# Patient Record
Sex: FEMALE | Race: WHITE | NOT HISPANIC OR LATINO | Employment: OTHER | URBAN - METROPOLITAN AREA
[De-identification: names, ages, dates, MRNs, and addresses within clinical notes are randomized per-mention and may not be internally consistent; named-entity substitution may affect disease eponyms.]

---

## 2017-01-11 ENCOUNTER — OFFICE VISIT (OUTPATIENT)
Dept: CARDIOLOGY | Facility: CLINIC | Age: 72
End: 2017-01-11
Payer: COMMERCIAL

## 2017-01-11 VITALS
WEIGHT: 251 LBS | BODY MASS INDEX: 43.06 KG/M2 | SYSTOLIC BLOOD PRESSURE: 151 MMHG | OXYGEN SATURATION: 94 % | HEART RATE: 56 BPM | DIASTOLIC BLOOD PRESSURE: 78 MMHG

## 2017-01-11 DIAGNOSIS — I25.10 CALCIFICATION OF CORONARY ARTERY: ICD-10-CM

## 2017-01-11 DIAGNOSIS — I48.91 ATRIAL FIBRILLATION (H): Primary | ICD-10-CM

## 2017-01-11 DIAGNOSIS — I48.0 PAROXYSMAL ATRIAL FIBRILLATION (H): Primary | ICD-10-CM

## 2017-01-11 DIAGNOSIS — I50.33 ACUTE ON CHRONIC DIASTOLIC CONGESTIVE HEART FAILURE (H): ICD-10-CM

## 2017-01-11 LAB
ALBUMIN SERPL-MCNC: 3.8 G/DL (ref 3.4–5)
ALP SERPL-CCNC: 86 U/L (ref 40–150)
ALT SERPL W P-5'-P-CCNC: 28 U/L (ref 0–50)
AST SERPL W P-5'-P-CCNC: 13 U/L (ref 0–45)
BILIRUB DIRECT SERPL-MCNC: 0.1 MG/DL (ref 0–0.2)
BILIRUB SERPL-MCNC: 0.4 MG/DL (ref 0.2–1.3)
PROT SERPL-MCNC: 8.3 G/DL (ref 6.8–8.8)

## 2017-01-11 PROCEDURE — 99214 OFFICE O/P EST MOD 30 MIN: CPT | Performed by: INTERNAL MEDICINE

## 2017-01-11 PROCEDURE — 80076 HEPATIC FUNCTION PANEL: CPT | Performed by: INTERNAL MEDICINE

## 2017-01-11 PROCEDURE — 36415 COLL VENOUS BLD VENIPUNCTURE: CPT | Performed by: INTERNAL MEDICINE

## 2017-01-11 RX ORDER — ATENOLOL 50 MG/1
50 TABLET ORAL 2 TIMES DAILY
Qty: 180 TABLET | Refills: 3 | Status: SHIPPED | OUTPATIENT
Start: 2017-01-11 | End: 2017-08-31 | Stop reason: ALTCHOICE

## 2017-01-11 RX ORDER — TORSEMIDE 20 MG/1
40 TABLET ORAL DAILY
Qty: 180 TABLET | Refills: 3 | Status: SHIPPED | OUTPATIENT
Start: 2017-01-11 | End: 2018-02-01

## 2017-01-11 RX ORDER — ATORVASTATIN CALCIUM 40 MG/1
40 TABLET, FILM COATED ORAL DAILY
Qty: 90 TABLET | Refills: 3 | Status: SHIPPED | OUTPATIENT
Start: 2017-01-11 | End: 2018-02-01

## 2017-01-11 RX ORDER — AMIODARONE HYDROCHLORIDE 200 MG/1
200 TABLET ORAL DAILY
Qty: 90 TABLET | Refills: 3 | Status: SHIPPED | OUTPATIENT
Start: 2017-01-11 | End: 2018-02-01

## 2017-01-11 ASSESSMENT — PAIN SCALES - GENERAL: PAINLEVEL: NO PAIN (0)

## 2017-01-11 NOTE — NURSING NOTE
"Chief Complaint   Patient presents with     RECHECK     10 month follow up for HFpEF and dyspnea on exertion. Former Dr. Cobos Pt. Feeling well for the most part. Does report more fatigue and SOB since last time.     Refill Request     all cardiac meds       Initial /78 mmHg  Pulse 56  Wt 113.853 kg (251 lb)  SpO2 94% Estimated body mass index is 43.06 kg/(m^2) as calculated from the following:    Height as of 3/8/16: 1.626 m (5' 4\").    Weight as of this encounter: 113.853 kg (251 lb)..  BP completed using cuff size: regular on right forearm.    Tanesha Mario CMA    "

## 2017-01-11 NOTE — Clinical Note
1/11/2017      RE: Caty Ng  [address] [address]       Dear Colleague,    Thank you for the opportunity to participate in the care of your patient, Caty Ng, at the [facility] AT [facility]. Please see a copy of my visit note below.       SUBJECTIVE:  Caty Ng is a 71 year old female who presents for follow up.  PAF.On Amio for over a year.Remain in SR. Also have elevated wedge pressure by cath in the past.  Symptoms more or less unchanged.Recent cough and some SOB with that.    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 Provider to review       Calcification of coronary artery 07/01/2015     Priority: Medium     Chronic diastolic heart failure (H) 03/06/2014     Priority: Medium     Hyperlipidemia LDL goal <130 11/05/2013     Priority: Medium     BROCK (obstructive sleep apnea) 11/04/2013     Priority: Medium     Patient has chosen to not do cpap.       Atrial fibrillation (H) 10/17/2012     Priority: Medium     Hyperlipidemia      Priority: Medium     Systolic heart failure (H)      Priority: Medium     Followed by Dr. Cobos, cardiology       Mitral valve regurgitation 06/24/2012     Priority: Medium     Health Care Home 06/22/2012     Priority: Medium     [facility] for .  Rosenda Hoyt RN-BSN, [facility]  [phone]   DX V65.8 REPLACED WITH [id number] HEALTH CARE HOME (04/08/2013)       Atrial fibrillation with RVR (H) 06/21/2012     Priority: Medium     Followed by Dr. Cobos,  cardiology       Hypertriglyceridemia      Priority: Medium     overweight with BMI >40 11/29/2011     Priority: Medium     Advanced directives, counseling/discussion 11/29/2011     Priority: Low     Advance Directive Problem List Overview:   Name Relationship Phone    Primary Health Care Agent            Alternative Health Care Agent          Discussed advance care planning with patient; information given to patient to review. 11/29/2011       .  Current Outpatient Prescriptions   Medication Sig     levothyroxine (SYNTHROID/LEVOTHROID) 75 MCG tablet Take 1 tablet (75 mcg) by mouth daily     warfarin (COUMADIN) 2 MG tablet Take as directed by ACC. Current dose is 4mg MWF and 2 TThSS.     torsemide (DEMADEX) 20 MG tablet Take 2 tablets (40 mg) by mouth daily     amiodarone (PACERONE/CODARONE) 200 MG tablet Take 1 tablet (200 mg) by mouth daily     atenolol (TENORMIN) 50 MG tablet Take 1 tablet (50 mg) by mouth 2 times daily     atorvastatin (LIPITOR) 40 MG tablet Take 1 tablet (40 mg) by mouth daily     fluticasone (FLONASE) 50 MCG/ACT nasal spray Spray 1-2 sprays into both nostrils daily Please inform patient if the OTC would be cheaper for her or if there is an alternative nasal steroid that is less costly, please provide that information.     loratadine (CLARITIN) 10 MG tablet Take 10 mg by mouth as needed for allergies     No current facility-administered medications for this visit.     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= [employer]     Hypertriglyceridemia      Mitral valve regurgitation 6/24/2012     Atrial fibrillation with RVR (H)      Hyperlipidemia      Systolic heart failure (H)      Obesity      BROCK (obstructive sleep apnea)      Past Surgical History   Procedure Laterality Date     Joint replacemtn, knee rt/lt  2007     right     Surgical history of -   1989     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     Hysterectomy, pap no longer indicated  1989      Salpingo oophorectomy,r/l/brando  2003     Salpingo Oophorectomy, BRANDO     Allergies   Allergen Reactions     Lisinopril      cough     Metal [Staples] Other (See Comments)     Metal allergy not clearly specified - patient had a stainless steel screw put in her foot that needed to be removed due to allergy/hypersensitivity      Metoprolol Fatigue     Social History     Social History     Marital Status:      Spouse Name: N/A     Number of Children: N/A     Years of Education: N/A     Occupational History     Not on file.     Social History Main Topics     Smoking status: Former Smoker -- 0.50 packs/day for 20 years     Types: Cigarettes     Quit date: 10/01/1982     Smokeless tobacco: Never Used     Alcohol Use: No      Comment: 1 beers per week     Drug Use: No     Sexual Activity:     Partners: Male     Other Topics Concern     Parent/Sibling W/ Cabg, Mi Or Angioplasty Before 65f 55m? Yes     Social History Narrative    , [employer].    2 children.    One great grandchildren.        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     Family History   Problem Relation Age of Onset     HEART DISEASE Mother      CANCER Mother      ovarian     DIABETES Father      Musculoskeletal Disorder Father      DIABETES Paternal Grandmother      DIABETES Paternal Grandfather      DIABETES Sister      Thrombophilia Daughter      Factor V Leiden Deficiency          REVIEW OF SYSTEMS:  General: negative, fever, chills, night sweats  Skin: negative, acne, rash and scaling  Eyes: negative, glaucoma, eye pain and photophobia  Ears/Nose/Throat: negative, nasal congestion and purulent rhinorrhea  Respiratory: No hemoptysis and negative  Cardiovascular: negative, palpitations, tachycardia, irregular heart beat, chest pain, exertional chest pain or pressure, paroxysmal nocturnal dyspnea and orthopnea         OBJECTIVE:  Blood pressure 151/78, pulse 56, weight 113.853 kg (251 lb), SpO2 94 %, not currently breastfeeding.  General Appearance: alert and  no distress  Head: Normocephalic. No masses, lesions, tenderness or abnormalities  Eyes: conjuctiva clear, PERRL, EOM intact  Ears: External ears normal. Canals clear. TM's normal.  Nose: Nares normal  Mouth: normal  Neck: Supple, no cervical adenopathy, no thyromegaly  Lungs: clear to auscultation  Cardiac: regular rate and rhythm, normal S1 and S2, no murmur         ASSESSMENT/PLAN:  PAF. HFpEF. Doing resonably well. Cough and SOB now.  On Amio for over a year.Remain in SR. Will check DLCO.  Cough- if persist,may contact PCP.  EF known to be mildly reduced.Will check echo.  Per orders.   Return to Clinic 1yr    Please do not hesitate to contact me if you have any questions/concerns.     Sincerely,     MEL Casey MD

## 2017-01-11 NOTE — MR AVS SNAPSHOT
After Visit Summary   1/11/2017    Caty Ng    MRN: [id number]           Patient Information     Date Of Birth          1945        Visit Information        Provider Department      1/11/2017 2:30 PM MEL Casey MD [facility] AT [facility]        Today's Diagnoses     Atrial fibrillation (H)    -  1     Mitral valve regurgitation         Systolic heart failure (H)         On amiodarone therapy           Care Instructions    1.  Dr. JACOB Whitlock would like for you to have an ECHO done. This is scheduled for:    Wednesday January 11th, 2017 at 11:00am. [address].  Cardiology RN will call you to discuss the results.      2. Dr. JACOB Whitlock would like for you to have labs today: LFT    3. Please call [facility]: [phone] to schedule PFT (pulmonary function testing).    4. Please follow up with your Primary doctor if cough worsens.    5. Dr. JACOB Whitlock would like to see you back in 1 year (January 2017). We will call you to schedule this appointment as time draws closer to the date.          [facility] Location    If you have any questions regarding to your visit please contact your care team:     Cardiology  Telephone Number   Hansel Agudelo  Cardiology RN's.    Gloria Mario CMA [phone]    After hours: [phone].  [phone]   For scheduling appts:     [phone] or  [phone]    After hours: [phone]   For the Device Clinic (Pacemakers and ICD's)  RN's :  Maricruz Chaudhari   During business hours: [phone]  After business hours:  [phone]- select option 4.      If you need a medication refill please contact your pharmacy.  Please allow 3 business days for your refill to be completed.    As always, Thank you for trusting us with your health care needs!  _____________________________________________________________________            Follow-ups  after your visit        Your next 10 appointments already scheduled     Jan 11, 2017  3:00 PM   LAB with FK LAB   [facility]    [address] [place] [id number] [phone]           Patient must bring picture ID.  Patient should be prepared to give a urine specimen  Please do not eat 10-12 hours before your appointment if you are coming in fasting for labs on lipids, cholesterol, or glucose (sugar).  Pregnant women should follow their Care Team instructions. Water with medications is okay. Do not drink coffee or other fluids.   If you have concerns about taking  your medications, please ask at office or if scheduling via BioAmber, send a message by clicking on Secure Messaging, Message Your Care Team.            Jan 16, 2017 10:00 AM   Anticoagulation Visit with NE ANTI COAG   [facility]    [address] [place] [id number] [phone]            Jan 18, 2017 11:00 AM   Ech Complete with FKECHR1   [facility] [place] ([facility])    [address] [place] [address] [address]nd [facility]  [place] [address]   [phone]           1.  Please bring or wear a comfortable two-piece outfit. 2.  You may eat, drink and take your normal medicines. 3.  For any questions that cannot be answered, please contact the ordering physician              Future tests that were ordered for you today     Open Future Orders        Priority Expected Expires Ordered    Hepatic panel Routine 1/11/2017 1/11/2018 1/11/2017    Echocardiogram Complete Routine  1/11/2018 1/11/2017    General PFT Lab (Please always keep checked) Routine  1/11/2018 1/11/2017    Pulmonary Function Test Routine  1/11/2018 1/11/2017            Who to contact     If you have questions or need follow up information about today's clinic visit or your schedule please contact [facility] PHYSICIANS HEART AT [place]  [facility] directly at [phone].  Normal or non-critical lab and imaging results will be communicated to you by MyChart, letter or phone within 4 business days after the clinic has received the results. If you do not hear from us within 7 days, please contact the clinic through Routehappyhart or phone. If you have a critical or abnormal lab result, we will notify you by phone as soon as possible.  Submit refill requests through Privcap or call your pharmacy and they will forward the refill request to us. Please allow 3 business days for your refill to be completed.          Additional Information About Your Visit        Routehappyhart Information     [employer] gives you secure access to your electronic health record. If you see a primary care provider, you can also send messages to your care team and make appointments. If you have questions, please call your primary care clinic.  If you do not have a primary care provider, please call [phone] and they will assist you.        Care EveryWhere ID     This is your Care EveryWhere ID. This could be used by other organizations to access your [facility] medical records  [id number]        Your Vitals Were     Pulse Pulse Oximetry                56 94%           Blood Pressure from Last 3 Encounters:   01/11/17 151/78   03/08/16 153/80   02/24/16 132/70    Weight from Last 3 Encounters:   01/11/17 113.853 kg (251 lb)   03/08/16 116.937 kg (257 lb 12.8 oz)   02/24/16 115.214 kg (254 lb)               Primary Care Provider Office Phone # Fax #    Avis Ferrer [phone]       [address] [address]        Thank you!     Thank you for choosing [facility] PHYSICIANS HEART AT [facility]  for your care. Our goal is always to provide you with excellent care. Hearing back from our patients is one way we can continue to improve our services. Please take a few minutes to complete the written survey that  you may receive in the mail after your visit with us. Thank you!             Your Updated Medication List - Protect others around you: Learn how to safely use, store and throw away your medicines at www.disposemymeds.org.          This list is accurate as of: 1/11/17  2:55 PM.  Always use your most recent med list.                   Brand Name Dispense Instructions for use    amiodarone 200 MG tablet    PACERONE/CODARONE    90 tablet    Take 1 tablet (200 mg) by mouth daily       atenolol 50 MG tablet    TENORMIN    180 tablet    Take 1 tablet (50 mg) by mouth 2 times daily       atorvastatin 40 MG tablet    LIPITOR    90 tablet    Take 1 tablet (40 mg) by mouth daily       fluticasone 50 MCG/ACT spray    FLONASE    16 g    Spray 1-2 sprays into both nostrils daily Please inform patient if the OTC would be cheaper for her or if there is an alternative nasal steroid that is less costly, please provide that information.       levothyroxine 75 MCG tablet    SYNTHROID/LEVOTHROID    90 tablet    Take 1 tablet (75 mcg) by mouth daily       loratadine 10 MG tablet    CLARITIN     Take 10 mg by mouth as needed for allergies       torsemide 20 MG tablet    DEMADEX    180 tablet    Take 2 tablets (40 mg) by mouth daily       warfarin 2 MG tablet    COUMADIN    135 tablet    Take as directed by ACC. Current dose is 4mg MWF and 2 TThSS.

## 2017-01-11 NOTE — PATIENT INSTRUCTIONS
1.  Dr. JACOB Whitlock would like for you to have an ECHO done. This is scheduled for:    Wednesday January 11th, 2017 at 11:00am. [address].  Cardiology RN will call you to discuss the results.      2. Dr. JACOB Whitlock would like for you to have labs today: LFT    3. Please call [facility]: [phone] to schedule PFT (pulmonary function testing).    4. Please follow up with your Primary doctor if cough worsens.    5. Dr. JACOB Whitlock would like to see you back in 1 year (January 2017). We will call you to schedule this appointment as time draws closer to the date.          [facility] Cardiology - [place] Location    If you have any questions regarding to your visit please contact your care team:     Cardiology  Telephone Number   Hansel Agudelo  Cardiology RN's.    Gloria Mario CMA [phone]    After hours: [phone].  [phone]   For scheduling appts:     [phone] or  [phone]    After hours: [phone]   For the Device Clinic (Pacemakers and ICD's)  RN's :  Maricruz Chaudhari   During business hours: [phone]  After business hours:  [phone]- select option 4.      If you need a medication refill please contact your pharmacy.  Please allow 3 business days for your refill to be completed.    As always, Thank you for trusting us with your health care needs!  _____________________________________________________________________

## 2017-01-11 NOTE — NURSING NOTE
Cardiac Testing: Patient given instructions regarding  echocardiogram . Discussed purpose, preparation, procedure and when to expect results reported back to the patient. Patient demonstrated understanding of this information and agreed to call with further questions or concerns.  Labs: Amiodarone patient:  Patient was advised to have LFT hepatic panel drawn today.  Cardiology RN will call patient with results Patient demonstrated understanding of this information and agreed to call with further questions or concerns.     Dr. Whitlock has also ordered PFT's d/t on amiodarone.  Patient currently has a cold and wants to wait to have the PFT's until she feels better.  # given to schedule the PFT's at  location.   Med Reconcile: Reviewed and verified all current medications with the patient. The updated medication list was printed and given to the patient.  Return Appointment: Patient given instructions regarding scheduling next clinic visit. Patient demonstrated understanding of this information and agreed to call with further questions or concerns.  Patient stated she understood all health information given and agreed to call with further questions or concerns.  Rajani Stout RN

## 2017-01-11 NOTE — PROGRESS NOTES
SUBJECTIVE:  Caty Ng is a 71 year old female who presents for follow up.  PAF.On Amio for over a year.Remain in SR. Also have elevated wedge pressure by cath in the past.  Symptoms more or less unchanged.Recent cough and some SOB with that.    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 Provider to review       Calcification of coronary artery 07/01/2015     Priority: Medium     Chronic diastolic heart failure (H) 03/06/2014     Priority: Medium     Hyperlipidemia LDL goal <130 11/05/2013     Priority: Medium     BROCK (obstructive sleep apnea) 11/04/2013     Priority: Medium     Patient has chosen to not do cpap.       Atrial fibrillation (H) 10/17/2012     Priority: Medium     Hyperlipidemia      Priority: Medium     Systolic heart failure (H)      Priority: Medium     Followed by Dr. Cobos, cardiology       Mitral valve regurgitation 06/24/2012     Priority: Medium     Health Care Home 06/22/2012     Priority: Medium     [facility] for .  Rosenda Hoyt RN-BSN, [facility]  [phone]   DX V65.8 REPLACED WITH [id number] HEALTH CARE HOME (04/08/2013)       Atrial fibrillation with RVR (H) 06/21/2012     Priority: Medium     Followed by Dr. Cobos, cardiology       Hypertriglyceridemia      Priority: Medium     overweight with BMI >40 11/29/2011     Priority: Medium     Advanced directives, counseling/discussion 11/29/2011     Priority: Low     Advance Directive Problem List Overview:   Name Relationship Phone    Primary Health Care Agent            Alternative Health Care Agent          Discussed advance care  planning with patient; information given to patient to review. 11/29/2011       .  Current Outpatient Prescriptions   Medication Sig     levothyroxine (SYNTHROID/LEVOTHROID) 75 MCG tablet Take 1 tablet (75 mcg) by mouth daily     warfarin (COUMADIN) 2 MG tablet Take as directed by ACC. Current dose is 4mg MWF and 2 TThSS.     torsemide (DEMADEX) 20 MG tablet Take 2 tablets (40 mg) by mouth daily     amiodarone (PACERONE/CODARONE) 200 MG tablet Take 1 tablet (200 mg) by mouth daily     atenolol (TENORMIN) 50 MG tablet Take 1 tablet (50 mg) by mouth 2 times daily     atorvastatin (LIPITOR) 40 MG tablet Take 1 tablet (40 mg) by mouth daily     fluticasone (FLONASE) 50 MCG/ACT nasal spray Spray 1-2 sprays into both nostrils daily Please inform patient if the OTC would be cheaper for her or if there is an alternative nasal steroid that is less costly, please provide that information.     loratadine (CLARITIN) 10 MG tablet Take 10 mg by mouth as needed for allergies     No current facility-administered medications for this visit.     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= [employer]     Hypertriglyceridemia      Mitral valve regurgitation 6/24/2012     Atrial fibrillation with RVR (H)      Hyperlipidemia      Systolic heart failure (H)      Obesity      BROCK (obstructive sleep apnea)      Past Surgical History   Procedure Laterality Date     Joint replacemtn, knee rt/lt  2007     right     Surgical history of -   1989     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     Hysterectomy, pap no longer indicated  1989     Salpingo oophorectomy,r/l/brando  2003     Salpingo Oophorectomy, BRANDO     Allergies   Allergen Reactions     Lisinopril      cough     Metal [Staples] Other (See Comments)     Metal allergy not clearly specified - patient had a stainless steel screw put in her foot that needed to be removed due to allergy/hypersensitivity      Metoprolol Fatigue     Social History      Social History     Marital Status:      Spouse Name: N/A     Number of Children: N/A     Years of Education: N/A     Occupational History     Not on file.     Social History Main Topics     Smoking status: Former Smoker -- 0.50 packs/day for 20 years     Types: Cigarettes     Quit date: 10/01/1982     Smokeless tobacco: Never Used     Alcohol Use: No      Comment: 1 beers per week     Drug Use: No     Sexual Activity:     Partners: Male     Other Topics Concern     Parent/Sibling W/ Cabg, Mi Or Angioplasty Before 65f 55m? Yes     Social History Narrative    , [employer].    2 children.    One great grandchildren.        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     Family History   Problem Relation Age of Onset     HEART DISEASE Mother      CANCER Mother      ovarian     DIABETES Father      Musculoskeletal Disorder Father      DIABETES Paternal Grandmother      DIABETES Paternal Grandfather      DIABETES Sister      Thrombophilia Daughter      Factor V Leiden Deficiency          REVIEW OF SYSTEMS:  General: negative, fever, chills, night sweats  Skin: negative, acne, rash and scaling  Eyes: negative, glaucoma, eye pain and photophobia  Ears/Nose/Throat: negative, nasal congestion and purulent rhinorrhea  Respiratory: No hemoptysis and negative  Cardiovascular: negative, palpitations, tachycardia, irregular heart beat, chest pain, exertional chest pain or pressure, paroxysmal nocturnal dyspnea and orthopnea         OBJECTIVE:  Blood pressure 151/78, pulse 56, weight 113.853 kg (251 lb), SpO2 94 %, not currently breastfeeding.  General Appearance: alert and no distress  Head: Normocephalic. No masses, lesions, tenderness or abnormalities  Eyes: conjuctiva clear, PERRL, EOM intact  Ears: External ears normal. Canals clear. TM's normal.  Nose: Nares normal  Mouth: normal  Neck: Supple, no cervical adenopathy, no thyromegaly  Lungs: clear to auscultation  Cardiac: regular rate and rhythm, normal S1 and S2, no  murmur         ASSESSMENT/PLAN:  PAF. HFpEF. Doing resonably well. Cough and SOB now.  On Amio for over a year.Remain in SR. Will check DLCO.  Cough- if persist,may contact PCP.  EF known to be mildly reduced.Will check echo.  Per orders.   Return to Clinic 1yr

## 2017-01-16 ENCOUNTER — ANTICOAGULATION THERAPY VISIT (OUTPATIENT)
Dept: NURSING | Facility: CLINIC | Age: 72
End: 2017-01-16
Payer: COMMERCIAL

## 2017-01-16 DIAGNOSIS — Z79.01 LONG TERM CURRENT USE OF ANTICOAGULANT THERAPY: Primary | ICD-10-CM

## 2017-01-16 DIAGNOSIS — I48.91 ATRIAL FIBRILLATION (H): ICD-10-CM

## 2017-01-16 LAB — INR POINT OF CARE: 3.8 (ref 2–3)

## 2017-01-16 PROCEDURE — 36416 COLLJ CAPILLARY BLOOD SPEC: CPT

## 2017-01-16 PROCEDURE — 99207 ZZC NO CHARGE NURSE ONLY: CPT

## 2017-01-16 PROCEDURE — 85610 PROTHROMBIN TIME: CPT | Mod: QW

## 2017-01-16 NOTE — MR AVS SNAPSHOT
Caty gN   1/16/2017 10:00 AM   Anticoagulation Therapy Visit    Description:  72 year old female   Provider:  CHRISSIE MANRIQUE   Department:  Ne Nurse           INR as of 1/16/2017     Selected INR 3.8! (1/16/2017)      Anticoagulation Summary as of 1/16/2017     INR goal 2.0-3.0   Selected INR 3.8! (1/16/2017)   Full instructions 1/16: Hold; Otherwise 2 mg every day   Next INR check 1/23/2017    Indications   Atrial fibrillation (H) [I48.91]  Long term current use of anticoagulant therapy [Z79.01]         Your next Anticoagulation Clinic appointment(s)     Jan 23, 2017 11:15 AM   Anticoagulation Visit with [facility]   [facility]    [address] [place] [address] [phone]              Contact Numbers     Please call [phone] to cancel and/or reschedule your appointment.  Please call [phone] with any problems or questions regarding your therapy          January 2017 Details    Sun Mon Tue Wed Thu Fri Sat     1               2               3               4               5               6               7                 8               9               10               11               12               13               14                 15               16      Hold   See details      17      2 mg         18      2 mg         19      2 mg         20      2 mg         21      2 mg           22      2 mg         23            24               25               26               27               28                 29               30               31                    Date Details   01/16 This INR check       Date of next INR:  1/23/2017         How to take your warfarin dose     To take:  2 mg Take 1 of the 2 mg tablets.    Hold Do not take your warfarin dose. See the Details table to the right for additional instructions.

## 2017-01-16 NOTE — PROGRESS NOTES
ANTICOAGULATION FOLLOW-UP CLINIC VISIT    Patient Name:  Caty Ng  Date:  1/16/2017  Contact Type:  Face to Face    SUBJECTIVE:     Patient Findings     Positives Other Complaints    Comments Resolving URI           OBJECTIVE    INR PROTIME   Date Value Ref Range Status   01/16/2017 3.8* 2.0 - 3.0 Final       ASSESSMENT / PLAN  INR assessment SUPRA URI   Recheck INR In: 1 WEEK    INR Location Clinic      Anticoagulation Summary as of 1/16/2017     INR goal 2.0-3.0   Selected INR 3.8! (1/16/2017)   Maintenance plan 2 mg (2 mg x 1) every day   Full instructions 1/16: Hold; Otherwise 2 mg every day   Weekly total 14 mg   Plan last modified Adiel Boyle RN (3/7/2016)   Next INR check 1/23/2017   Priority INR   Target end date Indefinite    Indications   Atrial fibrillation (H) [I48.91]  Long term current use of anticoagulant therapy [Z79.01]         Anticoagulation Episode Summary     [facility]    Preferred lab [facility]    [facility]    Comments       Anticoagulation Care Providers     Provider Role Specialty Phone number    Avis Ferrer MD  Family Practice [phone]            See the Encounter Report to view Anticoagulation Flowsheet and Dosing Calendar (Go to Encounters tab in chart review, and find the Anticoagulation Therapy Visit)    Dosage adjustment made based on physician directed care plan.    Madhuri Renae RN

## 2017-01-23 ENCOUNTER — ANTICOAGULATION THERAPY VISIT (OUTPATIENT)
Dept: NURSING | Facility: CLINIC | Age: 72
End: 2017-01-23
Payer: COMMERCIAL

## 2017-01-23 ENCOUNTER — RADIANT APPOINTMENT (OUTPATIENT)
Dept: CARDIOLOGY | Facility: CLINIC | Age: 72
End: 2017-01-23
Attending: INTERNAL MEDICINE
Payer: COMMERCIAL

## 2017-01-23 DIAGNOSIS — R06.02 SOB (SHORTNESS OF BREATH): ICD-10-CM

## 2017-01-23 DIAGNOSIS — Z79.01 LONG TERM CURRENT USE OF ANTICOAGULANT THERAPY: Primary | ICD-10-CM

## 2017-01-23 DIAGNOSIS — I48.91 ATRIAL FIBRILLATION (H): ICD-10-CM

## 2017-01-23 LAB — INR POINT OF CARE: 2.5 (ref 2–3)

## 2017-01-23 PROCEDURE — 36416 COLLJ CAPILLARY BLOOD SPEC: CPT

## 2017-01-23 PROCEDURE — 93306 TTE W/DOPPLER COMPLETE: CPT | Performed by: INTERNAL MEDICINE

## 2017-01-23 PROCEDURE — 85610 PROTHROMBIN TIME: CPT | Mod: QW

## 2017-01-23 PROCEDURE — 99207 ZZC NO CHARGE NURSE ONLY: CPT

## 2017-01-23 RX ADMIN — Medication 3 ML: at 14:00

## 2017-01-23 NOTE — PROGRESS NOTES
ANTICOAGULATION FOLLOW-UP CLINIC VISIT    Patient Name:  Caty Ng  Date:  1/23/2017  Contact Type:  Face to Face    SUBJECTIVE:     Patient Findings     Positives No Problem Findings    Comments Recovering from sinus illness           OBJECTIVE    INR PROTIME   Date Value Ref Range Status   01/23/2017 2.5 2.0 - 3.0 Final       ASSESSMENT / PLAN  INR assessment THER    Recheck INR In: 10 DAYS    INR Location Clinic      Anticoagulation Summary as of 1/23/2017     INR goal 2.0-3.0   Selected INR 2.5 (1/23/2017)   Maintenance plan 1 mg (2 mg x 0.5) on Tue, Fri; 2 mg (2 mg x 1) all other days   Full instructions 1 mg on Tue, Fri; 2 mg all other days   Weekly total 12 mg   Plan last modified Madhuri Renae RN (1/23/2017)   Next INR check 2/2/2017   Priority INR   Target end date Indefinite    Indications   Atrial fibrillation (H) [I48.91]  Long term current use of anticoagulant therapy [Z79.01]         Anticoagulation Episode Summary     [facility]    Preferred lab [facility]    [facility]ers [facility]    Comments       Anticoagulation Care Providers     Provider Role Specialty Phone number    Avis Ferrer MD  Family Practice [phone]            See the Encounter Report to view Anticoagulation Flowsheet and Dosing Calendar (Go to Encounters tab in chart review, and find the Anticoagulation Therapy Visit)    Dosage adjustment made based on physician directed care plan.    Madhuri Renae RN

## 2017-01-23 NOTE — NURSING NOTE
Patient presents today for resting echo ordered by MD.     Echo Tech provided patient education about resting echo. IV started in left hand.   IV start documented in  [employer].  Echo technician completed resting echo. Patient monitored according to Optison protocol. Data transferred to [facility] for final interpretation through [employer].     Optison medication:  Amount used 3ml Optison mixed with 6ml Saline - [id number]   After completion of resting echo, IV removed.    Patient education provided about cardiology interpretation and primary provider will be notified of results.    Aurelia Wilcox RDCS

## 2017-01-23 NOTE — MR AVS SNAPSHOT
Caty Ng   1/23/2017 11:15 AM   Anticoagulation Therapy Visit    Description:  72 year old female   Provider:  NE ANTI COAG   Department:  Ne Nurse           INR as of 1/23/2017     Selected INR 2.5 (1/23/2017)      Anticoagulation Summary as of 1/23/2017     INR goal 2.0-3.0   Selected INR 2.5 (1/23/2017)   Full instructions 1 mg on Tue, Fri; 2 mg all other days   Next INR check 2/2/2017    Indications   Atrial fibrillation (H) [I48.91]  Long term current use of anticoagulant therapy [Z79.01]         Your next Anticoagulation Clinic appointment(s)     Feb 02, 2017  9:45 AM   Anticoagulation Visit with NE ANTI COAG   [facility] ([facility])    [address] [place] [address] [phone]              Contact Numbers     Please call [phone] to cancel and/or reschedule your appointment.  Please call [phone] with any problems or questions regarding your therapy          January 2017 Details    Sun Mon Tue Wed Thu Fri Sat     1               2               3               4               5               6               7                 8               9               10               11               12               13               14                 15               16               17               18               19               20               21                 22               23      2 mg   See details      24      1 mg         25      2 mg         26      2 mg         27      1 mg         28      2 mg           29      2 mg         30      2 mg         31      1 mg              Date Details   01/23 This INR check               How to take your warfarin dose     To take:  1 mg Take 0.5 of a 2 mg tablet.    To take:  2 mg Take 1 of the 2 mg tablets.           February 2017 Details    Sun Mon Tue Wed Thu Fri Sat        1      2 mg         2            3               4                 5               6               7                8               9               10               11                 12               13               14               15               16               17               18                 19               20               21               22               23               24               25                 26               27               28                    Date Details   No additional details    Date of next INR:  2/2/2017         How to take your warfarin dose     To take:  2 mg Take 1 of the 2 mg tablets.

## 2017-01-25 ENCOUNTER — TELEPHONE (OUTPATIENT)
Dept: CARDIOLOGY | Facility: CLINIC | Age: 72
End: 2017-01-25

## 2017-01-25 DIAGNOSIS — E03.9 HYPOTHYROIDISM: ICD-10-CM

## 2017-01-25 DIAGNOSIS — I50.32 CHRONIC DIASTOLIC HEART FAILURE (H): Primary | ICD-10-CM

## 2017-01-25 RX ORDER — LOSARTAN POTASSIUM 50 MG/1
50 TABLET ORAL DAILY
Qty: 30 TABLET | Refills: 1 | Status: SHIPPED | OUTPATIENT
Start: 2017-01-25 | End: 2017-01-25

## 2017-01-25 RX ORDER — LOSARTAN POTASSIUM 50 MG/1
50 TABLET ORAL DAILY
Qty: 90 TABLET | Refills: 3 | Status: SHIPPED | OUTPATIENT
Start: 2017-01-25 | End: 2019-01-31

## 2017-02-02 ENCOUNTER — ANTICOAGULATION THERAPY VISIT (OUTPATIENT)
Dept: NURSING | Facility: CLINIC | Age: 72
End: 2017-02-02
Payer: COMMERCIAL

## 2017-02-02 DIAGNOSIS — I48.91 ATRIAL FIBRILLATION (H): ICD-10-CM

## 2017-02-02 DIAGNOSIS — Z79.01 LONG TERM CURRENT USE OF ANTICOAGULANT THERAPY: Primary | ICD-10-CM

## 2017-02-02 DIAGNOSIS — E03.9 HYPOTHYROIDISM: ICD-10-CM

## 2017-02-02 DIAGNOSIS — I50.32 CHRONIC DIASTOLIC HEART FAILURE (H): ICD-10-CM

## 2017-02-02 LAB
ANION GAP SERPL CALCULATED.3IONS-SCNC: 8 MMOL/L (ref 3–14)
BUN SERPL-MCNC: 23 MG/DL (ref 7–30)
CALCIUM SERPL-MCNC: 8.7 MG/DL (ref 8.5–10.1)
CHLORIDE SERPL-SCNC: 102 MMOL/L (ref 94–109)
CO2 SERPL-SCNC: 29 MMOL/L (ref 20–32)
CREAT SERPL-MCNC: 1.05 MG/DL (ref 0.52–1.04)
GFR SERPL CREATININE-BSD FRML MDRD: 52 ML/MIN/1.7M2
GLUCOSE SERPL-MCNC: 147 MG/DL (ref 70–99)
INR POINT OF CARE: 2.5 (ref 2–3)
POTASSIUM SERPL-SCNC: 3.9 MMOL/L (ref 3.4–5.3)
SODIUM SERPL-SCNC: 139 MMOL/L (ref 133–144)
TSH SERPL DL<=0.05 MIU/L-ACNC: 1.48 MU/L (ref 0.4–4)

## 2017-02-02 PROCEDURE — 85610 PROTHROMBIN TIME: CPT | Mod: QW

## 2017-02-02 PROCEDURE — 36416 COLLJ CAPILLARY BLOOD SPEC: CPT

## 2017-02-02 PROCEDURE — 84443 ASSAY THYROID STIM HORMONE: CPT | Performed by: INTERNAL MEDICINE

## 2017-02-02 PROCEDURE — 99207 ZZC NO CHARGE NURSE ONLY: CPT

## 2017-02-02 PROCEDURE — 80048 BASIC METABOLIC PNL TOTAL CA: CPT | Performed by: INTERNAL MEDICINE

## 2017-02-02 NOTE — PROGRESS NOTES
ANTICOAGULATION FOLLOW-UP CLINIC VISIT    Patient Name:  Caty Ng  Date:  2/2/2017  Contact Type:  Face to Face    SUBJECTIVE:     Patient Findings     Positives No Problem Findings           OBJECTIVE    INR PROTIME   Date Value Ref Range Status   02/02/2017 2.5 2.0 - 3.0 Final       ASSESSMENT / PLAN  INR assessment THER    Recheck INR In: 2 WEEKS    INR Location Clinic      Anticoagulation Summary as of 2/2/2017     INR goal 2.0-3.0   Selected INR 2.5 (2/2/2017)   Maintenance plan 1 mg (2 mg x 0.5) on Tue, Fri; 2 mg (2 mg x 1) all other days   Full instructions 1 mg on Tue, Fri; 2 mg all other days   Weekly total 12 mg   No change documented Madhuri Renae RN   Plan last modified Madhuri Renae RN (1/23/2017)   Next INR check 2/16/2017   Priority INR   Target end date Indefinite    Indications   Atrial fibrillation (H) [I48.91]  Long term current use of anticoagulant therapy [Z79.01]         Anticoagulation Episode Summary     [facility]    Preferred lab [facility]    Send INR reminders to [facility]    Comments       Anticoagulation Care Providers     Provider Role Specialty Phone number    Avis Ferrer MD  Family Practice [phone]            See the Encounter Report to view Anticoagulation Flowsheet and Dosing Calendar (Go to Encounters tab in chart review, and find the Anticoagulation Therapy Visit)        Madhuri Renae RN

## 2017-02-02 NOTE — MR AVS SNAPSHOT
Caty Ng   2/2/2017 9:45 AM   Anticoagulation Therapy Visit    Description:  72 year old female   Provider:  NE ANTI COAG   Department:  Ne Nurse           INR as of 2/2/2017     Selected INR 2.5 (2/2/2017)      Anticoagulation Summary as of 2/2/2017     INR goal 2.0-3.0   Selected INR 2.5 (2/2/2017)   Full instructions 1 mg on Tue, Fri; 2 mg all other days   Next INR check 2/16/2017    Indications   Atrial fibrillation (H) [I48.91]  Long term current use of anticoagulant therapy [Z79.01]         Your next Anticoagulation Clinic appointment(s)     Feb 16, 2017  9:30 AM   Anticoagulation [facility] with NE ANTI COAG   [facility]    [address] [place] [id number] [phone]              Contact Numbers     Please call [phone] to cancel and/or reschedule your appointment.  Please call [phone] with any problems or questions regarding your therapy          February 2017 Details    Sun Mon Tue Wed Thu Fri Sat        1               2      2 mg   See details      3      1 mg         4      2 mg           5      2 mg         6      2 mg         7      1 mg         8      2 mg         9      2 mg         10      1 mg         11      2 mg           12      2 mg         13      2 mg         14      1 mg         15      2 mg         16            17               18                 19               20               21               22               23               24               25                 26               27               28                    Date Details   02/02 This INR check       Date of next INR:  2/16/2017         How to take your warfarin dose     To take:  1 mg Take 0.5 of a 2 mg tablet.    To take:  2 mg Take 1 of the 2 mg tablets.

## 2017-02-13 ENCOUNTER — OFFICE VISIT (OUTPATIENT)
Dept: NURSING | Facility: CLINIC | Age: 72
End: 2017-02-13
Payer: COMMERCIAL

## 2017-02-13 DIAGNOSIS — R06.00 DYSPNEA: Primary | ICD-10-CM

## 2017-02-13 PROCEDURE — 94729 DIFFUSING CAPACITY: CPT | Performed by: INTERNAL MEDICINE

## 2017-02-13 PROCEDURE — 94375 RESPIRATORY FLOW VOLUME LOOP: CPT | Performed by: INTERNAL MEDICINE

## 2017-02-13 NOTE — MR AVS SNAPSHOT
After Visit Summary   2/13/2017    Caty Ng    MRN: [id number]           Patient Information     Date Of Birth          1945        Visit Information        Provider Department      2/13/2017 11:30 AM PFT LAB [facility]        Today's Diagnoses     Dyspnea    -  1       Follow-ups after your visit        Your next 10 appointments already scheduled     Feb 16, 2017  9:30 AM CST   Anticoagulation Visit with NE ANTI COAG   [facility] ([facility])    [address] [place] [address] [phone]              Who to contact     If you have questions or need follow up information about today's clinic visit or your schedule please contact [facility] directly at [phone].  Normal or non-critical lab and imaging results will be communicated to you by [email or web address], letter or phone within 4 business days after the clinic has received the results. If you do not hear from us within 7 days, please contact the clinic through [email or web address] or phone. If you have a critical or abnormal lab result, we will notify you by phone as soon as possible.  Submit refill requests through [employer] or call your pharmacy and they will forward the refill request to us. Please allow 3 business days for your refill to be completed.          Additional Information About Your Visit        [email or web address] Information     [employer] gives you secure access to your electronic health record. If you see a primary care provider, you can also send messages to your care team and make appointments. If you have questions, please call your primary care clinic.  If you do not have a primary care provider, please call [phone] and they will assist you.      [employer] is an electronic gateway that provides easy, online access to your medical records. With [employer], you can request a clinic appointment, read your test results, renew a prescription or communicate with  your care team.     To access your existing account, please contact your [facility] or call [phone] for assistance.        Care EveryWhere ID     This is your Care EveryWhere ID. This could be used by other organizations to access your [place] medical records  [id number]         Blood Pressure from Last 3 Encounters:   01/11/17 151/78   03/08/16 153/80   02/24/16 132/70    Weight from Last 3 Encounters:   01/11/17 113.9 kg (251 lb)   03/08/16 116.9 kg (257 lb 12.8 oz)   02/24/16 115.2 kg (254 lb)              We Performed the Following     General PFT Lab (Please always keep checked)     Pulmonary Function Test        Primary Care Provider Office Phone # Fax #    Avis Ferrer [phone]       [address] [address]        Thank you!     Thank you for choosing [facility]  for your care. Our goal is always to provide you with excellent care. Hearing back from our patients is one way we can continue to improve our services. Please take a few minutes to complete the written survey that you may receive in the mail after your visit with us. Thank you!             Your Updated Medication List - Protect others around you: Learn how to safely use, store and throw away your medicines at www.disposemymeds.org.          This list is accurate as of: 2/13/17 11:59 PM.  Always use your most recent med list.                   Brand Name Dispense Instructions for use    amiodarone 200 MG tablet    PACERONE/CODARONE    90 tablet    Take 1 tablet (200 mg) by mouth daily       atenolol 50 MG tablet    TENORMIN    180 tablet    Take 1 tablet (50 mg) by mouth 2 times daily       atorvastatin 40 MG tablet    LIPITOR    90 tablet    Take 1 tablet (40 mg) by mouth daily       fluticasone 50 MCG/ACT spray    FLONASE    16 g    Spray 1-2 sprays into both nostrils daily Please inform patient if the OTC would be  cheaper for her or if there is an alternative nasal steroid that is less costly, please provide that information.       levothyroxine 75 MCG tablet    SYNTHROID/LEVOTHROID    90 tablet    Take 1 tablet (75 mcg) by mouth daily       loratadine 10 MG tablet    CLARITIN     Take 10 mg by mouth as needed for allergies       losartan 50 MG tablet    COZAAR    90 tablet    Take 1 tablet (50 mg) by mouth daily       torsemide 20 MG tablet    DEMADEX    180 tablet    Take 2 tablets (40 mg) by mouth daily       warfarin 2 MG tablet    COUMADIN    135 tablet    Take as directed by [facility]. Current dose is 4mg MWF and 2 TThSS.

## 2017-02-16 ENCOUNTER — ANTICOAGULATION THERAPY VISIT (OUTPATIENT)
Dept: NURSING | Facility: CLINIC | Age: 72
End: 2017-02-16
Payer: COMMERCIAL

## 2017-02-16 DIAGNOSIS — I48.91 ATRIAL FIBRILLATION (H): ICD-10-CM

## 2017-02-16 DIAGNOSIS — Z79.01 LONG TERM CURRENT USE OF ANTICOAGULANT THERAPY: ICD-10-CM

## 2017-02-16 LAB — INR POINT OF CARE: 2.5 (ref 2–3)

## 2017-02-16 PROCEDURE — 36416 COLLJ CAPILLARY BLOOD SPEC: CPT

## 2017-02-16 PROCEDURE — 85610 PROTHROMBIN TIME: CPT | Mod: QW

## 2017-02-16 PROCEDURE — 99207 ZZC NO CHARGE NURSE ONLY: CPT

## 2017-02-16 NOTE — MR AVS SNAPSHOT
Caty Ng   2/16/2017 9:30 AM   Anticoagulation Therapy Visit    Description:  72 year old female   Provider:  NE ANTI HILARIA   Department:  Ne Nurse           INR as of 2/16/2017     Today's INR 2.5      Anticoagulation Summary as of 2/16/2017     INR goal 2.0-3.0   Today's INR 2.5   Full instructions 1 mg on Tue, Fri; 2 mg all other days   Next INR check 3/9/2017    Indications   Atrial fibrillation (H) [I48.91]  Long term current use of anticoagulant therapy [Z79.01]         Your next Anticoagulation Clinic appointment(s)     Mar 09, 2017  9:30 AM CST   An[facility]agulation [facility] with [facility] ANTI COAG   [facility]    [address] [place] [address] [phone]              Contact Numbers     Please call [phone] to cancel and/or reschedule your appointment.  Please call [phone] with any problems or questions regarding your therapy          February 2017 Details    Sun Mon Tue Wed Thu Fri Sat        1               2               3               4                 5               6               7               8               9               10               11                 12               13               14               15               16      2 mg   See details      17      1 mg         18      2 mg           19      2 mg         20      2 mg         21      1 mg         22      2 mg         23      2 mg         24      1 mg         25      2 mg           26      2 mg         27      2 mg         28      1 mg              Date Details   02/16 This INR check               How to take your warfarin dose     To take:  1 mg Take 0.5 of a 2 mg tablet.    To take:  2 mg Take 1 of the 2 mg tablets.           March 2017 Details    Sun Mon Tue Wed Thu Fri Sat        1      2 mg         2      2 mg         3      1 mg         4      2 mg           5      2 mg         6      2 mg         7      1 mg         8      2 mg         9             10               11                 12               13               14               15               16               17               18                 19               20               21               22               23               24               25                 26               27               28               29               30               31                 Date Details   No additional details    Date of next INR:  3/9/2017         How to take your warfarin dose     To take:  1 mg Take 0.5 of a 2 mg tablet.    To take:  2 mg Take 1 of the 2 mg tablets.

## 2017-02-16 NOTE — PROGRESS NOTES
ANTICOAGULATION FOLLOW-UP CLINIC VISIT    Patient Name:  Caty Ng  Date:  2/16/2017  Contact Type:  Face to Face    SUBJECTIVE:     Patient Findings     Positives No Problem Findings           OBJECTIVE    INR Protime   Date Value Ref Range Status   02/16/2017 2.5 2.0 - 3.0 Final       ASSESSMENT / PLAN  INR assessment THER    Recheck INR In: 3 WEEKS    INR Location Clinic      Anticoagulation Summary as of 2/16/2017     INR goal 2.0-3.0   Today's INR 2.5   Maintenance plan 1 mg (2 mg x 0.5) on Tue, Fri; 2 mg (2 mg x 1) all other days   Full instructions 1 mg on Tue, Fri; 2 mg all other days   Weekly total 12 mg   No change documented Madhuri Renae RN   Plan last modified Madhuri Renae RN (1/23/2017)   Next INR check 3/9/2017   Priority INR   Target end date Indefinite    Indications   Atrial fibrillation (H) [I48.91]  Long term current use of anticoagulant therapy [Z79.01]         Anticoagulation Episode Summary     [facility]    Preferred lab [facility]    Send INR reminders to [facility]    Comments       Anticoagulation Care Providers     Provider Role Specialty Phone number    Avis Ferrer MD  Family Practice [phone]            See the Encounter Report to view Anticoagulation Flowsheet and Dosing Calendar (Go to Encounters tab in chart review, and find the Anticoagulation Therapy Visit)        Madhuri Renae RN

## 2017-02-22 ENCOUNTER — TELEPHONE (OUTPATIENT)
Dept: CARDIOLOGY | Facility: CLINIC | Age: 72
End: 2017-02-22

## 2017-02-22 NOTE — TELEPHONE ENCOUNTER
"Dr. Whitlock has reviewed PFT and lab results.  Patient is to continue taking amiodarone.  Patient informed and verbalized understanding.  Patient did report that on some days she does experience dizziness that she feels it is related to the Losartan because it will occur about 1 hour after she takes losartan.  This does not happen daily.  She will rest (sit down) and the dizziness will resolve.  Happens when she is up and walking not during a positional change. When she feels dizzy she will check her blood pressure and the readings are \"normal\".  States it is tolerable.    Writer advised for patient to continue to monitor her symptoms, stay hydrated, rest when she feels dizzy, and if it persists/worsens to call back to discuss.  Patient verbalized understanding and is agreeable with the plan.    Rajani Stout RN    "

## 2017-03-09 ENCOUNTER — ANTICOAGULATION THERAPY VISIT (OUTPATIENT)
Dept: NURSING | Facility: CLINIC | Age: 72
End: 2017-03-09
Payer: COMMERCIAL

## 2017-03-09 DIAGNOSIS — Z79.01 LONG TERM CURRENT USE OF ANTICOAGULANT THERAPY: ICD-10-CM

## 2017-03-09 DIAGNOSIS — I48.91 ATRIAL FIBRILLATION (H): ICD-10-CM

## 2017-03-09 LAB — INR POINT OF CARE: 2.7 (ref 2–3)

## 2017-03-09 PROCEDURE — 85610 PROTHROMBIN TIME: CPT | Mod: QW

## 2017-03-09 PROCEDURE — 36416 COLLJ CAPILLARY BLOOD SPEC: CPT

## 2017-03-09 PROCEDURE — 99207 ZZC NO CHARGE NURSE ONLY: CPT

## 2017-03-09 NOTE — PROGRESS NOTES
ANTICOAGULATION FOLLOW-UP CLINIC VISIT    Patient Name:  Caty Ng  Date:  3/9/2017  Contact Type:  Face to Face    SUBJECTIVE:     Patient Findings     Positives No Problem Findings           OBJECTIVE    INR Protime   Date Value Ref Range Status   03/09/2017 2.7 2.0 - 3.0 Final       ASSESSMENT / PLAN  INR assessment THER    Recheck INR In: 4 WEEKS    INR Location Clinic      Anticoagulation Summary as of 3/9/2017     INR goal 2.0-3.0   Today's INR 2.7   Maintenance plan 1 mg (2 mg x 0.5) on Tue, Fri; 2 mg (2 mg x 1) all other days   Full instructions 1 mg on Tue, Fri; 2 mg all other days   Weekly total 12 mg   No change documented Madhuri Renae RN   Plan last modified Madhuri Renae RN (1/23/2017)   Next INR check 4/4/2017   Priority INR   Target end date Indefinite    Indications   Atrial fibrillation (H) [I48.91]  Long term current use of anticoagulant therapy [Z79.01]         Anticoagulation Episode Summary     [facility]    Preferred lab [facility]    [facility]    Comments       Anticoagulation Care Providers     Provider Role Specialty Phone number    Avis Ferrer MD  Family Practice [phone]            See the Encounter Report to view Anticoagulation Flowsheet and Dosing Calendar (Go to Encounters tab in chart review, and find the Anticoagulation Therapy Visit)        Madhuri Renae RN

## 2017-03-11 LAB
DLCOUNC-%PRED-PRE: 85 %
DLCOUNC-PRE: 17.02 ML/MIN/MMHG
DLCOUNC-PRED: 19.9 ML/MIN/MMHG
ERV-%PRED-PRE: 632 %
ERV-PRE: 0.13 L
ERV-PRED: 0.02 L
EXPTIME-PRE: 6.35 SEC
FEF2575-%PRED-PRE: 76 %
FEF2575-PRE: 1.39 L/SEC
FEF2575-PRED: 1.81 L/SEC
FEFMAX-%PRED-PRE: 85 %
FEFMAX-PRE: 4.65 L/SEC
FEFMAX-PRED: 5.41 L/SEC
FEV1-%PRED-PRE: 73 %
FEV1-PRE: 1.55 L
FEV1FEV6-PRE: 81 %
FEV1FEV6-PRED: 79 %
FEV1FVC-PRE: 81 %
FEV1FVC-PRED: 78 %
FEV1SVC-PRE: 73 %
FEV1SVC-PRED: 73 %
FIFMAX-PRE: 4.76 L/SEC
FVC-%PRED-PRE: 71 %
FVC-PRE: 1.92 L
FVC-PRED: 2.7 L
IC-%PRED-PRE: 69 %
IC-PRE: 1.98 L
IC-PRED: 2.85 L
VA-%PRED-PRE: 71 %
VA-PRE: 3.44 L
VC-%PRED-PRE: 73 %
VC-PRE: 2.12 L
VC-PRED: 2.87 L

## 2017-03-24 ENCOUNTER — OFFICE VISIT (OUTPATIENT)
Dept: FAMILY MEDICINE | Facility: CLINIC | Age: 72
End: 2017-03-24
Payer: COMMERCIAL

## 2017-03-24 VITALS
BODY MASS INDEX: 43.02 KG/M2 | HEART RATE: 54 BPM | OXYGEN SATURATION: 94 % | HEIGHT: 64 IN | WEIGHT: 252 LBS | SYSTOLIC BLOOD PRESSURE: 130 MMHG | DIASTOLIC BLOOD PRESSURE: 74 MMHG | TEMPERATURE: 97.8 F

## 2017-03-24 DIAGNOSIS — J06.9 VIRAL URI WITH COUGH: Primary | ICD-10-CM

## 2017-03-24 DIAGNOSIS — J98.01 ACUTE BRONCHOSPASM: ICD-10-CM

## 2017-03-24 PROCEDURE — 94640 AIRWAY INHALATION TREATMENT: CPT | Performed by: FAMILY MEDICINE

## 2017-03-24 PROCEDURE — 99214 OFFICE O/P EST MOD 30 MIN: CPT | Mod: 25 | Performed by: FAMILY MEDICINE

## 2017-03-24 RX ORDER — PREDNISONE 20 MG/1
20 TABLET ORAL DAILY
Qty: 5 TABLET | Refills: 0 | Status: SHIPPED | OUTPATIENT
Start: 2017-03-24 | End: 2017-03-29

## 2017-03-24 RX ORDER — ALBUTEROL SULFATE 0.83 MG/ML
1 SOLUTION RESPIRATORY (INHALATION) ONCE
Qty: 1 VIAL | Refills: 0
Start: 2017-03-24 | End: 2019-02-25

## 2017-03-24 RX ORDER — ALBUTEROL SULFATE 90 UG/1
2 AEROSOL, METERED RESPIRATORY (INHALATION) EVERY 6 HOURS PRN
Qty: 1 INHALER | Refills: 0 | Status: SHIPPED | OUTPATIENT
Start: 2017-03-24 | End: 2022-09-26

## 2017-03-24 NOTE — MR AVS SNAPSHOT
After Visit Summary   3/24/2017    Caty Ng    MRN: [id number]           Patient Information     Date Of Birth          1945        Visit Information        Provider Department      3/24/2017 1:40 PM Avis Ferrer MD [facility]        Today's Diagnoses     Viral URI with cough    -  1    Acute bronchospasm          Care Instructions    I would like you to start taking prednisone as it will help you feel as though you're breathing easier.     Albuterol is the inhaler for wheezing    [facility]   Discharged by : Jessica NELSON, Certified Medical Assistant (AAMA)March 24, 2017 3:51 PM    Paper scripts provided to patient :   albuterol (PROAIR HFA/PROVENTIL HFA/VENTOLIN HFA) 108 (90 BASE) MCG/ACT Inhaler 1 Inhaler 0 3/24/2017  --      Sig: Inhale 2 puffs into the lungs every 6 hours as needed for shortness of breath / dyspnea or wheezing Profile Rx: patient will contact pharmacy when needed        If you have any questions regarding to your visit please contact your care team:   [facility] Hours Telephone Number   Dr. Jessica Lima PA-C   7am-7pm Monday - Thursday   7am-5pm Fridays  [phone]   (Appointment scheduling available 24/7)   RN Line   [phone] option 2     What options do I have for visits at the clinic other than the traditional office visit?   To expand how we care for you, many of our providers are utilizing electronic visits (e-visits) and telephone visits, when medically appropriate, for interactions with their patients rather than a visit in the clinic. We also offer nurse visits for many medical concerns. Just like any other service, we will bill your insurance company for this type of visit based on time spent on the phone with your provider. Not all insurance companies cover these visits. Please check with your medical insurance if this type of visit is covered. You will  be responsible for any charges that are not paid by your insurance.   E-visits via Dragon Insidehart: generally incur a $35.00 fee.   Telephone visits:   Time spent on the phone: *charged based on time that is spent on the phone in increments of 10 minutes. Estimated cost:   5-10 mins $30.00   11-20 mins. $59.00   21-30 mins. $85.00   Use Dragon Insidehart (secure email communication and access to your chart) to send your primary care provider a message or make an appointment. Ask someone on your Team how to sign up for [facility].   For a Price Quote for your services, please call our [facility] Line at [phone].   As always, Thank you for trusting us with your health care needs!                    [facility]:    Scheduling Appointments  [place] [place]  Call: [phone]    [facility]  Call: [phone]    [facility]  Call: [phone]    WHERE TO GO FOR CARE?    Clinic    Make an appointment if you:       Are sick (cold, cough, flu, sore throat, earache or in pain).       Have a small injury (sprain, small cut, burn or broken bone).       Need a physical exam, Pap smear, vaccine or prescription refill.       Have questions about your health or medicines.    To reach us:      Call [phone] ([phone]). Open 24 hours every day. (For counseling services, call [phone].)    Log into [facility] at [email or web address]. (Visit [email or web address] to create an account.) Hospital emergency room    An emergency is a serious or life- threatening problem that must be treated right away.    Call 036 or get to the hospital if you have:      Very bad or sudden:            - Chest pain or pressure         - Bleeding         - Head or belly pain         - Dizziness or trouble seeing, walking or                          Speaking      Problems breathing      Blood in your vomit or you are coughing up blood      A major injury (knocked out,  loss of a finger or limb, rape, broken bone protruding from skin)    A mental health crisis. (Or call the Mental Health Crisis line at 1-394.445.4463 or Suicide Prevention Hotline at 1-272.840.4799.)    Open 24 hours every day. You don't need an appointment.     Urgent care    Visit urgent care for sickness or small injuries when the clinic is closed. You don't need an appointment. To check hours or find an urgent care near you, visit [email or web address]. Online care    Get online care from [employer] for more than 70 common problems, like colds, allergies and infections. Open 24 hours every day at: [email or web address]   Need help deciding?    For advice about where to be seen, you may call your clinic and ask to speak with a nurse. We're here for you 24 hours every day.         If you are deaf or hard of hearing, please let us know. We provide many free services including sign language interpreters, oral interpreters, TTYs, telephone amplifiers, note takers and written materials.               Follow-ups after your visit        Your next 10 appointments already scheduled     Apr 04, 2017  9:00 AM CDT   Anticoagulation Visit with NE ANTI COAG   [facility]    [address] [place] [id number] [phone]              Who to contact     If you have questions or need follow up information about today's clinic visit or your schedule please contact [facility] directly at [phone].  Normal or non-critical lab and imaging results will be communicated to you by MyChart, letter or phone within 4 business days after the clinic has received the results. If you do not hear from us within 7 days, please contact the clinic through MyChart or phone. If you have a critical or abnormal lab result, we will notify you by phone as soon as possible.  Submit refill requests through [employer] or call your pharmacy and they will forward the  "refill request to us. Please allow 3 business days for your refill to be completed.          Additional Information About Your Visit        WorkVoiceshart Information     Dropmysite gives you secure access to your electronic health record. If you see a primary care provider, you can also send messages to your care team and make appointments. If you have questions, please call your primary care clinic.  If you do not have a primary care provider, please call [phone] and they will assist you.        Care EveryWhere ID     This is your Care EveryWhere ID. This could be used by other organizations to access your [place] medical records  [id number]        Your Vitals Were     Pulse Temperature Height Pulse Oximetry BMI (Body Mass Index)       54 97.8  F (36.6  C) (Oral) 5' 4\" (1.626 m) 94% 43.26 kg/m2        Blood Pressure from Last 3 Encounters:   03/24/17 130/74   01/11/17 151/78   03/08/16 153/80    Weight from Last 3 Encounters:   03/24/17 252 lb (114.3 kg)   01/11/17 251 lb (113.9 kg)   03/08/16 257 lb 12.8 oz (116.9 kg)              We Performed the Following     INHALATION/NEBULIZER TREATMENT, INITIAL          Today's Medication Changes          These changes are accurate as of: 3/24/17  3:51 PM.  If you have any questions, ask your nurse or doctor.               Start taking these medicines.        Dose/Directions    * albuterol (2.5 MG/3ML) 0.083% neb solution   Used for:  Acute bronchospasm   Started by:  Avis Ferrer MD        Dose:  1 vial   Take 1 vial (2.5 mg) by nebulization once for 1 dose   Quantity:  1 vial   Refills:  0       * albuterol 108 (90 BASE) MCG/ACT Inhaler   Commonly known as:  PROAIR HFA/PROVENTIL HFA/VENTOLIN HFA   Used for:  Acute bronchospasm   Started by:  Avis Ferrer MD        Dose:  2 puff   Inhale 2 puffs into the lungs every 6 hours as needed for shortness of breath / dyspnea or wheezing Profile Rx: patient will contact pharmacy when needed   Quantity:  1 Inhaler "   Refills:  0       predniSONE 20 MG tablet   Commonly known as:  DELTASONE   Used for:  Acute bronchospasm   Started by:  Avis Ferrer MD        Dose:  20 mg   Take 1 tablet (20 mg) by mouth daily for 5 days   Quantity:  5 tablet   Refills:  0       * Notice:  This list has 2 medication(s) that are the same as other medications prescribed for you. Read the directions carefully, and ask your doctor or other care provider to review them with you.         Where to get your medicines      These medications were sent to [facility]/pharmacy #[id number] - [place], [place] - [address] [place] [address] [address] OF [address], [place] [address]     Phone:  [phone]     predniSONE 20 MG tablet         Some of these will need a paper prescription and others can be bought over the counter.  Ask your nurse if you have questions.     Bring a paper prescription for each of these medications     albuterol 108 (90 BASE) MCG/ACT Inhaler       You don't need a prescription for these medications     albuterol (2.5 MG/3ML) 0.083% neb solution                Primary Care Provider Office Phone # Fax #    Avis Ferrer [phone]       [facility] [address] [place] [id number]        Thank you!     Thank you for choosing [facility]  for your care. Our goal is always to provide you with excellent care. Hearing back from our patients is one way we can continue to improve our services. Please take a few minutes to complete the written survey that you may receive in the mail after your visit with us. Thank you!             Your Updated Medication List - Protect others around you: Learn how to safely use, store and throw away your medicines at www.disposemymeds.org.          This list is accurate as of: 3/24/17  3:51 PM.  Always use your most recent med list.                   Brand Name Dispense Instructions for use    * albuterol (2.5 MG/3ML)  0.083% neb solution     1 vial    Take 1 vial (2.5 mg) by nebulization once for 1 dose       * albuterol 108 (90 BASE) MCG/ACT Inhaler    PROAIR HFA/PROVENTIL HFA/VENTOLIN HFA    1 Inhaler    Inhale 2 puffs into the lungs every 6 hours as needed for shortness of breath / dyspnea or wheezing Profile Rx: patient will contact pharmacy when needed       amiodarone 200 MG tablet    PACERONE/CODARONE    90 tablet    Take 1 tablet (200 mg) by mouth daily       atenolol 50 MG tablet    TENORMIN    180 tablet    Take 1 tablet (50 mg) by mouth 2 times daily       atorvastatin 40 MG tablet    LIPITOR    90 tablet    Take 1 tablet (40 mg) by mouth daily       fluticasone 50 MCG/ACT spray    FLONASE    16 g    Spray 1-2 sprays into both nostrils daily Please inform patient if the OTC would be cheaper for her or if there is an alternative nasal steroid that is less costly, please provide that information.       levothyroxine 75 MCG tablet    SYNTHROID/LEVOTHROID    90 tablet    Take 1 tablet (75 mcg) by mouth daily       loratadine 10 MG tablet    CLARITIN     Take 10 mg by mouth as needed for allergies       losartan 50 MG tablet    COZAAR    90 tablet    Take 1 tablet (50 mg) by mouth daily       predniSONE 20 MG tablet    DELTASONE    5 tablet    Take 1 tablet (20 mg) by mouth daily for 5 days       torsemide 20 MG tablet    DEMADEX    180 tablet    Take 2 tablets (40 mg) by mouth daily       warfarin 2 MG tablet    COUMADIN    135 tablet    Take as directed by [facility]. Current dose is 4mg MWF and 2 TThSS.       * Notice:  This list has 2 medication(s) that are the same as other medications prescribed for you. Read the directions carefully, and ask your doctor or other care provider to review them with you.

## 2017-03-24 NOTE — NURSING NOTE
The following medication was given:     MEDICATION: Albuterol Sulfate  ROUTE: PO  SITE: Medication was given orally   DOSE: 2.5mg/3mL 0.083% neb solution   LOT #: [id number]  :  [employer]    EXPIRATION DATE:  Aug 2017  NDC#: 7279-2397-35   Madison Rooney CMA ([facility])

## 2017-03-24 NOTE — PROGRESS NOTES
SUBJECTIVE:                                                    Caty Ng is a 72 year old female who presents to clinic today for the following health issues:      ENT Symptoms             Symptoms: cc Present Absent Comment   Fever/Chills   x    Fatigue  x     Muscle Aches  x     Eye Irritation   x    Sneezing   x    Nasal Kyler/Drg  x  Runny nose   Sinus Pressure/Pain   x    Loss of smell   x    Dental pain   x    Sore Throat   x    Swollen Glands   x    Ear Pain/Fullness   x    Cough x   Both dry and productive at times, has coughed so hard she feels she may pass out   Wheeze  x  Very hard time sleeping due to her cough   Chest Pain  x     Shortness of breath  x     Rash   x    Other   x      Symptom duration:  week 3   Symptom severity:  moderate, improving over last couple days   Treatments tried:     Contacts:       It's been hard to take a deep breath for a couple weeks.  Started as a cough.   Endorses bad sore throat and runny nose.  Denies fever, or having something like this before.   Endorses trying many things over the counter; all without relief.   A shot of jama, lemon, and honey has been helping her sleep.   If she takes the jama she can get about 3 hours of sleep. Then wakes up and get another shot.   Denies ever using an inhaler for a cold besides a few years ago; made it worst.       Problem list and histories reviewed & adjusted, as indicated.  Additional history: as documented    Patient Active Problem List   Diagnosis     Advanced directives, counseling/discussion     overweight with BMI >40     Hypertriglyceridemia     Atrial fibrillation with RVR (H)     Health Care Home     Mitral valve regurgitation     Hyperlipidemia     Systolic heart failure (H)     Atrial fibrillation (H)     BROCK (obstructive sleep apnea)     Hyperlipidemia LDL goal <130     Chronic diastolic heart failure (H)     Calcification of coronary artery     Long term current use of anticoagulant therapy     Lesion of  mediastinum     Hypothyroidism     Thymic neoplasm     Past Surgical History:   Procedure Laterality Date     HYSTERECTOMY, PAP NO LONGER INDICATED  1989     JOINT REPLACEMTN, KNEE RT/LT  2007    right     SALPINGO OOPHORECTOMY,R/L/BRANDO  2003    Salpingo Oophorectomy, BRANDO     SURGICAL HISTORY OF -   1989    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       Social History   Substance Use Topics     Smoking status: Former Smoker     Packs/day: 0.50     Years: 20.00     Types: Cigarettes     Quit date: 10/1/1982     Smokeless tobacco: Never Used     Alcohol use No      Comment: 1 beers per week     Family History   Problem Relation Age of Onset     HEART DISEASE Mother      CANCER Mother      ovarian     DIABETES Father      Musculoskeletal Disorder Father      DIABETES Paternal Grandmother      DIABETES Paternal Grandfather      DIABETES Sister      Thrombophilia Daughter      Factor V Leiden Deficiency         Current Outpatient Prescriptions   Medication Sig Dispense Refill     albuterol (PROAIR HFA/PROVENTIL HFA/VENTOLIN HFA) 108 (90 BASE) MCG/ACT Inhaler Inhale 2 puffs into the lungs every 6 hours as needed for shortness of breath / dyspnea or wheezing Profile Rx: patient will contact pharmacy when needed 1 Inhaler 0     predniSONE (DELTASONE) 20 MG tablet Take 1 tablet (20 mg) by mouth daily for 5 days 5 tablet 0     losartan (COZAAR) 50 MG tablet Take 1 tablet (50 mg) by mouth daily 90 tablet 3     torsemide (DEMADEX) 20 MG tablet Take 2 tablets (40 mg) by mouth daily 180 tablet 3     amiodarone (PACERONE/CODARONE) 200 MG tablet Take 1 tablet (200 mg) by mouth daily 90 tablet 3     atenolol (TENORMIN) 50 MG tablet Take 1 tablet (50 mg) by mouth 2 times daily 180 tablet 3     atorvastatin (LIPITOR) 40 MG tablet Take 1 tablet (40 mg) by mouth daily 90 tablet 3     levothyroxine (SYNTHROID/LEVOTHROID) 75 MCG tablet Take 1 tablet (75 mcg) by mouth daily 90 tablet 0     warfarin (COUMADIN) 2 MG tablet Take as directed by  ACC. Current dose is 4mg MWF and 2 TThSS. 135 tablet 3     fluticasone (FLONASE) 50 MCG/ACT nasal spray Spray 1-2 sprays into both nostrils daily Please inform patient if the OTC would be cheaper for her or if there is an alternative nasal steroid that is less costly, please provide that information. 16 g 6     loratadine (CLARITIN) 10 MG tablet Take 10 mg by mouth as needed for allergies       Allergies   Allergen Reactions     Lisinopril      cough     Metal [Staples] Other (See Comments)     Metal allergy not clearly specified - patient had a stainless steel screw put in her foot that needed to be removed due to allergy/hypersensitivity      Metoprolol Fatigue     Recent Labs   Lab Test  02/02/17   0957  01/11/17   1511  03/28/16   1035  02/24/16   0904   09/09/15   0837   02/07/14   0738   11/04/13   1220   06/22/12   0618   A1C   --    --    --    --    --    --    --    --    --    --    --   5.4   LDL   --    --    --    --    --   32   --   70   --   125   --   48   HDL   --    --    --    --    --   41*   --   35*   --   37*   --   43*   TRIG   --    --    --    --    --   115   --   165*   --   298*   --   173*   ALT   --   28   --    --    --   23   --    --    --   24   < >   --    CR  1.05*   --   0.81   --    < >  0.84   < >  0.77   < >  0.67   < >  0.72   GFRESTIMATED  52*   --   70   --    < >  68   < >  75   < >  88   < >  81   GFRESTBLACK  63   --   85   --    < >  82   < >  >90   < >  >90   < >  >90   POTASSIUM  3.9   --   3.6   --    < >  4.1   < >  4.3   < >  4.4   < >  4.3   TSH  1.48   --    --   3.22   --   4.55*   < >   --    < >   --    --   7.97*    < > = values in this interval not displayed.      BP Readings from Last 3 Encounters:   03/24/17 130/74   01/11/17 151/78   03/08/16 153/80    Wt Readings from Last 3 Encounters:   03/24/17 252 lb (114.3 kg)   01/11/17 251 lb (113.9 kg)   03/08/16 257 lb 12.8 oz (116.9 kg)         Labs reviewed in EPIC    Reviewed and updated as needed this  "visit by clinical staff  Tobacco  Allergies  Meds  Med Hx  Surg Hx  Fam Hx  Soc Hx      Reviewed and updated as needed this visit by Provider       ROS:  Constitutional, HEENT, cardiovascular, pulmonary, GI, , musculoskeletal, neuro, skin, endocrine and psych systems are negative, except as otherwise noted.    This document serves as a record of the services and decisions personally performed by Avis Ferrer MD. It was created on his/her behalf by Jp Carr, a trained medical scribe. The creation of this document is based on the provider's statements to the medical scribe. Jp Carr March 24, 2017 2:21 PM      OBJECTIVE:                                                    /74 (BP Location: Right arm, Patient Position: Chair, Cuff Size: Adult Large)  Pulse 54  Temp 97.8  F (36.6  C) (Oral)  Ht 5' 4\" (1.626 m)  Wt 252 lb (114.3 kg)  SpO2 94%  BMI 43.26 kg/m2  Body mass index is 43.26 kg/(m^2).  GENERAL: healthy, morbidly obese, alert and no distress  HENT: ear canals and TM's normal, nose and mouth without ulcers or lesions  NECK: no adenopathy, no asymmetry, masses, or scars and thyroid normal to palpation  RESP:  Bilateral expiratory wheezes diffusely.   CV: regular rate and rhythm, normal S1 S2, no murmur, click or rub  SKIN: no suspicious lesions or rashes  NEURO: Normal strength and tone, mentation intact and speech normal  PSYCH: mentation appears normal, affect normal/bright         ASSESSMENT/PLAN:                                                      Caty was seen today for cough and health maintenance due.    Diagnoses and all orders for this visit:    Viral URI with cough  improving    Acute bronchospasm  -     INHALATION/NEBULIZER TREATMENT, INITIAL  -     albuterol (2.5 MG/3ML) 0.083% neb solution; Take 1 vial (2.5 mg) by nebulization once for 1 dose  -     albuterol (PROAIR HFA/PROVENTIL HFA/VENTOLIN HFA) 108 (90 BASE) MCG/ACT Inhaler; Inhale 2 puffs into the " lungs every 6 hours as needed for shortness of breath / dyspnea or wheezing Profile Rx: patient will contact pharmacy when needed  -     predniSONE (DELTASONE) 20 MG tablet; Take 1 tablet (20 mg) by mouth daily for 5 days    Common side effects of medications prescribed at this visit were discussed with the patient. Severe side effects, including current applicable black box warnings, were discussed. We discussed options for dealing with these possible side effects and allergic reactions, based on their severity.    Had wheezing episode last winter as well.  Next steps, spirometry.      FUTURE APPOINTMENTS:       - Follow-up visit in one week, sooner for worsening symptoms  See Patient Instructions  Patient Instructions     I would like you to start taking prednisone as it will help you feel as though you're breathing easier.     Albuterol is the inhaler for wheezing    [facility]   Discharged by : Jessica NELSON, Certified Medical Assistant (AAMA)March 24, 2017 3:51 PM    Paper scripts provided to patient :   albuterol (PROAIR HFA/PROVENTIL HFA/VENTOLIN HFA) 108 (90 BASE) MCG/ACT Inhaler 1 Inhaler 0 3/24/2017  --      Sig: Inhale 2 puffs into the lungs every 6 hours as needed for shortness of breath / dyspnea or wheezing Profile Rx: patient will contact pharmacy when needed        If you have any questions regarding to your visit please contact your care team:   [facility] Clinic Hours Telephone Number   Dr. Jessica Lima PA-C   7am-7pm Monday - Thursday   7am-5pm Fridays  [phone]   (Appointment scheduling available 24/7)   RN Line   [phone] option 2     What options do I have for visits at the clinic other than the traditional office visit?   To expand how we care for you, many of our providers are utilizing electronic visits (e-visits) and telephone visits, when medically appropriate, for interactions with their patients rather than a  visit in the clinic. We also offer nurse visits for many medical concerns. Just like any other service, we will bill your insurance company for this type of visit based on time spent on the phone with your provider. Not all insurance companies cover these visits. Please check with your medical insurance if this type of visit is covered. You will be responsible for any charges that are not paid by your insurance.   E-visits via wmblyhart: generally incur a $35.00 fee.   Telephone visits:   Time spent on the phone: *charged based on time that is spent on the phone in increments of 10 minutes. Estimated cost:   5-10 mins $30.00   11-20 mins. $59.00   21-30 mins. $85.00   Use Pinkdingo (secure email communication and access to your chart) to send your primary care provider a message or make an appointment. Ask someone on your Team how to sign up for Pinkdingo.   For a Price Quote for your services, please call our Euroffice Line at [phone].   As always, Thank you for trusting us with your health care needs!                    [facility]:    Scheduling Appointments  [facility]  Call: [phone]    [facility]  Call: [phone]    [facility]  Call: [phone]    WHERE TO GO FOR CARE?    Clinic    Make an appointment if you:       Are sick (cold, cough, flu, sore throat, earache or in pain).       Have a small injury (sprain, small cut, burn or broken bone).       Need a physical exam, Pap smear, vaccine or prescription refill.       Have questions about your health or medicines.    To reach us:      Call [phone] ([phone]). Open 24 hours every day. (For counseling services, call [phone].)    Log into Pinkdingo at [email or web address]. (Visit [email or web address] to create an account.) Hospital emergency room    An emergency is a serious or life- threatening problem that must be treated right  away.    Call 911 or get to the hospital if you have:      Very bad or sudden:            - Chest pain or pressure         - Bleeding         - Head or belly pain         - Dizziness or trouble seeing, walking or                          Speaking      Problems breathing      Blood in your vomit or you are coughing up blood      A major injury (knocked out, loss of a finger or limb, rape, broken bone protruding from skin)    A mental health crisis. (Or call the Mental Health Crisis line at 1-644.857.1815 or Suicide Prevention Hotline at 1-149.956.4677.)    Open 24 hours every day. You don't need an appointment.     Urgent care    Visit urgent care for sickness or small injuries when the clinic is closed. You don't need an appointment. To check hours or find an urgent care near you, visit [email or web address]. Online care    Get online care from [facility] for more than 70 common problems, like colds, allergies and infections. Open 24 hours every day at: [email or web address]   Need help deciding?    For advice about where to be seen, you may call your clinic and ask to speak with a nurse. We're here for you 24 hours every day.         If you are deaf or hard of hearing, please let us know. We provide many free services including sign language interpreters, oral interpreters, TTYs, telephone amplifiers, note takers and written materials.             Avis Ferrer MD  [facility]    The information in this document, created by the medical scribe Jp Carr for me, accurately reflects the services I personally performed and the decisions made by me. I have reviewed and approved this document for accuracy prior to leaving the patient care area.

## 2017-03-24 NOTE — NURSING NOTE
"Chief Complaint   Patient presents with     Cough     3 weeks but improving     Health Maintenance Due       Initial /74 (BP Location: Right arm, Patient Position: Chair, Cuff Size: Adult Large)  Pulse 54  Temp 97.8  F (36.6  C) (Oral)  Ht 5' 4\" (1.626 m)  Wt 252 lb (114.3 kg)  SpO2 94%  BMI 43.26 kg/m2 Estimated body mass index is 43.26 kg/(m^2) as calculated from the following:    Height as of this encounter: 5' 4\" (1.626 m).    Weight as of this encounter: 252 lb (114.3 kg).  Medication Reconciliation: complete   Poonam Girard MA      "

## 2017-03-24 NOTE — PATIENT INSTRUCTIONS
I would like you to start taking prednisone as it will help you feel as though you're breathing easier.     Albuterol is the inhaler for wheezing    [facility]   Discharged by : Jessica NELSON, Certified Medical Assistant (AAMA)March 24, 2017 3:51 PM    Paper scripts provided to patient :   albuterol (PROAIR HFA/PROVENTIL HFA/VENTOLIN HFA) 108 (90 BASE) MCG/ACT Inhaler 1 Inhaler 0 3/24/2017  --      Sig: Inhale 2 puffs into the lungs every 6 hours as needed for shortness of breath / dyspnea or wheezing Profile Rx: patient will contact pharmacy when needed        If you have any questions regarding to your visit please contact your care team:   [facility] Clinic Hours Telephone Number   Dr. Jessica Lima PA-C   7am-7pm Monday - Thursday   7am-5pm Fridays  [phone]   (Appointment scheduling available 24/7)   RN Line   [phone] option 2     What options do I have for visits at the clinic other than the traditional office visit?   To expand how we care for you, many of our providers are utilizing electronic visits (e-visits) and telephone visits, when medically appropriate, for interactions with their patients rather than a visit in the clinic. We also offer nurse visits for many medical concerns. Just like any other service, we will bill your insurance company for this type of visit based on time spent on the phone with your provider. Not all insurance companies cover these visits. Please check with your medical insurance if this type of visit is covered. You will be responsible for any charges that are not paid by your insurance.   E-visits via Stick and Play: generally incur a $35.00 fee.   Telephone visits:   Time spent on the phone: *charged based on time that is spent on the phone in increments of 10 minutes. Estimated cost:   5-10 mins $30.00   11-20 mins. $59.00   21-30 mins. $85.00   Use Stick and Play (secure email communication and access to your  chart) to send your primary care provider a message or make an appointment. Ask someone on your Team how to sign up for [address].   For a Price Quote for your services, please call our Consumer Price Line at [phone].   As always, Thank you for trusting us with your health care needs!                    [facility]:    Scheduling Appointments  [facility]  Call: [phone]    [facility]  Call: [phone]    [facility]  Call: [phone]    WHERE TO GO FOR CARE?    Clinic    Make an appointment if you:       Are sick (cold, cough, flu, sore throat, earache or in pain).       Have a small injury (sprain, small cut, burn or broken bone).       Need a physical exam, Pap smear, vaccine or prescription refill.       Have questions about your health or medicines.    To reach us:      Call [phone] ([phone]). Open 24 hours every day. (For counseling services, call [phone].)    Log into [address] at [email or web address].Mayi Zhaopin. (Visit "[email or web address].[email or web address] to create an account.) Hospital emergency room    An emergency is a serious or life- threatening problem that must be treated right away.    Call 143 or get to the hospital if you have:      Very bad or sudden:            - Chest pain or pressure         - Bleeding         - Head or belly pain         - Dizziness or trouble seeing, walking or                          Speaking      Problems breathing      Blood in your vomit or you are coughing up blood      A major injury (knocked out, loss of a finger or limb, rape, broken bone protruding from skin)    A mental health crisis. (Or call the Mental Health Crisis line at 1-841.701.9064 or Suicide Prevention Hotline at 1-389.817.1335.)    Open 24 hours every day. You don't need an appointment.     Urgent care    Visit urgent care for sickness or small injuries when the clinic is closed. You don't need an appointment.  To check hours or find an urgent care near you, visit [email or web address]. Online care    Get online care from [facility] for more than 70 common problems, like colds, allergies and infections. Open 24 hours every day at: [email or web address]   Need help deciding?    For advice about where to be seen, you may call your clinic and ask to speak with a nurse. We're here for you 24 hours every day.         If you are deaf or hard of hearing, please let us know. We provide many free services including sign language interpreters, oral interpreters, TTYs, telephone amplifiers, note takers and written materials.

## 2017-04-04 ENCOUNTER — ANTICOAGULATION THERAPY VISIT (OUTPATIENT)
Dept: NURSING | Facility: CLINIC | Age: 72
End: 2017-04-04
Payer: COMMERCIAL

## 2017-04-04 DIAGNOSIS — Z79.01 LONG TERM CURRENT USE OF ANTICOAGULANT THERAPY: ICD-10-CM

## 2017-04-04 DIAGNOSIS — I48.91 ATRIAL FIBRILLATION (H): ICD-10-CM

## 2017-04-04 LAB — INR POINT OF CARE: 2.6 (ref 2–3)

## 2017-04-04 PROCEDURE — 36416 COLLJ CAPILLARY BLOOD SPEC: CPT

## 2017-04-04 PROCEDURE — 85610 PROTHROMBIN TIME: CPT | Mod: QW

## 2017-04-04 PROCEDURE — 99207 ZZC NO CHARGE NURSE ONLY: CPT

## 2017-04-04 NOTE — MR AVS SNAPSHOT
Caty Ng   4/4/2017 9:00 AM   Anticoagulation Therapy Visit    Description:  72 year old female   Provider:  NE ANTI COACRISTY   Department:  Ne Nurse           INR as of 4/4/2017     Today's INR 2.6      Anticoagulation Summary as of 4/4/2017     INR goal 2.0-3.0   Today's INR 2.6   Full instructions 1 mg on Tue, Fri; 2 mg all other days   Next INR check 5/2/2017    Indications   Atrial fibrillation (H) [I48.91]  Long term current use of anticoagulant therapy [Z79.01]         Your next Anticoagulation Clinic appointment(s)     May 02, 2017  9:00 AM CDT   [facility] with [facility]   [facility]    [address] [place] [id number] [phone]              Contact Numbers     Please call [phone] to cancel and/or reschedule your appointment.  Please call [phone] with any problems or questions regarding your therapy          April 2017 Details    Sun Mon Tue Wed Thu Fri Sat           1                 2               3               4      1 mg   See details      5      2 mg         6      2 mg         7      1 mg         8      2 mg           9      2 mg         10      2 mg         11      1 mg         12      2 mg         13      2 mg         14      1 mg         15      2 mg           16      2 mg         17      2 mg         18      1 mg         19      2 mg         20      2 mg         21      1 mg         22      2 mg           23      2 mg         24      2 mg         25      1 mg         26      2 mg         27      2 mg         28      1 mg         29      2 mg           30      2 mg                Date Details   04/04 This INR check               How to take your warfarin dose     To take:  1 mg Take 0.5 of a 2 mg tablet.    To take:  2 mg Take 1 of the 2 mg tablets.           May 2017 Details    Sun Mon Tue Wed Thu Fri Sat      1      2 mg         2            3               4               5               6                  7               8               9               10               11               12               13                 14               15               16               17               18               19               20                 21               22               23               24               25               26               27                 28               29               30               31                   Date Details   No additional details    Date of next INR:  5/2/2017         How to take your warfarin dose     To take:  1 mg Take 0.5 of a 2 mg tablet.    To take:  2 mg Take 1 of the 2 mg tablets.

## 2017-04-04 NOTE — PROGRESS NOTES
ANTICOAGULATION FOLLOW-UP CLINIC VISIT    Patient Name:  Caty Ng  Date:  4/4/2017  Contact Type:  Face to Face    SUBJECTIVE:     Patient Findings     Positives No Problem Findings           OBJECTIVE    INR Protime   Date Value Ref Range Status   04/04/2017 2.6 2.0 - 3.0 Final       ASSESSMENT / PLAN  INR assessment THER    Recheck INR In: 4 WEEKS    INR Location Clinic      Anticoagulation Summary as of 4/4/2017     INR goal 2.0-3.0   Today's INR 2.6   Maintenance plan 1 mg (2 mg x 0.5) on Tue, Fri; 2 mg (2 mg x 1) all other days   Full instructions 1 mg on Tue, Fri; 2 mg all other days   Weekly total 12 mg   No change documented Madhuri Renae RN   Plan last modified Madhuri Renae RN (1/23/2017)   Next INR check 5/2/2017   Priority INR   Target end date Indefinite    Indications   Atrial fibrillation (H) [I48.91]  Long term current use of anticoagulant therapy [Z79.01]         Anticoagulation Episode Summary     INR check location [facility]    Preferred lab [facility]    [facility] reminders to [facility]    Comments       Anticoagulation Care Providers     Provider Role Specialty Phone number    Avis Ferrer MD  Family Practice [phone]            See the Encounter Report to view Anticoagulation Flowsheet and Dosing Calendar (Go to Encounters tab in chart review, and find the Anticoagulation Therapy Visit)        Madhuri Renae RN

## 2017-04-12 ENCOUNTER — TELEPHONE (OUTPATIENT)
Dept: FAMILY MEDICINE | Facility: CLINIC | Age: 72
End: 2017-04-12

## 2017-04-12 NOTE — TELEPHONE ENCOUNTER
Reason for Call:  Medication or medication refill:    Do you use a [facility]?  Name of the pharmacy and phone number for the current request:   [id number], [address] AT [address] [facility]    Name of the medication requested: Amoxicillin, 500 mg, by mouth    Other request: For her dental appointment    Can we leave a detailed message on this number? YES    Phone number patient can be reached at: Home number on file [phone] (home)    Best Time: anytime    Call taken on 4/12/2017 at 2:54 PM by Trisha Villa

## 2017-04-12 NOTE — TELEPHONE ENCOUNTER
Please find out why she needs this.  I don't believe there is a valve replacement.  I don't see that I have prescribed this in past so I'm not sure why it is being requested.  If prescribed by someone else, she may need to contact them.  Avis Ferrer MD

## 2017-04-12 NOTE — TELEPHONE ENCOUNTER
"Called and spoke to  Caty. She says that when she had her second knee replacement the orthopedic doctor told her she would need pre-dental antibiotics for life because \"they are finding that people get infections in these joints even 7 or 8 years after the surgery.\" She no longer sees that orthopedic doctor and isn't sure if he is even still in practice, the surgery was quite a few years ago. RN did inform her that the current guidelines are not to do antibiotics for joint replacements, but she is confused because of how that conflicts with the information she was given in the past. Will route to provider to advise.      Kaleigh Loomis RN    "

## 2017-04-12 NOTE — TELEPHONE ENCOUNTER
Will route to provider to advise. Unsure if patient has had any valve replacement or qualifying factors/procedures to prescribe abx for dental appointments.     Glen Juarez RN

## 2017-04-18 DIAGNOSIS — Z79.01 LONG-TERM (CURRENT) USE OF ANTICOAGULANTS, INR GOAL 2.0-3.0: ICD-10-CM

## 2017-04-18 RX ORDER — WARFARIN SODIUM 2 MG/1
TABLET ORAL
Qty: 130 TABLET | Refills: 3 | Status: SHIPPED | OUTPATIENT
Start: 2017-04-18 | End: 2018-08-19

## 2017-05-02 ENCOUNTER — ANTICOAGULATION THERAPY VISIT (OUTPATIENT)
Dept: NURSING | Facility: CLINIC | Age: 72
End: 2017-05-02
Payer: COMMERCIAL

## 2017-05-02 DIAGNOSIS — Z79.01 LONG TERM CURRENT USE OF ANTICOAGULANT THERAPY: ICD-10-CM

## 2017-05-02 DIAGNOSIS — I48.91 ATRIAL FIBRILLATION (H): ICD-10-CM

## 2017-05-02 LAB — INR POINT OF CARE: 2.8 (ref 2–3)

## 2017-05-02 PROCEDURE — 99207 ZZC NO CHARGE NURSE ONLY: CPT

## 2017-05-02 PROCEDURE — 85610 PROTHROMBIN TIME: CPT | Mod: QW

## 2017-05-02 PROCEDURE — 36416 COLLJ CAPILLARY BLOOD SPEC: CPT

## 2017-05-02 NOTE — PROGRESS NOTES
ANTICOAGULATION FOLLOW-UP CLINIC VISIT    Patient Name:  Caty Ng  Date:  5/2/2017  Contact Type:  Face to Face    SUBJECTIVE:     Patient Findings     Positives Other complaints           OBJECTIVE    INR Protime   Date Value Ref Range Status   05/02/2017 2.8 2.0 - 3.0 Final       ASSESSMENT / PLAN  INR assessment THER    Recheck INR In: 4 WEEKS    INR Location Clinic      Anticoagulation Summary as of 5/2/2017     INR goal 2.0-3.0   Today's INR 2.8   Maintenance plan 1 mg (2 mg x 0.5) on Tue, Fri; 2 mg (2 mg x 1) all other days   Full instructions 1 mg on Tue, Fri; 2 mg all other days   Weekly total 12 mg   No change documented Madhuri Renae RN   Plan last modified Madhuri Renae RN (1/23/2017)   Next INR check 5/30/2017   Priority INR   Target end date Indefinite    Indications   Atrial fibrillation (H) [I48.91]  Long term current use of anticoagulant therapy [Z79.01]         Anticoagulation Episode Summary     [facility]    Preferred lab [facility]    Send INR remind[facility]    Comments       Anticoagulation Care Providers     Provider Role Specialty Phone number    Avis Ferrer MD  Family Practice [phone]            See the Encounter Report to view Anticoagulation Flowsheet and Dosing Calendar (Go to Encounters tab in chart review, and find the Anticoagulation Therapy Visit)        Madhuri Renae RN

## 2017-05-02 NOTE — MR AVS SNAPSHOT
Caty Ng   5/2/2017 9:00 AM   Anticoagulation Therapy Visit    Description:  72 year old female   Provider:  [facility]   Department:  Ne Nurse           INR as of 5/2/2017     Today's INR 2.8      Anticoagulation Summary as of 5/2/2017     INR goal 2.0-3.0   Today's INR 2.8   Full instructions 1 mg on Tue, Fri; 2 mg all other days   Next INR check 5/30/2017    Indications   Atrial fibrillation (H) [I48.91]  Long term current use of anticoagulant therapy [Z79.01]         Your next Anticoagulation Clinic appointment(s)     May 30, 2017  9:00 AM CDT   Anticoagulation Visit with [facility]   [facility]    [address] [place] [id number] [phone]              Contact Numbers     Please call [phone] to cancel and/or reschedule your appointment.  Please call [phone] with any problems or questions regarding your therapy          May 2017 Details    Sun Mon Tue Wed Thu Fri Sat      1               2      1 mg   See details      3      2 mg         4      2 mg         5      1 mg         6      2 mg           7      2 mg         8      2 mg         9      1 mg         10      2 mg         11      2 mg         12      1 mg         13      2 mg           14      2 mg         15      2 mg         16      1 mg         17      2 mg         18      2 mg         19      1 mg         20      2 mg           21      2 mg         22      2 mg         23      1 mg         24      2 mg         25      2 mg         26      1 mg         27      2 mg           28      2 mg         29      2 mg         30            31                   Date Details   05/02 This INR check       Date of next INR:  5/30/2017         How to take your warfarin dose     To take:  1 mg Take 0.5 of a 2 mg tablet.    To take:  2 mg Take 1 of the 2 mg tablets.

## 2017-05-10 ENCOUNTER — TELEPHONE (OUTPATIENT)
Dept: CARDIOLOGY | Facility: CLINIC | Age: 72
End: 2017-05-10

## 2017-05-10 DIAGNOSIS — I50.32 CHRONIC DIASTOLIC HEART FAILURE (H): Primary | ICD-10-CM

## 2017-05-10 NOTE — TELEPHONE ENCOUNTER
[facility] requested a signed prescription by Dr. Whitlock for Losartan d/t Lisinopril listed as an allergy.  Patient has been on cozaar since beginning of 2017 and she has been tolerating it.  Lisinopril caused a cough.  Rx signed by Dr. Whitlock and faxed back to [facility].  Writer did attempt to contact patient to see how many tablets she has left to determine if a local Rx needs to be sent in the meantime until she receives the mail order.    Rajani Stout RN

## 2017-05-24 RX ORDER — LOSARTAN POTASSIUM 50 MG/1
50 TABLET ORAL DAILY
Qty: 10 TABLET | Refills: 0 | Status: SHIPPED | OUTPATIENT
Start: 2017-05-24 | End: 2017-05-25

## 2017-05-24 NOTE — TELEPHONE ENCOUNTER
Spoke to [facility] and the Rx will be shipped and patient will receive the Rx in 5-7 days.  Patient only has 4 tablets left.  Rx sent to local preferred pharmacy for 10 tablets to get through until she receives the mail order.  Patient is agreeable to this and verbalized understanding.    Rajani Stout, RN  Care Coordinator  [facility]  [phone]

## 2017-05-25 ENCOUNTER — TELEPHONE (OUTPATIENT)
Dept: CARDIOLOGY | Facility: CLINIC | Age: 72
End: 2017-05-25

## 2017-05-25 DIAGNOSIS — I50.32 CHRONIC DIASTOLIC HEART FAILURE (H): ICD-10-CM

## 2017-05-25 RX ORDER — LOSARTAN POTASSIUM 50 MG/1
50 TABLET ORAL DAILY
Qty: 5 TABLET | Refills: 0 | Status: SHIPPED | OUTPATIENT
Start: 2017-05-25 | End: 2018-02-01

## 2017-05-25 NOTE — TELEPHONE ENCOUNTER
Reason for Call:  Other prescription    Detailed comments: patient returning the call, has more questions regarding the prescription Losartan, Patient state she only has two remaining tabs left not 4, please call to discuss further     Phone Number Patient can be reached at: Home number on file [phone] (home)    Best Time: today    Can we leave a detailed message on this number? YES    Call taken on 5/25/2017 at 2:15 PM by Purvi Alejandra

## 2017-05-25 NOTE — TELEPHONE ENCOUNTER
Spoke to pt. States that she received a call from her [facility] mail order for her Losartan stating that it can not be filled again until July 8th. Pt had 10 tablets to  from her local pharmacy to cover her until her mail order comes but was told that was to be waved by [facility]. Pt went to pick the tablets up and they were going to charge her 19 dollars for the 10 tablets.    Called [facility] and they stated that her mail order was mailed out yesterday (5/24) and it will take 3-5 days. Pt currently has 2 tablets left to take.    Spoke to pt and she is going to go to pharmacy to see if she can get 5 tablets only to cover her until her mail order comes. New script written for 5 tablets and not 10.    Pt will touch base with us if any further issues come up.    Hansel Spann RN

## 2017-05-30 ENCOUNTER — ANTICOAGULATION THERAPY VISIT (OUTPATIENT)
Dept: NURSING | Facility: CLINIC | Age: 72
End: 2017-05-30
Payer: COMMERCIAL

## 2017-05-30 DIAGNOSIS — I48.91 ATRIAL FIBRILLATION (H): ICD-10-CM

## 2017-05-30 DIAGNOSIS — Z79.01 LONG TERM CURRENT USE OF ANTICOAGULANT THERAPY: ICD-10-CM

## 2017-05-30 LAB — INR POINT OF CARE: 1.6 (ref 2–3)

## 2017-05-30 PROCEDURE — 99207 ZZC NO CHARGE NURSE ONLY: CPT

## 2017-05-30 PROCEDURE — 36416 COLLJ CAPILLARY BLOOD SPEC: CPT

## 2017-05-30 PROCEDURE — 85610 PROTHROMBIN TIME: CPT | Mod: QW

## 2017-05-30 NOTE — MR AVS SNAPSHOT
Caty Ng   5/30/2017 9:00 AM   Anticoagulation Therapy Visit    Description:  72 year old female   Provider:  [facility]   Department:  Ne Nurse           INR as of 5/30/2017     Today's INR 1.6!      Anticoagulation Summary as of 5/30/2017     INR goal 2.0-3.0   Today's INR 1.6!   Full instructions 5/30: 2 mg; Otherwise 1 mg on Tue, Fri; 2 mg all other days   Next INR check 6/13/2017    Indications   Atrial fibrillation (H) [I48.91]  Long term current use of anticoagulant therapy [Z79.01]         Your next Anticoagulation Clinic appointment(s)     Jun 12, 2017  9:00 AM CDT   Anticoagulation Visit with [facility]   [facility]    [address] [place] [id number] [phone]              Contact Numbers     Please call [phone] to cancel and/or reschedule your appointment.  Please call [phone] with any problems or questions regarding your therapy          May 2017 Details    Sun Mon Tue Wed Thu Fri Sat      1               2               3               4               5               6                 7               8               9               10               11               12               13                 14               15               16               17               18               19               20                 21               22               23               24               25               26               27                 28               29               30      2 mg   See details      31      2 mg             Date Details   05/30 This INR check               How to take your warfarin dose     To take:  2 mg Take 1 of the 2 mg tablets.           June 2017 Details    Sun Mon Tue Wed Thu Fri Sat         1      2 mg         2      1 mg         3      2 mg           4      2 mg         5      2 mg         6      1 mg         7      2 mg         8      2 mg         9      1 mg         10      2  mg           11      2 mg         12      2 mg         13            14               15               16               17                 18               19               20               21               22               23               24                 25               26               27               28               29               30                 Date Details   No additional details    Date of next INR:  6/13/2017         How to take your warfarin dose     To take:  1 mg Take 0.5 of a 2 mg tablet.    To take:  2 mg Take 1 of the 2 mg tablets.

## 2017-05-30 NOTE — PROGRESS NOTES
ANTICOAGULATION FOLLOW-UP CLINIC VISIT    Patient Name:  Caty Ng  Date:  5/30/2017  Contact Type:  Face to Face    SUBJECTIVE:     Patient Findings     Positives Change in diet/appetite           OBJECTIVE    INR Protime   Date Value Ref Range Status   05/30/2017 1.6 (A) 2.0 - 3.0 Final       ASSESSMENT / PLAN  INR assessment SUB    Recheck INR In: 2 WEEKS    INR Location Clinic      Anticoagulation Summary as of 5/30/2017     INR goal 2.0-3.0   Today's INR 1.6!   Maintenance plan 1 mg (2 mg x 0.5) on Tue, Fri; 2 mg (2 mg x 1) all other days   Full instructions 5/30: 2 mg; Otherwise 1 mg on Tue, Fri; 2 mg all other days   Weekly total 12 mg   Plan last modified Madhuri Renae RN (1/23/2017)   Next INR check 6/13/2017   Priority INR   Target end date Indefinite    Indications   Atrial fibrillation (H) [I48.91]  Long term current use of anticoagulant therapy [Z79.01]         Anticoagulation Episode Summary     [facility]    Preferred lab [facility]    Send INR reminders to [facility]    Comments       Anticoagulation Care Providers     Provider Role Specialty Phone number    Avis Ferrer MD  Family Practice [phone]            See the Encounter Report to view Anticoagulation Flowsheet and Dosing Calendar (Go to Encounters tab in chart review, and find the Anticoagulation Therapy Visit)    Dosage adjustment made based on physician directed care plan.    Madhuri Renae RN

## 2017-06-09 ENCOUNTER — TELEPHONE (OUTPATIENT)
Dept: FAMILY MEDICINE | Facility: CLINIC | Age: 72
End: 2017-06-09

## 2017-06-09 DIAGNOSIS — E03.9 HYPOTHYROIDISM: ICD-10-CM

## 2017-06-09 RX ORDER — LEVOTHYROXINE SODIUM 75 UG/1
TABLET ORAL
Qty: 90 TABLET | Refills: 2 | Status: SHIPPED | OUTPATIENT
Start: 2017-06-09 | End: 2017-06-16

## 2017-06-09 NOTE — TELEPHONE ENCOUNTER
levothyroxine (SYNTHROID/LEVOTHROID) 75 MCG tablet   75 mcg, DAILY 0 ordered  Edit     Summary: Take 1 tablet (75 mcg) by mouth daily, Disp-90 tablet, R-0, E-Prescribe   Dose, Route, Frequency: 75 mcg, Oral, DAILY  Start: 12/16/2016  Ord/Sold: 12/16/2016 (O)  Report  Taking:   Long-term:   Pharmacy: [facility] [address] [facility]  Med Dose History       Patient Sig: Take 1 tablet (75 mcg) by mouth daily       Ordered on: 12/16/2016       Authorized by: NOREEN DE LOS SANTOS       Dispense: 90 tablet          [place] Office Visit [place] [facility] or [facility] prescribing provider: 3-        TSH   Date Value Ref Range Status   02/02/2017 1.48 0.40 - 4.00 mU/L Final

## 2017-06-12 ENCOUNTER — ANTICOAGULATION THERAPY VISIT (OUTPATIENT)
Dept: NURSING | Facility: CLINIC | Age: 72
End: 2017-06-12
Payer: COMMERCIAL

## 2017-06-12 DIAGNOSIS — Z79.01 LONG TERM CURRENT USE OF ANTICOAGULANT THERAPY: ICD-10-CM

## 2017-06-12 LAB — INR POINT OF CARE: 2.4 (ref 2–3)

## 2017-06-12 PROCEDURE — 36416 COLLJ CAPILLARY BLOOD SPEC: CPT

## 2017-06-12 PROCEDURE — 85610 PROTHROMBIN TIME: CPT | Mod: QW

## 2017-06-12 PROCEDURE — 99207 ZZC NO CHARGE NURSE ONLY: CPT

## 2017-06-12 NOTE — PROGRESS NOTES
ANTICOAGULATION FOLLOW-UP CLINIC VISIT    Patient Name:  Caty Ng  Date:  6/12/2017  Contact Type:  Face to Face    SUBJECTIVE:     Patient Findings     Positives No Problem Findings           OBJECTIVE    INR Protime   Date Value Ref Range Status   06/12/2017 2.4 2.0 - 3.0 Final       ASSESSMENT / PLAN  INR assessment THER    Recheck INR In: 4 WEEKS    INR Location Clinic      Anticoagulation Summary as of 6/12/2017     INR goal 2.0-3.0   Today's INR 2.4   Maintenance plan 1 mg (2 mg x 0.5) on Tue, Fri; 2 mg (2 mg x 1) all other days   Full instructions 1 mg on Tue, Fri; 2 mg all other days   Weekly total 12 mg   No change documented Madhuri Renae RN   Plan last modified Madhuri Renae RN (1/23/2017)   Next INR check 7/11/2017   Priority INR   Target end date Indefinite    Indications   Atrial fibrillation (H) [I48.91]  Long term current use of anticoagulant therapy [Z79.01]         Anticoagulation Episode Summary     [facility]    Preferred lab [facility]    Send INR reminders to [facility]    Comments       Anticoagulation Care Providers     Provider Role Specialty Phone number    Avis Ferrer MD  Family Practice [phone]            See the Encounter Report to view Anticoagulation Flowsheet and Dosing Calendar (Go to Encounters tab in chart review, and find the Anticoagulation Therapy Visit)        Madhuri Renae RN

## 2017-06-12 NOTE — MR AVS SNAPSHOT
Caty Ng   6/12/2017 9:00 AM   Anticoagulation Therapy Visit    Description:  72 year old female   Provider:  [facility]   Department:  Ne Nurse           INR as of 6/12/2017     Today's INR 2.4      Anticoagulation Summary as of 6/12/2017     INR goal 2.0-3.0   Today's INR 2.4   Full instructions 1 mg on Tue, Fri; 2 mg all other days   Next INR check 7/11/2017    Indications   Atrial fibrillation (H) [I48.91]  Long term current use of anticoagulant therapy [Z79.01]         Your next Anticoagulation Clinic appointment(s)     Jul 11, 2017  9:00 AM CDT   Anticoagulation Visit with [facility]   [facility]    [address] [place] [id number] [phone]              Contact Numbers     Please call [phone] to cancel and/or reschedule your appointment.  Please call [phone] with any problems or questions regarding your therapy          June 2017 Details    Sun Mon Tue Wed Thu Fri Sat         1               2               3                 4               5               6               7               8               9               10                 11               12      2 mg   See details      13      1 mg         14      2 mg         15      2 mg         16      1 mg         17      2 mg           18      2 mg         19      2 mg         20      1 mg         21      2 mg         22      2 mg         23      1 mg         24      2 mg           25      2 mg         26      2 mg         27      1 mg         28      2 mg         29      2 mg         30      1 mg           Date Details   06/12 This INR check               How to take your warfarin dose     To take:  1 mg Take 0.5 of a 2 mg tablet.    To take:  2 mg Take 1 of the 2 mg tablets.           July 2017 Details    Sun Mon Tue Wed Thu Fri Sat           1      2 mg           2      2 mg         3      2 mg         4      1 mg         5      2 mg         6      2 mg          7      1 mg         8      2 mg           9      2 mg         10      2 mg         11            12               13               14               15                 16               17               18               19               20               21               22                 23               24               25               26               27               28               29                 30               31                     Date Details   No additional details    Date of next INR:  7/11/2017         How to take your warfarin dose     To take:  1 mg Take 0.5 of a 2 mg tablet.    To take:  2 mg Take 1 of the 2 mg tablets.

## 2017-06-15 NOTE — TELEPHONE ENCOUNTER
Reason for Call:  Other prescription    Detailed comments: Patient states that she got a letter from [facility] saying they did not receive prescription, and are going to cancel medication. Please call to advise.    Phone Number Patient can be reached at: Home number on file [phone] (home)    Best Time: anytime    Can we leave a detailed message on this number? YES    Call taken on 6/15/2017 at 3:56 PM by Glen Johnson

## 2017-06-16 RX ORDER — LEVOTHYROXINE SODIUM 75 UG/1
TABLET ORAL
Qty: 90 TABLET | Refills: 2 | Status: SHIPPED | OUTPATIENT
Start: 2017-06-16 | End: 2018-05-27

## 2017-06-16 NOTE — TELEPHONE ENCOUNTER
Receipt was confirmed on 6/9 but resent & receipt was confirmed.  Left a detailed VM informing that I resent it & to double check with [employer].  Monique Simon RN

## 2017-06-16 NOTE — TELEPHONE ENCOUNTER
Spoke with patient who reports that she has approx 2 weeks left of losartan.  Writer spoke with [employer] rep who states it takes up to 1-2 days to receive a fax.  Writer will call [facility] next Monday to confirm they have received the fax and that the medication gets filled.  Rajani Stout, RN  Care Coordinator  [facility]  [phone]       (4) walks frequently

## 2017-07-08 ENCOUNTER — HEALTH MAINTENANCE LETTER (OUTPATIENT)
Age: 72
End: 2017-07-08

## 2017-07-11 ENCOUNTER — ANTICOAGULATION THERAPY VISIT (OUTPATIENT)
Dept: NURSING | Facility: CLINIC | Age: 72
End: 2017-07-11
Payer: COMMERCIAL

## 2017-07-11 DIAGNOSIS — Z79.01 LONG TERM CURRENT USE OF ANTICOAGULANT THERAPY: ICD-10-CM

## 2017-07-11 LAB — INR POINT OF CARE: 2.3 (ref 2–3)

## 2017-07-11 PROCEDURE — 85610 PROTHROMBIN TIME: CPT | Mod: QW

## 2017-07-11 PROCEDURE — 36416 COLLJ CAPILLARY BLOOD SPEC: CPT

## 2017-07-11 NOTE — MR AVS SNAPSHOT
Caty Ng   7/11/2017 9:00 AM   Anticoagulation Therapy Visit    Description:  72 year old female   Provider:  [facility]   Department:  Ne Nurse           INR as of 7/11/2017     Today's INR 2.3      Anticoagulation Summary as of 7/11/2017     INR goal 2.0-3.0   Today's INR 2.3   Full instructions 1 mg on Tue, Fri; 2 mg all other days   Next INR check 8/8/2017    Indications   Atrial fibrillation (H) [I48.91]  Long term current use of anticoagulant therapy [Z79.01]         Your next Anticoagulation Clinic appointment(s)     Aug 08, 2017  9:00 AM CDT   Anticoagulation Visit with [facility]   [facility]    [address] [place] [address] [phone]              Contact Numbers     Please call [phone] to cancel and/or reschedule your appointment.  Please call [phone] with any problems or questions regarding your therapy          July 2017 Details    Sun Mon Tue Wed Thu Fri Sat           1                 2               3               4               5               6               7               8                 9               10               11      1 mg   See details      12      2 mg         13      2 mg         14      1 mg         15      2 mg           16      2 mg         17      2 mg         18      1 mg         19      2 mg         20      2 mg         21      1 mg         22      2 mg           23      2 mg         24      2 mg         25      1 mg         26      2 mg         27      2 mg         28      1 mg         29      2 mg           30      2 mg         31      2 mg               Date Details   07/11 This INR check               How to take your warfarin dose     To take:  1 mg Take 0.5 of a 2 mg tablet.    To take:  2 mg Take 1 of the 2 mg tablets.           August 2017 Details    Sun Mon Tue Wed Thu Fri Sat       1      1 mg         2      2 mg         3      2 mg         4      1 mg         5       2 mg           6      2 mg         7      2 mg         8            9               10               11               12                 13               14               15               16               17               18               19                 20               21               22               23               24               25               26                 27               28               29               30               31                  Date Details   No additional details    Date of next INR:  8/8/2017         How to take your warfarin dose     To take:  1 mg Take 0.5 of a 2 mg tablet.    To take:  2 mg Take 1 of the 2 mg tablets.

## 2017-07-11 NOTE — PROGRESS NOTES
ANTICOAGULATION FOLLOW-UP CLINIC VISIT    Patient Name:  Caty Ng  Date:  7/11/2017  Contact Type:  Face to Face    SUBJECTIVE:     Patient Findings     Positives No Problem Findings           OBJECTIVE    INR Protime   Date Value Ref Range Status   07/11/2017 2.3 2.0 - 3.0 Final       ASSESSMENT / PLAN  INR assessment THER    Recheck INR In: 4 WEEKS    INR Location Clinic      Anticoagulation Summary as of 7/11/2017     INR goal 2.0-3.0   Today's INR 2.3   Maintenance plan 1 mg (2 mg x 0.5) on Tue, Fri; 2 mg (2 mg x 1) all other days   Full instructions 1 mg on Tue, Fri; 2 mg all other days   Weekly total 12 mg   No change documented Madhuri Renae RN   Plan last modified Madhuri Renae RN (1/23/2017)   Next INR check 8/8/2017   Priority INR   Target end date Indefinite    Indications   Atrial fibrillation (H) [I48.91]  Long term current use of anticoagulant therapy [Z79.01]         Anticoagulation Episode Summary     [facility]    Preferred lab [facility]    Send INR reminders to [facility]    Comments       Anticoagulation Care Providers     Provider Role Specialty Phone number    Avis Ferrer MD  Family Practice [phone]            See the Encounter Report to view Anticoagulation Flowsheet and Dosing Calendar (Go to Encounters tab in chart review, and find the Anticoagulation Therapy Visit)        Madhuri Renae RN

## 2017-08-08 ENCOUNTER — ANTICOAGULATION THERAPY VISIT (OUTPATIENT)
Dept: NURSING | Facility: CLINIC | Age: 72
End: 2017-08-08
Payer: COMMERCIAL

## 2017-08-08 DIAGNOSIS — Z79.01 LONG TERM CURRENT USE OF ANTICOAGULANT THERAPY: ICD-10-CM

## 2017-08-08 DIAGNOSIS — I48.91 ATRIAL FIBRILLATION (H): ICD-10-CM

## 2017-08-08 LAB — INR POINT OF CARE: 2.4 (ref 2–3)

## 2017-08-08 PROCEDURE — 85610 PROTHROMBIN TIME: CPT | Mod: QW

## 2017-08-08 PROCEDURE — 99207 ZZC NO CHARGE NURSE ONLY: CPT

## 2017-08-08 PROCEDURE — 36416 COLLJ CAPILLARY BLOOD SPEC: CPT

## 2017-08-08 NOTE — PROGRESS NOTES
ANTICOAGULATION FOLLOW-UP CLINIC VISIT    Patient Name:  Caty Ng  Date:  8/8/2017  Contact Type:  Face to Face    SUBJECTIVE:     Patient Findings     Positives No Problem Findings           OBJECTIVE    INR Protime   Date Value Ref Range Status   08/08/2017 2.4 2.0 - 3.0 Final       ASSESSMENT / PLAN  INR assessment THER    Recheck INR In: 4 WEEKS    INR Location Clinic      Anticoagulation Summary as of 8/8/2017     INR goal 2.0-3.0   Today's INR 2.4   Maintenance plan 1 mg (2 mg x 0.5) on Tue, Fri; 2 mg (2 mg x 1) all other days   Full instructions 1 mg on Tue, Fri; 2 mg all other days   Weekly total 12 mg   No change documented Madhuri Renae RN   Plan last modified aMdhuri Renae RN (1/23/2017)   Next INR check 9/5/2017   Priority INR   Target end date Indefinite    Indications   Atrial fibrillation (H) [I48.91]  Long term current use of anticoagulant therapy [Z79.01]         Anticoagulation Episode Summary     [facility]    Preferred lab [facility]    Send INR reminders to [facility]    Comments       Anticoagulation Care Providers     Provider Role Specialty Phone number    Avis Ferrer MD  Family Practice [phone]            See the Encounter Report to view Anticoagulation Flowsheet and Dosing Calendar (Go to Encounters tab in chart review, and find the Anticoagulation Therapy Visit)        Madhuri Renae RN

## 2017-08-08 NOTE — MR AVS SNAPSHOT
Caty Ng   8/8/2017 9:00 AM   Anticoagulation Therapy Visit    Description:  72 year old female   Provider:  NE ANTI COACRISTY   Department:  Ne Nurse           INR as of 8/8/2017     Today's INR 2.4      Anticoagulation Summary as of 8/8/2017     INR goal 2.0-3.0   Today's INR 2.4   Full instructions 1 mg on Tue, Fri; 2 mg all other days   Next INR check 9/5/2017    Indications   Atrial fibrillation (H) [I48.91]  Long term current use of anticoagulant therapy [Z79.01]         Your next Anticoagulation Clinic appointment(s)     Sep 05, 2017  8:45 AM CDT   [facility]   [facility]    [address] [place] [id number] [phone]              Contact Numbers     Please call [phone] to cancel and/or reschedule your appointment.  Please call [phone] with any problems or questions regarding your therapy          August 2017 Details    Sun Mon Tue Wed Thu Fri Sat       1               2               3               4               5                 6               7               8      1 mg   See details      9      2 mg         10      2 mg         11      1 mg         12      2 mg           13      2 mg         14      2 mg         15      1 mg         16      2 mg         17      2 mg         18      1 mg         19      2 mg           20      2 mg         21      2 mg         22      1 mg         23      2 mg         24      2 mg         25      1 mg         26      2 mg           27      2 mg         28      2 mg         29      1 mg         30      2 mg         31      2 mg            Date Details   08/08 This INR check               How to take your warfarin dose     To take:  1 mg Take 0.5 of a 2 mg tablet.    To take:  2 mg Take 1 of the 2 mg tablets.           September 2017 Details    Sun Mon Tue Wed Thu Fri Sat          1      1 mg         2      2 mg           3      2 mg         4      2 mg          5            6               7               8               9                 10               11               12               13               14               15               16                 17               18               19               20               21               22               23                 24               25               26               27               28               29               30                Date Details   No additional details    Date of next INR:  9/5/2017         How to take your warfarin dose     To take:  1 mg Take 0.5 of a 2 mg tablet.    To take:  2 mg Take 1 of the 2 mg tablets.

## 2017-08-25 ENCOUNTER — TELEPHONE (OUTPATIENT)
Dept: CARDIOLOGY | Facility: CLINIC | Age: 72
End: 2017-08-25

## 2017-08-25 DIAGNOSIS — I48.91 ATRIAL FIBRILLATION (H): ICD-10-CM

## 2017-08-25 DIAGNOSIS — I50.32 CHRONIC DIASTOLIC HEART FAILURE (H): Primary | ICD-10-CM

## 2017-08-25 NOTE — TELEPHONE ENCOUNTER
Reason for Call:  Other prescription    Detailed comments: patient calling and stating her Pharmacy is needing a new formulary alternative for Atenolol sent to the pharmacy. Nation wide shortage of the Atenolol. Please advise.     Phone Number Patient can be reached at: Home number on file [phone] (home)    Best Time: any time     Can we leave a detailed message on this number? YES    Call taken on 8/25/2017 at 12:53 PM by Abi Jameson

## 2017-08-25 NOTE — TELEPHONE ENCOUNTER
Message sent to Dr. Whitlock to review and advise. Pt aware that we need to wait for his response. Pt states she currently has enough for at least 15 days.      Pt previously was on Carvedilol 25mg/BID and was only switched off of that due to insurance changes so that would be an option. Pt was also previously on Metoprolol but experienced too many side effects.    Hansel Spann RN

## 2017-08-28 NOTE — TELEPHONE ENCOUNTER
Writer did confirm that [facility] does have a supply of Atenolol that patient can .  Attempted to contact patient to confirm if this is something she would want to do, no answer, message left on both contact #'s to call clinic back.  Rajani Stout RN

## 2017-08-29 NOTE — TELEPHONE ENCOUNTER
Spoke to patient.  Patient wants to switch to carvedilol 25 mg twice daily.  Message sent to covering cardiologist (Dr. Hernandez) to review and advise.    Rajani Stout RN

## 2017-08-29 NOTE — TELEPHONE ENCOUNTER
Patient left message on RN cardiology voicemail returning call.  Writer attempted to contact patient, no answer, message left for patient to call clinic back.  Rajani Stout RN

## 2017-08-31 RX ORDER — CARVEDILOL 25 MG/1
12.5 TABLET ORAL 2 TIMES DAILY WITH MEALS
Qty: 90 TABLET | Refills: 3 | Status: SHIPPED | OUTPATIENT
Start: 2017-08-31 | End: 2018-08-09

## 2017-08-31 NOTE — TELEPHONE ENCOUNTER
New orders received from Dr. Whitlock:    Pt to stop Atenolol and start Carvedilol 12.5mg/BID for 1 month then up to 25mg/BID if tolerating. Pt verbalized understanding. Medication list updated. Will f/u with pt in about 3 weeks to see how she is doing. Pt will call clinic if any issues arise.    Hansel Spann RN

## 2017-09-05 ENCOUNTER — ANTICOAGULATION THERAPY VISIT (OUTPATIENT)
Dept: NURSING | Facility: CLINIC | Age: 72
End: 2017-09-05
Payer: COMMERCIAL

## 2017-09-05 DIAGNOSIS — Z79.01 LONG TERM CURRENT USE OF ANTICOAGULANT THERAPY: ICD-10-CM

## 2017-09-05 DIAGNOSIS — I48.91 ATRIAL FIBRILLATION (H): ICD-10-CM

## 2017-09-05 LAB — INR POINT OF CARE: 2.1 (ref 2–3)

## 2017-09-05 PROCEDURE — 85610 PROTHROMBIN TIME: CPT | Mod: QW

## 2017-09-05 PROCEDURE — 36416 COLLJ CAPILLARY BLOOD SPEC: CPT

## 2017-09-05 NOTE — PROGRESS NOTES
ANTICOAGULATION FOLLOW-UP CLINIC VISIT    Patient Name:  Caty Ng  Date:  9/5/2017  Contact Type:  Face to Face    SUBJECTIVE:     Patient Findings     Positives Change in medications    Comments Will be changing from atenolol to carvedilol.            OBJECTIVE    INR Protime   Date Value Ref Range Status   08/08/2017 2.4 2.0 - 3.0 Final       ASSESSMENT / PLAN  INR assessment THER    Recheck INR In: 4 WEEKS    INR Location Clinic      Anticoagulation Summary as of 9/5/2017     INR goal 2.0-3.0   Today's INR    Maintenance plan 1 mg (2 mg x 0.5) on Tue, Fri; 2 mg (2 mg x 1) all other days   Full instructions 1 mg on Tue, Fri; 2 mg all other days   Weekly total 12 mg   No change documented Madhuri Renae RN   Plan last modified Madhuri Renae RN (1/23/2017)   Next INR check 10/3/2017   Priority INR   Target end date Indefinite    Indications   Atrial fibrillation (H) [I48.91]  Long term current use of anticoagulant therapy [Z79.01]         Anticoagulation Episode Summary     [facility]    Preferred lab [facility]    Send [facility] reminders to [facility]    Comments       Anticoagulation Care Providers     Provider Role Specialty Phone number    Avis Ferrer MD  [facility] [phone]            See the Encounter Report to view Anticoagulation Flowsheet and Dosing Calendar (Go to Encounters tab in chart review, and find the Anticoagulation Therapy Visit)        Madhuri Renae RN

## 2017-09-05 NOTE — MR AVS SNAPSHOT
Caty Ng   9/5/2017 8:45 AM   Anticoagulation Therapy Visit    Description:  72 year old female   Provider:  NE ANTI HILARIA   Department:  Ne Nurse           INR as of 9/5/2017     Today's INR       Anticoagulation Summary as of 9/5/2017     INR goal 2.0-3.0   Today's INR    Full instructions 1 mg on Tue, Fri; 2 mg all other days   Next INR check 10/3/2017    Indications   Atrial fibrillation (H) [I48.91]  Long term current use of anticoagulant therapy [Z79.01]         Your next Anticoagulation Clinic appointment(s)     Oct 03, 2017  9:15 AM CDT   Anticoagulation Visit with NE ANTI COAG   [facility]    [address] [place] [id number] [phone]              Contact Numbers     Please call [phone] to cancel and/or reschedule your appointment.  Please call [phone] with any problems or questions regarding your therapy          September 2017 Details    Sun Mon Tue Wed Thu Fri Sat          1               2                 3               4               5      1 mg   See details      6      2 mg         7      2 mg         8      1 mg         9      2 mg           10      2 mg         11      2 mg         12      1 mg         13      2 mg         14      2 mg         15      1 mg         16      2 mg           17      2 mg         18      2 mg         19      1 mg         20      2 mg         21      2 mg         22      1 mg         23      2 mg           24      2 mg         25      2 mg         26      1 mg         27      2 mg         28      2 mg         29      1 mg         30      2 mg          Date Details   09/05 This INR check               How to take your warfarin dose     To take:  1 mg Take 0.5 of a 2 mg tablet.    To take:  2 mg Take 1 of the 2 mg tablets.           October 2017 Details    Sun Mon Tue Wed Thu Fri Sat     1      2 mg         2      2 mg         3            4               5               6                7                 8               9               10               11               12               13               14                 15               16               17               18               19               20               21                 22               23               24               25               26               27               28                 29               30               31                    Date Details   No additional details    Date of next INR:  10/3/2017         How to take your warfarin dose     To take:  1 mg Take 0.5 of a 2 mg tablet.    To take:  2 mg Take 1 of the 2 mg tablets.

## 2017-09-06 ENCOUNTER — RADIANT APPOINTMENT (OUTPATIENT)
Dept: MAMMOGRAPHY | Facility: CLINIC | Age: 72
End: 2017-09-06
Attending: FAMILY MEDICINE
Payer: COMMERCIAL

## 2017-09-06 DIAGNOSIS — Z12.31 VISIT FOR SCREENING MAMMOGRAM: ICD-10-CM

## 2017-09-06 PROCEDURE — 77063 BREAST TOMOSYNTHESIS BI: CPT | Mod: TC

## 2017-09-06 PROCEDURE — G0202 SCR MAMMO BI INCL CAD: HCPCS | Mod: TC

## 2017-09-18 NOTE — TELEPHONE ENCOUNTER
Patient calling. She is taking two different medications. Amiodarone and carvedilol. Should these be taken together per the pharmacist? Please call and let know.

## 2017-09-18 NOTE — TELEPHONE ENCOUNTER
Writer spoke with patient who reports that she wants Dr. Whitlock to be aware that she is on amiodarone and is concerned of the interaction between carvedilol.  Writer explained that the main concern of both these medications is bradycardia and to monitor her blood pressure and heart rate.  Also monitor symptoms of bradycardia and hypotension such as lightheadedness and dizziness.  Patient verbalized understanding.    Will follow up with patient in 3 weeks to see how she is tolerating the medication and advised to call clinic in the mean time if any questions or concerns.  Patient verbalized understanding.    Rajani Stout RN

## 2017-09-29 ENCOUNTER — MYC MEDICAL ADVICE (OUTPATIENT)
Dept: FAMILY MEDICINE | Facility: CLINIC | Age: 72
End: 2017-09-29

## 2017-09-29 NOTE — TELEPHONE ENCOUNTER
Results are in and you can give them to her.  Results state that letter was sent.  Avis Ferrer MD

## 2017-10-03 ENCOUNTER — ANTICOAGULATION THERAPY VISIT (OUTPATIENT)
Dept: NURSING | Facility: CLINIC | Age: 72
End: 2017-10-03
Payer: COMMERCIAL

## 2017-10-03 DIAGNOSIS — Z79.01 LONG TERM CURRENT USE OF ANTICOAGULANT THERAPY: ICD-10-CM

## 2017-10-03 LAB — INR POINT OF CARE: 3.1 (ref 2–3)

## 2017-10-03 PROCEDURE — 36416 COLLJ CAPILLARY BLOOD SPEC: CPT

## 2017-10-03 PROCEDURE — 85610 PROTHROMBIN TIME: CPT | Mod: QW

## 2017-10-03 PROCEDURE — 99207 ZZC NO CHARGE NURSE ONLY: CPT

## 2017-10-03 NOTE — MR AVS SNAPSHOT
Caty Ng   10/3/2017 9:15 AM   Anticoagulation Therapy Visit    Description:  72 year old female   Provider:  NE ANTI HILARIA   Department:  Ne Nurse           INR as of 10/3/2017     Today's INR 3.1!      Anticoagulation Summary as of 10/3/2017     INR goal 2.0-3.0   Today's INR 3.1!   Full instructions 1 mg on Tue, Fri; 2 mg all other days   Next INR check 10/31/2017    Indications   Atrial fibrillation (H) [I48.91]  Long term current use of anticoagulant therapy [Z79.01]         Your next Anticoagulation Clinic appointment(s)     Oct 31, 2017 10:00 AM CDT   Anticoagulation Visit with NE ANTI COAG   [facility]    [address] [place] [id number] [phone]              Contact Numbers     Please call [phone] to cancel and/or reschedule your appointment.  Please call [phone] with any problems or questions regarding your therapy          October 2017 Details    Sun Mon Tue Wed Thu Fri Sat     1               2               3      1 mg   See details      4      2 mg         5      2 mg         6      1 mg         7      2 mg           8      2 mg         9      2 mg         10      1 mg         11      2 mg         12      2 mg         13      1 mg         14      2 mg           15      2 mg         16      2 mg         17      1 mg         18      2 mg         19      2 mg         20      1 mg         21      2 mg           22      2 mg         23      2 mg         24      1 mg         25      2 mg         26      2 mg         27      1 mg         28      2 mg           29      2 mg         30      2 mg         31                 Date Details   10/03 This INR check       Date of next INR:  10/31/2017         How to take your warfarin dose     To take:  1 mg Take 0.5 of a 2 mg tablet.    To take:  2 mg Take 1 of the 2 mg tablets.

## 2017-10-03 NOTE — PROGRESS NOTES
ANTICOAGULATION FOLLOW-UP CLINIC VISIT    Patient Name:  Caty Ng  Date:  10/3/2017  Contact Type:  Face to Face    SUBJECTIVE:     Patient Findings     Positives No Problem Findings           OBJECTIVE    INR Protime   Date Value Ref Range Status   10/03/2017 3.1 (A) 2.0 - 3.0 Final       ASSESSMENT / PLAN  INR assessment THER    Recheck INR In: 4 WEEKS    INR Location Clinic      Anticoagulation Summary as of 10/3/2017     INR goal 2.0-3.0   Today's INR 3.1!   Maintenance plan 1 mg (2 mg x 0.5) on Tue, Fri; 2 mg (2 mg x 1) all other days   Full instructions 1 mg on Tue, Fri; 2 mg all other days   Weekly total 12 mg   No change documented Madhuri Renae RN   Plan last modified Madhuri Renae RN (1/23/2017)   Next INR check 10/31/2017   Priority INR   Target end date Indefinite    Indications   Atrial fibrillation (H) [I48.91]  Long term current use of anticoagulant therapy [Z79.01]         Anticoagulation Episode Summary     [facility]    Preferred lab [facility]    [facility]    Comments       Anticoagulation Care Providers     Provider Role Specialty Phone number    Avis Ferrer MD  Family Practice [phone]            See the Encounter Report to view Anticoagulation Flowsheet and Dosing Calendar (Go to Encounters tab in chart review, and find the Anticoagulation Therapy Visit)        Madhuri Renae RN

## 2017-10-05 ENCOUNTER — TELEPHONE (OUTPATIENT)
Dept: CARDIOLOGY | Facility: CLINIC | Age: 72
End: 2017-10-05

## 2017-10-09 ENCOUNTER — TELEPHONE (OUTPATIENT)
Dept: CARDIOLOGY | Facility: CLINIC | Age: 72
End: 2017-10-09

## 2017-10-09 NOTE — TELEPHONE ENCOUNTER
"Called patient to discuss how she is tolerating the Coreg.  Patient states that she feels she is tolerating the Coreg.  She has not been checking her blood pressure/pulse daily but did last week one day and the readings were \"good\" but does not recall the numbers.  She did verbalize that she does feel slightly more short of breath.  Admits to increased salt in diet.  States she has been eating more snacks and BLT's.  She also reports that she has gained 4.5 pounds in the last 1-2 weeks.  Denies increased edema.  Is taking torsemide 40 mg daily and reports she has noticed that she usually has to go to the bathroom when she takes her torsemide but not the last 2 days her urine output has decreased.      Patient states at this time she wants to work on her diet to see if her breathing improves.  Discussed in detail to keep sodium intake at 2 grams or less focusing on lean meats, fruits and veggies and to limit fluid intake to 2 liters daily.  Patient verbalized understanding.  Writer will follow up with patient on Thursday to discuss if symptoms have improved.      Rajani Stout, RN  Care Coordinator  [facility]  [phone]      "

## 2017-10-17 NOTE — TELEPHONE ENCOUNTER
Spoke to patient who reports that she has lost 5 pounds and her breathing is better.  Her ankle edema has improved also and she did experience urine output after taking her torsemide.  She verbalized that she has been focusing on a low salt diet and limited her fluid intake and feels that this has helped her.  Advised for patient to call cardiology clinic if she gains 1-2 pounds in 1-2 days or 5 pounds in 1 week or if experience worsening edema or shortness of breath.  Patient verbalized understanding.    Rajani Stout RN  Care Coordinator  [facility]  [phone]

## 2017-10-31 ENCOUNTER — ANTICOAGULATION THERAPY VISIT (OUTPATIENT)
Dept: NURSING | Facility: CLINIC | Age: 72
End: 2017-10-31
Payer: COMMERCIAL

## 2017-10-31 DIAGNOSIS — I48.91 ATRIAL FIBRILLATION (H): ICD-10-CM

## 2017-10-31 DIAGNOSIS — Z79.01 LONG TERM CURRENT USE OF ANTICOAGULANT THERAPY: ICD-10-CM

## 2017-10-31 LAB — INR POINT OF CARE: 2.7 (ref 2–3)

## 2017-10-31 PROCEDURE — 36416 COLLJ CAPILLARY BLOOD SPEC: CPT

## 2017-10-31 PROCEDURE — 85610 PROTHROMBIN TIME: CPT | Mod: QW

## 2017-10-31 PROCEDURE — 99207 ZZC NO CHARGE NURSE ONLY: CPT

## 2017-10-31 NOTE — MR AVS SNAPSHOT
Caty Ng   10/31/2017 10:00 AM   Anticoagulation Therapy Visit    Description:  72 year old female   Provider:  NE ANTI HILARIA   Department:  Ne Nurse           INR as of 10/31/2017     Today's INR 2.7      Anticoagulation Summary as of 10/31/2017     INR goal 2.0-3.0   Today's INR 2.7   Full instructions 1 mg on Tue, Fri; 2 mg all other days   Next INR check 11/28/2017    Indications   Atrial fibrillation (H) [I48.91]  Long term current use of anticoagulant therapy [Z79.01]         Your next Anticoagulation Clinic appointment(s)     Nov 28, 2017 10:15 AM CST   [facility]agulation [facility] with NE ANTI COAG   [facility]    [address] [place] [id number] [phone]              Contact Numbers     Please call [phone] to cancel and/or reschedule your appointment.  Please call [phone] with any problems or questions regarding your therapy          October 2017 Details    Sun Mon Tue Wed Thu Fri Sat     1               2               3               4               5               6               7                 8               9               10               11               12               13               14                 15               16               17               18               19               20               21                 22               23               24               25               26               27               28                 29               30               31      1 mg   See details           Date Details   10/31 This INR check               How to take your warfarin dose     To take:  1 mg Take 0.5 of a 2 mg tablet.           November 2017 Details    Sun Mon Tue Wed Thu Fri Sat        1      2 mg         2      2 mg         3      1 mg         4      2 mg           5      2 mg         6      2 mg         7      1 mg         8      2 mg         9      2 mg         10      1 mg         11       2 mg           12      2 mg         13      2 mg         14      1 mg         15      2 mg         16      2 mg         17      1 mg         18      2 mg           19      2 mg         20      2 mg         21      1 mg         22      2 mg         23      2 mg         24      1 mg         25      2 mg           26      2 mg         27      2 mg         28            29               30                  Date Details   No additional details    Date of next INR:  11/28/2017         How to take your warfarin dose     To take:  1 mg Take 0.5 of a 2 mg tablet.    To take:  2 mg Take 1 of the 2 mg tablets.

## 2017-10-31 NOTE — PROGRESS NOTES
ANTICOAGULATION FOLLOW-UP CLINIC VISIT    Patient Name:  Caty Ng  Date:  10/31/2017  Contact Type:  Face to Face    SUBJECTIVE:     Patient Findings     Comments Wants to stop thyroid med because she feels it is making her hair thin and resistant to perms           OBJECTIVE    INR Protime   Date Value Ref Range Status   10/31/2017 2.7 2.0 - 3.0 Final       ASSESSMENT / PLAN  INR assessment THER    Recheck INR In: 4 WEEKS    INR Location Clinic      Anticoagulation Summary as of 10/31/2017     INR goal 2.0-3.0   Today's INR 2.7   Maintenance plan 1 mg (2 mg x 0.5) on Tue, Fri; 2 mg (2 mg x 1) all other days   Full instructions 1 mg on Tue, Fri; 2 mg all other days   Weekly total 12 mg   No change documented Madhuri Renae RN   Plan last modified Madhuri Renae RN (1/23/2017)   Next INR check 11/28/2017   Priority INR   Target end date Indefinite    Indications   Atrial fibrillation (H) [I48.91]  Long term current use of anticoagulant therapy [Z79.01]         Anticoagulation Episode Summary     [facility]    Preferred lab [facility]    Send INR reminders to [facility]    Comments       Anticoagulation Care Providers     Provider Role Specialty Phone number    Avis Ferrer MD  Family Practice [phone]            See the Encounter Report to view Anticoagulation Flowsheet and Dosing Calendar (Go to Encounters tab in chart review, and find the Anticoagulation Therapy Visit)        Madhuri Renae RN

## 2017-12-21 ENCOUNTER — ANTICOAGULATION THERAPY VISIT (OUTPATIENT)
Dept: NURSING | Facility: CLINIC | Age: 72
End: 2017-12-21
Payer: COMMERCIAL

## 2017-12-21 DIAGNOSIS — Z79.01 LONG TERM CURRENT USE OF ANTICOAGULANT THERAPY: ICD-10-CM

## 2017-12-21 DIAGNOSIS — I48.91 ATRIAL FIBRILLATION (H): ICD-10-CM

## 2017-12-21 LAB — INR POINT OF CARE: 2.5 (ref 0.86–1.14)

## 2017-12-21 PROCEDURE — 99207 ZZC NO CHARGE NURSE ONLY: CPT

## 2017-12-21 PROCEDURE — 36416 COLLJ CAPILLARY BLOOD SPEC: CPT

## 2017-12-21 PROCEDURE — 85610 PROTHROMBIN TIME: CPT | Mod: QW

## 2017-12-21 NOTE — MR AVS SNAPSHOT
Caty Ng   12/21/2017 8:30 AM   Anticoagulation Therapy Visit    Description:  72 year old female   Provider:  NE ANTI HILARIA   Department:  Ne Nurse           INR as of 12/21/2017     Today's INR 2.5      Anticoagulation Summary as of 12/21/2017     INR goal 2.0-3.0   Today's INR 2.5   Full instructions 1 mg on Tue, Fri; 2 mg all other days   Next INR check 2/1/2018    Indications   Atrial fibrillation (H) [I48.91]  Long term current use of anticoagulant therapy [Z79.01]         Your next Anticoagulation Clinic appointment(s)     Feb 01, 2018 10:15 AM CST   Anticoagulation Visit with NE ANTI COAG   [facility]    [address] [place] [id number] [phone]              Contact Numbers     Please call [phone] to cancel and/or reschedule your appointment.  Please call [phone] with any problems or questions regarding your therapy          December 2017 Details    Sun Mon Tue Wed Thu Fri Sat          1               2                 3               4               5               6               7               8               9                 10               11               12               13               14               15               16                 17               18               19               20               21      2 mg   See details      22      1 mg         23      2 mg           24      2 mg         25      2 mg         26      1 mg         27      2 mg         28      2 mg         29      1 mg         30      2 mg           31      2 mg                Date Details   12/21 This INR check               How to take your warfarin dose     To take:  1 mg Take 0.5 of a 2 mg tablet.    To take:  2 mg Take 1 of the 2 mg tablets.           January 2018 Details    Sun Mon Tue Wed Thu Fri Sat      1      2 mg         2      1 mg         3      2 mg         4      2 mg         5      1 mg         6      2 mg            7      2 mg         8      2 mg         9      1 mg         10      2 mg         11      2 mg         12      1 mg         13      2 mg           14      2 mg         15      2 mg         16      1 mg         17      2 mg         18      2 mg         19      1 mg         20      2 mg           21      2 mg         22      2 mg         23      1 mg         24      2 mg         25      2 mg         26      1 mg         27      2 mg           28      2 mg         29      2 mg         30      1 mg         31      2 mg             Date Details   No additional details            How to take your warfarin dose     To take:  1 mg Take 0.5 of a 2 mg tablet.    To take:  2 mg Take 1 of the 2 mg tablets.           February 2018 Details    Sun Mon Tue Wed Thu Fri Sat         1            2               3                 4               5               6               7               8               9               10                 11               12               13               14               15               16               17                 18               19               20               21               22               23               24                 25               26               27               28                   Date Details   No additional details    Date of next INR:  2/1/2018         How to take your warfarin dose     To take:  2 mg Take 1 of the 2 mg tablets.

## 2017-12-21 NOTE — PROGRESS NOTES
ANTICOAGULATION FOLLOW-UP CLINIC VISIT    Patient Name:  Caty Ng  Date:  12/21/2017  Contact Type:  Face to Face    SUBJECTIVE:     Patient Findings     Positives No Problem Findings           OBJECTIVE    INR Protime   Date Value Ref Range Status   12/21/2017 2.5 (A) 0.86 - 1.14 Final       ASSESSMENT / PLAN  INR assessment THER    Recheck INR In: 6 WEEKS    INR Location Clinic      Anticoagulation Summary as of 12/21/2017     INR goal 2.0-3.0   Today's INR 2.5   Maintenance plan 1 mg (2 mg x 0.5) on Tue, Fri; 2 mg (2 mg x 1) all other days   Full instructions 1 mg on Tue, Fri; 2 mg all other days   Weekly total 12 mg   No change documented Adiel Boyle RN   Plan last modified Madhuri Renae RN (1/23/2017)   Next INR check 2/1/2018   Priority INR   Target end date Indefinite    Indications   Atrial fibrillation (H) [I48.91]  Long term current use of anticoagulant therapy [Z79.01]         Anticoagulation Episode Summary     [facility]    Preferred lab [facility]    [facility]    Comments       Anticoagulation Care Providers     Provider Role Specialty Phone number    Avis Ferrer MD  Family Practice [phone]            See the Encounter Report to view Anticoagulation Flowsheet and Dosing Calendar (Go to Encounters tab in chart review, and find the Anticoagulation Therapy Visit)        Adiel Boyle, WILMA

## 2018-01-08 ENCOUNTER — OFFICE VISIT (OUTPATIENT)
Dept: FAMILY MEDICINE | Facility: CLINIC | Age: 73
End: 2018-01-08
Payer: COMMERCIAL

## 2018-01-08 VITALS
HEART RATE: 70 BPM | SYSTOLIC BLOOD PRESSURE: 122 MMHG | DIASTOLIC BLOOD PRESSURE: 70 MMHG | HEIGHT: 64 IN | WEIGHT: 252 LBS | TEMPERATURE: 98.3 F | BODY MASS INDEX: 43.02 KG/M2

## 2018-01-08 DIAGNOSIS — S02.2XXA CLOSED FRACTURE OF NASAL BONE, INITIAL ENCOUNTER: Primary | ICD-10-CM

## 2018-01-08 PROCEDURE — 99213 OFFICE O/P EST LOW 20 MIN: CPT | Performed by: FAMILY MEDICINE

## 2018-01-08 NOTE — PROGRESS NOTES
SUBJECTIVE:   Caty Ng is a 72 year old female who presents to clinic today for the following health issues:    Seen at [facility], brought discharge summary.   Patient fell on garage floor December 29th  Patient has black and blue robyn on right knee. The bruising is in front and back and has had a knee replacement in past. She had to kneel on concrete to get up after fall.   Left arm, upper muscle, she can hear it clock, once in a while.   She has facial bruising, would like checked today.    Feels generally fine at this time.  The bruising is tender.  Also some bruising on the right knee that is tender and since she has had a knee replacement on that side was a little bit worried about that.  Walking is pain-free and she feels stable on that leg.  Since the injury she has felt a little bit unsteady but has had no further falls.  The original fall was caused when she tripped over a small change in the elevation of the concrete in her garage.  She denies any double vision or headache at this time.  She was seen at the [facility] emergency room.  Those records were reviewed in care everywhere.  She was put on amoxicillin due to the nasal fracture.  CT scan results are as noted below:      Findings:     There is a mildly comminuted nasal bone fracture with leftward deviation. There is a nondisplaced septal bone fracture. There is soft tissue swelling over the brow, nasal bridge, cartilaginous septum and nasal vestibules. There is mild lucency about maxillary tooth 10 root and thinning of the ventral cortex, which may be related to dental infection or posttraumatic. No definite tooth luxation or fracture. The lamina papyracea and orbital walls are intact. There is nonaggressive, chronic-appearing mucosal thickening mostly within the right maxillary sinus and to a lesser degree within anterior ethmoid air cells and alveolar recess of the left maxillary sinus. The posterior ethmoids and the sphenoid chambers are  clear. There is bilateral hyperaeration of the mastoid air cells, which are clear. The middle ear complexes are unremarkable. The skull base is intact. The mandible is intact.    There is no evidence for intracranial hemorrhage or extra-axial fluid collection. The brain precarinal volume is appropriate for age. There is mild periventricular white matter change, nonspecific. There is no large territorial area of gray-white differentiation loss, or obscuration of the basal ganglia There is no evidence for mass, mass effect, shift of midline structures, or basal cistern effacement.    Impression:   1. Mildly comminuted nasal bone fracture with overall leftward deviation and overlying soft tissue swelling. There is a concomitant nondisplaced septal fracture. The orbital walls are intact.    2. No acute appearing intracranial process.    She is taking taking amoxicillin for 2 more days.          Problem list and histories reviewed & adjusted, as indicated.  Additional history: as documented    Patient Active Problem List   Diagnosis     Advanced directives, counseling/discussion     overweight with BMI >40     Hypertriglyceridemia     Atrial fibrillation with RVR (H)     Health Care Home     Mitral valve regurgitation     Hyperlipidemia     Systolic heart failure (H)     Atrial fibrillation (H)     BROCK (obstructive sleep apnea)     Hyperlipidemia LDL goal <130     Chronic diastolic heart failure (H)     Calcification of coronary artery     Long term current use of anticoagulant therapy     Lesion of mediastinum     Hypothyroidism     Thymic neoplasm     Past Surgical History:   Procedure Laterality Date     HYSTERECTOMY, PAP NO LONGER INDICATED  1989     JOINT REPLACEMTN, KNEE RT/LT  2007    right     SALPINGO OOPHORECTOMY,R/L/BRANDO  2003    Salpingo Oophorectomy, BRANDO     SURGICAL HISTORY OF -   1989    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       Social History   Substance Use Topics     Smoking status: Former Smoker      Packs/day: 0.50     Years: 20.00     Types: Cigarettes     Quit date: 10/1/1982     Smokeless tobacco: Never Used     Alcohol use No      Comment: 1 beers per week     Family History   Problem Relation Age of Onset     HEART DISEASE Mother      CANCER Mother      ovarian     DIABETES Father      Musculoskeletal Disorder Father      DIABETES Paternal Grandmother      DIABETES Paternal Grandfather      DIABETES Sister      Thrombophilia Daughter      Factor V Leiden Deficiency         Current Outpatient Prescriptions   Medication Sig Dispense Refill     carvedilol (COREG) 25 MG tablet Take 0.5 tablets (12.5 mg) by mouth 2 times daily (with meals) 90 tablet 3     levothyroxine (SYNTHROID/LEVOTHROID) 75 MCG tablet TAKE 1 TABLET EVERY DAY 90 tablet 2     losartan (COZAAR) 50 MG tablet Take 1 tablet (50 mg) by mouth daily 5 tablet 0     warfarin (COUMADIN) 2 MG tablet USE AS DIRECTED PER ACC- CURRENT DOSE IS 4 MG (2 TABS) ON MON, WED, AND FRI AND 2MG (1 TAB) ON TUES, THURS, SAT, AND SUN  130 tablet 3     albuterol (PROAIR HFA/PROVENTIL HFA/VENTOLIN HFA) 108 (90 BASE) MCG/ACT Inhaler Inhale 2 puffs into the lungs every 6 hours as needed for shortness of breath / dyspnea or wheezing Profile Rx: patient will contact pharmacy when needed 1 Inhaler 0     losartan (COZAAR) 50 MG tablet Take 1 tablet (50 mg) by mouth daily 90 tablet 3     torsemide (DEMADEX) 20 MG tablet Take 2 tablets (40 mg) by mouth daily 180 tablet 3     amiodarone (PACERONE/CODARONE) 200 MG tablet Take 1 tablet (200 mg) by mouth daily 90 tablet 3     atorvastatin (LIPITOR) 40 MG tablet Take 1 tablet (40 mg) by mouth daily 90 tablet 3     fluticasone (FLONASE) 50 MCG/ACT nasal spray Spray 1-2 sprays into both nostrils daily Please inform patient if the OTC would be cheaper for her or if there is an alternative nasal steroid that is less costly, please provide that information. 16 g 6     loratadine (CLARITIN) 10 MG tablet Take 10 mg by mouth as needed for  "allergies       Allergies   Allergen Reactions     Lisinopril      cough     Metal [Staples] Other (See Comments)     Metal allergy not clearly specified - patient had a stainless steel screw put in her foot that needed to be removed due to allergy/hypersensitivity      Metoprolol Fatigue         Reviewed and updated as needed this visit by clinical staff  Tobacco  Allergies  Meds  Problems  Med Hx  Surg Hx  Soc Hx      Reviewed and updated as needed this visit by Provider  Tobacco  Problems  Med Hx  Surg Hx  Soc Hx        ROS:  Constitutional, HEENT, cardiovascular, pulmonary, gi and gu systems are negative, except as otherwise noted.      OBJECTIVE:   /70  Pulse 70  Temp 98.3  F (36.8  C) (Oral)  Ht 5' 4\" (1.626 m)  Wt 252 lb (114.3 kg)  BMI 43.26 kg/m2  Body mass index is 43.26 kg/(m^2).  GENERAL: healthy, alert and no distress  EYES: Eyes grossly normal to inspection and extraocular movements are intact.  She has tenderness over the bruising below the eyes.  No asymmetry is noted.  HENT: normal cephalic/atraumatic, ear canals and TM's normal, nose and mouth without ulcers or lesions and tenderness to palpation at the nasal bridge.  No septal deviation is noted.  No septal hematomas noted.  She is able to breathe easily through each nostril.  NECK: no adenopathy, no asymmetry, masses, or scars and thyroid normal to palpation  RESP: lungs clear to auscultation - no rales, rhonchi or wheezes  CV: regular rate and rhythm, normal S1 S2, no S3 or S4, no murmur, click or rub, no peripheral edema and peripheral pulses strong  MS: no gross musculoskeletal defects noted, no edema  MS: She has visible bruising to the right knee that is tender to palpation but no deformity and her gait is normal.  NEURO: Normal strength and tone, mentation intact and speech normal  PSYCH: mentation appears normal, affect normal/bright    Diagnostic Test Results:  none     ASSESSMENT/PLAN:             1. Closed fracture " of nasal bone, initial encounter  Appears to be doing well at this time and not suffering any complications.  I offered an ENT consultation but since she is okay with the cosmetic appearance and pain is lessening she did not feel any to pursue that at this time.  I do not see any other compelling reasons such as a septal hematoma for ENT consultation.      FUTURE APPOINTMENTS:       - Follow-up for annual visit or as needed    Pavel Fuller MD  [employer]

## 2018-01-08 NOTE — NURSING NOTE
"Chief Complaint   Patient presents with     Follow Up For     broken nose     Health Maintenance     pended       Initial /70  Pulse 70  Temp 98.3  F (36.8  C) (Oral)  Ht 5' 4\" (1.626 m)  Wt 252 lb (114.3 kg)  BMI 43.26 kg/m2 Estimated body mass index is 43.26 kg/(m^2) as calculated from the following:    Height as of this encounter: 5' 4\" (1.626 m).    Weight as of this encounter: 252 lb (114.3 kg).  Medication Reconciliation: complete   Poonam Girard MA      "

## 2018-01-08 NOTE — MR AVS SNAPSHOT
After Visit Summary   1/8/2018    Caty Ng    MRN: [id number]           Patient Information     Date Of Birth          1945        Visit Information        Provider Department      1/8/2018 11:20 AM Paevl Fuller MD [facility]        Today's Diagnoses     Closed fracture of nasal bone, initial encounter    -  1       Follow-ups after your visit        Your next 10 appointments already scheduled     Feb 01, 2018 10:15 AM CST   Anticoagulation Visit with NE ANTI COAG   [facility]    [address] [facility] [address] [phone]            Feb 01, 2018 11:00 AM CST   Return Visit with MEL Casey MD   [facility] AT [facility] ([facility])    [address] [place] [address] 2nd Floor  [facility] [address] [phone]              Who to contact     If you have questions or need follow up information about today's clinic visit or your schedule please contact [facility] directly at [phone].  Normal or non-critical lab and imaging results will be communicated to you by [employer], letter or phone within 4 business days after the clinic has received the results. If you do not hear from us within 7 days, please contact the clinic through [employer] or phone. If you have a critical or abnormal lab result, we will notify you by phone as soon as possible.  Submit refill requests through [employer] or call your pharmacy and they will forward the refill request to us. Please allow 3 business days for your refill to be completed.          Additional Information About Your Visit        MyChart Information     [employer] gives you secure access to your electronic health record. If you see a primary care provider, you can also send messages to your care team and make appointments. If you have questions, please call your primary care clinic.  If you do  "not have a primary care provider, please call [phone] and they will assist you.        Care EveryWhere ID     This is your Care EveryWhere ID. This could be used by other organizations to access your [facility] medical records  [id number]        Your Vitals Were     Pulse Temperature Height BMI (Body Mass Index)          70 98.3  F (36.8  C) (Oral) 5' 4\" (1.626 m) 43.26 kg/m2         Blood Pressure from Last 3 Encounters:   01/08/18 122/70   03/24/17 130/74   01/11/17 151/78    Weight from Last 3 Encounters:   01/08/18 252 lb (114.3 kg)   03/24/17 252 lb (114.3 kg)   01/11/17 251 lb (113.9 kg)              Today, you had the following     No orders found for display       Primary Care Provider Office Phone # Fax #    Avis Ferrer [phone]       [address] [facility] [address]        Equal Access to Services     [facility] AH: Hadii aad ku hadasho Soomaali, waaxda luqadaha, qaybta kaalmada adeegyada, waxay idiin haytain osiris larson . So [facility] [phone].    ATENCIÓN: Si habla español, tiene a castro disposición servicios gratuitos de asistencia lingüística. [facility] [phone].    We comply with applicable federal civil rights laws and [place] laws. We do not discriminate on the basis of race, color, national origin, age, disability, sex, sexual orientation, or gender identity.            Thank you!     Thank you for choosing [facility]  for your care. Our goal is always to provide you with excellent care. Hearing back from our patients is one way we can continue to improve our services. Please take a few minutes to complete the written survey that you may receive in the mail after your visit with us. Thank you!             Your Updated Medication List - Protect others around you: Learn how to safely use, store and throw away your medicines at www.disposemymeds.org.          This list is accurate as of: 1/8/18 11:59 PM.  Always use your most " recent med list.                   Brand Name Dispense Instructions for use Diagnosis    albuterol 108 (90 BASE) MCG/ACT Inhaler    PROAIR HFA/PROVENTIL HFA/VENTOLIN HFA    1 Inhaler    Inhale 2 puffs into the lungs every 6 hours as needed for shortness of breath / dyspnea or wheezing Profile Rx: patient will contact pharmacy when needed    Acute bronchospasm       amiodarone 200 MG tablet    PACERONE/CODARONE    90 tablet    Take 1 tablet (200 mg) by mouth daily    Paroxysmal atrial fibrillation (H)       atorvastatin 40 MG tablet    LIPITOR    90 tablet    Take 1 tablet (40 mg) by mouth daily    Calcification of coronary artery       carvedilol 25 MG tablet    COREG    90 tablet    Take 0.5 tablets (12.5 mg) by mouth 2 times daily (with meals)    Chronic diastolic heart failure (H), Atrial fibrillation (H)       fluticasone 50 MCG/ACT spray    FLONASE    16 g    Spray 1-2 sprays into both nostrils daily Please inform patient if the OTC would be cheaper for her or if there is an alternative nasal steroid that is less costly, please provide that information.    Post-nasal drainage, Cough       levothyroxine 75 MCG tablet    SYNTHROID/LEVOTHROID    90 tablet    TAKE 1 TABLET EVERY DAY    Hypothyroidism       loratadine 10 MG tablet    CLARITIN     Take 10 mg by mouth as needed for allergies        * losartan 50 MG tablet    COZAAR    90 tablet    Take 1 tablet (50 mg) by mouth daily    Chronic diastolic heart failure (H)       * losartan 50 MG tablet    COZAAR    5 tablet    Take 1 tablet (50 mg) by mouth daily    Chronic diastolic heart failure (H)       torsemide 20 MG tablet    DEMADEX    180 tablet    Take 2 tablets (40 mg) by mouth daily    Acute on chronic diastolic congestive heart failure (H)       warfarin 2 MG tablet    COUMADIN    130 tablet    USE AS DIRECTED PER ACC- CURRENT DOSE IS 4 MG (2 TABS) ON MON, WED, AND FRI AND 2MG (1 TAB) ON TUES, THURS, SAT, AND SUN    Long-term (current) use of  anticoagulants, INR goal 2.0-3.0       * Notice:  This list has 2 medication(s) that are the same as other medications prescribed for you. Read the directions carefully, and ask your doctor or other care provider to review them with you.

## 2018-02-01 ENCOUNTER — OFFICE VISIT (OUTPATIENT)
Dept: CARDIOLOGY | Facility: CLINIC | Age: 73
End: 2018-02-01
Payer: COMMERCIAL

## 2018-02-01 ENCOUNTER — ANTICOAGULATION THERAPY VISIT (OUTPATIENT)
Dept: NURSING | Facility: CLINIC | Age: 73
End: 2018-02-01
Payer: COMMERCIAL

## 2018-02-01 VITALS
SYSTOLIC BLOOD PRESSURE: 138 MMHG | HEART RATE: 72 BPM | BODY MASS INDEX: 43.47 KG/M2 | DIASTOLIC BLOOD PRESSURE: 73 MMHG | WEIGHT: 253.25 LBS | OXYGEN SATURATION: 98 %

## 2018-02-01 DIAGNOSIS — I48.0 PAROXYSMAL ATRIAL FIBRILLATION (H): ICD-10-CM

## 2018-02-01 DIAGNOSIS — Z79.01 LONG TERM CURRENT USE OF ANTICOAGULANT THERAPY: ICD-10-CM

## 2018-02-01 DIAGNOSIS — I50.33 ACUTE ON CHRONIC DIASTOLIC CONGESTIVE HEART FAILURE (H): ICD-10-CM

## 2018-02-01 DIAGNOSIS — I48.91 ATRIAL FIBRILLATION (H): ICD-10-CM

## 2018-02-01 DIAGNOSIS — I25.10 CALCIFICATION OF CORONARY ARTERY: ICD-10-CM

## 2018-02-01 DIAGNOSIS — I50.32 CHRONIC DIASTOLIC HEART FAILURE (H): Primary | ICD-10-CM

## 2018-02-01 LAB — INR POINT OF CARE: 2.1 (ref 0.86–1.14)

## 2018-02-01 PROCEDURE — 99214 OFFICE O/P EST MOD 30 MIN: CPT | Performed by: INTERNAL MEDICINE

## 2018-02-01 PROCEDURE — 85610 PROTHROMBIN TIME: CPT | Mod: QW

## 2018-02-01 PROCEDURE — 99207 ZZC NO CHARGE NURSE ONLY: CPT

## 2018-02-01 PROCEDURE — 36416 COLLJ CAPILLARY BLOOD SPEC: CPT

## 2018-02-01 RX ORDER — ATORVASTATIN CALCIUM 40 MG/1
40 TABLET, FILM COATED ORAL DAILY
Qty: 90 TABLET | Refills: 3 | Status: SHIPPED | OUTPATIENT
Start: 2018-02-01 | End: 2019-03-28

## 2018-02-01 RX ORDER — LOSARTAN POTASSIUM 50 MG/1
50 TABLET ORAL DAILY
Qty: 90 TABLET | Refills: 3 | Status: SHIPPED | OUTPATIENT
Start: 2018-02-01 | End: 2018-05-04

## 2018-02-01 RX ORDER — AMIODARONE HYDROCHLORIDE 200 MG/1
200 TABLET ORAL DAILY
Qty: 30 TABLET | Refills: 0 | Status: SHIPPED | OUTPATIENT
Start: 2018-02-01 | End: 2018-03-08 | Stop reason: ALTCHOICE

## 2018-02-01 RX ORDER — TORSEMIDE 20 MG/1
40 TABLET ORAL DAILY
Qty: 180 TABLET | Refills: 3 | Status: SHIPPED | OUTPATIENT
Start: 2018-02-01 | End: 2019-02-04

## 2018-02-01 ASSESSMENT — PAIN SCALES - GENERAL: PAINLEVEL: NO PAIN (0)

## 2018-02-01 NOTE — PATIENT INSTRUCTIONS
Thank you for coming to the [facility] @ [facility]; please note the following instructions:    1. Echo and zio appts scheduled. See attatched sheet for dates and times.    2. Follow up will be based on your results.         If you have any questions regarding your visit please contact your care team:     Cardiology  Telephone Number   Rajani H., RN  Hansel ROUSE, RN   Mela LUNA, KRISTY GALLEGOS, ZUHAIR DESOUZA MA   [phone]    *After hours: [phone]   For scheduling appts:     [phone] or    [phone] (select option 1)    *After hours: [phone]     For the Device Clinic (Pacemakers and ICD's)  RN's :  Maricruz Chaudhari   During business hours: [phone]    *After business hours:  [phone] (select option 4)      Normal test result notifications will be released via [employer] or mailed within 7 business days.  All other test results, will be communicated via telephone once reviewed by your cardiologist.    If you need a medication refill please contact your pharmacy.  Please allow 3 business days for your refill to be completed.    As always, thank you for trusting us with your health care needs!

## 2018-02-01 NOTE — NURSING NOTE
Cardiac Testing: Patient given instructions regarding  echocardiogram . Discussed purpose, preparation, procedure and when to expect results reported back to the patient. Patient demonstrated understanding of this information and agreed to call with further questions or concerns.   Cardiac Monitor:  Suzanne 2 week to determine afib burdency.  May stop amiodarone depending on results  Med Reconcile: Reviewed and verified all current medications with the patient. The updated medication list was printed and given to the patient.  Return Appointment: Patient given instructions regarding scheduling next clinic visit. Patient demonstrated understanding of this information and agreed to call with further questions or concerns.  Depending on results  Patient stated she understood all health information given and agreed to call with further questions or concerns.  Rajani Stout RN

## 2018-02-01 NOTE — LETTER
2/1/2018      RE: Caty Ng  [address] [facility] [address]       Dear Colleague,    Thank you for the opportunity to participate in the care of your patient, Caty Ng, at the [facility] HEART AT [facility]. Please see a copy of my visit note below.       SUBJECTIVE:  Caty Ng is a 73 year old female who presents for follow up.    PAF. On Amio and coumadin. Remain in SR. Patient unaware of afib.    On Amio for 2 years. DLCO normal last year. Do have restrictive lung disease by PFT last year.    Normal coronary angiogram 2012. Elevated PCWP and PAP at that time.  LAD calcium score 414 in 2015. No symptoms.EF 45-60% by echo.    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 Provider to review       Calcification of coronary artery 07/01/2015     Priority: Medium     Chronic diastolic heart failure (H) 03/06/2014     Priority: Medium     Hyperlipidemia LDL goal <130 11/05/2013     Priority: Medium     BROCK (obstructive sleep apnea) 11/04/2013     Priority: Medium     Patient has chosen to not do cpap.       Atrial fibrillation (H) 10/17/2012     Priority: Medium     Hyperlipidemia      Priority: Medium     Systolic heart failure (H)      Priority: Medium     Followed by Dr. Cobos, cardiology       Mitral valve regurgitation 06/24/2012     Priority: Medium     Health Care Home 06/22/2012     Priority: Medium     FPA [facility] for .  Rosenda Hoyt RN-BSN, [facility]  [phone]   DX V65.8 REPLACED WITH [id number] HEALTH CARE HOME (04/08/2013)       Atrial  fibrillation with RVR (H) 06/21/2012     Priority: Medium     Followed by Dr. Cobos, cardiology       Hypertriglyceridemia      Priority: Medium     overweight with BMI >40 11/29/2011     Priority: Medium     Advanced directives, counseling/discussion 11/29/2011     Priority: Low     Advance Directive Problem List Overview:   Name Relationship Phone    Primary Health Care Agent            Alternative Health Care Agent          Discussed advance care planning with patient; information given to patient to review. 11/29/2011       .  Current Outpatient Prescriptions   Medication Sig     carvedilol (COREG) 25 MG tablet Take 0.5 tablets (12.5 mg) by mouth 2 times daily (with meals)     levothyroxine (SYNTHROID/LEVOTHROID) 75 MCG tablet TAKE 1 TABLET EVERY DAY     warfarin (COUMADIN) 2 MG tablet USE AS DIRECTED PER ACC- CURRENT DOSE IS 4 MG (2 TABS) ON MON, WED, AND FRI AND 2MG (1 TAB) ON TUES, THURS, SAT, AND SUN      albuterol (PROAIR HFA/PROVENTIL HFA/VENTOLIN HFA) 108 (90 BASE) MCG/ACT Inhaler Inhale 2 puffs into the lungs every 6 hours as needed for shortness of breath / dyspnea or wheezing Profile Rx: patient will contact pharmacy when needed     losartan (COZAAR) 50 MG tablet Take 1 tablet (50 mg) by mouth daily     torsemide (DEMADEX) 20 MG tablet Take 2 tablets (40 mg) by mouth daily     amiodarone (PACERONE/CODARONE) 200 MG tablet Take 1 tablet (200 mg) by mouth daily     atorvastatin (LIPITOR) 40 MG tablet Take 1 tablet (40 mg) by mouth daily     fluticasone (FLONASE) 50 MCG/ACT nasal spray Spray 1-2 sprays into both nostrils daily Please inform patient if the OTC would be cheaper for her or if there is an alternative nasal steroid that is less costly, please provide that information.     losartan (COZAAR) 50 MG tablet Take 1 tablet (50 mg) by mouth daily     loratadine (CLARITIN) 10 MG tablet Take 10 mg by mouth as needed for allergies     No current facility-administered medications for this visit.       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= [employer]     Hyperlipidemia      Hypertriglyceridemia      Mitral valve regurgitation 6/24/2012     Obesity      BROCK (obstructive sleep apnea)      Systolic heart failure (H)      Past Surgical History:   Procedure Laterality Date     HYSTERECTOMY, PAP NO LONGER INDICATED  1989     JOINT REPLACEMTN, KNEE RT/LT  2007    right     SALPINGO OOPHORECTOMY,R/L/BRANDO  2003    Salpingo Oophorectomy, BRANDO     SURGICAL HISTORY OF -   1989    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     Allergies   Allergen Reactions     Lisinopril      cough     Metal [Staples] Other (See Comments)     Metal allergy not clearly specified - patient had a stainless steel screw put in her foot that needed to be removed due to allergy/hypersensitivity      Metoprolol Fatigue     Social History     Social History     Marital status:      Spouse name: N/A     Number of children: N/A     Years of education: N/A     Occupational History     Not on file.     Social History Main Topics     Smoking status: Former Smoker     Packs/day: 0.50     Years: 20.00     Types: Cigarettes     Quit date: 10/1/1982     Smokeless tobacco: Never Used     Alcohol use No      Comment: 1 beers per week     Drug use: No     Sexual activity: Yes     Partners: Male     Other Topics Concern     Parent/Sibling W/ Cabg, Mi Or Angioplasty Before 65f 55m? Yes     Social History Narrative    , [place].    2 children.    One great grandchildren.        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     Family History   Problem Relation Age of Onset     HEART DISEASE Mother      CANCER Mother      ovarian     DIABETES Father      Musculoskeletal Disorder Father      DIABETES Paternal Grandmother      DIABETES Paternal Grandfather      DIABETES Sister      Thrombophilia Daughter      Factor V Leiden Deficiency          REVIEW OF SYSTEMS:  General: negative,  fever, chills, night sweats  Skin: negative, acne, rash and scaling  Eyes: negative, double vision, eye pain and photophobia  Ears/Nose/Throat: negative, nasal congestion and purulent rhinorrhea  Respiratory: No dyspnea on exertion, No cough, No hemoptysis and negative  Cardiovascular: negative, palpitations, tachycardia, irregular heart beat, chest pain, exertional chest pain or pressure, paroxysmal nocturnal dyspnea, dyspnea on exertion and orthopnea       OBJECTIVE:  Blood pressure 138/73, pulse 72, weight 114.9 kg (253 lb 4 oz), SpO2 98 %, not currently breastfeeding.  General Appearance: healthy, alert, active and no distress  Head: Normocephalic. No masses, lesions, tenderness or abnormalities  Eyes: conjuctiva clear, PERRL, EOM intact  Ears: External ears normal. Canals clear. TM's normal.  Nose: Nares normal  Mouth: normal  Neck: Supple, no cervical adenopathy, no thyromegaly  Lungs: clear to auscultation  Cardiac: regular rate and rhythm, normal S1 and S2, no murmur         ASSESSMENT/PLAN:  Asymptomatic Afib. On Amio. Remain in SR.  EF 45-50% by echo.  Normal coronary arteries by cath 2012. Elevated PAP and wedge pressure at that time. LAD calcium score 414 in 2015. No symptoms.  DLCO normal.  Amio may be too much for asymptomatic afib.  Will plan for a 2 week Zio. If no afib will stop Amio and see. Discussed possibility of recurrence.  Will check an echo for Function and PAP.  Per orders.   Return to Clinic 1yr.    Please do not hesitate to contact me if you have any questions/concerns.     Sincerely,     MEL Casey MD

## 2018-02-01 NOTE — PROGRESS NOTES
SUBJECTIVE:  Caty Ng is a 73 year old female who presents for follow up.    PAF. On Amio and coumadin. Remain in SR. Patient unaware of afib.    On Amio for 2 years. DLCO normal last year. Do have restrictive lung disease by PFT last year.    Normal coronary angiogram 2012. Elevated PCWP and PAP at that time.  LAD calcium score 414 in 2015. No symptoms.EF 45-60% by echo.    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 Provider to review       Calcification of coronary artery 07/01/2015     Priority: Medium     Chronic diastolic heart failure (H) 03/06/2014     Priority: Medium     Hyperlipidemia LDL goal <130 11/05/2013     Priority: Medium     BROCK (obstructive sleep apnea) 11/04/2013     Priority: Medium     Patient has chosen to not do cpap.       Atrial fibrillation (H) 10/17/2012     Priority: Medium     Hyperlipidemia      Priority: Medium     Systolic heart failure (H)      Priority: Medium     Followed by Dr. Cobos, cardiology       Mitral valve regurgitation 06/24/2012     Priority: Medium     Health Care Home 06/22/2012     Priority: Medium     FPA [facility] for .  Rosenda Hoyt RN-BSN, [facility]  [phone]   DX V65.8 REPLACED WITH [id number] HEALTH CARE HOME (04/08/2013)       Atrial fibrillation with RVR (H) 06/21/2012     Priority: Medium     Followed by Dr. Cobos, cardiology       Hypertriglyceridemia      Priority: Medium     overweight with BMI >40 11/29/2011     Priority: Medium     Advanced directives, counseling/discussion 11/29/2011     Priority: Low     Advance Directive Problem List Overview:   Name Relationship Phone     Primary Health Care Agent            Alternative Health Care Agent          Discussed advance care planning with patient; information given to patient to review. 11/29/2011       .  Current Outpatient Prescriptions   Medication Sig     carvedilol (COREG) 25 MG tablet Take 0.5 tablets (12.5 mg) by mouth 2 times daily (with meals)     levothyroxine (SYNTHROID/LEVOTHROID) 75 MCG tablet TAKE 1 TABLET EVERY DAY     warfarin (COUMADIN) 2 MG tablet USE AS DIRECTED PER ACC- CURRENT DOSE IS 4 MG (2 TABS) ON MON, WED, AND FRI AND 2MG (1 TAB) ON TUES, THURS, SAT, AND SUN      albuterol (PROAIR HFA/PROVENTIL HFA/VENTOLIN HFA) 108 (90 BASE) MCG/ACT Inhaler Inhale 2 puffs into the lungs every 6 hours as needed for shortness of breath / dyspnea or wheezing Profile Rx: patient will contact pharmacy when needed     losartan (COZAAR) 50 MG tablet Take 1 tablet (50 mg) by mouth daily     torsemide (DEMADEX) 20 MG tablet Take 2 tablets (40 mg) by mouth daily     amiodarone (PACERONE/CODARONE) 200 MG tablet Take 1 tablet (200 mg) by mouth daily     atorvastatin (LIPITOR) 40 MG tablet Take 1 tablet (40 mg) by mouth daily     fluticasone (FLONASE) 50 MCG/ACT nasal spray Spray 1-2 sprays into both nostrils daily Please inform patient if the OTC would be cheaper for her or if there is an alternative nasal steroid that is less costly, please provide that information.     losartan (COZAAR) 50 MG tablet Take 1 tablet (50 mg) by mouth daily     loratadine (CLARITIN) 10 MG tablet Take 10 mg by mouth as needed for allergies     No current facility-administered medications for this visit.      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= [employer]     Hyperlipidemia      Hypertriglyceridemia      Mitral valve regurgitation 6/24/2012     Obesity      BROCK (obstructive sleep apnea)      Systolic heart failure (H)      Past Surgical History:   Procedure Laterality Date      HYSTERECTOMY, PAP NO LONGER INDICATED  1989     JOINT REPLACEMTN, KNEE RT/LT  2007    right     SALPINGO OOPHORECTOMY,R/L/BRANDO  2003    Salpingo Oophorectomy, BRANDO     SURGICAL HISTORY OF -   1989    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     Allergies   Allergen Reactions     Lisinopril      cough     Metal [Staples] Other (See Comments)     Metal allergy not clearly specified - patient had a stainless steel screw put in her foot that needed to be removed due to allergy/hypersensitivity      Metoprolol Fatigue     Social History     Social History     Marital status:      Spouse name: N/A     Number of children: N/A     Years of education: N/A     Occupational History     Not on file.     Social History Main Topics     Smoking status: Former Smoker     Packs/day: 0.50     Years: 20.00     Types: Cigarettes     Quit date: 10/1/1982     Smokeless tobacco: Never Used     Alcohol use No      Comment: 1 beers per week     Drug use: No     Sexual activity: Yes     Partners: Male     Other Topics Concern     Parent/Sibling W/ Cabg, Mi Or Angioplasty Before 65f 55m? Yes     Social History Narrative    , [employer].    2 children.    One great grandchildren.        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     Family History   Problem Relation Age of Onset     HEART DISEASE Mother      CANCER Mother      ovarian     DIABETES Father      Musculoskeletal Disorder Father      DIABETES Paternal Grandmother      DIABETES Paternal Grandfather      DIABETES Sister      Thrombophilia Daughter      Factor V Leiden Deficiency          REVIEW OF SYSTEMS:  General: negative, fever, chills, night sweats  Skin: negative, acne, rash and scaling  Eyes: negative, double vision, eye pain and photophobia  Ears/Nose/Throat: negative, nasal congestion and purulent rhinorrhea  Respiratory: No dyspnea on exertion, No cough, No hemoptysis and negative  Cardiovascular: negative, palpitations, tachycardia, irregular heart beat, chest pain,  exertional chest pain or pressure, paroxysmal nocturnal dyspnea, dyspnea on exertion and orthopnea       OBJECTIVE:  Blood pressure 138/73, pulse 72, weight 114.9 kg (253 lb 4 oz), SpO2 98 %, not currently breastfeeding.  General Appearance: healthy, alert, active and no distress  Head: Normocephalic. No masses, lesions, tenderness or abnormalities  Eyes: conjuctiva clear, PERRL, EOM intact  Ears: External ears normal. Canals clear. TM's normal.  Nose: Nares normal  Mouth: normal  Neck: Supple, no cervical adenopathy, no thyromegaly  Lungs: clear to auscultation  Cardiac: regular rate and rhythm, normal S1 and S2, no murmur         ASSESSMENT/PLAN:  Asymptomatic Afib. On Amio. Remain in SR.  EF 45-50% by echo.  Normal coronary arteries by cath 2012. Elevated PAP and wedge pressure at that time. LAD calcium score 414 in 2015. No symptoms.  DLCO normal.  Amio may be too much for asymptomatic afib.  Will plan for a 2 week Zio. If no afib will stop Amio and see. Discussed possibility of recurrence.  Will check an echo for Function and PAP.  Per orders.   Return to Clinic 1yr.

## 2018-02-01 NOTE — MR AVS SNAPSHOT
Caty Ng   2/1/2018 10:15 AM   Anticoagulation Therapy Visit    Description:  73 year old female   Provider:  [facility]   Department:  [facility] Nurse           INR as of 2/1/2018     Today's INR 2.1      Anticoagulation Summary as of 2/1/2018     INR goal 2.0-3.0   Today's INR 2.1   Full instructions 1 mg on Tue, Fri; 2 mg all other days   Next INR check 3/15/2018    Indications   Atrial fibrillation (H) [I48.91]  Long term current use of anticoagulant therapy [Z79.01]         Your next Anticoagulation Clinic appointment(s)     Mar 15, 2018  9:45 AM CDT   [facility]    [address] [place] [id number] [phone]              Contact Numbers     Please call [phone] to cancel and/or reschedule your appointment.  Please call [phone] with any problems or questions regarding your therapy          February 2018 Details    Sun Mon Tue Wed Thu Fri Sat         1      2 mg   See details      2      1 mg         3      2 mg           4      2 mg         5      2 mg         6      1 mg         7      2 mg         8      2 mg         9      1 mg         10      2 mg           11      2 mg         12      2 mg         13      1 mg         14      2 mg         15      2 mg         16      1 mg         17      2 mg           18      2 mg         19      2 mg         20      1 mg         21      2 mg         22      2 mg         23      1 mg         24      2 mg           25      2 mg         26      2 mg         27      1 mg         28      2 mg             Date Details   02/01 This INR check               How to take your warfarin dose     To take:  1 mg Take 0.5 of a 2 mg tablet.    To take:  2 mg Take 1 of the 2 mg tablets.           March 2018 Details    Sun Mon Tue Wed Thu Fri Sat         1      2 mg         2      1 mg         3      2 mg           4      2 mg         5      2 mg         6      1  mg         7      2 mg         8      2 mg         9      1 mg         10      2 mg           11      2 mg         12      2 mg         13      1 mg         14      2 mg         15            16               17                 18               19               20               21               22               23               24                 25               26               27               28               29               30               31                Date Details   No additional details    Date of next INR:  3/15/2018         How to take your warfarin dose     To take:  1 mg Take 0.5 of a 2 mg tablet.    To take:  2 mg Take 1 of the 2 mg tablets.

## 2018-02-01 NOTE — NURSING NOTE
"Chief Complaint   Patient presents with     RECHECK     Yearly follow up with Dr. JACOB Whitlock for: Paroxysmal atrial fibrillation, Acute on chronic diastolic congestive heart failure, Calcification of coronary artery. Pt states SOB all of the time, she uses an inhaler. She states sometimes feling lightheaded. fell and broke her nose a month ago.       Initial /73 (BP Location: Left arm, Patient Position: Chair, Cuff Size: Adult Large)  Pulse 72  Wt 114.9 kg (253 lb 4 oz)  SpO2 98%  BMI 43.47 kg/m2 Estimated body mass index is 43.47 kg/(m^2) as calculated from the following:    Height as of 1/8/18: 1.626 m (5' 4\").    Weight as of this encounter: 114.9 kg (253 lb 4 oz)..  BP completed using cuff size: ludy Rahman MA    "

## 2018-02-01 NOTE — PROGRESS NOTES
ANTICOAGULATION FOLLOW-UP CLINIC VISIT    Patient Name:  Caty Ng  Date:  2/1/2018  Contact Type:  Face to Face    SUBJECTIVE:     Patient Findings     Positives Bruising (Bruising has faded from fall on 12/31), No Problem Findings           OBJECTIVE    INR Protime   Date Value Ref Range Status   02/01/2018 2.1 (A) 0.86 - 1.14 Final       ASSESSMENT / PLAN  INR assessment THER    Recheck INR In: 6 WEEKS    INR Location Clinic      Anticoagulation Summary as of 2/1/2018     INR goal 2.0-3.0   Today's INR 2.1   Maintenance plan 1 mg (2 mg x 0.5) on Tue, Fri; 2 mg (2 mg x 1) all other days   Full instructions 1 mg on Tue, Fri; 2 mg all other days   Weekly total 12 mg   No change documented Adiel Boyle RN   Plan last modified Madhuri Renae RN (1/23/2017)   Next INR check 3/15/2018   Priority INR   Target end date Indefinite    Indications   Atrial fibrillation (H) [I48.91]  Long term current use of anticoagulant therapy [Z79.01]         Anticoagulation Episode Summary     INR check location [facility]    Preferred lab [facility]    Send INR reminders to [facility]    Comments       Anticoagulation Care Providers     Provider Role Specialty Phone number    Avis Ferrer MD  Family Practice [phone]            See the Encounter Report to view Anticoagulation Flowsheet and Dosing Calendar (Go to Encounters tab in chart review, and find the Anticoagulation Therapy Visit)        Adiel Boyle, RN

## 2018-02-05 ENCOUNTER — RADIANT APPOINTMENT (OUTPATIENT)
Dept: CARDIOLOGY | Facility: CLINIC | Age: 73
End: 2018-02-05
Attending: INTERNAL MEDICINE
Payer: COMMERCIAL

## 2018-02-05 ENCOUNTER — ALLIED HEALTH/NURSE VISIT (OUTPATIENT)
Dept: CARDIOLOGY | Facility: CLINIC | Age: 73
End: 2018-02-05
Payer: COMMERCIAL

## 2018-02-05 DIAGNOSIS — I50.32 CHRONIC DIASTOLIC HEART FAILURE (H): ICD-10-CM

## 2018-02-05 DIAGNOSIS — I48.91 ATRIAL FIBRILLATION (H): Primary | ICD-10-CM

## 2018-02-05 PROCEDURE — 0296T ZZHC  EXT ECG > 48HR TO 21 DAY RCRD W/CONECT INTL RCRD: CPT | Performed by: INTERNAL MEDICINE

## 2018-02-05 PROCEDURE — 40000264 ZZHC STATISTIC IV PUSH SINGLE INITIAL SUBSTANCE: Performed by: INTERNAL MEDICINE

## 2018-02-05 PROCEDURE — 93306 TTE W/DOPPLER COMPLETE: CPT | Performed by: INTERNAL MEDICINE

## 2018-02-05 PROCEDURE — 0298T ZZC EXT ECG > 48HR TO 21 DAY REVIEW AND INTERPRETATN: CPT | Performed by: INTERNAL MEDICINE

## 2018-02-05 RX ADMIN — Medication 4 ML: at 11:45

## 2018-02-05 NOTE — MR AVS SNAPSHOT
After Visit Summary   2/5/2018    Caty Ng    MRN: [id number]           Patient Information     Date Of Birth          1945        Visit Information        Provider Department      2/5/2018 10:00 AM FK ANCILLARY CARDS [facility] HEART [facility]        Today's Diagnoses     Atrial fibrillation (H)    -  1       Follow-ups after your visit        Your next 10 appointments already scheduled     Mar 15, 2018  9:45 AM CDT   [facility] with [facility] ANTI COAG   [facility]    [address] [place] [id number] [phone]              Who to contact     If you have questions or need follow up information about today's clinic visit or your schedule please contact [facility] directly at [phone].  Normal or non-critical lab and imaging results will be communicated to you by Klangoohart, letter or phone within 4 business days after the clinic has received the results. If you do not hear from us within 7 days, please contact the clinic through Klangoohart or phone. If you have a critical or abnormal lab result, we will notify you by phone as soon as possible.  Submit refill requests through nanoRETE or call your pharmacy and they will forward the refill request to us. Please allow 3 business days for your refill to be completed.          Additional Information About Your Visit        MyChart Information     nanoRETE gives you secure access to your electronic health record. If you see a primary care provider, you can also send messages to your care team and make appointments. If you have questions, please call your primary care clinic.  If you do not have a primary care provider, please call [phone] and they will assist you.        Care EveryWhere ID     This is your Care EveryWhere ID. This could be used by other organizations to access your [place]  medical records  [id number]         Blood Pressure from Last 3 Encounters:   02/01/18 138/73   01/08/18 122/70   03/24/17 130/74    Weight from Last 3 Encounters:   02/01/18 114.9 kg (253 lb 4 oz)   01/08/18 114.3 kg (252 lb)   03/24/17 114.3 kg (252 lb)              Today, you had the following     No orders found for display       Primary Care Provider Office Phone # Fax #    Avis Ferrer [phone]       [address] [facility] [address]        Equal Access to Services     [facility]: Hadii aad yaniv hadasho Soneda, waaxda luqadaha, qaybta kaalmada adeluzmariayasilvina, ayana larson . So [facility] [phone].    ATENCIÓN: Si habla español, tiene a castro disposición servicios gratuitos de asistencia lingüística. Ll[facility] [phone].    We comply with applicable federal civil rights laws and [place] laws. We do not discriminate on the basis of race, color, national origin, age, disability, sex, sexual orientation, or gender identity.            Thank you!     Thank you for choosing [facility] PHYSICIANS HEART AT [facility]  for your care. Our goal is always to provide you with excellent care. Hearing back from our patients is one way we can continue to improve our services. Please take a few minutes to complete the written survey that you may receive in the mail after your visit with us. Thank you!             Your Updated Medication List - Protect others around you: Learn how to safely use, store and throw away your medicines at www.disposemymeds.org.          This list is accurate as of 2/5/18 11:32 AM.  Always use your most recent med list.                   Brand Name Dispense Instructions for use Diagnosis    albuterol 108 (90 BASE) MCG/ACT Inhaler    PROAIR HFA/PROVENTIL HFA/VENTOLIN HFA    1 Inhaler    Inhale 2 puffs into the lungs every 6 hours as needed for shortness of breath / dyspnea or wheezing Profile Rx: patient will contact  pharmacy when needed    Acute bronchospasm       amiodarone 200 MG tablet    PACERONE/CODARONE    30 tablet    Take 1 tablet (200 mg) by mouth daily    Paroxysmal atrial fibrillation (H)       atorvastatin 40 MG tablet    LIPITOR    90 tablet    Take 1 tablet (40 mg) by mouth daily    Calcification of coronary artery       carvedilol 25 MG tablet    COREG    90 tablet    Take 0.5 tablets (12.5 mg) by mouth 2 times daily (with meals)    Chronic diastolic heart failure (H), Atrial fibrillation (H)       fluticasone 50 MCG/ACT spray    FLONASE    16 g    Spray 1-2 sprays into both nostrils daily Please inform patient if the OTC would be cheaper for her or if there is an alternative nasal steroid that is less costly, please provide that information.    Post-nasal drainage, Cough       levothyroxine 75 MCG tablet    SYNTHROID/LEVOTHROID    90 tablet    TAKE 1 TABLET EVERY DAY    Hypothyroidism       loratadine 10 MG tablet    CLARITIN     Take 10 mg by mouth as needed for allergies        * losartan 50 MG tablet    COZAAR    90 tablet    Take 1 tablet (50 mg) by mouth daily    Chronic diastolic heart failure (H)       * losartan 50 MG tablet    COZAAR    90 tablet    Take 1 tablet (50 mg) by mouth daily    Chronic diastolic heart failure (H)       torsemide 20 MG tablet    DEMADEX    180 tablet    Take 2 tablets (40 mg) by mouth daily    Acute on chronic diastolic congestive heart failure (H)       warfarin 2 MG tablet    COUMADIN    130 tablet    USE AS DIRECTED PER ACC- CURRENT DOSE IS 4 MG (2 TABS) ON MON, WED, AND FRI AND 2MG (1 TAB) ON TUES, THURS, SAT, AND SUN    Long-term (current) use of anticoagulants, INR goal 2.0-3.0       * Notice:  This list has 2 medication(s) that are the same as other medications prescribed for you. Read the directions carefully, and ask your doctor or other care provider to review them with you.

## 2018-02-05 NOTE — NURSING NOTE
2 week ZioXT applied to patient today. Instructions on use and removal given and mail back instructions discussed. All questions answered. Consent form signed. Device registered. Form faxed to [employer].  Device number: [id number].    Kiara Rahman MA

## 2018-03-08 ENCOUNTER — TELEPHONE (OUTPATIENT)
Dept: CARDIOLOGY | Facility: CLINIC | Age: 73
End: 2018-03-08

## 2018-03-08 ENCOUNTER — MEDICAL CORRESPONDENCE (OUTPATIENT)
Dept: CARDIOLOGY | Facility: CLINIC | Age: 73
End: 2018-03-08

## 2018-03-08 NOTE — TELEPHONE ENCOUNTER
Reviewed zio results with Dr. Whitlock. Per Dr. Whitlock, pt to STOP amiodarone and follow up in 3 months in clinic.    Left message for pt to call clinic back to discuss.    Hansel Spann RN

## 2018-03-08 NOTE — TELEPHONE ENCOUNTER
Reason for Call:  Other returning call    Detailed comments: Patient returned your call    Phone Number Patient can be reached at: Home number on file [phone] (home)    Best Time: Any    Can we leave a detailed message on this number? YES    Call taken on 3/8/2018 at 10:09 AM by Maria E Orantes

## 2018-03-08 NOTE — TELEPHONE ENCOUNTER
Spoke to pt. Pt to stop Amiodarone today and will see Dr. Whitlock in 3 months as ordered. Scheduled for Thursday, May 31st at 10:30am. Pt verbalized understanding to call clinic if anything changes or symptoms arise.    Hansel Spann RN

## 2018-03-15 ENCOUNTER — ANTICOAGULATION THERAPY VISIT (OUTPATIENT)
Dept: NURSING | Facility: CLINIC | Age: 73
End: 2018-03-15
Payer: COMMERCIAL

## 2018-03-15 DIAGNOSIS — I48.91 ATRIAL FIBRILLATION (H): ICD-10-CM

## 2018-03-15 DIAGNOSIS — Z79.01 LONG TERM CURRENT USE OF ANTICOAGULANT THERAPY: ICD-10-CM

## 2018-03-15 LAB — INR POINT OF CARE: 2.4 (ref 0.86–1.14)

## 2018-03-15 PROCEDURE — 85610 PROTHROMBIN TIME: CPT | Mod: QW

## 2018-03-15 PROCEDURE — 36416 COLLJ CAPILLARY BLOOD SPEC: CPT

## 2018-03-15 PROCEDURE — 99207 ZZC NO CHARGE NURSE ONLY: CPT

## 2018-03-15 NOTE — MR AVS SNAPSHOT
Caty Ng   3/15/2018 9:45 AM   Anticoagulation Therapy Visit    Description:  73 year old female   Provider:  NE ANTI COACRISTY   Department:  Ne Nurse           INR as of 3/15/2018     Today's INR 2.4      Anticoagulation Summary as of 3/15/2018     INR goal 2.0-3.0   Today's INR 2.4   Full instructions 1 mg on Tue, Fri; 2 mg all other days   Next INR check 4/26/2018    Indications   Atrial fibrillation (H) [I48.91]  Long term current use of anticoagulant therapy [Z79.01]         Your next Anticoagulation Clinic appointment(s)     Apr 26, 2018  9:45 AM CDT   Anticoagulation Visit with NE ANTI COAG   [facility]    [address] [place] [address] [phone]              Contact Numbers     Please call [phone] to cancel and/or reschedule your appointment.  Please call [phone] with any problems or questions regarding your therapy          March 2018 Details    Sun Mon Tue Wed Thu Fri Sat         1               2               3                 4               5               6               7               8               9               10                 11               12               13               14               15      2 mg   See details      16      1 mg         17      2 mg           18      2 mg         19      2 mg         20      1 mg         21      2 mg         22      2 mg         23      1 mg         24      2 mg           25      2 mg         26      2 mg         27      1 mg         28      2 mg         29      2 mg         30      1 mg         31      2 mg          Date Details   03/15 This INR check               How to take your warfarin dose     To take:  1 mg Take 0.5 of a 2 mg tablet.    To take:  2 mg Take 1 of the 2 mg tablets.           April 2018 Details    Sun Mon Tue Wed Thu Fri Sat     1      2 mg         2      2 mg         3      1 mg         4      2 mg         5      2 mg         6      1 mg          7      2 mg           8      2 mg         9      2 mg         10      1 mg         11      2 mg         12      2 mg         13      1 mg         14      2 mg           15      2 mg         16      2 mg         17      1 mg         18      2 mg         19      2 mg         20      1 mg         21      2 mg           22      2 mg         23      2 mg         24      1 mg         25      2 mg         26            27               28                 29               30                     Date Details   No additional details    Date of next INR:  4/26/2018         How to take your warfarin dose     To take:  1 mg Take 0.5 of a 2 mg tablet.    To take:  2 mg Take 1 of the 2 mg tablets.

## 2018-03-15 NOTE — PROGRESS NOTES
ANTICOAGULATION FOLLOW-UP CLINIC VISIT    Patient Name:  Caty Ng  Date:  3/15/2018  Contact Type:  Face to Face    SUBJECTIVE:     Patient Findings     Positives Change in medications (Discontinued Amiodorone), No Problem Findings           OBJECTIVE    INR Protime   Date Value Ref Range Status   03/15/2018 2.4 (A) 0.86 - 1.14 Final       ASSESSMENT / PLAN  INR assessment THER    Recheck INR In: 6 WEEKS    INR Location Clinic      Anticoagulation Summary as of 3/15/2018     INR goal 2.0-3.0   Today's INR 2.4   Maintenance plan 1 mg (2 mg x 0.5) on Tue, Fri; 2 mg (2 mg x 1) all other days   Full instructions 1 mg on Tue, Fri; 2 mg all other days   Weekly total 12 mg   No change documented Adiel Boyle RN   Plan last modified Madhuri Renae RN (1/23/2017)   Next INR check 4/26/2018   Priority INR   Target end date Indefinite    Indications   Atrial fibrillation (H) [I48.91]  Long term current use of anticoagulant therapy [Z79.01]         Anticoagulation Episode Summary     INR check location [facility]    Preferred lab [facility]    Send INR reminders to [facility]    Comments       Anticoagulation Care Providers     Provider Role Specialty Phone number    Avis Ferrer MD  Family Practice [phone]            See the Encounter Report to view Anticoagulation Flowsheet and Dosing Calendar (Go to Encounters tab in chart review, and find the Anticoagulation Therapy Visit)        Adiel Boyle, RN

## 2018-05-03 ENCOUNTER — TELEPHONE (OUTPATIENT)
Dept: CARDIOLOGY | Facility: CLINIC | Age: 73
End: 2018-05-03

## 2018-05-03 NOTE — TELEPHONE ENCOUNTER
Called pt back to discuss her symptoms and was not able to get ahold of pt. Left message for pt to call clinic back.    Hansel Spann RN

## 2018-05-03 NOTE — TELEPHONE ENCOUNTER
Reason for Call:  Other call back    Detailed comments: patient states that she discontinued her Amiodarone 6 weeks ago. Patient started noticing that she has been more tired lately after exertion. Getting more out of breath if she were to just walk downstairs to put a load in the wash. Patient has been experiencing this for a week now asking if this would be since she stopped the medication or if its due to the warmer weather. Patient asking for a return call to discuss further.    Phone Number Patient can be reached at: Home number on file [phone] (home)    Best Time: any time    Can we leave a detailed message on this number? YES    Call taken on 5/3/2018 at 4:02 PM by Abi Jameson

## 2018-05-03 NOTE — TELEPHONE ENCOUNTER
"Spoke to pt who states that she feels \"OK but has been short of breath with anything she does\". States that she doesn't have any other symptoms and really feels good overall. Is wondering if it is because of the warm weather or not.     Takes daily weights, but states that they have not fluctuated much at all except for during her [employer] tournament 1.5 weeks ago when she did not take any torsemide for 2 days due to no bathroom breaks between games. States that when she returned from her tournament, she resumed her Torsemide and her weight went back to normal.     Denies edema, dizziness or SOB.     Recommended pt to see PCP or Dr. Rob tomorrow. Pt to see Dr. Rob tomorrow for assessment.    Hansel Spann RN    "

## 2018-05-04 ENCOUNTER — OFFICE VISIT (OUTPATIENT)
Dept: CARDIOLOGY | Facility: CLINIC | Age: 73
End: 2018-05-04
Payer: COMMERCIAL

## 2018-05-04 VITALS
SYSTOLIC BLOOD PRESSURE: 122 MMHG | WEIGHT: 252.6 LBS | OXYGEN SATURATION: 94 % | HEART RATE: 100 BPM | HEIGHT: 64 IN | DIASTOLIC BLOOD PRESSURE: 84 MMHG | BODY MASS INDEX: 43.13 KG/M2

## 2018-05-04 DIAGNOSIS — I48.0 PAROXYSMAL ATRIAL FIBRILLATION (H): ICD-10-CM

## 2018-05-04 DIAGNOSIS — I48.0 PAROXYSMAL ATRIAL FIBRILLATION (H): Primary | ICD-10-CM

## 2018-05-04 DIAGNOSIS — I50.30 NYHA CLASS 3 HEART FAILURE WITH PRESERVED EJECTION FRACTION (H): ICD-10-CM

## 2018-05-04 DIAGNOSIS — E78.2 MIXED HYPERLIPIDEMIA: ICD-10-CM

## 2018-05-04 DIAGNOSIS — I10 BENIGN ESSENTIAL HYPERTENSION: ICD-10-CM

## 2018-05-04 LAB
ALBUMIN SERPL-MCNC: 3.8 G/DL (ref 3.4–5)
ALP SERPL-CCNC: 87 U/L (ref 40–150)
ALT SERPL W P-5'-P-CCNC: 22 U/L (ref 0–50)
ANION GAP SERPL CALCULATED.3IONS-SCNC: 11 MMOL/L (ref 3–14)
AST SERPL W P-5'-P-CCNC: 16 U/L (ref 0–45)
BASOPHILS # BLD AUTO: 0 10E9/L (ref 0–0.2)
BASOPHILS NFR BLD AUTO: 0.2 %
BILIRUB SERPL-MCNC: 0.8 MG/DL (ref 0.2–1.3)
BUN SERPL-MCNC: 27 MG/DL (ref 7–30)
CALCIUM SERPL-MCNC: 9.1 MG/DL (ref 8.5–10.1)
CHLORIDE SERPL-SCNC: 102 MMOL/L (ref 94–109)
CO2 SERPL-SCNC: 27 MMOL/L (ref 20–32)
CREAT SERPL-MCNC: 1.22 MG/DL (ref 0.52–1.04)
DIFFERENTIAL METHOD BLD: NORMAL
EOSINOPHIL # BLD AUTO: 0.1 10E9/L (ref 0–0.7)
EOSINOPHIL NFR BLD AUTO: 1.3 %
ERYTHROCYTE [DISTWIDTH] IN BLOOD BY AUTOMATED COUNT: 14.7 % (ref 10–15)
GFR SERPL CREATININE-BSD FRML MDRD: 43 ML/MIN/1.7M2
GLUCOSE SERPL-MCNC: 100 MG/DL (ref 70–99)
HCT VFR BLD AUTO: 42.4 % (ref 35–47)
HGB BLD-MCNC: 14 G/DL (ref 11.7–15.7)
INR PPP: 1.8 (ref 0.86–1.14)
LYMPHOCYTES # BLD AUTO: 1.6 10E9/L (ref 0.8–5.3)
LYMPHOCYTES NFR BLD AUTO: 18.7 %
MAGNESIUM SERPL-MCNC: 2.2 MG/DL (ref 1.6–2.3)
MCH RBC QN AUTO: 30 PG (ref 26.5–33)
MCHC RBC AUTO-ENTMCNC: 33 G/DL (ref 31.5–36.5)
MCV RBC AUTO: 91 FL (ref 78–100)
MONOCYTES # BLD AUTO: 0.8 10E9/L (ref 0–1.3)
MONOCYTES NFR BLD AUTO: 8.9 %
NEUTROPHILS # BLD AUTO: 6 10E9/L (ref 1.6–8.3)
NEUTROPHILS NFR BLD AUTO: 70.9 %
NT-PROBNP SERPL-MCNC: 1174 PG/ML (ref 0–125)
PLATELET # BLD AUTO: 270 10E9/L (ref 150–450)
POTASSIUM SERPL-SCNC: 4.4 MMOL/L (ref 3.4–5.3)
PROT SERPL-MCNC: 8.3 G/DL (ref 6.8–8.8)
RBC # BLD AUTO: 4.66 10E12/L (ref 3.8–5.2)
SODIUM SERPL-SCNC: 140 MMOL/L (ref 133–144)
TSH SERPL DL<=0.005 MIU/L-ACNC: 2.16 MU/L (ref 0.4–4)
WBC # BLD AUTO: 8.5 10E9/L (ref 4–11)

## 2018-05-04 PROCEDURE — 84443 ASSAY THYROID STIM HORMONE: CPT | Performed by: INTERNAL MEDICINE

## 2018-05-04 PROCEDURE — 85610 PROTHROMBIN TIME: CPT | Performed by: INTERNAL MEDICINE

## 2018-05-04 PROCEDURE — 36415 COLL VENOUS BLD VENIPUNCTURE: CPT | Performed by: INTERNAL MEDICINE

## 2018-05-04 PROCEDURE — 83880 ASSAY OF NATRIURETIC PEPTIDE: CPT | Performed by: INTERNAL MEDICINE

## 2018-05-04 PROCEDURE — 80053 COMPREHEN METABOLIC PANEL: CPT | Performed by: INTERNAL MEDICINE

## 2018-05-04 PROCEDURE — 83735 ASSAY OF MAGNESIUM: CPT | Performed by: INTERNAL MEDICINE

## 2018-05-04 PROCEDURE — 93000 ELECTROCARDIOGRAM COMPLETE: CPT | Performed by: INTERNAL MEDICINE

## 2018-05-04 PROCEDURE — 85025 COMPLETE CBC W/AUTO DIFF WBC: CPT | Performed by: INTERNAL MEDICINE

## 2018-05-04 PROCEDURE — 99215 OFFICE O/P EST HI 40 MIN: CPT | Performed by: INTERNAL MEDICINE

## 2018-05-04 ASSESSMENT — PAIN SCALES - GENERAL: PAINLEVEL: NO PAIN (0)

## 2018-05-04 NOTE — NURSING NOTE
Labs: Patient was instructed to return for the next laboratory testing today. Patient demonstrated understanding of this information and agreed to call with further questions or concerns.     Med Reconcile: Reviewed and verified all current medications with the patient. The updated medication list was printed and given to the patient.    Return Appointment: Patient given instructions regarding scheduling next clinic visit, in 3 months with Dr. Whitlock. Patient demonstrated understanding of this information and agreed to call with further questions or concerns.    Patient stated she understood all health information given and agreed to call with further questions or concerns.    Hansel Spann RN

## 2018-05-04 NOTE — PATIENT INSTRUCTIONS
Thank you for coming to the [facility] Heart @ [facility]; please note the following instructions:    1. There are no changes to your medications today.    2. Please go across the street to the [facility] building to have labs drawn before leaving today.    3. Call us with any further symptoms or issues.    4. Follow up with Dr. Whitlock on August 9th, 2018 at 10:00am in [place].      If you have any questions regarding your visit please contact your care team:     Cardiology  Telephone Number   Rajani RYAN, WILMA ROUSE, WILMA LUNA, KRISTY DESOUZA MA   [phone]    *After hours: [phone]   For scheduling appts:     [phone] or    [phone] (select option 1)    *After hours: [phone]     For the Device Clinic (Pacemakers and ICD's)  RN's :  Maricruz Chaudhari   During business hours: [phone]    *After business hours:  [phone] (select option 4)      Normal test result notifications will be released via [facility] or mailed within 7 business days.  All other test results, will be communicated via telephone once reviewed by your cardiologist.    If you need a medication refill please contact your pharmacy.  Please allow 3 business days for your refill to be completed.    As always, thank you for trusting us with your health care needs!

## 2018-05-04 NOTE — MR AVS SNAPSHOT
After Visit Summary   5/4/2018    Caty Ng    MRN: [id number]           Patient Information     Date Of Birth          1945        Visit Information        Provider Department      5/4/2018 1:20 PM Rosalio Rob MD [facility] PHYSICIANS HEART AT [facility]        Today's Diagnoses     Paroxysmal atrial fibrillation (H)    -  1      Care Instructions    Thank you for coming to the [facility] @ [facility]; please note the following instructions:    1. There are no changes to your medications today.    2. Please go across the street to the [address] [place] [address] to have labs drawn before leaving today.    3. Call us with any further symptoms or issues.    4. Follow up with Dr. Whitlock on August 9th, 2018 at 10:00am in [place].      If you have any questions regarding your visit please contact your care team:     Cardiology  Telephone Number   Rajani RYAN, WILMA ROUSE, RN   Mela LUNA, KRISTY DESOUZA, MA   [phone]    *After hours: [phone]   For scheduling appts:     [phone] or    [phone] (select option 1)    *After hours: [phone]     For the Device Clinic (Pacemakers and ICD's)  RN's :  Maricruz Chaudhari   During business hours: [phone]    *After business hours:  [phone] (select option 4)      Normal test result notifications will be released via [employer] or mailed within 7 business days.  All other test results, will be communicated via telephone once reviewed by your cardiologist.    If you need a medication refill please contact your pharmacy.  Please allow 3 business days for your refill to be completed.    As always, thank you for trusting us with your health care needs!              Follow-ups after your visit        Your next 10 appointments already scheduled     May 04, 2018  2:15 PM CDT   LAB with FZ LAB   [facility] ([address] [id number] [phone]            Please do not eat 10-12 hours before your appointment if you are coming in fasting for labs on lipids, cholesterol, or glucose (sugar). This does not apply to pregnant women. Water, hot tea and black coffee (with nothing added) are okay. Do not drink other fluids, diet soda or chew gum.            Aug 09, 2018 10:00 AM CDT   Return Visit with MEL Casey MD   [facility] PHYSICIANS HEART AT [facility] ([facility])    [address] [place] [id number] [phone]              Future tests that were ordered for you today     Open Future Orders        Priority Expected Expires Ordered    CBC with platelets differential Routine 5/4/2018 5/18/2018 5/4/2018    Comprehensive metabolic panel Routine 5/4/2018 5/18/2018 5/4/2018    TSH with free T4 reflex Routine 5/4/2018 5/18/2018 5/4/2018    Magnesium Routine 5/4/2018 5/18/2018 5/4/2018    INR Routine 5/4/2018 5/18/2018 5/4/2018    N terminal pro BNP outpatient Routine 5/4/2018 5/18/2018 5/4/2018            Who to contact     If you have questions or need follow up information about today's clinic visit or your schedule please contact [facility] PHYSICIANS HEART AT [facility] directly at [phone].  Normal or non-critical lab and imaging results will be communicated to you by Medigushart, letter or phone within 4 business days after the clinic has received the results. If you do not hear from us within 7 days, please contact the clinic through Medigushart or phone. If you have a critical or abnormal lab result, we will notify you by phone as soon as possible.  Submit refill requests through CASTT or call your pharmacy and they will forward the refill request to us. Please allow 3 business days for your refill to be completed.          Additional Information About Your Visit        CASTT Information     CASTT gives you secure access to your electronic health record. If you see a primary care  "provider, you can also send messages to your care team and make appointments. If you have questions, please call your primary care clinic.  If you do not have a primary care provider, please call [phone] and they will assist you.        Care EveryWhere ID     This is your Care EveryWhere ID. This could be used by other organizations to access your [place] medical records  [id number]        Your Vitals Were     Pulse Height Pulse Oximetry BMI (Body Mass Index)          100 1.613 m (5' 3.5\") 94% 44.04 kg/m2         Blood Pressure from Last 3 Encounters:   05/04/18 122/84   02/01/18 138/73   01/08/18 122/70    Weight from Last 3 Encounters:   05/04/18 114.6 kg (252 lb 9.6 oz)   02/01/18 114.9 kg (253 lb 4 oz)   01/08/18 114.3 kg (252 lb)              We Performed the Following     EKG 12-lead, tracing only (Same Day)        Primary Care Provider Office Phone # Fax #    Pavel Fuller [phone]       [address] [facility] [address]        Equal Access to Services     [facility]: Hadii aad ku hadasho Soomaali, waaxda luqadaha, qaybta kaalmada adeegyada, ayana zhou haytain osiris larson . So [facility] [phone].    ATENCIÓN: Si habla español, tiene a castro disposición servicios gratuitos de asistencia lingüística. Llame al [phone].    We comply with applicable federal civil rights laws and [place] laws. We do not discriminate on the basis of race, color, national origin, age, disability, sex, sexual orientation, or gender identity.            Thank you!     Thank you for choosing [facility] PHYSICIANS HEART AT [facility]  for your care. Our goal is always to provide you with excellent care. Hearing back from our patients is one way we can continue to improve our services. Please take a few minutes to complete the written survey that you may receive in the mail after your visit with us. Thank you!             Your Updated Medication List - " Protect others around you: Learn how to safely use, store and throw away your medicines at www.disposemymeds.org.          This list is accurate as of 5/4/18  1:48 PM.  Always use your most recent med list.                   Brand Name Dispense Instructions for use Diagnosis    albuterol 108 (90 Base) MCG/ACT Inhaler    PROAIR HFA/PROVENTIL HFA/VENTOLIN HFA    1 Inhaler    Inhale 2 puffs into the lungs every 6 hours as needed for shortness of breath / dyspnea or wheezing Profile Rx: patient will contact pharmacy when needed    Acute bronchospasm       atorvastatin 40 MG tablet    LIPITOR    90 tablet    Take 1 tablet (40 mg) by mouth daily    Calcification of coronary artery       carvedilol 25 MG tablet    COREG    90 tablet    Take 0.5 tablets (12.5 mg) by mouth 2 times daily (with meals)    Chronic diastolic heart failure (H), Atrial fibrillation (H)       fluticasone 50 MCG/ACT spray    FLONASE    16 g    Spray 1-2 sprays into both nostrils daily Please inform patient if the OTC would be cheaper for her or if there is an alternative nasal steroid that is less costly, please provide that information.    Post-nasal drainage, Cough       levothyroxine 75 MCG tablet    SYNTHROID/LEVOTHROID    90 tablet    TAKE 1 TABLET EVERY DAY    Hypothyroidism       loratadine 10 MG tablet    CLARITIN     Take 10 mg by mouth as needed for allergies        losartan 50 MG tablet    COZAAR    90 tablet    Take 1 tablet (50 mg) by mouth daily    Chronic diastolic heart failure (H)       torsemide 20 MG tablet    DEMADEX    180 tablet    Take 2 tablets (40 mg) by mouth daily    Acute on chronic diastolic congestive heart failure (H)       warfarin 2 MG tablet    COUMADIN    130 tablet    USE AS DIRECTED PER ACC- CURRENT DOSE IS 4 MG (2 TABS) ON MON, WED, AND FRI AND 2MG (1 TAB) ON TUES, THURS, SAT, AND SUN    Long-term (current) use of anticoagulants, INR goal 2.0-3.0

## 2018-05-04 NOTE — NURSING NOTE
"Chief Complaint   Patient presents with     Follow Up For     Appt with Dr. Rob for increased LOPEZ.     Chief Complaint   Patient presents with     Follow Up For     Appt with Dr. Rob for increased LOPEZ.       Initial /84 (BP Location: Left arm, Patient Position: Chair, Cuff Size: Adult Large)  Pulse 100  Ht 1.613 m (5' 3.5\")  Wt 114.6 kg (252 lb 9.6 oz)  SpO2 94%  BMI 44.04 kg/m2 Estimated body mass index is 44.04 kg/(m^2) as calculated from the following:    Height as of this encounter: 1.613 m (5' 3.5\").    Weight as of this encounter: 114.6 kg (252 lb 9.6 oz)..  BP completed using cuff size: X-large  Medications Reconciled: Yes  Ximena Zamora CMA  1:30 PM        "

## 2018-05-04 NOTE — PROGRESS NOTES
May 4, 2018     I had the pleasure of seeing Caty Ng  in the [facility] for further evaluation and management of her shortness of breath. She is followed regularly by Dr. Whitlock.  Patient is a 73 year old lady with PAF, on Amio until 2/2018 and coumadin, DLCO normal last year, Do have restrictive lung disease by PFT, normal coronary angiogram 2012. Elevated PCWP and PAP at that time, LAD calcium score 414 in 2015. EF is borderline at 45-50% by echo.  She presented to the clinic today for further evaluation of her shortness of breath.  She has been playing [employer] over the past weekend and notes that she did not take her torsemide as prescribed at the time because of the frequent urination and response to medication.  She noted that she felt hot at times and she had heat intolerance in addition to feeling a little bit more short of breath.  This is why she presented to the clinic for further evaluation. Denies any chest pain or worsening lower extremity edema.  Her weight has been increasing a little bit over the past couple of weeks.  No dizziness or lightheadedness or syncope.  She met tells me that she does not feel like she is actually in atrial fibrillation.     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= [employer] [facility]     Hyperlipidemia      Hypertriglyceridemia      Mitral valve regurgitation 6/24/2012     Obesity      BROCK (obstructive sleep apnea)      Systolic heart failure (H)      FAMILY HISTORY:  Family History   Problem Relation Age of Onset     HEART DISEASE Mother      CANCER Mother      ovarian     DIABETES Father      Musculoskeletal Disorder Father      DIABETES Paternal Grandmother      DIABETES Paternal Grandfather      DIABETES Sister      Thrombophilia Daughter      Factor V Leiden Deficiency     SOCIAL HISTORY:  Social History     Social History     Marital  "status:      Spouse name: N/A     Number of children: N/A     Years of education: N/A     Social History Main Topics     Smoking status: Former Smoker     Packs/day: 0.50     Years: 20.00     Types: Cigarettes     Quit date: 10/1/1982     Smokeless tobacco: Never Used     Alcohol use No      Comment: 1 beers per week     Drug use: No     Sexual activity: Yes     Partners: Male     Other Topics Concern     Parent/Sibling W/ Cabg, Mi Or Angioplasty Before 65f 55m? Yes     Social History Narrative    , [place].    2 children.    One great grandchildren.        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      ROS:   Constitutional: No fever, chills, or sweats. Weight is 0 lbs 0 oz  ENT: No visual disturbance, ear ache, epistaxis, sore throat.   Allergies/Immunologic: Negative.   Respiratory: No cough, hemoptysis.   Cardiovascular: As per HPI.   GI: No nausea, vomiting, hematemesis, melena, or hematochezia.   : No urinary frequency, dysuria, or hematuria.   Integrument: Negative.   Psychiatric: No evidence of major depression  Neuro: No new neurological complaints at this time. Non focal  Endocrinology: Negative.   Musculoskeletal: As per HPI.      EXAM:  /84 (BP Location: Left arm, Patient Position: Chair, Cuff Size: Adult Large)  Pulse 100  Ht 1.613 m (5' 3.5\")  Wt 114.6 kg (252 lb 9.6 oz)  SpO2 94%  BMI 44.04 " kg/m2  General Appearance: healthy, alert, active and no distress  Head: Normocephalic. No masses, lesions, tenderness or abnormalities  Eyes: conjuctiva clear, PERRL, EOM intact  Ears: External ears normal.   Nose: Nares normal  Mouth: normal  Neck: Supple, no cervical adenopathy, no thyromegaly  Lungs: clear to auscultation  Cardiac: irregularly irregular, no murmurs noted. Rate around 98/min  Neuro: No focal deficits     Labs:  Lab Results   Component Value Date    WBC 6.9 09/09/2015    HGB 14.3 09/09/2015    HCT 42.1 09/09/2015     09/09/2015     02/02/2017    POTASSIUM 3.9 02/02/2017    CHLORIDE 102 02/02/2017    CO2 29 02/02/2017    BUN 23 02/02/2017    CR 1.05 (H) 02/02/2017     (H) 02/02/2017    DD 1.0 (H) 06/21/2012    NTBNPI 1600 (H) 06/21/2012    NTBNP 603 (H) 06/25/2015    TROPONIN 0.00 06/21/2012    AST 13 01/11/2017    ALT 28 01/11/2017    ALKPHOS 86 01/11/2017    BILITOTAL 0.4 01/11/2017    INR 2.4 (A) 03/15/2018     Echocardiogram reviewed 2/5/18:   Mildly dilated LV with mildly reduced EF. Visually estimated at 45-50%. Grade II diastolic dysfunction. Right ventricular function, chamber size, wall motion, and thickness are normal. Left atrial enlargement. Moderate MR. Regurgitant volume 50 ml, ERO 0.24 cm2. Mild TR. PA systolic pressure at least 40 mmHg plus RA pressure but may be underestimated in setting of poor TR Doppler profile. IVC not visualized. No pericardial effusion is present.    Ziopatch: 10% a-fib burden, patient activated the device related to the a-fib episode. Longest lasted over 7 hours.    ASSESSMENT AND PLAN:  In summary, patient is a 73 year old lady with above past medical history including borderline ejection fraction of 45-50% most recent echocardiogram grade 2 diastolic dysfunction asymptomatic paroxysmal atrial fibrillation currently in A. fib with rates around 100/min.  She has been on amiodarone in the past which was discontinued in February 2018.   Subsequent monitoring showed an A. fib burden of about 10%.  She came to the clinic today for because of worsening shortness of breath over the Past Couple of Days.  We have discussed extensively that she should take her diuretic medications as prescribed including the torsemide.  We also discussed importance of weight loss.  I believe that amiodarone has been discontinued appropriately and we will keep it off at this time.  She takes Coreg 25 mg twice daily we will keep her at this dose now as she would like to avoid any dose changes of the Coreg.  She has been on amiodarone we will check her TSH that might contribute to her heat intolerance should she have hypothyroidism especially with replacement therapy.  We will also check basic labs in addition to INR.  On exam she appeared more euvolemic today and we will continue her current outpatient regimen with no changes in diuretics.  If ongoing symptoms may consider adding low-dose spironolactone daily.    She is to follow-up with Dr. Whitlock in the next couple of months.    I appreciate the opportunity to participate in the care of Mrs. Ng.  Please do not hesitate to contact me with any questions.    Sincerely,    Rosalio Rob MD     [facility] of Cardiology

## 2018-05-04 NOTE — LETTER
5/4/2018      RE: Caty Ng  [address] [address]       Dear Colleague,    Thank you for the opportunity to participate in the care of your patient, Caty Ng, at the [facility]. Please see a copy of my visit note below.    May 4, 2018     I had the pleasure of seeing Caty Ng  in the [facility] for further evaluation and management of her shortness of breath. She is followed regularly by Dr. Whitlock.  Patient is a 73 year old lady with PAF, on Amio until 2/2018 and coumadin, DLCO normal last year, Do have restrictive lung disease by PFT, normal coronary angiogram 2012. Elevated PCWP and PAP at that time, LAD calcium score 414 in 2015. EF is borderline at 45-50% by echo.  She presented to the clinic today for further evaluation of her shortness of breath.  She has been playing [employer] tournament over the past weekend and notes that she did not take her torsemide as prescribed at the time because of the frequent urination and response to medication.  She noted that she felt hot at times and she had heat intolerance in addition to feeling a little bit more short of breath.  This is why she presented to the clinic for further evaluation. Denies any chest pain or worsening lower extremity edema.  Her weight has been increasing a little bit over the past couple of weeks.  No dizziness or lightheadedness or syncope.  She met tells me that she does not feel like she is actually in atrial fibrillation.     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= [employer]     Hyperlipidemia      Hypertriglyceridemia      Mitral valve regurgitation 6/24/2012     Obesity      BROCK (obstructive sleep apnea)      Systolic heart failure (H)      FAMILY HISTORY:  Family History    Problem Relation Age of Onset     HEART DISEASE Mother      CANCER Mother      ovarian     DIABETES Father      Musculoskeletal Disorder Father      DIABETES Paternal Grandmother      DIABETES Paternal Grandfather      DIABETES Sister      Thrombophilia Daughter      Factor V Leiden Deficiency     SOCIAL HISTORY:  Social History     Social History     Marital status:      Spouse name: N/A     Number of children: N/A     Years of education: N/A     Social History Main Topics     Smoking status: Former Smoker     Packs/day: 0.50     Years: 20.00     Types: Cigarettes     Quit date: 10/1/1982     Smokeless tobacco: Never Used     Alcohol use No      Comment: 1 beers per week     Drug use: No     Sexual activity: Yes     Partners: Male     Other Topics Concern     Parent/Sibling W/ Cabg, Mi Or Angioplasty Before 65f 55m? Yes     Social History Narrative    , [place].    2 children.    One great grandchildren.        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      ROS:   Constitutional: No fever, chills, or sweats. Weight is 0 lbs 0 oz  ENT: No visual disturbance, ear ache, epistaxis, sore throat.   Allergies/Immunologic: Negative.   Respiratory: No cough, hemoptysis.   Cardiovascular: As per HPI.   GI: No nausea, vomiting, hematemesis, melena, or hematochezia.   : No urinary frequency, dysuria, or hematuria.   Integrument:  "Negative.   Psychiatric: No evidence of major depression  Neuro: No new neurological complaints at this time. Non focal  Endocrinology: Negative.   Musculoskeletal: As per HPI.      EXAM:  /84 (BP Location: Left arm, Patient Position: Chair, Cuff Size: Adult Large)  Pulse 100  Ht 1.613 m (5' 3.5\")  Wt 114.6 kg (252 lb 9.6 oz)  SpO2 94%  BMI 44.04 kg/m2  General Appearance: healthy, alert, active and no distress  Head: Normocephalic. No masses, lesions, tenderness or abnormalities  Eyes: conjuctiva clear, PERRL, EOM intact  Ears: External ears normal.   Nose: Nares normal  Mouth: normal  Neck: Supple, no cervical adenopathy, no thyromegaly  Lungs: clear to auscultation  Cardiac: irregularly irregular, no murmurs noted. Rate around 98/min  Neuro: No focal deficits     Labs:  Lab Results   Component Value Date    WBC 6.9 09/09/2015    HGB 14.3 09/09/2015    HCT 42.1 09/09/2015     09/09/2015     02/02/2017    POTASSIUM 3.9 02/02/2017    CHLORIDE 102 02/02/2017    CO2 29 02/02/2017    BUN 23 02/02/2017    CR 1.05 (H) 02/02/2017     (H) 02/02/2017    DD 1.0 (H) 06/21/2012    NTBNPI 1600 (H) 06/21/2012    NTBNP 603 (H) 06/25/2015    TROPONIN 0.00 06/21/2012    AST 13 01/11/2017    ALT 28 01/11/2017    ALKPHOS 86 01/11/2017    BILITOTAL 0.4 01/11/2017    INR 2.4 (A) 03/15/2018     Echocardiogram reviewed 2/5/18:   Mildly dilated LV with mildly reduced EF. Visually estimated at 45-50%. Grade II diastolic dysfunction. Right ventricular function, chamber size, wall motion, and thickness are normal. Left atrial enlargement. Moderate MR. Regurgitant volume 50 ml, ERO 0.24 cm2. Mild TR. PA systolic pressure at least 40 mmHg plus RA pressure but may be underestimated in setting of poor TR Doppler profile. IVC not visualized. No pericardial effusion is present.    Ziopatch: 10% a-fib burden, patient activated the device related to the a-fib episode. Longest lasted over 7 hours.    ASSESSMENT AND " PLAN:  In summary, patient is a 73 year old lady with above past medical history including borderline ejection fraction of 45-50% most recent echocardiogram grade 2 diastolic dysfunction asymptomatic paroxysmal atrial fibrillation currently in A. fib with rates around 100/min.  She has been on amiodarone in the past which was discontinued in February 2018.  Subsequent monitoring showed an A. fib burden of about 10%.  She came to the clinic today for because of worsening shortness of breath over the Past Couple of Days.  We have discussed extensively that she should take her diuretic medications as prescribed including the torsemide.  We also discussed importance of weight loss.  I believe that amiodarone has been discontinued appropriately and we will keep it off at this time.  She takes Coreg 25 mg twice daily we will keep her at this dose now as she would like to avoid any dose changes of the Coreg.  She has been on amiodarone we will check her TSH that might contribute to her heat intolerance should she have hypothyroidism especially with replacement therapy.  We will also check basic labs in addition to INR.  On exam she appeared more euvolemic today and we will continue her current outpatient regimen with no changes in diuretics.  If ongoing symptoms may consider adding low-dose spironolactone daily.    She is to follow-up with Dr. Whitlock in the next couple of months.    I appreciate the opportunity to participate in the care of Mrs. Ng.  Please do not hesitate to contact me with any questions.    Sincerely,    Rosalio Rob MD     [facility] Division of Cardiology

## 2018-05-11 ENCOUNTER — TELEPHONE (OUTPATIENT)
Dept: CARDIOLOGY | Facility: CLINIC | Age: 73
End: 2018-05-11

## 2018-05-11 DIAGNOSIS — I50.9 CHF (CONGESTIVE HEART FAILURE) (H): Primary | ICD-10-CM

## 2018-05-11 NOTE — TELEPHONE ENCOUNTER
Reviewed recent lab results with Dr. Rob.     Pt to have repeat labs in a few weeks and to follow up with Dr. Whitlock as already in August.    Orders entered. Called pt and left voice mail for her to call back to discuss results and schedule labs.    Hansel Spann RN

## 2018-05-11 NOTE — TELEPHONE ENCOUNTER
Spoke to pt. Verbalized understanding. States she will have repeat labs drawn at [facility] on 5/21 along with her INR levels. Will f/u with pt after labs are completed.     Hansel Spann RN

## 2018-05-17 NOTE — TELEPHONE ENCOUNTER
Received a refill for atenolol from [facility].  This is not on her med list and it was switched over to Carvedilol on 8/25/17 due to shortage of atenolol at that time.  Spoke to patient and patient does not want to switch back over to Atenolol at this time.  Laith informed via fax.      Rajani Stout RN

## 2018-05-21 ENCOUNTER — ANTICOAGULATION THERAPY VISIT (OUTPATIENT)
Dept: NURSING | Facility: CLINIC | Age: 73
End: 2018-05-21
Payer: MEDICARE

## 2018-05-21 DIAGNOSIS — I50.9 CHF (CONGESTIVE HEART FAILURE) (H): ICD-10-CM

## 2018-05-21 DIAGNOSIS — I48.91 ATRIAL FIBRILLATION (H): ICD-10-CM

## 2018-05-21 DIAGNOSIS — Z79.01 LONG TERM CURRENT USE OF ANTICOAGULANT THERAPY: ICD-10-CM

## 2018-05-21 LAB
INR POINT OF CARE: 2.5 (ref 0.86–1.14)
NT-PROBNP SERPL-MCNC: 1012 PG/ML (ref 0–125)

## 2018-05-21 PROCEDURE — 80048 BASIC METABOLIC PNL TOTAL CA: CPT | Performed by: INTERNAL MEDICINE

## 2018-05-21 PROCEDURE — 85610 PROTHROMBIN TIME: CPT | Mod: QW

## 2018-05-21 PROCEDURE — 99207 ZZC NO CHARGE NURSE ONLY: CPT

## 2018-05-21 PROCEDURE — 83880 ASSAY OF NATRIURETIC PEPTIDE: CPT | Performed by: INTERNAL MEDICINE

## 2018-05-21 PROCEDURE — 36416 COLLJ CAPILLARY BLOOD SPEC: CPT

## 2018-05-21 NOTE — MR AVS SNAPSHOT
Caty Ng   5/21/2018 11:15 AM   Anticoagulation Therapy Visit    Description:  73 year old female   Provider:  [facility]   Department:  [facility]           INR as of 5/21/2018     Today's INR 2.5      Anticoagulation Summary as of 5/21/2018     INR goal 2.0-3.0   Today's INR 2.5   Full instructions 1 mg on Tue, Fri; 2 mg all other days   Next INR check 6/21/2018    Indications   Atrial fibrillation (H) [I48.91]  Long term current use of anticoagulant therapy [Z79.01]         Your next Anticoagulation Clinic appointment(s)     May 21, 2018 11:15 AM CDT   [facility] with [facility]   [facility]    [address] [place] [id number] [phone]            Jun 21, 2018  9:30 AM CDT   Anticoagulation Visit with [facility] AN[facility]   [facility] ([address] [id number] [phone]              Contact Numbers     Please call [phone] to cancel and/or reschedule your appointment.  Please call [phone] with any problems or questions regarding your therapy          May 2018 Details    Sun Mon Tue Wed Thu Fri Sat       1               2               3               4               5                 6               7               8               9               10               11               12                 13               14               15               16               17               18               19                 20               21      2 mg   See details      22      1 mg         23      2 mg         24      2 mg         25      1 mg         26      2 mg           27      2 mg         28      2 mg         29      1 mg         30      2 mg         31      2 mg            Date Details   05/21 This INR check               How to take your warfarin dose     To take:  1 mg Take 0.5 of a 2 mg tablet.    To take:  2 mg Take 1 of the 2  mg tablets.           June 2018 Details    Sun Mon Tue Wed Thu Fri Sat          1      1 mg         2      2 mg           3      2 mg         4      2 mg         5      1 mg         6      2 mg         7      2 mg         8      1 mg         9      2 mg           10      2 mg         11      2 mg         12      1 mg         13      2 mg         14      2 mg         15      1 mg         16      2 mg           17      2 mg         18      2 mg         19      1 mg         20      2 mg         21            22               23                 24               25               26               27               28               29               30                Date Details   No additional details    Date of next INR:  6/21/2018         How to take your warfarin dose     To take:  1 mg Take 0.5 of a 2 mg tablet.    To take:  2 mg Take 1 of the 2 mg tablets.

## 2018-05-21 NOTE — PROGRESS NOTES
ANTICOAGULATION FOLLOW-UP CLINIC VISIT    Patient Name:  Caty Ng  Date:  5/21/2018  Contact Type:  Face to Face    SUBJECTIVE:     Patient Findings     Positives No Problem Findings           OBJECTIVE    INR Protime   Date Value Ref Range Status   05/21/2018 2.5 (A) 0.86 - 1.14 Final       ASSESSMENT / PLAN  INR assessment THER    Recheck INR In: 4 WEEKS    INR Location Clinic      Anticoagulation Summary as of 5/21/2018     INR goal 2.0-3.0   Today's INR 2.5   Maintenance plan 1 mg (2 mg x 0.5) on Tue, Fri; 2 mg (2 mg x 1) all other days   Full instructions 1 mg on Tue, Fri; 2 mg all other days   Weekly total 12 mg   No change documented Maryellen Atwood RN   Plan last modified Madhuri Renae RN (1/23/2017)   Next INR check 6/21/2018   Priority INR   Target end date Indefinite    Indications   Atrial fibrillation (H) [I48.91]  Long term current use of anticoagulant therapy [Z79.01]         Anticoagulation Episode Summary     INR check location [facility]    Preferred lab [facility]    [facility]    Comments       Anticoagulation Care Providers     Provider Role Specialty Phone number    Avis Ferrer MD  Family Practice [phone]            See the Encounter Report to view Anticoagulation Flowsheet and Dosing Calendar (Go to Encounters tab in chart review, and find the Anticoagulation Therapy Visit)        Maryellen Atwood, WILMA

## 2018-05-22 LAB
ANION GAP SERPL CALCULATED.3IONS-SCNC: 7 MMOL/L (ref 3–14)
BUN SERPL-MCNC: 23 MG/DL (ref 7–30)
CALCIUM SERPL-MCNC: 8.7 MG/DL (ref 8.5–10.1)
CHLORIDE SERPL-SCNC: 103 MMOL/L (ref 94–109)
CO2 SERPL-SCNC: 31 MMOL/L (ref 20–32)
CREAT SERPL-MCNC: 1.02 MG/DL (ref 0.52–1.04)
GFR SERPL CREATININE-BSD FRML MDRD: 53 ML/MIN/1.7M2
GLUCOSE SERPL-MCNC: 132 MG/DL (ref 70–99)
POTASSIUM SERPL-SCNC: 3.8 MMOL/L (ref 3.4–5.3)
SODIUM SERPL-SCNC: 141 MMOL/L (ref 133–144)

## 2018-05-22 NOTE — TELEPHONE ENCOUNTER
Reviewed results with pt. Verbalized understanding to continue meds as normal and call if any issues arise.    Hansel Spann RN

## 2018-05-22 NOTE — TELEPHONE ENCOUNTER
Follow up labs normal. Pt to continue same medications.    Left message for pt to call clinic back.    Hansel Spann RN

## 2018-05-27 DIAGNOSIS — E03.9 HYPOTHYROIDISM: ICD-10-CM

## 2018-05-29 RX ORDER — LEVOTHYROXINE SODIUM 75 UG/1
TABLET ORAL
Qty: 90 TABLET | Refills: 1 | Status: SHIPPED | OUTPATIENT
Start: 2018-05-29 | End: 2018-11-02

## 2018-05-29 NOTE — TELEPHONE ENCOUNTER
"Requested Prescriptions   Pending Prescriptions Disp Refills     levothyroxine (SYNTHROID/LEVOTHROID) 75 MCG tablet [Pharmacy Med Name: LEVOTHYROXINE SODIUM 75 MCG Tablet]  Last Written Prescription Date:  6/16/2017  Last Fill Quantity: 90 tabs,  # refills: 2   Last office visit: 1/8/2018 with prescribing provider:  Schulenberg   Future Office Visit:   Next 5 appointments (look out 90 days)     Aug 09, 2018 10:00 AM CDT   Return Visit with MEL Casey MD   [facility] ([facility])    [address] [place] [id number] [phone]                  90 tablet 2     Sig: TAKE 1 TABLET EVERY DAY    Thyroid Protocol Passed    5/27/2018  4:06 PM       Passed - Patient is 12 years or older       Passed - Recent (12 mo) or future (30 days) visit within the authorizing provider's specialty    Patient had office visit in the last 12 months or has a visit in the next 30 days with authorizing provider or within the authorizing provider's specialty.  See \"Patient Info\" tab in inbasket, or \"Choose Columns\" in Meds & Orders section of the refill encounter.           Passed - Normal TSH on file in past 12 months    Recent Labs   Lab Test  05/04/18   1359   TSH  2.16             Passed - No active pregnancy on record    If patient is pregnant or has had a positive pregnancy test, please check TSH.         Passed - No positive pregnancy test in past 12 months    If patient is pregnant or has had a positive pregnancy test, please check TSH.            "

## 2018-05-29 NOTE — TELEPHONE ENCOUNTER
Prescription approved per [facility] Refill Protocol.  Monique Simon RN

## 2018-06-21 ENCOUNTER — ANTICOAGULATION THERAPY VISIT (OUTPATIENT)
Dept: NURSING | Facility: CLINIC | Age: 73
End: 2018-06-21
Payer: COMMERCIAL

## 2018-06-21 DIAGNOSIS — Z79.01 LONG TERM CURRENT USE OF ANTICOAGULANT THERAPY: ICD-10-CM

## 2018-06-21 DIAGNOSIS — I48.91 ATRIAL FIBRILLATION (H): ICD-10-CM

## 2018-06-21 LAB — INR POINT OF CARE: 2.1 (ref 0.86–1.14)

## 2018-06-21 PROCEDURE — 99207 ZZC NO CHARGE NURSE ONLY: CPT

## 2018-06-21 PROCEDURE — 85610 PROTHROMBIN TIME: CPT | Mod: QW

## 2018-06-21 PROCEDURE — 36416 COLLJ CAPILLARY BLOOD SPEC: CPT

## 2018-06-21 NOTE — MR AVS SNAPSHOT
Caty Ng   6/21/2018 9:30 AM   Anticoagulation Therapy Visit    Description:  73 year old female   Provider:  CHRISSIE MANRIQUE   Department:  [facility]           INR as of 6/21/2018     Today's INR 2.1      Anticoagulation Summary as of 6/21/2018     INR goal 2.0-3.0   Today's INR 2.1   Full warfarin instructions 1 mg on Tue, Fri; 2 mg all other days   Next INR check 8/2/2018    Indications   Atrial fibrillation (H) [I48.91]  Long term current use of anticoagulant therapy [Z79.01]         Your next Anticoagulation Clinic appointment(s)     Aug 02, 2018  9:15 AM CDT   Anticoagulation Visit with [facility]   [facility]    [address] [place] [id number] [phone]              Contact Numbers     Please call [phone] to cancel and/or reschedule your appointment.  Please call [phone] with any problems or questions regarding your therapy          June 2018 Details    Sun Mon Tue Wed Thu Fri Sat          1               2                 3               4               5               6               7               8               9                 10               11               12               13               14               15               16                 17               18               19               20               21      2 mg   See details      22      1 mg         23      2 mg           24      2 mg         25      2 mg         26      1 mg         27      2 mg         28      2 mg         29      1 mg         30      2 mg          Date Details   06/21 This INR check               How to take your warfarin dose     To take:  1 mg Take 0.5 of a 2 mg tablet.    To take:  2 mg Take 1 of the 2 mg tablets.           July 2018 Details    Sun Mon Tue Wed Thu Fri Sat     1      2 mg         2      2 mg         3      1 mg         4      2 mg         5      2 mg         6      1 mg         7      2 mg           8       2 mg         9      2 mg         10      1 mg         11      2 mg         12      2 mg         13      1 mg         14      2 mg           15      2 mg         16      2 mg         17      1 mg         18      2 mg         19      2 mg         20      1 mg         21      2 mg           22      2 mg         23      2 mg         24      1 mg         25      2 mg         26      2 mg         27      1 mg         28      2 mg           29      2 mg         30      2 mg         31      1 mg              Date Details   No additional details            How to take your warfarin dose     To take:  1 mg Take 0.5 of a 2 mg tablet.    To take:  2 mg Take 1 of the 2 mg tablets.           August 2018 Details    Sun Mon Tue Wed Thu Fri Sat        1      2 mg         2            3               4                 5               6               7               8               9               10               11                 12               13               14               15               16               17               18                 19               20               21               22               23               24               25                 26               27               28               29               30               31                 Date Details   No additional details    Date of next INR:  8/2/2018         How to take your warfarin dose     To take:  2 mg Take 1 of the 2 mg tablets.

## 2018-06-21 NOTE — PROGRESS NOTES
ANTICOAGULATION FOLLOW-UP CLINIC VISIT    Patient Name:  Caty Ng  Date:  6/21/2018  Contact Type:  Face to Face    SUBJECTIVE:     Patient Findings     Positives No Problem Findings           OBJECTIVE    INR Protime   Date Value Ref Range Status   06/21/2018 2.1 (A) 0.86 - 1.14 Final       ASSESSMENT / PLAN  INR assessment THER    Recheck INR In: 6 WEEKS    INR Location Clinic      Anticoagulation Summary as of 6/21/2018     INR goal 2.0-3.0   Today's INR 2.1   Warfarin maintenance plan 1 mg (2 mg x 0.5) on Tue, Fri; 2 mg (2 mg x 1) all other days   Full warfarin instructions 1 mg on Tue, Fri; 2 mg all other days   Weekly warfarin total 12 mg   No change documented Adiel Boyle RN   Plan last modified Madhuri Renae RN (1/23/2017)   Next INR check 8/2/2018   Priority INR   Target end date Indefinite    Indications   Atrial fibrillation (H) [I48.91]  Long term current use of anticoagulant therapy [Z79.01]         Anticoagulation Episode Summary     [facility] check [facility]    Preferred lab [facility]    Send INR reminders to [facility]    Comments       Anticoagulation Care Providers     Provider Role Specialty Phone number    Avis Ferrer MD  Family Practice [phone]            See the Encounter Report to view Anticoagulation Flowsheet and Dosing Calendar (Go to Encounters tab in chart review, and find the Anticoagulation Therapy Visit)        Adiel Boyle, WILMA

## 2018-08-02 ENCOUNTER — ANTICOAGULATION THERAPY VISIT (OUTPATIENT)
Dept: NURSING | Facility: CLINIC | Age: 73
End: 2018-08-02
Payer: COMMERCIAL

## 2018-08-02 DIAGNOSIS — Z79.01 LONG TERM CURRENT USE OF ANTICOAGULANT THERAPY: ICD-10-CM

## 2018-08-02 DIAGNOSIS — I48.91 ATRIAL FIBRILLATION (H): ICD-10-CM

## 2018-08-02 LAB — INR POINT OF CARE: 1.9 (ref 0.86–1.14)

## 2018-08-02 PROCEDURE — 36416 COLLJ CAPILLARY BLOOD SPEC: CPT

## 2018-08-02 PROCEDURE — 85610 PROTHROMBIN TIME: CPT | Mod: QW

## 2018-08-02 PROCEDURE — 99207 ZZC NO CHARGE NURSE ONLY: CPT

## 2018-08-02 NOTE — MR AVS SNAPSHOT
Caty Ng   8/2/2018 9:15 AM   Anticoagulation Therapy Visit    Description:  73 year old female   Provider:  CHRISSIE MANRIQUE   Department:  [facility]           INR as of 8/2/2018     Today's INR 1.9!      Anticoagulation Summary as of 8/2/2018     INR goal 2.0-3.0   Today's INR 1.9!   Full warfarin instructions 1 mg on Tue, Fri; 2 mg all other days   Next INR check 9/13/2018    Indications   Atrial fibrillation (H) [I48.91]  Long term current use of anticoagulant therapy [Z79.01]         Your next Anticoagulation Clinic appointment(s)     Sep 13, 2018  9:45 AM CDT   An[facility]agulation Visit with [facility]   [facility]    [address] [place] [id number] [phone]              Contact Numbers     Please call [phone] to cancel and/or reschedule your appointment.  Please call [phone] with any problems or questions regarding your therapy          August 2018 Details    Sun Mon Tue Wed Thu Fri Sat        1               2      2 mg   See details      3      1 mg         4      2 mg           5      2 mg         6      2 mg         7      1 mg         8      2 mg         9      2 mg         10      1 mg         11      2 mg           12      2 mg         13      2 mg         14      1 mg         15      2 mg         16      2 mg         17      1 mg         18      2 mg           19      2 mg         20      2 mg         21      1 mg         22      2 mg         23      2 mg         24      1 mg         25      2 mg           26      2 mg         27      2 mg         28      1 mg         29      2 mg         30      2 mg         31      1 mg           Date Details   08/02 This INR check               How to take your warfarin dose     To take:  1 mg Take 0.5 of a 2 mg tablet.    To take:  2 mg Take 1 of the 2 mg tablets.           September 2018 Details    Sun Mon Tue Wed Thu Fri Sat           1      2 mg           2      2 mg          3      2 mg         4      1 mg         5      2 mg         6      2 mg         7      1 mg         8      2 mg           9      2 mg         10      2 mg         11      1 mg         12      2 mg         13            14               15                 16               17               18               19               20               21               22                 23               24               25               26               27               28               29                 30                      Date Details   No additional details    Date of next INR:  9/13/2018         How to take your warfarin dose     To take:  1 mg Take 0.5 of a 2 mg tablet.    To take:  2 mg Take 1 of the 2 mg tablets.

## 2018-08-02 NOTE — PROGRESS NOTES
ANTICOAGULATION FOLLOW-UP CLINIC VISIT    Patient Name:  Caty Ng  Date:  8/2/2018  Contact Type:  Face to Face    SUBJECTIVE:     Patient Findings     Positives No Problem Findings           OBJECTIVE    INR Protime   Date Value Ref Range Status   08/02/2018 1.9 (A) 0.86 - 1.14 Final       ASSESSMENT / PLAN  INR assessment THER    Recheck INR In: 6 WEEKS    INR Location Clinic      Anticoagulation Summary as of 8/2/2018     INR goal 2.0-3.0   Today's INR 1.9!   Warfarin maintenance plan 1 mg (2 mg x 0.5) on Tue, Fri; 2 mg (2 mg x 1) all other days   Full warfarin instructions 1 mg on Tue, Fri; 2 mg all other days   Weekly warfarin total 12 mg   No change documented Adiel Boyle RN   Plan last modified Madhuri Renae RN (1/23/2017)   Next INR check 9/13/2018   Priority INR   Target end date Indefinite    Indications   Atrial fibrillation (H) [I48.91]  Long term current use of anticoagulant therapy [Z79.01]         Anticoagulation Episode Summary     [facility] check [facility]    Preferred lab [facility]    Send INR reminders to [facility]    Comments       Anticoagulation Care Providers     Provider Role Specialty Phone number    Avis Ferrer MD  Family Practice [phone]            See the Encounter Report to view Anticoagulation Flowsheet and Dosing Calendar (Go to Encounters tab in chart review, and find the Anticoagulation Therapy Visit)        Adiel Boyle, RN

## 2018-08-09 ENCOUNTER — TELEPHONE (OUTPATIENT)
Dept: CARDIOLOGY | Facility: CLINIC | Age: 73
End: 2018-08-09

## 2018-08-09 ENCOUNTER — OFFICE VISIT (OUTPATIENT)
Dept: CARDIOLOGY | Facility: CLINIC | Age: 73
End: 2018-08-09
Payer: COMMERCIAL

## 2018-08-09 VITALS
WEIGHT: 251 LBS | BODY MASS INDEX: 43.77 KG/M2 | SYSTOLIC BLOOD PRESSURE: 107 MMHG | DIASTOLIC BLOOD PRESSURE: 66 MMHG | HEART RATE: 117 BPM | OXYGEN SATURATION: 93 %

## 2018-08-09 DIAGNOSIS — I50.32 CHRONIC DIASTOLIC HEART FAILURE (H): ICD-10-CM

## 2018-08-09 DIAGNOSIS — I48.19 PERSISTENT ATRIAL FIBRILLATION (H): Primary | ICD-10-CM

## 2018-08-09 PROCEDURE — 93000 ELECTROCARDIOGRAM COMPLETE: CPT | Performed by: INTERNAL MEDICINE

## 2018-08-09 PROCEDURE — 99214 OFFICE O/P EST MOD 30 MIN: CPT | Performed by: INTERNAL MEDICINE

## 2018-08-09 RX ORDER — CARVEDILOL 25 MG/1
12.5 TABLET ORAL 2 TIMES DAILY WITH MEALS
Qty: 90 TABLET | Refills: 3 | Status: SHIPPED | OUTPATIENT
Start: 2018-08-09 | End: 2019-08-15

## 2018-08-09 RX ORDER — DILTIAZEM HYDROCHLORIDE 120 MG/1
120 CAPSULE, COATED, EXTENDED RELEASE ORAL DAILY
Qty: 90 CAPSULE | Refills: 1 | Status: SHIPPED | OUTPATIENT
Start: 2018-08-09 | End: 2018-09-04

## 2018-08-09 RX ORDER — DILTIAZEM HYDROCHLORIDE 120 MG/1
120 TABLET, FILM COATED ORAL DAILY
Qty: 30 TABLET | Refills: 0 | Status: SHIPPED | OUTPATIENT
Start: 2018-08-09 | End: 2018-08-29

## 2018-08-09 NOTE — NURSING NOTE
Cardiac Testing: Patient given instructions regarding  echocardiogram  per Dr Whitlock scheduled on Friday August 17 at 9:00 AM at [facility] . Discussed purpose, preparation, Patient demonstrated understanding of this information and agreed to call with further questions or concerns.    Med Reconcile: Reviewed and verified all current medications with the patient. The updated medication list was printed and given to the patient.    New Medication: Per Dr Whitlock patient to start Diltiazem 120 mg daily.Patient was educated regarding newly prescribed medication, including discussion of  the indication, administration, side effects, and when to report to MD or RN. Patient demonstrated understanding of this information and agreed to call with further questions or concerns.    Return Appointment: 1 year follow-up per Dr Whitlock     Patient stated she understood all health information given and agreed to call with further questions or concerns.

## 2018-08-09 NOTE — NURSING NOTE
"Chief Complaint   Patient presents with     RECHECK     6 month f/u after stopping amiodarone. Pt reports her SOB is worsening. She occasionally feels a hard chest type pain and lightheadedness. More fatigued with exertion.       Initial /66 (BP Location: Right arm, Patient Position: Chair, Cuff Size: Adult Large)  Pulse 117  Wt 113.9 kg (251 lb)  SpO2 93%  BMI 43.77 kg/m2 Estimated body mass index is 43.77 kg/(m^2) as calculated from the following:    Height as of 5/4/18: 1.613 m (5' 3.5\").    Weight as of this encounter: 113.9 kg (251 lb)..  BP completed using cuff size: ludy Rahman MA    "

## 2018-08-09 NOTE — TELEPHONE ENCOUNTER
Patient was seen in clinic today with Dr Whitlock. New prescription was sent for Diltiazem 120 mg daily via [facility] home delivery. Patient called clinic later to request a new prescription to be sent at the [facility] in [facility] due to [facility] pharmacy  will take 7-10 days to deliver medication at patient home.    New prescription was sent to [facility] per patient request for Diltiazem 120 mg daily with 30 days supply with no refill. Patient was called,unable to leave voice mail no messaging system set-up on patient phone. Awaiting for patient to contact clinic back.    Nela Frank RN

## 2018-08-09 NOTE — MR AVS SNAPSHOT
After Visit Summary   8/9/2018    Caty Ng    MRN: [id number]           Patient Information     Date Of Birth          1945        Visit Information        Provider Department      8/9/2018 10:00 AM MEL Casey MD [facility] AT [facility]        Today's Diagnoses     Paroxysmal atrial fibrillation (H)    -  1    Persistent atrial fibrillation (H)          Care Instructions    Thank you for coming to the [facility] @ [facility]; please note the following instructions:    1.Cardizem CD ( Diltiazem) 120 mg daily   2.ECHO   3.1 year follow-up, clinic will call as time gets closer         If you have any questions regarding your visit please contact your care team:     Cardiology  Telephone Number   Rajani RYAN, RN  Hansel ROUSE, RN   Mela LUNA, KRISTY DESOUZA MA   [phone]    *After hours: [phone]   For scheduling appts:     [phone] or    [phone] (select option [phone])    *After hours: [phone]     For the Device Clinic (Pacemakers and ICD's)  RN's :  Maricruz Chaudhari   During business hours: [phone]    *After business hours:  [phone] (select option 4)      Normal test result notifications will be released via [employer] or mailed within 7 business days.  All other test results, will be communicated via telephone once reviewed by your cardiologist.    If you need a medication refill please contact your pharmacy.  Please allow 3 business days for your refill to be completed.    As always, thank you for trusting us with your health care needs!    Diltiazem tablets  Brand Name: Cardizem  What is this medicine?  DILTIAZEM (dil GURJIT a zem) is a calcium-channel blocker. It affects the amount of calcium found in your heart and muscle cells. This relaxes your blood vessels, which can reduce the amount of work the heart has to do. This medicine is used to treat chest pain caused by angina.  How should I use this  medicine?  Take this medicine by mouth with a glass of water. Follow the directions on the prescription label. Do not cut, crush or chew this medicine. This medicine is usually taken before meals and at bedtime. Take your doses at regular intervals. Do not take your medicine more often then directed. Do not stop taking except on the advice of your doctor or health care professional.  Talk to your pediatrician regarding the use of this medicine in children. Special care may be needed.  What side effects may I notice from receiving this medicine?  Side effects that you should report to your doctor or health care professional as soon as possible:    allergic reactions like skin rash, itching or hives, swelling of the face, lips, or tongue    confusion, mental depression    feeling faint or lightheaded, falls    pinpoint red spots on the skin    redness, blistering, peeling or loosening of the skin, including inside the mouth    slow, irregular heartbeat    swelling of the ankles, feet    unusual bleeding or bruising  Side effects that usually do not require medical attention (report to your doctor or health care professional if they continue or are bothersome):    change in sex drive or performance    constipation or diarrhea    flushing of the face    headache    nausea, vomiting    tired or weak    trouble sleeping  What may interact with this medicine?  Do not take this medicine with any of the following:    cisapride    hawthorn    pimozide    ranolazine    red yeast rice  This medicine may also interact with the following medications:    buspirone    carbamazepine    cimetidine    cyclosporine    digoxin    local anesthetics or general anesthetics    lovastatin    medicines for anxiety or difficulty sleeping like midazolam and triazolam    medicines for high blood pressure or heart problems    quinidine    rifampin, rifabutin, or rifapentine  What if I miss a dose?  If you miss a dose, take it as soon as you can.  If it is almost time for your next dose, take only that dose. Do not take double or extra doses.  Where should I keep my medicine?  Keep out of the reach of children.  Store at room temperature between 20 and 25 degrees C (68 and 77 degrees F). Protect from light. Keep container tightly closed. Throw away any unused medicine after the expiration date.  What should I tell my health care provider before I take this medicine?  They need to know if you have any of these conditions:    heart problems, low blood pressure, irregular heartbeat    liver disease    previous heart attack    an unusual or allergic reaction to diltiazem, other medicines, foods, dyes, or preservatives    pregnant or trying to get pregnant    breast-feeding  What should I watch for while using this medicine?  Check your blood pressure and pulse rate regularly. Ask your doctor or health care professional what your blood pressure and pulse rate should be and when you should contact him or her.  You may feel dizzy or lightheaded. Do not drive, use machinery, or do anything that needs mental alertness until you know how this medicine affects you. To reduce the risk of dizzy or fainting spells, do not sit or stand up quickly, especially if you are an older patient. Alcohol can make you more dizzy or increase flushing and rapid heartbeats. Avoid alcoholic drinks.  NOTE:This sheet is a summary. It may not cover all possible information. If you have questions about this medicine, talk to your doctor, pharmacist, or health care provider. Copyright  2018 Elsevier        Diltiazem tablets  Brand Name: Cardizem  What is this medicine?  DILTIAZEM (dil GURJIT a gianna) is a calcium-channel blocker. It affects the amount of calcium found in your heart and muscle cells. This relaxes your blood vessels, which can reduce the amount of work the heart has to do. This medicine is used to treat chest pain caused by angina.  How should I use this medicine?  Take this medicine  by mouth with a glass of water. Follow the directions on the prescription label. Do not cut, crush or chew this medicine. This medicine is usually taken before meals and at bedtime. Take your doses at regular intervals. Do not take your medicine more often then directed. Do not stop taking except on the advice of your doctor or health care professional.  Talk to your pediatrician regarding the use of this medicine in children. Special care may be needed.  What side effects may I notice from receiving this medicine?  Side effects that you should report to your doctor or health care professional as soon as possible:    allergic reactions like skin rash, itching or hives, swelling of the face, lips, or tongue    confusion, mental depression    feeling faint or lightheaded, falls    pinpoint red spots on the skin    redness, blistering, peeling or loosening of the skin, including inside the mouth    slow, irregular heartbeat    swelling of the ankles, feet    unusual bleeding or bruising  Side effects that usually do not require medical attention (report to your doctor or health care professional if they continue or are bothersome):    change in sex drive or performance    constipation or diarrhea    flushing of the face    headache    nausea, vomiting    tired or weak    trouble sleeping  What may interact with this medicine?  Do not take this medicine with any of the following:    cisapride    hawthorn    pimozide    ranolazine    red yeast rice  This medicine may also interact with the following medications:    buspirone    carbamazepine    cimetidine    cyclosporine    digoxin    local anesthetics or general anesthetics    lovastatin    medicines for anxiety or difficulty sleeping like midazolam and triazolam    medicines for high blood pressure or heart problems    quinidine    rifampin, rifabutin, or rifapentine  What if I miss a dose?  If you miss a dose, take it as soon as you can. If it is almost time for your  next dose, take only that dose. Do not take double or extra doses.  Where should I keep my medicine?  Keep out of the reach of children.  Store at room temperature between 20 and 25 degrees C (68 and 77 degrees F). Protect from light. Keep container tightly closed. Throw away any unused medicine after the expiration date.  What should I tell my health care provider before I take this medicine?  They need to know if you have any of these conditions:    heart problems, low blood pressure, irregular heartbeat    liver disease    previous heart attack    an unusual or allergic reaction to diltiazem, other medicines, foods, dyes, or preservatives    pregnant or trying to get pregnant    breast-feeding  What should I watch for while using this medicine?  Check your blood pressure and pulse rate regularly. Ask your doctor or health care professional what your blood pressure and pulse rate should be and when you should contact him or her.  You may feel dizzy or lightheaded. Do not drive, use machinery, or do anything that needs mental alertness until you know how this medicine affects you. To reduce the risk of dizzy or fainting spells, do not sit or stand up quickly, especially if you are an older patient. Alcohol can make you more dizzy or increase flushing and rapid heartbeats. Avoid alcoholic drinks.  NOTE:This sheet is a summary. It may not cover all possible information. If you have questions about this medicine, talk to your doctor, pharmacist, or health care provider. Copyright  2018 Elsevier                Follow-ups after your visit        Your next 10 appointments already scheduled     Aug 17, 2018  9:00 AM CDT   Ech Complete with [id number]   [facility] [place] ([facility])    [address] [place] [id number] [phone]           1.  Please bring or wear a comfortable two-piece outfit. 2.  You may eat, drink and take your normal medicines. 3.  For  any questions that cannot be answered, please contact the ordering physician 4.  Please do not wear perfumes or scented lotions on the day of your exam.            Sep 13, 2018  9:45 AM CDT   Anticoagulation Visit with NE ANTI COAG   [facility]    [address] [place] [id number] [phone]              Future tests that were ordered for you today     Open Future Orders        Priority Expected Expires Ordered    Echocardiogram Complete Routine  8/9/2019 8/9/2018            Who to contact     If you have questions or need follow up information about today's clinic visit or your schedule please contact [facility] AT [facility] directly at [phone].  Normal or non-critical lab and imaging results will be communicated to you by [employer], letter or phone within 4 business days after the clinic has received the results. If you do not hear from us within 7 days, please contact the clinic through [employer] or phone. If you have a critical or abnormal lab result, we will notify you by phone as soon as possible.  Submit refill requests through [employer]is or call your pharmacy and they will forward the refill request to us. Please allow 3 business days for your refill to be completed.          Additional Information About Your Visit        [employer] Information     [employer]iris gives you secure access to your electronic health record. If you see a primary care provider, you can also send messages to your care team and make appointments. If you have questions, please call your primary care clinic.  If you do not have a primary care provider, please call [phone] and they will assist you.        Care EveryWhere ID     This is your Care EveryWhere ID. This could be used by other organizations to access your [facility] medical records  [id number]        Your Vitals Were     Pulse Pulse Oximetry BMI (Body Mass Index)              117 93% 43.77 kg/m2          Blood Pressure from Last 3 Encounters:   08/09/18 107/66   05/04/18 122/84   02/01/18 138/73    Weight from Last 3 Encounters:   08/09/18 113.9 kg (251 lb)   05/04/18 114.6 kg (252 lb 9.6 oz)   02/01/18 114.9 kg (253 lb 4 oz)              We Performed the Following     EKG 12-lead, tracing only (Same Day)          Today's Medication Changes          These changes are accurate as of 8/9/18 10:48 AM.  If you have any questions, ask your nurse or doctor.               Start taking these medicines.        Dose/Directions    diltiazem 120 MG 24 hr capsule   Commonly known as:  CARDIZEM CD   Used for:  Paroxysmal atrial fibrillation (H)   Started by:  MEL Casey MD        Dose:  120 mg   Take 1 capsule (120 mg) by mouth daily   Quantity:  90 capsule   Refills:  1            Where to get your medicines      These medications were sent to [facility] - [facility] [address]  [address], [facility] [address]     Phone:  [phone]     diltiazem 120 MG 24 hr capsule                Primary Care Provider Office Phone # Fax #    Ibrahima Moura [phone]       [facility] [facility] [address]        Equal Access to Services     POLLY ROJO AH: Hadii aad ku hadasho Soomaali, waaxda luqadaha, qaybta kaalmada adeegyada, ayana oliver. So [facility] [phone].    ATENCIÓN: Si habla español, tiene a castro disposición servicios gratuitos de asistencia lingüística. Llame al [phone].    We comply with applicable federal civil rights laws and [place] laws. We do not discriminate on the basis of race, color, national origin, age, disability, sex, sexual orientation, or gender identity.            Thank you!     Thank you for choosing [facility] AT [facility]  for your care. Our goal is always to provide you with excellent care. Hearing back from our patients is  one way we can continue to improve our services. Please take a few minutes to complete the written survey that you may receive in the mail after your visit with us. Thank you!             Your Updated Medication List - Protect others around you: Learn how to safely use, store and throw away your medicines at www.disposemymeds.org.          This list is accurate as of 8/9/18 10:48 AM.  Always use your most recent med list.                   Brand Name Dispense Instructions for use Diagnosis    albuterol 108 (90 Base) MCG/ACT Inhaler    PROAIR HFA/PROVENTIL HFA/VENTOLIN HFA    1 Inhaler    Inhale 2 puffs into the lungs every 6 hours as needed for shortness of breath / dyspnea or wheezing Profile Rx: patient will contact pharmacy when needed    Acute bronchospasm       atorvastatin 40 MG tablet    LIPITOR    90 tablet    Take 1 tablet (40 mg) by mouth daily    Calcification of coronary artery       carvedilol 25 MG tablet    COREG    90 tablet    Take 0.5 tablets (12.5 mg) by mouth 2 times daily (with meals)    Chronic diastolic heart failure (H), Atrial fibrillation (H)       diltiazem 120 MG 24 hr capsule    CARDIZEM CD    90 capsule    Take 1 capsule (120 mg) by mouth daily    Paroxysmal atrial fibrillation (H)       fluticasone 50 MCG/ACT spray    FLONASE    16 g    Spray 1-2 sprays into both nostrils daily Please inform patient if the OTC would be cheaper for her or if there is an alternative nasal steroid that is less costly, please provide that information.    Post-nasal drainage, Cough       levothyroxine 75 MCG tablet    SYNTHROID/LEVOTHROID    90 tablet    TAKE 1 TABLET EVERY DAY    Hypothyroidism       loratadine 10 MG tablet    CLARITIN     Take 10 mg by mouth as needed for allergies        losartan 50 MG tablet    COZAAR    90 tablet    Take 1 tablet (50 mg) by mouth daily    Chronic diastolic heart failure (H)       torsemide 20 MG tablet    DEMADEX    180 tablet    Take 2 tablets (40 mg) by mouth daily     Acute on chronic diastolic congestive heart failure (H)       warfarin 2 MG tablet    COUMADIN    130 tablet    USE AS DIRECTED PER ACC- CURRENT DOSE IS 4 MG (2 TABS) ON MON, WED, AND FRI AND 2MG (1 TAB) ON TUES, THURS, SAT, AND SUN    Long-term (current) use of anticoagulants, INR goal 2.0-3.0

## 2018-08-09 NOTE — LETTER
8/9/2018      RE: Caty Ng  [address] [id number]       Dear Colleague,    Thank you for the opportunity to participate in the care of your patient, Caty Ng, at the [facility] AT [facility] at [facility]. Please see a copy of my visit note below.       SUBJECTIVE:  Caty Ng is a 73 year old female who presents for  Follow up.    Asymptomatic PAF. Had one cardioversion and was on Amio. Amio was stopped recently due to persistent Afib. Also has Restrictive Lung disease.    Normal coronary angiogram 2012. LAD calcium score 414 in 2015. No symptoms.    After stopping Amio. Has more SPB.    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 Provider to review       Calcification of coronary artery 07/01/2015     Priority: Medium     Chronic diastolic heart failure (H) 03/06/2014     Priority: Medium     Hyperlipidemia LDL goal <130 11/05/2013     Priority: Medium     BROCK (obstructive sleep apnea) 11/04/2013     Priority: Medium     Patient has chosen to not do cpap.       Atrial fibrillation (H) 10/17/2012     Priority: Medium     Hyperlipidemia      Priority: Medium     Systolic heart failure (H)      Priority: Medium     Followed by Dr. Cobos, cardiology       Mitral valve regurgitation 06/24/2012     Priority: Medium     Health Care Home 06/22/2012     Priority: Medium     [facility] for .  Rosenda Hoyt RN-BSN, [facility]  [phone]   DX V65.8 REPLACED WITH [id number] HEALTH CARE HOME (04/08/2013)       Atrial fibrillation with RVR (H) 06/21/2012      Priority: Medium     Followed by Dr. Cobos, cardiology       Hypertriglyceridemia      Priority: Medium     overweight with BMI >40 11/29/2011     Priority: Medium     Advanced directives, counseling/discussion 11/29/2011     Priority: Low     Advance Directive Problem List Overview:   Name Relationship Phone    Primary Health Care Agent            Alternative Health Care Agent          Discussed advance care planning with patient; information given to patient to review. 11/29/2011       .  Current Outpatient Prescriptions   Medication Sig     albuterol (PROAIR HFA/PROVENTIL HFA/VENTOLIN HFA) 108 (90 BASE) MCG/ACT Inhaler Inhale 2 puffs into the lungs every 6 hours as needed for shortness of breath / dyspnea or wheezing Profile Rx: patient will contact pharmacy when needed     atorvastatin (LIPITOR) 40 MG tablet Take 1 tablet (40 mg) by mouth daily     carvedilol (COREG) 25 MG tablet Take 0.5 tablets (12.5 mg) by mouth 2 times daily (with meals)     fluticasone (FLONASE) 50 MCG/ACT nasal spray Spray 1-2 sprays into both nostrils daily Please inform patient if the OTC would be cheaper for her or if there is an alternative nasal steroid that is less costly, please provide that information.     levothyroxine (SYNTHROID/LEVOTHROID) 75 MCG tablet TAKE 1 TABLET EVERY DAY     loratadine (CLARITIN) 10 MG tablet Take 10 mg by mouth as needed for allergies     losartan (COZAAR) 50 MG tablet Take 1 tablet (50 mg) by mouth daily     torsemide (DEMADEX) 20 MG tablet Take 2 tablets (40 mg) by mouth daily     warfarin (COUMADIN) 2 MG tablet USE AS DIRECTED PER ACC- CURRENT DOSE IS 4 MG (2 TABS) ON MON, WED, AND FRI AND 2MG (1 TAB) ON TUES, THURS, SAT, AND SUN      No current facility-administered medications for this visit.      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= [employer]     Hyperlipidemia      Hypertriglyceridemia      Mitral  valve regurgitation 6/24/2012     Obesity      BROCK (obstructive sleep apnea)      Systolic heart failure (H)      Past Surgical History:   Procedure Laterality Date     HYSTERECTOMY, PAP NO LONGER INDICATED  1989     JOINT REPLACEMTN, KNEE RT/LT  2007    right     SALPINGO OOPHORECTOMY,R/L/BRANDO  2003    Salpingo Oophorectomy, BRANDO     SURGICAL HISTORY OF -   1989    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     Allergies   Allergen Reactions     Lisinopril      cough     Metal [Staples] Other (See Comments)     Metal allergy not clearly specified - patient had a stainless steel screw put in her foot that needed to be removed due to allergy/hypersensitivity      Metoprolol Fatigue     Social History     Social History     Marital status:      Spouse name: N/A     Number of children: N/A     Years of education: N/A     Occupational History     Not on file.     Social History Main Topics     Smoking status: Former Smoker     Packs/day: 0.50     Years: 20.00     Types: Cigarettes     Quit date: 10/1/1982     Smokeless tobacco: Never Used     Alcohol use No      Comment: 1 beers per week     Drug use: No     Sexual activity: Yes     Partners: Male     Other Topics Concern     Parent/Sibling W/ Cabg, Mi Or Angioplasty Before 65f 55m? Yes     Social History Narrative    , [employer].    2 children.    One great grandchildren.        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     Family History   Problem Relation Age of Onset     HEART DISEASE Mother      Cancer Mother      ovarian     Diabetes Father      Musculoskeletal Disorder Father      Diabetes Paternal Grandmother      Diabetes Paternal Grandfather      Diabetes Sister      Thrombophilia Daughter      Factor V Leiden Deficiency          REVIEW OF SYSTEMS:  General: negative, fever, chills, night sweats  Skin: negative, acne, rash and scaling  Eyes: negative, double vision, eye pain and photophobia  Ears/Nose/Throat: negative, nasal congestion and purulent  rhinorrhea  Respiratory: No cough, No hemoptysis and negative  Cardiovascular: negative, palpitations, tachycardia, irregular heart beat, chest pain, exertional chest pain or pressure, paroxysmal nocturnal dyspnea and orthopnea       OBJECTIVE:  Blood pressure 107/66, pulse 117, weight 113.9 kg (251 lb), SpO2 93 %, not currently breastfeeding.  General Appearance: alert, active and no distress  Head: Normocephalic. No masses, lesions, tenderness or abnormalities  Eyes: conjuctiva clear, PERRL, EOM intact  Ears: External ears normal. Canals clear. TM's normal.  Nose: Nares normal  Mouth: normal  Neck: Supple, no cervical adenopathy, no thyromegaly  Lungs: clear to auscultation  Cardiac: Afib. S1 and S2, no murmur         ASSESSMENT/PLAN:  Patient with PAF. Was on Amio. Amio discontined due topersistent afib and Restrictive lung disease.(though had normal DLCO).   Now more SOB.  After stopping Amio,heartrate up above 100. 117 in clinic today.  On 25 BID of Coreg.  Will add Cardizem  mg daily for better rate control.  Will also check an echo for EF.  Continue coumadin.  Per orders.   Return to Clinic 1yr.    Please do not hesitate to contact me if you have any questions/concerns.     Sincerely,     MEL Casey MD

## 2018-08-09 NOTE — PROGRESS NOTES
SUBJECTIVE:  Caty Ng is a 73 year old female who presents for  Follow up.    Asymptomatic PAF. Had one cardioversion and was on Amio. Amio was stopped recently due to persistent Afib. Also has Restrictive Lung disease.    Normal coronary angiogram 2012. LAD calcium score 414 in 2015. No symptoms.    After stopping Amio. Has more SPB.    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 Provider to review       Calcification of coronary artery 07/01/2015     Priority: Medium     Chronic diastolic heart failure (H) 03/06/2014     Priority: Medium     Hyperlipidemia LDL goal <130 11/05/2013     Priority: Medium     BROCK (obstructive sleep apnea) 11/04/2013     Priority: Medium     Patient has chosen to not do cpap.       Atrial fibrillation (H) 10/17/2012     Priority: Medium     Hyperlipidemia      Priority: Medium     Systolic heart failure (H)      Priority: Medium     Followed by Dr. Cobos, cardiology       Mitral valve regurgitation 06/24/2012     Priority: Medium     Health Care Home 06/22/2012     Priority: Medium     FPA [employer] for .  Rosenda Hoyt RN-BSN, [facility]  [phone]   DX V65.8 REPLACED WITH [id number] HEALTH CARE HOME (04/08/2013)       Atrial fibrillation with RVR (H) 06/21/2012     Priority: Medium     Followed by Dr. Cobos, cardiology       Hypertriglyceridemia      Priority: Medium     overweight with BMI >40 11/29/2011     Priority: Medium     Advanced directives, counseling/discussion 11/29/2011     Priority: Low     Advance Directive Problem List Overview:   Name Relationship Phone    Primary Health Care Agent             Alternative Health Care Agent          Discussed advance care planning with patient; information given to patient to review. 11/29/2011       .  Current Outpatient Prescriptions   Medication Sig     albuterol (PROAIR HFA/PROVENTIL HFA/VENTOLIN HFA) 108 (90 BASE) MCG/ACT Inhaler Inhale 2 puffs into the lungs every 6 hours as needed for shortness of breath / dyspnea or wheezing Profile Rx: patient will contact pharmacy when needed     atorvastatin (LIPITOR) 40 MG tablet Take 1 tablet (40 mg) by mouth daily     carvedilol (COREG) 25 MG tablet Take 0.5 tablets (12.5 mg) by mouth 2 times daily (with meals)     fluticasone (FLONASE) 50 MCG/ACT nasal spray Spray 1-2 sprays into both nostrils daily Please inform patient if the OTC would be cheaper for her or if there is an alternative nasal steroid that is less costly, please provide that information.     levothyroxine (SYNTHROID/LEVOTHROID) 75 MCG tablet TAKE 1 TABLET EVERY DAY     loratadine (CLARITIN) 10 MG tablet Take 10 mg by mouth as needed for allergies     losartan (COZAAR) 50 MG tablet Take 1 tablet (50 mg) by mouth daily     torsemide (DEMADEX) 20 MG tablet Take 2 tablets (40 mg) by mouth daily     warfarin (COUMADIN) 2 MG tablet USE AS DIRECTED PER ACC- CURRENT DOSE IS 4 MG (2 TABS) ON MON, WED, AND FRI AND 2MG (1 TAB) ON TUES, THURS, SAT, AND SUN      No current facility-administered medications for this visit.      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= [employer] [facility]     Hyperlipidemia      Hypertriglyceridemia      Mitral valve regurgitation 6/24/2012     Obesity      BROCK (obstructive sleep apnea)      Systolic heart failure (H)      Past Surgical History:   Procedure Laterality Date     HYSTERECTOMY, PAP NO LONGER INDICATED  1989     JOINT REPLACEMTN, KNEE RT/LT  2007    right     SALPINGO OOPHORECTOMY,R/L/BRANDO  2003    Salpingo Oophorectomy, BRANDO     SURGICAL HISTORY OF -    1989    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     Allergies   Allergen Reactions     Lisinopril      cough     Metal [Staples] Other (See Comments)     Metal allergy not clearly specified - patient had a stainless steel screw put in her foot that needed to be removed due to allergy/hypersensitivity      Metoprolol Fatigue     Social History     Social History     Marital status:      Spouse name: N/A     Number of children: N/A     Years of education: N/A     Occupational History     Not on file.     Social History Main Topics     Smoking status: Former Smoker     Packs/day: 0.50     Years: 20.00     Types: Cigarettes     Quit date: 10/1/1982     Smokeless tobacco: Never Used     Alcohol use No      Comment: 1 beers per week     Drug use: No     Sexual activity: Yes     Partners: Male     Other Topics Concern     Parent/Sibling W/ Cabg, Mi Or Angioplasty Before 65f 55m? Yes     Social History Narrative    , [place].    2 children.    One great grandchildren.        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     Family History   Problem Relation Age of Onset     HEART DISEASE Mother      Cancer Mother      ovarian     Diabetes Father      Musculoskeletal Disorder Father      Diabetes Paternal Grandmother      Diabetes Paternal Grandfather      Diabetes Sister      Thrombophilia Daughter      Factor V Leiden Deficiency          REVIEW OF SYSTEMS:  General: negative, fever, chills, night sweats  Skin: negative, acne, rash and scaling  Eyes: negative, double vision, eye pain and photophobia  Ears/Nose/Throat: negative, nasal congestion and purulent rhinorrhea  Respiratory: No cough, No hemoptysis and negative  Cardiovascular: negative, palpitations, tachycardia, irregular heart beat, chest pain, exertional chest pain or pressure, paroxysmal nocturnal dyspnea and orthopnea       OBJECTIVE:  Blood pressure 107/66, pulse 117, weight 113.9 kg (251 lb), SpO2 93 %, not currently breastfeeding.  General Appearance: alert,  active and no distress  Head: Normocephalic. No masses, lesions, tenderness or abnormalities  Eyes: conjuctiva clear, PERRL, EOM intact  Ears: External ears normal. Canals clear. TM's normal.  Nose: Nares normal  Mouth: normal  Neck: Supple, no cervical adenopathy, no thyromegaly  Lungs: clear to auscultation  Cardiac: Afib. S1 and S2, no murmur         ASSESSMENT/PLAN:  Patient with PAF. Was on Amio. Amio discontined due topersistent afib and Restrictive lung disease.(though had normal DLCO).   Now more SOB.  After stopping Amio,heartrate up above 100. 117 in clinic today.  On 25 BID of Coreg.  Will add Cardizem  mg daily for better rate control.  Will also check an echo for EF.  Continue coumadin.  Per orders.   Return to Clinic 1yr.

## 2018-08-09 NOTE — PATIENT INSTRUCTIONS
Thank you for coming to the [facility]; please note the following instructions:    1.Cardizem CD ( Diltiazem) 120 mg daily   2.ECHO   3.1 year follow-up, clinic will call as time gets closer         If you have any questions regarding your visit please contact your care team:     Cardiology  Telephone Number   Rajani RYAN, RN  Hansel ROUSE, RN   Mela LUNA, LUKAS DESOUZA MA   [phone]    *After hours: [phone]   For scheduling appts:     [phone] or    [phone] (select option 1)    *After hours: [phone]     For the Device Clinic (Pacemakers and ICD's)  RN's :  Maricruz Chaudhari   During business hours: [phone]    *After business hours:  [phone] (select option 4)      Normal test result notifications will be released via [employer] or mailed within 7 business days.  All other test results, will be communicated via telephone once reviewed by your cardiologist.    If you need a medication refill please contact your pharmacy.  Please allow 3 business days for your refill to be completed.    As always, thank you for trusting us with your health care needs!    Diltiazem tablets  Brand Name: Cardizem  What is this medicine?  DILTIAZEM (dil GURJIT a zem) is a calcium-channel blocker. It affects the amount of calcium found in your heart and muscle cells. This relaxes your blood vessels, which can reduce the amount of work the heart has to do. This medicine is used to treat chest pain caused by angina.  How should I use this medicine?  Take this medicine by mouth with a glass of water. Follow the directions on the prescription label. Do not cut, crush or chew this medicine. This medicine is usually taken before meals and at bedtime. Take your doses at regular intervals. Do not take your medicine more often then directed. Do not stop taking except on the advice of your doctor or health care professional.  Talk to your pediatrician regarding the use of this medicine in  children. Special care may be needed.  What side effects may I notice from receiving this medicine?  Side effects that you should report to your doctor or health care professional as soon as possible:    allergic reactions like skin rash, itching or hives, swelling of the face, lips, or tongue    confusion, mental depression    feeling faint or lightheaded, falls    pinpoint red spots on the skin    redness, blistering, peeling or loosening of the skin, including inside the mouth    slow, irregular heartbeat    swelling of the ankles, feet    unusual bleeding or bruising  Side effects that usually do not require medical attention (report to your doctor or health care professional if they continue or are bothersome):    change in sex drive or performance    constipation or diarrhea    flushing of the face    headache    nausea, vomiting    tired or weak    trouble sleeping  What may interact with this medicine?  Do not take this medicine with any of the following:    cisapride    hawthorn    pimozide    ranolazine    red yeast rice  This medicine may also interact with the following medications:    buspirone    carbamazepine    cimetidine    cyclosporine    digoxin    local anesthetics or general anesthetics    lovastatin    medicines for anxiety or difficulty sleeping like midazolam and triazolam    medicines for high blood pressure or heart problems    quinidine    rifampin, rifabutin, or rifapentine  What if I miss a dose?  If you miss a dose, take it as soon as you can. If it is almost time for your next dose, take only that dose. Do not take double or extra doses.  Where should I keep my medicine?  Keep out of the reach of children.  Store at room temperature between 20 and 25 degrees C (68 and 77 degrees F). Protect from light. Keep container tightly closed. Throw away any unused medicine after the expiration date.  What should I tell my health care provider before I take this medicine?  They need to know if  you have any of these conditions:    heart problems, low blood pressure, irregular heartbeat    liver disease    previous heart attack    an unusual or allergic reaction to diltiazem, other medicines, foods, dyes, or preservatives    pregnant or trying to get pregnant    breast-feeding  What should I watch for while using this medicine?  Check your blood pressure and pulse rate regularly. Ask your doctor or health care professional what your blood pressure and pulse rate should be and when you should contact him or her.  You may feel dizzy or lightheaded. Do not drive, use machinery, or do anything that needs mental alertness until you know how this medicine affects you. To reduce the risk of dizzy or fainting spells, do not sit or stand up quickly, especially if you are an older patient. Alcohol can make you more dizzy or increase flushing and rapid heartbeats. Avoid alcoholic drinks.  NOTE:This sheet is a summary. It may not cover all possible information. If you have questions about this medicine, talk to your doctor, pharmacist, or health care provider. Copyright  2018 [employer]        Diltiazem tablets  Brand Name: Cardizem  What is this medicine?  DILTIAZEM (dil GURJIT a zem) is a calcium-channel blocker. It affects the amount of calcium found in your heart and muscle cells. This relaxes your blood vessels, which can reduce the amount of work the heart has to do. This medicine is used to treat chest pain caused by angina.  How should I use this medicine?  Take this medicine by mouth with a glass of water. Follow the directions on the prescription label. Do not cut, crush or chew this medicine. This medicine is usually taken before meals and at bedtime. Take your doses at regular intervals. Do not take your medicine more often then directed. Do not stop taking except on the advice of your doctor or health care professional.  Talk to your pediatrician regarding the use of this medicine in children. Special care may  be needed.  What side effects may I notice from receiving this medicine?  Side effects that you should report to your doctor or health care professional as soon as possible:    allergic reactions like skin rash, itching or hives, swelling of the face, lips, or tongue    confusion, mental depression    feeling faint or lightheaded, falls    pinpoint red spots on the skin    redness, blistering, peeling or loosening of the skin, including inside the mouth    slow, irregular heartbeat    swelling of the ankles, feet    unusual bleeding or bruising  Side effects that usually do not require medical attention (report to your doctor or health care professional if they continue or are bothersome):    change in sex drive or performance    constipation or diarrhea    flushing of the face    headache    nausea, vomiting    tired or weak    trouble sleeping  What may interact with this medicine?  Do not take this medicine with any of the following:    cisapride    hawthorn    pimozide    ranolazine    red yeast rice  This medicine may also interact with the following medications:    buspirone    carbamazepine    cimetidine    cyclosporine    digoxin    local anesthetics or general anesthetics    lovastatin    medicines for anxiety or difficulty sleeping like midazolam and triazolam    medicines for high blood pressure or heart problems    quinidine    rifampin, rifabutin, or rifapentine  What if I miss a dose?  If you miss a dose, take it as soon as you can. If it is almost time for your next dose, take only that dose. Do not take double or extra doses.  Where should I keep my medicine?  Keep out of the reach of children.  Store at room temperature between 20 and 25 degrees C (68 and 77 degrees F). Protect from light. Keep container tightly closed. Throw away any unused medicine after the expiration date.  What should I tell my health care provider before I take this medicine?  They need to know if you have any of these  conditions:    heart problems, low blood pressure, irregular heartbeat    liver disease    previous heart attack    an unusual or allergic reaction to diltiazem, other medicines, foods, dyes, or preservatives    pregnant or trying to get pregnant    breast-feeding  What should I watch for while using this medicine?  Check your blood pressure and pulse rate regularly. Ask your doctor or health care professional what your blood pressure and pulse rate should be and when you should contact him or her.  You may feel dizzy or lightheaded. Do not drive, use machinery, or do anything that needs mental alertness until you know how this medicine affects you. To reduce the risk of dizzy or fainting spells, do not sit or stand up quickly, especially if you are an older patient. Alcohol can make you more dizzy or increase flushing and rapid heartbeats. Avoid alcoholic drinks.  NOTE:This sheet is a summary. It may not cover all possible information. If you have questions about this medicine, talk to your doctor, pharmacist, or health care provider. Copyright  2018 [employer]

## 2018-08-17 ENCOUNTER — RADIANT APPOINTMENT (OUTPATIENT)
Dept: CARDIOLOGY | Facility: CLINIC | Age: 73
End: 2018-08-17
Attending: INTERNAL MEDICINE
Payer: COMMERCIAL

## 2018-08-17 DIAGNOSIS — I48.19 PERSISTENT ATRIAL FIBRILLATION (H): ICD-10-CM

## 2018-08-17 PROCEDURE — 40000264 ZZHC STATISTIC IV PUSH SINGLE INITIAL SUBSTANCE: Performed by: INTERNAL MEDICINE

## 2018-08-17 PROCEDURE — 93306 TTE W/DOPPLER COMPLETE: CPT | Mod: GC | Performed by: INTERNAL MEDICINE

## 2018-08-17 RX ADMIN — Medication 3 ML: at 09:45

## 2018-08-17 NOTE — NURSING NOTE
Patient presents today for resting echo ordered by MD.     Echo Tech provided patient education about resting echo. IV started in left hand.   IV start documented in  [employer].  Echo technician completed resting echo. Patient monitored according to Opt[employer] protocol. Data transferred to [facility] for final interpretation through [employer].     Optison medication:  Amount used 3ml Optison mixed with 6ml Saline - [id number].  6ml Wasted.   After completion of resting echo, IV removed.    Patient education provided about cardiology interpretation and primary provider will be notified of results.    Aurelia Wilcox RDCS

## 2018-08-19 DIAGNOSIS — Z79.01 LONG-TERM (CURRENT) USE OF ANTICOAGULANTS, INR GOAL 2.0-3.0: ICD-10-CM

## 2018-08-20 RX ORDER — WARFARIN SODIUM 2 MG/1
TABLET ORAL
Qty: 130 TABLET | Refills: 3 | Status: SHIPPED | OUTPATIENT
Start: 2018-08-20 | End: 2019-09-11

## 2018-08-22 ENCOUNTER — TELEPHONE (OUTPATIENT)
Dept: CARDIOLOGY | Facility: CLINIC | Age: 73
End: 2018-08-22

## 2018-08-22 DIAGNOSIS — R06.02 SOB (SHORTNESS OF BREATH): Primary | ICD-10-CM

## 2018-08-22 DIAGNOSIS — J98.59 MEDIASTINAL MASS: ICD-10-CM

## 2018-08-22 NOTE — TELEPHONE ENCOUNTER
Patient calling. She had an echo done. Please call to discuss the results.     She also started a new medication. Cardizem. When is this to start working?  Please call and let know.

## 2018-08-22 NOTE — TELEPHONE ENCOUNTER
Spoke to patient and reviewed echo results.  Patient states that she started the diltiazem 120 mg daily on 8/11 and she does not feel any different.  She did take her heart rate yesterday and it is 112 bpm.  When she first started the diltiazem she felt lightheaded and dizzy, so the next day she took it at bedtime and she has not felt dizzy since.      Advised for patient to continue to monitor her heart rate and blood pressure throughout the weekend and call on Monday with details.  Explained the next step is to probably increase the diltiazem.  Patient verballized understanding and will call MOnday with an update.    Rajani Stout, RN  Care Coordinator  [facility]  [phone]

## 2018-08-28 NOTE — TELEPHONE ENCOUNTER
Patient is calling back regarding previous message and would like to speak with Rajani to update on heart medication. Please call.

## 2018-08-29 RX ORDER — DILTIAZEM HCL 60 MG
60 TABLET ORAL 4 TIMES DAILY
Qty: 90 TABLET | Refills: 1 | Status: CANCELLED | OUTPATIENT
Start: 2018-08-29

## 2018-08-29 NOTE — TELEPHONE ENCOUNTER
Date: 8/29/2018    Time of Call: 12:10 PM     Diagnosis:  SOB     [ VORB ] Ordering provider: Dr. Whitlock  Order: CT of the chest with/wothout contrast and add another 60mg pill of cardizem     Order received by: Rajani Stout RN       Follow-up/additional notes: CT ordered. Rx jorge'd up.  Confirm pharmacy, please.  She can have this test at [facility]. Left message for patient to call clinic.  Rajani Stout RN

## 2018-08-29 NOTE — TELEPHONE ENCOUNTER
"Spoke to patient who is c/o increased shortness of breath since she saw Dr. Whitlock on 8/9/18.  Diltiazem 120 mg daily was started and has helped her heart rate go down but it is still ranging from 102-108 bpm.  She is now using her rescue inhaler daily and this does help with her SOB.  States \"my shortness of breath has worsened since I started Diltiazem\".  She also has an ongoing cough but this has worsened also.  Her shortness of breath is only present with exertion.  Denies orthopnea.  Has been watching the salt in her diet.  Weighs herself daily and typically between 145-150 lbs.  Uncertain if this is lung related versus heart or a combo.  Hx of persistent afib and heart failure.  Most recent echo shows an EF of 45-50% on 8/17/18.    Patient is talking in full, complete, understandable sentences but is constantly coughing and apologizing due to the cough interrupting conversation.    Routed to Dr. Whitlock to advise.  She has also seen Dr. Rob who did discuss possibly starting spironolactone in the future.      Rajani Stout, RN  Care Coordinator  [facility]  [phone]      "

## 2018-09-04 ENCOUNTER — MYC MEDICAL ADVICE (OUTPATIENT)
Dept: CARDIOLOGY | Facility: CLINIC | Age: 73
End: 2018-09-04

## 2018-09-04 DIAGNOSIS — I48.91 ATRIAL FIBRILLATION (H): Primary | ICD-10-CM

## 2018-09-05 RX ORDER — DILTIAZEM HYDROCHLORIDE 180 MG/1
180 CAPSULE, COATED, EXTENDED RELEASE ORAL DAILY
Qty: 90 CAPSULE | Refills: 3 | Status: SHIPPED | OUTPATIENT
Start: 2018-09-05 | End: 2018-09-20

## 2018-09-05 NOTE — TELEPHONE ENCOUNTER
Spoke to patient via phone and reviewed Dr. Whitlock's recommendations.  Patient states that she reviewed the [employer] message and the CT of the chest is scheduled for this Friday.  Rx for the increased dose of Diltiazem sent to preferred pharmacy.  Rajani Stout RN

## 2018-09-07 ENCOUNTER — RADIANT APPOINTMENT (OUTPATIENT)
Dept: CT IMAGING | Facility: CLINIC | Age: 73
End: 2018-09-07
Attending: INTERNAL MEDICINE
Payer: COMMERCIAL

## 2018-09-07 DIAGNOSIS — R06.02 SOB (SHORTNESS OF BREATH): ICD-10-CM

## 2018-09-07 DIAGNOSIS — J98.59 MEDIASTINAL MASS: ICD-10-CM

## 2018-09-07 PROCEDURE — 82565 ASSAY OF CREATININE: CPT

## 2018-09-07 PROCEDURE — 71260 CT THORAX DX C+: CPT | Mod: TC

## 2018-09-07 RX ORDER — IOPAMIDOL 755 MG/ML
80 INJECTION, SOLUTION INTRAVASCULAR ONCE
Status: COMPLETED | OUTPATIENT
Start: 2018-09-07 | End: 2018-09-07

## 2018-09-07 RX ADMIN — IOPAMIDOL 80 ML: 755 INJECTION, SOLUTION INTRAVASCULAR at 12:00

## 2018-09-10 ENCOUNTER — RADIANT APPOINTMENT (OUTPATIENT)
Dept: MAMMOGRAPHY | Facility: CLINIC | Age: 73
End: 2018-09-10
Attending: FAMILY MEDICINE
Payer: COMMERCIAL

## 2018-09-10 DIAGNOSIS — Z12.31 VISIT FOR SCREENING MAMMOGRAM: ICD-10-CM

## 2018-09-10 LAB
CREAT BLD-MCNC: 0.9 MG/DL (ref 0.5–1.2)
GFR SERPL CREATININE-BSD FRML MDRD: 64 ML/MIN/{1.73_M2}
GFRB: 62

## 2018-09-10 PROCEDURE — 77063 BREAST TOMOSYNTHESIS BI: CPT | Mod: TC

## 2018-09-10 PROCEDURE — 77067 SCR MAMMO BI INCL CAD: CPT | Mod: TC

## 2018-09-13 ENCOUNTER — ANTICOAGULATION THERAPY VISIT (OUTPATIENT)
Dept: NURSING | Facility: CLINIC | Age: 73
End: 2018-09-13
Payer: COMMERCIAL

## 2018-09-13 ENCOUNTER — TELEPHONE (OUTPATIENT)
Dept: CARDIOLOGY | Facility: CLINIC | Age: 73
End: 2018-09-13

## 2018-09-13 DIAGNOSIS — Z79.01 LONG TERM CURRENT USE OF ANTICOAGULANT THERAPY: ICD-10-CM

## 2018-09-13 DIAGNOSIS — I48.91 ATRIAL FIBRILLATION (H): ICD-10-CM

## 2018-09-13 LAB — INR POINT OF CARE: 2 (ref 0.86–1.14)

## 2018-09-13 PROCEDURE — 36416 COLLJ CAPILLARY BLOOD SPEC: CPT

## 2018-09-13 PROCEDURE — 99207 ZZC NO CHARGE NURSE ONLY: CPT

## 2018-09-13 PROCEDURE — 85610 PROTHROMBIN TIME: CPT | Mod: QW

## 2018-09-13 NOTE — TELEPHONE ENCOUNTER
Patient called clinic to report HR and blood pressure after increasing  Diltiazem to 180 mg daily per Dr Whitlock's new order. Patient started medication on 09.10    Patient reported feeling better with the increase. Writer advised patient to continue to monitor Heart rate and to  Call clinic back next Thursday to report.    Readings reported per patient:    09.11: HR/110,BP: 116/74  09.12: ,BP: 123/73  09.13: HR/101,BP: 110/71  Patient agreeable with the plan and asked to call clinic with any questions or concerns.    Nela Frank RN

## 2018-09-13 NOTE — MR AVS SNAPSHOT
Caty Ng   9/13/2018 9:45 AM   Anticoagulation Therapy Visit    Description:  73 year old female   Provider:  CHRISSIE MANRIQUE   Department:  [facility] Nurse           INR as of 9/13/2018     Today's INR 2.0      Anticoagulation Summary as of 9/13/2018     INR goal 2.0-3.0   Today's INR 2.0   Full warfarin instructions 1 mg on Tue, Fri; 2 mg all other days   Next INR check 10/25/2018    Indications   Atrial fibrillation (H) [I48.91]  Long term current use of anticoagulant therapy [Z79.01]         Your next Anticoagulation Clinic appointment(s)     Oct 25, 2018  9:45 AM CDT   Anticoagulation Visit with [facility] ANTI COAG   [facility]    [address] [place] [id number] [phone]              Contact Numbers     Please call [phone] to cancel and/or reschedule your appointment.  Please call [phone] with any problems or questions regarding your therapy          September 2018 Details    Sun Mon Tue Wed Thu Fri Sat           1                 2               3               4               5               6               7               8                 9               10               11               12               13      2 mg   See details      14      1 mg         15      2 mg           16      2 mg         17      2 mg         18      1 mg         19      2 mg         20      2 mg         21      1 mg         22      2 mg           23      2 mg         24      2 mg         25      1 mg         26      2 mg         27      2 mg         28      1 mg         29      2 mg           30      2 mg                Date Details   09/13 This INR check               How to take your warfarin dose     To take:  1 mg Take 0.5 of a 2 mg tablet.    To take:  2 mg Take 1 of the 2 mg tablets.           October 2018 Details    Sun Mon Tue Wed Thu Fri Sat      1      2 mg         2      1 mg         3      2 mg         4      2 mg         5      1 mg          6      2 mg           7      2 mg         8      2 mg         9      1 mg         10      2 mg         11      2 mg         12      1 mg         13      2 mg           14      2 mg         15      2 mg         16      1 mg         17      2 mg         18      2 mg         19      1 mg         20      2 mg           21      2 mg         22      2 mg         23      1 mg         24      2 mg         25            26               27                 28               29               30               31                   Date Details   No additional details    Date of next INR:  10/25/2018         How to take your warfarin dose     To take:  1 mg Take 0.5 of a 2 mg tablet.    To take:  2 mg Take 1 of the 2 mg tablets.

## 2018-09-13 NOTE — PROGRESS NOTES
ANTICOAGULATION FOLLOW-UP CLINIC VISIT    Patient Name:  Caty Ng  Date:  9/13/2018  Contact Type:  Face to Face    SUBJECTIVE:     Patient Findings     Positives No Problem Findings           OBJECTIVE    INR Protime   Date Value Ref Range Status   09/13/2018 2.0 (A) 0.86 - 1.14 Final       ASSESSMENT / PLAN  INR assessment THER    Recheck INR In: 6 WEEKS    INR Location Clinic      Anticoagulation Summary as of 9/13/2018     INR goal 2.0-3.0   Today's INR 2.0   Warfarin maintenance plan 1 mg (2 mg x 0.5) on Tue, Fri; 2 mg (2 mg x 1) all other days   Full warfarin instructions 1 mg on Tue, Fri; 2 mg all other days   Weekly warfarin total 12 mg   No change documented Adiel Boyle RN   Plan last modified Madhuri Renae RN (1/23/2017)   Next INR check 10/25/2018   Priority INR   Target end date Indefinite    Indications   Atrial fibrillation (H) [I48.91]  Long term current use of anticoagulant therapy [Z79.01]         Anticoagulation Episode Summary     IN[facility] check [facility]    Preferred lab [facility]    Send INR reminders to [facility]    Comments       Anticoagulation Care Providers     Provider Role Specialty Phone number    Avis Ferrer MD  Family Practice [phone]            See the Encounter Report to view Anticoagulation Flowsheet and Dosing Calendar (Go to Encounters tab in chart review, and find the Anticoagulation Therapy Visit)        Adiel Boyle, WILMA

## 2018-09-20 ENCOUNTER — TELEPHONE (OUTPATIENT)
Dept: CARDIOLOGY | Facility: CLINIC | Age: 73
End: 2018-09-20

## 2018-09-20 DIAGNOSIS — I48.20 CHRONIC ATRIAL FIBRILLATION (H): ICD-10-CM

## 2018-09-20 RX ORDER — DILTIAZEM HYDROCHLORIDE 120 MG/1
240 CAPSULE, COATED, EXTENDED RELEASE ORAL DAILY
Qty: 60 CAPSULE | Refills: 0
Start: 2018-09-20 | End: 2019-02-25

## 2018-09-20 NOTE — TELEPHONE ENCOUNTER
Patient waited 1 more week on Diltiazem 180 mg daily. Called today to report blood pressure and heart rates :     09.13: 119/77/  09.15: 129/77/Hr 102,131/78,HR:109  09.17: 118/71/HR 92  09:19: 117/68, HR:96   09.20: 125/83/HR: 102    Per Dr Whitlock the goal is to keep patient Heart rate below 100. Patient agreed to increase Diltiazem per Dr Whitlock order.   Date: 9/20/2018    Time of Call: 3:16 PM     Diagnosis:  Atrial fibrillation     [ VORB ] Ordering provider: Dr Whitlock  Order: Diltiazem 240 mg daily     Order received by: Nela Frank RN     Follow-up/additional notes: No script was sent to pharmacy patient stated having 120 mg supply at home. Writer advised patient to take 2 capsules equal 240 mg daily, and to call clinic in 1-2 weeks to report Blood pressure readings and heart heart rates.Medication list updated.    Nela Frank RN

## 2018-09-20 NOTE — TELEPHONE ENCOUNTER
----- Message from Nela Frank RN sent at 9/13/2018  2:43 PM CDT -----      ----- Message -----     From: MEL Casey MD     Sent: 9/13/2018   2:02 PM       To: Nela Frank RN    Her heartrate is not still controlled.  We can increase cardizem to 240 CD to 240 daily.  ----- Message -----     From: Nela Frank RN     Sent: 9/13/2018   1:18 PM       To: MEL Casey MD, Rajani Stout RN    Hello Dr Whitlock,    Patient called to report Heart rate after increasing Diltiazem to 180 mg daily. She started new dosage on the 09.10 and stated feeling better after the increase.    09.11: HR/110,BP: 116/74  09.12: ,BP: 123/73  09.13: HR/101,BP: 110/71  I advised her to continue to monitor Heart rate and to call next Thursday to report. Thank you !    Nela

## 2018-09-28 NOTE — TELEPHONE ENCOUNTER
Patient called to report blood pressure and heart rate readings for 1 week after starting Diltiazem 240 mg daily per Dr Whitlock's new order. The goal is to keep the heart rate below 100 bpm. Patient reported feeling good.Writer will send a message to Dr Whitlock with readings. In the meantime patient was instructed to continue to monitor blood pressure and pulse at least twice a week and to call clinic with any questions or concerns.    Patient verbalized understanding and in agreement with plan.  09.20  125/85; 102  09.21  105/70; 94  09.22  117/78; 86  09.23  110/73; 93  09.24  134/85; 105  09.25  105/68; 94  09.26  110/71; 102  09.27  105/65; 97    Nela Frank RN

## 2018-10-04 RX ORDER — DILTIAZEM HYDROCHLORIDE 240 MG/1
240 CAPSULE, COATED, EXTENDED RELEASE ORAL DAILY
Qty: 90 CAPSULE | Refills: 3 | Status: SHIPPED | OUTPATIENT
Start: 2018-10-04 | End: 2019-10-09

## 2018-10-04 NOTE — TELEPHONE ENCOUNTER
"Discussed with Dr. Whitlock.  Per Dr. Whitlock, continue same regimen if she feels well.  Patient informed and verbalized understanding.  Patient also states that her heart rate was 89 bpm today.  She did complain of feeling tired, more than usual.  Also states she is experiencing constipation.  Her last BM was this morning \"but it takes a while\".  Writer discussed her fatigue may be caused by her increase dose of diltiazem and her heart rate being lower and if it continues to let us know but to increase exercise (she has a treadmill at home) to see if that helps her energy level.  Also advised for the patient to take a daily dose a metamucil for fiber intake to help with her constipation.  Patient is agreeable with the plan and verbalized understanding.    Rajani Stout RN  Care Coordinator  [facility] [[email or web address]] [facility]  [phone]      "

## 2018-10-11 ENCOUNTER — ALLIED HEALTH/NURSE VISIT (OUTPATIENT)
Dept: NURSING | Facility: CLINIC | Age: 73
End: 2018-10-11
Payer: COMMERCIAL

## 2018-10-11 DIAGNOSIS — Z23 NEED FOR PROPHYLACTIC VACCINATION AND INOCULATION AGAINST INFLUENZA: Primary | ICD-10-CM

## 2018-10-11 PROCEDURE — 90662 IIV NO PRSV INCREASED AG IM: CPT

## 2018-10-11 PROCEDURE — G0008 ADMIN INFLUENZA VIRUS VAC: HCPCS

## 2018-10-11 PROCEDURE — 99207 ZZC NO CHARGE NURSE ONLY: CPT

## 2018-10-11 NOTE — MR AVS SNAPSHOT
After Visit Summary   2/1/2018    Caty Ng    MRN: [id number]           Patient Information     Date Of Birth          1945        Visit Information        Provider Department      2/1/2018 11:00 AM MEL Casey MD [facility]        Today's Diagnoses     Chronic diastolic heart failure (H)    -  1    Atrial fibrillation (H)          Care Instructions    Thank you for coming to the [facility]; please note the following instructions:    1. Echo and zio appts scheduled. See attatched sheet for dates and times.    2. Follow up will be based on your results.         If you have any questions regarding your visit please contact your care team:     Cardiology  Telephone Number   Rajani RYAN, RN  Hansel ROUSE, RN   Mela LUNA, KRISTY GALLEGOS, ZUHAIR DESOUZA, MA   [phone]    *After hours: [phone]   For scheduling appts:     [phone] or    [phone] (select option 1)    *After hours: [phone]     For the Device Clinic (Pacemakers and ICD's)  RN's :  Maricruz Chaudhari   During business hours: [phone]    *After business hours:  [phone] (select option 4)      Normal test result notifications will be released via Sojern or mailed within 7 business days.  All other test results, will be communicated via telephone once reviewed by your cardiologist.    If you need a medication refill please contact your pharmacy.  Please allow 3 business days for your refill to be completed.    As always, thank you for trusting us with your health care needs!            Follow-ups after your visit        Your next 10 appointments already scheduled     Feb 05, 2018  9:00 AM CST   Ech Complete with [id number]   [facility] [place] ([facility])    [address] [place] [id number] [phone]           1. Please bring or wear a comfortable two-piece  Sent, thanks    outfit. 2. You may eat, drink and take your normal medicines. 3. For any questions that cannot be answered, please contact the ordering physician            Feb 05, 2018 10:00 AM CST   Nurse Only with FK ANCILLARY CARDS   [facility] AT [facility] ([facility])    [address] [place] [address] [address]nd [address]  [place] [address]   [phone]            Mar 15, 2018  9:45 AM CDT   Anticoagulation Visit with NE ANTI COAG   [facility]    [address] [place] [id number] [phone]              Future tests that were ordered for you today     Open Future Orders        Priority Expected Expires Ordered    Echocardiogram Complete Routine  2/1/2019 2/1/2018    Ziopatch Holter Monitor - Adult Routine  4/2/2018 2/1/2018            Who to contact     If you have questions or need follow up information about today's clinic visit or your schedule please contact [facility] AT [facility] directly at [phone].  Normal or non-critical lab and imaging results will be communicated to you by Digitalsmithshart, letter or phone within 4 business days after the clinic has received the results. If you do not hear from us within 7 days, please contact the clinic through UberMediat or phone. If you have a critical or abnormal lab result, we will notify you by phone as soon as possible.  Submit refill requests through FireHost or call your pharmacy and they will forward the refill request to us. Please allow 3 business days for your refill to be completed.          Additional Information About Your Visit        FireHost Information     FireHost gives you secure access to your electronic health record. If you see a primary care provider, you can also send messages to your care team and make appointments. If you have questions, please call your primary care clinic.  If you do not have a primary care provider, please  call [phone] and they will assist you.        Care EveryWhere ID     This is your Care EveryWhere ID. This could be used by other organizations to access your [place] medical records  [id number]        Your Vitals Were     Pulse Pulse Oximetry BMI (Body Mass Index)             72 98% 43.47 kg/m2          Blood Pressure from Last 3 Encounters:   02/01/18 138/73   01/08/18 122/70   03/24/17 130/74    Weight from Last 3 Encounters:   02/01/18 114.9 kg (253 lb 4 oz)   01/08/18 114.3 kg (252 lb)   03/24/17 114.3 kg (252 lb)               Primary Care Provider Office Phone # Fax #    Avis Ferrer [phone]       [address] [facility] [address]        Equal Access to Services     POLLY ROJO : Hadii sophie purvis hadasho Soomaali, waaxda luqadaha, qaybta kaalmada adeluzmariayada, ayana larson . So [facility] [phone].    ATENCIÓN: Si habla español, tiene a castro disposición servicios gratuitos de asistencia lingüística. Julio C al [phone].    We comply with applicable federal civil rights laws and [place] laws. We do not discriminate on the basis of race, color, national origin, age, disability, sex, sexual orientation, or gender identity.            Thank you!     Thank you for choosing [facility] PHYSICIANS HEART AT [facility]  for your care. Our goal is always to provide you with excellent care. Hearing back from our patients is one way we can continue to improve our services. Please take a few minutes to complete the written survey that you may receive in the mail after your visit with us. Thank you!             Your Updated Medication List - Protect others around you: Learn how to safely use, store and throw away your medicines at www.disposemymeds.org.          This list is accurate as of 2/1/18 11:24 AM.  Always use your most recent med list.                   Brand Name Dispense Instructions for use Diagnosis    albuterol 108 (90 BASE)  MCG/ACT Inhaler    PROAIR HFA/PROVENTIL HFA/VENTOLIN HFA    1 Inhaler    Inhale 2 puffs into the lungs every 6 hours as needed for shortness of breath / dyspnea or wheezing Profile Rx: patient will contact pharmacy when needed    Acute bronchospasm       amiodarone 200 MG tablet    PACERONE/CODARONE    90 tablet    Take 1 tablet (200 mg) by mouth daily    Paroxysmal atrial fibrillation (H)       atorvastatin 40 MG tablet    LIPITOR    90 tablet    Take 1 tablet (40 mg) by mouth daily    Calcification of coronary artery       carvedilol 25 MG tablet    COREG    90 tablet    Take 0.5 tablets (12.5 mg) by mouth 2 times daily (with meals)    Chronic diastolic heart failure (H), Atrial fibrillation (H)       fluticasone 50 MCG/ACT spray    FLONASE    16 g    Spray 1-2 sprays into both nostrils daily Please inform patient if the OTC would be cheaper for her or if there is an alternative nasal steroid that is less costly, please provide that information.    Post-nasal drainage, Cough       levothyroxine 75 MCG tablet    SYNTHROID/LEVOTHROID    90 tablet    TAKE 1 TABLET EVERY DAY    Hypothyroidism       loratadine 10 MG tablet    CLARITIN     Take 10 mg by mouth as needed for allergies        * losartan 50 MG tablet    COZAAR    90 tablet    Take 1 tablet (50 mg) by mouth daily    Chronic diastolic heart failure (H)       * losartan 50 MG tablet    COZAAR    5 tablet    Take 1 tablet (50 mg) by mouth daily    Chronic diastolic heart failure (H)       torsemide 20 MG tablet    DEMADEX    180 tablet    Take 2 tablets (40 mg) by mouth daily    Acute on chronic diastolic congestive heart failure (H)       warfarin 2 MG tablet    COUMADIN    130 tablet    USE AS DIRECTED PER ACC- CURRENT DOSE IS 4 MG (2 TABS) ON MON, WED, AND FRI AND 2MG (1 TAB) ON TUES, THURS, SAT, AND SUN    Long-term (current) use of anticoagulants, INR goal 2.0-3.0       * Notice:  This list has 2 medication(s) that are the same as other medications  prescribed for you. Read the directions carefully, and ask your doctor or other care provider to review them with you.

## 2018-10-11 NOTE — PROGRESS NOTES
Injectable Influenza Immunization Documentation    1.  Is the person to be vaccinated sick today?   No    2. Does the person to be vaccinated have an allergy to a component   of the vaccine?   No  Egg Allergy Algorithm Link    3. Has the person to be vaccinated ever had a serious reaction   to influenza vaccine in the past?   No    4. Has the person to be vaccinated ever had Guillain-Barré syndrome?   No    Shinrix not given, she will check with her insurance company.         Form completed by Joycelyn Gan CMA

## 2018-10-11 NOTE — MR AVS SNAPSHOT
After Visit Summary   10/11/2018    Caty Ng    MRN: [id number]           Patient Information     Date Of Birth          1945        Visit Information        Provider Department      10/11/2018 11:30 AM NE ANCILLARY [facility]        Today's Diagnoses     Need for prophylactic vaccination and inoculation against influenza    -  1       Follow-ups after your visit        Your next 10 appointments already scheduled     Oct 25, 2018  9:40 AM CDT   Anticoagulation Visit with NE ANTI COAG   [facility]    [address] [place] [id number] [phone]              Who to contact     If you have questions or need follow up information about today's clinic visit or your schedule please contact [facility] directly at [phone].  Normal or non-critical lab and imaging results will be communicated to you by MyChart, letter or phone within 4 business days after the clinic has received the results. If you do not hear from us within 7 days, please contact the clinic through MyChart or phone. If you have a critical or abnormal lab result, we will notify you by phone as soon as possible.  Submit refill requests through Miralupa or call your pharmacy and they will forward the refill request to us. Please allow 3 business days for your refill to be completed.          Additional Information About Your Visit        MyChart Information     Miralupa gives you secure access to your electronic health record. If you see a primary care provider, you can also send messages to your care team and make appointments. If you have questions, please call your primary care clinic.  If you do not have a primary care provider, please call [phone] and they will assist you.        Care EveryWhere ID     This is your Care EveryWhere ID. This could be used by other organizations to access your [facility]  records  [id number]         Blood Pressure from Last 3 Encounters:   08/09/18 107/66   05/04/18 122/84   02/01/18 138/73    Weight from Last 3 Encounters:   08/09/18 251 lb (113.9 kg)   05/04/18 252 lb 9.6 oz (114.6 kg)   02/01/18 253 lb 4 oz (114.9 kg)              We Performed the Following     ADMIN INFLUENZA (For MEDICARE Patients ONLY) []     FLU VACCINE, INCREASED ANTIGEN, PRESV FREE, AGE 65+ [08317]        Primary Care Provider Office Phone # Fax #    Ibrahima Moura [phone]       [address] [facility] [address]        Equal Access to Services     POLLY ROJO : Padmini jean-baptisteo Soneda, waaxda luqadaha, qaybta kaalmada adeegyada, ayana oliver. So [facility] [phone].    ATENCIÓN: Si habla español, tiene a castro disposición servicios gratuitos de asistencia lingüística. Llame al [phone].    We comply with applicable federal civil rights laws and [place] laws. We do not discriminate on the basis of race, color, national origin, age, disability, sex, sexual orientation, or gender identity.            Thank you!     Thank you for choosing [facility]  for your care. Our goal is always to provide you with excellent care. Hearing back from our patients is one way we can continue to improve our services. Please take a few minutes to complete the written survey that you may receive in the mail after your visit with us. Thank you!             Your Updated Medication List - Protect others around you: Learn how to safely use, store and throw away your medicines at www.disposemymeds.org.          This list is accurate as of 10/11/18 11:43 AM.  Always use your most recent med list.                   Brand Name Dispense Instructions for use Diagnosis    albuterol 108 (90 Base) MCG/ACT inhaler    PROAIR HFA/PROVENTIL HFA/VENTOLIN HFA    1 Inhaler    Inhale 2 puffs into the lungs every 6 hours as needed for shortness of  breath / dyspnea or wheezing Profile Rx: patient will contact pharmacy when needed    Acute bronchospasm       atorvastatin 40 MG tablet    LIPITOR    90 tablet    Take 1 tablet (40 mg) by mouth daily    Calcification of coronary artery       carvedilol 25 MG tablet    COREG    90 tablet    Take 0.5 tablets (12.5 mg) by mouth 2 times daily (with meals)    Chronic diastolic heart failure (H)       * diltiazem 120 MG 24 hr capsule    CARDIZEM CD/CARTIA XT    60 capsule    Take 2 capsules (240 mg) by mouth daily    Chronic atrial fibrillation (H)       * diltiazem 240 MG 24 hr capsule    CARTIA XT    90 capsule    Take 1 capsule (240 mg) by mouth daily    Chronic atrial fibrillation (H)       fluticasone 50 MCG/ACT spray    FLONASE    16 g    Spray 1-2 sprays into both nostrils daily Please inform patient if the OTC would be cheaper for her or if there is an alternative nasal steroid that is less costly, please provide that information.    Post-nasal drainage, Cough       levothyroxine 75 MCG tablet    SYNTHROID/LEVOTHROID    90 tablet    TAKE 1 TABLET EVERY DAY    Hypothyroidism       loratadine 10 MG tablet    CLARITIN     Take 10 mg by mouth as needed for allergies        losartan 50 MG tablet    COZAAR    90 tablet    Take 1 tablet (50 mg) by mouth daily    Chronic diastolic heart failure (H)       torsemide 20 MG tablet    DEMADEX    180 tablet    Take 2 tablets (40 mg) by mouth daily    Acute on chronic diastolic congestive heart failure (H)       warfarin 2 MG tablet    COUMADIN    130 tablet    USE AS DIRECTED BY YOUR PRESCRIBER. COMPLETE DIRECTIONS ARE INCLUDED IN A LETTER WITH YOUR ORIGINAL ORDER    Long-term (current) use of anticoagulants, INR goal 2.0-3.0       * Notice:  This list has 2 medication(s) that are the same as other medications prescribed for you. Read the directions carefully, and ask your doctor or other care provider to review them with you.

## 2018-10-25 DIAGNOSIS — Z79.01 LONG TERM CURRENT USE OF ANTICOAGULANT THERAPY: Primary | ICD-10-CM

## 2018-10-25 LAB — INR PPP: 2.1 (ref 0.86–1.14)

## 2018-10-25 PROCEDURE — 85610 PROTHROMBIN TIME: CPT | Performed by: FAMILY MEDICINE

## 2018-10-25 PROCEDURE — 36416 COLLJ CAPILLARY BLOOD SPEC: CPT | Performed by: FAMILY MEDICINE

## 2018-11-02 DIAGNOSIS — E03.9 HYPOTHYROIDISM, UNSPECIFIED TYPE: ICD-10-CM

## 2018-11-05 NOTE — TELEPHONE ENCOUNTER
Patient was seen 1/8/18 by new PCP Dr. Fuller.   Labs current until May 2019.      Routed to Dr. Fuller to address refill, need new provider on Rx (last was under Dr. Ferrer).    Karrie Leone RN  [facility]

## 2018-11-05 NOTE — TELEPHONE ENCOUNTER
"Requested Prescriptions   Pending Prescriptions Disp Refills     levothyroxine (SYNTHROID/LEVOTHROID) 75 MCG tablet [Pharmacy Med Name: LEVOTHYROXINE SODIUM 75 MCG Tablet]  Last Written Prescription Date:  5/29/2018  Last Fill Quantity: 90 tablet,  # refills: 1   Last office visit: 3/24/2017 with prescribing provider:  BRY Ferrer   Future Office Visit:     90 tablet 1     Sig: TAKE 1 TABLET EVERY DAY    Thyroid Protocol Passed    11/2/2018  5:52 PM       Passed - Patient is 12 years or older       Passed - Recent (12 mo) or future (30 days) visit within the authorizing provider's specialty    Patient had office visit in the last 12 months or has a visit in the next 30 days with authorizing provider or within the authorizing provider's specialty.  See \"Patient Info\" tab in inbasket, or \"Choose Columns\" in Meds & Orders section of the refill encounter.             Passed - Normal TSH on file in past 12 months    Recent Labs   Lab Test  05/04/18   1359   TSH  2.16             Passed - No active pregnancy on record    If patient is pregnant or has had a positive pregnancy test, please check TSH.         Passed - No positive pregnancy test in past 12 months    If patient is pregnant or has had a positive pregnancy test, please check TSH.            "

## 2018-11-06 RX ORDER — LEVOTHYROXINE SODIUM 75 UG/1
TABLET ORAL
Qty: 90 TABLET | Refills: 1 | Status: SHIPPED | OUTPATIENT
Start: 2018-11-06 | End: 2019-04-17

## 2018-11-16 ENCOUNTER — TRANSFERRED RECORDS (OUTPATIENT)
Dept: HEALTH INFORMATION MANAGEMENT | Facility: CLINIC | Age: 73
End: 2018-11-16

## 2018-12-07 ENCOUNTER — ANTICOAGULATION THERAPY VISIT (OUTPATIENT)
Dept: NURSING | Facility: CLINIC | Age: 73
End: 2018-12-07
Payer: COMMERCIAL

## 2018-12-07 DIAGNOSIS — Z79.01 LONG TERM CURRENT USE OF ANTICOAGULANT THERAPY: ICD-10-CM

## 2018-12-07 DIAGNOSIS — I48.91 ATRIAL FIBRILLATION (H): ICD-10-CM

## 2018-12-07 LAB — INR POINT OF CARE: 2.4 (ref 0.86–1.14)

## 2018-12-07 PROCEDURE — 99207 ZZC NO CHARGE NURSE ONLY: CPT

## 2018-12-07 PROCEDURE — 85610 PROTHROMBIN TIME: CPT | Mod: QW

## 2018-12-07 PROCEDURE — 36416 COLLJ CAPILLARY BLOOD SPEC: CPT

## 2018-12-07 NOTE — PROGRESS NOTES
ANTICOAGULATION FOLLOW-UP CLINIC VISIT    Patient Name:  Caty Ng  Date:  12/7/2018  Contact Type:  Face to Face    SUBJECTIVE:     Patient Findings     Positives No Problem Findings           OBJECTIVE    INR Protime   Date Value Ref Range Status   12/07/2018 2.4 (A) 0.86 - 1.14 Final       ASSESSMENT / PLAN  INR assessment THER    Recheck INR In: 6 WEEKS    INR Location Clinic      Anticoagulation Summary as of 12/7/2018     INR goal 2.0-3.0   Today's INR 2.4   Warfarin maintenance plan 1 mg (2 mg x 0.5) on Tue, Fri; 2 mg (2 mg x 1) all other days   Full warfarin instructions 1 mg on Tue, Fri; 2 mg all other days   Weekly warfarin total 12 mg   No change documented Adiel Boyle RN   Plan last modified Madhuri Renae RN (1/23/2017)   Next INR check 1/25/2019   Priority INR   Target end date Indefinite    Indications   Atrial fibrillation (H) [I48.91]  Long term current use of anticoagulant therapy [Z79.01]         Anticoagulation Episode Summary     [facility]    Preferred lab [facility]    Send INR reminders to [facility]    Comments       Anticoagulation Care Providers     Provider Role Specialty Phone number    Ibrahima Moura Kvngnohemi  [facility] [phone]            See the Encounter Report to view Anticoagulation Flowsheet and Dosing Calendar (Go to Encounters tab in chart review, and find the Anticoagulation Therapy Visit)        Adiel Boyle, WILMA

## 2018-12-07 NOTE — MR AVS SNAPSHOT
Caty Ng   12/7/2018 9:20 AM   Anticoagulation Therapy Visit    Description:  73 year old female   Provider:  NE ANTI HILARIA   Department:  Ne Nurse           INR as of 12/7/2018     Today's INR 2.4      Anticoagulation Summary as of 12/7/2018     INR goal 2.0-3.0   Today's INR 2.4   Full warfarin instructions 1 mg on Tue, Fri; 2 mg all other days   Next INR check 1/25/2019    Indications   Atrial fibrillation (H) [I48.91]  Long term current use of anticoagulant therapy [Z79.01]         Your next Anticoagulation Clinic appointment(s)     Jan 25, 2019  9:20 AM CST   [facility] with [facility]   [facility]    [address] [place] [address] [phone]              Contact Numbers     Please call [phone] to cancel and/or reschedule your appointment.  Please call [phone] with any problems or questions regarding your therapy          December 2018 Details    Sun Mon Tue Wed Thu Fri Sat           1                 2               3               4               5               6               7      1 mg   See details      8      2 mg           9      2 mg         10      2 mg         11      1 mg         12      2 mg         13      2 mg         14      1 mg         15      2 mg           16      2 mg         17      2 mg         18      1 mg         19      2 mg         20      2 mg         21      1 mg         22      2 mg           23      2 mg         24      2 mg         25      1 mg         26      2 mg         27      2 mg         28      1 mg         29      2 mg           30      2 mg         31      2 mg               Date Details   12/07 This INR check               How to take your warfarin dose     To take:  1 mg Take 0.5 of a 2 mg tablet.    To take:  2 mg Take 1 of the 2 mg tablets.           January 2019 Details    Sun Mon Tue Wed Thu Fri Sat       1      1 mg         2      2 mg         3      2 mg          4      1 mg         5      2 mg           6      2 mg         7      2 mg         8      1 mg         9      2 mg         10      2 mg         11      1 mg         12      2 mg           13      2 mg         14      2 mg         15      1 mg         16      2 mg         17      2 mg         18      1 mg         19      2 mg           20      2 mg         21      2 mg         22      1 mg         23      2 mg         24      2 mg         25            26                 27               28               29               30               31                  Date Details   No additional details    Date of next INR:  1/25/2019         How to take your warfarin dose     To take:  1 mg Take 0.5 of a 2 mg tablet.    To take:  2 mg Take 1 of the 2 mg tablets.

## 2019-01-25 ENCOUNTER — ANTICOAGULATION THERAPY VISIT (OUTPATIENT)
Dept: NURSING | Facility: CLINIC | Age: 74
End: 2019-01-25
Payer: COMMERCIAL

## 2019-01-25 DIAGNOSIS — I48.91 ATRIAL FIBRILLATION (H): ICD-10-CM

## 2019-01-25 DIAGNOSIS — Z79.01 LONG TERM CURRENT USE OF ANTICOAGULANT THERAPY: Primary | ICD-10-CM

## 2019-01-25 LAB — INR POINT OF CARE: 1.3 (ref 0.86–1.14)

## 2019-01-25 PROCEDURE — 85610 PROTHROMBIN TIME: CPT | Mod: QW

## 2019-01-25 PROCEDURE — 99207 ZZC NO CHARGE NURSE ONLY: CPT

## 2019-01-25 PROCEDURE — 36416 COLLJ CAPILLARY BLOOD SPEC: CPT

## 2019-01-25 NOTE — PROGRESS NOTES
ANTICOAGULATION FOLLOW-UP CLINIC VISIT    Patient Name:  Caty Ng  Date:  2019  Contact Type:  Face to Face    SUBJECTIVE:     Patient Findings     Positives:   Inflammation (pain in rt buttock radiating to rt foot. Wiil see ortho.), No Problem Findings           OBJECTIVE    INR Protime   Date Value Ref Range Status   2019 1.3 (A) 0.86 - 1.14 Final       ASSESSMENT / PLAN  INR assessment THER    Recheck INR In: 6 WEEKS    INR Location Clinic      Anticoagulation Summary  As of 2019    INR goal:   2.0-3.0   TTR:   70.2 % (3.5 y)   INR used for dosin.3! (2019)   Warfarin maintenance plan:   1 mg (2 mg x 0.5) every Tue, Fri; 2 mg (2 mg x 1) all other days   Full warfarin instructions:   : 2 mg; Otherwise 1 mg every Tue, Fri; 2 mg all other days   Weekly warfarin total:   12 mg   Plan last modified:   Madhuri Renae RN (2017)   Next INR check:   2019   Priority:   INR   Target end date:   Indefinite    Indications    Atrial fibrillation (H) [I48.91]  Long term current use of anticoagulant therapy [Z79.01]             Anticoagulation Episode Summary     INR check location:   [facility]    Preferred lab:   [facility]    Send INR reminders to:   [facility]    Comments:         Anticoagulation Care Providers     Provider Role Specialty Phone number    Ibrahima Moura AmeliarobbieDO [facility] [phone]            See the Encounter Report to view Anticoagulation Flowsheet and Dosing Calendar (Go to Encounters tab in chart review, and find the Anticoagulation Therapy Visit)    Dosage adjustment made based on physician directed care plan.    Adiel Boyle RN

## 2019-01-31 DIAGNOSIS — I50.32 CHRONIC DIASTOLIC HEART FAILURE (H): ICD-10-CM

## 2019-02-04 DIAGNOSIS — I50.33 ACUTE ON CHRONIC DIASTOLIC CONGESTIVE HEART FAILURE (H): ICD-10-CM

## 2019-02-04 RX ORDER — LOSARTAN POTASSIUM 50 MG/1
50 TABLET ORAL DAILY
Qty: 90 TABLET | Refills: 1 | Status: SHIPPED | OUTPATIENT
Start: 2019-02-04 | End: 2019-06-12

## 2019-02-05 RX ORDER — TORSEMIDE 20 MG/1
TABLET ORAL
Qty: 180 TABLET | Refills: 1 | Status: SHIPPED | OUTPATIENT
Start: 2019-02-05 | End: 2019-08-07

## 2019-02-05 NOTE — TELEPHONE ENCOUNTER
Signed Prescriptions:                        Disp   Refills    losartan (COZAAR) 50 MG tablet             90 tab*1        Sig: Take 1 tablet (50 mg) by mouth daily  Authorizing Provider: MEL CALLAHAN  Ordering User: RAJANI RAMOS    Rx filled per refill protocol.  Rajani Ramos RN

## 2019-02-05 NOTE — TELEPHONE ENCOUNTER
Signed Prescriptions:                        Disp   Refills    torsemide (DEMADEX) 20 MG tablet           180 ta*1        Sig: TAKE 2 TABLETS EVERY DAY  Authorizing Provider: MEL CALLAHAN  Ordering User: SHAMAR RAMOS

## 2019-02-22 ENCOUNTER — ANTICOAGULATION THERAPY VISIT (OUTPATIENT)
Dept: NURSING | Facility: CLINIC | Age: 74
End: 2019-02-22
Payer: MEDICARE

## 2019-02-22 DIAGNOSIS — Z79.01 LONG TERM CURRENT USE OF ANTICOAGULANT THERAPY: ICD-10-CM

## 2019-02-22 DIAGNOSIS — I48.91 ATRIAL FIBRILLATION (H): ICD-10-CM

## 2019-02-22 LAB — INR POINT OF CARE: 1.4 (ref 0.86–1.14)

## 2019-02-22 PROCEDURE — 99207 ZZC NO CHARGE NURSE ONLY: CPT

## 2019-02-22 PROCEDURE — 36416 COLLJ CAPILLARY BLOOD SPEC: CPT

## 2019-02-22 PROCEDURE — 85610 PROTHROMBIN TIME: CPT | Mod: QW

## 2019-02-22 NOTE — PROGRESS NOTES
ANTICOAGULATION FOLLOW-UP CLINIC VISIT    Patient Name:  Caty Ng  Date:  2019  Contact Type:  Face to Face    SUBJECTIVE:     Patient Findings     Positives:   Unexplained INR or factor level change (Will increase maintanence dose for 2 weeks.)           OBJECTIVE    INR Protime   Date Value Ref Range Status   2019 1.4 (A) 0.86 - 1.14 Final       ASSESSMENT / PLAN  INR assessment SUB    Recheck INR In: 2 WEEKS    INR Location Clinic      Anticoagulation Summary  As of 2019    INR goal:   2.0-3.0   TTR:   68.7 % (3.6 y)   INR used for dosin.4! (2019)   Warfarin maintenance plan:   1 mg (2 mg x 0.5) every Tue, Fri; 2 mg (2 mg x 1) all other days   Full warfarin instructions:   : 2 mg; 3/1: 2 mg; Otherwise 1 mg every Tue, Fri; 2 mg all other days   Weekly warfarin total:   12 mg   Plan last modified:   Madhuri Renae RN (2017)   Next INR check:   3/8/2019   Priority:   INR   Target end date:   Indefinite    Indications    Atrial fibrillation (H) [I48.91]  Long term current use of anticoagulant therapy [Z79.01]             Anticoagulation Episode Summary     [facility]   [facility]    [facility]    Send INR reminders to:   [facility]    Comments:         Anticoagulation Care Providers     Provider Role Specialty Phone number    Ibrahima MouraDO [facility] [phone]            See the Encounter Report to view Anticoagulation Flowsheet and Dosing Calendar (Go to Encounters tab in chart review, and find the Anticoagulation Therapy Visit)    Dosage adjustment made based on physician directed care plan.    Adiel Boyle RN

## 2019-02-25 ENCOUNTER — ANCILLARY PROCEDURE (OUTPATIENT)
Dept: GENERAL RADIOLOGY | Facility: CLINIC | Age: 74
End: 2019-02-25
Attending: FAMILY MEDICINE
Payer: MEDICARE

## 2019-02-25 ENCOUNTER — OFFICE VISIT (OUTPATIENT)
Dept: FAMILY MEDICINE | Facility: CLINIC | Age: 74
End: 2019-02-25
Payer: MEDICARE

## 2019-02-25 VITALS
WEIGHT: 251 LBS | RESPIRATION RATE: 16 BRPM | BODY MASS INDEX: 44.47 KG/M2 | TEMPERATURE: 97.2 F | OXYGEN SATURATION: 93 % | HEIGHT: 63 IN | SYSTOLIC BLOOD PRESSURE: 110 MMHG | DIASTOLIC BLOOD PRESSURE: 66 MMHG | HEART RATE: 101 BPM

## 2019-02-25 DIAGNOSIS — M16.11 PRIMARY OSTEOARTHRITIS OF RIGHT HIP: ICD-10-CM

## 2019-02-25 DIAGNOSIS — Z71.89 ADVANCED DIRECTIVES, COUNSELING/DISCUSSION: ICD-10-CM

## 2019-02-25 DIAGNOSIS — M54.16 LUMBAR RADICULOPATHY: ICD-10-CM

## 2019-02-25 DIAGNOSIS — E66.01 MORBID OBESITY DUE TO EXCESS CALORIES (H): ICD-10-CM

## 2019-02-25 DIAGNOSIS — I50.22 CHRONIC SYSTOLIC HEART FAILURE (H): ICD-10-CM

## 2019-02-25 DIAGNOSIS — I48.91 ATRIAL FIBRILLATION WITH RVR (H): ICD-10-CM

## 2019-02-25 DIAGNOSIS — M54.16 LUMBAR RADICULOPATHY: Primary | ICD-10-CM

## 2019-02-25 DIAGNOSIS — Z96.653 S/P TOTAL KNEE REPLACEMENT, BILATERAL: ICD-10-CM

## 2019-02-25 PROBLEM — Z85.830 HISTORY OF CHONDROSARCOMA: Status: ACTIVE | Noted: 2019-02-25

## 2019-02-25 PROCEDURE — 73502 X-RAY EXAM HIP UNI 2-3 VIEWS: CPT

## 2019-02-25 PROCEDURE — 73562 X-RAY EXAM OF KNEE 3: CPT | Mod: RT

## 2019-02-25 PROCEDURE — 99214 OFFICE O/P EST MOD 30 MIN: CPT | Performed by: FAMILY MEDICINE

## 2019-02-25 PROCEDURE — 72100 X-RAY EXAM L-S SPINE 2/3 VWS: CPT

## 2019-02-25 ASSESSMENT — MIFFLIN-ST. JEOR: SCORE: 1617.66

## 2019-02-25 NOTE — PROGRESS NOTES
"  SUBJECTIVE:   Caty Ng is a 74 year old morbidly obese female with CHF and atrial fibrillation who presents to clinic today for the following health issues:    Joint Pain    Onset: 1 year    Description:   Location: right lower extremity / Right lower back pain  Character: Sharp    Intensity: moderate, severe - using cane     Progression of Symptoms: intermittent    Accompanying Signs & Symptoms:  Other symptoms: radiation of pain to down right leg; no weakness or numbness     History:   Previous similar pain: no       Precipitating factors:   Trauma or overuse: no     Alleviating factors:  Improved by: nothing    Therapies Tried and outcome: tylenol    I have reviewed the patient's medical history in detail and updated the computerized patient record.    ROS:  CONSTITUTIONAL: NEGATIVE for fever, chills, change in weight  INTEGUMENTARY/SKIN: NEGATIVE for worrisome rashes, moles or lesions  RESP: NEGATIVE for significant cough or SOB  CV: NEGATIVE for chest pain, palpitations or peripheral edema  MUSCULOSKELETAL:arthralgias and back pain  NEURO: NEGATIVE for weakness, dizziness or paresthesias    OBJECTIVE:     /66   Pulse 101   Temp 97.2  F (36.2  C) (Oral)   Resp 16   Ht 1.6 m (5' 3\")   Wt 113.9 kg (251 lb)   SpO2 93%   Breastfeeding? No   BMI 44.46 kg/m    Body mass index is 44.46 kg/m .  GENERAL: alert, no distress and obese  NECK: no adenopathy, no asymmetry, masses, or scars and thyroid normal to palpation  RESP: lungs clear to auscultation - no rales, rhonchi or wheezes  CV: regular rate and rhythm, normal S1 S2, no S3 or S4, no murmur, click or rub, no peripheral edema    MS: antalgic gait; negative straight leg raise, normal range of motion   NEURO: Normal strength and tone, mentation intact and speech normal  PSYCH: mentation appears normal, affect normal/bright    Diagnostic Test Results:  Results for orders placed or performed in visit on 02/22/19   INR point of care   Result Value " Ref Range    INR Protime 1.4 (A) 0.86 - 1.14       ASSESSMENT/PLAN:   (M54.16) Lumbar radiculopathy  (primary encounter diagnosis)  Plan: XR Lumbar Spine 2/3 Views, XR Hip Right 2-3         Views, XR Knee Right 3 Views        Over the counter pain medication as needed, ice or heat as she finds helpful, avoidance of aggravating activities, and return for persistence for consideration of further imaging or referral.     (I50.22) Chronic systolic heart failure (H)  (I48.91) Atrial fibrillation with RVR (H)  Comment: rate controlled and anticoagulated; euvolemic on coreg and losartan   Plan: follow-up for labs soon     (E66.01) Morbid obesity due to excess calories (H)  (Z96.653) S/p total knee replacement, bilateral  Plan: Counseled to make better food choices, exercise as tolerated, and lose weight.     (Z71.89) Advanced directives, counseling/discussion  Plan: referral for planning - recommended wellness visit     See Patient Instructions    Joann Clark MD  [facility]

## 2019-02-25 NOTE — PATIENT INSTRUCTIONS
It is recommended that you get a vaccination for shingles called Shingrix (given as 2 shots, 2 to 6 months apart), even if you have already had the Zostavax vaccine. Discuss getting the Shingix vaccine from your pharmacist, or schedule an ancillary shot visit here. Some insurances do not cover the cost of these vaccines.

## 2019-02-25 NOTE — LETTER
My Heart Failure Action Plan   Name: Caty Ng    YOB: 1945   Date: 2/25/2019    My doctor: Joann Clark [address]  [place] [address] [phone]  My Diagnosis: Systolic Heart Failure   My Ejection Fraction: 45% - 49%    My Exercise Goal: 30 minutes daily  .     My Weight Goal:    Wt Readings from Last 2 Encounters:   02/25/19 113.9 kg (251 lb)   08/09/18 113.9 kg (251 lb)     Weigh yourself daily using the same scale. If you gain more than 2 pounds in 24 hours or 5 pounds in a week call the clinic    My Diet Goal: No added salt    Emergency Room Visits:    Our goal is to improve your quality of life and help you avoid a visit to the emergency room or hospital.  If we work together, we can achieve this goal. But, if you feel you need to call 911 or go to the emergency room, please do so.  If you go to the emergency room, please bring your list of medicines and your daily weight chart with you.       GREEN ZONE     Doing well today    Weight gained is no more than 2 pounds a day or 5 pounds a week.    No swelling in feet, ankles, legs or stomach.    No more swelling than usual.    No more trouble breathing than usual.    No change in my sleep.    No other problems. Actions:    I am doing fine.  I will take my medicine, follow my diet, see my doctor, exercise, and watch for symptoms.           YELLOW ZONE         Having a bad day or flare up    Weight gain of more than 2 pounds in one day or 5 pounds in one week.    New swelling in ankle, leg, knee or thigh.    Bloating in belly, pants feel tighter.    Swelling in hands or face.    Coughing or trouble breathing while walking or talking.    Harder to breathe last night.    Have trouble sleeping, wake up short of breath.    Much more tired than usual.    Not eating.    Pain in my chest or bad leg cramps.    Feel weak or dizzy. Actions:    I need to take action and call my doctor or nurse  today.                 RED ZONE         Need medical care now    Weight gain of 5 pounds overnight.    Chest pain or pressure that does not go away.    Feel less alert.    Wheezing or have trouble breathing when at rest.    Cannot sleep lying down.    Cannot take my water pill.    Pass out or faint. Actions:    I need to call my doctor or nurse now!    Call 911 if I have chest pain or cannot breathe.

## 2019-02-25 NOTE — RESULT ENCOUNTER NOTE
Abi,    You do have moderate hip and back arthritis (osteoarthritis). This is a wear-and-tear process related to aging. I will have our physical therapy department call you to schedule an initial session. If your symptoms are uncontrolled, let's consider a consult with one of our sports medicine experts. In some cases, further imaging tests or an injection could be considered.     Joann Clark MD

## 2019-03-08 ENCOUNTER — ANTICOAGULATION THERAPY VISIT (OUTPATIENT)
Dept: NURSING | Facility: CLINIC | Age: 74
End: 2019-03-08
Payer: MEDICARE

## 2019-03-08 DIAGNOSIS — Z79.01 LONG TERM CURRENT USE OF ANTICOAGULANT THERAPY: ICD-10-CM

## 2019-03-08 LAB — INR POINT OF CARE: 1.9 (ref 0.86–1.14)

## 2019-03-08 PROCEDURE — 85610 PROTHROMBIN TIME: CPT | Mod: QW

## 2019-03-08 PROCEDURE — 36416 COLLJ CAPILLARY BLOOD SPEC: CPT

## 2019-03-08 PROCEDURE — 99207 ZZC NO CHARGE NURSE ONLY: CPT

## 2019-03-08 NOTE — PROGRESS NOTES
ANTICOAGULATION FOLLOW-UP CLINIC VISIT    Patient Name:  Caty Ng  Date:  3/8/2019  Contact Type:  Face to Face    SUBJECTIVE:     Patient Findings     Positives:   No Problem Findings (Will increase her maintanence dose to bring her closer to 2.5)           OBJECTIVE    INR Protime   Date Value Ref Range Status   2019 1.9 (A) 0.86 - 1.14 Final       ASSESSMENT / PLAN  INR assessment THER    Recheck INR In: 10 DAYS    INR Location Clinic      Anticoagulation Summary  As of 3/8/2019    INR goal:   2.0-3.0   TTR:   68.0 % (3.6 y)   INR used for dosin.9! (3/8/2019)   Warfarin maintenance plan:   1 mg (2 mg x 0.5) every Tue, Fri; 2 mg (2 mg x 1) all other days   Full warfarin instructions:   3/8: 2 mg; 3/12: 2 mg; 3/15: 2 mg; Otherwise 1 mg every Tue, Fri; 2 mg all other days   Weekly warfarin total:   12 mg   Plan last modified:   Madhuri Renae RN (2017)   Next INR check:   3/18/2019   Priority:   INR   Target end date:   Indefinite    Indications    Atrial fibrillation (H) (Resolved) [I48.91]  Long term current use of anticoagulant therapy [Z79.01]             Anticoagulation Episode Summary     INR check location:   [facility]    Preferred lab:   [facility]    Send INR reminders to:   [facility]    Comments:         Anticoagulation Care Providers     Provider Role Specialty Phone number    Ibrahima Moura DO [facility] [phone]            See the Encounter Report to view Anticoagulation Flowsheet and Dosing Calendar (Go to Encounters tab in chart review, and find the Anticoagulation Therapy Visit)    Dosage adjustment made based on physician directed care plan.    Adiel Boyle, WILMA

## 2019-03-11 ENCOUNTER — THERAPY VISIT (OUTPATIENT)
Dept: PHYSICAL THERAPY | Facility: CLINIC | Age: 74
End: 2019-03-11
Payer: MEDICARE

## 2019-03-11 DIAGNOSIS — M16.11 PRIMARY OSTEOARTHRITIS OF RIGHT HIP: ICD-10-CM

## 2019-03-11 DIAGNOSIS — M54.16 LUMBAR RADICULOPATHY: ICD-10-CM

## 2019-03-11 PROCEDURE — 97110 THERAPEUTIC EXERCISES: CPT | Mod: GP | Performed by: PHYSICAL THERAPIST

## 2019-03-11 PROCEDURE — 97161 PT EVAL LOW COMPLEX 20 MIN: CPT | Mod: GP | Performed by: PHYSICAL THERAPIST

## 2019-03-11 PROCEDURE — 97530 THERAPEUTIC ACTIVITIES: CPT | Mod: GP | Performed by: PHYSICAL THERAPIST

## 2019-03-11 ASSESSMENT — ACTIVITIES OF DAILY LIVING (ADL)
STEPPING_UP_AND_DOWN_CURBS: SLIGHT DIFFICULTY
STANDING_FOR_15_MINUTES: MODERATE DIFFICULTY
HOS_ADL_COUNT: 17
WALKING_APPROXIMATELY_10_MINUTES: EXTREME DIFFICULTY
GOING_UP_1_FLIGHT_OF_STAIRS: MODERATE DIFFICULTY
WALKING_UP_STEEP_HILLS: UNABLE TO DO
ROLLING_OVER_IN_BED: EXTREME DIFFICULTY
LIGHT_TO_MODERATE_WORK: MODERATE DIFFICULTY
SITTING_FOR_15_MINUTES: NO DIFFICULTY AT ALL
HEAVY_WORK: UNABLE TO DO
GETTING_INTO_AND_OUT_OF_A_BATHTUB: UNABLE TO DO
PUTTING_ON_SOCKS_AND_SHOES: MODERATE DIFFICULTY
WALKING_INITIALLY: SLIGHT DIFFICULTY
GETTING_INTO_AND_OUT_OF_AN_AVERAGE_CAR: SLIGHT DIFFICULTY
RECREATIONAL_ACTIVITIES: UNABLE TO DO
HOS_ADL_HIGHEST_POTENTIAL_SCORE: 68
WALKING_DOWN_STEEP_HILLS: UNABLE TO DO
DEEP_SQUATTING: UNABLE TO DO
TWISTING/PIVOTING_ON_INVOLVED_LEG: SLIGHT DIFFICULTY
GOING_DOWN_1_FLIGHT_OF_STAIRS: MODERATE DIFFICULTY
HOS_ADL_SCORE(%): 32.35
HOS_ADL_ITEM_SCORE_TOTAL: 22
WALKING_15_MINUTES_OR_GREATER: UNABLE TO DO

## 2019-03-11 NOTE — LETTER
DEPARTMENT OF HEALTH AND HUMAN SERVICES  CENTERS FOR MEDICARE & MEDICAID SERVICES    PLAN/UPDATED PLAN OF PROGRESS FOR OUTPATIENT REHABILITATION    PATIENTS NAME:  Caty Ng   : 1945  PROVIDER NUMBER:    [id number]  [facility]  [id number]   PROVIDER NAME: RJ CHAVIRA PT  MEDICAL RECORD NUMBER: [id number]     START OF CARE DATE:  SOC Date: 19   TYPE:  PT    PRIMARY/TREATMENT DIAGNOSIS: (Pertinent Medical Diagnosis)     Lumbar radiculopathy  Primary osteoarthritis of right hip    VISITS FROM START OF CARE:  Rxs Used: 1     [place] Initial Evaluation  Subjective:  Caty Ng is a 74 year old female with a lumbar condition.  Condition occurred with:  Insidious onset.  Condition occurred: for unknown reasons.  This is a chronic condition  Patient reports having low back pain for >1 year without a mechanism of injury or change in daily routine. Patient reports a worsening of pain in 2019 and is unable to explain why..    Patient reports pain:  Lumbar spine right.  Radiates to:  Foot right, knee right and lower leg right.  Pain is described as aching and is constant and reported as 3/10.  Associated symptoms:  Loss of motion/stiffness. Pain is worse during the day.  Symptoms are exacerbated by sitting, standing and walking Relieved by: sitting with right knee extended fully.  Since onset symptoms are unchanged.  Special tests:  X-ray (see epic). General health as reported by patient is good.  MD order  2019.              Red flags:  None as reported by the patient.    Objective:  Standing Alignment:    Cervical/Thoracic:  Forward head  Shoulder/UE:  Rounded shoulders       Lumbar/SI Evaluation  ROM:    AROM Lumbar:   Flexion:            Hands to mid lower leg  Ext:                    Mod loss    Side Bend:        Left:  Hand to mid thigh    Right:  Hand to mid thigh  Rotation:           Left:  WFL    Right:  WFL  Side Glide:        Left:     Right:         Lumbar  Myotomes:  Lumbar myotomes: grossly 4+/5 bilaterally.  Lumbar Dermtomes:  normal    PATIENTS NAME:  Caty Ng   : 1945    Neural Tension/Mobility:    Right side:   SLR positive.    Assessment/Plan:    Patient is a 74 year old female with lumbar complaints.    Patient has the following significant findings with corresponding treatment plan.                Diagnosis 1:  Low back pain  Pain -  hot/cold therapy, self management, education and home program  Decreased ROM/flexibility - manual therapy, therapeutic exercise, therapeutic activity and home program  Decreased joint mobility - manual therapy, therapeutic exercise, therapeutic activity and home program  Decreased strength - therapeutic exercise, therapeutic activities and home program  Impaired gait - gait training and home program  Decreased function - therapeutic activities and home program  Impaired posture - neuro re-education, therapeutic activities and home program    Therapy Evaluation Codes:   1) History comprised of:   Personal factors that impact the plan of care:      None.    Comorbidity factors that impact the plan of care are:      None.     Medications impacting care: None.  2) Examination of Body Systems comprised of:   Body structures and functions that impact the plan of care:      Lumbar spine.   Activity limitations that impact the plan of care are:      Bathing, Bending, Dressing, Sitting, Standing and Walking.  3) Clinical presentation characteristics are:   Stable/Uncomplicated.  4) Decision-Making    Low complexity using standardized patient assessment instrument and/or measureable assessment of functional outcome.  Cumulative Therapy Evaluation is: Low complexity.    Previous and current functional limitations:  (See Goal Flow Sheet for this information)    Short term and Long term goals: (See Goal Flow Sheet for this information)     Communication ability:  Patient appears to be able to clearly communicate and understand  "verbal and written communication and follow directions correctly.  Treatment Explanation - The following has been discussed with the patient:   RX ordered/plan of care  Anticipated outcomes  Possible risks and side effects  This patient would benefit from PT intervention to resume normal activities.   Rehab potential is good.        PATIENTS NAME:  Caty Ng   : 1945    Frequency:  1 X week, once daily  Duration:  for 6 weeks  Discharge Plan:  Achieve all LTG.  Independent in home treatment program.  Reach maximal therapeutic benefit.    Please refer to the daily flowsheet for treatment today, total treatment time and time spent performing 1:1 timed codes.         Caregiver Signature/Credentials _____________________________ Date ________       Treating Provider: Christopher Bain DPT  I have reviewed and certified the need for these services and plan of treatment while under my care.        PHYSICIAN'S SIGNATURE:   _________________________________________  Date___________   Joann Clark MD    Certification period:  Beginning of Cert date period: 19 to  End of Cert period date: 19     Functional Level Progress Report: Please see attached \"Goal Flow sheet for Functional level.\"    ____X____ Continue Services or       ________ DC Services                Service dates: From  SOC Date: 19 date to present                         "

## 2019-03-11 NOTE — PROGRESS NOTES
[facility] Initial Evaluation  Subjective:    Caty Ng is a 74 year old female with a lumbar condition.  Condition occurred with:  Insidious onset.  Condition occurred: for unknown reasons.  This is a chronic condition  Patient reports having low back pain for >1 year without a mechanism of injury or change in daily routine. Patient reports a worsening of pain in February 2019 and is unable to explain why..    Patient reports pain:  Lumbar spine right.  Radiates to:  Foot right, knee right and lower leg right.  Pain is described as aching and is constant and reported as 3/10.  Associated symptoms:  Loss of motion/stiffness. Pain is worse during the day.  Symptoms are exacerbated by sitting, standing and walking Relieved by: sitting with right knee extended fully.  Since onset symptoms are unchanged.  Special tests:  X-ray (see epic).      General health as reported by patient is good.                      Red flags:  None as reported by the patient.                        Objective:  Standing Alignment:    Cervical/Thoracic:  Forward head  Shoulder/UE:  Rounded shoulders                             Lumbar/SI Evaluation  ROM:    AROM Lumbar:   Flexion:            Hands to mid lower leg  Ext:                    Mod loss    Side Bend:        Left:  Hand to mid thigh    Right:  Hand to mid thigh  Rotation:           Left:  WFL    Right:  WFL  Side Glide:        Left:     Right:           Lumbar Myotomes:  Lumbar myotomes: grossly 4+/5 bilaterally.                Lumbar Dermtomes:  normal                Neural Tension/Mobility:      Right side:   SLR positive.                                                       General     ROS    Assessment/Plan:    Patient is a 74 year old female with lumbar complaints.    Patient has the following significant findings with corresponding treatment plan.                Diagnosis 1:  Low back pain  Pain -  hot/cold therapy, self management, education and home  program  Decreased ROM/flexibility - manual therapy, therapeutic exercise, therapeutic activity and home program  Decreased joint mobility - manual therapy, therapeutic exercise, therapeutic activity and home program  Decreased strength - therapeutic exercise, therapeutic activities and home program  Impaired gait - gait training and home program  Decreased function - therapeutic activities and home program  Impaired posture - neuro re-education, therapeutic activities and home program    Therapy Evaluation Codes:   1) History comprised of:   Personal factors that impact the plan of care:      None.    Comorbidity factors that impact the plan of care are:      None.     Medications impacting care: None.  2) Examination of Body Systems comprised of:   Body structures and functions that impact the plan of care:      Lumbar spine.   Activity limitations that impact the plan of care are:      Bathing, Bending, Dressing, Sitting, Standing and Walking.  3) Clinical presentation characteristics are:   Stable/Uncomplicated.  4) Decision-Making    Low complexity using standardized patient assessment instrument and/or measureable assessment of functional outcome.  Cumulative Therapy Evaluation is: Low complexity.    Previous and current functional limitations:  (See Goal Flow Sheet for this information)    Short term and Long term goals: (See Goal Flow Sheet for this information)     Communication ability:  Patient appears to be able to clearly communicate and understand verbal and written communication and follow directions correctly.  Treatment Explanation - The following has been discussed with the patient:   RX ordered/plan of care  Anticipated outcomes  Possible risks and side effects  This patient would benefit from PT intervention to resume normal activities.   Rehab potential is good.    Frequency:  1 X week, once daily  Duration:  for 6 weeks  Discharge Plan:  Achieve all LTG.  Independent in home treatment  program.  Reach maximal therapeutic benefit.    Please refer to the daily flowsheet for treatment today, total treatment time and time spent performing 1:1 timed codes.

## 2019-03-18 ENCOUNTER — THERAPY VISIT (OUTPATIENT)
Dept: PHYSICAL THERAPY | Facility: CLINIC | Age: 74
End: 2019-03-18
Payer: MEDICARE

## 2019-03-18 ENCOUNTER — ANTICOAGULATION THERAPY VISIT (OUTPATIENT)
Dept: NURSING | Facility: CLINIC | Age: 74
End: 2019-03-18
Payer: MEDICARE

## 2019-03-18 DIAGNOSIS — M54.16 LUMBAR RADICULOPATHY: Primary | ICD-10-CM

## 2019-03-18 DIAGNOSIS — Z79.01 LONG TERM CURRENT USE OF ANTICOAGULANT THERAPY: ICD-10-CM

## 2019-03-18 DIAGNOSIS — M16.31 OSTEOARTHRITIS RESULTING FROM RIGHT HIP DYSPLASIA: ICD-10-CM

## 2019-03-18 LAB — INR POINT OF CARE: 1.9 (ref 0.86–1.14)

## 2019-03-18 PROCEDURE — 85610 PROTHROMBIN TIME: CPT | Mod: QW

## 2019-03-18 PROCEDURE — 36416 COLLJ CAPILLARY BLOOD SPEC: CPT

## 2019-03-18 PROCEDURE — 99207 ZZC NO CHARGE NURSE ONLY: CPT

## 2019-03-18 PROCEDURE — 97110 THERAPEUTIC EXERCISES: CPT | Mod: GP | Performed by: PHYSICAL THERAPIST

## 2019-03-18 NOTE — PROGRESS NOTES
[facility] Initial Evaluation  Subjective:                                     General health as reported by patient is good.  Pertinent medical history includes:  Cancer, heart problems, high blood pressure, implanted devices, incontinence, overweight, sleep disorder/apnea, thyroid problems and other.  Medical allergies: yes.  Other surgeries include:  Cancer surgery and orthopedic surgery.  Current medications:  Heparin/coumadin, high blood pressure medication, cardiac medication and thyroid medication.  Current occupation is retired.                                    Objective:  System    Physical Exam    General     ROS    Assessment/Plan:

## 2019-03-18 NOTE — PROGRESS NOTES
ANTICOAGULATION FOLLOW-UP CLINIC VISIT    Patient Name:  Caty Ng  Date:  3/18/2019  Contact Type:  Face to Face    SUBJECTIVE:        OBJECTIVE    INR Protime   Date Value Ref Range Status   2019 1.9 (A) 0.86 - 1.14 Final       ASSESSMENT / PLAN  INR assessment THER    Recheck INR In: 2 WEEKS    INR Location Clinic      Anticoagulation Summary  As of 3/18/2019    INR goal:   2.0-3.0   TTR:   67.4 % (3.7 y)   INR used for dosin.9! (3/18/2019)   Warfarin maintenance plan:   1 mg (2 mg x 0.5) every Tue, Fri; 2 mg (2 mg x 1) all other days   Full warfarin instructions:   3/19: 2 mg; 3/20: 3 mg; 3/22: 2 mg; 3/26: 2 mg; 3/27: 3 mg; 3/29: 2 mg; Otherwise 1 mg every Tue, Fri; 2 mg all other days   Weekly warfarin total:   12 mg   Plan last modified:   Madhuri Renae RN (2017)   Next INR check:   2019   Priority:   INR   Target end date:   Indefinite    Indications    Atrial fibrillation (H) (Resolved) [I48.91]  Long term current use of anticoagulant therapy [Z79.01]             Anticoagulation Episode Summary     INR check location:   [facility]    Preferred lab:   [facility]    Send INR reminders to:   [facility]    Comments:         Anticoagulation Care Providers     Provider Role Specialty Phone number    Ibrahima MouraDO [facility] [phone]            See the Encounter Report to view Anticoagulation Flowsheet and Dosing Calendar (Go to Encounters tab in chart review, and find the Anticoagulation Therapy Visit)        Maryellen Atwood RN

## 2019-03-20 DIAGNOSIS — I25.10 CALCIFICATION OF CORONARY ARTERY: ICD-10-CM

## 2019-03-23 RX ORDER — ATORVASTATIN CALCIUM 40 MG/1
TABLET, FILM COATED ORAL
Qty: 90 TABLET | Refills: 3 | OUTPATIENT
Start: 2019-03-23

## 2019-03-25 ENCOUNTER — THERAPY VISIT (OUTPATIENT)
Dept: PHYSICAL THERAPY | Facility: CLINIC | Age: 74
End: 2019-03-25
Payer: MEDICARE

## 2019-03-25 DIAGNOSIS — M54.16 LUMBAR RADICULOPATHY: Primary | ICD-10-CM

## 2019-03-25 DIAGNOSIS — M16.11 PRIMARY OSTEOARTHRITIS OF RIGHT HIP: ICD-10-CM

## 2019-03-25 PROCEDURE — 97110 THERAPEUTIC EXERCISES: CPT | Mod: GP | Performed by: PHYSICAL THERAPIST

## 2019-03-28 ENCOUNTER — TELEPHONE (OUTPATIENT)
Dept: CARDIOLOGY | Facility: CLINIC | Age: 74
End: 2019-03-28

## 2019-03-28 DIAGNOSIS — I25.10 CALCIFICATION OF CORONARY ARTERY: ICD-10-CM

## 2019-03-28 RX ORDER — ATORVASTATIN CALCIUM 40 MG/1
40 TABLET, FILM COATED ORAL DAILY
Qty: 90 TABLET | Refills: 3 | Status: SHIPPED | OUTPATIENT
Start: 2019-03-28 | End: 2020-02-28

## 2019-03-28 NOTE — TELEPHONE ENCOUNTER
Reason for Call:  Other prescription    Detailed comments: Patient calling regarding her refill request for her atorvastatin (LIPITOR) 40 MG tablet. It was refused and patient wasn't notified. She only has one week left and just wondering why it was refused? Please call    Phone Number Patient can be reached at: Home number on file [phone] (home)    Best Time: ASAP    Can we leave a detailed message on this number? YES    Call taken on 3/28/2019 at 2:20 PM by Maria E Orantes

## 2019-04-02 ENCOUNTER — ANTICOAGULATION THERAPY VISIT (OUTPATIENT)
Dept: NURSING | Facility: CLINIC | Age: 74
End: 2019-04-02
Payer: MEDICARE

## 2019-04-02 DIAGNOSIS — Z79.01 LONG TERM CURRENT USE OF ANTICOAGULANT THERAPY: ICD-10-CM

## 2019-04-02 LAB — INR POINT OF CARE: 1.8 (ref 0.86–1.14)

## 2019-04-02 PROCEDURE — 36416 COLLJ CAPILLARY BLOOD SPEC: CPT

## 2019-04-02 PROCEDURE — 99207 ZZC NO CHARGE NURSE ONLY: CPT

## 2019-04-02 PROCEDURE — 85610 PROTHROMBIN TIME: CPT | Mod: QW

## 2019-04-02 NOTE — PROGRESS NOTES
ANTICOAGULATION FOLLOW-UP CLINIC VISIT    Patient Name:  Caty Ng  Date:  2019  Contact Type:  Face to Face    SUBJECTIVE: physical therapy for once a week for hip and back pain could be inflamation     Patient Findings            OBJECTIVE    INR Protime   Date Value Ref Range Status   2019 1.8 (A) 0.86 - 1.14 Final       ASSESSMENT / PLAN  INR assessment SUB    Recheck INR In: 2 WEEKS    INR Location Clinic      Anticoagulation Summary  As of 2019    INR goal:   2.0-3.0   TTR:   66.7 % (3.7 y)   INR used for dosin.8! (2019)   Warfarin maintenance plan:   1 mg (2 mg x 0.5) every Tue, Fri; 2 mg (2 mg x 1) all other days   Full warfarin instructions:   : 3 mg; : 2 mg; : 3 mg; : 2 mg; Otherwise 1 mg every Tue, Fri; 2 mg all other days   Weekly warfarin total:   12 mg   Plan last modified:   Madhuri Renae RN (2017)   Next INR check:      Priority:   INR   Target end date:   Indefinite    Indications    Atrial fibrillation (H) (Resolved) [I48.91]  Long term current use of anticoagulant therapy [Z79.01]             Anticoagulation Episode Summary     INR check location:   [facility]    Preferred lab:   [facility]    Send INR reminders to:   [facility]    Comments:         Anticoagulation Care Providers     Provider Role Specialty Phone number    Ibrahima MouraDO [facility] [phone]            See the Encounter Report to view Anticoagulation Flowsheet and Dosing Calendar (Go to Encounters tab in chart review, and find the Anticoagulation Therapy Visit)        Maryellen Atwood RN

## 2019-04-16 ENCOUNTER — ANTICOAGULATION THERAPY VISIT (OUTPATIENT)
Dept: NURSING | Facility: CLINIC | Age: 74
End: 2019-04-16
Payer: MEDICARE

## 2019-04-16 DIAGNOSIS — Z79.01 LONG TERM CURRENT USE OF ANTICOAGULANT THERAPY: ICD-10-CM

## 2019-04-16 LAB — INR POINT OF CARE: 2.2 (ref 0.86–1.14)

## 2019-04-16 PROCEDURE — 36416 COLLJ CAPILLARY BLOOD SPEC: CPT

## 2019-04-16 PROCEDURE — 99207 ZZC NO CHARGE NURSE ONLY: CPT

## 2019-04-16 PROCEDURE — 85610 PROTHROMBIN TIME: CPT | Mod: QW

## 2019-04-16 NOTE — PROGRESS NOTES
ANTICOAGULATION FOLLOW-UP CLINIC VISIT    Patient Name:  Caty Ng  Date:  2019  Contact Type:  Face to Face    SUBJECTIVE: changed maintenance dose to reflect.     Patient Findings            OBJECTIVE    INR Protime   Date Value Ref Range Status   2019 2.2 (A) 0.86 - 1.14 Final       ASSESSMENT / PLAN  INR assessment THER    Recheck INR In: 4 WEEKS    INR Location Clinic      Anticoagulation Summary  As of 2019    INR goal:   2.0-3.0   TTR:   66.5 % (3.8 y)   INR used for dosin.2 (2019)   Warfarin maintenance plan:   3 mg (2 mg x 1.5) every Tue; 2 mg (2 mg x 1) all other days   Full warfarin instructions:   3 mg every Tue; 2 mg all other days   Weekly warfarin total:   15 mg   Plan last modified:   Maryellen Atwood RN (2019)   Next INR check:      Priority:   INR   Target end date:   Indefinite    Indications    Atrial fibrillation (H) (Resolved) [I48.91]  Long term current use of anticoagulant therapy [Z79.01]             Anticoagulation Episode Summary     INR check location:   Anticoagulation Clinic    Preferred lab:   [facility]ILL[facility]    Send INR reminders to:   [facility]    Comments:         Anticoagulation Care Providers     Provider Role Specialty Phone number    MartellIbrahima gastelum Abhishek,  [facility] [phone]            See the Encounter Report to view Anticoagulation Flowsheet and Dosing Calendar (Go to Encounters tab in chart review, and find the Anticoagulation Therapy Visit)        Maryellen Atwood RN

## 2019-04-17 DIAGNOSIS — E03.9 HYPOTHYROIDISM, UNSPECIFIED TYPE: ICD-10-CM

## 2019-04-17 RX ORDER — LEVOTHYROXINE SODIUM 75 UG/1
TABLET ORAL
Qty: 90 TABLET | Refills: 0 | Status: SHIPPED | OUTPATIENT
Start: 2019-04-17 | End: 2019-08-24

## 2019-04-17 NOTE — TELEPHONE ENCOUNTER
"Routing refill request to provider for review/approval because:  Was prescribed previously by another provider.     Requested Prescriptions   Pending Prescriptions Disp Refills     levothyroxine (SYNTHROID/LEVOTHROID) 75 MCG tablet 90 tablet 1     Sig: TAKE 1 TABLET EVERY DAY       Thyroid Protocol Passed - 4/17/2019 10:12 AM        Passed - Patient is 12 years or older        Passed - Recent (12 mo) or future (30 days) visit within the authorizing provider's specialty     Patient had office visit in the last 12 months or has a visit in the next 30 days with authorizing provider or within the authorizing provider's specialty.  See \"Patient Info\" tab in inbasket, or \"Choose Columns\" in Meds & Orders section of the refill encounter.              Passed - Medication is active on med list        Passed - Normal TSH on file in past 12 months     Recent Labs   Lab Test 05/04/18  1359   TSH 2.16              Passed - No active pregnancy on record     If patient is pregnant or has had a positive pregnancy test, please check TSH.          Passed - No positive pregnancy test in past 12 months     If patient is pregnant or has had a positive pregnancy test, please check TSH.          Lanie Kumar RN  "

## 2019-04-22 ENCOUNTER — THERAPY VISIT (OUTPATIENT)
Dept: PHYSICAL THERAPY | Facility: CLINIC | Age: 74
End: 2019-04-22
Payer: MEDICARE

## 2019-04-22 DIAGNOSIS — M16.11 PRIMARY OSTEOARTHRITIS OF RIGHT HIP: Primary | ICD-10-CM

## 2019-04-22 DIAGNOSIS — M54.16 LUMBAR RADICULOPATHY: ICD-10-CM

## 2019-04-22 PROCEDURE — 97110 THERAPEUTIC EXERCISES: CPT | Mod: GP | Performed by: PHYSICAL THERAPIST

## 2019-05-03 ENCOUNTER — TELEPHONE (OUTPATIENT)
Dept: CARDIOLOGY | Facility: CLINIC | Age: 74
End: 2019-05-03

## 2019-05-03 NOTE — TELEPHONE ENCOUNTER
Appointment  due with [facility]  Aug. 9,19 note.      Left message to return clinic call to .  Jose Alberto Collins L.P.N.

## 2019-05-14 ENCOUNTER — TELEPHONE (OUTPATIENT)
Dept: FAMILY MEDICINE | Facility: CLINIC | Age: 74
End: 2019-05-14

## 2019-05-14 ENCOUNTER — THERAPY VISIT (OUTPATIENT)
Dept: PHYSICAL THERAPY | Facility: CLINIC | Age: 74
End: 2019-05-14
Payer: MEDICARE

## 2019-05-14 ENCOUNTER — ANTICOAGULATION THERAPY VISIT (OUTPATIENT)
Dept: NURSING | Facility: CLINIC | Age: 74
End: 2019-05-14
Payer: MEDICARE

## 2019-05-14 DIAGNOSIS — M54.16 LUMBAR RADICULOPATHY: ICD-10-CM

## 2019-05-14 DIAGNOSIS — Z79.01 LONG TERM CURRENT USE OF ANTICOAGULANT THERAPY: ICD-10-CM

## 2019-05-14 LAB — INR POINT OF CARE: 2 (ref 0.86–1.14)

## 2019-05-14 PROCEDURE — 97110 THERAPEUTIC EXERCISES: CPT | Mod: GP | Performed by: PHYSICAL THERAPIST

## 2019-05-14 PROCEDURE — 85610 PROTHROMBIN TIME: CPT | Mod: QW

## 2019-05-14 PROCEDURE — 99207 ZZC NO CHARGE NURSE ONLY: CPT

## 2019-05-14 PROCEDURE — 36416 COLLJ CAPILLARY BLOOD SPEC: CPT

## 2019-05-14 NOTE — PROGRESS NOTES
ANTICOAGULATION FOLLOW-UP CLINIC VISIT    Patient Name:  Caty Ng  Date:  2019  Contact Type:  Face to Face    SUBJECTIVE: doing well on current dose of coumadin  Patient Findings         Clinical Outcomes     Negatives:   Major bleeding event, Thromboembolic event, Anticoagulation-related hospital admission, Anticoagulation-related ED visit, Anticoagulation-related fatality           OBJECTIVE    INR Protime   Date Value Ref Range Status   2019 2.0 (A) 0.86 - 1.14 Final       ASSESSMENT / PLAN  INR assessment THER    Recheck INR In: 4 WEEKS    INR Location Clinic      Anticoagulation Summary  As of 2019    INR goal:   2.0-3.0   TTR:   67.2 % (3.8 y)   INR used for dosin.0 (2019)   Warfarin maintenance plan:   3 mg (2 mg x 1.5) every Tue; 2 mg (2 mg x 1) all other days   Full warfarin instructions:   3 mg every Tue; 2 mg all other days   Weekly warfarin total:   15 mg   No change documented:   Maryellen Atwood RN   Plan last modified:   Maryellen Atwood RN (2019)   Next INR check:   2019   Priority:   INR   Target end date:   Indefinite    Indications    Atrial fibrillation (H) (Resolved) [I48.91]  Long term current use of anticoagulant therapy [Z79.01]             Anticoagulation Episode Summary     INR check location:   Anticoagulation Clinic    Preferred lab:   [facility]    Send INR reminders to:   [facility]    Comments:         Anticoagulation Care Providers     Provider Role Specialty Phone number    Ibrahima MouramiltonnohemiDO [facility] [phone]            See the Encounter Report to view Anticoagulation Flowsheet and Dosing Calendar (Go to Encounters tab in chart review, and find the Anticoagulation Therapy Visit)        Maryellen Atwood RN

## 2019-05-14 NOTE — PROGRESS NOTES
[facility] Initial Evaluation  Subjective:  HPI                    Objective:  System    Physical Exam    General     ROS    Assessment/Plan:    DISCHARGE REPORT        SUBJECTIVE  Subjective changes noted by patient:  Subjective: Patient reports feelnig much better and currently has no complaints. Patient is feeling more tired than normal and she is seeing her MD for a medication check    Current pain level is  Current Pain level: 0/10.     Previous pain level was   Initial Pain level: 3/10.   Changes in function:  Yes (See Goal flowsheet attached for changes in current functional level)  Adverse reaction to treatment or activity: None    OBJECTIVE  Changes noted in objective findings:  Yes,   Objective: unremarkable low back screen. Patient continues to walk with a hip drop, but she reports improvments with this since last PT session     ASSESSMENT/PLAN  Updated problem list and treatment plan: Diagnosis 1:  Low back pain  Impaired gait - home program  STG/LTGs have been met or progress has been made towards goals:  Yes (See Goal flow sheet completed today.)  Assessment of Progress: The patient's condition is improving.  The patient has met all of their long term goals.  Self Management Plans:  Patient is independent in a home treatment program.  Patient is independent in self management of symptoms.  I have re-evaluated this patient and find that the nature, scope, duration and intensity of the therapy is appropriate for the medical condition of the patient.  Caty continues to require the following intervention to meet STG and LTG's:  PT intervention is no longer required to meet STG/LTG.    Recommendations:  This patient is ready to be discharged from therapy and continue their home treatment program.    Please refer to the daily flowsheet for treatment today, total treatment time and time spent performing 1:1 timed codes.

## 2019-05-30 ENCOUNTER — OFFICE VISIT (OUTPATIENT)
Dept: PHARMACY | Facility: CLINIC | Age: 74
End: 2019-05-30
Payer: COMMERCIAL

## 2019-05-30 VITALS
SYSTOLIC BLOOD PRESSURE: 94 MMHG | DIASTOLIC BLOOD PRESSURE: 62 MMHG | HEART RATE: 94 BPM | WEIGHT: 256.8 LBS | BODY MASS INDEX: 45.49 KG/M2

## 2019-05-30 DIAGNOSIS — I48.91 ATRIAL FIBRILLATION WITH RVR (H): ICD-10-CM

## 2019-05-30 DIAGNOSIS — E78.5 HYPERLIPIDEMIA LDL GOAL <130: ICD-10-CM

## 2019-05-30 DIAGNOSIS — G47.33 OSA (OBSTRUCTIVE SLEEP APNEA): ICD-10-CM

## 2019-05-30 DIAGNOSIS — I50.32 CHRONIC DIASTOLIC HEART FAILURE (H): ICD-10-CM

## 2019-05-30 DIAGNOSIS — I50.22 CHRONIC SYSTOLIC HEART FAILURE (H): Primary | ICD-10-CM

## 2019-05-30 DIAGNOSIS — E03.4 HYPOTHYROIDISM DUE TO ACQUIRED ATROPHY OF THYROID: ICD-10-CM

## 2019-05-30 PROCEDURE — 99607 MTMS BY PHARM ADDL 15 MIN: CPT | Performed by: PHARMACIST

## 2019-05-30 PROCEDURE — 99605 MTMS BY PHARM NP 15 MIN: CPT | Performed by: PHARMACIST

## 2019-05-30 NOTE — PROGRESS NOTES
SUBJECTIVE/OBJECTIVE:                           Caty Ng is a 74 year old female coming in for an initial visit for Medication Therapy Management.  She was referred to me from Joann Clark.    Chief Complaint: medication management, wants to discuss medication side effect    Allergies/ADRs: Reviewed in Epic  Tobacco: History of tobacco dependence - quit 1982  Alcohol: none  Caffeine: 2 cups/day of coffee  PMH: Reviewed in Epic    Medication Adherence/Access:  no issues reported    HFrEF/Afib: She follows with cardiology, Dr. Casey at the . Patient is currently taking warfarin 2 mg for anticoagulation. Patient reports no current concerns of bruising or bleeding. Did fall once around a year ago. Patient does not have a hx of GI bleed. Patient is also taking carvedilol 12.5 mg BID, diltiazem 240 mg XT daily for rate control, torsemide 40 mg (20 mg x 2 tabs) daily, and losartan 50 mg daily. Last ECHO was 8/17/18 EF 45-50%.   Pt reports the following medication side effects: fatigue, lightheadedness for the last 6 months or so. She denies any recent changes to SOB, LOPEZ, peripheral edema or weight changes. Previously taken amiodarone which was stopped due to persistent afib and side effects. Patient does self-monitor BP, usual readings around 100/70 mmHg. She does monitor sodium in the diet and avoids alcohol.  BP Readings from Last 6 Encounters:   05/30/19 94/62   02/25/19 110/66   08/09/18 107/66   05/04/18 122/84   02/01/18 138/73   01/08/18 122/70     Obstructive sleep apnea: Currently untreated, could not tolerate CPAP. States that using melatonin helps her sleep through the night.     Hyperlipidemia: Current therapy includes atorvastatin 40 mg once daily.  Pt reports no significant myalgias or other side effects.    Hypothyroidism: Patient is taking levothyroxine 75 mcg daily. Patient is having the following symptoms: none.   TSH   Date Value Ref Range Status   05/04/2018 2.16 0.40 - 4.00 mU/L  Final     Today's Vitals: BP 94/62   Pulse 94   Wt 256 lb 12.8 oz (116.5 kg)   BMI 45.49 kg/m        ASSESSMENT:                             Current medications were reviewed today.    Medication Adherence: excellent, no issues identified    HFrEF/Afib: Needs improvement. The combination of carvedilol and diltiazem and their current doses are likely contributing to her fatigue and LHness, especially given symptoms became noticeable upon increase of diltiazem from 120mg to 240mg last October -- in addition to other BP lowering agents including losartan and torsemide. We may be able to alleviate some of these symptoms, but maintain HR <100bpm by decreasing dose of diltiazem to 180 mg daily and increasing carvedilol to 37.5 mg daily (0.5 tablet in the morning and 1 tablet at night). Could also decrease dose of losartan to reduce likelihood of hypotension.     Obstructive sleep apnea: Stable    Hyperlipidemia: Stable    Hypothyroidism: Stable    PLAN:                            1. Cardiology to consider decreasing diltiazem dose to 180 mg daily and increasing carvedilol dose to 37.5 mg daily. AND/OR could also consider decreasing dose of losartan to 25 mg daily. I will forward chart to cardiology and await response.    I spent 60 minutes with this patient today. I offer these suggestions for consideration by Dr. Casey and Joann Clark. A copy of the visit note was provided to the patient's primary care and referring provider.    Will follow up upon hearing back from cardiology.    The patient was given a summary of these recommendations as an after visit summary.     Tanesha Whitman, [employer] Student  Poonam Izquierdo, Pharm.D., BCACP  Medication Therapy Management Pharmacist  [phone]

## 2019-05-30 NOTE — Clinical Note
Dr. Casey,Please see A/P -- would you be ok with slight reduction in diltiazem and slight increase in carvedilol to alleviate some of the fatigue and dizziness? Please let me know and I will contact the patient. Thank you for any input!Poonam Izquierdo, Pharm.D., BCACPMedication Therapy Management [phone]

## 2019-06-11 ENCOUNTER — ANTICOAGULATION THERAPY VISIT (OUTPATIENT)
Dept: NURSING | Facility: CLINIC | Age: 74
End: 2019-06-11
Payer: MEDICARE

## 2019-06-11 DIAGNOSIS — Z79.01 LONG TERM CURRENT USE OF ANTICOAGULANT THERAPY: ICD-10-CM

## 2019-06-11 LAB — INR POINT OF CARE: 2.1 (ref 0.86–1.14)

## 2019-06-11 PROCEDURE — 36416 COLLJ CAPILLARY BLOOD SPEC: CPT

## 2019-06-11 PROCEDURE — 99207 ZZC NO CHARGE NURSE ONLY: CPT

## 2019-06-11 PROCEDURE — 85610 PROTHROMBIN TIME: CPT | Mod: QW

## 2019-06-11 NOTE — PROGRESS NOTES
ANTICOAGULATION FOLLOW-UP CLINIC VISIT    Patient Name:  Caty Ng  Date:  2019  Contact Type:  Face to Face    SUBJECTIVE:  Patient Findings         Clinical Outcomes     Negatives:   Major bleeding event, Thromboembolic event, Anticoagulation-related hospital admission, Anticoagulation-related ED visit, Anticoagulation-related fatality           OBJECTIVE    INR Protime   Date Value Ref Range Status   2019 2.1 (A) 0.86 - 1.14 Final       ASSESSMENT / PLAN  INR assessment THER    Recheck INR In: 2 WEEKS    INR Location Clinic      Anticoagulation Summary  As of 2019    INR goal:   2.0-3.0   TTR:   67.8 % (3.9 y)   INR used for dosin.1 (2019)   Warfarin maintenance plan:   3 mg (2 mg x 1.5) every Fri; 2 mg (2 mg x 1) all other days   Full warfarin instructions:   3 mg every Fri; 2 mg all other days   Weekly warfarin total:   15 mg   Plan last modified:   Maryellen Atwood RN (2019)   Next INR check:   2019   Priority:   INR   Target end date:   Indefinite    Indications    Atrial fibrillation (H) (Resolved) [I48.91]  Long term current use of anticoagulant therapy [Z79.01]             Anticoagulation Episode Summary     INR check location:   Anticoagulation Clinic    Preferred lab:   [facility]ILL[facility]    Send INR reminders to:   [facility]    Comments:         Anticoagulation Care Providers     Provider Role Specialty Phone number    Ibrahima Moura KvngnohemiDO [facility] [phone]            See the Encounter Report to view Anticoagulation Flowsheet and Dosing Calendar (Go to Encounters tab in chart review, and find the Anticoagulation Therapy Visit)        Maryellen Atwood RN

## 2019-06-11 NOTE — PATIENT INSTRUCTIONS
Please call [phone] for questions.    New Hours at [facility]  Monday  730-330 pm  Tuesday 10:00- 6 pm   Friday     12:30- 4:30 pm  Closed on Wed and Thursday

## 2019-06-12 RX ORDER — LOSARTAN POTASSIUM 25 MG/1
25 TABLET ORAL DAILY
Qty: 30 TABLET | Refills: 1 | Status: SHIPPED | OUTPATIENT
Start: 2019-06-12 | End: 2019-06-20

## 2019-06-12 NOTE — PROGRESS NOTES
Cutdown dose of ARB. That may help.    Previous Messages      ----- Message -----   From: Tanesha Whitman   Sent: 6/11/2019   1:38 PM   To: MEL Casey MD   Subject: RE: Medication question                           Maritza Casey,     Thank you for your response.  Given her age and complaints of lightheadedness we were concerned about fall risk. We suspected hypotension since her blood pressure in clinic was 94/62 but recognize that her pulse has been elevated with persistent afib.       I'll let the patient know to continue taking her medications as she has been doing.     Best wishTanesha powers     ----- Message -----   From: MEL Casey MD   Sent: 6/11/2019  11:25 AM   To: Tanesha Whitman   Subject: RE: Medication question                           Leave the dose of Coreg and Cardizem as it is . Her heartrate is barely controlled at the current dose.   Are you thinking Hypotension is the cause?   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 We saw Abi a few weeks ago for lightheadedness and noted a few possible medication effects. She came back to clinic today looking for follow-up on the recommendations.     Our original recommendations were to consider decreasing diltiazem dose to 180 mg daily and increasing carvedilol dose to 37.5 mg daily. AND/OR could also consider decreasing dose of losartan to 25 mg daily. Please let me know if you have an opportunity to review Abi's chart and let the patient know how you would like to proceed.     Thank you for your time.     Best wishesTanesha

## 2019-06-12 NOTE — PROGRESS NOTES
Working with Poonam's pharmacy student today. Will send new prescription for Losartan 25mg daily to pharmacy as cardiology agreed with recommendation to decrease dose and will follow-up with patient to inform her of the change.    Keke Lopez, Pharm.D, [facility] Center[facility]  Medication Therapy Management Pharmacist

## 2019-06-26 NOTE — PROGRESS NOTES
"SUBJECTIVE/OBJECTIVE:                           Caty Ng is a 74 year old female coming in for a follow-up visit for Medication Therapy Management.  She was referred to me from Joann Clark.    Chief Complaint: medication management, wants to discuss medication side effect.     Allergies/ADRs: Reviewed in Epic  Tobacco: History of tobacco dependence - quit 1982  Alcohol: none  Caffeine: 2 cups/day of coffee  PMH: Reviewed in Epic    Medication Adherence/Access: no issues reported.     HFrEF/Afib: She follows with cardiology, Dr. Casey at the . Patient is currently taking warfarin 2 mg for anticoagulation. Patient reports no current concerns of bruising or bleeding. Did fall once around a year ago. Patient does not have a hx of GI bleed.   Patient is also taking carvedilol 12.5 mg BID, diltiazem 240 mg XT daily for rate control, torsemide 40 mg (20 mg x 2 tabs) daily, and losartan 25 mg daily. Last ECHO was 8/17/18 EF 45-50%. She denies any recent changes to LOPEZ, peripheral edema or weight changes. Has some trouble breathing when it gets really humid. Previously taken amiodarone which was stopped due to persistent afib and side effects. Patient does self-monitor BP, usual readings around 100/70 mmHg. Thinks that blood pressure cuff may not be accurate with readings \"all over the place\". She does monitor sodium in the diet and avoids alcohol.  Recently decreased dose of losartan from 50 mg to 25 mg based on side effect of lightheadedness. Is feeling much better with less dizziness since decreasing dose of losartan. Has had a little dizziness lately but attributes to heat. Mentioned unsteady gait and using a cane ever since a fall on ice last winter. She is afraid to go [place] this [place] due to uneven ground.       BP Readings from Last 6 Encounters:   05/30/19 94/62   02/25/19 110/66   08/09/18 107/66   05/04/18 122/84   02/01/18 138/73   01/08/18 122/70     Immunizations: " Pt asked about Shingles shot and whether it is covered under Part B or Part D. Called plan and had a hard time getting a straight answer. She and her  both need to start the two-shot series.     Today's Vitals: /64 (BP Location: Right arm)   Wt 251 lb 6.4 oz (114 kg)   BMI 44.53 kg/m        ASSESSMENT:                             Current medications were reviewed today.    Medication Adherence: excellent, no issues identified    HFrEF/Afib: Stable. Significant improvement since last visit with mostly resolved dizziness on lower dose of losartan. Pt may benefit from assessment by occupational therapy due to ongoing issues with unsteady gait.     Immunizations: Needs improvement. Pt would benefit from receiving Shingrix vaccine to reduce risk of developing shingles.     PLAN:                            1. Call insurance to determine if [employer] ([employer] supplemental) or [employer] (PDP) covers Shingrix. Will send [employer] after determining coverage in pharmacy or in clinic.  2. Refer to occupational therapy for assessment of gait and optimization of mobility.     I spent 30 minutes with this patient today. I offer these suggestions for consideration by Dr. Casey and Joann Clark. A copy of the visit note was provided to the patient's primary care and referring provider.    Will follow up within a week by Wanda and as needed.    The patient was given a summary of these recommendations as an after visit summary.     Tanesha Whitman, PharmD IV Student  Poonam Izquierdo, Pharm.D., BCACP  Medication Therapy Management Pharmacist  [phone]

## 2019-07-09 ENCOUNTER — ANTICOAGULATION THERAPY VISIT (OUTPATIENT)
Dept: NURSING | Facility: CLINIC | Age: 74
End: 2019-07-09
Payer: MEDICARE

## 2019-07-09 DIAGNOSIS — Z79.01 LONG TERM CURRENT USE OF ANTICOAGULANT THERAPY: ICD-10-CM

## 2019-07-09 LAB — INR POINT OF CARE: 2.3 (ref 0.86–1.14)

## 2019-07-09 PROCEDURE — 85610 PROTHROMBIN TIME: CPT | Mod: QW

## 2019-07-09 PROCEDURE — 36416 COLLJ CAPILLARY BLOOD SPEC: CPT

## 2019-07-09 PROCEDURE — 99207 ZZC NO CHARGE NURSE ONLY: CPT

## 2019-07-09 NOTE — PATIENT INSTRUCTIONS
Please call [phone] for questions.    New Hours at [facility]  Monday  730-330 pm  Tuesday 10:00- 6 pm   Friday     12:30- 4:30 pm  Closed on Wed and Thursday

## 2019-07-09 NOTE — PROGRESS NOTES
ANTICOAGULATION FOLLOW-UP CLINIC VISIT    Patient Name:  Caty Ng  Date:  2019  Contact Type:  Face to Face    SUBJECTIVE:  Patient Findings         Clinical Outcomes     Negatives:   Major bleeding event, Thromboembolic event, Anticoagulation-related hospital admission, Anticoagulation-related ED visit, Anticoagulation-related fatality           OBJECTIVE    INR Protime   Date Value Ref Range Status   2019 2.3 (A) 0.86 - 1.14 Final       ASSESSMENT / PLAN  INR assessment THER    Recheck INR In: 4 WEEKS    INR Location Clinic      Anticoagulation Summary  As of 2019    INR goal:   2.0-3.0   TTR:   68.5 % (4 y)   INR used for dosin.3 (2019)   Warfarin maintenance plan:   3 mg (2 mg x 1.5) every Fri; 2 mg (2 mg x 1) all other days   Full warfarin instructions:   3 mg every Fri; 2 mg all other days   Weekly warfarin total:   15 mg   No change documented:   Maryellen Atwood RN   Plan last modified:   Maryellen Atwood RN (2019)   Next INR check:      Priority:   INR   Target end date:   Indefinite    Indications    Atrial fibrillation (H) (Resolved) [I48.91]  Long term current use of anticoagulant therapy [Z79.01]             Anticoagulation Episode Summary     INR check location:   Anticoagulation Clinic    Preferred lab:   [email or web address]    Send INR reminders to:   BREE WALDROP    Comments:         Anticoagulation Care Providers     Provider Role Specialty Phone number    Ibrahima Moura KvngnohemiDO [facility] [phone]            See the Encounter Report to view Anticoagulation Flowsheet and Dosing Calendar (Go to Encounters tab in chart review, and find the Anticoagulation Therapy Visit)        Maryellen Atwood RN

## 2019-07-16 ENCOUNTER — OFFICE VISIT (OUTPATIENT)
Dept: PHARMACY | Facility: CLINIC | Age: 74
End: 2019-07-16
Payer: COMMERCIAL

## 2019-07-16 VITALS — DIASTOLIC BLOOD PRESSURE: 64 MMHG | BODY MASS INDEX: 44.53 KG/M2 | WEIGHT: 251.4 LBS | SYSTOLIC BLOOD PRESSURE: 106 MMHG

## 2019-07-16 DIAGNOSIS — I48.91 ATRIAL FIBRILLATION WITH RVR (H): ICD-10-CM

## 2019-07-16 DIAGNOSIS — R29.6 FALLS FREQUENTLY: ICD-10-CM

## 2019-07-16 DIAGNOSIS — I50.22 CHRONIC SYSTOLIC HEART FAILURE (H): Primary | ICD-10-CM

## 2019-07-16 PROCEDURE — 99606 MTMS BY PHARM EST 15 MIN: CPT | Performed by: PHARMACIST

## 2019-07-16 PROCEDURE — 99607 MTMS BY PHARM ADDL 15 MIN: CPT | Performed by: PHARMACIST

## 2019-07-16 NOTE — PATIENT INSTRUCTIONS
Recommendations from today's MTM visit:                                                      Continue current medications without change. It sounds like the reduced dose of losartan has helped with dizziness.    We will contact you regarding the best place to get your shingles vaccine.    Next MTM visit: as needed    To schedule another MTM appointment, please call the clinic directly or you may call the MTM scheduling line at [phone] or toll-free at [phone].     My Clinical Pharmacist's contact information:                                                      It was a pleasure talking with you today!  Please feel free to contact me with any questions or concerns you have.      Poonam Izquierdo, Pharm.D., [facility]  Medication Therapy Management Pharmacist  [phone]    You may receive a survey about the MTM services you received by email and/or US Mail.  I would appreciate your feedback to help me serve you better in the future. Your comments will be anonymous.

## 2019-07-17 ENCOUNTER — MYC MEDICAL ADVICE (OUTPATIENT)
Dept: PHARMACY | Facility: CLINIC | Age: 74
End: 2019-07-17

## 2019-08-06 ENCOUNTER — ANTICOAGULATION THERAPY VISIT (OUTPATIENT)
Dept: NURSING | Facility: CLINIC | Age: 74
End: 2019-08-06
Payer: MEDICARE

## 2019-08-06 DIAGNOSIS — Z79.01 LONG TERM CURRENT USE OF ANTICOAGULANT THERAPY: ICD-10-CM

## 2019-08-06 LAB — INR POINT OF CARE: 2 (ref 0.86–1.14)

## 2019-08-06 PROCEDURE — 85610 PROTHROMBIN TIME: CPT | Mod: QW

## 2019-08-06 PROCEDURE — 99207 ZZC NO CHARGE NURSE ONLY: CPT

## 2019-08-06 PROCEDURE — 36416 COLLJ CAPILLARY BLOOD SPEC: CPT

## 2019-08-06 NOTE — PROGRESS NOTES
ANTICOAGULATION FOLLOW-UP CLINIC VISIT    Patient Name:  Caty Ng  Date:  2019  Contact Type:  Face to Face    SUBJECTIVE:  Patient Findings     Comments:   Bleeding Signs/Symptoms: NO  Thromboembolic Signs/Symptoms: NO     Medication Changes:  NO  Dietary Changes:  NO  Bacterial/Viral Infection: NO     Missed Coumadin Doses: NO  Other Concerns:  NO          Clinical Outcomes     Negatives:   Major bleeding event, Thromboembolic event, Anticoagulation-related hospital admission, Anticoagulation-related ED visit, Anticoagulation-related fatality    Comments:   Bleeding Signs/Symptoms: NO  Thromboembolic Signs/Symptoms: NO     Medication Changes:  NO  Dietary Changes:  NO  Bacterial/Viral Infection: NO     Missed Coumadin Doses: NO  Other Concerns:  NO             OBJECTIVE    INR Protime   Date Value Ref Range Status   2019 2.0 (A) 0.86 - 1.14 Final       ASSESSMENT / PLAN  INR assessment THER    Recheck INR In: 4 WEEKS    INR Location Clinic      Anticoagulation Summary  As of 2019    INR goal:   2.0-3.0   TTR:   69.1 % (4.1 y)   INR used for dosin.0 (2019)   Warfarin maintenance plan:   3 mg (2 mg x 1.5) every Fri; 2 mg (2 mg x 1) all other days   Full warfarin instructions:   3 mg every Fri; 2 mg all other days   Weekly warfarin total:   15 mg   Plan last modified:   Maryellen Atwood RN (2019)   Next INR check:      Priority:   INR   Target end date:   Indefinite    Indications    Atrial fibrillation (H) (Resolved) [I48.91]  Long term current use of anticoagulant therapy [Z79.01]             Anticoagulation Episode Summary     INR check location:   Anticoagulation Clinic    Preferred lab:   [facility]    Send INR reminders to:   BREE WALDROP    Comments:         Anticoagulation Care Providers     Provider Role Specialty Phone number    MartellIbrahima DO [facility] [phone]            See the Encounter Report to view  Anticoagulation Flowsheet and Dosing Calendar (Go to Encounters tab in chart review, and find the Anticoagulation Therapy Visit)        Maryellen Atwood RN

## 2019-08-07 DIAGNOSIS — I50.33 ACUTE ON CHRONIC DIASTOLIC CONGESTIVE HEART FAILURE (H): ICD-10-CM

## 2019-08-08 RX ORDER — TORSEMIDE 20 MG/1
TABLET ORAL
Qty: 180 TABLET | Refills: 0 | Status: SHIPPED | OUTPATIENT
Start: 2019-08-08 | End: 2019-10-09

## 2019-08-08 NOTE — TELEPHONE ENCOUNTER
Signed Prescriptions:                        Disp   Refills    torsemide (DEMADEX) 20 MG tablet           180 ta*0        Sig: TAKE 2 TABLETS EVERY DAY  Authorizing Provider: MEL CALLAHAN  Ordering User: SHAMAR RAMOS

## 2019-08-15 ENCOUNTER — OFFICE VISIT (OUTPATIENT)
Dept: CARDIOLOGY | Facility: CLINIC | Age: 74
End: 2019-08-15
Payer: MEDICARE

## 2019-08-15 ENCOUNTER — ANCILLARY PROCEDURE (OUTPATIENT)
Dept: CARDIOLOGY | Facility: CLINIC | Age: 74
End: 2019-08-15
Attending: INTERNAL MEDICINE
Payer: MEDICARE

## 2019-08-15 VITALS
OXYGEN SATURATION: 94 % | HEART RATE: 66 BPM | BODY MASS INDEX: 44.99 KG/M2 | WEIGHT: 254 LBS | SYSTOLIC BLOOD PRESSURE: 113 MMHG | DIASTOLIC BLOOD PRESSURE: 72 MMHG

## 2019-08-15 DIAGNOSIS — I48.19 PERSISTENT ATRIAL FIBRILLATION (H): ICD-10-CM

## 2019-08-15 DIAGNOSIS — I50.32 CHRONIC DIASTOLIC HEART FAILURE (H): ICD-10-CM

## 2019-08-15 DIAGNOSIS — I48.19 PERSISTENT ATRIAL FIBRILLATION (H): Primary | ICD-10-CM

## 2019-08-15 PROCEDURE — 99214 OFFICE O/P EST MOD 30 MIN: CPT | Performed by: INTERNAL MEDICINE

## 2019-08-15 PROCEDURE — 0298T ZZC EXT ECG > 48HR TO 21 DAY REVIEW AND INTERPRETATN: CPT | Performed by: INTERNAL MEDICINE

## 2019-08-15 PROCEDURE — 0296T ZZHC  EXT ECG > 48HR TO 21 DAY RCRD W/CONECT INTL RCRD: CPT | Performed by: INTERNAL MEDICINE

## 2019-08-15 RX ORDER — CARVEDILOL 25 MG/1
12.5 TABLET ORAL 2 TIMES DAILY WITH MEALS
Qty: 90 TABLET | Refills: 3 | Status: SHIPPED | OUTPATIENT
Start: 2019-08-15 | End: 2020-07-10

## 2019-08-15 NOTE — PATIENT INSTRUCTIONS
Thank you for coming to the [facility] @ [facility]; please note the following instructions:    1. Echocardiogram scheduled. please see following pages for appointment detail information.    2.  We have applied a holter (heart) monitor for you to wear for 3 days.  You may remove the heart monitor on 8/18/19 at 2:00pm.  Please see the enclosed instructions for further information, as well as instructions for removal of the heart monitor and return mailing directions.  If you should have questions regarding your monitor, please call [employer] at [phone].  The Cardiology Nurse will contact you with your results (please see result notification details at bottom of summary).    *PLEASE DO NOT SHOWER OR INDUCE EXCESSIVE SWEATING IN THE FIRST 24 HOURS OF WEARING*          If you have any questions regarding your visit please contact your care team:     Cardiology  Telephone Number   Rajani HOLLOWAY., RN  Karen PIERSON, RN   Mela LUNA, RMA  Kiara DESOUZA, RMA  Jose Alberto YODER, [facility]   [phone] (option 1)   For scheduling appts:     [phone] (select option 1)       For the Device Clinic (Pacemakers and ICD's)  RN's :  Maricruz Chaudhari   During business hours: [phone]    *After business hours:  [phone] (select option 4)      Normal test result notifications will be released via [employer] or mailed within 7 business days.  All other test results, will be communicated via telephone once reviewed by your cardiologist.    If you need a medication refill please contact your pharmacy.  Please allow 3 business days for your refill to be completed.    As always, thank you for trusting us with your health care needs!

## 2019-08-15 NOTE — PROGRESS NOTES
SUBJECTIVE:  Caty Ng is a 74 year old female who presents for yearly follow-up.    Asymptomatic PAF. Had one cardioversion and was on Amio. Amio was stopped recently due to persistent Afib. Also has Restrictive Lung disease.     Normal coronary angiogram 2012. LAD calcium score 414 in 2015. No symptoms.     Patient is complaining of more shortness of breath especially going up and down.  No PND or other heart failure symptoms.  Still patient is not aware of atrial fibrillation.    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 Provider to review       Calcification of coronary artery 07/01/2015     Priority: Medium     Hyperlipidemia LDL goal <130 11/05/2013     Priority: Medium     BROCK (obstructive sleep apnea) 11/04/2013     Priority: Medium     Patient has chosen to not do cpap.       Systolic heart failure (H)      Priority: Medium     EF 45 - 50%         Mitral valve regurgitation 06/24/2012     Priority: Medium     Health Care Home 06/22/2012     Priority: Medium     FPA [facility] for .  Rosenda Hoyt RN-BSN, [facility]  [phone]   DX V65.8 REPLACED WITH [id number] HEALTH CARE HOME (04/08/2013)       Atrial fibrillation with RVR (H) 06/21/2012     Priority: Medium     Hypertriglyceridemia      Priority: Medium     Morbid obesity due to excess calories (H) 11/29/2011     Priority: Medium     Advanced directives, counseling/discussion 11/29/2011      Priority: Low     Advance Directive Problem List Overview:   Name Relationship Phone    Primary Health Care Agent            Alternative Health Care Agent          Discussed advance care planning with patient; information given to patient to review. 11/29/2011       .  Current Outpatient Medications   Medication Sig     albuterol (PROAIR HFA/PROVENTIL HFA/VENTOLIN HFA) 108 (90 BASE) MCG/ACT Inhaler Inhale 2 puffs into the lungs every 6 hours as needed for shortness of breath / dyspnea or wheezing Profile Rx: patient will contact pharmacy when needed     atorvastatin (LIPITOR) 40 MG tablet Take 1 tablet (40 mg) by mouth daily     carvedilol (COREG) 25 MG tablet Take 0.5 tablets (12.5 mg) by mouth 2 times daily (with meals)     diltiazem (CARTIA XT) 240 MG 24 hr capsule Take 1 capsule (240 mg) by mouth daily     fluticasone (FLONASE) 50 MCG/ACT nasal spray Spray 1-2 sprays into both nostrils daily Please inform patient if the OTC would be cheaper for her or if there is an alternative nasal steroid that is less costly, please provide that information.     levothyroxine (SYNTHROID/LEVOTHROID) 75 MCG tablet TAKE 1 TABLET EVERY DAY     loratadine (CLARITIN) 10 MG tablet Take 10 mg by mouth as needed for allergies     losartan (COZAAR) 25 MG tablet Take 1 tablet (25 mg) by mouth daily     torsemide (DEMADEX) 20 MG tablet TAKE 2 TABLETS EVERY DAY     warfarin (COUMADIN) 2 MG tablet USE AS DIRECTED BY YOUR PRESCRIBER. COMPLETE DIRECTIONS ARE INCLUDED IN A LETTER WITH YOUR ORIGINAL ORDER     No current facility-administered medications for this visit.      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= [employer] [facility]     Colon adenoma      DDD (degenerative disc disease), lumbar      Degenerative joint disease of right hip      Hyperlipidemia      Hypertriglyceridemia      Mediastinal mass 2015    benign, [facility] consult       Mitral valve regurgitation 2012     Obesity      BROCK (obstructive sleep apnea)      Scoliosis      Systolic heart failure (H)      Past Surgical History:   Procedure Laterality Date     HYSTERECTOMY, PAP NO LONGER INDICATED       JOINT REPLACEMTN, KNEE RT/LT      right     JOINT REPLACEMTN, KNEE RT/LT Left      SALPINGO OOPHORECTOMY,R/L/BRANDO Bilateral      SURGICAL HISTORY OF -       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     Allergies   Allergen Reactions     Lisinopril      cough     Metal [Staples] Other (See Comments)     Metal allergy not clearly specified - patient had a stainless steel screw put in her foot that needed to be removed due to allergy/hypersensitivity      Metoprolol Fatigue     Social History     Socioeconomic History     Marital status:      Spouse name: Bill      Number of children: 2     Years of education: Not on file     Highest education level: Not on file   Occupational History     Occupation: taxes      Employer: RETIRED   Social Needs     Financial resource strain: Not on file     Food insecurity:     Worry: Not on file     Inability: Not on file     Transportation needs:     Medical: Not on file     Non-medical: Not on file   Tobacco Use     Smoking status: Former Smoker     Packs/day: 0.50     Years: 20.00     Pack years: 10.00     Types: Cigarettes     Last attempt to quit: 10/1/1982     Years since quittin.8     Smokeless tobacco: Never Used   Substance and Sexual Activity     Alcohol use: No     Comment: 1 beers per week     Drug use: No     Sexual activity: Yes     Partners: Male     Birth control/protection: Post-menopausal   Lifestyle     Physical activity:     Days per week: Not on file     Minutes per session: Not on file     Stress: Not on file   Relationships     Social connections:     Talks on phone: Not on file     Gets together: Not on file     Attends Ep[employer] service: Not on file     Active member of club or organization: Not on file      Attends meetings of clubs or organizations: Not on file     Relationship status: Not on file     Intimate partner violence:     Fear of current or ex partner: Not on file     Emotionally abused: Not on file     Physically abused: Not on file     Forced sexual activity: Not on file   Other Topics Concern     Parent/sibling w/ CABG, MI or angioplasty before 65F 55M? Yes   Social History Narrative     Not on file     Family History   Problem Relation Age of Onset     Heart Disease Mother      Cancer Mother         ovarian     Diabetes Father      Musculoskeletal Disorder Father      Diabetes Paternal Grandmother      Diabetes Paternal Grandfather      Diabetes Sister      Thrombophilia Daughter         Factor V Leiden Deficiency          REVIEW OF SYSTEMS:  General: negative, fever, chills, night sweats  Skin: negative, acne, rash and scaling  Eyes: negative, double vision, eye pain and photophobia  Ears/Nose/Throat: negative, nasal congestion and purulent rhinorrhea  Respiratory: No cough, No hemoptysis and negative  Cardiovascular: negative, palpitations, tachycardia, irregular heart beat, chest pain, exertional chest pain or pressure, paroxysmal nocturnal dyspnea and orthopnea         OBJECTIVE:  Blood pressure 113/72, pulse 66, weight 115.2 kg (254 lb), SpO2 94 %, not currently breastfeeding.  General Appearance: alert and no distress  Head: Normocephalic. No masses, lesions, tenderness or abnormalities  Eyes: conjuctiva clear, PERRL, EOM intact  Ears: External ears normal. Canals clear. TM's normal.  Nose: Nares normal  Mouth: normal  Neck: Supple, no cervical adenopathy, no thyromegaly  Lungs: clear to auscultation  Cardiac: regular rate and rhythm, normal S1 and S2, MR.         ASSESSMENT/PLAN:  Patient here for yearly follow-up.  Asymptomatic atrial fibrillation.  Had one cardioversion and patient was on amiodarone.  Amiodarone was discontinued last year due to permanent atrial fibrillation.  Patient  complains of shortness of breath.  This is mainly exertional.  No CHF symptoms like PND or orthopnea.  Patient still unaware of atrial fibrillation.  Last echo showed EF of 45 to 50%.  Also had moderate mitral regurgitation.  We will plan for a 3 days of 0 patch to assess A. fib rate control.  Also plan for a repeat echocardiogram to reassess the LV function.  Patient will be contacted with these results  Advised to continue current medications.  Per orders.   Return to Clinic 1yr.

## 2019-08-15 NOTE — NURSING NOTE
"Chief Complaint   Patient presents with     RECHECK     OV with Dr. JACOB Whitlock for 1 year follow up for PAF, chronic diastolic HF. Pt reports increased SOB.       Initial /72 (BP Location: Right arm, Patient Position: Chair, Cuff Size: Adult Large)   Pulse 66   Wt 115.2 kg (254 lb)   SpO2 94%   BMI 44.99 kg/m   Estimated body mass index is 44.99 kg/m  as calculated from the following:    Height as of 2/25/19: 1.6 m (5' 3\").    Weight as of this encounter: 115.2 kg (254 lb)..  BP completed using cuff size: large    Kiara Rahman MA    "

## 2019-08-15 NOTE — LETTER
8/15/2019      RE: Caty Ng  [address] [address]       Dear Colleague,    Thank you for the opportunity to participate in the care of your patient, Caty Ng, at the [facility] HEART AT [facility]. Please see a copy of my visit note below.       SUBJECTIVE:  Caty Ng is a 74 year old female who presents for yearly follow-up.    Asymptomatic PAF. Had one cardioversion and was on Amio. Amio was stopped recently due to persistent Afib. Also has Restrictive Lung disease.     Normal coronary angiogram 2012. LAD calcium score 414 in 2015. No symptoms.     Patient is complaining of more shortness of breath especially going up and down.  No PND or other heart failure symptoms.  Still patient is not aware of atrial fibrillation.    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 Provider to review       Calcification of coronary artery 07/01/2015     Priority: Medium     Hyperlipidemia LDL goal <130 11/05/2013     Priority: Medium     BROCK (obstructive sleep apnea) 11/04/2013     Priority: Medium     Patient has chosen to not do cpap.       Systolic heart failure (H)      Priority: Medium     EF 45 - 50%         Mitral valve regurgitation 06/24/2012     Priority: Medium     Health Care Home 06/22/2012     Priority: Medium     [facility] [place] for .  Rosenda  Lai RN-BSN, [facility]  [phone]   DX V65.8 REPLACED WITH [id number] HEALTH CARE HOME (04/08/2013)       Atrial fibrillation with RVR (H) 06/21/2012     Priority: Medium     Hypertriglyceridemia      Priority: Medium     Morbid obesity due to excess calories (H) 11/29/2011     Priority: Medium     Advanced directives, counseling/discussion 11/29/2011     Priority: Low               Discussed advance care planning with patient; information given to patient to review. 11/29/2011

## 2019-08-18 ENCOUNTER — TRANSFERRED RECORDS (OUTPATIENT)
Dept: HEALTH INFORMATION MANAGEMENT | Facility: CLINIC | Age: 74
End: 2019-08-18

## 2019-08-23 ENCOUNTER — ANCILLARY PROCEDURE (OUTPATIENT)
Dept: CARDIOLOGY | Facility: CLINIC | Age: 74
End: 2019-08-23
Attending: INTERNAL MEDICINE
Payer: MEDICARE

## 2019-08-23 DIAGNOSIS — I50.32 CHRONIC DIASTOLIC HEART FAILURE (H): ICD-10-CM

## 2019-08-23 DIAGNOSIS — I48.19 PERSISTENT ATRIAL FIBRILLATION (H): ICD-10-CM

## 2019-08-23 PROCEDURE — 93306 TTE W/DOPPLER COMPLETE: CPT | Performed by: INTERNAL MEDICINE

## 2019-08-23 PROCEDURE — 40000264 ZZHC STATISTIC IV PUSH SINGLE INITIAL SUBSTANCE: Performed by: INTERNAL MEDICINE

## 2019-08-23 RX ADMIN — Medication 3 ML: at 15:45

## 2019-08-24 DIAGNOSIS — E03.9 HYPOTHYROIDISM, UNSPECIFIED TYPE: ICD-10-CM

## 2019-08-26 NOTE — PROGRESS NOTES
3 days ZioXT applied to patient today. Instructions on use and removal given and mail back instructions discussed. All questions answered. Consent form signed. Device registered. Form faxed to [employer].  Device number: [id number]    Kiara Rahman MA

## 2019-08-26 NOTE — TELEPHONE ENCOUNTER
Attempt 1, called patient and left VM to call clinic. Please help schedule med check appointment if patient returns call.  Keke MICHELLE CMA ([facility])

## 2019-08-27 RX ORDER — LEVOTHYROXINE SODIUM 75 UG/1
TABLET ORAL
Qty: 90 TABLET | Refills: 0 | Status: SHIPPED | OUTPATIENT
Start: 2019-08-27 | End: 2019-08-28

## 2019-08-27 NOTE — TELEPHONE ENCOUNTER
Patient has physical scheduled for 8/28/2019 with PCP. Please send refills if appropriate thank you  LS

## 2019-08-28 ENCOUNTER — OFFICE VISIT (OUTPATIENT)
Dept: FAMILY MEDICINE | Facility: CLINIC | Age: 74
End: 2019-08-28
Payer: MEDICARE

## 2019-08-28 VITALS
SYSTOLIC BLOOD PRESSURE: 118 MMHG | OXYGEN SATURATION: 92 % | DIASTOLIC BLOOD PRESSURE: 68 MMHG | TEMPERATURE: 98.2 F | RESPIRATION RATE: 16 BRPM | BODY MASS INDEX: 45.18 KG/M2 | HEART RATE: 94 BPM | WEIGHT: 255 LBS | HEIGHT: 63 IN

## 2019-08-28 DIAGNOSIS — E66.01 MORBID OBESITY DUE TO EXCESS CALORIES (H): ICD-10-CM

## 2019-08-28 DIAGNOSIS — I50.22 CHRONIC SYSTOLIC HEART FAILURE (H): ICD-10-CM

## 2019-08-28 DIAGNOSIS — E03.9 HYPOTHYROIDISM, UNSPECIFIED TYPE: ICD-10-CM

## 2019-08-28 DIAGNOSIS — I48.91 ATRIAL FIBRILLATION WITH RVR (H): ICD-10-CM

## 2019-08-28 DIAGNOSIS — Z00.00 ENCOUNTER FOR MEDICARE ANNUAL WELLNESS EXAM: Primary | ICD-10-CM

## 2019-08-28 DIAGNOSIS — Z12.31 VISIT FOR SCREENING MAMMOGRAM: ICD-10-CM

## 2019-08-28 DIAGNOSIS — Z12.11 SPECIAL SCREENING FOR MALIGNANT NEOPLASMS, COLON: ICD-10-CM

## 2019-08-28 DIAGNOSIS — E78.5 HYPERLIPIDEMIA LDL GOAL <130: ICD-10-CM

## 2019-08-28 LAB
ALBUMIN SERPL-MCNC: 3.7 G/DL (ref 3.4–5)
ALP SERPL-CCNC: 87 U/L (ref 40–150)
ALT SERPL W P-5'-P-CCNC: 16 U/L (ref 0–50)
ANION GAP SERPL CALCULATED.3IONS-SCNC: 8 MMOL/L (ref 3–14)
AST SERPL W P-5'-P-CCNC: 13 U/L (ref 0–45)
BASOPHILS # BLD AUTO: 0 10E9/L (ref 0–0.2)
BASOPHILS NFR BLD AUTO: 0.3 %
BILIRUB SERPL-MCNC: 0.8 MG/DL (ref 0.2–1.3)
BUN SERPL-MCNC: 16 MG/DL (ref 7–30)
CALCIUM SERPL-MCNC: 9.1 MG/DL (ref 8.5–10.1)
CHLORIDE SERPL-SCNC: 106 MMOL/L (ref 94–109)
CHOLEST SERPL-MCNC: 86 MG/DL
CO2 SERPL-SCNC: 28 MMOL/L (ref 20–32)
CREAT SERPL-MCNC: 0.74 MG/DL (ref 0.52–1.04)
DIFFERENTIAL METHOD BLD: NORMAL
EOSINOPHIL # BLD AUTO: 0.1 10E9/L (ref 0–0.7)
EOSINOPHIL NFR BLD AUTO: 1.8 %
ERYTHROCYTE [DISTWIDTH] IN BLOOD BY AUTOMATED COUNT: 14.2 % (ref 10–15)
GFR SERPL CREATININE-BSD FRML MDRD: 80 ML/MIN/{1.73_M2}
GLUCOSE SERPL-MCNC: 102 MG/DL (ref 70–99)
HCT VFR BLD AUTO: 42.6 % (ref 35–47)
HCV AB SERPL QL IA: NONREACTIVE
HDLC SERPL-MCNC: 41 MG/DL
HGB BLD-MCNC: 14 G/DL (ref 11.7–15.7)
LDLC SERPL CALC-MCNC: 24 MG/DL
LYMPHOCYTES # BLD AUTO: 1.3 10E9/L (ref 0.8–5.3)
LYMPHOCYTES NFR BLD AUTO: 17.4 %
MCH RBC QN AUTO: 30.4 PG (ref 26.5–33)
MCHC RBC AUTO-ENTMCNC: 32.9 G/DL (ref 31.5–36.5)
MCV RBC AUTO: 92 FL (ref 78–100)
MONOCYTES # BLD AUTO: 0.8 10E9/L (ref 0–1.3)
MONOCYTES NFR BLD AUTO: 10.5 %
NEUTROPHILS # BLD AUTO: 5.4 10E9/L (ref 1.6–8.3)
NEUTROPHILS NFR BLD AUTO: 70 %
NONHDLC SERPL-MCNC: 45 MG/DL
PLATELET # BLD AUTO: 205 10E9/L (ref 150–450)
POTASSIUM SERPL-SCNC: 4.3 MMOL/L (ref 3.4–5.3)
PROT SERPL-MCNC: 7.6 G/DL (ref 6.8–8.8)
RBC # BLD AUTO: 4.61 10E12/L (ref 3.8–5.2)
SODIUM SERPL-SCNC: 142 MMOL/L (ref 133–144)
TRIGL SERPL-MCNC: 105 MG/DL
TSH SERPL DL<=0.005 MIU/L-ACNC: 1.62 MU/L (ref 0.4–4)
WBC # BLD AUTO: 7.7 10E9/L (ref 4–11)

## 2019-08-28 PROCEDURE — 84443 ASSAY THYROID STIM HORMONE: CPT | Performed by: FAMILY MEDICINE

## 2019-08-28 PROCEDURE — 36415 COLL VENOUS BLD VENIPUNCTURE: CPT | Performed by: FAMILY MEDICINE

## 2019-08-28 PROCEDURE — G0438 PPPS, INITIAL VISIT: HCPCS | Performed by: FAMILY MEDICINE

## 2019-08-28 PROCEDURE — 80053 COMPREHEN METABOLIC PANEL: CPT | Performed by: FAMILY MEDICINE

## 2019-08-28 PROCEDURE — 80061 LIPID PANEL: CPT | Performed by: FAMILY MEDICINE

## 2019-08-28 PROCEDURE — 90471 IMMUNIZATION ADMIN: CPT | Performed by: FAMILY MEDICINE

## 2019-08-28 PROCEDURE — 90714 TD VACC NO PRESV 7 YRS+ IM: CPT | Performed by: FAMILY MEDICINE

## 2019-08-28 PROCEDURE — 90670 PCV13 VACCINE IM: CPT | Performed by: FAMILY MEDICINE

## 2019-08-28 PROCEDURE — 99213 OFFICE O/P EST LOW 20 MIN: CPT | Mod: 25 | Performed by: FAMILY MEDICINE

## 2019-08-28 PROCEDURE — 85025 COMPLETE CBC W/AUTO DIFF WBC: CPT | Performed by: FAMILY MEDICINE

## 2019-08-28 PROCEDURE — G0472 HEP C SCREEN HIGH RISK/OTHER: HCPCS | Performed by: FAMILY MEDICINE

## 2019-08-28 PROCEDURE — G0009 ADMIN PNEUMOCOCCAL VACCINE: HCPCS | Mod: 59 | Performed by: FAMILY MEDICINE

## 2019-08-28 RX ORDER — LEVOTHYROXINE SODIUM 75 UG/1
TABLET ORAL
Qty: 90 TABLET | Refills: 3 | Status: SHIPPED | OUTPATIENT
Start: 2019-08-28 | End: 2020-02-28

## 2019-08-28 RX ORDER — TORSEMIDE 20 MG/1
TABLET ORAL
Qty: 180 TABLET | Refills: 1 | Status: CANCELLED | OUTPATIENT
Start: 2019-08-28

## 2019-08-28 ASSESSMENT — MIFFLIN-ST. JEOR: SCORE: 1635.8

## 2019-08-28 ASSESSMENT — ACTIVITIES OF DAILY LIVING (ADL): CURRENT_FUNCTION: NO ASSISTANCE NEEDED

## 2019-08-28 NOTE — PROGRESS NOTES
SUBJECTIVE:   Caty Ng is a 74 year old female  who presents for Preventive Visit. Are you in the first 12 months of your Medicare coverage?  No    She is also followed by cardiology for atrial fibrillation with HF. Hypercholesterolemia well controlled with current treatment plan without side effects. Patient also presents for follow-up of hypothyroidism, controlled on current medication.  Denies symptoms of hypo- or hyperthyroidism.     Healthy Habits:     In general, how would you rate your overall health?  Fair    Frequency of exercise:  None    Taking medications regularly:  Yes    Barriers to taking medications:  None    Medication side effects:  None    Ability to successfully perform activities of daily living:  No assistance needed    Home Safety:  No safety concerns identified    Hearing Impairment:  No hearing concerns    In the past 6 months, have you been bothered by leaking of urine? Yes    In general, how would you rate your overall mental or emotional health?  Good      PHQ-2 Total Score: 2    Additional concerns today:  No    Do you feel safe in your environment? Yes    Do you have a Health Care Directive? No: Advance care planning reviewed with patient; information given to patient to review.      Fall risk  Fallen 2 or more times in the past year?: No  Any fall with injury in the past year?: No    Cognitive Screening   1) Repeat 3 items (Leader, Season, Table)    2) Clock draw: NORMAL  3) 3 item recall: Recalls 3 objects  Results: NORMAL clock, 1-2 items recalled: COGNITIVE IMPAIRMENT LESS LIKELY    Mini-CogTM Copyright S Jhonatan. Licensed by the author for use in [facility]; reprinted with permission ([email or web address]). All rights reserved.      Do you have sleep apnea, excessive snoring or daytime drowsiness?: yes    Reviewed and updated as needed this visit by clinical staff  Tobacco  Allergies  Meds  Problems  Med Hx  Surg Hx  Fam Hx  Soc Hx          Reviewed and  updated as needed this visit by Provider  Allergies  Meds  Problems  Med Hx  Surg Hx        Social History     Tobacco Use     Smoking status: Former Smoker     Packs/day: 0.50     Years: 20.00     Pack years: 10.00     Types: Cigarettes     Last attempt to quit: 10/1/1982     Years since quittin.9     Smokeless tobacco: Never Used   Substance Use Topics     Alcohol use: No     Comment: 1 beers per week         No flowsheet data found.    Current providers sharing in care for this patient include:     Patient Care Team:  Joann Clark MD as PCP - General (Family Practice)  Joann Clark MD as Assigned PCP  Poonam Izquierdo [facility] as Pharmacist (Pharmacist)  Tanesha Whitman    The following health maintenance items are reviewed in Epic and correct as of today:  Health Maintenance   Topic Date Due     HEPATITIS C SCREENING  1945     ZOSTER IMMUNIZATION (2 of 3) 2012     PNEUMOCOCCAL IMMUNIZATION 65+ LOW/MEDIUM RISK (1 of 2 - PCV13) 2013     LIPID  2016     COLONOSCOPY  2017     OP ANNUAL INR REFERRAL  2017     BMP  2018     ALT  2019     TSH W/FREE T4 REFLEX  2019     CBC  2019     DTAP/TDAP/TD IMMUNIZATION (2 - Td) 2019     INFLUENZA VACCINE (1) 2019     FALL RISK ASSESSMENT  2020     MEDICARE ANNUAL WELLNESS VISIT  2020     MAMMO SCREENING  09/10/2020     HF ACTION PLAN  2022     ADVANCE CARE PLANNING  2024     DEXA  Completed     PHQ-2  Completed     IPV IMMUNIZATION  Aged Out     MENINGITIS IMMUNIZATION  Aged Out     Patient Active Problem List   Diagnosis     Advanced directives, counseling/discussion     Morbid obesity due to excess calories (H)     Hypertriglyceridemia     Atrial fibrillation with RVR (H)     Health Care Home     Mitral valve regurgitation     Systolic heart failure (H)     BROCK (obstructive sleep apnea)     Hyperlipidemia LDL goal <130     Calcification of coronary artery      Long term current use of anticoagulant therapy     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 atrophy of thyroid     History of chondrosarcoma     S/p total knee replacement, bilateral     Primary osteoarthritis of right hip     Scoliosis     DDD (degenerative disc disease), lumbar     Past Surgical History:   Procedure Laterality Date     HYSTERECTOMY, PAP NO LONGER INDICATED       JOINT REPLACEMTN, KNEE RT/LT  2007    right     JOINT REPLACEMTN, KNEE RT/LT Left      SALPINGO OOPHORECTOMY,R/L/BRANDO Bilateral      SURGICAL HISTORY OF -       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       Social History     Tobacco Use     Smoking status: Former Smoker     Packs/day: 0.50     Years: 20.00     Pack years: 10.00     Types: Cigarettes     Last attempt to quit: 10/1/1982     Years since quittin.9     Smokeless tobacco: Never Used   Substance Use Topics     Alcohol use: No     Comment: 1 beers per week     Family History   Problem Relation Age of Onset     Heart Disease Mother      Cancer Mother         ovarian     Diabetes Father      Musculoskeletal Disorder Father      Diabetes Paternal Grandmother      Diabetes Paternal Grandfather      Diabetes Sister      Thrombophilia Daughter         Factor V Leiden Deficiency         Current Outpatient Medications   Medication Sig Dispense Refill     albuterol (PROAIR HFA/PROVENTIL HFA/VENTOLIN HFA) 108 (90 BASE) MCG/ACT Inhaler Inhale 2 puffs into the lungs every 6 hours as needed for shortness of breath / dyspnea or wheezing Profile Rx: patient will contact pharmacy when needed 1 Inhaler 0     ASPIRIN NOT PRESCRIBED (INTENTIONAL) Please choose reason not prescribed, below       atorvastatin (LIPITOR) 40 MG tablet Take 1 tablet (40 mg) by mouth daily 90 tablet 3     carvedilol (COREG) 25 MG tablet Take 0.5 tablets (12.5 mg) by mouth 2 times daily (with meals) 90 tablet 3     diltiazem (CARTIA XT) 240 MG 24 hr capsule Take 1 capsule (240 mg) by mouth daily 90 capsule 3      "levothyroxine (SYNTHROID/LEVOTHROID) 75 MCG tablet TAKE 1 TABLET EVERY DAY 90 tablet 3     losartan (COZAAR) 25 MG tablet Take 1 tablet (25 mg) by mouth daily 90 tablet 1     torsemide (DEMADEX) 20 MG tablet TAKE 2 TABLETS EVERY  tablet 0     warfarin (COUMADIN) 2 MG tablet USE AS DIRECTED BY YOUR PRESCRIBER. COMPLETE DIRECTIONS ARE INCLUDED IN A LETTER WITH YOUR ORIGINAL ORDER 130 tablet 3     loratadine (CLARITIN) 10 MG tablet Take 10 mg by mouth as needed for allergies       Allergies   Allergen Reactions     Lisinopril      cough     Metal [Staples] Other (See Comments)     Metal allergy not clearly specified - patient had a stainless steel screw put in her foot that needed to be removed due to allergy/hypersensitivity      Metoprolol Fatigue     Review of Systems  CONSTITUTIONAL: NEGATIVE for fever, chills, change in weight  INTEGUMENTARY/SKIN: NEGATIVE for worrisome rashes, moles or lesions  EYES: NEGATIVE for vision changes or irritation  ENT/MOUTH: NEGATIVE for ear, mouth and throat problems  RESP: NEGATIVE for significant cough or SOB  BREAST: NEGATIVE for masses, tenderness or discharge  CV: NEGATIVE for chest pain, palpitations or peripheral edema  GI: NEGATIVE for nausea, abdominal pain, heartburn, or change in bowel habits  : NEGATIVE for frequency, dysuria, or hematuria  MUSCULOSKELETAL: arthralgias and gait disturbance   NEURO: NEGATIVE for weakness, dizziness or paresthesias  ENDOCRINE: NEGATIVE for temperature intolerance, skin/hair changes  HEME: NEGATIVE for bleeding problems  PSYCHIATRIC: NEGATIVE for changes in mood or affect    OBJECTIVE:   /68   Pulse 94   Temp 98.2  F (36.8  C) (Oral)   Resp 16   Ht 1.6 m (5' 3\")   Wt 115.7 kg (255 lb)   SpO2 92%   Breastfeeding? No   BMI 45.17 kg/m   Estimated body mass index is 45.17 kg/m  as calculated from the following:    Height as of this encounter: 1.6 m (5' 3\").    Weight as of this encounter: 115.7 kg (255 lb).  Physical " Exam  GENERAL: alert, no distress and obese  EYES: Eyes grossly normal to inspection, PERRL and conjunctivae and sclerae normal  HENT: ear canals and TM's normal, nose and mouth without ulcers or lesions  NECK: no adenopathy, no asymmetry, masses, or scars and thyroid normal to palpation  RESP: lungs clear to auscultation - no rales, rhonchi or wheezes  BREAST: normal without masses, tenderness or nipple discharge and no palpable axillary masses or adenopathy  CV: regular rate and rhythm, normal S1 S2, no S3 or S4, no murmur, click or rub, no peripheral edema and peripheral pulses strong  ABDOMEN: soft, nontender, no hepatosplenomegaly, no masses and bowel sounds normal  MS: no deformity; uses cane   SKIN: no suspicious lesions or rashes  NEURO: Normal strength and tone, mentation intact and speech normal  PSYCH: mentation appears normal, affect normal/bright    Diagnostic Test Results:  Labs reviewed in Epic  Results for orders placed or performed in visit on 08/28/19   CBC with platelets differential   Result Value Ref Range    WBC 7.7 4.0 - 11.0 10e9/L    RBC Count 4.61 3.8 - 5.2 10e12/L    Hemoglobin 14.0 11.7 - 15.7 g/dL    Hematocrit 42.6 35.0 - 47.0 %    MCV 92 78 - 100 fl    MCH 30.4 26.5 - 33.0 pg    MCHC 32.9 31.5 - 36.5 g/dL    RDW 14.2 10.0 - 15.0 %    Platelet Count 205 150 - 450 10e9/L    % Neutrophils 70.0 %    % Lymphocytes 17.4 %    % Monocytes 10.5 %    % Eosinophils 1.8 %    % Basophils 0.3 %    Absolute Neutrophil 5.4 1.6 - 8.3 10e9/L    Absolute Lymphocytes 1.3 0.8 - 5.3 10e9/L    Absolute Monocytes 0.8 0.0 - 1.3 10e9/L    Absolute Eosinophils 0.1 0.0 - 0.7 10e9/L    Absolute Basophils 0.0 0.0 - 0.2 10e9/L    Diff Method Automated Method        ASSESSMENT / PLAN:   (Z00.00) Encounter for Medicare annual wellness exam  (primary encounter diagnosis)  Plan: Hepatitis C antibody          (I48.91) Atrial fibrillation with RVR (H)  Comment: rate controlled and anticoagulated   Plan: Comprehensive  "metabolic panel, OFFICE/OUTPT         VISIT,EST,LEVL IV        Continue chronic anticoagulation under surveillance of [facility]time clinic with goal INR 2 - 3 indefinitely      (I50.22) Chronic systolic heart failure (H)  Comment: euvolemic, on beta blocker and ACE/ARB   Plan: Comprehensive metabolic panel, CBC with         platelets differential, OFFICE/OUTPT         VISIT,EST,LEVL IV        Follow-up with cardiology as planned     (E66.01) Morbid obesity due to excess calories (H)  (E78.5) Hyperlipidemia LDL goal <130  Plan: Lipid panel reflex to direct LDL Fasting,         Comprehensive metabolic panel, TSH with free T4        reflex, OFFICE/OUTPT VISIT,EST,LEVL IV          (E03.9) Hypothyroidism, unspecified type  Comment: euthyroid on replacement   Plan: levothyroxine (SYNTHROID/LEVOTHROID) 75 MCG         tablet, OFFICE/OUTPT VISIT,EST,LEVL IV             End of Life Planning:  Patient currently has an advanced directive: No.  I have verified the patient's ablity to prepare an advanced directive/make health care decisions.  Literature was provided to assist patient in preparing an advanced directive.    COUNSELING:  Reviewed preventive health counseling, as reflected in patient instructions  Special attention given to:       Regular exercise       Healthy diet/nutrition       Osteoporosis Prevention/Bone Health       Colon cancer screening       Hepatitis C screening       HIV screening for high risk patient       The ASCVD Risk score (Luis DC Jr., et al., 2013) failed to calculate for the following reasons:    Cannot find a previous HDL lab    Cannot find a previous total cholesterol lab       Advanced Planning     Estimated body mass index is 45.17 kg/m  as calculated from the following:    Height as of this encounter: 1.6 m (5' 3\").    Weight as of this encounter: 115.7 kg (255 lb).    Weight management plan: Discussed healthy diet and exercise guidelines     reports that she quit smoking about 36 years ago. Her " smoking use included cigarettes. She has a 10.00 pack-year smoking history. She has never used smokeless tobacco.      Appropriate preventive services were discussed with this patient, including applicable screening as appropriate for cardiovascular disease, diabetes, osteopenia/osteoporosis, and glaucoma.  As appropriate for age/gender, discussed screening for colorectal cancer, prostate cancer, breast cancer, and cervical cancer. Checklist reviewing preventive services available has been given to the patient.    Reviewed patients plan of care and provided an AVS. The Intermediate Care Plan ( asthma action plan, low back pain action plan, and migraine action plan) for Caty meets the Care Plan requirement. This Care Plan has been established and reviewed with the Patient.    Counseling Resources:  ATP IV Guidelines  Pooled Cohorts Equation Calculator  Breast Cancer Risk Calculator  FRAX Risk Assessment  ICSI Preventive Guidelines  Dietary Guidelines for Americans, 2010  USDA's MyPlate  ASA Prophylaxis  Lung CA Screening    Joann Clark MD  [facility]    Identified Health Risks:

## 2019-08-28 NOTE — PROGRESS NOTES
The patient was provided with suggestions to help her develop a healthy physical lifestyle.  She is at risk for lack of exercise and has been provided with information to increase physical activity for the benefit of her well-being.  Information on urinary incontinence and treatment options given to patient.

## 2019-08-28 NOTE — PATIENT INSTRUCTIONS
Patient Education   Personalized Prevention Plan  You are due for the preventive services outlined below.  Your care team is available to assist you in scheduling these services.  If you have already completed any of these items, please share that information with your care team to update in your medical record.  Health Maintenance Due   Topic Date Due     Hepatitis C Screening  1945     Zoster (Shingles) Vaccine (2 of 3) 03/01/2012     Annual Wellness Visit  01/05/2013     Pneumococcal Vaccine (1 of 2 - PCV13) 01/05/2013     Cholesterol Lab  09/09/2016     Colonscopy  01/18/2017     INR CLINIC REFERRAL - yearly  11/21/2017     Basic Metabolic Panel  11/21/2018     Liver Monitoring Lab  05/04/2019     Thyroid Function Lab  05/04/2019     Complete Blood Count  05/04/2019     Diptheria Tetanus Pertussis (DTAP/TDAP/TD) Vaccine (2 - Td) 07/06/2019     Your Health Risk Assessment indicates you feel you are not in good health    A healthy lifestyle helps keep the body fit and the mind alert. It helps protect you from disease, helps you fight disease, and helps prevent chronic disease (disease that doesn't go away) from getting worse. This is important as you get older and begin to notice twinges in muscles and joints and a decline in the strength and stamina you once took for granted. A healthy lifestyle includes good healthcare, good nutrition, weight control, recreation, and regular exercise. Avoid harmful substances and do what you can to keep safe. Another part of a healthy lifestyle is stay mentally active and socially involved.    Good healthcare     Have a wellness visit every year.     If you have new symptoms, let us know right away. Don't wait until the next checkup.     Take medicines exactly as prescribed and keep your medicines in a safe place. Tell us if your medicine causes problems.   Healthy diet and weight control     Eat 3 or 4 small, nutritious, low-fat, high-fiber meals a day. Include a  variety of fruits, vegetables, and whole-grain foods.     Make sure you get enough calcium in your diet. Calcium, vitamin D, and exercise help prevent osteoporosis (bone thinning).     If you live alone, try eating with others when you can. That way you get a good meal and have company while you eat it.     Try to keep a healthy weight. If you eat more calories than your body uses for energy, it will be stored as fat and you will gain weight.     Recreation   Recreation is not limited to sports and team events. It includes any activity that provides relaxation, interest, enjoyment, and exercise. Recreation provides an outlet for physical, mental, and social energy. It can give a sense of worth and achievement. It can help you stay healthy.    Mental Exercise and Social Involvement  Mental and emotional health is as important as physical health. Keep in touch with friends and family. Stay as active as possible. Continue to learn and challenge yourself.   Things you can do to stay mentally active are:    Learn something new, like a foreign language or musical instrument.     Play SCRABBLE or do crossword puzzles. If you cannot find people to play these games with you at home, you can play them with others on your computer through the Internet.     Join a games club--anything from card games to chess or checkers or lawn bowling.     Start a new hobby.     Go back to school.     Volunteer.     Read.   Keep up with world events.    Exercise for a Healthier Heart     Exercise with a friend. When activity is fun, you're more likely to stick with it.   You may wonder how you can improve the health of your heart. If you re thinking about exercise, you re on the right track. You don t need to become an athlete, but you do need a certain amount of brisk exercise to help strengthen your heart. If you have been diagnosed with a heart condition, your doctor may recommend exercise to help stabilize your condition. To help make  exercise a habit, choose safe, fun activities.  Be sure to check with your healthcare provider before starting an exercise program.   Why exercise?  Exercising regularly offers many healthy rewards. It can help you do all of the following:    Improve your blood cholesterol level to help prevent further heart trouble    Lower your blood pressure to help prevent a stroke or heart attack    Control diabetes, or reduce your risk of getting this disease    Improve your heart and lung function    Reach and maintain a healthy weight    Make your muscles stronger and more limber so you can stay active    Prevent falls and fractures by slowing the loss of bone mass (osteoporosis)    Manage stress better    Reduce your blood pressure    Improve your sense of self and your body image  Exercise tips  Ease into your routine. Set small goals. Then build on them.  Exercise on most days. Aim for a total of 150 or more minutes of moderate to  vigorous intensity activity each week. Consider 40 minutes, 3 to 4 times a week. For best results, activity should last for 40 minutes on average. It is OK to work up to the 40 minute period over time. Examples of moderate-intensity activity is walking 1 mile in 15 minutes or 30 to 45 minutes of yard work.  Step up your daily activity level. Along with your exercise program, try being more active throughout the day. Walk instead of drive. Do more household tasks or yard work.  Choose one or more activities you enjoy. Walking is one of the easiest things you can do. You can also try swimming, riding a bike, dancing, or taking an exercise class.  Stop exercising and call your doctor if you:    Have chest pain or feel dizzy or lightheaded    Feel burning, tightness, pressure, or heaviness in your chest, neck, shoulders, back, or arms    Have unusual shortness of breath    Have increased joint or muscle pain    Have palpitations or an irregular heartbeat   Date Last Reviewed: 5/1/2016 2000-2018  [employer]. [address] [place] [id number]. All rights reserved. This information is not intended as a substitute for professional medical care. Always follow your healthcare professional's instructions.          Urinary Incontinence, Female (Adult)  Urinary incontinence means loss of control of the bladder. This problem affects many women, especially as they get older. If you have incontinence, you may be embarrassed to ask for help. But know that this problem can be treated.  Types of Incontinence  There are different types of incontinence. Two of the main types are described here. You can have more than one type.    Stress incontinence. With this type, urine leaks when pressure (stress) is put on the bladder. This may happen when you cough, sneeze, or laugh. Stress incontinence most often occurs because the pelvic floor muscles that support the bladder and urethra are weak. This can happen after pregnancy and vaginal childbirth or a hysterectomy. It can also be due to excess body weight or hormone changes.    Urge incontinence (also called overactive bladder). With this type, a sudden urge to urinate is felt often. This may happen even though there may not be much urine in the bladder. The need to urinate often during the night is common. Urge incontinence most often occurs because of bladder spasms. This may be due to bladder irritation or infection. Damage to bladder nerves or pelvic muscles, constipation, and certain medicines can also lead to urge incontinence.  Treatment of urinary incontinence depends on the cause. Further evaluation is needed to find the type you have. This will likely include an exam and certain tests. Based on the results, you and your healthcare provider can then plan treatment. Until a diagnosis is made, the home care tips below can help relieve symptoms.  Home care    Do pelvic floor muscle exercises, if they are prescribed. The pelvic floor muscles help  support the bladder and urethra. Many women find that their symptoms improve when doing special exercises that strengthen these muscles. To do the exercises contract the muscles you would use to stop your stream of urine, but do this when you re not urinating. Hold for 10 seconds, then relax. Repeat 10 to 20 times in a row, at least 3 times a day. Your provider may give you other instructions for how to do the exercises and how often.    Keep a bladder diary. This helps track how often and how much you urinate over a set period of time. Bring this diary with you to your next visit with the provider. The information can help your provider learn more about your bladder problem.    Lose weight, if advised to by your provider. Excess weight puts pressure on the bladder. Your provider can help you create a weight-loss plan that s right for you. This may include exercising more and making certain diet changes.    Don't consume foods and drinks that may irritate the bladder. These can include alcohol and caffeinated drinks.    Quit smoking. Smoking and other tobacco use can lead to chronic cough that strains the pelvic floor muscles. Smoking may also damage the bladder and urethra. Talk with your provider about treatments or methods you can use to quit smoking.    If drinking large amounts of fluid causes you to have symptoms, you may be advised to limit your fluid intake. You may also be advised to drink most of your fluids during the day and to limit fluids at night.    If you re worried about urine leakage or accidents, you may wear absorbent pads to catch urine. Change the pads often. This helps reduce discomfort. It may also reduce the risk of skin or bladder infections.  Follow-up care  Follow up with your healthcare provider, or as directed. It may take some to find the right treatment for your problem. Your treatment plan may include special therapies or medicines. Certain procedures or surgery may also be options.  Be sure to discuss any questions you have with your provider.  When to seek medical advice  Call the healthcare provider right away if any of these occur:    Fever of 100.4 F (38 C) or higher, or as directed by your provider    Bladder pain or fullness    Abdominal swelling    Nausea or vomiting    Back pain    Weakness, dizziness or fainting  Date Last Reviewed: 10/1/2017    7215-1459 [employer]. [address] [place] [address]. All rights reserved. This information is not intended as a substitute for professional medical care. Always follow your healthcare professional's instructions.          It is recommended that you get a vaccination for shingles called Shingrix (given as 2 shots, 2 to 6 months apart), even if you have already had the Zostavax vaccine. Discuss getting the Shingix vaccine from your pharmacist, or schedule an ancillary shot visit here. Some insurances do not cover the cost of these vaccines.

## 2019-08-28 NOTE — RESULT ENCOUNTER NOTE
Your results are normal.  Your final test results are pending.  Please check your chart again within 3 to 5 days. You will receive further instruction when your full test result panel is complete.    Joann Clark MD

## 2019-09-03 ENCOUNTER — ANTICOAGULATION THERAPY VISIT (OUTPATIENT)
Dept: NURSING | Facility: CLINIC | Age: 74
End: 2019-09-03
Payer: MEDICARE

## 2019-09-03 VITALS — DIASTOLIC BLOOD PRESSURE: 62 MMHG | SYSTOLIC BLOOD PRESSURE: 102 MMHG | HEART RATE: 68 BPM

## 2019-09-03 DIAGNOSIS — Z79.01 LONG TERM CURRENT USE OF ANTICOAGULANT THERAPY: ICD-10-CM

## 2019-09-03 LAB — INR POINT OF CARE: 1.7 (ref 0.86–1.14)

## 2019-09-03 PROCEDURE — 85610 PROTHROMBIN TIME: CPT | Mod: QW

## 2019-09-03 PROCEDURE — 36416 COLLJ CAPILLARY BLOOD SPEC: CPT

## 2019-09-03 PROCEDURE — 99207 ZZC NO CHARGE NURSE ONLY: CPT

## 2019-09-03 NOTE — Clinical Note
Patient complaints of dizziness blood pressure 102/62 pulse feels irregular in a fib?? A 68 she will monitor b/p  Next few days and report to pcp if she feels dizzy still. She has no other symptoms. She has not eaten or taken meds today.Maryellen Atwood,Clinic [facility]

## 2019-09-03 NOTE — PROGRESS NOTES
ANTICOAGULATION FOLLOW-UP CLINIC VISIT    Patient Name:  Caty Ng  Date:  9/3/2019  Contact Type:  Face to Face    SUBJECTIVE: dizziness today blood pressure low today. Has not taken meds today. Will take at home next few days and make apt if continues.  Patient Findings     Comments:   Bleeding Signs/Symptoms: NO  Thromboembolic Signs/Symptoms: NO     Medication Changes:  NO  Dietary Changes:  NO  Bacterial/Viral Infection: NO     Missed Coumadin Doses: NO  Other Concerns:  Complains of dizziness today blood pressure checked low today. Will send pcp message.          Clinical Outcomes     Negatives:   Major bleeding event, Thromboembolic event, Anticoagulation-related hospital admission, Anticoagulation-related ED visit, Anticoagulation-related fatality    Comments:   Bleeding Signs/Symptoms: NO  Thromboembolic Signs/Symptoms: NO     Medication Changes:  NO  Dietary Changes:  NO  Bacterial/Viral Infection: NO     Missed Coumadin Doses: NO  Other Concerns:  Complains of dizziness today blood pressure checked low today. Will send pcp message.             OBJECTIVE    INR Protime   Date Value Ref Range Status   2019 1.7 (A) 0.86 - 1.14 Final       ASSESSMENT / PLAN  INR assessment SUB    Recheck INR In: 2 WEEKS    INR Location Clinic      Anticoagulation Summary  As of 9/3/2019    INR goal:   2.0-3.0   TTR:   67.8 % (4.1 y)   INR used for dosin.7! (9/3/2019)   Warfarin maintenance plan:   3 mg (2 mg x 1.5) every Fri; 2 mg (2 mg x 1) all other days   Full warfarin instructions:   9/3: 3 mg; 9/10: 3 mg; Otherwise 3 mg every Fri; 2 mg all other days   Weekly warfarin total:   15 mg   Plan last modified:   Maryellen Atwood RN (2019)   Next INR check:   2019   Priority:   INR   Target end date:   Indefinite    Indications    Atrial fibrillation (H) (Resolved) [I48.91]  Long term current use of anticoagulant therapy [Z79.01]             Anticoagulation Episode Summary     INR check location:    Anticoagulation Clinic    Preferred lab:   [facility]    Send INR reminders to:   [facility]    Comments:         Anticoagulation Care Providers     Provider Role Specialty Phone number    Ibrahima Moura AmeliarobbieDO [facility] [phone]            See the Encounter Report to view Anticoagulation Flowsheet and Dosing Calendar (Go to Encounters tab in chart review, and find the Anticoagulation Therapy Visit)        Maryellen Atwood RN

## 2019-09-11 DIAGNOSIS — Z79.01 LONG-TERM (CURRENT) USE OF ANTICOAGULANTS, INR GOAL 2.0-3.0: ICD-10-CM

## 2019-09-11 RX ORDER — WARFARIN SODIUM 2 MG/1
TABLET ORAL
Qty: 105 TABLET | Refills: 1 | Status: SHIPPED | OUTPATIENT
Start: 2019-09-11 | End: 2020-02-28

## 2019-09-17 ENCOUNTER — ANTICOAGULATION THERAPY VISIT (OUTPATIENT)
Dept: NURSING | Facility: CLINIC | Age: 74
End: 2019-09-17
Payer: MEDICARE

## 2019-09-17 DIAGNOSIS — Z79.01 LONG TERM CURRENT USE OF ANTICOAGULANT THERAPY: ICD-10-CM

## 2019-09-17 LAB — INR POINT OF CARE: 2 (ref 0.86–1.14)

## 2019-09-17 PROCEDURE — 36416 COLLJ CAPILLARY BLOOD SPEC: CPT

## 2019-09-17 PROCEDURE — 85610 PROTHROMBIN TIME: CPT | Mod: QW

## 2019-09-26 ENCOUNTER — TELEPHONE (OUTPATIENT)
Dept: CARDIOLOGY | Facility: CLINIC | Age: 74
End: 2019-09-26

## 2019-09-26 NOTE — TELEPHONE ENCOUNTER
Preliminary report reviewed by Dr. Whitlock.  Afib continuously 100% burden ranging [id number] bpm.  Average 88 bpm.  Isolated VEs 1.6% and no VE couplets or VE triplets    Per Dr. Whitlock continue same medication regimen.  Patient verbalized understanding and is agreeable with the plan.    Rajani Stout RN

## 2019-10-02 ENCOUNTER — TELEPHONE (OUTPATIENT)
Dept: FAMILY MEDICINE | Facility: CLINIC | Age: 74
End: 2019-10-02

## 2019-10-02 ENCOUNTER — OFFICE VISIT (OUTPATIENT)
Dept: FAMILY MEDICINE | Facility: CLINIC | Age: 74
End: 2019-10-02
Payer: MEDICARE

## 2019-10-02 VITALS
DIASTOLIC BLOOD PRESSURE: 76 MMHG | SYSTOLIC BLOOD PRESSURE: 120 MMHG | WEIGHT: 252 LBS | TEMPERATURE: 98.4 F | HEART RATE: 60 BPM | BODY MASS INDEX: 44.64 KG/M2

## 2019-10-02 DIAGNOSIS — M79.672 CHRONIC PAIN OF BOTH FEET: ICD-10-CM

## 2019-10-02 DIAGNOSIS — M79.671 CHRONIC PAIN OF BOTH FEET: ICD-10-CM

## 2019-10-02 DIAGNOSIS — Z79.01 LONG TERM CURRENT USE OF ANTICOAGULANT THERAPY: ICD-10-CM

## 2019-10-02 DIAGNOSIS — G89.29 CHRONIC PAIN OF BOTH FEET: ICD-10-CM

## 2019-10-02 PROCEDURE — 99214 OFFICE O/P EST MOD 30 MIN: CPT | Performed by: FAMILY MEDICINE

## 2019-10-02 NOTE — TELEPHONE ENCOUNTER
Reason for Call: Request for an order or referral:    Order or referral being requested: warfarin for AFIB , having a colonoscopy on 10/23/19 recommend 4 day hold.      Date needed: as soon as possible    Has the patient been seen by the PCP for this problem? YES    Additional comments: n/a    Phone number Patient can be reached at:  [employer] phone number: [phone]  Fax number: [phone]    Best Time:  any    Can we leave a detailed message on this number?  YES    Call taken on 10/2/2019 at 2:20 PM by Christopher Taveras

## 2019-10-02 NOTE — PROGRESS NOTES
Subjective     Caty Ng is a 74 year old female who presents to clinic today for the following health issues:    HPI   Patient is here to establish care with provider also wondering about a referral for podiatry.    Joint Pain    Onset: years ago     Description:   Location: top of feet  Character: Sharp    Intensity: 9/10 on left foot right not as bad     Progression of Symptoms: worse    Accompanying Signs & Symptoms:  Other symptoms: swelling    History:   Previous similar pain: YES- has had it for years, had the same issue in right foot but had a shot to help the pain, also had surgery on left foot years ago       Precipitating factors:   Trauma or overuse: no     Alleviating factors:  Improved by: OTC restful something cream, Pain is worse when wearing shoes but still hurts when not wearing shoes just not as much.    Therapies Tried and outcome: none    Patient had a procedure on her left foot many years ago for a cyst on her metatarsal.  She has also had 1st toe bunion surgery on both feet.        Patient Active Problem List   Diagnosis     Advanced directives, counseling/discussion     Morbid obesity due to excess calories (H)     Hypertriglyceridemia     Atrial fibrillation with RVR (H)     Health Care Home     Mitral valve regurgitation     Systolic heart failure (H)     BROCK (obstructive sleep apnea)     Hyperlipidemia LDL goal <130     Calcification of coronary artery     Long term current use of anticoagulant therapy     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 atrophy of thyroid     History of chondrosarcoma     S/p total knee replacement, bilateral     Primary osteoarthritis of right hip     Scoliosis     DDD (degenerative disc disease), lumbar     Chronic pain of both feet     Past Surgical History:   Procedure Laterality Date     HYSTERECTOMY, PAP NO LONGER INDICATED  1989     JOINT REPLACEMTN, KNEE RT/LT  2007    right     JOINT REPLACEMTN, KNEE RT/LT Left      SALPINGO OOPHORECTOMY,R/L/BRANDO Bilateral       SURGICAL HISTORY OF -       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       Social History     Tobacco Use     Smoking status: Former Smoker     Packs/day: 0.50     Years: 20.00     Pack years: 10.00     Types: Cigarettes     Last attempt to quit: 10/1/1982     Years since quittin.0     Smokeless tobacco: Never Used   Substance Use Topics     Alcohol use: No     Comment: 1 beers per week     Family History   Problem Relation Age of Onset     Heart Disease Mother      Cancer Mother         ovarian     Diabetes Father      Musculoskeletal Disorder Father      Diabetes Paternal Grandmother      Diabetes Paternal Grandfather      Diabetes Sister      Thrombophilia Daughter         Factor V Leiden Deficiency           Reviewed and updated as needed this visit by Provider         Review of Systems   ROS COMP: Constitutional, HEENT, cardiovascular, pulmonary, gi and gu systems are negative, except as otherwise noted.      Objective    /76 (BP Location: Right arm, Patient Position: Sitting, Cuff Size: Adult Large)   Pulse 60   Temp 98.4  F (36.9  C) (Oral)   Wt 114.3 kg (252 lb)   BMI 44.64 kg/m    Body mass index is 44.64 kg/m .  Physical Exam   GENERAL: healthy, alert and no distress  MS: Patient has what feel like bony overgrowths along the 2nd-3rd metatarsals bilaterally (dorsal) measuring approx 1-2cm each.  Bunions of the big toes bilaterally.  Right 2nd toe with valgus deformity.            Assessment & Plan     1. Chronic pain of both feet  - Foot pain likely from the above mentioned deformities and history of of surgery with possible arthritis  - Referred to podiatry for further management.    - PODIATRY/FOOT & ANKLE SURGERY REFERRAL     Return in about 10 months (around 2020) for Medicare AW.    Della Perera DO  [facility]

## 2019-10-02 NOTE — TELEPHONE ENCOUNTER
Attempted to call JEROME RDZ but office closes at 5 PM.   Call again tomorrow.     Lanie George RN

## 2019-10-02 NOTE — PATIENT INSTRUCTIONS
[facility]     Discharged by : Jacquelyn Norman CMA   Paper scripts provided to patient : No    If you have any questions regarding your visit please contact your care team:     Team Sanjuanita              Clinic Hours Telephone Number     Dr. Lux Garg, CNP   7am-7pm  Monday - Thursday   7am-5pm  Fridays  [phone]   (Appointment scheduling available 24/7)     RN Line  [phone] option 2     Urgent Care - [place] - 11am-9pm Monday-Friday Saturday-Sunday- 9am-5pm     [place] -   5pm-9pm Monday-Friday Saturday-Sunday- 9am-5pm    [phone] - [place]    [phone] - [place]     For a Price Quote for your services, please call our [employer] Price Line at [phone].     What options do I have for visits at the clinic other than the traditional office visit?     To expand how we care for you, many of our providers are utilizing electronic visits (e-visits) and telephone visits, when medically appropriate, for interactions with their patients rather than a visit in the clinic. We also offer nurse visits for many medical concerns. Just like any other service, we will bill your insurance company for this type of visit based on time spent on the phone with your provider. Not all insurance companies cover these visits. Please check with your medical insurance if this type of visit is covered. You will be responsible for any charges that are not paid by your insurance.     E-visits via [employer]: generally incur a $45.00 fee.     Telephone visits:  Time spent on the phone: *charged based on time that is spent on the phone in increments of 10 minutes. Estimated cost:   5-10 mins $30.00   11-20 mins. $59.00   21-30 mins. $85.00       Use [employer] (secure email communication and access to your chart) to send your primary care provider a message or make an appointment. Ask someone on your Team how to sign up for [employer].      As always, Thank you for trusting us with your health care needs!      [facility]:    Scheduling Appointments  [facility]  Call: [phone]    Mirza German [facility]  Call: [phone]    [facility]  Call: [phone]    For Gastroenterology referrals   [facility]   Clinics and Surgery Center, 4th Floor   [address] [place] [address]   Appointments: [phone]    WHERE TO GO FOR CARE?    Clinic    Make an appointment if you:       Are sick (cold, cough, flu, sore throat, earache or in pain).       Have a small injury (sprain, small cut, burn or broken bone).       Need a physical exam, Pap smear, vaccine or prescription refill.       Have questions about your health or medicines.    To reach us:      Call [phone] ([phone]). Open 24 hours every day. (For counseling services, call [phone].)    Log into [employer] at everbill. (Visit [email or web address] [email or web address] to create an account.) Hospital emergency room    An emergency is a serious or life- threatening problem that must be treated right away.    Call 722 or get to the hospital if you have:      Very bad or sudden:            - Chest pain or pressure         - Bleeding         - Head or belly pain         - Dizziness or trouble seeing, walking or                          Speaking      Problems breathing      Blood in your vomit or you are coughing up blood      A major injury (knocked out, loss of a finger or limb, rape, broken bone protruding from skin)    A mental health crisis. (Or call the Mental Health Crisis line at 1-156.814.9570 or Suicide Prevention Hotline at 1-381.461.5770.)    Open 24 hours every day. You don't need an appointment.     Urgent care    Visit urgent care for sickness or small injuries when the clinic is closed. You don't need an appointment. To check hours or find an urgent care near you, visit  [email or web address]. Online care    Get online care from [facility] for more than 70 common problems, like colds, allergies and infections. Open 24 hours every day at:   [email or web address]   Need help deciding?    For advice about where to be seen, you may call your clinic and ask to speak with a nurse. We're here for you 24 hours every day.         If you are deaf or hard of hearing, please let us know. We provide many free services including sign language interpreters, oral interpreters, TTYs, telephone amplifiers, note takers and written materials.

## 2019-10-03 NOTE — TELEPHONE ENCOUNTER
Jorge Luis with [facility] called back  She needs verbal orders directly over the phone, cannot take verbal orders off a VM  Verbal hold orders given as written below in Dr. Caruso's note    Routing to INR as FYI  Please call them back if you have any questions or need to make any changes    Zaina Lee RN

## 2019-10-03 NOTE — TELEPHONE ENCOUNTER
Noted.  Anticoagulation flow sheet updated.  Haily De Leon, RN  Anticoagulation Nurse - [facility] [place]

## 2019-10-09 DIAGNOSIS — I48.20 CHRONIC ATRIAL FIBRILLATION (H): ICD-10-CM

## 2019-10-09 DIAGNOSIS — I50.33 ACUTE ON CHRONIC DIASTOLIC CONGESTIVE HEART FAILURE (H): ICD-10-CM

## 2019-10-10 RX ORDER — TORSEMIDE 20 MG/1
TABLET ORAL
Qty: 180 TABLET | Refills: 1 | Status: SHIPPED | OUTPATIENT
Start: 2019-10-10 | End: 2020-02-28

## 2019-10-10 RX ORDER — DILTIAZEM HYDROCHLORIDE 240 MG/1
CAPSULE, COATED, EXTENDED RELEASE ORAL
Qty: 90 CAPSULE | Refills: 3 | Status: SHIPPED | OUTPATIENT
Start: 2019-10-10 | End: 2020-07-10

## 2019-10-15 ENCOUNTER — ANTICOAGULATION THERAPY VISIT (OUTPATIENT)
Dept: NURSING | Facility: CLINIC | Age: 74
End: 2019-10-15
Payer: MEDICARE

## 2019-10-15 ENCOUNTER — ANCILLARY PROCEDURE (OUTPATIENT)
Dept: MAMMOGRAPHY | Facility: CLINIC | Age: 74
End: 2019-10-15
Payer: MEDICARE

## 2019-10-15 DIAGNOSIS — Z79.01 LONG TERM CURRENT USE OF ANTICOAGULANT THERAPY: ICD-10-CM

## 2019-10-15 DIAGNOSIS — Z12.31 VISIT FOR SCREENING MAMMOGRAM: ICD-10-CM

## 2019-10-15 LAB — INR POINT OF CARE: 2.1 (ref 0.86–1.14)

## 2019-10-15 PROCEDURE — 36416 COLLJ CAPILLARY BLOOD SPEC: CPT

## 2019-10-15 PROCEDURE — 85610 PROTHROMBIN TIME: CPT | Mod: QW

## 2019-10-15 PROCEDURE — 77067 SCR MAMMO BI INCL CAD: CPT | Mod: TC

## 2019-10-15 PROCEDURE — 99207 ZZC NO CHARGE NURSE ONLY: CPT

## 2019-10-15 PROCEDURE — 77063 BREAST TOMOSYNTHESIS BI: CPT | Mod: TC

## 2019-10-15 NOTE — PROGRESS NOTES
ANTICOAGULATION FOLLOW-UP CLINIC VISIT    Patient Name:  Caty Ng  Date:  10/15/2019  Contact Type:  Face to Face    SUBJECTIVE:  Patient Findings     Positives:   Upcoming invasive procedure (having colonoscopy will have 4 day hold)    Comments:   Bleeding Signs/Symptoms: NO  Thromboembolic Signs/Symptoms: NO     Medication Changes:  NO  Dietary Changes:  NO  Bacterial/Viral Infection: NO     Missed Coumadin Doses: NO  Other Concerns:  NO          Clinical Outcomes     Negatives:   Major bleeding event, Thromboembolic event, Anticoagulation-related hospital admission, Anticoagulation-related ED visit, Anticoagulation-related fatality    Comments:   Bleeding Signs/Symptoms: NO  Thromboembolic Signs/Symptoms: NO     Medication Changes:  NO  Dietary Changes:  NO  Bacterial/Viral Infection: NO     Missed Coumadin Doses: NO  Other Concerns:  NO             OBJECTIVE    INR Protime   Date Value Ref Range Status   10/15/2019 2.1 (A) 0.86 - 1.14 Final       ASSESSMENT / PLAN  INR assessment THER    Recheck INR In: 3 WEEKS    INR Location Clinic      Anticoagulation Summary  As of 10/15/2019    INR goal:   2.0-3.0   TTR:   67.7 % (4.2 y)   INR used for dosin.1 (10/15/2019)   Warfarin maintenance plan:   3 mg (2 mg x 1.5) every Tue, Fri; 2 mg (2 mg x 1) all other days   Full warfarin instructions:   10/19: Hold; 10/20: Hold; 10/21: Hold; 10/22: Hold; Otherwise 3 mg every Tue, Fri; 2 mg all other days   Weekly warfarin total:   16 mg   Plan last modified:   Maryellen Atwood RN (2019)   Next INR check:      Priority:   INR   Target end date:   Indefinite    Indications    Atrial fibrillation (H) (Resolved) [I48.91]  Long term current use of anticoagulant therapy [Z79.01]             Anticoagulation Episode Summary     INR check location:   Anticoagulation Clinic    Preferred lab:   [facility]    Send INR reminders to:   B[facility]DROP    Comments:         Anticoagulation Care Providers      Provider Role Specialty Phone number    Ibrahima Moura DO [facility] [phone]            See the Encounter Report to view Anticoagulation Flowsheet and Dosing Calendar (Go to Encounters tab in chart review, and find the Anticoagulation Therapy Visit)        Maryellen Atwood RN

## 2019-10-16 ENCOUNTER — ANCILLARY PROCEDURE (OUTPATIENT)
Dept: GENERAL RADIOLOGY | Facility: CLINIC | Age: 74
End: 2019-10-16
Attending: PODIATRIST
Payer: MEDICARE

## 2019-10-16 ENCOUNTER — OFFICE VISIT (OUTPATIENT)
Dept: PODIATRY | Facility: CLINIC | Age: 74
End: 2019-10-16
Payer: MEDICARE

## 2019-10-16 VITALS
SYSTOLIC BLOOD PRESSURE: 128 MMHG | BODY MASS INDEX: 44.54 KG/M2 | HEART RATE: 78 BPM | HEIGHT: 63 IN | WEIGHT: 251.4 LBS | DIASTOLIC BLOOD PRESSURE: 84 MMHG

## 2019-10-16 DIAGNOSIS — M79.672 PAIN IN BOTH FEET: Primary | ICD-10-CM

## 2019-10-16 DIAGNOSIS — M79.671 PAIN IN BOTH FEET: Primary | ICD-10-CM

## 2019-10-16 DIAGNOSIS — M79.671 PAIN IN BOTH FEET: ICD-10-CM

## 2019-10-16 DIAGNOSIS — M19.071 PRIMARY OSTEOARTHRITIS OF BOTH FEET: ICD-10-CM

## 2019-10-16 DIAGNOSIS — M79.672 PAIN IN BOTH FEET: ICD-10-CM

## 2019-10-16 DIAGNOSIS — M19.072 PRIMARY OSTEOARTHRITIS OF BOTH FEET: ICD-10-CM

## 2019-10-16 PROCEDURE — 99203 OFFICE O/P NEW LOW 30 MIN: CPT | Performed by: PODIATRIST

## 2019-10-16 PROCEDURE — 73630 X-RAY EXAM OF FOOT: CPT | Mod: LT

## 2019-10-16 ASSESSMENT — MIFFLIN-ST. JEOR: SCORE: 1619.47

## 2019-10-16 NOTE — LETTER
10/16/2019         RE: Caty Ng  [address] [id number]        Dear Colleague,    Thank you for referring your patient, Caty Ng, to the [facility]. Please see a copy of my visit note below.    PATIENT HISTORY:  Caty Ng is a 74 year old female who presents to clinic for b/l foot pain, mostly dorsal midfoot area.  Pt reports foot surgeries over 30 years ago including b/l bunion repairs (x2 on L) and what sounds like a dorsal midfoot bone spur removal.  Present for years.  Pt has trouble with shoe fit.  Worse with walking, better with rest.  L is worse than R.  Pt is on coumadin, so cannot take NSAIDs.      I was requested to see this patient for this issue by Dr Perera.    Review of Systems:  Patient denies fever, chills, rash, wound, stiffness, limping, numbness, weakness, heart burn, blood in stool, chest pain with activity, calf pain when walking, shortness of breath with activity, chronic cough, easy bleeding/bruising, swelling of ankles, excessive thirst, fatigue, depression, anxiety.       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= [employer] [facility]     Colon adenoma      DDD (degenerative disc disease), lumbar      Degenerative joint disease of right hip      Hyperlipidemia      Hypertriglyceridemia      Mediastinal mass 2015    benign, [facility] consult      Mitral valve regurgitation 6/24/2012     Obesity      BROCK (obstructive sleep apnea)      Scoliosis      Systolic heart failure (H)         PAST SURGICAL HISTORY:   Past Surgical History:   Procedure Laterality Date     HYSTERECTOMY, PAP NO LONGER INDICATED  1989     JOINT REPLACEMTN, KNEE RT/LT  2007    right     JOINT REPLACEMTN, KNEE RT/LT Left      SALPINGO OOPHORECTOMY,R/L/BRANDO Bilateral 2003     SURGICAL HISTORY OF -   1989     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        MEDICATIONS:   Current Outpatient Medications:      albuterol (PROAIR HFA/PROVENTIL HFA/VENTOLIN HFA) 108 (90 BASE) MCG/ACT Inhaler, Inhale 2 puffs into the lungs every 6 hours as needed for shortness of breath / dyspnea or wheezing Profile Rx: patient will contact pharmacy when needed, Disp: 1 Inhaler, Rfl: 0     ASPIRIN NOT PRESCRIBED (INTENTIONAL), Please choose reason not prescribed, below, Disp: , Rfl:      atorvastatin (LIPITOR) 40 MG tablet, Take 1 tablet (40 mg) by mouth daily, Disp: 90 tablet, Rfl: 3     carvedilol (COREG) 25 MG tablet, Take 0.5 tablets (12.5 mg) by mouth 2 times daily (with meals), Disp: 90 tablet, Rfl: 3     diltiazem ER COATED BEADS (CARDIZEM CD/CARTIA XT) 240 MG 24 hr capsule, TAKE 1 CAPSULE DAILY (INCREASED DOSE), Disp: 90 capsule, Rfl: 3     levothyroxine (SYNTHROID/LEVOTHROID) 75 MCG tablet, TAKE 1 TABLET EVERY DAY, Disp: 90 tablet, Rfl: 3     loratadine (CLARITIN) 10 MG tablet, Take 10 mg by mouth as needed for allergies, Disp: , Rfl:      losartan (COZAAR) 25 MG tablet, Take 1 tablet (25 mg) by mouth daily, Disp: 90 tablet, Rfl: 1     torsemide (DEMADEX) 20 MG tablet, TAKE 2 TABLETS EVERY DAY, Disp: 180 tablet, Rfl: 1     warfarin ANTICOAGULANT (COUMADIN) 2 MG tablet, Take 3 mg (2 mg x 1.5) every Fri; 2 mg (2 mg x 1) all other days or as directed by INR nurse, Disp: 105 tablet, Rfl: 1     ALLERGIES:    Allergies   Allergen Reactions     Lisinopril      cough     Metal [Staples] Other (See Comments)     Metal allergy not clearly specified - patient had a stainless steel screw put in her foot that needed to be removed due to allergy/hypersensitivity      Metoprolol Fatigue        SOCIAL HISTORY:   Social History     Socioeconomic History     Marital status:      Spouse name: Bill      Number of children: 2     Years of education: Not on file     Highest education level: Not on file   Occupational History     Occupation: taxes   "    Employer: RETIRED   Social Needs     Financial resource strain: Not on file     Food insecurity:     Worry: Not on file     Inability: Not on file     Transportation needs:     Medical: Not on file     Non-medical: Not on file   Tobacco Use     Smoking status: Former Smoker     Packs/day: 0.50     Years: 20.00     Pack years: 10.00     Types: Cigarettes     Last attempt to quit: 10/1/1982     Years since quittin.0     Smokeless tobacco: Never Used   Substance and Sexual Activity     Alcohol use: No     Comment: 1 beers per week     Drug use: No     Sexual activity: Yes     Partners: Male     Birth control/protection: Post-menopausal   Lifestyle     Physical activity:     Days per week: Not on file     Minutes per session: Not on file     Stress: Not on file   Relationships     Social connections:     Talks on phone: Not on file     Gets together: Not on file     Attends Orthodoxy service: Not on file     Active member of club or organization: Not on file     Attends meetings of clubs or organizations: Not on file     Relationship status: Not on file     Intimate partner violence:     Fear of current or ex partner: Not on file     Emotionally abused: Not on file     Physically abused: Not on file     Forced sexual activity: Not on file   Other Topics Concern     Parent/sibling w/ CABG, MI or angioplasty before 65F 55M? Yes   Social History Narrative     Not on file        FAMILY HISTORY:   Family History   Problem Relation Age of Onset     Heart Disease Mother      Cancer Mother         ovarian     Diabetes Father      Musculoskeletal Disorder Father      Diabetes Paternal Grandmother      Diabetes Paternal Grandfather      Diabetes Sister      Thrombophilia Daughter         Factor V Leiden Deficiency        EXAM:Vitals: /84   Pulse 78   Ht 1.626 m (5' 4\")   Wt 114 kg (251 lb 6.4 oz)   BMI 43.15 kg/m     BMI= Body mass index is 43.15 kg/m .    General appearance: Patient is alert and fully " cooperative with history & exam.  No sign of distress is noted during the visit.     Psychiatric: Affect is pleasant & appropriate.  Patient appears motivated to improve health.     Respiratory: Breathing is regular & unlabored while sitting.     HEENT: Hearing is intact to spoken word.  Speech is clear.  No gross evidence of visual impairment that would impact ambulation.     Dermatologic: Skin is intact to both lower extremities without significant lesions, rash or abrasion.  No paronychia or evidence of soft tissue infection is noted.     Vascular: DP & PT pulses are intact & regular on the R, difficult to palpate today on the L.  No significant edema or varicosities noted.  CFT and skin temperature are normal to both lower extremities.     Neurologic: Lower extremity sensation is intact to light touch.  No evidence of weakness or contracture in the lower extremities.  No evidence of neuropathy.     Musculoskeletal: Pes planus b/l wide feet.  R 2nd hammertoe noted.  R dorsal midfoot bone spurs area palpable.  Foot pain to dorsal midfoot b/l near 2nd and 3rd TMTJs primarily.  R foot bunion.  Patient is ambulatory without assistive device or brace.  No gross ankle deformity noted.  No foot or ankle joint effusion is noted.    XRs of feet reviewed with pt.  Advanced midfoot degenerative changes b/l.  Evidence of prior bunion surgery b/l.  Shortened L 1st ray, recurrent bunion on R.     ASSESSMENT:   B/l foot pain, pes planus, midfoot osteoarthritis     PLAN:  Reviewed patient's chart in epic.  Discussed condition and treatment options including pros and cons.    Midfoot arthritis is the primary issue, I suspect.  Discussed the progressive nature of this condition.  Treatments include rest, ice, stiff soled shoes, orthotics, topical otc arthritis creams, injection, sometimes surgery if conservative care fails.  Advised stiff soled flat, wide shoes.  Will send for custom orthotics.  Discussed good otc options from  [facility].  Discussed possible IR guided steroid injection of midfoot if not improving.  I would want ABIs prior to ordering this given concern for some PAD.  Pt deferred for now.  Follow-up prn.    Christopher Renae DPM, FACFAS    Weight management plan: Patient was referred to their PCP to discuss a diet and exercise plan.      Again, thank you for allowing me to participate in the care of your patient.        Sincerely,        Christopher Renae DPM

## 2019-10-16 NOTE — PATIENT INSTRUCTIONS
Thank you for choosing [facility]!    Follow up as needed    DR. HERNANDEZ'S CLINIC LOCATIONS     MONDAY  [place] TUESDAY & FRIDAY AM  [place]   [address]   [address]   [place] [address] JEROME Yadav [address]   [phone]  [phone]  [phone]       [facility]NESDAY  [facility] [phone]   [address] [facility] APPOINTMENTS: [phone]   JEROME Bates [address] BILLING QUESTIONS: [phone]   [phone]       OSTEOARTHRITIS OF THE FOOT & ANKLE  Osteoarthritis is a condition characterized by the breakdown and eventual loss of cartilage in one or more joints. Cartilage (the connective tissue found at the end of the bones in the joints) protects and cushions the bones during movement. When cartilage deteriorates or is lost, symptoms develop that can restrict one s ability to easily perform daily activities.  Osteoarthritis is also known as degenerative arthritis, reflecting its nature to develop as part of the aging process. As the most common form of arthritis, osteoarthritis affects millions of Americans. Some people refer to osteoarthritis simply as arthritis, even though there are many different types of arthritis.  Osteoarthritis appears at various joints throughout the body, including the hands, feet, spine, hips, and knees. In the foot, the disease most frequently occurs in the big toe, although it is also often found in the midfoot and ankle.  CAUSES  Osteoarthritis is considered a  wear and tear  disease because the cartilage in the joint wears down with repeated stress and use over time. As the cartilage deteriorates and gets thinner, the bones lose their protective covering and eventually may rub together, causing pain and inflammation of the joint.  An injury may also lead to osteoarthritis, although it may take months or years after the injury for the condition to develop. For example,  osteoarthritis in the big toe is often caused by kicking or jamming the toe, or by dropping something on the toe. Osteoarthritis in the midfoot is often caused by dropping something on it, or by a sprain or fracture. In the ankle, osteoarthritis is usually caused by a fracture and occasionally by a severe sprain.  Sometimes osteoarthritis develops as a result of abnormal foot mechanics such as flat feet or high arches. A flat foot causes less stability in the ligaments (bands of tissue that connect bones), resulting in excessive strain on the joints, which can cause arthritis. A high arch is rigid and lacks mobility, causing a jamming of joints that creates an increased risk of arthritis.  SYMPTOMS  People with osteoarthritis in the foot or ankle experience, in varying degrees, one or more of the following: Pain and stiffness in the joint, swelling in or near the joint, or difficulty walking or bending the joint.   Some patients with osteoarthritis also develop a bone spur (a bony protrusion) at the affected joint. Shoe pressure may cause pain at the site of a bone spur, and in some cases blisters or calluses may form over its surface. Bone spurs can also limit the movement of the joint.    DIAGNOSIS  In diagnosing osteoarthritis, the foot and ankle surgeon will examine the foot thoroughly, looking for swelling in the joint, limited mobility, and pain with movement. In some cases, deformity and/or enlargement (spur) of the joint may be noted. X-rays may be ordered to evaluate the extent of the disease.  NON-SURGICAL TREATMENT  To help relieve symptoms, the surgeon may begin treating osteoarthritis with one or more of the following non-surgical approaches:  Oral medications. Nonsteroidal anti-inflammatory drugs (NSAIDs), such as ibuprofen, are often helpful in reducing the inflammation and pain. Occasionally a prescription for a steroid medication is needed to adequately reduce symptoms.   Orthotic devices. Custom  orthotic devices (shoe inserts) are often prescribed to provide support to improve the foot s mechanics or cushioning to help minimize pain.   Bracing. Bracing, which restricts motion and supports the joint, can reduce pain during walking and help prevent further deformity.   Immobilization. Protecting the foot from movement by wearing a cast or removable cast-boot may be necessary to allow the inflammation to resolve.   Steroid injections. In some cases, steroid injections are applied to the affected joint to deliver anti-inflammatory medication.   Physical therapy. Exercises to strengthen the muscles, especially when the osteoarthritis occurs in the ankle, may give the patient greater stability and help avoid injury that might worsen the condition.   DO I NEED SURGERY?  When osteoarthritis has progressed substantially or failed to improve with non-surgical treatment, surgery may be recommended. In advanced cases, surgery may be the only option. The goal of surgery is to decrease pain and improve function. The foot and ankle surgeon will consider a number of factors when selecting the procedure best suited to the patient s condition and lifestyle.    PRICE THERAPY  Many aches and pains throughout the foot and ankle can be helped with many simple treatments. This is usually described as PRICE Therapy.      P - Protection - often times, inflammation/pain in the lower extremity is not able to improve simply because the areas involved are never allowed to rest. Every step we take can bother the problematic area. Protecting those areas is an important step in the healing process. This may involve a walking cast boot, a special insert/orthotic device, an ankle brace, or simply avoiding barefoot walking.    R - Rest - in addition to protecting the foot/ankle, resting is an important, but often times difficult, treatment option. Getting off your feet when they bother you, and specifically avoiding activities that cause  pain/discomfort, are very beneficial to prevent, and treat, foot/ankle pain.      I - Ice - icing regularly can help to decrease inflammation and swelling in the foot, thus decreasing pain. Using an ice pack or a bag of frozen veggies works very well. Ice for 20 minutes multiple times per day as needed.  Do not place the ice directly on the skin as this can cause tissue damage.    C - Compression - using a compression wrap or an ACE wrap can help to decrease swelling, which can help to decrease pain. Wearing the wraps is generally not needed at night, but they should be worn on a regular basis when you are going to be on your feet for prolonged periods as gravity tends to pull fluids down to your feet/ankles.    E - Elevation - elevating your lower extremities multiple times daily for 15-20 minutes can help to decrease swelling, which works well in decreasing pain levels.    NSAID/Tylenol - Anti-inflammatories like Aleve or ibuprofen, and/or a pain medication, such as Tylenol, can help to improve pain levels and get the issue resolved sooner rather than later. Anyone with liver issues should be careful with Tylenol, and anyone with high blood pressure or heart, stomach or kidney issues should be careful with anti-inflammatories. Please ask if you have questions about these medications, including dosage.    [facility] LOCATIONS  [facility]  [address]  [place] [address]  Phone: [phone]  Fax: [phone] [facility]  [address]  [place] [address]  Phone: [phone]   Fax: [phone]   [facility]  [address]  [place] [address]  Phone: [phone]  Fax: [phone] [facility]  [address]  [place] [address]  Phone: [phone]   Fax: [phone]   [facility]   [address]  [place] [address]  Phone:  [phone]  Fax: [phone] * Please call any location listed to make an appointment for a casting/fitting. Your referral was sent to their central office and they will all have the order on file.           BODY WEIGHT AND YOUR FEET  The following information is included in the after visit summary for all patients. Body weight can be a sensitive issue to discuss in clinic, but we think the following information is very important. Although we focus on the feet and ankles, we do support the overall health of our patients.     Many things can cause foot and ankle problems. Foot structure, activity level, foot mechanics and injuries are common causes of pain. One very important issue that often goes unmentioned, is body weight. Extra weight can cause increased stress on muscles, ligaments, bones and tendons. Sometimes just a few extra pounds is all it takes to put one over her/his threshold. Without reducing that stress, it can be difficult to alleviate pain. As Foot & Ankle specialists, our job is addressing the lower extremity problem and possible causes. Regarding extra body weight, we encourage patients to discuss diet and weight management plans with their primary care doctors. It is this team approach that gives you the best opportunity for pain relief and getting you back on your feet.      [facility] has a Comprehensive Weight Management Program. This program includes counseling, education, non-surgical and surgical approaches to weight loss. If you are interested in learning more either talk to you primary care provider or call [phone].

## 2019-10-16 NOTE — PROGRESS NOTES
PATIENT HISTORY:  Caty Ng is a 74 year old female who presents to clinic for b/l foot pain, mostly dorsal midfoot area.  Pt reports foot surgeries over 30 years ago including b/l bunion repairs (x2 on L) and what sounds like a dorsal midfoot bone spur removal.  Present for years.  Pt has trouble with shoe fit.  Worse with walking, better with rest.  L is worse than R.  Pt is on coumadin, so cannot take NSAIDs.      I was requested to see this patient for this issue by Dr Perera.    Review of Systems:  Patient denies fever, chills, rash, wound, stiffness, limping, numbness, weakness, heart burn, blood in stool, chest pain with activity, calf pain when walking, shortness of breath with activity, chronic cough, easy bleeding/bruising, swelling of ankles, excessive thirst, fatigue, depression, anxiety.       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= [employer] [facility]     Colon adenoma      DDD (degenerative disc disease), lumbar      Degenerative joint disease of right hip      Hyperlipidemia      Hypertriglyceridemia      Mediastinal mass 2015    benign, [facility] consult      Mitral valve regurgitation 6/24/2012     Obesity      BROCK (obstructive sleep apnea)      Scoliosis      Systolic heart failure (H)         PAST SURGICAL HISTORY:   Past Surgical History:   Procedure Laterality Date     HYSTERECTOMY, PAP NO LONGER INDICATED  1989     JOINT REPLACEMTN, KNEE RT/LT  2007    right     JOINT REPLACEMTN, KNEE RT/LT Left      SALPINGO OOPHORECTOMY,R/L/BRANDO Bilateral 2003     SURGICAL HISTORY OF -   1989    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        MEDICATIONS:   Current Outpatient Medications:      albuterol (PROAIR HFA/PROVENTIL HFA/VENTOLIN HFA) 108 (90 BASE) MCG/ACT Inhaler, Inhale 2 puffs into the lungs every 6 hours as needed for shortness of  breath / dyspnea or wheezing Profile Rx: patient will contact pharmacy when needed, Disp: 1 Inhaler, Rfl: 0     ASPIRIN NOT PRESCRIBED (INTENTIONAL), Please choose reason not prescribed, below, Disp: , Rfl:      atorvastatin (LIPITOR) 40 MG tablet, Take 1 tablet (40 mg) by mouth daily, Disp: 90 tablet, Rfl: 3     carvedilol (COREG) 25 MG tablet, Take 0.5 tablets (12.5 mg) by mouth 2 times daily (with meals), Disp: 90 tablet, Rfl: 3     diltiazem ER COATED BEADS (CARDIZEM CD/CARTIA XT) 240 MG 24 hr capsule, TAKE 1 CAPSULE DAILY (INCREASED DOSE), Disp: 90 capsule, Rfl: 3     levothyroxine (SYNTHROID/LEVOTHROID) 75 MCG tablet, TAKE 1 TABLET EVERY DAY, Disp: 90 tablet, Rfl: 3     loratadine (CLARITIN) 10 MG tablet, Take 10 mg by mouth as needed for allergies, Disp: , Rfl:      losartan (COZAAR) 25 MG tablet, Take 1 tablet (25 mg) by mouth daily, Disp: 90 tablet, Rfl: 1     torsemide (DEMADEX) 20 MG tablet, TAKE 2 TABLETS EVERY DAY, Disp: 180 tablet, Rfl: 1     warfarin ANTICOAGULANT (COUMADIN) 2 MG tablet, Take 3 mg (2 mg x 1.5) every Fri; 2 mg (2 mg x 1) all other days or as directed by INR nurse, Disp: 105 tablet, Rfl: 1     ALLERGIES:    Allergies   Allergen Reactions     Lisinopril      cough     Metal [Staples] Other (See Comments)     Metal allergy not clearly specified - patient had a stainless steel screw put in her foot that needed to be removed due to allergy/hypersensitivity      Metoprolol Fatigue        SOCIAL HISTORY:   Social History     Socioeconomic History     Marital status:      Spouse name: Bill      Number of children: 2     Years of education: Not on file     Highest education level: Not on file   Occupational History     Occupation: taxes      Employer: RETIRED   Social Needs     Financial resource strain: Not on file     Food insecurity:     Worry: Not on file     Inability: Not on file     Transportation needs:     Medical: Not on file     Non-medical: Not on file   Tobacco Use      "Smoking status: Former Smoker     Packs/day: 0.50     Years: 20.00     Pack years: 10.00     Types: Cigarettes     Last attempt to quit: 10/1/1982     Years since quittin.0     Smokeless tobacco: Never Used   Substance and Sexual Activity     Alcohol use: No     Comment: 1 beers per week     Drug use: No     Sexual activity: Yes     Partners: Male     Birth control/protection: Post-menopausal   Lifestyle     Physical activity:     Days per week: Not on file     Minutes per session: Not on file     Stress: Not on file   Relationships     Social connections:     Talks on phone: Not on file     Gets together: Not on file     Attends [employer] service: Not on file     Active member of club or organization: Not on file     Attends meetings of clubs or organizations: Not on file     Relationship status: Not on file     Intimate partner violence:     Fear of current or ex partner: Not on file     Emotionally abused: Not on file     Physically abused: Not on file     Forced sexual activity: Not on file   Other Topics Concern     Parent/sibling w/ CABG, MI or angioplasty before 65F 55M? Yes   Social History Narrative     Not on file        FAMILY HISTORY:   Family History   Problem Relation Age of Onset     Heart Disease Mother      Cancer Mother         ovarian     Diabetes Father      Musculoskeletal Disorder Father      Diabetes Paternal Grandmother      Diabetes Paternal Grandfather      Diabetes Sister      Thrombophilia Daughter         Factor V Leiden Deficiency        EXAM:Vitals: /84   Pulse 78   Ht 1.626 m (5' 4\")   Wt 114 kg (251 lb 6.4 oz)   BMI 43.15 kg/m    BMI= Body mass index is 43.15 kg/m .    General appearance: Patient is alert and fully cooperative with history & exam.  No sign of distress is noted during the visit.     Psychiatric: Affect is pleasant & appropriate.  Patient appears motivated to improve health.     Respiratory: Breathing is regular & unlabored while sitting.     HEENT: " Hearing is intact to spoken word.  Speech is clear.  No gross evidence of visual impairment that would impact ambulation.     Dermatologic: Skin is intact to both lower extremities without significant lesions, rash or abrasion.  No paronychia or evidence of soft tissue infection is noted.     Vascular: DP & PT pulses are intact & regular on the R, difficult to palpate today on the L.  No significant edema or varicosities noted.  CFT and skin temperature are normal to both lower extremities.     Neurologic: Lower extremity sensation is intact to light touch.  No evidence of weakness or contracture in the lower extremities.  No evidence of neuropathy.     Musculoskeletal: Pes planus b/l wide feet.  R 2nd hammertoe noted.  R dorsal midfoot bone spurs area palpable.  Foot pain to dorsal midfoot b/l near 2nd and 3rd TMTJs primarily.  R foot bunion.  Patient is ambulatory without assistive device or brace.  No gross ankle deformity noted.  No foot or ankle joint effusion is noted.    XRs of feet reviewed with pt.  Advanced midfoot degenerative changes b/l.  Evidence of prior bunion surgery b/l.  Shortened L 1st ray, recurrent bunion on R.     ASSESSMENT:   B/l foot pain, pes planus, midfoot osteoarthritis     PLAN:  Reviewed patient's chart in epic.  Discussed condition and treatment options including pros and cons.    Midfoot arthritis is the primary issue, I suspect.  Discussed the progressive nature of this condition.  Treatments include rest, ice, stiff soled shoes, orthotics, topical otc arthritis creams, injection, sometimes surgery if conservative care fails.  Advised stiff soled flat, wide shoes.  Will send for custom orthotics.  Discussed good otc options from [facility].  Discussed possible IR guided steroid injection of midfoot if not improving.  I would want ABIs prior to ordering this given concern for some PAD.  Pt deferred for now.  Follow-up prn.    Christopher Renae, JULIUS, FACFAS    Weight management  plan: Patient was referred to their PCP to discuss a diet and exercise plan.

## 2019-11-01 ENCOUNTER — ANTICOAGULATION THERAPY VISIT (OUTPATIENT)
Dept: NURSING | Facility: CLINIC | Age: 74
End: 2019-11-01
Payer: MEDICARE

## 2019-11-01 DIAGNOSIS — Z79.01 LONG TERM CURRENT USE OF ANTICOAGULANT THERAPY: ICD-10-CM

## 2019-11-01 LAB — INR POINT OF CARE: 2.6 (ref 0.86–1.14)

## 2019-11-01 PROCEDURE — 85610 PROTHROMBIN TIME: CPT | Mod: QW

## 2019-11-01 PROCEDURE — 99207 ZZC NO CHARGE NURSE ONLY: CPT

## 2019-11-01 PROCEDURE — 36416 COLLJ CAPILLARY BLOOD SPEC: CPT

## 2019-11-01 NOTE — PROGRESS NOTES
ANTICOAGULATION FOLLOW-UP CLINIC VISIT    Patient Name:  Caty Ng  Date:  2019  Contact Type:  Face to Face    SUBJECTIVE:  Patient Findings     Positives:   Upcoming invasive procedure    Comments:   The patient was assessed for diet, medication, and activity level changes, missed or extra doses, bruising or bleeding, with no problem findings.          Clinical Outcomes     Negatives:   Major bleeding event, Thromboembolic event, Anticoagulation-related hospital admission, Anticoagulation-related ED visit, Anticoagulation-related fatality    Comments:   The patient was assessed for diet, medication, and activity level changes, missed or extra doses, bruising or bleeding, with no problem findings.             OBJECTIVE    INR Protime   Date Value Ref Range Status   2019 2.6 (A) 0.86 - 1.14 Final       ASSESSMENT / PLAN  No question data found.  Anticoagulation Summary  As of 2019    INR goal:   2.0-3.0   TTR:   68.1 % (4.3 y)   INR used for dosin.6 (2019)   Warfarin maintenance plan:   3 mg (2 mg x 1.5) every Tue, Fri; 2 mg (2 mg x 1) all other days   Full warfarin instructions:   : Hold; : Hold; : Hold; : Hold; Otherwise 3 mg every Tue, Fri; 2 mg all other days   Weekly warfarin total:   16 mg   Plan last modified:   Maryellen Atwood RN (2019)   Next INR check:   2019   Priority:   INR   Target end date:   Indefinite    Indications    Atrial fibrillation (H) (Resolved) [I48.91]  Long term current use of anticoagulant therapy [Z79.01]             Anticoagulation Episode Summary     INR check location:   Anticoagulation Clinic    Preferred lab:   [facility]    Send INR reminders to:   BREE [facility]DR[facility]    Comments:         Anticoagulation Care Providers     Provider Role Specialty Phone number    MartellKarlos gastelumhemapaddy DO [facility] [phone]            See the Encounter Report to view Anticoagulation Flowsheet and  Dosing Calendar (Go to Encounters tab in chart review, and find the Anticoagulation Therapy Visit)        Debo Zuniga RN

## 2019-11-01 NOTE — PATIENT INSTRUCTIONS
Anticoagulation Clinic:  Please call [phone] with any problems or questions regarding your Warfarin therapy.

## 2019-11-02 ENCOUNTER — HEALTH MAINTENANCE LETTER (OUTPATIENT)
Age: 74
End: 2019-11-02

## 2019-11-15 ENCOUNTER — RECORDS - HEALTHEAST (OUTPATIENT)
Dept: ADMINISTRATIVE | Facility: OTHER | Age: 74
End: 2019-11-15

## 2019-11-15 ENCOUNTER — TRANSFERRED RECORDS (OUTPATIENT)
Dept: HEALTH INFORMATION MANAGEMENT | Facility: CLINIC | Age: 74
End: 2019-11-15

## 2019-11-26 ENCOUNTER — ANTICOAGULATION THERAPY VISIT (OUTPATIENT)
Dept: NURSING | Facility: CLINIC | Age: 74
End: 2019-11-26
Payer: MEDICARE

## 2019-11-26 DIAGNOSIS — Z79.01 LONG TERM CURRENT USE OF ANTICOAGULANT THERAPY: ICD-10-CM

## 2019-11-26 LAB — INR POINT OF CARE: 2.1 (ref 0.86–1.14)

## 2019-11-26 PROCEDURE — 99207 ZZC NO CHARGE NURSE ONLY: CPT

## 2019-11-26 PROCEDURE — 85610 PROTHROMBIN TIME: CPT | Mod: QW

## 2019-11-26 PROCEDURE — 36416 COLLJ CAPILLARY BLOOD SPEC: CPT

## 2019-11-26 NOTE — PROGRESS NOTES
ANTICOAGULATION FOLLOW-UP CLINIC VISIT    Patient Name:  Caty Ng  Date:  2019  Contact Type:  Face to Face    SUBJECTIVE:  Patient Findings     Comments:     Assessed for S/S bleeding, clotting, medication, diet, health, activity and alcohol changes          Clinical Outcomes     Negatives:   Major bleeding event, Thromboembolic event, Anticoagulation-related hospital admission, Anticoagulation-related ED visit, Anticoagulation-related fatality    Comments:     Assessed for S/S bleeding, clotting, medication, diet, health, activity and alcohol changes             OBJECTIVE    INR Protime   Date Value Ref Range Status   2019 2.1 (A) 0.86 - 1.14 Final       ASSESSMENT / PLAN  INR assessment THER    Recheck INR In: 4 WEEKS    INR Location Clinic      Anticoagulation Summary  As of 2019    INR goal:   2.0-3.0   TTR:   59.7 % (1 y)   INR used for dosin.1 (2019)   Warfarin maintenance plan:   3 mg (2 mg x 1.5) every Tue, Fri; 2 mg (2 mg x 1) all other days   Full warfarin instructions:   3 mg every Tue, Fri; 2 mg all other days   Weekly warfarin total:   16 mg   No change documented:   Maryellen Atwood, RN   Plan last modified:   Maryellen Atwood RN (2019)   Next INR check:   2019   Priority:   INR   Target end date:   Indefinite    Indications    Atrial fibrillation (H) (Resolved) [I48.91]  Long term current use of anticoagulant therapy [Z79.01]             Anticoagulation Episode Summary     INR check location:   Anticoagulation Clinic    Preferred lab:   [facility]    Send INR reminders to:   [facility]REE [facility]DR[facility]    Comments:         Anticoagulation Care Providers     Provider Role Specialty Phone number    Ibrahima Moura DO [facility] [phone]            See the Encounter Report to view Anticoagulation Flowsheet and Dosing Calendar (Go to Encounters tab in chart review, and find the Anticoagulation Therapy Visit)        Maryellen  CHEVY Atwood, RN

## 2019-12-31 DIAGNOSIS — I50.32 CHRONIC DIASTOLIC HEART FAILURE (H): ICD-10-CM

## 2020-01-03 ENCOUNTER — ANTICOAGULATION THERAPY VISIT (OUTPATIENT)
Dept: NURSING | Facility: CLINIC | Age: 75
End: 2020-01-03
Payer: MEDICARE

## 2020-01-03 DIAGNOSIS — Z79.01 LONG TERM CURRENT USE OF ANTICOAGULANT THERAPY: ICD-10-CM

## 2020-01-03 LAB — INR POINT OF CARE: 2.4 (ref 0.86–1.14)

## 2020-01-03 PROCEDURE — 36416 COLLJ CAPILLARY BLOOD SPEC: CPT

## 2020-01-03 PROCEDURE — 99207 ZZC NO CHARGE NURSE ONLY: CPT

## 2020-01-03 PROCEDURE — 85610 PROTHROMBIN TIME: CPT | Mod: QW

## 2020-01-03 NOTE — PROGRESS NOTES
ANTICOAGULATION FOLLOW-UP CLINIC VISIT    Patient Name:  Caty Ng  Date:  1/3/2020  Contact Type:  Face to Face    SUBJECTIVE:  Patient Findings     Comments:     Assessed for S/S bleeding, clotting, medication, diet, health, activity and alcohol changes          Clinical Outcomes     Negatives:   Major bleeding event, Thromboembolic event, Anticoagulation-related hospital admission, Anticoagulation-related ED visit, Anticoagulation-related fatality    Comments:     Assessed for S/S bleeding, clotting, medication, diet, health, activity and alcohol changes             OBJECTIVE    INR Protime   Date Value Ref Range Status   2020 2.4 (A) 0.86 - 1.14 Final       ASSESSMENT / PLAN  INR assessment THER    Recheck INR In: 6 WEEKS    INR Location Clinic      Anticoagulation Summary  As of 1/3/2020    INR goal:   2.0-3.0   TTR:   62.2 % (1 y)   INR used for dosin.4 (1/3/2020)   Warfarin maintenance plan:   3 mg (2 mg x 1.5) every Tue, Fri; 2 mg (2 mg x 1) all other days   Full warfarin instructions:   3 mg every Tue, Fri; 2 mg all other days   Weekly warfarin total:   16 mg   Plan last modified:   Maryellen Atwood RN (1/3/2020)   Next INR check:   2020   Priority:   INR   Target end date:   Indefinite    Indications    Atrial fibrillation (H) (Resolved) [I48.91]  Long term current use of anticoagulant therapy [Z79.01]             Anticoagulation Episode Summary     INR check location:   Anticoagulation Clinic    Preferred lab:   [facility]    Send INR reminders to:   [facility]REE [facility]DR[facility]    Comments:         Anticoagulation Care Providers     Provider Role Specialty Phone number    Ibrahima Moura DO [facility] [phone]            See the Encounter Report to view Anticoagulation Flowsheet and Dosing Calendar (Go to Encounters tab in chart review, and find the Anticoagulation Therapy Visit)        Maryellen Atwood RN

## 2020-01-07 RX ORDER — LOSARTAN POTASSIUM 25 MG/1
25 TABLET ORAL DAILY
Qty: 90 TABLET | Refills: 1 | Status: SHIPPED | OUTPATIENT
Start: 2020-01-07 | End: 2020-07-10

## 2020-02-14 ENCOUNTER — ANTICOAGULATION THERAPY VISIT (OUTPATIENT)
Dept: NURSING | Facility: CLINIC | Age: 75
End: 2020-02-14
Payer: MEDICARE

## 2020-02-14 DIAGNOSIS — Z79.01 LONG TERM CURRENT USE OF ANTICOAGULANT THERAPY: ICD-10-CM

## 2020-02-14 LAB — INR POINT OF CARE: 2.5 (ref 0.86–1.14)

## 2020-02-14 PROCEDURE — 85610 PROTHROMBIN TIME: CPT | Mod: QW

## 2020-02-14 PROCEDURE — 99207 ZZC NO CHARGE NURSE ONLY: CPT

## 2020-02-14 PROCEDURE — 36416 COLLJ CAPILLARY BLOOD SPEC: CPT

## 2020-02-14 NOTE — PROGRESS NOTES
ANTICOAGULATION FOLLOW-UP CLINIC VISIT    Patient Name:  Caty Ng  Date:  2020  Contact Type:  Face to Face    SUBJECTIVE:  Patient Findings     Comments:     Assessed for S/S bleeding, clotting, medication, diet, health, activity and alcohol changes          Clinical Outcomes     Negatives:   Major bleeding event, Thromboembolic event, Anticoagulation-related hospital admission, Anticoagulation-related ED visit, Anticoagulation-related fatality    Comments:     Assessed for S/S bleeding, clotting, medication, diet, health, activity and alcohol changes             OBJECTIVE    INR Protime   Date Value Ref Range Status   2020 2.5 (A) 0.86 - 1.14 Final       ASSESSMENT / PLAN  INR assessment THER    Recheck INR In: 6 WEEKS    INR Location Clinic      Anticoagulation Summary  As of 2020    INR goal:   2.0-3.0   TTR:   73.7 % (1 y)   Prior goal:   2.0-3.0   INR used for dosin.5 (2020)   Warfarin maintenance plan:   3 mg (2 mg x 1.5) every Tue, Fri; 2 mg (2 mg x 1) all other days   Full warfarin instructions:   3 mg every Tue, Fri; 2 mg all other days   Weekly warfarin total:   16 mg   No change documented:   Maryellen Atwood RN   Plan last modified:   Maryellen Atwood RN (1/3/2020)   Next INR check:   3/27/2020   Priority:   INR   Target end date:   Indefinite    Indications    Atrial fibrillation (H) (Resolved) [I48.91]  Long term current use of anticoagulant therapy [Z79.01]             Anticoagulation Episode Summary     INR check location:   Anticoagulation Clinic    Preferred lab:   [facility] BILL[facility]    Send INR reminders to:   BREE WALDROP    Comments:         Anticoagulation Care Providers     Provider Role Specialty Phone number    Ibrahima Moura DO [facility] [phone]            See the Encounter Report to view Anticoagulation Flowsheet and Dosing Calendar (Go to Encounters tab in chart review, and find the Anticoagulation Therapy  Visit)        Maryellen Atwood, RN

## 2020-02-26 ENCOUNTER — TELEPHONE (OUTPATIENT)
Dept: CARDIOLOGY | Facility: CLINIC | Age: 75
End: 2020-02-26

## 2020-02-26 DIAGNOSIS — Z79.01 LONG-TERM (CURRENT) USE OF ANTICOAGULANTS, INR GOAL 2.0-3.0: ICD-10-CM

## 2020-02-26 DIAGNOSIS — I50.33 ACUTE ON CHRONIC DIASTOLIC CONGESTIVE HEART FAILURE (H): ICD-10-CM

## 2020-02-26 DIAGNOSIS — I25.10 CALCIFICATION OF CORONARY ARTERY: ICD-10-CM

## 2020-02-26 DIAGNOSIS — E03.9 HYPOTHYROIDISM, UNSPECIFIED TYPE: ICD-10-CM

## 2020-02-26 NOTE — TELEPHONE ENCOUNTER
Left detailed message for patient to call her current pharmacy and request for them to transfer all her prescriptions to [facility] and if needed call clinic back to discuss.  Writer can always send the cardiac medications over to new mail order if needed.  Rajani Stout RN

## 2020-02-26 NOTE — TELEPHONE ENCOUNTER
Reason for Call:  Other prescription    Detailed comments: Levothryroxine    [facility]  Phone [phone]     Phone Number Patient can be reached at: Home number on file [phone] (home)    Best Time: any    Can we leave a detailed message on this number? YES    Call taken on 2/26/2020 at 3:07 PM by Christopher Tavreas

## 2020-02-26 NOTE — TELEPHONE ENCOUNTER
[facility] Call [facility]    Phone Message    May a detailed message be left on voicemail: yes     Reason for Call: Other: Pt States she has new pharmacy. [facility], [phone], mail order. All Rx's must go here.      Action Taken: Message routed to:  Other: [facility]    Travel Screening: Not Applicable

## 2020-02-27 NOTE — TELEPHONE ENCOUNTER
Health Call Center    Phone Message    May a detailed message be left on voicemail: yes     Reason for Call: Other: Pt called to follow up with Rajani per the message she got. Her pharmacy was able to send over two of the prescriptions, but [facility] is needing the clinic to send over new prescriptions for pt's Warfarin, Torsemide, and Atorvastatin since there were no refills left on file. ENDY Almanzar's phone number is [phone].     Action Taken: Message routed to:  [facility] Cardiology    Travel Screening: Not Applicable

## 2020-02-28 RX ORDER — WARFARIN SODIUM 2 MG/1
TABLET ORAL
Qty: 105 TABLET | Refills: 1 | Status: SHIPPED | OUTPATIENT
Start: 2020-02-28 | End: 2020-09-02

## 2020-02-28 RX ORDER — ATORVASTATIN CALCIUM 40 MG/1
40 TABLET, FILM COATED ORAL DAILY
Qty: 90 TABLET | Refills: 1 | Status: SHIPPED | OUTPATIENT
Start: 2020-02-28 | End: 2020-07-10

## 2020-02-28 RX ORDER — LEVOTHYROXINE SODIUM 75 UG/1
TABLET ORAL
Qty: 90 TABLET | Refills: 1 | Status: SHIPPED | OUTPATIENT
Start: 2020-02-28 | End: 2020-10-12

## 2020-02-28 RX ORDER — TORSEMIDE 20 MG/1
TABLET ORAL
Qty: 180 TABLET | Refills: 1 | Status: SHIPPED | OUTPATIENT
Start: 2020-02-28 | End: 2020-07-10

## 2020-02-28 RX ORDER — LEVOTHYROXINE SODIUM 75 UG/1
TABLET ORAL
Qty: 90 TABLET | Refills: 1 | Status: SHIPPED | OUTPATIENT
Start: 2020-02-28 | End: 2020-02-28

## 2020-02-28 NOTE — TELEPHONE ENCOUNTER
Refilled per RN refill protocol.  Left message on pt vm that refill was sent to [facility].  Elsie LANGLEYN, RN

## 2020-03-09 ENCOUNTER — TELEPHONE (OUTPATIENT)
Dept: CARDIOLOGY | Facility: CLINIC | Age: 75
End: 2020-03-09

## 2020-03-09 NOTE — TELEPHONE ENCOUNTER
Spoke to patient who states that she spoke with mail order and they will be refilling both prescriptions and no need to call pharmacy at this time.  She will call if needed in the future.  Patient has been on both levthyroxine and warfarin for couple years now and tolerated both fine.  There is a minor interaction when verified with Micromedex.    Rajani Stout RN  Cardiology Care Coordinator  [facility]  [phone] option 1    LEVOTHYROXINE SODIUM -- WARFARIN SODIUM      Minor     Concurrent use of ORAL ANTICOAGULANTS and THYROID HORMONES may result in an increased risk of bleeding.

## 2020-03-09 NOTE — TELEPHONE ENCOUNTER
Health Call Center    Phone Message    May a detailed message be left on voicemail: yes     Reason for Call: Medication Question or concern regarding medication   Prescription Clarification  Name of Medication: levothyroxine (SYNTHROID/LEVOTHROID) 75 MCG tablet and warfarin ANTICOAGULANT (COUMADIN) 2 MG tablet and/or Jantoven  Prescribing Provider: Dr. Casey   Pharmacy: [facility] - [place], [place] - [address] AT PORTAL TO REGISTERED [facility] SITES    What on the order needs clarification? Abi received a letter in the mail that her Pharmacy didn't refill her medication due to a drug interaction. She spoke with them today and they told her that the Levothroid has a drug interaction with both Warfarin and Jantoven. Please call patient back to discuss.         Action Taken: Message routed to:  [facility] Cardiology    Travel Screening: Not Applicable

## 2020-03-20 ENCOUNTER — TELEPHONE (OUTPATIENT)
Dept: FAMILY MEDICINE | Facility: CLINIC | Age: 75
End: 2020-03-20

## 2020-03-20 DIAGNOSIS — I48.91 ATRIAL FIBRILLATION WITH RVR (H): Primary | ICD-10-CM

## 2020-03-20 NOTE — TELEPHONE ENCOUNTER
Left message for patient advising need to reschedule her 3/27 1pm [facility] appointment. Advised to call [phone]. Current appointment cancelled and lab orders placed    Debo Zuniga RN - [facility]

## 2020-03-27 ENCOUNTER — ANTICOAGULATION THERAPY VISIT (OUTPATIENT)
Dept: NURSING | Facility: CLINIC | Age: 75
End: 2020-03-27
Payer: MEDICARE

## 2020-03-27 DIAGNOSIS — Z79.01 LONG TERM CURRENT USE OF ANTICOAGULANT THERAPY: ICD-10-CM

## 2020-03-27 DIAGNOSIS — I48.91 ATRIAL FIBRILLATION WITH RVR (H): ICD-10-CM

## 2020-03-27 LAB
CAPILLARY BLOOD COLLECTION: NORMAL
INR PPP: 2.1 (ref 0.86–1.14)

## 2020-03-27 PROCEDURE — 99207 ZZC NO CHARGE NURSE ONLY: CPT

## 2020-03-27 PROCEDURE — 85610 PROTHROMBIN TIME: CPT | Performed by: FAMILY MEDICINE

## 2020-03-27 PROCEDURE — 36416 COLLJ CAPILLARY BLOOD SPEC: CPT | Performed by: FAMILY MEDICINE

## 2020-03-27 NOTE — PROGRESS NOTES
ANTICOAGULATION FOLLOW-UP CLINIC VISIT    Patient Name:  Caty Ng  Date:  3/27/2020  Contact Type:  Telephone detailed VM    SUBJECTIVE:         OBJECTIVE    INR   Date Value Ref Range Status   2020 2.10 (H) 0.86 - 1.14 Final     Comment:     This test is intended for monitoring Coumadin therapy.  Results are not   accurate in patients with prolonged INR due to factor deficiency.         ASSESSMENT / PLAN  INR assessment THER    Recheck INR In: 6 WEEKS    INR Location Clinic      Anticoagulation Summary  As of 3/27/2020    INR goal:   2.0-3.0   TTR:   85.2 % (1 y)   INR used for dosin.10 (3/27/2020)   Warfarin maintenance plan:   3 mg (2 mg x 1.5) every Tue, Fri; 2 mg (2 mg x 1) all other days   Full warfarin instructions:   3 mg every Tue, Fri; 2 mg all other days   Weekly warfarin total:   16 mg   No change documented:   Debo Zuniga RN   Plan last modified:   Maryellen Atwood RN (1/3/2020)   Next INR check:   2020   Priority:   INR   Target end date:   Indefinite    Indications    Atrial fibrillation (H) (Resolved) [I48.91]  Long term current use of anticoagulant therapy [Z79.01]             Anticoagulation Episode Summary     INR check location:   Anticoagulation Clinic    [facility] lab[facility]    Send INR reminders to:   BREE WALDROP    Comments:         Anticoagulation Care Providers     Provider Role Specialty Phone number    Ibrahima Moura DO [facility] [phone]            See the Encounter Report to view Anticoagulation Flowsheet and Dosing Calendar (Go to Encounters tab in chart review, and find the Anticoagulation Therapy Visit)    Advised continue current warfarin dose, recheck in 6 weeks, call if bruises, bleeding, missed doses, changes in health.    Aurelia Soria [facility]

## 2020-03-27 NOTE — PROGRESS NOTES
Anticoagulation Management    Unable to reach Abi today.    Today's INR result of 2.1 is therapeutic (goal INR of 2.0-3.0).  Result received from: Clinic Lab    Follow up required to confirm warfarin dose taken and assess for changes    Left message to continue maintenance dose of 3 mg Tue/Fri and 2 mg ROW this weekend. Advised to call [phone] between 8am and 5pm      Anticoagulation clinic to follow up    Debo Zuniga RN  Transfer return call to: [phone]

## 2020-06-16 ENCOUNTER — TELEPHONE (OUTPATIENT)
Dept: FAMILY MEDICINE | Facility: CLINIC | Age: 75
End: 2020-06-16

## 2020-06-18 ENCOUNTER — ANTICOAGULATION THERAPY VISIT (OUTPATIENT)
Dept: NURSING | Facility: CLINIC | Age: 75
End: 2020-06-18

## 2020-06-18 DIAGNOSIS — Z79.01 LONG TERM CURRENT USE OF ANTICOAGULANT THERAPY: ICD-10-CM

## 2020-06-18 DIAGNOSIS — I48.91 ATRIAL FIBRILLATION WITH RVR (H): ICD-10-CM

## 2020-06-18 LAB
CAPILLARY BLOOD COLLECTION: NORMAL
INR PPP: 2.3 (ref 0.86–1.14)

## 2020-06-18 PROCEDURE — 85610 PROTHROMBIN TIME: CPT | Performed by: FAMILY MEDICINE

## 2020-06-18 PROCEDURE — 36416 COLLJ CAPILLARY BLOOD SPEC: CPT | Performed by: FAMILY MEDICINE

## 2020-06-18 NOTE — PROGRESS NOTES
ANTICOAGULATION MANAGEMENT     Patient Name:  Caty Ng  Date:  2020    ASSESSMENT /SUBJECTIVE:    Today's INR result of 2.3 is therapeutic. Goal INR of 2.0-3.0      Warfarin dose taken: Warfarin taken as previously instructed    Diet: No new diet changes affecting INR    Medication changes/ interactions: No new medications/supplements affecting INR    Previous INR: Therapeutic     S/S of bleeding or thromboembolism: No    New injury or illness: No    Upcoming surgery, procedure or cardioversion: No    Additional findings: None      PLAN:    Spoke with Caty regarding INR result and instructed:     Warfarin Dosing Instructions: Continue your current warfarin dose 3 mg Tue/Fri and 2 mg ROW    Instructed patient to follow up no later than: 6 weeks  Lab visit scheduled    Education provided: Target INR goal and significance of current INR result      Abi verbalizes understanding and agrees to warfarin dosing plan.    Instructed to call the Anticoagulation Clinic for any changes, questions or concerns. (#[phone])        OBJECTIVE:  INR   Date Value Ref Range Status   2020 2.30 (H) 0.86 - 1.14 Final     Comment:     This test is intended for monitoring Coumadin therapy.  Results are not   accurate in patients with prolonged INR due to factor deficiency.           No question data found.  Anticoagulation Summary  As of 2020    INR goal:   2.0-3.0   TTR:   88.5 % (1 y)   INR used for dosin.30 (2020)   Warfarin maintenance plan:   3 mg (2 mg x 1.5) every Tue, Fri; 2 mg (2 mg x 1) all other days   Full warfarin instructions:   3 mg every Tue, Fri; 2 mg all other days   Weekly warfarin total:   16 mg   No change documented:   Debo Zuniga, RN   Plan last modified:   Maryellen Atwood RN (1/3/2020)   Next INR check:   2020   Priority:   INR   Target end date:   Indefinite    Indications    Atrial fibrillation (H) (Resolved) [I48.91]  Long term current use of anticoagulant therapy  [Z79.01]             Anticoagulation Episode Summary     INR check location:   Anticoagulation Clinic    Preferred lab:   [facility]ILL[facility]    Send INR reminders to:   BREE WALDROP    Comments:         Anticoagulation Care Providers     Provider Role Specialty Phone number    Ibrahima Moura DO [facility] [phone]         Debo Zuniga RN - [facility]

## 2020-07-09 ENCOUNTER — TELEPHONE (OUTPATIENT)
Dept: CARDIOLOGY | Facility: CLINIC | Age: 75
End: 2020-07-09

## 2020-07-09 DIAGNOSIS — I48.20 CHRONIC ATRIAL FIBRILLATION (H): ICD-10-CM

## 2020-07-09 DIAGNOSIS — I25.10 CALCIFICATION OF CORONARY ARTERY: ICD-10-CM

## 2020-07-09 DIAGNOSIS — I50.33 ACUTE ON CHRONIC DIASTOLIC CONGESTIVE HEART FAILURE (H): ICD-10-CM

## 2020-07-09 DIAGNOSIS — I50.32 CHRONIC DIASTOLIC HEART FAILURE (H): ICD-10-CM

## 2020-07-09 NOTE — TELEPHONE ENCOUNTER
[facility] Call Center    Phone Message    May a detailed message be left on voicemail: no     Reason for Call: Medication Question or concern regarding medication   Prescription Clarification  Name of Medication:   sartan (COZAAR) 25 MG tablet, atorvastatin (LIPITOR) 40 MG tablet, levothyroxine (SYNTHROID/LEVOTHROID) 75 MCG tablet, torsemide (DEMADEX) 20 MG tablet, diltiazem ER COATED BEADS (CARDIZEM CD/CARTIA XT) 240 MG, carvedilol (COREG) 25 MG tablet  90 tablet  Prescribing Provider: Dr. Carmela MD   Pharmacy: [facility] - [place], [place] - [address] AT PORTAL TO REGISTERED [facility] SITES  [phone]    What on the order needs clarification? Out of the [place], needs the others to be refilled too. High Priority          Action Taken: Message routed to:  [facility] Cardiology    Travel Screening: Not Applicable

## 2020-07-10 RX ORDER — ATORVASTATIN CALCIUM 40 MG/1
40 TABLET, FILM COATED ORAL DAILY
Qty: 90 TABLET | Refills: 3 | Status: SHIPPED | OUTPATIENT
Start: 2020-07-10 | End: 2021-01-05

## 2020-07-10 RX ORDER — DILTIAZEM HYDROCHLORIDE 240 MG/1
CAPSULE, COATED, EXTENDED RELEASE ORAL
Qty: 90 CAPSULE | Refills: 3 | Status: SHIPPED | OUTPATIENT
Start: 2020-07-10 | End: 2021-07-12

## 2020-07-10 RX ORDER — CARVEDILOL 25 MG/1
12.5 TABLET ORAL 2 TIMES DAILY WITH MEALS
Qty: 90 TABLET | Refills: 3 | Status: SHIPPED | OUTPATIENT
Start: 2020-07-10 | End: 2021-08-09

## 2020-07-10 RX ORDER — TORSEMIDE 20 MG/1
TABLET ORAL
Qty: 180 TABLET | Refills: 3 | Status: SHIPPED | OUTPATIENT
Start: 2020-07-10 | End: 2021-07-20

## 2020-07-10 RX ORDER — LOSARTAN POTASSIUM 25 MG/1
25 TABLET ORAL DAILY
Qty: 90 TABLET | Refills: 3 | Status: SHIPPED | OUTPATIENT
Start: 2020-07-10 | End: 2021-06-29

## 2020-07-23 ENCOUNTER — VIRTUAL VISIT (OUTPATIENT)
Dept: CARDIOLOGY | Facility: CLINIC | Age: 75
End: 2020-07-23
Payer: MEDICARE

## 2020-07-23 DIAGNOSIS — I48.21 PERMANENT ATRIAL FIBRILLATION (H): Primary | ICD-10-CM

## 2020-07-23 DIAGNOSIS — I42.8 NON-ISCHEMIC CARDIOMYOPATHY (H): ICD-10-CM

## 2020-07-23 PROCEDURE — 99214 OFFICE O/P EST MOD 30 MIN: CPT | Mod: 95 | Performed by: INTERNAL MEDICINE

## 2020-07-23 NOTE — PROGRESS NOTES
"Caty Ng is a 75 year old female who is being evaluated via a billable video visit.      The patient has been notified of following:     \"This video visit will be conducted via a call between you and your physician/provider. We have found that certain health care needs can be provided without the need for an in-person physical exam.  This service lets us provide the care you need with a video conversation.  If a prescription is necessary we can send it directly to your pharmacy.  If lab work is needed we can place an order for that and you can then stop by our lab to have the test done at a later time.    Video visits are billed at different rates depending on your insurance coverage.  Please reach out to your insurance provider with any questions.    If during the course of the call the physician/provider feels a video visit is not appropriate, you will not be charged for this service.\"    Patient has given verbal consent for Video visit? Yes  How would you like to obtain your AVS? MyChart  If you are dropped from the video visit, the video invite should be resent to: Text.  Will anyone else be joining your video visit? No        Video-Visit Details    Type of service:  Video Visit    Video Start Time: 11.03AM  Video End Time: 11.16AM    Originating Location (pt. Location): Home    Distant Location (provider location):  [facility]     Platform used for Video Visit: Doximity        SUBJECTIVE:  Caty Ng is a 75 year old female who presents for follow-up.    Asymptomatic PAF. Had one cardioversion and was on Amio. Amio was stopped recently due to persistent Afib. Also has Restrictive Lung disease.     Normal coronary angiogram 2012. LAD calcium score 414 in 2015. No symptoms.    Patient do have dyspnea on exertion.  Denied chest pain.  Patient is unable to do whatever she would like to do and is restricted in her activity.  No other cardiac " symptoms.    Patient Active Problem List    Diagnosis Date Noted     Chronic pain of both feet 10/02/2019     Priority: Medium     History of chondrosarcoma 02/25/2019     Priority: Medium     Pelvis 1989       S/p total knee replacement, bilateral 02/25/2019     Priority: Medium     Primary osteoarthritis of right hip      Priority: Medium     Scoliosis      Priority: Medium     DDD (degenerative disc disease), lumbar      Priority: Medium     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 atrophy of thyroid 09/13/2015     Priority: Medium     Long term current use of anticoagulant therapy 07/07/2015     Priority: Medium     Problem list name updated by automated process. Provider to review       Calcification of coronary artery 07/01/2015     Priority: Medium     Hyperlipidemia LDL goal <130 11/05/2013     Priority: Medium     BROCK (obstructive sleep apnea) 11/04/2013     Priority: Medium     Patient has chosen to not do cpap.       Systolic heart failure (H)      Priority: Medium     EF 45 - 50%         Mitral valve regurgitation 06/24/2012     Priority: Medium     Health Care Home 06/22/2012     Priority: Medium     FPA [employer] for .  Rosenda Hoyt RN-BSN, [facility]  [phone]   DX V65.8 REPLACED WITH [id number] HEALTH CARE HOME (04/08/2013)       Atrial fibrillation with RVR (H) 06/21/2012     Priority: Medium     Hypertriglyceridemia      Priority: Medium     Morbid obesity due to excess calories (H) 11/29/2011     Priority: Medium     Advanced directives, counseling/discussion 11/29/2011     Priority: Low     Advance Directive Problem List Overview:   Name Relationship Phone    Primary Health Care Agent            Alternative Health Care Agent          Discussed advance care planning with patient; information given to patient to review. 11/29/2011       .  Current Outpatient Medications   Medication Sig     albuterol (PROAIR HFA/PROVENTIL HFA/VENTOLIN HFA) 108 (90 BASE) MCG/ACT Inhaler Inhale 2 puffs into the  lungs every 6 hours as needed for shortness of breath / dyspnea or wheezing Profile Rx: patient will contact pharmacy when needed     atorvastatin (LIPITOR) 40 MG tablet Take 1 tablet (40 mg) by mouth daily     carvedilol (COREG) 25 MG tablet Take 0.5 tablets (12.5 mg) by mouth 2 times daily (with meals)     diltiazem ER COATED BEADS (CARDIZEM CD/CARTIA XT) 240 MG 24 hr capsule TAKE 1 CAPSULE DAILY     levothyroxine (SYNTHROID/LEVOTHROID) 75 MCG tablet TAKE 1 TABLET EVERY DAY     losartan (COZAAR) 25 MG tablet Take 1 tablet (25 mg) by mouth daily     torsemide (DEMADEX) 20 MG tablet TAKE 2 TABLETS EVERY DAY     warfarin ANTICOAGULANT (COUMADIN) 2 MG tablet Take 3 mg (2 mg x 1.5) every Fri; 2 mg (2 mg x 1) all other days or as directed by INR nurse     ASPIRIN NOT PRESCRIBED (INTENTIONAL) Please choose reason not prescribed, below     No current facility-administered medications for this visit.      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= [employer]     Colon adenoma      DDD (degenerative disc disease), lumbar      Degenerative joint disease of right hip      Hyperlipidemia      Hypertriglyceridemia      Mediastinal mass 2015    benign, [employer] consult      Mitral valve regurgitation 6/24/2012     Obesity      BROCK (obstructive sleep apnea)      Scoliosis      Systolic heart failure (H)      Past Surgical History:   Procedure Laterality Date     HYSTERECTOMY, PAP NO LONGER INDICATED  1989     JOINT REPLACEMTN, KNEE RT/LT  2007    right     JOINT REPLACEMTN, KNEE RT/LT Left      SALPINGO OOPHORECTOMY,R/L/BRANDO Bilateral 2003     SURGICAL HISTORY OF -   1989    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     Allergies   Allergen Reactions     Lisinopril      cough     Metal [Staples] Other (See Comments)     Metal allergy not clearly specified - patient had a stainless steel  screw put in her foot that needed to be removed due to allergy/hypersensitivity      Metoprolol Fatigue     Social History     Socioeconomic History     Marital status:      Spouse name: Bill      Number of children: 2     Years of education: Not on file     Highest education level: Not on file   Occupational History     Occupation: taxes      Employer: RETIRED   Social Needs     Financial resource strain: Not on file     Food insecurity     Worry: Not on file     Inability: Not on file     Transportation needs     Medical: Not on file     Non-medical: Not on file   Tobacco Use     Smoking status: Former Smoker     Packs/day: 0.50     Years: 20.00     Pack years: 10.00     Types: Cigarettes     Last attempt to quit: 10/1/1982     Years since quittin.8     Smokeless tobacco: Never Used   Substance and Sexual Activity     Alcohol use: No     Comment: 1 beers per week     Drug use: No     Sexual activity: Yes     Partners: Male     Birth control/protection: Post-menopausal   Lifestyle     Physical activity     Days per week: Not on file     Minutes per session: Not on file     Stress: Not on file   Relationships     Social connections     Talks on phone: Not on file     Gets together: Not on file     Attends Restorationist service: Not on file     Active member of club or organization: Not on file     Attends meetings of clubs or organizations: Not on file     Relationship status: Not on file     Intimate partner violence     Fear of current or ex partner: Not on file     Emotionally abused: Not on file     Physically abused: Not on file     Forced sexual activity: Not on file   Other Topics Concern     Parent/sibling w/ CABG, MI or angioplasty before 65F 55M? Yes   Social History Narrative     Not on file     Family History   Problem Relation Age of Onset     Heart Disease Mother      Cancer Mother         ovarian     Diabetes Father      Musculoskeletal Disorder Father      Diabetes Paternal Grandmother       Diabetes Paternal Grandfather      Diabetes Sister      Thrombophilia Daughter         Factor V Leiden Deficiency          REVIEW OF SYSTEMS:  General: negative, fever, chills, night sweats  Skin: negative, acne, rash and scaling  Eyes: negative, double vision, eye pain and photophobia  Ears/Nose/Throat: negative, nasal congestion and purulent rhinorrhea  Respiratory: No cough, No hemoptysis and negative  Cardiovascular: negative, palpitations, tachycardia, irregular heart beat, chest pain, exertional chest pain or pressure, paroxysmal nocturnal dyspnea and orthopnea       OBJECTIVE:  not currently breastfeeding.  General Appearance: alert and no distress  Head: Normocephalic. No masses, lesions, tenderness or abnormalities  Eyes: conjuctiva clear, PERRL, EOM intact         ASSESSMENT/PLAN:  Patient here for yearly follow-up.  Asymptomatic atrial fibrillation.  Currently on rate control and anticoagulation.  Patient was on amiodarone and this was discontinued due to persistent atrial fibrillation on amiodarone and restrictive lung disease.  Patient do have dyspnea on exertion.  Had normal coronary angiogram in 2012 and a CT coronary angiogram showing LAD calcification of 414 placing her at 92nd percentile.  She do not have any exertional chest pain.  Unsure at this time whether dyspnea on exertion is related to lung disease or atrial fibrillation or CAD.  Patient had a ZIO Patch.  This showed 100% atrial fibrillation and maximum heart rate of 128 bpm average heart rate of 83 bpm.  Probably well controlled atrial fibrillation.  Recent echocardiogram reviewed.  EF is around 47% trace 45-50 visually.  Normal PA pressure.  No significant valvular abnormalities.  Severe left atrial enlargement.  Discussed the plan to continue with the current medications.  Patient has significant difficulty in walking due to pain in the right leg.  This pain do not appears to be claudication.  Patient may contact PCP for evaluating this  pain.  Advised to increase her physical activity as much as possible.  If symptoms worsen probably she need to look into CAD as a cause of dyspnea on exertion.  For the time being patient is going to continue with the current medications.  Will return to clinic in 1 year with an echocardiogram.  Meanwhile if her symptoms worsen we will plan for a Lexiscan.  Per orders.   Return to Clinic 1yr

## 2020-07-23 NOTE — PATIENT INSTRUCTIONS
Thank you for coming to the [facility]; please note the following instructions:    1. Will return to clinic in 1 year with an echocardiogram.    2. Meanwhile if her symptoms worsen we will plan for a Lexiscan.          If you have any questions regarding your visit please contact your care team:     Cardiology  Telephone Number   Rajani RYAN, RN  Karen PIERSON, RN   Mela LUNA, RMA  Kiara DESOUZA, RMLUKAS YODER, LPN   [phone] (option 1)   For scheduling appts:     [phone] (select option 1)       For the Device Clinic (Pacemakers and ICD's)  RN's :  Maricruz Chaudhari   During business hours: [phone]    *After business hours:  [phone] (select option 4)      Normal test result notifications will be released via [email or web address] or mailed within 7 business days.  All other test results, will be communicated via telephone once reviewed by your cardiologist.    If you need a medication refill please contact your pharmacy.  Please allow 3 business days for your refill to be completed.    As always, thank you for trusting us with your health care needs!

## 2020-07-23 NOTE — LETTER
7/23/2020      RE: Caty Ng  [address]  [facility] [address]       Dear Colleague,    Thank you for the opportunity to participate in the care of your patient, Caty Ng, at the [facility] HEART AT [facility]. Please see a copy of my visit note below.    Caty Ng is a 75 year old female who is being evaluated via a billable video visit.         SUBJECTIVE:  Caty Ng is a 75 year old female who presents for follow-up.    Asymptomatic PAF. Had one cardioversion and was on Amio. Amio was stopped recently due to persistent Afib. Also has Restrictive Lung disease.     Normal coronary angiogram 2012. LAD calcium score 414 in 2015. No symptoms.    Patient do have dyspnea on exertion.  Denied chest pain.  Patient is unable to do whatever she would like to do and is restricted in her activity.  No other cardiac symptoms.    Patient Active Problem List    Diagnosis Date Noted     Chronic pain of both feet 10/02/2019     Priority: Medium     History of chondrosarcoma 02/25/2019     Priority: Medium     Pelvis 1989       S/p total knee replacement, bilateral 02/25/2019     Priority: Medium     Primary osteoarthritis of right hip      Priority: Medium     Scoliosis      Priority: Medium     DDD (degenerative disc disease), lumbar      Priority: Medium     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 atrophy of thyroid 09/13/2015     Priority: Medium     Long term current use of anticoagulant therapy 07/07/2015     Priority: Medium     Problem list name updated by automated process. Provider to review       Calcification of coronary artery 07/01/2015     Priority: Medium     Hyperlipidemia LDL goal <130 11/05/2013     Priority: Medium     BROCK (obstructive sleep apnea) 11/04/2013     Priority: Medium     Patient has chosen to not do cpap.       Systolic heart failure (H)      Priority: Medium     EF 45 - 50%          Mitral valve regurgitation 06/24/2012     Priority: Medium     Health Care Home 06/22/2012     Priority: Medium     FPA [employer] for .  Rosenda Hoyt RN-BSN, [facility]  [phone]   DX V65.8 REPLACED WITH [id number] HEALTH CARE HOME (04/08/2013)       Atrial fibrillation with RVR (H) 06/21/2012     Priority: Medium     Hypertriglyceridemia      Priority: Medium     Morbid obesity due to excess calories (H) 11/29/2011     Priority: Medium     Advanced directives, counseling/discussion 11/29/2011     Priority: Low               Discussed advance care planning with patient; information given to patient to review. 11/29/2011

## 2020-07-30 ENCOUNTER — ANTICOAGULATION THERAPY VISIT (OUTPATIENT)
Dept: FAMILY MEDICINE | Facility: CLINIC | Age: 75
End: 2020-07-30

## 2020-07-30 DIAGNOSIS — Z79.01 LONG TERM CURRENT USE OF ANTICOAGULANT THERAPY: ICD-10-CM

## 2020-07-30 DIAGNOSIS — I48.91 ATRIAL FIBRILLATION WITH RVR (H): ICD-10-CM

## 2020-07-30 LAB
CAPILLARY BLOOD COLLECTION: NORMAL
INR PPP: 3.1 (ref 0.86–1.14)

## 2020-07-30 PROCEDURE — 99207 ZZC NO CHARGE NURSE ONLY: CPT | Performed by: FAMILY MEDICINE

## 2020-07-30 PROCEDURE — 85610 PROTHROMBIN TIME: CPT | Performed by: FAMILY MEDICINE

## 2020-07-30 PROCEDURE — 36416 COLLJ CAPILLARY BLOOD SPEC: CPT | Performed by: FAMILY MEDICINE

## 2020-07-30 NOTE — PROGRESS NOTES
ANTICOAGULATION FOLLOW-UP TELEPHONE VISIT    Patient Name:  Caty Ng  Date:  7/30/2020  Contact Type:  Telephone/ Report per below    SUBJECTIVE: 2 wk obdulio  Patient Findings     Positives:   Change in health (has not been eating as many greens lately.  she will get back on track with that.)    Comments:   I spoke to Abi following INR done at  lab today.  She denies symptoms of bleeding or clotting.  No medicine or health changes, but does think she hasn't been keeping up on vit k.  Plan a recheck in 2 weeks.          Clinical Outcomes     Negatives:   Major bleeding event, Thromboembolic event, Anticoagulation-related hospital admission, Anticoagulation-related ED visit, Anticoagulation-related fatality    Comments:   I spoke to Abi following INR done at  lab today.  She denies symptoms of bleeding or clotting.  No medicine or health changes, but does think she hasn't been keeping up on vit k.  Plan a recheck in 2 weeks.             OBJECTIVE    Recent labs: (last 7 days)     07/30/20  1107   INR 3.10*       ASSESSMENT / PLAN  INR assessment SUPRA    Recheck INR In: 2 WEEKS    INR Location Outside lab      Anticoagulation Summary  As of 7/30/2020    INR goal:   2.0-3.0   TTR:   87.1 % (1 y)   INR used for dosing:   3.10! (7/30/2020)   Warfarin maintenance plan:   3 mg (2 mg x 1.5) every Tue, Fri; 2 mg (2 mg x 1) all other days   Full warfarin instructions:   7/31: 2 mg; Otherwise 3 mg every Tue, Fri; 2 mg all other days   Weekly warfarin total:   16 mg   Plan last modified:   Maryellen Atwood RN (1/3/2020)   Next INR check:   8/13/2020   Priority:   High   Target end date:   Indefinite    Indications    Atrial fibrillation (H) (Resolved) [I48.91]  Long term current use of anticoagulant therapy [Z79.01]             Anticoagulation Episode Summary     INR check location:   Anticoagulation Clinic    Preferred lab:   [facility]    Send INR reminders to:   BREE WALDROP    Comments:          Anticoagulation Care Providers     Provider Role Specialty Phone number    Ibrahima Moura DO Abhishek [facility] [phone]            See the Encounter Report to view Anticoagulation Flowsheet and Dosing Calendar (Go to Encounters tab in chart review, and find the Anticoagulation Therapy Visit)    Dosage adjustment made based on physician directed care plan.    Fara Veliz RN

## 2020-08-18 ENCOUNTER — ANTICOAGULATION THERAPY VISIT (OUTPATIENT)
Dept: NURSING | Facility: CLINIC | Age: 75
End: 2020-08-18

## 2020-08-18 DIAGNOSIS — Z79.01 LONG TERM CURRENT USE OF ANTICOAGULANT THERAPY: ICD-10-CM

## 2020-08-18 DIAGNOSIS — I48.91 ATRIAL FIBRILLATION WITH RVR (H): ICD-10-CM

## 2020-08-18 LAB
CAPILLARY BLOOD COLLECTION: NORMAL
INR PPP: 2.3 (ref 0.86–1.14)

## 2020-08-18 PROCEDURE — 85610 PROTHROMBIN TIME: CPT | Performed by: FAMILY MEDICINE

## 2020-08-18 PROCEDURE — 36416 COLLJ CAPILLARY BLOOD SPEC: CPT | Performed by: FAMILY MEDICINE

## 2020-08-18 NOTE — PROGRESS NOTES
Anticoagulation Management    Unable to reach Abi today.    Today's INR result of 2.3 is therapeutic (goal INR of 2.0-3.0).  Result received from: Clinic Lab    Left message to call and make follow up in 4 weeks and continue on same current warfarin dose.      Anticoagulation clinic to follow up    Maryellen Atwood RN

## 2020-08-29 DIAGNOSIS — Z79.01 LONG-TERM (CURRENT) USE OF ANTICOAGULANTS, INR GOAL 2.0-3.0: ICD-10-CM

## 2020-09-02 RX ORDER — WARFARIN SODIUM 2 MG/1
TABLET ORAL
Qty: 105 TABLET | Refills: 1 | Status: SHIPPED | OUTPATIENT
Start: 2020-09-02 | End: 2021-03-16

## 2020-09-15 ENCOUNTER — ANTICOAGULATION THERAPY VISIT (OUTPATIENT)
Dept: NURSING | Facility: CLINIC | Age: 75
End: 2020-09-15

## 2020-09-15 DIAGNOSIS — Z79.01 LONG TERM CURRENT USE OF ANTICOAGULANT THERAPY: ICD-10-CM

## 2020-09-15 DIAGNOSIS — I48.91 ATRIAL FIBRILLATION WITH RVR (H): ICD-10-CM

## 2020-09-15 LAB
CAPILLARY BLOOD COLLECTION: NORMAL
INR PPP: 2.4 (ref 0.86–1.14)

## 2020-09-15 PROCEDURE — 85610 PROTHROMBIN TIME: CPT | Performed by: FAMILY MEDICINE

## 2020-09-15 PROCEDURE — 36416 COLLJ CAPILLARY BLOOD SPEC: CPT | Performed by: FAMILY MEDICINE

## 2020-09-15 NOTE — PROGRESS NOTES
Anticoagulation Management    Unable to reach Abi today.    Today's INR result of 2.4 is therapeutic (goal INR of 2.0-3.0).  Result received from: Clinic Lab    Follow up required to left message to resume current warfarin dose and make follow up apt in 4 weeks.    No instructions provided. Unable to leave voicemail.      Anticoagulation clinic to follow up    Maryellen Atwood RN

## 2020-10-10 ENCOUNTER — AMBULATORY - HEALTHEAST (OUTPATIENT)
Dept: NURSING | Facility: CLINIC | Age: 75
End: 2020-10-10

## 2020-10-11 DIAGNOSIS — E03.9 HYPOTHYROIDISM, UNSPECIFIED TYPE: ICD-10-CM

## 2020-10-12 RX ORDER — LEVOTHYROXINE SODIUM 75 UG/1
TABLET ORAL
Qty: 90 TABLET | Refills: 0 | Status: SHIPPED | OUTPATIENT
Start: 2020-10-12 | End: 2021-01-18

## 2020-10-14 ENCOUNTER — ANTICOAGULATION THERAPY VISIT (OUTPATIENT)
Dept: FAMILY MEDICINE | Facility: CLINIC | Age: 75
End: 2020-10-14

## 2020-10-14 DIAGNOSIS — I48.91 ATRIAL FIBRILLATION WITH RVR (H): ICD-10-CM

## 2020-10-14 DIAGNOSIS — Z79.01 LONG TERM CURRENT USE OF ANTICOAGULANT THERAPY: ICD-10-CM

## 2020-10-14 LAB
CAPILLARY BLOOD COLLECTION: NORMAL
INR PPP: 2.3 (ref 0.9–1.1)
INR PPP: 2.4 (ref 0.86–1.14)

## 2020-10-14 PROCEDURE — 99207 PR NO CHARGE NURSE ONLY: CPT | Performed by: FAMILY MEDICINE

## 2020-10-14 PROCEDURE — 36416 COLLJ CAPILLARY BLOOD SPEC: CPT | Performed by: FAMILY MEDICINE

## 2020-10-14 PROCEDURE — 85610 PROTHROMBIN TIME: CPT | Performed by: FAMILY MEDICINE

## 2020-10-14 NOTE — PROGRESS NOTES
ANTICOAGULATION FOLLOW-UP CLINIC VISIT    Patient Name:  Caty Ng  Date:  10/14/2020  Contact Type:  Telephone    SUBJECTIVE:  Patient Findings     Comments:  No changes in diet, activity level, medications (including over the counter), or health. No missed doses of warfarin. Patient took dosing as prescribed. No signs of clots or bleeding concerns. Patient will continue maintenance warfarin dosing.          Clinical Outcomes     Negatives:  Major bleeding event, Thromboembolic event, Anticoagulation-related hospital admission, Anticoagulation-related ED visit, Anticoagulation-related fatality    Comments:  No changes in diet, activity level, medications (including over the counter), or health. No missed doses of warfarin. Patient took dosing as prescribed. No signs of clots or bleeding concerns. Patient will continue maintenance warfarin dosing.             OBJECTIVE    Recent labs: (last 7 days)     10/14/20  1128   INR 2.40*       ASSESSMENT / PLAN  INR assessment THER    Recheck INR In: 5 WEEKS    INR Location Clinic      Anticoagulation Summary  As of 10/14/2020    INR goal:  2.0-3.0   TTR:  97.9 % (1 y)   INR used for dosin.40 (10/14/2020)   Warfarin maintenance plan:  3 mg (2 mg x 1.5) every Tue, Fri; 2 mg (2 mg x 1) all other days   Full warfarin instructions:  3 mg every Tue, Fri; 2 mg all other days   Weekly warfarin total:  16 mg   No change documented:  Anabelle Mcdonald RN   Plan last modified:  Maryellen Atwood RN (1/3/2020)   Next INR check:  2020   Priority:  Maintenance   Target end date:  Indefinite    Indications    Atrial fibrillation (H) (Resolved) [I48.91]  Long term current use of anticoagulant therapy [Z79.01]             Anticoagulation Episode Summary     INR check location:  Anticoagulation Clinic    Preferred lab:  [facility]    Send INR reminders to:  BREE WALDROP    Comments:        Anticoagulation Care Providers     Provider Role Specialty Phone  number    Karlos Mourahemapaddy DO Abhishek [facility] [phone]            See the Encounter Report to view Anticoagulation Flowsheet and Dosing Calendar (Go to Encounters tab in chart review, and find the Anticoagulation Therapy Visit)        Anabelle Mcdonald RN

## 2020-11-05 ENCOUNTER — OFFICE VISIT - HEALTHEAST (OUTPATIENT)
Dept: FAMILY MEDICINE | Facility: CLINIC | Age: 75
End: 2020-11-05

## 2020-11-05 ENCOUNTER — COMMUNICATION - HEALTHEAST (OUTPATIENT)
Dept: ANTICOAGULATION | Facility: CLINIC | Age: 75
End: 2020-11-05

## 2020-11-05 DIAGNOSIS — I34.0 MITRAL VALVE INSUFFICIENCY, UNSPECIFIED ETIOLOGY: ICD-10-CM

## 2020-11-05 DIAGNOSIS — I48.20 CHRONIC ATRIAL FIBRILLATION (H): ICD-10-CM

## 2020-11-05 DIAGNOSIS — R73.9 HYPERGLYCEMIA: ICD-10-CM

## 2020-11-05 DIAGNOSIS — E03.4 HYPOTHYROIDISM DUE TO ACQUIRED ATROPHY OF THYROID: ICD-10-CM

## 2020-11-05 DIAGNOSIS — E78.5 HYPERLIPIDEMIA, UNSPECIFIED HYPERLIPIDEMIA TYPE: ICD-10-CM

## 2020-11-05 DIAGNOSIS — G47.33 OBSTRUCTIVE SLEEP APNEA SYNDROME: ICD-10-CM

## 2020-11-05 DIAGNOSIS — Z79.01 CHRONIC ANTICOAGULATION: ICD-10-CM

## 2020-11-05 DIAGNOSIS — Z79.01 LONG TERM CURRENT USE OF ANTICOAGULANT THERAPY: ICD-10-CM

## 2020-11-05 DIAGNOSIS — E66.01 MORBID OBESITY DUE TO EXCESS CALORIES (H): ICD-10-CM

## 2020-11-05 DIAGNOSIS — I50.32 CHRONIC DIASTOLIC HEART FAILURE (H): ICD-10-CM

## 2020-11-05 DIAGNOSIS — I25.10 CALCIFICATION OF CORONARY ARTERY: ICD-10-CM

## 2020-11-05 LAB
ALBUMIN SERPL-MCNC: 3.9 G/DL (ref 3.5–5)
ALP SERPL-CCNC: 80 U/L (ref 45–120)
ALT SERPL W P-5'-P-CCNC: 11 U/L (ref 0–45)
ANION GAP SERPL CALCULATED.3IONS-SCNC: 11 MMOL/L (ref 5–18)
AST SERPL W P-5'-P-CCNC: 12 U/L (ref 0–40)
BILIRUB SERPL-MCNC: 0.8 MG/DL (ref 0–1)
BUN SERPL-MCNC: 21 MG/DL (ref 8–28)
CALCIUM SERPL-MCNC: 9.2 MG/DL (ref 8.5–10.5)
CHLORIDE BLD-SCNC: 104 MMOL/L (ref 98–107)
CHOLEST SERPL-MCNC: 96 MG/DL
CO2 SERPL-SCNC: 27 MMOL/L (ref 22–31)
CREAT SERPL-MCNC: 0.78 MG/DL (ref 0.6–1.1)
FASTING STATUS PATIENT QL REPORTED: YES
GFR SERPL CREATININE-BSD FRML MDRD: >60 ML/MIN/1.73M2
GLUCOSE BLD-MCNC: 148 MG/DL (ref 70–125)
HDLC SERPL-MCNC: 38 MG/DL
INR PPP: 2.5 (ref 0.9–1.1)
LDLC SERPL CALC-MCNC: 35 MG/DL
POTASSIUM BLD-SCNC: 4.1 MMOL/L (ref 3.5–5)
PROT SERPL-MCNC: 7.2 G/DL (ref 6–8)
SODIUM SERPL-SCNC: 142 MMOL/L (ref 136–145)
TRIGL SERPL-MCNC: 117 MG/DL
TSH SERPL DL<=0.005 MIU/L-ACNC: 1.08 UIU/ML (ref 0.3–5)

## 2020-11-05 ASSESSMENT — MIFFLIN-ST. JEOR: SCORE: 1607.19

## 2020-11-14 ENCOUNTER — HEALTH MAINTENANCE LETTER (OUTPATIENT)
Age: 75
End: 2020-11-14

## 2020-11-17 ENCOUNTER — AMBULATORY - HEALTHEAST (OUTPATIENT)
Dept: FAMILY MEDICINE | Facility: CLINIC | Age: 75
End: 2020-11-17

## 2020-11-17 DIAGNOSIS — I25.10 CALCIFICATION OF CORONARY ARTERY: ICD-10-CM

## 2020-11-23 ENCOUNTER — HOSPITAL ENCOUNTER (OUTPATIENT)
Dept: CARDIOLOGY | Facility: HOSPITAL | Age: 75
Discharge: HOME | End: 2020-11-23
Attending: FAMILY MEDICINE

## 2020-11-23 ENCOUNTER — HOSPITAL ENCOUNTER (OUTPATIENT)
Dept: NUCLEAR MEDICINE | Facility: HOSPITAL | Age: 75
Discharge: HOME | End: 2020-11-23
Attending: FAMILY MEDICINE

## 2020-11-23 DIAGNOSIS — I25.10 CALCIFICATION OF CORONARY ARTERY: ICD-10-CM

## 2020-11-23 LAB
CV STRESS CURRENT BP HE: NORMAL
CV STRESS CURRENT HR HE: 100
CV STRESS CURRENT HR HE: 101
CV STRESS CURRENT HR HE: 103
CV STRESS CURRENT HR HE: 103
CV STRESS CURRENT HR HE: 81
CV STRESS CURRENT HR HE: 86
CV STRESS CURRENT HR HE: 86
CV STRESS CURRENT HR HE: 87
CV STRESS CURRENT HR HE: 88
CV STRESS CURRENT HR HE: 88
CV STRESS CURRENT HR HE: 89
CV STRESS CURRENT HR HE: 89
CV STRESS CURRENT HR HE: 90
CV STRESS CURRENT HR HE: 92
CV STRESS CURRENT HR HE: 92
CV STRESS CURRENT HR HE: 93
CV STRESS CURRENT HR HE: 94
CV STRESS CURRENT HR HE: 95
CV STRESS CURRENT HR HE: 95
CV STRESS DEVIATION TIME HE: NORMAL
CV STRESS ECHO PERCENT HR HE: NORMAL
CV STRESS EXERCISE STAGE HE: NORMAL
CV STRESS FINAL RESTING BP HE: NORMAL
CV STRESS FINAL RESTING HR HE: 103
CV STRESS MAX HR HE: 106
CV STRESS MAX TREADMILL GRADE HE: 0
CV STRESS MAX TREADMILL SPEED HE: 0
CV STRESS PEAK DIA BP HE: NORMAL
CV STRESS PEAK SYS BP HE: NORMAL
CV STRESS PHASE HE: NORMAL
CV STRESS PROTOCOL HE: NORMAL
CV STRESS RESTING PT POSITION HE: NORMAL
CV STRESS RESTING PT POSITION HE: NORMAL
CV STRESS ST DEVIATION AMOUNT HE: NORMAL
CV STRESS ST DEVIATION ELEVATION HE: NORMAL
CV STRESS ST EVELATION AMOUNT HE: NORMAL
CV STRESS TEST TYPE HE: NORMAL
CV STRESS TOTAL STAGE TIME MIN 1 HE: NORMAL
NUC STRESS EJECTION FRACTION: 51 %
RATE PRESSURE PRODUCT: NORMAL
STRESS ECHO BASELINE HR: 88
STRESS ECHO CALCULATED PERCENT HR: 72 %
STRESS ECHO LAST STRESS DIASTOLIC BP: 59
STRESS ECHO LAST STRESS HR: 103
STRESS ECHO LAST STRESS SYSTOLIC BP: 106
STRESS ECHO TARGET HR: 147

## 2020-11-25 ENCOUNTER — TELEPHONE (OUTPATIENT)
Dept: FAMILY MEDICINE | Facility: CLINIC | Age: 75
End: 2020-11-25

## 2020-12-02 ENCOUNTER — RECORDS - HEALTHEAST (OUTPATIENT)
Dept: ADMINISTRATIVE | Facility: OTHER | Age: 75
End: 2020-12-02

## 2020-12-03 ENCOUNTER — OFFICE VISIT - HEALTHEAST (OUTPATIENT)
Dept: CARDIOLOGY | Facility: CLINIC | Age: 75
End: 2020-12-03

## 2020-12-03 ENCOUNTER — COMMUNICATION - HEALTHEAST (OUTPATIENT)
Dept: ANTICOAGULATION | Facility: CLINIC | Age: 75
End: 2020-12-03

## 2020-12-03 DIAGNOSIS — G47.33 OBSTRUCTIVE SLEEP APNEA SYNDROME: ICD-10-CM

## 2020-12-03 DIAGNOSIS — I34.0 MITRAL VALVE INSUFFICIENCY, UNSPECIFIED ETIOLOGY: ICD-10-CM

## 2020-12-03 DIAGNOSIS — I48.20 CHRONIC ATRIAL FIBRILLATION (H): ICD-10-CM

## 2020-12-03 DIAGNOSIS — R06.09 DYSPNEA ON EXERTION: ICD-10-CM

## 2020-12-03 DIAGNOSIS — I48.21 PERMANENT ATRIAL FIBRILLATION (H): ICD-10-CM

## 2020-12-03 DIAGNOSIS — Z79.01 LONG TERM (CURRENT) USE OF ANTICOAGULANTS: ICD-10-CM

## 2020-12-03 LAB
BNP SERPL-MCNC: 184 PG/ML (ref 0–130)
ERYTHROCYTE [DISTWIDTH] IN BLOOD BY AUTOMATED COUNT: 11.9 % (ref 11–14.5)
HCT VFR BLD AUTO: 41 % (ref 35–47)
HGB BLD-MCNC: 14 G/DL (ref 12–16)
INR PPP: 2.2 (ref 0.9–1.1)
MCH RBC QN AUTO: 31.3 PG (ref 27–34)
MCHC RBC AUTO-ENTMCNC: 34.1 G/DL (ref 32–36)
MCV RBC AUTO: 92 FL (ref 80–100)
PLATELET # BLD AUTO: 214 THOU/UL (ref 140–440)
PMV BLD AUTO: 7.4 FL (ref 7–10)
RBC # BLD AUTO: 4.46 MILL/UL (ref 3.8–5.4)
WBC: 6.5 THOU/UL (ref 4–11)

## 2020-12-07 ENCOUNTER — COMMUNICATION - HEALTHEAST (OUTPATIENT)
Dept: CARDIOLOGY | Facility: CLINIC | Age: 75
End: 2020-12-07

## 2020-12-07 ENCOUNTER — HOSPITAL ENCOUNTER (OUTPATIENT)
Dept: CARDIOLOGY | Facility: HOSPITAL | Age: 75
Discharge: HOME | End: 2020-12-07
Attending: INTERNAL MEDICINE

## 2020-12-07 DIAGNOSIS — I48.20 CHRONIC ATRIAL FIBRILLATION (H): ICD-10-CM

## 2020-12-07 DIAGNOSIS — I48.21 PERMANENT ATRIAL FIBRILLATION (H): ICD-10-CM

## 2020-12-08 ENCOUNTER — AMBULATORY - HEALTHEAST (OUTPATIENT)
Dept: CARDIOLOGY | Facility: CLINIC | Age: 75
End: 2020-12-08

## 2020-12-08 DIAGNOSIS — Z79.899 LONG TERM CURRENT USE OF DIURETIC: ICD-10-CM

## 2020-12-11 ENCOUNTER — AMBULATORY - HEALTHEAST (OUTPATIENT)
Dept: LAB | Facility: CLINIC | Age: 75
End: 2020-12-11

## 2020-12-11 DIAGNOSIS — Z79.899 LONG TERM CURRENT USE OF DIURETIC: ICD-10-CM

## 2020-12-11 LAB
ANION GAP SERPL CALCULATED.3IONS-SCNC: 13 MMOL/L (ref 5–18)
BUN SERPL-MCNC: 22 MG/DL (ref 8–28)
CALCIUM SERPL-MCNC: 9.1 MG/DL (ref 8.5–10.5)
CHLORIDE BLD-SCNC: 99 MMOL/L (ref 98–107)
CO2 SERPL-SCNC: 29 MMOL/L (ref 22–31)
CREAT SERPL-MCNC: 0.83 MG/DL (ref 0.6–1.1)
GFR SERPL CREATININE-BSD FRML MDRD: >60 ML/MIN/1.73M2
GLUCOSE BLD-MCNC: 103 MG/DL (ref 70–125)
POTASSIUM BLD-SCNC: 4.3 MMOL/L (ref 3.5–5)
SODIUM SERPL-SCNC: 141 MMOL/L (ref 136–145)

## 2020-12-17 ENCOUNTER — RECORDS - HEALTHEAST (OUTPATIENT)
Dept: ADMINISTRATIVE | Facility: OTHER | Age: 75
End: 2020-12-17

## 2020-12-17 ENCOUNTER — ANCILLARY PROCEDURE (OUTPATIENT)
Dept: MAMMOGRAPHY | Facility: CLINIC | Age: 75
End: 2020-12-17
Attending: FAMILY MEDICINE
Payer: MEDICARE

## 2020-12-17 DIAGNOSIS — Z12.31 VISIT FOR SCREENING MAMMOGRAM: ICD-10-CM

## 2020-12-17 PROCEDURE — 77063 BREAST TOMOSYNTHESIS BI: CPT | Mod: TC | Performed by: RADIOLOGY

## 2020-12-17 PROCEDURE — 77067 SCR MAMMO BI INCL CAD: CPT | Mod: TC | Performed by: RADIOLOGY

## 2020-12-29 DIAGNOSIS — I25.10 CALCIFICATION OF CORONARY ARTERY: ICD-10-CM

## 2020-12-29 DIAGNOSIS — E03.9 HYPOTHYROIDISM, UNSPECIFIED TYPE: ICD-10-CM

## 2020-12-31 RX ORDER — ATORVASTATIN CALCIUM 40 MG/1
40 TABLET, FILM COATED ORAL DAILY
Qty: 90 TABLET | Refills: 3 | Status: CANCELLED | OUTPATIENT
Start: 2020-12-31

## 2021-01-02 RX ORDER — LEVOTHYROXINE SODIUM 75 UG/1
TABLET ORAL
Qty: 90 TABLET | Refills: 0 | OUTPATIENT
Start: 2021-01-02

## 2021-01-04 ENCOUNTER — TELEPHONE (OUTPATIENT)
Dept: CARDIOLOGY | Facility: CLINIC | Age: 76
End: 2021-01-04

## 2021-01-04 NOTE — TELEPHONE ENCOUNTER
[facility] Call Center    Phone Message    May a detailed message be left on voicemail: no     Reason for Call: Other: The pharmacy would like a call back to discuss the lipitor 40 mg as the pt requested for a refill but just had a refill local pharmacy for 10 mg so pharmacy needs confirmation before refilling  Ref # [id number]    Action Taken: Message routed to:  [facility] ([facility] Cardio    Travel Screening: Not Applicable

## 2021-01-05 RX ORDER — ATORVASTATIN CALCIUM 10 MG/1
10 TABLET, FILM COATED ORAL DAILY
Status: ON HOLD | COMMUNITY
Start: 2020-11-17 | End: 2021-07-21

## 2021-01-05 NOTE — TELEPHONE ENCOUNTER
Spoke to patient.  Patient is being followed by [facility] cardiology and family practice.  Reports her atorvastatin dosage has been decreased due to low lipids results and she is currently taking atorvastatin 10 mg daily NOT 40 mg daily.  Reports she accidentally requested the wrong prescription.  Also states she does have a refill left of atorvastatin 10 mg daily.      Writer attempted to contact [facility], the telephone tree disconnected x4 times.  Finally got a live representative and confirmed that pt is taking atorvastatin 10 mg daily NOT 40 mg tablet with pharmacy [facility].and this was confirmed with pharmacist    Med list updated.    Rajani Stout, WILMA  Cardiology Care Coordinator  [facility]  [phone] option 1

## 2021-01-15 DIAGNOSIS — E03.9 HYPOTHYROIDISM, UNSPECIFIED TYPE: ICD-10-CM

## 2021-01-18 RX ORDER — LEVOTHYROXINE SODIUM 75 UG/1
TABLET ORAL
Qty: 30 TABLET | Refills: 0 | Status: SHIPPED | OUTPATIENT
Start: 2021-01-18 | End: 2021-08-28

## 2021-01-19 ENCOUNTER — COMMUNICATION - HEALTHEAST (OUTPATIENT)
Dept: ADMINISTRATIVE | Facility: CLINIC | Age: 76
End: 2021-01-19

## 2021-01-19 DIAGNOSIS — I25.10 CALCIFICATION OF CORONARY ARTERY: ICD-10-CM

## 2021-01-21 ENCOUNTER — COMMUNICATION - HEALTHEAST (OUTPATIENT)
Dept: ANTICOAGULATION | Facility: CLINIC | Age: 76
End: 2021-01-21

## 2021-01-22 ENCOUNTER — COMMUNICATION - HEALTHEAST (OUTPATIENT)
Dept: ANTICOAGULATION | Facility: CLINIC | Age: 76
End: 2021-01-22

## 2021-01-22 ENCOUNTER — AMBULATORY - HEALTHEAST (OUTPATIENT)
Dept: LAB | Facility: CLINIC | Age: 76
End: 2021-01-22

## 2021-01-22 DIAGNOSIS — I48.20 CHRONIC ATRIAL FIBRILLATION (H): ICD-10-CM

## 2021-01-22 DIAGNOSIS — Z79.01 LONG TERM (CURRENT) USE OF ANTICOAGULANTS: ICD-10-CM

## 2021-01-22 LAB — INR PPP: 2.7 (ref 0.9–1.1)

## 2021-01-28 ENCOUNTER — HOSPITAL ENCOUNTER (OUTPATIENT)
Dept: CARDIOLOGY | Facility: HOSPITAL | Age: 76
Discharge: HOME | End: 2021-01-28
Attending: INTERNAL MEDICINE

## 2021-01-28 DIAGNOSIS — I48.20 CHRONIC ATRIAL FIBRILLATION (H): ICD-10-CM

## 2021-01-28 DIAGNOSIS — I34.0 MITRAL VALVE INSUFFICIENCY, UNSPECIFIED ETIOLOGY: ICD-10-CM

## 2021-01-28 DIAGNOSIS — R06.09 DYSPNEA ON EXERTION: ICD-10-CM

## 2021-01-28 LAB
AORTIC ROOT: 2.9 CM
AORTIC VALVE MEAN VELOCITY: 79.7 CM/S
ASCENDING AORTA: 3.1 CM
AV DIMENSIONLESS INDEX VTI: 1.2
AV MEAN GRADIENT: 3 MMHG
AV PEAK GRADIENT: 4.2 MMHG
AV VALVE AREA: 3 CM2
AV VELOCITY RATIO: 1.1
BSA FOR ECHO PROCEDURE: 2.25 M2
CV BLOOD PRESSURE: ABNORMAL MMHG
CV ECHO HEIGHT: 63 IN
CV ECHO WEIGHT: 252 LBS
DOP CALC AO PEAK VEL: 103 CM/S
DOP CALC AO VTI: 17 CM
DOP CALC LVOT AREA: 2.54 CM2
DOP CALC LVOT DIAMETER: 1.8 CM
DOP CALC LVOT PEAK VEL: 110 CM/S
DOP CALC LVOT STROKE VOLUME: 51.1 CM3
DOP CALCLVOT PEAK VEL VTI: 20.1 CM
ECHO EJECTION FRACTION ESTIMATED: 45 %
FRACTIONAL SHORTENING: 17.4 % (ref 28–44)
LA AREA 1: 19.4 CM2
LA AREA 2: 23.2 CM2
LEFT ATRIUM LENGTH: 5.49 CM
LEFT ATRIUM SIZE: 4.2 CM
LEFT ATRIUM TO AORTIC ROOT RATIO: 1.45 NO UNITS
LEFT ATRIUM VOLUME INDEX: 31 ML/M2
LEFT ATRIUM VOLUME: 69.7 ML
LEFT VENTRICLE CARDIAC INDEX: 1.9 L/MIN/M2
LEFT VENTRICLE CARDIAC OUTPUT: 4.3 L/MIN
LEFT VENTRICLE HEART RATE: 84 BPM
LEFT VENTRICULAR INTERNAL DIMENSION IN DIASTOLE: 4.6 CM (ref 3.8–5.2)
LEFT VENTRICULAR INTERNAL DIMENSION IN SYSTOLE: 3.8 CM (ref 2.2–3.5)
LEFT VENTRICULAR OUTFLOW TRACT MEAN GRADIENT: 2 MMHG
LEFT VENTRICULAR OUTFLOW TRACT MEAN VELOCITY: 62.7 CM/S
LEFT VENTRICULAR OUTFLOW TRACT PEAK GRADIENT: 5 MMHG
LV STROKE VOLUME INDEX: 22.7 ML/M2
MITRAL REGURGITANT VELOCITY TIME INTEGRAL: 165 CM
MITRAL VALVE E/A RATIO: 0.9
MR FLOW: 5 CM3
MR MEAN GRADIENT: 69 MMHG
MR MEAN VELOCITY: 397 CM/S
MR PEAK GRADIENT: 100 MMHG
MR PISA EROA: 0 CM2
MR PISA RADIUS: 0.3 CM
MR PISA VN NYQUIST: 23.1 CM/S
MV AVERAGE E/E' RATIO: 15.4 CM/S
MV DECELERATION TIME: 169 MS
MV E'TISSUE VEL-LAT: 8.19 CM/S
MV E'TISSUE VEL-MED: 6.24 CM/S
MV LATERAL E/E' RATIO: 13.6
MV MEDIAL E/E' RATIO: 17.8
MV PEAK A VELOCITY: 121 CM/S
MV PEAK E VELOCITY: 111 CM/S
MV REGURGITANT VOLUME: 4.3 CC
NUC REST DIASTOLIC VOLUME INDEX: 4032 LBS
NUC REST SYSTOLIC VOLUME INDEX: 63 IN
PISA MR PEAK VEL: 500 CM/S
TRICUSPID REGURGITATION PEAK PRESSURE GRADIENT: 19.4 MMHG
TRICUSPID VALVE PEAK REGURGITANT VELOCITY: 220 CM/S

## 2021-01-28 ASSESSMENT — MIFFLIN-ST. JEOR: SCORE: 1612.19

## 2021-02-08 DIAGNOSIS — E03.9 HYPOTHYROIDISM, UNSPECIFIED TYPE: ICD-10-CM

## 2021-02-09 RX ORDER — LEVOTHYROXINE SODIUM 75 UG/1
TABLET ORAL
Qty: 30 TABLET | Refills: 0 | OUTPATIENT
Start: 2021-02-09

## 2021-02-18 ENCOUNTER — OFFICE VISIT - HEALTHEAST (OUTPATIENT)
Dept: CARDIOLOGY | Facility: CLINIC | Age: 76
End: 2021-02-18

## 2021-02-18 DIAGNOSIS — I48.21 PERMANENT ATRIAL FIBRILLATION (H): ICD-10-CM

## 2021-02-18 DIAGNOSIS — R06.09 DYSPNEA ON EXERTION: ICD-10-CM

## 2021-02-18 DIAGNOSIS — I34.0 MITRAL VALVE INSUFFICIENCY, UNSPECIFIED ETIOLOGY: ICD-10-CM

## 2021-02-18 DIAGNOSIS — R94.39 ABNORMAL RESULT OF OTHER CARDIOVASCULAR FUNCTION STUDY: ICD-10-CM

## 2021-02-18 DIAGNOSIS — I25.10 CALCIFICATION OF CORONARY ARTERY: ICD-10-CM

## 2021-02-18 DIAGNOSIS — I42.0 DILATED CARDIOMYOPATHY (H): ICD-10-CM

## 2021-02-18 LAB
ANION GAP SERPL CALCULATED.3IONS-SCNC: 11 MMOL/L (ref 5–18)
BUN SERPL-MCNC: 20 MG/DL (ref 8–28)
CALCIUM SERPL-MCNC: 9.4 MG/DL (ref 8.5–10.5)
CHLORIDE BLD-SCNC: 101 MMOL/L (ref 98–107)
CO2 SERPL-SCNC: 29 MMOL/L (ref 22–31)
CREAT SERPL-MCNC: 0.84 MG/DL (ref 0.6–1.1)
GFR SERPL CREATININE-BSD FRML MDRD: >60 ML/MIN/1.73M2
GLUCOSE BLD-MCNC: 100 MG/DL (ref 70–125)
POTASSIUM BLD-SCNC: 4.4 MMOL/L (ref 3.5–5)
SODIUM SERPL-SCNC: 141 MMOL/L (ref 136–145)

## 2021-03-05 ENCOUNTER — COMMUNICATION - HEALTHEAST (OUTPATIENT)
Dept: ANTICOAGULATION | Facility: CLINIC | Age: 76
End: 2021-03-05

## 2021-03-05 ENCOUNTER — AMBULATORY - HEALTHEAST (OUTPATIENT)
Dept: LAB | Facility: CLINIC | Age: 76
End: 2021-03-05

## 2021-03-05 DIAGNOSIS — Z79.01 LONG TERM (CURRENT) USE OF ANTICOAGULANTS: ICD-10-CM

## 2021-03-05 DIAGNOSIS — I48.20 CHRONIC ATRIAL FIBRILLATION (H): ICD-10-CM

## 2021-03-05 LAB — INR PPP: 2.8 (ref 0.9–1.1)

## 2021-03-08 ENCOUNTER — COMMUNICATION - HEALTHEAST (OUTPATIENT)
Dept: CARDIOLOGY | Facility: CLINIC | Age: 76
End: 2021-03-08

## 2021-03-08 ENCOUNTER — COMMUNICATION - HEALTHEAST (OUTPATIENT)
Dept: ADMINISTRATIVE | Facility: CLINIC | Age: 76
End: 2021-03-08

## 2021-03-08 DIAGNOSIS — I48.20 CHRONIC ATRIAL FIBRILLATION (H): ICD-10-CM

## 2021-03-08 DIAGNOSIS — E03.4 HYPOTHYROIDISM DUE TO ACQUIRED ATROPHY OF THYROID: ICD-10-CM

## 2021-03-16 ENCOUNTER — HOSPITAL ENCOUNTER (OUTPATIENT)
Dept: CT IMAGING | Facility: CLINIC | Age: 76
Discharge: HOME | End: 2021-03-16
Attending: INTERNAL MEDICINE

## 2021-03-16 DIAGNOSIS — R06.09 DYSPNEA ON EXERTION: ICD-10-CM

## 2021-03-16 DIAGNOSIS — I42.0 DILATED CARDIOMYOPATHY (H): ICD-10-CM

## 2021-03-16 DIAGNOSIS — Z79.01 LONG-TERM (CURRENT) USE OF ANTICOAGULANTS, INR GOAL 2.0-3.0: ICD-10-CM

## 2021-03-16 DIAGNOSIS — R94.39 ABNORMAL RESULT OF OTHER CARDIOVASCULAR FUNCTION STUDY: ICD-10-CM

## 2021-03-16 RX ORDER — WARFARIN SODIUM 2 MG/1
TABLET ORAL
Qty: 105 TABLET | Refills: 0 | Status: SHIPPED | OUTPATIENT
Start: 2021-03-16 | End: 2021-07-20

## 2021-03-16 ASSESSMENT — MIFFLIN-ST. JEOR: SCORE: 1580.44

## 2021-03-16 NOTE — TELEPHONE ENCOUNTER
Prescription approved per [facility] Refill Protocol.  Shlomo Mercedes Rn  [facility]

## 2021-04-02 ENCOUNTER — AMBULATORY - HEALTHEAST (OUTPATIENT)
Dept: LAB | Facility: CLINIC | Age: 76
End: 2021-04-02

## 2021-04-02 ENCOUNTER — COMMUNICATION - HEALTHEAST (OUTPATIENT)
Dept: ANTICOAGULATION | Facility: CLINIC | Age: 76
End: 2021-04-02

## 2021-04-02 DIAGNOSIS — Z79.01 LONG TERM (CURRENT) USE OF ANTICOAGULANTS: ICD-10-CM

## 2021-04-02 DIAGNOSIS — I48.20 CHRONIC ATRIAL FIBRILLATION (H): ICD-10-CM

## 2021-04-02 LAB — INR PPP: 3.4 (ref 0.9–1.1)

## 2021-04-16 ENCOUNTER — COMMUNICATION - HEALTHEAST (OUTPATIENT)
Dept: ANTICOAGULATION | Facility: CLINIC | Age: 76
End: 2021-04-16

## 2021-04-16 ENCOUNTER — AMBULATORY - HEALTHEAST (OUTPATIENT)
Dept: LAB | Facility: CLINIC | Age: 76
End: 2021-04-16

## 2021-04-16 DIAGNOSIS — Z79.01 LONG TERM (CURRENT) USE OF ANTICOAGULANTS: ICD-10-CM

## 2021-04-16 DIAGNOSIS — I48.20 CHRONIC ATRIAL FIBRILLATION (H): ICD-10-CM

## 2021-04-16 LAB — INR PPP: 3.8 (ref 0.9–1.1)

## 2021-04-20 ENCOUNTER — HOSPITAL ENCOUNTER (OUTPATIENT)
Dept: CT IMAGING | Facility: CLINIC | Age: 76
Discharge: HOME | End: 2021-04-20
Attending: INTERNAL MEDICINE

## 2021-04-20 LAB
CREAT BLD-MCNC: 0.9 MG/DL (ref 0.6–1.1)
GFR SERPL CREATININE-BSD FRML MDRD: >60 ML/MIN/1.73M2

## 2021-04-20 ASSESSMENT — MIFFLIN-ST. JEOR: SCORE: 1637.14

## 2021-04-21 LAB
BSA FOR ECHO PROCEDURE: 2.28 M2
CV CALCIUM SCORE AGATSTON LM: 410
CV CALCIUM SCORING AGATSON LAD: 168
CV CALCIUM SCORING AGATSTON CX: 0
CV CALCIUM SCORING AGATSTON RCA: 0
CV CALCIUM SCORING AGATSTON TOTAL: 578
LEFT VENTRICLE HEART RATE: 82 BPM

## 2021-04-22 ENCOUNTER — COMMUNICATION - HEALTHEAST (OUTPATIENT)
Dept: FAMILY MEDICINE | Facility: CLINIC | Age: 76
End: 2021-04-22

## 2021-04-30 ENCOUNTER — COMMUNICATION - HEALTHEAST (OUTPATIENT)
Dept: ANTICOAGULATION | Facility: CLINIC | Age: 76
End: 2021-04-30

## 2021-04-30 ENCOUNTER — AMBULATORY - HEALTHEAST (OUTPATIENT)
Dept: LAB | Facility: CLINIC | Age: 76
End: 2021-04-30

## 2021-04-30 DIAGNOSIS — Z79.01 LONG TERM (CURRENT) USE OF ANTICOAGULANTS: ICD-10-CM

## 2021-04-30 DIAGNOSIS — I48.20 CHRONIC ATRIAL FIBRILLATION (H): ICD-10-CM

## 2021-04-30 LAB — INR PPP: 2.2 (ref 0.9–1.1)

## 2021-05-06 ENCOUNTER — OFFICE VISIT - HEALTHEAST (OUTPATIENT)
Dept: FAMILY MEDICINE | Facility: CLINIC | Age: 76
End: 2021-05-06

## 2021-05-06 DIAGNOSIS — E78.5 HYPERLIPIDEMIA, UNSPECIFIED HYPERLIPIDEMIA TYPE: ICD-10-CM

## 2021-05-06 DIAGNOSIS — I48.21 PERMANENT ATRIAL FIBRILLATION (H): ICD-10-CM

## 2021-05-06 DIAGNOSIS — I42.0 DILATED CARDIOMYOPATHY (H): ICD-10-CM

## 2021-05-06 DIAGNOSIS — E66.01 MORBID OBESITY DUE TO EXCESS CALORIES (H): ICD-10-CM

## 2021-05-06 DIAGNOSIS — E03.4 HYPOTHYROIDISM DUE TO ACQUIRED ATROPHY OF THYROID: ICD-10-CM

## 2021-05-06 DIAGNOSIS — M79.651 PAIN OF RIGHT THIGH: ICD-10-CM

## 2021-05-06 DIAGNOSIS — I25.10 CALCIFICATION OF CORONARY ARTERY: ICD-10-CM

## 2021-05-06 DIAGNOSIS — R73.9 HYPERGLYCEMIA: ICD-10-CM

## 2021-05-07 ENCOUNTER — COMMUNICATION - HEALTHEAST (OUTPATIENT)
Dept: CARDIOLOGY | Facility: CLINIC | Age: 76
End: 2021-05-07

## 2021-05-07 ENCOUNTER — OFFICE VISIT - HEALTHEAST (OUTPATIENT)
Dept: CARDIOLOGY | Facility: CLINIC | Age: 76
End: 2021-05-07

## 2021-05-07 ENCOUNTER — SURGERY - HEALTHEAST (OUTPATIENT)
Dept: CARDIOLOGY | Facility: HOSPITAL | Age: 76
End: 2021-05-07
Payer: MEDICARE

## 2021-05-07 DIAGNOSIS — I25.10 CORONARY ARTERY DISEASE INVOLVING NATIVE CORONARY ARTERY OF NATIVE HEART WITHOUT ANGINA PECTORIS: ICD-10-CM

## 2021-05-07 DIAGNOSIS — I42.0 DILATED CARDIOMYOPATHY (H): ICD-10-CM

## 2021-05-07 DIAGNOSIS — G47.33 OBSTRUCTIVE SLEEP APNEA SYNDROME: ICD-10-CM

## 2021-05-07 DIAGNOSIS — I34.0 MITRAL VALVE INSUFFICIENCY, UNSPECIFIED ETIOLOGY: ICD-10-CM

## 2021-05-07 DIAGNOSIS — I25.10 CALCIFICATION OF CORONARY ARTERY: ICD-10-CM

## 2021-05-07 DIAGNOSIS — I48.21 PERMANENT ATRIAL FIBRILLATION (H): ICD-10-CM

## 2021-05-07 DIAGNOSIS — R06.09 DYSPNEA ON EXERTION: ICD-10-CM

## 2021-05-07 ASSESSMENT — MIFFLIN-ST. JEOR: SCORE: 1636.67

## 2021-05-10 ENCOUNTER — COMMUNICATION - HEALTHEAST (OUTPATIENT)
Dept: CARDIOLOGY | Facility: CLINIC | Age: 76
End: 2021-05-10

## 2021-05-10 DIAGNOSIS — I48.20 CHRONIC ATRIAL FIBRILLATION (H): ICD-10-CM

## 2021-05-11 ENCOUNTER — RECORDS - HEALTHEAST (OUTPATIENT)
Dept: RADIOLOGY | Facility: CLINIC | Age: 76
End: 2021-05-11

## 2021-05-11 ENCOUNTER — COMMUNICATION - HEALTHEAST (OUTPATIENT)
Dept: PHYSICAL MEDICINE AND REHAB | Facility: CLINIC | Age: 76
End: 2021-05-11
Payer: MEDICARE

## 2021-05-17 ENCOUNTER — AMBULATORY - HEALTHEAST (OUTPATIENT)
Dept: LAB | Facility: CLINIC | Age: 76
End: 2021-05-17

## 2021-05-17 ENCOUNTER — COMMUNICATION - HEALTHEAST (OUTPATIENT)
Dept: ANTICOAGULATION | Facility: CLINIC | Age: 76
End: 2021-05-17

## 2021-05-17 DIAGNOSIS — R73.9 HYPERGLYCEMIA: ICD-10-CM

## 2021-05-17 DIAGNOSIS — Z79.01 LONG TERM (CURRENT) USE OF ANTICOAGULANTS: ICD-10-CM

## 2021-05-17 DIAGNOSIS — I48.20 CHRONIC ATRIAL FIBRILLATION (H): ICD-10-CM

## 2021-05-17 DIAGNOSIS — E78.5 HYPERLIPIDEMIA, UNSPECIFIED HYPERLIPIDEMIA TYPE: ICD-10-CM

## 2021-05-17 LAB
ANION GAP SERPL CALCULATED.3IONS-SCNC: 13 MMOL/L (ref 5–18)
BUN SERPL-MCNC: 19 MG/DL (ref 8–28)
CALCIUM SERPL-MCNC: 9.2 MG/DL (ref 8.5–10.5)
CHLORIDE BLD-SCNC: 99 MMOL/L (ref 98–107)
CO2 SERPL-SCNC: 26 MMOL/L (ref 22–31)
CREAT SERPL-MCNC: 0.77 MG/DL (ref 0.6–1.1)
GFR SERPL CREATININE-BSD FRML MDRD: >60 ML/MIN/1.73M2
GLUCOSE BLD-MCNC: 88 MG/DL (ref 70–125)
HBA1C MFR BLD: 5.7 %
INR PPP: 2.1 (ref 0.9–1.1)
LDLC SERPL CALC-MCNC: 49 MG/DL
POTASSIUM BLD-SCNC: 4.4 MMOL/L (ref 3.5–5)
SODIUM SERPL-SCNC: 138 MMOL/L (ref 136–145)

## 2021-05-20 ENCOUNTER — RECORDS - HEALTHEAST (OUTPATIENT)
Dept: ADMINISTRATIVE | Facility: OTHER | Age: 76
End: 2021-05-20

## 2021-05-20 ENCOUNTER — OFFICE VISIT - HEALTHEAST (OUTPATIENT)
Dept: CARDIOLOGY | Facility: CLINIC | Age: 76
End: 2021-05-20

## 2021-05-20 DIAGNOSIS — I25.10 CORONARY ARTERY DISEASE INVOLVING NATIVE CORONARY ARTERY OF NATIVE HEART WITHOUT ANGINA PECTORIS: ICD-10-CM

## 2021-05-21 ENCOUNTER — HOSPITAL ENCOUNTER (OUTPATIENT)
Dept: PHYSICAL MEDICINE AND REHAB | Facility: CLINIC | Age: 76
Discharge: HOME | End: 2021-05-21
Attending: FAMILY MEDICINE
Payer: MEDICARE

## 2021-05-21 DIAGNOSIS — M51.369 DDD (DEGENERATIVE DISC DISEASE), LUMBAR: ICD-10-CM

## 2021-05-21 DIAGNOSIS — M70.61 GREATER TROCHANTERIC BURSITIS OF RIGHT HIP: ICD-10-CM

## 2021-05-21 DIAGNOSIS — M79.18 MYOFASCIAL PAIN: ICD-10-CM

## 2021-05-21 DIAGNOSIS — M54.16 LUMBAR RADICULAR PAIN: ICD-10-CM

## 2021-05-21 DIAGNOSIS — M25.551 HIP PAIN, RIGHT: ICD-10-CM

## 2021-05-21 LAB
ATRIAL RATE - MUSE: 277 BPM
DIASTOLIC BLOOD PRESSURE - MUSE: NORMAL
INTERPRETATION ECG - MUSE: NORMAL
P AXIS - MUSE: NORMAL
PR INTERVAL - MUSE: NORMAL
QRS DURATION - MUSE: 84 MS
QT - MUSE: 426 MS
QTC - MUSE: 462 MS
R AXIS - MUSE: -10 DEGREES
SYSTOLIC BLOOD PRESSURE - MUSE: NORMAL
T AXIS - MUSE: 36 DEGREES
VENTRICULAR RATE- MUSE: 71 BPM

## 2021-05-21 ASSESSMENT — MIFFLIN-ST. JEOR: SCORE: 1623.53

## 2021-05-25 ENCOUNTER — RECORDS - HEALTHEAST (OUTPATIENT)
Dept: ADMINISTRATIVE | Facility: CLINIC | Age: 76
End: 2021-05-25

## 2021-05-26 ENCOUNTER — COMMUNICATION - HEALTHEAST (OUTPATIENT)
Dept: CARDIOLOGY | Facility: CLINIC | Age: 76
End: 2021-05-26

## 2021-05-26 VITALS
RESPIRATION RATE: 24 BRPM | DIASTOLIC BLOOD PRESSURE: 70 MMHG | HEART RATE: 84 BPM | WEIGHT: 252 LBS | BODY MASS INDEX: 44.64 KG/M2 | SYSTOLIC BLOOD PRESSURE: 118 MMHG

## 2021-05-27 VITALS — SYSTOLIC BLOOD PRESSURE: 124 MMHG | HEART RATE: 76 BPM | RESPIRATION RATE: 16 BRPM | DIASTOLIC BLOOD PRESSURE: 72 MMHG

## 2021-05-27 VITALS — WEIGHT: 251.4 LBS | BODY MASS INDEX: 43.15 KG/M2

## 2021-06-04 ENCOUNTER — AMBULATORY - HEALTHEAST (OUTPATIENT)
Dept: ANTICOAGULATION | Facility: CLINIC | Age: 76
End: 2021-06-04

## 2021-06-04 DIAGNOSIS — I48.21 PERMANENT ATRIAL FIBRILLATION (H): ICD-10-CM

## 2021-06-05 VITALS — WEIGHT: 245 LBS | HEIGHT: 63 IN | BODY MASS INDEX: 43.41 KG/M2

## 2021-06-05 VITALS — BODY MASS INDEX: 44.65 KG/M2 | WEIGHT: 252 LBS | HEIGHT: 63 IN

## 2021-06-05 VITALS — WEIGHT: 254 LBS | HEIGHT: 64 IN | BODY MASS INDEX: 43.36 KG/M2

## 2021-06-07 DIAGNOSIS — Z79.01 LONG-TERM (CURRENT) USE OF ANTICOAGULANTS, INR GOAL 2.0-3.0: ICD-10-CM

## 2021-06-08 ENCOUNTER — AMBULATORY - HEALTHEAST (OUTPATIENT)
Dept: CARDIOLOGY | Facility: CLINIC | Age: 76
End: 2021-06-08

## 2021-06-08 DIAGNOSIS — Z00.6 EXAMINATION OF PARTICIPANT OR CONTROL IN CLINICAL RESEARCH: ICD-10-CM

## 2021-06-08 ASSESSMENT — MIFFLIN-ST. JEOR: SCORE: 1611.73

## 2021-06-08 NOTE — CONFIDENTIAL NOTE
Patient is being followed by [facility].  We do not monitor her coumadin.  Will call the pharmacy at 9 am today.    Rajani Stout RN

## 2021-06-10 RX ORDER — WARFARIN SODIUM 2 MG/1
TABLET ORAL
Qty: 105 TABLET | Refills: 0 | OUTPATIENT
Start: 2021-06-10

## 2021-06-11 ENCOUNTER — COMMUNICATION - HEALTHEAST (OUTPATIENT)
Dept: CARDIOLOGY | Facility: CLINIC | Age: 76
End: 2021-06-11

## 2021-06-12 NOTE — TELEPHONE ENCOUNTER
ANTICOAGULATION  MANAGEMENT: INITIAL CONSULT    Assessment     paddy Duncan 75 y.o. female  is newly referred to [facility] anticoagulation.    New to [facility] Anticoagulation: continuing on warfarin.         Today's INR result of 2.5 is Therapeutic (goal INR of 2.0-3.0)     INR goal range reviewed; standard for indication(s)    Previous INR therapeutic per patient report 10/14    Warfarin taken as previously instructed    Factors that may increase sensitivity to warfarin: Age > 75, Female gender and Low greens/vitamink K intake     Factors expected to reduce sensitivity to warfarin: No factors       Home medication list reviewed for potential drug interactions. Interaction between levothyroxine and toresmide and warfarin may be affecting INR    No interaction expected between see medication list  and warfarin    Educational needs identified: none-understands indication, target goal, duration, dietary and medication considerations     Plan:     Warfarin Dosing Instructions: Continue current warfarin dose 3 mg daily on Tuesdays and Fridays; and 2 mg daily rest of week  (0 % change)    Instructed patient to follow up no later than: 4 weeks, scheduled 12/3    Education provided: importance of following up for INR monitoring at instructed interval, importance of taking warfarin as instructed, importance of notifying clinic for changes in medications, when to seek medical attention/emergency care, importance of notifying clinic for diarrhea, nausea/vomiting, reduced intake and/or illness and importance of notifying clinic of upcoming surgeries and procedures 2 weeks in advance    Unable to offer related to COVID19     Caty verbalizes understanding and agrees to warfarin dosing plan.     Instructed to call the [facility] for any questions or concerns regarding warfarin dosing. (# [phone])   ?   Jamila Santiago RN    Subjective/Objective:      paddy Duncan 75 y.o. female  is newly  referred to [facility] anticoagulation for warfarin management    Caty reports:    Anticoagulation:      Warfarin initiation date (approximate): 7 years ago    Indication: Atrial Fibrillation    Goal INR: 2.0-3.0   On Bridge Therapy: No   Previously on warfarin:Yes, currently on warfarin transferring anticoagulation management to [facility]. Most recent warfarin dose Take warfarin 3 mg Tuesdays and Fridays, take warfarin 2 mg all other days of the week  since at least one month ago       Medication setup/administration: self     How often do you forget to take your medications?: rarely      Missed warfarin doses since last INR: No     S/sx of bleeding, thromboembolism: No     Typical  vitamin K intake: low;  variable     Typical  alcohol intake: yes, occasional once week     Other dietary considerations: none     New Injury or illness:  No     Upcoming surgery procedure or cardioversion: No    Patient weight:   Wt Readings from Last 1 Encounters:   11/05/20 (!) 252 lb (114.3 kg)       Recent lab results:       Lab Results   Component Value Date    INR 2.50 (H) 11/05/2020    INR 2.30 (!) 10/14/2020      No results found for: HGB, HCT, PLT     Lab Results   Component Value Date    ALT 11 11/05/2020    AST 12 11/05/2020    ALBUMIN 3.9 11/05/2020        Lab Results   Component Value Date    CREATININE 0.78 11/05/2020       Medications:    Current Outpatient Medications on File Prior to Visit   Medication Sig     albuterol (PROAIR HFA;PROVENTIL HFA;VENTOLIN HFA) 90 mcg/actuation inhaler Inhale 2 puffs.     amoxicillin (AMOXIL) 500 MG capsule TAKE 4 CAPSULES BY MOUTH 1 HOUR BEFORE DENTAL APPOINTMENT     atorvastatin (LIPITOR) 40 MG tablet Take 40 mg by mouth.     carvediloL (COREG) 25 MG tablet Take 0.5 tablets (12.5 mg total) by mouth 2 (two) times a day with meals.     diltiazem (CARDIZEM CD) 240 MG 24 hr capsule      levothyroxine (SYNTHROID, LEVOTHROID) 75 MCG tablet TAKE 1 TABLET DAILY     losartan (COZAAR)  25 MG tablet Take 25 mg by mouth.     torsemide (DEMADEX) 20 MG tablet TAKE 2 TABLETS EVERY DAY     warfarin ANTICOAGULANT (COUMADIN/JANTOVEN) 2 MG tablet TAKE 1 AND 1/2 TABLETS EVERY Thursday and FRIDAY AND 1 TABLET  ALL OTHER DAYS OR AS       DIRECTED BY INR NURSES     No current facility-administered medications on file prior to visit.        Past Medical History:  Patient Active Problem List   Diagnosis     Chronic atrial fibrillation (H)     Calcification of coronary artery     Chronic diastolic heart failure (H)     Chronic pain of both feet     History of chondrosarcoma     Hyperlipidemia     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 atrophy of thyroid     Long term current use of anticoagulant therapy     Mitral valve insufficiency     Morbid obesity due to excess calories (H)     Obstructive sleep apnea syndrome     Primary osteoarthritis of right hip     S/p total knee replacement, bilateral     Scoliosis    No past medical history on file.

## 2021-06-12 NOTE — TELEPHONE ENCOUNTER
FYI - Status Update  Who is Calling: Patient  Update: Returning phone call from [facility]. Tried to warm transfer and no answer  Okay to leave a detailed message?:  Yes

## 2021-06-12 NOTE — TELEPHONE ENCOUNTER
Anticoagulation Management    Unable to reach Caty today.    Today's INR result of 2.5 is Therapeutic (goal INR of 2.0-3.0).     Follow up required to confirm warfarin doses taken.    Left message to continue current dose of warfarin 2 mg tonight.       ACN to follow up    Jamila Santiago RN

## 2021-06-12 NOTE — PROGRESS NOTES
Assessment/Plan:     Problem List Items Addressed This Visit        [facility] Cardiac Problems    Chronic atrial fibrillation (H) - Primary     Patient is on Coumadin daily as well as Coreg and diltiazem for rate control.         Relevant Medications    diltiazem (CARDIZEM CD) 240 MG 24 hr capsule    atorvastatin (LIPITOR) 40 MG tablet    losartan (COZAAR) 25 MG tablet    torsemide (DEMADEX) 20 MG tablet    carvediloL (COREG) 25 MG tablet    Other Relevant Orders    Ambulatory referral to Anticoagulation Monitoring    Comprehensive Metabolic Panel (Completed)    INR (Completed)    Calcification of coronary artery     The patient had a CT coronary calcium score of over 400 and all of the calcification was localized to the left anterior descending artery.  I recommended a stress test as one has not been done and consideration for cardiology to evaluate for the need of any kind of angiography to study this anatomically further.         Relevant Medications    diltiazem (CARDIZEM CD) 240 MG 24 hr capsule    atorvastatin (LIPITOR) 40 MG tablet    losartan (COZAAR) 25 MG tablet    torsemide (DEMADEX) 20 MG tablet    carvediloL (COREG) 25 MG tablet    Other Relevant Orders    NM Exercise Stress Test    NM MPI with Lexiscan    Chronic diastolic heart failure (H)     History of concentric LVH; is on Coreg and Demedex         Relevant Medications    diltiazem (CARDIZEM CD) 240 MG 24 hr capsule    atorvastatin (LIPITOR) 40 MG tablet    losartan (COZAAR) 25 MG tablet    torsemide (DEMADEX) 20 MG tablet    carvediloL (COREG) 25 MG tablet    Other Relevant Orders    Ambulatory referral to Cardiology    Hyperlipidemia     Takes Lipitor 40mg daily         Relevant Medications    atorvastatin (LIPITOR) 40 MG tablet    Other Relevant Orders    Lipid Profile (Completed)    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 atrophy of thyroid     TSH checked today; currently on Levothyroxine 75mcg daily         Relevant Medications    levothyroxine  (SYNTHROID, LEVOTHROID) 75 MCG tablet    Other Relevant Orders    Thyroid Stimulating Hormone (TSH) (Completed)    Mitral valve insufficiency     Moderate in degree based on last echocardiogram.  Since patient has moved, she would like to establish with a cardiologist closer to home.  A referral was placed so that she can get established and have them follow.         Relevant Medications    diltiazem (CARDIZEM CD) 240 MG 24 hr capsule    atorvastatin (LIPITOR) 40 MG tablet    losartan (COZAAR) 25 MG tablet    torsemide (DEMADEX) 20 MG tablet    carvediloL (COREG) 25 MG tablet    Other Relevant Orders    Ambulatory referral to Cardiology    Obstructive sleep apnea syndrome     Does not tolerate CPAP; discussed reevaluation with sleep medicine and consideration for a mouth guard         Relevant Medications    albuterol (PROAIR HFA;PROVENTIL HFA;VENTOLIN HFA) 90 mcg/actuation inhaler       Other    Long term current use of anticoagulant therapy    Morbid obesity due to excess calories (H)      Other Visit Diagnoses     Chronic anticoagulation        Relevant Orders    INR (Completed)    Hyperglycemia                Subjective:       75 y.o. female presents today to establish care.  The patient and her  recently moved to the [facility] and would like to establish with a primary provider closer to home.  The patient does not have any acute or new complaints at today's visit.  She does need prescription refills on a few of her medications.  The patient has a significant cardiac history including a history of heart failure, moderate mitral insufficiency, chronic atrial fibrillation and has a history of elevated calcium score.  She denies any chest pain although is quite limited in her ability to ambulate and uses a cane to walk.  She has a history of obstructive sleep apnea but has not been able to tolerate CPAP in the past and currently is not on any treatment.  She is on chronic anticoagulation in the form  "of Coumadin because of her atrial fibrillation.      Reviewed: The following portions of the patient's history were reviewed and updated as appropriate: allergies, current medications, past family history, past medical history, past social history, past surgical history and problem list.    Review of Systems  Pertinent items are noted in HPI.        Objective:     /68 (Patient Site: Left Arm, Patient Position: Sitting, Cuff Size: Adult Large)   Pulse 61   Ht 5' 3\" (1.6 m)   Wt (!) 252 lb (114.3 kg)   SpO2 92%   BMI 44.64 kg/m    General appearance: alert, appears stated age, cooperative and moderately obese  Lungs: clear to auscultation bilaterally  Heart: irregularly irregular rhythm      This note has been dictated using voice recognition software. Any grammatical or context distortions are unintentional and inherent to the software  "

## 2021-06-13 NOTE — TELEPHONE ENCOUNTER
ANTICOAGULATION  MANAGEMENT    Assessment     Today's INR result of 2.2 is Therapeutic (goal INR of 2.0-3.0)        Warfarin taken as previously instructed    No new diet changes affecting INR    No interaction expected between lipitor  and warfarin    Continues to tolerate warfarin with no reported s/s of bleeding or thromboembolism     Previous INR was Therapeutic    Plan:     Left detailed message for Caty regarding INR result and instructed:     Warfarin Dosing Instructions:  Continue current warfarin dose 3 mg daily on Tuesdays and Fridays; and 2 mg daily rest of week  (0 % change)    Instructed patient to follow up no later than: 6 weeks.    Education provided:     Instructed to call the [facility] for any changes, questions or concerns. (#[phone])   ?   Michelle Santana RN    Subjective/Objective:      Caty LUKAS Ng, a 75 y.o. female is on warfarin.     Caty reports:     Home warfarin dose: as updated on anticoagulation calendar per template     Missed doses: No     Medication changes:  Yes: Lipitor dose decreased.      S/S of bleeding or thromboembolism:  No     New Injury or illness:  No     Changes in diet or alcohol consumption:  No     Upcoming surgery, procedure or cardioversion:  No    Anticoagulation Episode Summary     Current INR goal:  2.0-3.0   TTR:  100.0 % (2.6 wk)   Next INR check:  1/14/2021   INR from last check:  2.20 (12/3/2020)   Weekly max warfarin dose:     Target end date:     INR check location:     Preferred lab:     Send INR reminders to:  BREE ANDREW    Indications    Atrial fibrillation  rapid (H) (Resolved) [I48.91]           Comments:           Anticoagulation Care Providers     Provider Role Specialty Phone number    Keke Flores MD Referring Family Medicine [phone]

## 2021-06-13 NOTE — TELEPHONE ENCOUNTER
"Dr. Soria, please review update from patient after taking 60 mg of Torsemide on Saturday and Sunday. -ejb  -------------------------------------------------------------------------------------------------------------    Patient called to provide update after taking increased dose of torsemide on Saturday and Sunday. She notes shortness of breath is \"not nearly as bad as it was\" and she is able to function better. Patient had Holter placed today and echo is scheduled for the end of the month.   "

## 2021-06-13 NOTE — TELEPHONE ENCOUNTER
----- Message -----  From: Cameron Soria MD  Sent: 12/7/2020   4:37 PM CST  To: Kimberly Dunaway RN    Would stay on 60 mg/day and check BMP on Friday.    Cameron Castillo      Response and recommendations reviewed with patient; she verbalized understanding and agreement with plan. Patient states that she will do her best to take 60 mg everyday. However, on days when she will leave her house she may not take full dose.   BMP order placed. Patient is scheduled for lab on 12/11/20. -trevon

## 2021-06-14 ENCOUNTER — COMMUNICATION - HEALTHEAST (OUTPATIENT)
Dept: CARDIOLOGY | Facility: CLINIC | Age: 76
End: 2021-06-14

## 2021-06-14 NOTE — TELEPHONE ENCOUNTER
ANTICOAGULATION  MANAGEMENT    Assessment     Today's INR result of 2.7 is Therapeutic (goal INR of 2.0-3.0)        Warfarin taken as previously instructed    No new diet changes affecting INR    No new medication/supplements affecting INR    Continues to tolerate warfarin with no reported s/s of bleeding or thromboembolism     Previous INR was Therapeutic    Plan:     Spoke on phone with Caty regarding INR result and instructed:     Warfarin Dosing Instructions:  Continue current warfarin dose 3 mg daily on Tue/Fri; and 2 mg daily rest of week  (0 % change)    Instructed patient to follow up no later than: 6 weeks     Education provided: importance of notifying clinic for changes in medications, monitoring for bleeding signs and symptoms, monitoring for clotting signs and symptoms and importance of notifying clinic for diarrhea, nausea/vomiting, reduced intake and/or illness    Caty verbalizes understanding and agrees to warfarin dosing plan.    Instructed to call the [facility] for any changes, questions or concerns. (#[phone])   ?   Keke Welch RN    Subjective/Objective:      Caty Ng, a 76 y.o. female is on warfarin.     Caty reports:     Home warfarin dose: verbally confirmed home dose with Ctay and updated on anticoagulation calendar     Missed doses: No     Medication changes:  No     S/S of bleeding or thromboembolism:  No     New Injury or illness:  No     Changes in diet or alcohol consumption:  No     Upcoming surgery, procedure or cardioversion:  Yes: Echocardiogram    Anticoagulation Episode Summary     Current INR goal:  2.0-3.0   TTR:  100.0 % (2.3 mo)   Next INR check:  3/5/2021   INR from last check:  2.70 (1/22/2021)   Weekly max warfarin dose:     Target end date:     INR check location:     Preferred lab:     Send INR reminders to:  BREE ANDREW    Indications    Atrial fibrillation  rapid (H) (Resolved) [I48.91]           Comments:           Anticoagulation  Care Providers     Provider Role Specialty Phone number    Keke Flores MD Referring Family Medicine [phone]

## 2021-06-14 NOTE — TELEPHONE ENCOUNTER
ANTICOAGULATION  MANAGEMENT PROGRAM    Caty Ng is overdue for INR check.     Spoke with Caty.   Was disconnected when attempting to schedule.   Writer called back and went right to .   M instructing to call back and schedule INR appointment.      Alysa Ariza RN

## 2021-06-14 NOTE — TELEPHONE ENCOUNTER
Reason for Call:  Medication or medication refill:  atorvastatin (LIPITOR) 10 MG tablet    Do you use a [facility]?  Name of the pharmacy and phone number for the current request: [facility], [place], [place], [phone]     Name of the medication requested: atorvastatin (LIPITOR) 10 MG tablet    Other request: change to a mail order     Can we leave a detailed message on this number? No call back needed    Phone number patient can be reached at: Home number on file [phone] (home)    Best Time:     Call taken on 1/19/2021 at 1:47 PM by Felisha Hilliard

## 2021-06-15 ENCOUNTER — COMMUNICATION - HEALTHEAST (OUTPATIENT)
Dept: CARDIOLOGY | Facility: CLINIC | Age: 76
End: 2021-06-15

## 2021-06-15 NOTE — TELEPHONE ENCOUNTER
Reason for Call:  Medication or medication refill:  levothyroxine (SYNTHROID, LEVOTHROID) 75 MCG tablet    Do you use a [facility]?  Name of the pharmacy and phone number for the current request: [facility] - [place], [place] - [address] AT PORTAL TO REGISTERED [facility] S[facility]    Name of the medication requested: levothyroxine (SYNTHROID, LEVOTHROID) 75 MCG tablet    Other request:     Can we leave a detailed message on this number? No call back needed    Phone number patient can be reached at: Home number on file [phone] (home)    Best Time:      Call taken on 3/8/2021 at 2:40 PM by Felisha Hilliard

## 2021-06-15 NOTE — PROGRESS NOTES
CCTA  Patient with permanent a-fib arrived for CCTA. RN had instructed patient as prep for test to take her prescribed diltiazem and carvedilol the morning of test but patient did not. Patient's HR on arrival was 115-135. HR 15 min later was still unchanged so CCTA was cancelled per CCTA policy. Sent note to Dr. Soria ordering provider.  Shaye Patrick RN  [facility]

## 2021-06-15 NOTE — TELEPHONE ENCOUNTER
ANTICOAGULATION  MANAGEMENT    Assessment     Today's INR result of 2.8 is Therapeutic (goal INR of 2.0-3.0)        Warfarin taken as previously instructed    No new diet changes affecting INR    No new medication/supplements affecting INR    Continues to tolerate warfarin with no reported s/s of bleeding or thromboembolism     Previous INR was Therapeutic    Plan:     Spoke on phone with Caty regarding INR result and instructed:      Warfarin Dosing Instructions:  Continue current warfarin dose 3 mg daily on tue/fri; and 2 mg daily rest of week  (0 % change)    Instructed patient to follow up no later than: one month      Abi verbalizes understanding and agrees to warfarin dosing plan.    Instructed to call the [facility] for any changes, questions or concerns. (#[phone])   ?   Nathalie Glaser RN    Subjective/Objective:      Caty Ng, a 76 y.o. female is on warfarin. Caty Byrne reports:     Home warfarin dose: as updated on anticoagulation calendar per template     Missed doses: No     Medication changes:  No     S/S of bleeding or thromboembolism:  No     New Injury or illness:  No     Changes in diet or alcohol consumption:  No     Upcoming surgery, procedure or cardioversion:  No    Anticoagulation Episode Summary     Current INR goal:  2.0-3.0   TTR:  100.0 % (3.7 mo)   Next INR check:  4/2/2021   INR from last check:  2.80 (3/5/2021)   Weekly max warfarin dose:     Target end date:     INR check location:     Preferred lab:     Send INR reminders to:  BREE ANDREW    Indications    Atrial fibrillation  rapid (H) (Resolved) [I48.91]           Comments:           Anticoagulation Care Providers     Provider Role Specialty Phone number    Keke Flores MD Referring Family Medicine [phone]

## 2021-06-16 ENCOUNTER — COMMUNICATION - HEALTHEAST (OUTPATIENT)
Dept: CARDIOLOGY | Facility: CLINIC | Age: 76
End: 2021-06-16

## 2021-06-16 NOTE — TELEPHONE ENCOUNTER
Called patient to get the dates of the vaccines as well.   Updated chart.     Cherry Saenz, ZUHAIR 4/22/2021 3:42 PM   John UNGER

## 2021-06-16 NOTE — TELEPHONE ENCOUNTER
Reason for Call: patient calling to report she has received her covid vaccine.    Detailed comments: Moderna, lot #[id number] for 1st dose. #[id number] for 2nd dose.    Phone Number Patient can be reached at: Home number on file [phone] (home)    Best Time:    Can we leave a detailed message on this number?: No call back needed    Call taken on 4/22/2021 at 3:21 PM by Dunia Guerra

## 2021-06-17 ENCOUNTER — HOSPITAL ENCOUNTER (OUTPATIENT)
Dept: PHYSICAL MEDICINE AND REHAB | Facility: CLINIC | Age: 76
Discharge: HOME | End: 2021-06-17
Attending: PHYSICAL MEDICINE & REHABILITATION
Payer: MEDICARE

## 2021-06-17 DIAGNOSIS — M25.551 HIP PAIN, RIGHT: ICD-10-CM

## 2021-06-17 DIAGNOSIS — M54.16 LUMBAR RADICULAR PAIN: ICD-10-CM

## 2021-06-17 DIAGNOSIS — M51.369 DDD (DEGENERATIVE DISC DISEASE), LUMBAR: ICD-10-CM

## 2021-06-17 DIAGNOSIS — M79.18 MYOFASCIAL PAIN: ICD-10-CM

## 2021-06-17 DIAGNOSIS — M70.61 GREATER TROCHANTERIC BURSITIS OF RIGHT HIP: ICD-10-CM

## 2021-06-17 NOTE — TELEPHONE ENCOUNTER
"Pt seen in clinic and verbal order from provider to arrange for CORS poss PCI. Met with patient and  discussed plan of care. Pt and  returned home, and will call to arrange. Per THJ non-urgent.     During discussion patient informed writer that she ha an allergy to \"srugical stainless steel/alloys\" She previously had surgical screws for a foot surgery, that the incisions wouldn't heal until the screws were removed. She was then told that she has an allergy to this. She has had Bilateral knee replacements, and states that the knees are titanium but have a coating called Oxium? So that she doesn't have an allergic response to the metal. So they posed the question, are our stents metal and could this cause an allergic response or rejection of the stents?? Informed patient will have to forward for discussion. Also will have to ask J for care for Warfarin.    Dr. Soria, pt on Warfarin ok to Hold for 4 days prior to procedure? Any bridging?     Dr. Soria, and input on stents, if needed to use for this patient? Or would you like me to ask the Interventional cardiologists? BOB,Rn   "

## 2021-06-17 NOTE — TELEPHONE ENCOUNTER
===View-only below this line===  ----- Message -----  From: Hai Murphy MD  Sent: 5/11/2021  11:28 AM CDT  To: Gallo Greco RN, Cameron Soria MD    Sure thing!  ----- Message -----  From: Cameron Soria MD  Sent: 5/11/2021  11:18 AM CDT  To: Gallo Greco RN, Hai Murphy MD    Thanks Hai!    Could we set her up to have angio with Hai.    Thanks,    Cameron

## 2021-06-17 NOTE — TELEPHONE ENCOUNTER
PC and discussion with the patient. Hesitant to proceed with angiogram. Offered in office discussion or VV with MY to discuss options and plan of care. Pt agreeable to in office to discuss and answer her questions. She is still undecided to have the angiogram at all. OV arranged for 5/20/21 at 1310, at [facility] location. Call if any further questions or concerns. Pt verbalized understanding and appreciative of the call. Seamus [address] Dr[address] in office visit arranged. If patient would like to proceed post-discussion please send message so I can facilitate arranging with you. Thank you CMM,Rn

## 2021-06-17 NOTE — PROGRESS NOTES
[facility] Note    Assessment:    Caty Ng is a 76 y.o. old female with cardiomyopathy, AF, high BMI 40, HL, hypothyroidism here for evaluation of possible work up and management options for her decreased LVEF of 45%, abnormal CTA cors.    Thank you  for this consult for evaluation of work up and management options for CAD.      Plan:  # Cardiomyopathy - mild, LVEF 45%  - differential includes AF-induced, but certainly can't rule out ischemic component  - we spent a long time discussing the implications, natural history, and possible management options for coronary disease  - she is very hesitant to consider stents as she has had metal allergies to orthopedic screw and jewelry   - we have discussed a possibility of diagnostic angiography, followed by staged CABG  - we went over risks/benefits of angiography, PCI  - she would like more time to think about it and at the moment, isn't prepared for either of those approaches; if she changes her mind, she will let us know and we can schedule her accordingly  - continue carvedilol, losartan, furosemide    # AF - continue carvedilol/warfarin    # Abnormal CTA cors - certainly has Ca2+ but asymptomatic at low level of exertion (she is limited due to orthopedic issues)  - ideally, would need angiography as per above    VIKI TREADWELL  Pending sale to [facility]  [phone]  ______________________________________________________________________    Subjective:  CC: CAD, cardiomyopathy    I had the opportunity to see Caty Ng at the [facility]. Caty Ng is a 76 y.o. female with a known history of  cardiomyopathy, AF, high BMI 40, HL, hypothyroidism here for evaluation of possible work up and management options for her decreased LVEF of 45%, abnormal CTA cors.    She was seen earlier this month by  for w.u of her reduced LVEF, Given her Ca2+ score of 578 (90th percentile) and possible severe Ca2+ disease in LAD,  suboptimally visualized Lcx, he recommended coronary angiography for further delineation of her anatomy, however she is very concerned about a possibility of reaction to metal alloys in stents, and is hence presenting today for further discussion.    She denies CP, endorses LOPEZ, which is aided by diuretics when she takes them; no orthopnea/PND/edema/syncope.     Prior smoker, lives w/ her   ______________________________________________________________________      Review of Systems:   As noted in HPI, all others reviewed and are negative      Problem List:  Patient Active Problem List   Diagnosis     Calcification of coronary artery     Chronic pain of both feet     History of chondrosarcoma     Hyperlipidemia     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 atrophy of thyroid     Long term current use of anticoagulant therapy     Mitral valve insufficiency     Morbid obesity due to excess calories (H)     Obstructive sleep apnea syndrome     Primary osteoarthritis of right hip     S/p total knee replacement, bilateral     Scoliosis     Permanent atrial fibrillation (H)     Dyspnea on exertion     Dilated cardiomyopathy (H)     Coronary artery disease involving native coronary artery of native heart without angina pectoris     Medical History:  Past Medical History:   Diagnosis Date     Atrial fibrillation, rapid (H) 6/21/2012    Followed by Dr. Cobos, cardiology     DDD (degenerative disc disease), lumbar 11/23/2020     Heart failure, systolic, chronic (H) 3/6/2014    Followed by Dr. Cobos, cardiology EF 45 - 50%     Morbid obesity with BMI of 40.0-44.9, adult (H) 4/25/2015     Surgical History:  No past surgical history on file.  Social History:  Social History     Socioeconomic History     Marital status:      Spouse name: Not on file     Number of children: Not on file     Years of education: Not on file     Highest education level: Not on file   Occupational History     Not on file   Social Needs      Financial resource strain: Not on file     Food insecurity     Worry: Not on file     Inability: Not on file     Transportation needs     Medical: Not on file     Non-medical: Not on file   Tobacco Use     Smoking status: Former Smoker     Quit date:      Years since quittin.4     Smokeless tobacco: Never Used   Substance and Sexual Activity     Alcohol use: Not on file     Drug use: Not on file     Sexual activity: Not on file   Lifestyle     Physical activity     Days per week: Not on file     Minutes per session: Not on file     Stress: Not on file   Relationships     Social connections     Talks on phone: Not on file     Gets together: Not on file     Attends [employer] service: Not on file     Active member of club or organization: Not on file     Attends meetings of clubs or organizations: Not on file     Relationship status: Not on file     Intimate partner violence     Fear of current or ex partner: Not on file     Emotionally abused: Not on file     Physically abused: Not on file     Forced sexual activity: Not on file   Other Topics Concern     Not on file   Social History Narrative     Not on file           Family History:  No family history on file.      Allergies:  Allergies   Allergen Reactions     Lisinopril Cough     Metoprolol Other (See Comments)     fatigue       Medications:  Current Outpatient Medications   Medication Sig Dispense Refill     amoxicillin (AMOXIL) 500 MG capsule TAKE 4 CAPSULES BY MOUTH 1 HOUR BEFORE DENTAL APPOINTMENT       atorvastatin (LIPITOR) 10 MG tablet Take 1 tablet (10 mg total) by mouth daily. 90 tablet 3     carvediloL (COREG) 25 MG tablet Take 0.5 tablets (12.5 mg total) by mouth 2 (two) times a day with meals. 90 tablet 1     diltiazem (CARDIZEM CD) 240 MG 24 hr capsule Take by mouth daily.        levothyroxine (SYNTHROID, LEVOTHROID) 75 MCG tablet TAKE 1 TABLET DAILY 90 tablet 2     losartan (COZAAR) 25 MG tablet Take 25 mg by mouth.       torsemide (DEMADEX)  20 MG tablet TAKE 3 TABLETS(60 MG TOTAL)DAILY 90 tablet 1     warfarin ANTICOAGULANT (COUMADIN/JANTOVEN) 2 MG tablet TAKE 1 AND 1/2 TABLETS EVERY Thursday and FRIDAY AND 1 TABLET  ALL OTHER DAYS OR AS       DIRECTED BY INR NURSES       No current facility-administered medications for this visit.        Objective:   Vital signs:  /72 (Patient Site: Left Arm, Patient Position: Sitting, Cuff Size: Adult Large)   Pulse 76   Resp 16   Wt (!) 251 lb 6.4 oz (114 kg)   LMP  (LMP Unknown)   BMI 43.15 kg/m        Physical Exam:    GENERAL APPEARANCE: Alert, cooperative and in no acute distress.   HEENT: No scleral icterus. Oral mucuos membranes pink and moist.   NECK: JVP 7. No Hepatojugular reflux. Thyroid not visualized. No lymphadenopathy   CHEST: clear to auscultation   CARDIOVASCULAR: S1, S2 without murmur ,clicks or rubs. Brachial, radial and posterior tibial pulses are intact and symetric. No carotid bruits noted. No edema  ABDOMEN: Nontender. BS+. No bruits.   SKIN: No Xanthelasma   Musculoskeletal: No cyanosis, clubbing or swelling.      Lab Results:  LIPIDS:  Lab Results   Component Value Date    CHOL 96 11/05/2020     Lab Results   Component Value Date    HDL 38 (L) 11/05/2020     Lab Results   Component Value Date    LDLCALC 35 11/05/2020     Lab Results   Component Value Date    TRIG 117 11/05/2020     No components found for: CHOLHDL    BMP:  Lab Results   Component Value Date    CREATININE 0.77 05/17/2021    BUN 19 05/17/2021     05/17/2021    K 4.4 05/17/2021    CL 99 05/17/2021    CO2 26 05/17/2021         [facility]

## 2021-06-17 NOTE — PROGRESS NOTES
Assessment/Plan:     Problem List Items Addressed This Visit        [facility] Cardiac Problems    Calcification of coronary artery    Hyperlipidemia     I reduce the dose of her Lipitor after her last visit to 10 mg daily due to concerns about her LDL cholesterol being in the 30s.         Relevant Orders    LDL Cholesterol, Direct    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 atrophy of thyroid     Last TSH was in the normal range on 75 mcg daily.         Permanent atrial fibrillation (H)     The patient continues on Coumadin as well as Cardizem and her heart rate is rate controlled.         Dilated cardiomyopathy (H)     Anticipating an angiogram to be performed as she had an abnormal CT angiogram in February suggesting coronary artery disease that may be significant.  She denies any worsening dyspnea on exertion or chest pain.            Other    Morbid obesity due to excess calories (H)      Other Visit Diagnoses     Pain of right thigh    -  Primary    Relevant Orders    Ambulatory referral to [facility] [place]    Hyperglycemia        Relevant Orders    Glycosylated Hemoglobin A1c    Basic Metabolic Panel        The patient will get her labs completed when she goes in for her next INR.  I reviewed her chronic medical conditions and anticipate results from the angiogram to review as soon as she gets those.  I would defer to her cardiologist as it relates to further adjustment in her statin medication based on those findings with consideration of how low her LDL had gotten previously.  We will recheck an LDL cholesterol.  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  I do not have a good diagnosis although her daughter was concerned about IT band syndrome.  I am skeptical of this diagnosis as she does not fit the typical clinical picture of this and I would like a second opinion regarding further  evaluation.  I placed a referral for the patient to meet with Dr. Raul Polo at the spine care center.    Subjective:       76 y.o. female presents today regarding right leg pain in addition to a routine medication check visit.  The patient reports right leg pain for the past 2 years.  She was seen for this previously through [place] and had x-rays taken of her spine, right hip as well as right knee.  She is status post right knee replacement.  She was found to have some moderate arthritic changes of her hip and spine and the hardware in her right knee appeared well aligned and normal.  She was referred to physical therapy which she did but did not have any improvement in her symptoms.  She has been living with the pain since that time although discussed that recently with her daughter who suggested that maybe she has IT band syndrome and she was hoping that perhaps an appropriate diagnosis would provide some relief of her pain.  She has moderate to severe pain all the time.  Sometimes it does interfere with sleep as well.  She reports pain while sitting and sometimes she needs to stand for a while but she cannot stand too long either without it hurting.  The pain is localized to the lateral aspect of her right hip area and she feels it down the lateral aspect of her thigh to her knee.  She states that the right lateral knee pain is usually the most intense pain.  She denies radiation of the pain down further beyond her knee.    The patient is also due for routine medication check visit.  She establish care about 6 months ago at which time we reviewed her medical history and medications.  She establish care with a new cardiologist, Dr. Soria, and had some evaluation including an updated echocardiogram and then a CT angiogram.  She is anticipating a regular angiogram soon as the CT did find some possible blockages.  She is not having any active chest pain or worsening shortness of breath.      Reviewed: The  following portions of the patient's history were reviewed and updated as appropriate: allergies, current medications, past family history, past medical history, past social history, past surgical history and problem list.    Review of Systems  Pertinent items are noted in HPI.        Objective:     /60 (Patient Site: Left Arm, Patient Position: Sitting, Cuff Size: Adult Large)   Pulse 77   Wt (!) 251 lb (113.9 kg)   LMP  (LMP Unknown)   SpO2 95%   BMI 43.08 kg/m    General appearance: alert, appears stated age, cooperative, morbidly obese and great difficulty ambulating around and uses a cane.   Right leg: Very tender palpation along the lateral aspect of her knee and the entire lateral aspect of her thigh and lateral hip area.  She experiences pain in the lateral hip area with internal rotation of the hip and is unable to accomplish external rotation well without pain.  She seems to be tender over the greater trochanter suggestive of possible trochanteric bursitis.      This note has been dictated using voice recognition software. Any grammatical or context distortions are unintentional and inherent to the software

## 2021-06-17 NOTE — TELEPHONE ENCOUNTER
"  ===View-only below this line===  ----- Message -----  From: Hai Murphy MD  Sent: 5/11/2021  10:08 AM CDT  To: Gallo Greco RN, MD Cameron Cui,    This is a tough question - obviously exceedingly rare and the alloys are likely different from what is used for surgical screws. We could try to do angio and stop after learning the lay of the land, t let her explore whether she would be open to it?      Thx!  Hai  ----- Message -----  From: Cameron Soria MD  Sent: 5/11/2021   9:42 AM CDT  To: Gallo Greco RN, Hai Murphy MD    OK to hold warfarin for 3 days.    Hai, what do you think about her metal \"allergy\" and possible stents?    Thanks,    Cameron"

## 2021-06-17 NOTE — TELEPHONE ENCOUNTER
ANTICOAGULATION  MANAGEMENT    Assessment     Today's INR result of 2.2 is Therapeutic (goal INR of 2.0-3.0)        Warfarin taken as previously instructed    No new diet changes affecting INR    No new medication/supplements affecting INR    Continues to tolerate warfarin with no reported s/s of bleeding or thromboembolism     Previous INR was Supratherapeutic    Plan:     Spoke on phone with Caty regarding INR result and instructed:      Warfarin Dosing Instructions:  Continue current warfarin dose 2 mg daily     Instructed patient to follow up no later than: two weeks      Caty verbalizes understanding and agrees to warfarin dosing plan.    Instructed to call the [facility] for any changes, questions or concerns. (#[phone])   ?   Nathalie Glaser RN    Subjective/Objective:      Caty Ng, a 76 y.o. female is on warfarin. Caty Byrne reports:     Home warfarin dose: as updated on anticoagulation calendar per template     Missed doses: No     Medication changes:  No     S/S of bleeding or thromboembolism:  No     New Injury or illness:  No     Changes in diet or alcohol consumption:  No     Upcoming surgery, procedure or cardioversion:  No    Anticoagulation Episode Summary     Current INR goal:  2.0-3.0   TTR:  76.2 % (5.5 mo)   Next INR check:  5/14/2021   INR from last check:  2.20 (4/30/2021)   Weekly max warfarin dose:     Target end date:     INR check location:     Preferred lab:     Send INR reminders to:  BREE ANDREW    Indications    Atrial fibrillation  rapid (H) (Resolved) [I48.91]           Comments:           Anticoagulation Care Providers     Provider Role Specialty Phone number    Kkee Flores MD Referring Family Medicine [phone]

## 2021-06-17 NOTE — TELEPHONE ENCOUNTER
Telephone Encounter by Michelle Santana RN at 4/16/2021  2:52 PM     Author: Michelle Santana RN Service: -- Author Type: Registered Nurse    Filed: 4/16/2021  2:55 PM Encounter Date: 4/16/2021 Status: Signed    : Michelle Santana RN (Registered Nurse)       ANTICOAGULATION  MANAGEMENT    Assessment     Today's INR result of 3.8 is Supratherapeutic (goal INR of 2.0-3.0)        Warfarin taken as previously instructed    No new diet changes affecting INR    She changed her CBD oil to gummis awhile ago.    Continues to tolerate warfarin with no reported s/s of bleeding or thromboembolism     Previous INR was Supratherapeutic    Plan:     Spoke on phone with Caty regarding INR result and instructed:      Warfarin Dosing Instructions:  hold dose today then change warfarin dose to 2 mg daily  (12.5 % change)    Instructed patient to follow up no later than: two weeks.    Education provided: importance of therapeutic range and target INR goal and significance of current INR result    Abi verbalizes understanding and agrees to warfarin dosing plan.    Instructed to call the [facility] for any changes, questions or concerns. (#[phone])   ?   Michelle Santana RN    Subjective/Objective:      Caty Ng, a 76 y.o. female is on warfarin. Caty Byrne reports:     Home warfarin dose: verbally confirmed home dose with Abi and updated on anticoagulation calendar     Missed doses: No     Medication changes:  Yes-she did change her CBD oil to gummis.      S/S of bleeding or thromboembolism:  No     New Injury or illness:  No     Changes in diet or alcohol consumption:  No     Upcoming surgery, procedure or cardioversion:  No    Anticoagulation Episode Summary     Current INR goal:  2.0-3.0   TTR:  78.6 % (5.1 mo)   Next INR check:  4/30/2021   INR from last check:  3.80 (4/16/2021)   Weekly max warfarin dose:     Target end date:     INR check location:     Preferred lab:     Send INR  reminders to:  BREE ANDREW    Indications    Atrial fibrillation  rapid (H) (Resolved) [I48.91]           Comments:           Anticoagulation Care Providers     Provider Role Specialty Phone number    Keke Flores MD Referring Family Medicine [phone]

## 2021-06-17 NOTE — PATIENT INSTRUCTIONS - HE
1. An MRI was ordered for you today.  You will be contacted by scheduling within 3 days.    If you are not contacted, please call   [facility] [place] [facility] [phone].     2. Medrol dose pack has been prescribed.  Do not take with NSAIDs.

## 2021-06-17 NOTE — TELEPHONE ENCOUNTER
Dr. Murphy, would it be best to arrange an OV with you or VV via phone for discussion and arrange a Coronary Angiogram? As I have 6/3/21 on hold for next day of procedure availability.  BOB,RN

## 2021-06-17 NOTE — PROGRESS NOTES
Assessment/Plan:      Diagnoses and all orders for this visit:    Lumbar radicular pain  -     methylPREDNISolone (MEDROL DOSEPACK) 4 mg tablet; Follow package directions  Dispense: 21 tablet; Refill: 0  -     MR Lumbar Spine Without Contrast; Future; Expected date: 05/21/2021    Myofascial pain  -     methylPREDNISolone (MEDROL DOSEPACK) 4 mg tablet; Follow package directions  Dispense: 21 tablet; Refill: 0    Greater trochanteric bursitis of right hip  -     methylPREDNISolone (MEDROL DOSEPACK) 4 mg tablet; Follow package directions  Dispense: 21 tablet; Refill: 0  -     MR Hip Without Contrast Right; Future; Expected date: 05/21/2021    DDD (degenerative disc disease), lumbar  -     MR Lumbar Spine Without Contrast; Future; Expected date: 05/21/2021    Hip pain, right  -     MR Hip Without Contrast Right; Future; Expected date: 05/21/2021        Assessment: Pleasant 76 y.o. female with a history of atrial fibrillation with heart failure on warfarin with:    1.  2-year history of right lateral leg pain in the gluteal region to the knee.  This is consistent with lumbar radiculopathy along with potential right greater trochanteric bursitis.  She has a left lumbar scoliotic curve with degenerative disc disease through lumbar spine and facet arthropathy on plain films.  She also has loss of right Achilles reflex which would be consistent with S1 radiculopathy.    2.  Myofascial pain right gluteal region greater trochanter and IT band.    3.  Right lateral hip pain.  She does have moderate hip osteoarthritis on plain films      Discussion:    1.  We discussed the diagnosis and treatment options.  I am not sure if this pain is related to hip osteoarthritis, lumbar radiculopathy or more of a soft tissue IT band/greater trochanteric bursitis type situation.  We discussed options of restarting with physical therapy, lumbar imaging, hip imaging, greater trochanteric bursa injection and she actually requests a Medrol Dosepak  today as that has helped her daughter with similar symptoms.    2.  MRI lumbar spine to evaluate for right L5 or S1 nerve root compression.    3.  MRI of the right hip to evaluate the extent of osteoarthritis as well as any trochanteric bursitis.    4.  She would like to proceed with Medrol Dosepak today.  Medrol Dosepak has been provided.  I did discuss the  increased risk of GI bleed with Medrol pack and warfarin.    5.  Follow-up with me in 2 weeks.      It was our pleasure caring for your patient today, if there any questions or concerns please do not hesitate to contact us.      Subjective:   Patient ID: Caty Ng is a 76 y.o. female.    History of Present Illness:   Patient presents today at the request of Dr. Flores for evaluation of right gluteal lateral leg pain to the knee.  This started 2 years ago.  No trauma.  Has been significantly worsening over time.  Waxes and wanes in intensity but she does have constant pain worse with any standing and walking better with laying down.  She is getting up in the morning as the pain will become severe.  She rates her pain a 9/10 today 10/10 at worst 5/10 at best.  Starts at the greater trochanter down the lateral leg to the lateral knee.  She feels weak in her right hip flexors and lifting her right leg up.  She also has left foot pain with osteoarthritis.    She has tried physical therapy with the last sessions being in March 2019 at [facility].  She has tried heat and ice, walks with a cane in the right hand.  Her daughter told the patient that she had the same issue and was on a Medrol Dosepak which did help.      Imaging: Plain films lumbar spine and right hip personally reviewed.  These are from 2019 [facility].  Lumbar spine shows left lumbar scoliotic curve general mild.  Degenerative disc disease most significant L3-4 but also L4-5 with significant facet arthropathy in the lower lumbar spine.    Right hip plain films show moderate joint space loss of the  right hip.         Review of Systems: Patient complains of weight gain, palpitations, foot swelling, cough, joint pain, muscle pain, muscle fatigue, dizziness, insomnia and excessive tiredness.  Denies fevers, headache, change in vision, chest pain, shortness of breath, abdominal pain, nausea, vomiting, bowel or bladder incontinence, skin issues. Remainder of 12 point review systems negative unless listed above.    Past Medical History:   Diagnosis Date     Atrial fibrillation, rapid (H) 2012    Followed by Dr. Cobos, cardiology     DDD (degenerative disc disease), lumbar 2020     Heart failure, systolic, chronic (H) 3/6/2014    Followed by Dr. Cobos, cardiology EF 45 - 50%     Morbid obesity with BMI of 40.0-44.9, adult (H) 2015       Family History   Problem Relation Age of Onset     Cancer Mother      Heart disease Mother      Diabetes Father      Diabetes Sister          Social History     Socioeconomic History     Marital status:      Spouse name: None     Number of children: None     Years of education: None     Highest education level: None   Occupational History     None   Social Needs     Financial resource strain: None     Food insecurity     Worry: None     Inability: None     Transportation needs     Medical: None     Non-medical: None   Tobacco Use     Smoking status: Former Smoker     Quit date:      Years since quittin.4     Smokeless tobacco: Never Used   Substance and Sexual Activity     Alcohol use: Not Currently     Drug use: Never     Sexual activity: None   Lifestyle     Physical activity     Days per week: None     Minutes per session: None     Stress: None   Relationships     Social connections     Talks on phone: None     Gets together: None     Attends [employer] service: None     Active member of club or organization: None     Attends meetings of clubs or organizations: None     Relationship status: None     Intimate partner violence     Fear of  current or ex partner: None     Emotionally abused: None     Physically abused: None     Forced sexual activity: None   Other Topics Concern     None   Social History Narrative     None     Social history: .  6 children.  Retired.  No tobacco or alcohol.    The following portions of the patient's history were reviewed and updated as appropriate: allergies, current medications, past family history, past medical history, past social history, past surgical history and problem list.      WHO 5: 12    XIAO Score: 38      Objective:   Physical Exam:    Vitals:    05/21/21 1246   BP: 109/56   Pulse: 79     Body mass index is 43.08 kg/m .      General:  Well-appearing female in no acute distress.  Obese, pleasant, cooperative, and interactive throughout the examination and interview.  CV: No lower extremity edema on inspection or paltation.  Lymphatics: No cervical lymphadenopathy palpated. Eyes: sclera clear. Skin: No rashes or lesions seen over the head/neck, hairline, arms, legs, lumbar spine.  Respirations unlabored.  MSK: Gait is cautious, single-point cane right hand.  Not able to use in the left hand as her left leg does not feel strong enough for her to bear weight alone..  Able to heel-toe stand with assistance from her single-point cane.    Negative Romberg.  Spine: Flattened lumbar lordotic curve mildly flexed at the waist.  Mild to moderate decreased lumbar flexion finger to floor testing.  Lacks full lumbar extension..  Palpation: Tenderness to palpation over right gluteal tissues, greater trochanter, IT band to the right lateral knee.  Extremities: Full range of motion of the elbows, and wrists with no effusions or tenderness to palpation.  Decreased range of motion both hips internal rotation mildly with no reproduction of pain.  Full range of motion of the  knees, and ankles with no effusions.  She has tenderness over the right lateral knee.    No hypermobility of the upper or lower  extremities.  Neurologic exam: Mental status: Patient is alert and oriented with normal affect.  Attention, knowledge, memory, and language are intact.  Normal coordination throughout the examination.  Reflexes are 2+ and symmetric biceps, triceps, brachioradialis, patellar, and 1+ left, 0 right Achilles with equivocal toes and Negative Conrad's.  Sensation is intact to light touch throughout the upper and lower extremities bilaterally.  Manual muscle testing reveals 4/5 bilateral hip flexors, 5 out of 5 in the  knee flexors/extensors, ankle plantar flexors, ankle  dorsiflexors, and EHL.  Upper extremities: Grossly normal strength . Normal muscle bulk and tone in the arms and legs.    Negative seated  straight leg raise bilaterally.

## 2021-06-17 NOTE — TELEPHONE ENCOUNTER
===View-only below this line===  ----- Message -----  From: Cameron Soria MD  Sent: 5/13/2021   7:21 AM CDT  To: Gallo Greco RN    OK to schedule with next available but need to warn about her concerns with metal allergy.    Thanks,    Cameron

## 2021-06-17 NOTE — PATIENT INSTRUCTIONS - HE
It is a pleasure meeting you today!  As we have discussed, after you've had a chance to discuss further work up options, let us know if you're open to proceeding with diagnostic angiography, and we can schedule you accordingly

## 2021-06-17 NOTE — TELEPHONE ENCOUNTER
ANTICOAGULATION  MANAGEMENT    Assessment     Today's INR result of 2.1 is Therapeutic (goal INR of 2.0-3.0)        Warfarin taken as previously instructed    No new diet changes affecting INR    No new medication/supplements affecting INR    Continues to tolerate warfarin with no reported s/s of bleeding or thromboembolism     Previous INR was Therapeutic    Plan:     Spoke on phone with Abi regarding INR result and instructed:      Warfarin Dosing Instructions:  Continue current warfarin dose 2 mg daily     Instructed patient to follow up no later than: one month    Abi verbalizes understanding and agrees to warfarin dosing plan.    Instructed to call the [facility] for any changes, questions or concerns. (#[phone])   ?   Nathalie Glaser RN    Subjective/Objective:      Caty Ng, a 76 y.o. female is on warfarin. Abi Alex reports:     Home warfarin dose: verbally confirmed home dose with Abi and updated on anticoagulation calendar     Missed doses: No     Medication changes:  No     S/S of bleeding or thromboembolism:  No     New Injury or illness:  No     Changes in diet or alcohol consumption:  No     Upcoming surgery, procedure or cardioversion:  No    Anticoagulation Episode Summary     Current INR goal:  2.0-3.0   TTR:  78.4 % (6.1 mo)   Next INR check:  5/14/2021   INR from last check:  2.20 (4/30/2021)   Most recent INR:   2.10 (5/17/2021)   Weekly max warfarin dose:     Target end date:     INR check location:     Preferred lab:     Send INR reminders to:  BREE ANDREW    Indications    Atrial fibrillation  rapid (H) (Resolved) [I48.91]           Comments:           Anticoagulation Care Providers     Provider Role Specialty Phone number    Keke Flores MD Referring Family Medicine [phone]

## 2021-06-17 NOTE — TELEPHONE ENCOUNTER
===View-only below this line===  ----- Message -----  From: Hai Murphy MD  Sent: 5/13/2021   2:36 PM CDT  To: Gallo Greco RN, Cameron Soria MD    Happy to do it!    Hai

## 2021-06-17 NOTE — TELEPHONE ENCOUNTER
Pt sen in clinic by MY. Not ready to proceed. No incoming call to schedule. Staff message sent to providers to inform. BOB,RN

## 2021-06-17 NOTE — TELEPHONE ENCOUNTER
Dr. Soria, to schedule with MY at this time, procedure date will be 6/3. Ok to arrange at that time or too far out? Please advise. Seamus ROJAS

## 2021-06-17 NOTE — TELEPHONE ENCOUNTER
"PC and discussion with the patient. Informed of response from MY and THJ. Pt reports that she has been doing some research of her own reading an article from 2015 From: [facility] Immunology Pract Vol 3, Number 5  pages 685, 690, and 691.     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 She had a screw put in her foot, that the surgery site would not heal until the screw was removed. 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 She is concerned and hesitant after reading the article, knowing the risks, and not sure that she would have the angiogram done. She states that from what she read it seems \"pretty risky\". She is open to discussion but is very concerned.  Requested patient send the pages from the article she read, for review by providers. Discussion with patient that will send for their review and call with any further steps.     Dr. Soria and Dr. Murphy, I will e-mail the article pages she has read. Would an in office visit with MY, for discussion be appropriate or just proceed with a Coronary Angiogram as diagnostic for possible CABG, as the next step? Please advise on next steps for patient care. CMM,Rn     **MABLE I have e-mailed the 4 pages from the Journal that she read  "

## 2021-06-17 NOTE — TELEPHONE ENCOUNTER
Telephone Encounter by Nathalie Glaser RN at 4/2/2021  2:15 PM     Author: Nathalie Glaser RN Service: -- Author Type: Registered Nurse    Filed: 4/2/2021  2:52 PM Encounter Date: 4/2/2021 Status: Signed    : Nathalie Glaser RN (Registered Nurse)       ANTICOAGULATION  MANAGEMENT    Assessment     Today's INR result of 3.4 is Supratherapeutic (goal INR of 2.0-3.0)        Warfarin taken as previously instructed    No new diet changes affecting INR    No new medication/supplements affecting INR    Continues to tolerate warfarin with no reported s/s of bleeding or thromboembolism     Previous INR was Therapeutic    Plan:     Spoke on phone with Caty regarding INR result and instructed:      Warfarin Dosing Instructions:  hold today then continue current warfarin dose 3 mg daily on tue/fri; and 2 mg daily rest of week  (0 % change)    Instructed patient to follow up no later than: two weeks      Abi verbalizes understanding and agrees to warfarin dosing plan.    Instructed to call the [facility] for any changes, questions or concerns. (#[phone])   ?   Nathalie Glaser RN    Subjective/Objective:      Caty Ng, a 76 y.o. female is on warfarin. Caty Byrne reports:     Home warfarin dose: as updated on anticoagulation calendar per template     Missed doses: No     Medication changes:  No     S/S of bleeding or thromboembolism:  No     New Injury or illness:  No     Changes in diet or alcohol consumption:  No     Upcoming surgery, procedure or cardioversion:  No    Anticoagulation Episode Summary     Current INR goal:  2.0-3.0   TTR:  86.5 % (4.6 mo)   Next INR check:  4/16/2021   INR from last check:  3.40 (4/2/2021)   Weekly max warfarin dose:     Target end date:     INR check location:     Preferred lab:     Send INR reminders to:  BREE ANDREW    Indications    Atrial fibrillation  rapid (H) (Resolved) [I48.91]           Comments:           Anticoagulation Care Providers     Provider  Role Specialty Phone number    Keke Flores MD Referring Family Medicine [phone]

## 2021-06-17 NOTE — TELEPHONE ENCOUNTER
==View-only below this line===  ----- Message -----  From: Cameron Soria MD  Sent: 5/13/2021  12:50 PM CDT  To: Gallo Greco RN, Hai Murphy MD    Would just do angio and discuss after that.    Cameron  ----- Message -----  From: Hai Murphy MD  Sent: 5/13/2021  11:47 AM CDT  To: Gallo Greco RN, MD Ashlyn Cui and Cameron,    Given her concerns, should we post-pone the angio at this time?    Thank you both!    Hai

## 2021-06-18 ENCOUNTER — COMMUNICATION - HEALTHEAST (OUTPATIENT)
Dept: ANTICOAGULATION | Facility: CLINIC | Age: 76
End: 2021-06-18

## 2021-06-18 ENCOUNTER — COMMUNICATION - HEALTHEAST (OUTPATIENT)
Dept: FAMILY MEDICINE | Facility: CLINIC | Age: 76
End: 2021-06-18

## 2021-06-18 ENCOUNTER — RECORDS - HEALTHEAST (OUTPATIENT)
Dept: ADMINISTRATIVE | Facility: OTHER | Age: 76
End: 2021-06-18

## 2021-06-18 ENCOUNTER — AMBULATORY - HEALTHEAST (OUTPATIENT)
Dept: LAB | Facility: CLINIC | Age: 76
End: 2021-06-18

## 2021-06-18 ENCOUNTER — TRANSFERRED RECORDS (OUTPATIENT)
Dept: HEALTH INFORMATION MANAGEMENT | Facility: CLINIC | Age: 76
End: 2021-06-18

## 2021-06-18 DIAGNOSIS — I48.21 PERMANENT ATRIAL FIBRILLATION (H): ICD-10-CM

## 2021-06-18 LAB — INR PPP: 2.9 (ref 0.9–1.1)

## 2021-06-21 ENCOUNTER — COMMUNICATION - HEALTHEAST (OUTPATIENT)
Dept: PHYSICAL MEDICINE AND REHAB | Facility: CLINIC | Age: 76
End: 2021-06-21

## 2021-06-21 DIAGNOSIS — Z79.01 LONG-TERM (CURRENT) USE OF ANTICOAGULANTS, INR GOAL 2.0-3.0: ICD-10-CM

## 2021-06-21 DIAGNOSIS — M54.16 LUMBAR RADICULOPATHY: ICD-10-CM

## 2021-06-22 RX ORDER — WARFARIN SODIUM 2 MG/1
TABLET ORAL
Qty: 105 TABLET | Refills: 0 | OUTPATIENT
Start: 2021-06-22

## 2021-06-25 NOTE — TELEPHONE ENCOUNTER
Call received from patient, she is interested in information regarding the material and/or brand of stent used by Dr. Murphy so she can pursue allergy testing to determine if she can tolerate the stents that may be used. Informed patient Dr. Murphy out of office for the rest of the week but that her questions will be passed on to him. She is appreciative and states there is currently no rush for these questions to be answered. Verbalized understanding. No further questions at this time. [facility]

## 2021-06-25 NOTE — TELEPHONE ENCOUNTER
Call to patient with Dr. Murphy's recommendations for consult with CTS to discuss questions regarding bypass if reaction to stents occur after possible PCI.     Patient wondering how long medication on drug eluding stents lasts. She wants to know once medication coating on drug eluding stents wear if, is she prone to a possible allergic reaction? Informed patient questions would be passed to Dr. Murphy for review. Informed her scheduling would reach out to her for consult with CTS. Verbalized understanding. No further questions at this time. [facility]

## 2021-06-25 NOTE — TELEPHONE ENCOUNTER
Call to patient regarding message below. Patient wondering about getting allergy testing to determine which metals she is allergic to specifically. Advised patient follow-up with PCP for allergy testing referral. Verbalized understanding. No further questions at this time. SALMA

## 2021-06-25 NOTE — TELEPHONE ENCOUNTER
Called and left  for pt to call CV RN back regarding her questions about stents vs open heart surgery. Contact info provided.    ----- Message from Vicky Lloyd RN sent at 6/14/2021  2:36 PM CDT -----  Regarding: RE: Request for CV surgery consult in clinic  Delilah Doll! Dr. Murphy said that sounds great and he would appreciate you calling her. Let me know if there's anything else I can do to help!    Vicky LONDONO RN    ----- Message -----  From: Mecca Smith RN  Sent: 6/14/2021   1:50 PM CDT  To: Vicky Lloyd RN  Subject: RE: Request for CV surgery consult in clinic     I could try calling her if you think that would help? They don't remove stents, but perform bypass around the stents when patients have them.     If she doesn't need to be charged for a visit without needing surgery, then maybe it's something I could help answer for her? But if she wants to see a surgeon we can definitely set her up.    What do you think?    Sharmila  ----- Message -----  From: Vicky Lloyd RN  Sent: 6/14/2021   1:39 PM CDT  To: Mecca Smith RN  Subject: RE: Request for CV surgery consult in clinic     Oh good information to know about the wires! Yeah, the patient has had lots of questions about DEJON for possible angio with PCI. Dr. Murphy wanted me to have her see a CTS because she had lots of questions about what would happen if she were to get angio with pci, had stents placed, and she later developed a reaction to the stents. She specifically wanted to know if the stents could be removed via bypass. Dr. Murphy said this was possible but very difficult, which I already told the patient. She just has lots of hypothetical questions and Dr. Murphy felt it would be best to discuss with a CTS.     ----- Message -----  From: Mecca Smith RN  Sent: 6/14/2021   1:32 PM CDT  To: Vicky Lloyd RN  Subject: RE: Request for CV surgery consult in clinic     Shanelle Gonzalez asked me about next  steps with this pt - can you provide some direction on if she needs to see a surgeon?    And to answer the metal question, the wires we use for sternums are stainless steel.    Thank you,  Sharmila  -    [12:25 PM] Vicky Lloyd! I was hoping to get a patient scheduled for consult with a CTS (MRN:  [id number])?[12:26 PM] Vicky Lloyd    I tried to make it clear in my notes but Dr. Alfaro wants this patient seen by a CTS because she has lots of questions regarding possible bypass if for some reason she had a stent placed that she developed an allergy to?[12:28 PM] Vicky Lloyd    She has a history of metal allergies and has been given lots of information regarding our drug eluding stents and their makeup but basically her big thing is she wants to know if CTS can do bypass ad removal of the stent should she ever develop a reaciton

## 2021-06-25 NOTE — TELEPHONE ENCOUNTER
Patient returned call and all questions about stents vs. Heart surgery were addressed.    Contact info confirmed and patient will call with any additional questions. She does not feel a CVTS consultation is needed at this time.

## 2021-06-25 NOTE — TELEPHONE ENCOUNTER
Call to patient with information below. Patient has information written down. She states she will arrange allergy testing after she looks up what the stents are made from. Encouraged the patient to call should questions or concerns arise. Verbalized understanding. No questions at this time. [facility]     ----- Message -----  From: Hai Murphy MD  Sent: 6/3/2021   9:49 AM CDT  To: Vicky Lloyd, RN    Vicky,    We use Synergy DEJON by [employer], but also have Resolute DEJON from [employer].    Thx!    Hai

## 2021-06-25 NOTE — TELEPHONE ENCOUNTER
Attempted to call patient regarding questions below, left message. [facility]     ----- Message -----  From: Robert Martel HUC  Sent: 5/26/2021  11:39 AM CDT  To: Maria Ines Greco RN  Subject: MY Patient                                       General phone call:    Caller: Patient   Primary cardiologist: MY  Detailed reason for call: Patient would like to know if we know who the supplied of our heart stents is. (trying to find out what the metal alloys are in them)   Best phone number: [phone]  Best time to contact: Any  Ok to leave a detailed message? Yes  Device? No     Additional Info:

## 2021-06-25 NOTE — TELEPHONE ENCOUNTER
----- Message from SARATH Lloyd sent at 6/16/2021 11:53 AM CDT -----  Regarding: MY PATIENT  General phone call:    Caller: PATIENT    Primary cardiologist: MY    Detailed reason for call: PLEASE CALL PATIENT, RE: HER METAL ALLERGY.    Best phone number: [phone]    Best time to contact: ANY    Ok to leave a detailed message? YES    Device? NO    Additional Info:

## 2021-06-25 NOTE — TELEPHONE ENCOUNTER
Patient calling with more questions regarding possible angiogram. One barrier she lists is severe pain in leg from knee to hip. She is considering getting a cortisone shot to relieve the pain so she may lay flat long enough post-angiogram.     She is wondering if she proceeds with angiogram and has stents placed, what are symptoms she should monitor for if an allergic reaction occurs. She wants to know if removal of stents via bypass is a possibility if and issue occurs where she develops a reaction to the stents. She has decided to forego allergy testing d/t her own research of the makeup of surgical screws (to which she previously had a reaction) versus stents. She acknowledges that there are some similarities between the two but that surgical screws are primarily stainless, per her research. She reports she has had an angiogram previously in 2012 via femoral route. Informed patient information would be passed to Dr. Murphy for review. Verbalized understanding. No further questions at this time. [facility]

## 2021-06-25 NOTE — TELEPHONE ENCOUNTER
----- Message from Reena Hanks sent at 6/11/2021 12:26 PM CDT -----  General phone call:  PATIENT WOULD LIKE A CALL  PATIENT MAY NEED AN ANGIO  PLEASE CALL    Caller: PATIENT  Primary cardiologist: DR COOPER, DR TREADWELL  Detailed reason for call: SEE ABOVE  New or active symptoms? NO  Best phone number: [phone]  Best time to contact: ANY TIME  Ok to leave a detailed message? YES  Device? NO    Additional Info:

## 2021-06-26 DIAGNOSIS — I50.32 CHRONIC DIASTOLIC HEART FAILURE (H): ICD-10-CM

## 2021-06-26 NOTE — TELEPHONE ENCOUNTER
PSP:  Dr. Montero  Last clinic visit:  6/17/21 OV  Reason for call: Called to notify PSP she has had the MRI  Clinical information:  Reports she completed her MRI on 6/18/21 at 630 PM at [facility].   Advice given to patient:PSP will be notified. Once he has had a chance to review the results, nurse navigation will be sent the results and his recommendations. She will then be called with next steps. Stated understanding.   Provider to address: MRI to be reviewed ([facility] on 6/18)

## 2021-06-26 NOTE — TELEPHONE ENCOUNTER
ANTICOAGULATION  MANAGEMENT    Assessment     Today's INR result of 2.9 is Therapeutic (goal INR of 2.0-3.0)        Warfarin taken as previously instructed    No new diet changes affecting INR    No interaction expected between baclofen and warfarin    Continues to tolerate warfarin with no reported s/s of bleeding or thromboembolism     Previous INR was Therapeutic    Plan:     Left detailed message for Abi regarding INR result and instructed:      Warfarin Dosing Instructions:  Continue current warfarin dose 2 mg daily     Instructed patient to follow up no later than: one month      Instructed to call the [facility] for any changes, questions or concerns. (#[phone])   ?   Nathalie Glaser RN    Subjective/Objective:      Caty Ng, a 76 y.o. female is on warfarin. Abi Alex reports:     Home warfarin dose: as updated on anticoagulation calendar per template     Missed doses: No     Medication changes:  No     S/S of bleeding or thromboembolism:  No     New Injury or illness:  No     Changes in diet or alcohol consumption:  No     Upcoming surgery, procedure or cardioversion:  No    Anticoagulation Episode Summary     Current INR goal:  2.0-3.0   TTR:  81.6 % (7.2 mo)   Next INR check:  7/16/2021   INR from last check:  2.90 (6/18/2021)   Weekly max warfarin dose:     Target end date:     INR check location:     Preferred lab:     Send INR reminders to:  BREE ANDREW    Indications    Atrial fibrillation  rapid (H) (Resolved) [I48.91]           Comments:           Anticoagulation Care Providers     Provider Role Specialty Phone number    Keke Flores MD Referring Family Medicine [phone]

## 2021-06-26 NOTE — PROGRESS NOTES
Assessment/Plan:      Diagnoses and all orders for this visit:    Lumbar radicular pain  -     MR Lumbar Spine Without Contrast; Future; Expected date: 06/17/2021  -     baclofen (LIORESAL) 10 MG tablet; Take 0.5-1 tablets (5-10 mg total) by mouth 3 (three) times a day as needed (for muscle spasms).  Dispense: 30 tablet; Refill: 0    Myofascial pain  -     baclofen (LIORESAL) 10 MG tablet; Take 0.5-1 tablets (5-10 mg total) by mouth 3 (three) times a day as needed (for muscle spasms).  Dispense: 30 tablet; Refill: 0    Greater trochanteric bursitis of right hip  -     MR Hip Without Contrast Right; Future; Expected date: 06/17/2021    Hip pain, right  -     MR Hip Without Contrast Right; Future; Expected date: 06/17/2021    DDD (degenerative disc disease), lumbar  -     MR Lumbar Spine Without Contrast; Future; Expected date: 06/17/2021        Assessment: Pleasant 76 y.o. female   with a history of atrial fibrillation with heart failure on warfarin with:     1.  2-year history of right lateral leg pain in the gluteal region to the knee.  This is consistent with lumbar radiculopathy along with potential right greater trochanteric bursitis.  She has a left lumbar scoliotic curve with degenerative disc disease through lumbar spine and facet arthropathy on plain films.  She also has loss of right Achilles reflex which would be consistent with S1 radiculopathy.  2 days give good relief with  Medrol Dosepak the pain is then worsened.  Did not have MRI of the hip or lumbar spine as of yet due to inability to lay flat.  Request seated MRI.     2.  Myofascial pain right gluteal region greater trochanter and IT band.     3.  Right lateral hip pain.  She does have moderate hip osteoarthritis on plain films       Discussion:    1.  We did discussion today regarding the necessity of the MRI of the lumbar spine and hip to streamline treatment.  We discussed option of doing a greater trochanteric bursa injection today as a  potential treatment but if there is a lumbar disc herniation or nerve compression it may delay further steroid injections or further definitive treatment.  In the end she has decided to obtain an MRI of the lumbar spine and hip would like to try seated imaging at [facility] as she cannot lie flat.    2.  MRI of the lumbar spine and hip at [facility] seated.  If they cannot up obtain the MRI of the hip seated we will try to get a CT scan.    3.  Trial baclofen as needed for myofascial pain.    4.  After her MRI is completed she will contact the nurses.  Based on MRI of the lumbar spine if there is nerve compression would likely recommend lumbar epidural if no nerve compression in the lumbar spine and a trochanteric bursa injection is an option.    5.  We will follow up by phone with further recommendations after MRI is completed.    It was our pleasure caring for your patient today, if there any questions or concerns please do not hesitate to contact us.    30 minutes were spent on the date of the encounter performing chart review, patient visit and documentation in addition to any procedure.    Subjective:   Patient ID: Caty Ng is a 76 y.o. female.    History of Present Illness: Patient presents for follow-up evaluation of low back pain with right lower extremity pain and paresthesias to the lateral knee and in fact paresthesias to the lateral calf.  Pain is not changed since last visit.  She usually ambulates with a cane is in a wheelchair today and left her cane upfront.  Her pain is a 10/10 at worst 8/10 today 6/10 at best.  Worse with any walking better with sitting.  She gets a sharp pain over the right lateral knee and a sharp pain down the leg from the buttock but does have pain in the right low back at times as well.  Medrol Dosepak did offer 2 days of good relief and the symptoms returned.  MRI had been ordered and she was unable to complete that as she cannot lay on her back due to the severity of the leg pain  and back pain.  She also tells me that she has in need of cardiac stress test or cardiac work-up but cannot have this work-up because she is unable to lay on her back and so she is putting that test off until she gets her back and leg pain evaluated.      Osteoarthritis of the right hip on plain films.  Moderate.    Review of Systems: Pertinent positives: Numbness tingling weakness right lower extremity.  Pertinent negatives:    No bowel or bladder incontinence.  No urinary retention.  No fevers, unintentional weight loss, balance changes, headaches, frequent falling, difficulty swallowing, or coordination difficulties.  All others reviewed are negative.           Past Medical History:   Diagnosis Date     Arrhythmia 2012     Atrial fibrillation, rapid (H) 6/21/2012    Followed by Dr. Cobos, cardiology     Cancer (H) 1989    per patient- [facility]     CHF (congestive heart failure) (H) 2012     DDD (degenerative disc disease), lumbar 11/23/2020     Heart failure, systolic, chronic (H) 3/6/2014    Followed by Dr. Cobos, cardiology EF 45 - 50%     Heart valve disease      Hyperlipidemia 2012     Morbid obesity with BMI of 40.0-44.9, adult (H) 4/25/2015     Sleep apnea 2010    per pt       The following portions of the patient's history were reviewed and updated as appropriate: allergies, current medications, past family history, past medical history, past social history, past surgical history and problem list.           Objective:   Physical Exam:    Vitals:    06/17/21 1417   BP: 126/75   Pulse: (!) 44     There is no height or weight on file to calculate BMI.      General: Alert and oriented with normal affect.  Overweight attention, knowledge, memory, and language are intact. No acute distress.   Eyes: Sclerae are clear.  Respirations: Unlabored. CV: No lower extremity edema.  Skin: No rashes seen over the legs.    Gait: In a wheelchair today does not ambulate as her cane is upfront.  Significant tenderness  right greater trochanter IT band and right lateral knee.  Sensation is intact to light touch throughout the upper and lower extremities.     Manual muscle testing reveals:  Right /Left out of 5     5/5 hip flexors  5/5 knee flexors  5/5 knee extensors  5/5 ankle plantar flexors  5/5 ankle dorsiflexors  5/5   ankle evertors

## 2021-06-26 NOTE — TELEPHONE ENCOUNTER
"Phone call to patient to review results and provider's recommendations. Results given and explained. Discussed recommended injection as well as PT as treatments. Patient would like to move forward with injection. Order placed in Epic. Injection requirements reviewed with patient. Discussed steroids have immunosuppressant properties which could potentially put patient at a higher risk for a worsened course of any infection including COVID. Stated understanding. Transferred to scheduling to make appointment.      Patient dose report she is feeling tired and lightheaded since taking the Baclofen. Started with 1/2 tablet, but did not feel it effective. Now taking 1 full tablet and has the dizziness and tired feeling. Instructed to not take any further Baclofen. Heart rate was noted at 44 at last office visit. Reports BPs and heart rates at home have not been low with heart rate in the 80s. Patient states she is in A Fib. and on multiple cardiac medications as well as Coumadin. Explained PSP will be notified of the update/concern. Patient will be called with his response. Stated understanding.     Patient reports her pain has lightened up; rating it a 3/10 \"bearable now\" where it was a 8-9/10.    "

## 2021-06-26 NOTE — TELEPHONE ENCOUNTER
Who is calling:  Patient   Reason for Call:  Warfarin Rx -- see below  Date of last appointment with primary care:   Okay to leave a detailed message: Yes    Patient need the Warfarin Rx that was sent to the local [facility] pharmacy to go to the [facility] mail order.     Please call patient and verify.

## 2021-06-26 NOTE — PROGRESS NOTES
Radhika Inclusion/Exclusion Criteria:     Study Name: Radhika   : Elliott Abdi MD    Protocol version: 1.2, 08 Mar 2021     Criteria # Inclusion Criteria (ALL MUST BE YES)  YES/NO   1 Able to read, understand, and provide written informed consent Yes   2 Willing and able to participate in the study procedures as described in the consent form  Yes   3 Individuals who are 22 years of age and older Yes   4 Able to communicate effectively with and follow instructions from the study staff Yes   5 A wrist circumference between 125-190 mm (subject must be able to wear the DCD)  Yes   6 For subjects enrolled into the AF population, subjects must have a known diagnosis of AF and be in AF at the time of screening. Subjects may have any type of AF including paroxysmal, persistent, or permanent AF, though persistent and permanent AF are preferred as subjects with persistent and permanent AF are more likely to be in AF at the time of screening.  yes       Criteria #  Exclusion Criteria (ALL MUST BE NO)  YES/NO    1 Physical disability that precludes safe an adequate testing  No   2 Mental impairment resulting in limited ability to cooperate  No   3 Subjects with a pacemaker or implantable cardioverter-defibrillator (ICD)  No   4 Acute myocardial infarction within 90 days of screening or other cardiovascular disease that, in the opinion of the Investigator, increases the risk to the subject or renders data uninterruptable (e.g. recent or ongoing unstable angina, significant valvular heart disease or heart failure, myocarditis, or pericarditis)  No   5 Acute pulmonary embolism, pulmonary infarction, or deep vein thrombosis within 90 days of screening  No   6 Stroke or transient ischemic attack within 90 days of screening No   7 Subjects taking rhythm control drugs (e.g., disopyramide, quinidine, flecainide, propafenone, amiodarone, dofetilide, dronedarone, sotalol, procainamide, ibutilide, moricizine, or  procainamide.) For clarity and in contrast, rate control, anticoagulants, and anti-platelet medications are permitted (e.g., metoprolol, atenolol, diltiazem, coumadin, clopidogrel, or aspirin)     Exception: subjects who are undergoing scheduled cardioversion for known AF within 30 days after study participation are allowed to participate in the study even when on rhythm control drugs  No   8 Symptomatic (or active) allergic skin reaction such as eczema, rosacea, impetigo, dermatomyositis, or allergic contact dermatitis on both wrists or over electrode attachment sites, including known allergy or sensitivity to silicone bands primarily used in wrist-worn fitness devices No   9 Known sensitivity to medical adhesives, isopropyl alcohol, watch bands, or ECG electrodes No   10 A history or abnormal life-threatening rhythms as determined by the Investigator (e.g., ventricular tachycardia, ventricular fibrillation, 3rd-degree heart block, resting heart rate < 50 bpm, or resting heart rate > 120 bpm)  No   11 Significant tremor that prevents subject from being able to hold still  No   12 Women who are pregnant at the time of study participation  No   13 Subjects enrolled into the SR population must not have any diagnosis of AF. For example, subjects cannot be enrolled into the SR population if they have a diagnosis of paroxysmal AF but during screening are in SR No     Patient fulfills study inclusion criteria and no exclusion criteria are found.     Elliott Abdi MD

## 2021-06-26 NOTE — TELEPHONE ENCOUNTER
Please have her stop baclofen and see if her lightheadedness improves if not she should contact her PCP to see if it is related to her cardiac status.  Continue plan for lumbar epidural.

## 2021-06-26 NOTE — TELEPHONE ENCOUNTER
Phone call to patient to discuss PSP's recommendation to stop the Baclofen as was relayed earlier. Did explain there is no new Rx sent. If her lightheadedness continues despite stopping the Baclofen, she should contact PCP. Stated understanding.

## 2021-06-26 NOTE — TELEPHONE ENCOUNTER
MRI of the lumbar spine has been reviewed from [facility].  There are degenerative changes with facet arthritis and degenerative disc disease most significant L3-4.  There is potential for nerve impingement at L3-4 with moderate spinal stenosis.    Would recommend a right L4-5 transforaminal epidural steroid injection to help with the low back and right lower extremity pain.It would be necessary for her to be away on her stomach for this procedure.  My hope is that this injection would significantly help her right lower extremity pain I would also like her to start into some physical therapy.

## 2021-06-26 NOTE — PATIENT INSTRUCTIONS - HE
1. An MRI was ordered for you today.  You will be contacted by scheduling within 3 days.    If you are not contacted, please call   [facility] [phone].    2. Baclofen (muscle relaxant medication) has been prescribed today. Please take 1/2-1 tab three times daily as needed for muscle pain. This medication may cause drowsiness. Please do not work or drive while taking this medication until you know how it effects you. If it does make you drowsy, you should only take it before bedtime or at times that you do not have to work/drive.     3. After your MRI is completed call  Our nurses at Clinic Phone # [phone] to let them know.  I will then review the MRI and make recommendation.

## 2021-06-29 RX ORDER — LOSARTAN POTASSIUM 25 MG/1
25 TABLET ORAL DAILY
Qty: 90 TABLET | Refills: 0 | Status: SHIPPED | OUTPATIENT
Start: 2021-06-29 | End: 2021-09-21

## 2021-06-29 NOTE — TELEPHONE ENCOUNTER
LOSARTAN TAB 25MG  Last Written Prescription Date:  7/10/2020  Last Fill Quantity: 90,   # refills: 3  Last Office Visit : 7/23/2020  Future Office visit:  None  90 Tabs sent to pharm 6/29/2021      Madison Rodriguez RN  Central Triage Red Flags/Med Refills    Warnings Override History for losartan (COZAAR) 25 MG tablet [[id number]]    Overridden by Karen Ortiz RN on Jul 10, 2020 10:21 AM   Allergy/Contraindication   1. LISINOPRIL [Level: Cross-sensitive Class Match]

## 2021-06-30 ENCOUNTER — COMMUNICATION - HEALTHEAST (OUTPATIENT)
Dept: CARDIOLOGY | Facility: CLINIC | Age: 76
End: 2021-06-30

## 2021-06-30 DIAGNOSIS — I25.10 CORONARY ARTERY DISEASE INVOLVING NATIVE CORONARY ARTERY OF NATIVE HEART WITHOUT ANGINA PECTORIS: Primary | ICD-10-CM

## 2021-06-30 DIAGNOSIS — I42.8 NON-ISCHEMIC CARDIOMYOPATHY (H): ICD-10-CM

## 2021-06-30 NOTE — PROGRESS NOTES
Progress Notes by Cameron Soria MD at 5/7/2021  1:30 PM     Author: Cameron Soria MD Service: -- Author Type: Physician    Filed: 5/7/2021  1:59 PM Encounter Date: 5/7/2021 Status: Signed    : Cameron Soria MD (Physician)               Assessment/Recommendations   Patient with known permanent atrial fibrillation, reduction in left ventricular systolic function of undetermined etiology in a CT angiogram suggesting quite significant coronary artery disease.  I have recommended that she undergo coronary angiography to define this better and to see if revascularization is needed in hopes of preventing worsening left ventricular systolic function and improving her breathing.  We talked about this today and the rationale and also reviewed the results of the CT angiogram in detail.    She is agreeable to proceed.    We will see her in follow-up after angiogram for continued cardiology care.    Thank you for allowing us to participate in her care.       History of Present Illness/Subjective    Ms. Caty Ng is a 76 y.o. female with known permanent atrial fibrillation also of reduction in left ventricular systolic function and moderate mitral insufficiency.  She does have shortness of breath when she takes her diuretics it seems to help the breathing quite significantly.  She sometimes does not take the diuretic because she will be moving around her out from the house and it does cause her to urinate significantly.  When she misses the diuretic her breathing is not as good.  She denies orthopnea or paroxysmal nocturnal dyspnea but does have peripheral edema.  She has had some atypical chest discomforts but they seem to be improved.  She does have dyspnea on exertion which is stable and again worse if she does not take her diuretic.           Physical Examination Review of Systems   Vitals:    05/07/21 1333   BP: 90/58   Pulse: 68   Resp: 16     Body mass index is 43.77 kg/m .  Wt Readings from Last  3 Encounters:   05/07/21 (!) 255 lb (115.7 kg)   05/06/21 (!) 251 lb (113.9 kg)   04/20/21 (!) 254 lb (115.2 kg)     General Appearance:   Alert, cooperative and in no acute distress.   ENT/Mouth: Patient wearing a mask.      EYES:  no scleral icterus, normal conjunctivae   Neck: JVP normal but difficult to see. No Hepatojugular reflux. Thyroid not visualized.   Chest/Lungs:   Lungs are clear to auscultation, equal chest wall expansion.   Cardiovascular:   S1, S2 with 1/6 systolic murmur, no clicks or rubs. Brachial, radial  pulses are intact and symetric. No carotid bruits noted   Abdomen:  Nontender. BS+.    Extremities: No cyanosis, clubbing and mild pretibial and ankle edema bilaterally   Skin: no xanthelasma, warm.    Neurologic: normal arm movement bilateral, no tremors     Psychiatric: Appropriate affect.      General: WNL  Eyes: WNL  Ears/Nose/Throat: WNL  Lungs: Shortness of Breath  Heart: Shortness of Breath with activity, Irregular Heartbeat, Leg Swelling  Stomach: WNL  Bladder: Frequent Urination at Night  Muscle/Joints: Muscle Pain  Skin: WNL  Nervous System: Dizziness  Mental Health: WNL     Blood: WNL       Medical History  Surgical History Family History Social History   Past Medical History:   Diagnosis Date   ? Atrial fibrillation, rapid (H) 6/21/2012    Followed by Dr. Cobos, cardiology   ? DDD (degenerative disc disease), lumbar 11/23/2020   ? Heart failure, systolic, chronic (H) 3/6/2014    Followed by Dr. Cobos, cardiology EF 45 - 50%   ? Morbid obesity with BMI of 40.0-44.9, adult (H) 4/25/2015    No past surgical history on file. No family history on file. Social History     Socioeconomic History   ? Marital status:      Spouse name: Not on file   ? Number of children: Not on file   ? Years of education: Not on file   ? Highest education level: Not on file   Occupational History   ? Not on file   Social Needs   ? Financial resource strain: Not on file   ? Food insecurity      Worry: Not on file     Inability: Not on file   ? Transportation needs     Medical: Not on file     Non-medical: Not on file   Tobacco Use   ? Smoking status: Former Smoker     Quit date: 1982     Years since quittin.3   ? Smokeless tobacco: Never Used   Substance and Sexual Activity   ? Alcohol use: Not on file   ? Drug use: Not on file   ? Sexual activity: Not on file   Lifestyle   ? Physical activity     Days per week: Not on file     Minutes per session: Not on file   ? Stress: Not on file   Relationships   ? Social connections     Talks on phone: Not on file     Gets together: Not on file     Attends [employer] service: Not on file     Active member of club or organization: Not on file     Attends meetings of clubs or organizations: Not on file     Relationship status: Not on file   ? Intimate partner violence     Fear of current or ex partner: Not on file     Emotionally abused: Not on file     Physically abused: Not on file     Forced sexual activity: Not on file   Other Topics Concern   ? Not on file   Social History Narrative   ? Not on file          Medications  Allergies   Current Outpatient Medications   Medication Sig Dispense Refill   ? amoxicillin (AMOXIL) 500 MG capsule TAKE 4 CAPSULES BY MOUTH 1 HOUR BEFORE DENTAL APPOINTMENT     ? atorvastatin (LIPITOR) 10 MG tablet Take 1 tablet (10 mg total) by mouth daily. 90 tablet 3   ? carvediloL (COREG) 25 MG tablet Take 0.5 tablets (12.5 mg total) by mouth 2 (two) times a day with meals. 90 tablet 1   ? diltiazem (CARDIZEM CD) 240 MG 24 hr capsule Take by mouth daily.      ? levothyroxine (SYNTHROID, LEVOTHROID) 75 MCG tablet TAKE 1 TABLET DAILY 90 tablet 2   ? losartan (COZAAR) 25 MG tablet Take 25 mg by mouth.     ? torsemide (DEMADEX) 20 MG tablet Take 3 tablets (60 mg total) by mouth daily. 90 tablet 1   ? warfarin ANTICOAGULANT (COUMADIN/JANTOVEN) 2 MG tablet TAKE 1 AND 1/2 TABLETS EVERY Thursday and FRIDAY AND 1 TABLET  ALL OTHER DAYS OR AS        DIRECTED BY INR NURSES       No current facility-administered medications for this visit.     Allergies   Allergen Reactions   ? Lisinopril Cough   ? Metoprolol Other (See Comments)     fatigue         Lab Results    Chemistry/lipid CBC Cardiac Enzymes/BNP/TSH/INR   Lab Results   Component Value Date    CHOL 96 11/05/2020    HDL 38 (L) 11/05/2020    LDLCALC 35 11/05/2020    TRIG 117 11/05/2020    CREATININE 0.84 02/18/2021    BUN 20 02/18/2021    K 4.4 02/18/2021     02/18/2021     02/18/2021    CO2 29 02/18/2021    Lab Results   Component Value Date    WBC 6.5 12/03/2020    HGB 14.0 12/03/2020    HCT 41.0 12/03/2020    MCV 92 12/03/2020     12/03/2020    Lab Results   Component Value Date     (H) 12/03/2020    TSH 1.08 11/05/2020    INR 2.20 (H) 04/30/2021

## 2021-06-30 NOTE — PROGRESS NOTES
Progress Notes by Cameron Soria MD at 12/3/2020  2:00 PM     Author: Cameron Soria MD Service: -- Author Type: Physician    Filed: 12/3/2020  2:46 PM Encounter Date: 12/3/2020 Status: Signed    : Cameron Soria MD (Physician)         Thank you, Dr. Flores, for asking the [facility] team to see Ms. Caty Ng to evaluate shortness of breath with activity and atrial fibrillation..      Assessment/Recommendations   Patient is a 75-year-old with obstructive sleep apnea which is untreated as well as atrial fibrillation which is now permanent.  She has been evaluated and treated at the [facility] for her atrial fibrillation and also was noted to have moderate mitral insufficiency and borderline to mildly decreased left ventricular ejection fraction.  Her breathing is significantly worse at this time.  She gets short of breath with minimal activities.    I would like to check a B natruretic peptide today as well as a CBC as I do not see hemoglobin for some time.  I would like to recheck an echocardiogram as it has been over 1 year to evaluate her LV systolic function as well as the degree of mitral insufficiency.  I would like to get a Holter monitor to evaluate her ventricular response atrial fibrillation throughout a 24-hour timeframe.    She is agreeable to these tests and pending these results we will make further recommendations.  Her breathing is quite problematic and further evaluation will be necessary if these above tests are unremarkable.    Thank you for allowing us to participate in her care.       History of Present Illness/Subjective    Ms. Caty Ng is a 75 y.o. female with known history of atrial fibrillation which was attempted to be controlled but she reverted to atrial fibrillation it was decided to do a rate control strategy.  She was on amiodarone for some time, she believes about 3 years.  She followed with a cardiologist at the [place]  [place] and recently moved to [place] and is transferring her care.  She also has obstructive sleep apnea but cannot tolerate any mask so it goes untreated.    She did have pulmonary function test done several years back when she was on amiodarone I presume.  She had a mild restrictive pattern but no dramatic changes.    She reports shortness of breath with minimal activity and just walking about 30 feet from the waiting room into the exam room caused her to be significantly dyspneic.  She denies orthopnea or paroxysmal nocturnal dyspnea.  She does not have much in the way of peripheral edema.  She feels occasional palpitations but does does not have chest discomfort.  She has not had syncopal or near syncopal episodes.  She does have pain particular in her right leg and she wonders if amiodarone contributed to this.    She is not diabetic, does have a family history with her mother having bypass surgery in her 50s.  She does not take any medication for her cholesterol.  She quit smoking many years ago.    She grew up in [place].  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  She enjoyed her work but does not miss it.  She is  in between she and her  have 6 children.           Physical Examination Review of Systems   Vitals:    12/03/20 1355   BP: 118/70   Pulse: 84   Resp: 24     Body mass index is 44.64 kg/m .  Wt Readings from Last 3 Encounters:   12/03/20 (!) 252 lb (114.3 kg)   11/05/20 (!) 252 lb (114.3 kg)     General Appearance:   Alert, cooperative and in no acute distress.   ENT/Mouth:  Patient wearing a mask.      EYES:  no scleral icterus, normal conjunctivae   Neck: JVP normal. No Hepatojugular reflux. Thyroid not visualized.   Chest/Lungs:   Lungs are clear to auscultation, equal chest wall expansion.   Cardiovascular:   S1, S2 with 1/6 systolic murmur no clicks or rubs.  Heart sounds are distant brachial, radial  pulses are intact  and symetric. No carotid bruits noted   Abdomen:  Nontender. BS+.   Extremities: No cyanosis, clubbing and mild ankle edema   Skin: no xanthelasma, warm.    Neurologic: normal arm movement bilateral, no tremors     Psychiatric: Appropriate affect.      General: Weight Gain  Eyes: WNL  Ears/Nose/Throat: WNL  Lungs: Cough  Heart: Shortness of Breath with activity, Irregular Heartbeat  Stomach: Constipation  Bladder: Frequent Urination at Night  Muscle/Joints: Joint Pain, Muscle Weakness, Muscle Pain  Skin: WNL  Nervous System: Daytime Sleepiness, Dizziness, Loss of Balance  Mental Health: WNL     Blood: WNL       Medical History  Surgical History Family History Social History   Past Medical History:   Diagnosis Date   ? Atrial fibrillation, rapid (H) 2012    Followed by Dr. Cobos, cardiology   ? DDD (degenerative disc disease), lumbar 2020   ? Heart failure, systolic, chronic (H) 3/6/2014    Followed by Dr. Cobos, cardiology EF 45 - 50%   ? Morbid obesity with BMI of 40.0-44.9, adult (H) 2015    No past surgical history on file.   Mother had bypass surgery in her 50s Social History     Socioeconomic History   ? Marital status:      Spouse name: Not on file   ? Number of children: Not on file   ? Years of education: Not on file   ? Highest education level: Not on file   Occupational History   ? Not on file   Social Needs   ? Financial resource strain: Not on file   ? Food insecurity     Worry: Not on file     Inability: Not on file   ? Transportation needs     Medical: Not on file     Non-medical: Not on file   Tobacco Use   ? Smoking status: Former Smoker     Quit date: 1982     Years since quittin.9   ? Smokeless tobacco: Never Used   Substance and Sexual Activity   ? Alcohol use: Not on file   ? Drug use: Not on file   ? Sexual activity: Not on file   Lifestyle   ? Physical activity     Days per week: Not on file     Minutes per session: Not on file   ? Stress: Not on file    Relationships   ? Social connections     Talks on phone: Not on file     Gets together: Not on file     Attends Voodoo service: Not on file     Active member of club or organization: Not on file     Attends meetings of clubs or organizations: Not on file     Relationship status: Not on file   ? Intimate partner violence     Fear of current or ex partner: Not on file     Emotionally abused: Not on file     Physically abused: Not on file     Forced sexual activity: Not on file   Other Topics Concern   ? Not on file   Social History Narrative   ? Not on file          Medications  Allergies   Current Outpatient Medications   Medication Sig Dispense Refill   ? albuterol (PROAIR HFA;PROVENTIL HFA;VENTOLIN HFA) 90 mcg/actuation inhaler Inhale 2 puffs.     ? amoxicillin (AMOXIL) 500 MG capsule TAKE 4 CAPSULES BY MOUTH 1 HOUR BEFORE DENTAL APPOINTMENT     ? atorvastatin (LIPITOR) 10 MG tablet Take 1 tablet (10 mg total) by mouth daily. 90 tablet 1   ? carvediloL (COREG) 25 MG tablet Take 0.5 tablets (12.5 mg total) by mouth 2 (two) times a day with meals. 180 tablet 0   ? diltiazem (CARDIZEM CD) 240 MG 24 hr capsule      ? levothyroxine (SYNTHROID, LEVOTHROID) 75 MCG tablet TAKE 1 TABLET DAILY     ? losartan (COZAAR) 25 MG tablet Take 25 mg by mouth.     ? torsemide (DEMADEX) 20 MG tablet TAKE 2 TABLETS EVERY DAY     ? warfarin ANTICOAGULANT (COUMADIN/JANTOVEN) 2 MG tablet TAKE 1 AND 1/2 TABLETS EVERY Thursday and FRIDAY AND 1 TABLET  ALL OTHER DAYS OR AS       DIRECTED BY INR NURSES       No current facility-administered medications for this visit.     Allergies   Allergen Reactions   ? Lisinopril Cough   ? Metoprolol Other (See Comments)     fatigue         Lab Results    Chemistry/lipid CBC Cardiac Enzymes/BNP/TSH/INR   Lab Results   Component Value Date    CHOL 96 11/05/2020    HDL 38 (L) 11/05/2020    LDLCALC 35 11/05/2020    TRIG 117 11/05/2020    CREATININE 0.78 11/05/2020    BUN 21 11/05/2020    K 4.1 11/05/2020      11/05/2020     11/05/2020    CO2 27 11/05/2020    No results found for: WBC, HGB, HCT, MCV, PLT Lab Results   Component Value Date    TSH 1.08 11/05/2020    INR 2.20 (H) 12/03/2020

## 2021-06-30 NOTE — PROGRESS NOTES
Progress Notes by Cameron Soria MD at 2/18/2021 11:20 AM     Author: Cameron Soria MD Service: -- Author Type: Physician    Filed: 2/18/2021 12:10 PM Encounter Date: 2/18/2021 Status: Signed    : Cameron Soria MD (Physician)               Assessment/Recommendations   Patient with known permanent atrial fibrillation, under a rate control program but also has reduced left ventricular systolic function.  Her breathing is improved on increased dose of diuretic and will check a basic metabolic panel today to make sure her renal function and potassium have not suffered.    Because of previous coronary calcifications and reduced left ventricular systolic function I think it would be prudent to get a better evaluation of her coronary anatomy and we will do a CT angiogram.  She is agreeable.    Her fleeting type chest discomforts are atypical and I do not believe anginal in nature.       History of Present Illness/Subjective    Ms. Caty Ng is a 76 y.o. female with known atrial fibrillation which is now permanent and good rate control on Holter monitor.  She had a pharmacologic nuclear study which showed mild reduction in left ventricular systolic function but no ischemia or infarction.  She had an echocardiogram which showed mild to moderate mitral insufficiency and again reduction in left ventricular systolic function.    Her breathing is better after we increased her torsemide.  She still gets short of breath with physical activity but has other limitations in regard to physical activity.  She denies orthopnea or paroxysmal nocturnal dyspnea but does get some peripheral edema.  She has not had syncopal or near syncopal episodes.  She does get a fleeting discomfort which is a tightness in her left neck when she lays down at night and this occurs about once every 1 to 3 weeks.  She has another discomfort which is a thumping sensation in her neck, at the base of the neck and this is also fleeting  in nature and is unpredictable.           Physical Examination Review of Systems   Vitals:    02/18/21 1119   BP: 130/76   Pulse: 100   Resp: 20     There is no height or weight on file to calculate BMI.  Wt Readings from Last 3 Encounters:   01/28/21 (!) 252 lb (114.3 kg)   12/03/20 (!) 252 lb (114.3 kg)   11/05/20 (!) 252 lb (114.3 kg)     General Appearance:   Alert, cooperative and in no acute distress.   ENT/Mouth: Patient wearing a mask.      EYES:  no scleral icterus, normal conjunctivae   Neck: JVP normal. No Hepatojugular reflux. Thyroid not visualized.   Chest/Lungs:   Lungs are clear to auscultation, equal chest wall expansion.   Cardiovascular:   S1, S2 without murmur ,clicks or rubs. Brachial, radial  pulses are intact and symetric. No carotid bruits noted   Abdomen:  Nontender. BS+. No bruits.   Extremities: No cyanosis, clubbing or edema   Skin: no xanthelasma, warm.    Neurologic: normal arm movement bilateral, no tremors     Psychiatric: Appropriate affect.      General: Weight Gain  Eyes: WNL  Ears/Nose/Throat: WNL  Lungs: WNL  Heart: Shortness of Breath with activity, Irregular Heartbeat  Stomach: WNL  Bladder: WNL  Muscle/Joints: Muscle Weakness, Muscle Pain  Skin: WNL  Nervous System: WNL  Mental Health: WNL     Blood: WNL       Medical History  Surgical History Family History Social History   Past Medical History:   Diagnosis Date   ? Atrial fibrillation, rapid (H) 6/21/2012    Followed by Dr. Cobos, cardiology   ? DDD (degenerative disc disease), lumbar 11/23/2020   ? Heart failure, systolic, chronic (H) 3/6/2014    Followed by Dr. Cobos, cardiology EF 45 - 50%   ? Morbid obesity with BMI of 40.0-44.9, adult (H) 4/25/2015    No past surgical history on file. No family history on file. Social History     Socioeconomic History   ? Marital status:      Spouse name: Not on file   ? Number of children: Not on file   ? Years of education: Not on file   ? Highest education level: Not  on file   Occupational History   ? Not on file   Social Needs   ? Financial resource strain: Not on file   ? Food insecurity     Worry: Not on file     Inability: Not on file   ? Transportation needs     Medical: Not on file     Non-medical: Not on file   Tobacco Use   ? Smoking status: Former Smoker     Quit date: 1982     Years since quittin.1   ? Smokeless tobacco: Never Used   Substance and Sexual Activity   ? Alcohol use: Not on file   ? Drug use: Not on file   ? Sexual activity: Not on file   Lifestyle   ? Physical activity     Days per week: Not on file     Minutes per session: Not on file   ? Stress: Not on file   Relationships   ? Social connections     Talks on phone: Not on file     Gets together: Not on file     Attends Hoahaoism service: Not on file     Active member of club or organization: Not on file     Attends meetings of clubs or organizations: Not on file     Relationship status: Not on file   ? Intimate partner violence     Fear of current or ex partner: Not on file     Emotionally abused: Not on file     Physically abused: Not on file     Forced sexual activity: Not on file   Other Topics Concern   ? Not on file   Social History Narrative   ? Not on file          Medications  Allergies   Current Outpatient Medications   Medication Sig Dispense Refill   ? albuterol (PROAIR HFA;PROVENTIL HFA;VENTOLIN HFA) 90 mcg/actuation inhaler Inhale 2 puffs.     ? amoxicillin (AMOXIL) 500 MG capsule TAKE 4 CAPSULES BY MOUTH 1 HOUR BEFORE DENTAL APPOINTMENT     ? atorvastatin (LIPITOR) 10 MG tablet Take 1 tablet (10 mg total) by mouth daily. 90 tablet 3   ? carvediloL (COREG) 25 MG tablet Take 0.5 tablets (12.5 mg total) by mouth 2 (two) times a day with meals. 180 tablet 0   ? diltiazem (CARDIZEM CD) 240 MG 24 hr capsule      ? levothyroxine (SYNTHROID, LEVOTHROID) 75 MCG tablet TAKE 1 TABLET DAILY     ? losartan (COZAAR) 25 MG tablet Take 25 mg by mouth.     ? torsemide (DEMADEX) 20 MG tablet TAKE 2  TABLETS EVERY DAY     ? warfarin ANTICOAGULANT (COUMADIN/JANTOVEN) 2 MG tablet TAKE 1 AND 1/2 TABLETS EVERY Thursday and FRIDAY AND 1 TABLET  ALL OTHER DAYS OR AS       DIRECTED BY INR NURSES       No current facility-administered medications for this visit.     Allergies   Allergen Reactions   ? Lisinopril Cough   ? Metoprolol Other (See Comments)     fatigue         Lab Results    Chemistry/lipid CBC Cardiac Enzymes/BNP/TSH/INR   Lab Results   Component Value Date    CHOL 96 11/05/2020    HDL 38 (L) 11/05/2020    LDLCALC 35 11/05/2020    TRIG 117 11/05/2020    CREATININE 0.83 12/11/2020    BUN 22 12/11/2020    K 4.3 12/11/2020     12/11/2020    CL 99 12/11/2020    CO2 29 12/11/2020    Lab Results   Component Value Date    WBC 6.5 12/03/2020    HGB 14.0 12/03/2020    HCT 41.0 12/03/2020    MCV 92 12/03/2020     12/03/2020    Lab Results   Component Value Date     (H) 12/03/2020    TSH 1.08 11/05/2020    INR 2.70 (H) 01/22/2021

## 2021-07-01 ENCOUNTER — TELEPHONE (OUTPATIENT)
Dept: CARDIOLOGY | Facility: CLINIC | Age: 76
End: 2021-07-01

## 2021-07-01 NOTE — TELEPHONE ENCOUNTER
[facility] Call Center    Phone Message    May a detailed message be left on voicemail: no     Reason for Call: Other: Abi called to advise she has moved to [place] and has transferred care to a cardiologist at [facility]. Please update records.      Action Taken: Other: [facility]    Travel Screening: Not Applicable

## 2021-07-02 ENCOUNTER — COMMUNICATION - HEALTHEAST (OUTPATIENT)
Dept: ANTICOAGULATION | Facility: CLINIC | Age: 76
End: 2021-07-02

## 2021-07-02 ENCOUNTER — AMBULATORY - HEALTHEAST (OUTPATIENT)
Dept: LAB | Facility: CLINIC | Age: 76
End: 2021-07-02

## 2021-07-02 DIAGNOSIS — I48.21 PERMANENT ATRIAL FIBRILLATION (H): ICD-10-CM

## 2021-07-02 LAB — INR PPP: 2.6 (ref 0.9–1.1)

## 2021-07-04 ENCOUNTER — COMMUNICATION - HEALTHEAST (OUTPATIENT)
Dept: FAMILY MEDICINE | Facility: CLINIC | Age: 76
End: 2021-07-04

## 2021-07-04 DIAGNOSIS — Z79.01 LONG-TERM (CURRENT) USE OF ANTICOAGULANTS, INR GOAL 2.0-3.0: ICD-10-CM

## 2021-07-04 DIAGNOSIS — I25.10 CALCIFICATION OF CORONARY ARTERY: ICD-10-CM

## 2021-07-04 NOTE — TELEPHONE ENCOUNTER
Telephone Encounter by Vicky Lloyd RN at 6/30/2021 11:38 AM     Author: Vicky Lloyd RN Service: -- Author Type: Registered Nurse    Filed: 6/30/2021 11:40 AM Encounter Date: 6/11/2021 Status: Signed    : Vicky Lloyd RN (Registered Nurse)       Hai Murphy MD  You 7 days ago     Dayami Perry on [employer] EES stays on for 3 mos.   I don't know that allergy risk would go up after that.     Thx!     Hai    Message text

## 2021-07-04 NOTE — TELEPHONE ENCOUNTER
Telephone Encounter by Vicky Lloyd RN at 6/30/2021 10:58 AM     Author: Vicky Lloyd RN Service: -- Author Type: Registered Nurse    Filed: 6/30/2021 11:38 AM Encounter Date: 6/30/2021 Status: Signed    : Vicky Lloyd RN (Registered Nurse)       Phone call to patient regarding information below. She has upcoming cortisone injection 7/7/21 to help with leg and hip pain. She reports no improvement in her symptoms and would like to proceed with angiogram with possible PCI. She verbalized that she understands that stents cannot be removed once placed. She is hopeful that intervention can be done at time of angiogram but understands that there is the possibility she may need to return for intervention at a later date. She understands that post-procedure she will need bedrest and will have to lay still, as she currently has pain in one of her hips/legs. She has questions regarding medications pre-procedure, particularly warfarin. Informed patient medication education would be provided pre-procedure.     Informed patient Dr. Murphy would be notified of her request to proceed with angio poss PCI. We will contact her to schedule procedure and COVID test pre-procedure. Informed patient she will need H&P within 30 days of procedure, verbalized understanding. Encouraged patient to call with further questions and concerns. No further questions at this time. [facility]

## 2021-07-04 NOTE — TELEPHONE ENCOUNTER
Telephone Encounter by Petra Carter RN at 7/4/2021  8:51 AM     Author: Petra Carter RN Service: -- Author Type: Registered Nurse    Filed: 7/4/2021  8:54 AM Encounter Date: 7/3/2021 Status: Signed    : Petra Carter RN (Registered Nurse)       Refill Approved    Rx renewed per Medication Renewal Policy. Medication was last renewed on 1/19/21, last OV 5/6/21.    Petra Carter, [facility] Triage/Med Refill 7/4/2021     Requested Prescriptions   Pending Prescriptions Disp Refills   ? atorvastatin (LIPITOR) 10 MG tablet [Pharmacy Med Name: ATORVASTATIN 10 MG TABLET] 90 tablet 1     Sig: TAKE 1 TABLET BY MOUTH EVERY DAY       Statins Refill Protocol (Hmg CoA Reductase Inhibitors) Passed - 7/3/2021  8:54 AM        Passed - PCP or prescribing provider visit in past 12 months      Last office visit with prescriber/PCP: 5/6/2021 Keke Flores MD OR same dept: 5/6/2021 Keke Flores MD OR same specialty: 5/6/2021 Keke Flores MD  Last physical: 11/5/2020 Last MTM visit: Visit date not found   Next visit within 3 mo: Visit date not found  Next physical within 3 mo: Visit date not found  Prescriber OR PCP: Keke Flores MD  Last diagnosis associated with med order: 1. Calcification of coronary artery  - atorvastatin (LIPITOR) 10 MG tablet [Pharmacy Med Name: ATORVASTATIN 10 MG TABLET]; TAKE 1 TABLET BY MOUTH EVERY DAY  Dispense: 90 tablet; Refill: 1    If protocol passes may refill for 12 months if within 3 months of last provider visit (or a total of 15 months).

## 2021-07-04 NOTE — TELEPHONE ENCOUNTER
Telephone Encounter by Vicky Lloyd RN at 6/30/2021 12:04 PM     Author: Vicky Lloyd RN Service: -- Author Type: Registered Nurse    Filed: 6/30/2021 12:06 PM Encounter Date: 6/30/2021 Status: Signed    : Vicky Lloyd RN (Registered Nurse)       ----- Message -----  From: Hai Murphy MD  Sent: 6/30/2021  11:52 AM CDT  To: Vicky Lloyd RN    Sounds reasonable.    Thank you!    Hai  ----- Message -----  From: Vicky Lloyd RN  Sent: 6/30/2021  11:38 AM CDT  To: Hai Murphy MD    ----- Message from Vicky Lloyd RN sent at 6/30/2021 11:38 AM CDT -----  Dr. Murphy, patient now interested in proceeding with angio poss PCI with you. On warfarin for Afib. Ok to proceed? Ok to hold warfarin 4 full days before procedure? Thanks!    Vicky LONDONO RN

## 2021-07-04 NOTE — TELEPHONE ENCOUNTER
Telephone Encounter by Vicky Lloyd RN at 6/30/2021 12:04 PM     Author: Vicky Lloyd RN Service: -- Author Type: Registered Nurse    Filed: 6/30/2021 12:06 PM Encounter Date: 6/30/2021 Status: Signed    : Vicky Lloyd RN (Registered Nurse)       Case request for coronary angiogram with poss PCI submitted per verbal order in [facility] with Dr. Murphy. Per Dr. Murphy below, ok to hold warfarin for 4 full days pre-procedure. [facility]

## 2021-07-04 NOTE — PROGRESS NOTES
Progress Notes by Caty Alvarado CNP at 6/8/2021 12:30 PM     Author: Caty Alvarado CNP Service: -- Author Type: Nurse Practitioner    Filed: 6/9/2021  1:55 PM Encounter Date: 6/8/2021 Status: Addendum    : Caty Alvarado CNP (Nurse Practitioner)    Related Notes: Original Note by Caty Alvarado CNP (Nurse Practitioner) filed at 6/8/2021  1:25 PM           Garmin Study     Physical Examination  For abnormal findings, please evaluate if the finding is Clinically Significant (by 'CS') or Not Clinically Significant (by 'NCS')  General Appearance  Normal  Head and Neck   Normal  Eyes     Normal  Ears, Nose, Mouth, Throat  Normal  Lungs     Normal  Cardiovascular   Abnormal- Irregular heart tones NCS  Abdomen    Normal  Musculoskeletal/Extremities Walks with a can and needed wheelchair NCS assistance    Lymph Nodes   Normal  Skin     Normal    NP noted O2 saturation at 92% rechecked it 93% prior noted 95%.  Patient states she is not short of breath.  Patient states needs to schedule a follow up with cardiology and reinforce this as well.     Neurological    Normal   Tremor absent     If present, document.     Caty Alvarado CNP

## 2021-07-04 NOTE — PROGRESS NOTES
Progress Notes by Wendy Merino RN at 6/4/2021 11:12 AM     Author: Wendy Merino RN Service: -- Author Type: Registered Nurse    Filed: 6/4/2021 11:14 AM Encounter Date: 6/4/2021 Status: Attested    : Wendy Merino RN (Registered Nurse) Cosigner: Keke Flores MD at 6/6/2021 11:20 AM    Attestation signed by Keke Flores MD at 6/6/2021 11:20 AM    OK                Anticoagulation Annual Referral Renewal Review    Caty Ng's chart reviewed for annual renewal of referral to anticoagulation monitoring.        Criteria for anticoagulation nurse and/or pharmacist renewal met   Warfarin indication: Atrial Fibrillation Yes, per indication   Current with INR monitoring/compliant Yes Yes   Date of last office visit 5/6/2021 Yes, had office visit within last year   Time in Therapeutic Range (TTR) 78 % Yes, TTR > 60%       Caty Ng met all criteria for anticoagulation management program initiated renewal.  New INR standing orders and anticoagulation referral renewal placed.      Wendy Merino RN  11:12 AM

## 2021-07-04 NOTE — TELEPHONE ENCOUNTER
Telephone Encounter by Nathalie Glaser RN at 7/2/2021  4:48 PM     Author: Nathalie Glaser RN Service: -- Author Type: Registered Nurse    Filed: 7/2/2021  5:36 PM Encounter Date: 7/2/2021 Status: Signed    : Nathalie Glaser RN (Registered Nurse)       ANTICOAGULATION MANAGEMENT     Caty Ng 76 y.o., female is on warfarin with Therapeutic INR result (goal range 2.0-3.0)    Recent labs: (last 7 days)     07/02/21  1303   INR 2.60*       ASSESSMENT     Source: Chart Review      Warfarin dosing taken: Warfarin taken as instructed    Diet: No new diet changes affecting INR    Illness, Injury or hospitalization: No    Medication changes: None    Signs or symptoms of bleeding or clotting: No    Previous INR: therapeutic last 2(+) visits    Additional findings: angiogram 7/21, has preop Monday 7/5. looks like cardiology has okayd 4 day warfarin hold (6/30)     PLAN     Recommended plan for no diet, medication or health factor changes affecting INR:     Dosing instructions: Continue your current warfarin dose 2 mg daily pending pre op instruction for angiogram     Follow up no later than: 2 weeks, we will review plan for surgery     Detailed voice message left for Abi with dosing instructions and follow up date.     Left detailed message with recommended recheck date      Plan made per [facility] anticoagulation protocol    Nathalie Glaser  Anticoagulation Clinic   [phone]    Anticoagulation Episode Summary     Current INR goal:  2.0-3.0   TTR:  82.7 % (7.6 mo)   Next INR check:  7/16/2021   INR from last check:  2.60 (7/2/2021)   Weekly max warfarin dose:     Target end date:     INR check location:     Preferred lab:     Send INR reminders to:  BREE ANDREW    Indications    Atrial fibrillation  rapid (H) (Resolved) [I48.91]           Comments:           Anticoagulation Care Providers     Provider Role Specialty Phone number    Keke Flores MD Referring Family Medicine [phone]

## 2021-07-04 NOTE — TELEPHONE ENCOUNTER
Telephone Encounter by Vicky Lloyd RN at 6/14/2021 10:15 AM     Author: Vicky Lloyd RN Service: -- Author Type: Registered Nurse    Filed: 6/14/2021 12:18 PM Encounter Date: 6/11/2021 Status: Signed    : Vicky Lloyd RN (Registered Nurse)       Hai Murphy MD  to Me          8:08 AM  Vicky,     That is not impossible but certainly very difficult.   Specifics could be addressed by CTS surgeons better than me.     Thank you!   Hai   June 11, 2021      Me  to Hai Murphy MD          2:21 PM  Dr. Murphy, patient curious if stent removal via bypass surgery is a possibility, should she ever develop a reaction to stents placed. She is asking what a possible reaction to a stent would manifest as. Any advice? Thanks!     Vicky LONDONO RN

## 2021-07-04 NOTE — TELEPHONE ENCOUNTER
Telephone Encounter by Vicky Lloyd RN at 6/30/2021 11:39 AM     Author: Vicky Lloyd RN Service: -- Author Type: Registered Nurse    Filed: 6/30/2021 11:40 AM Encounter Date: 6/11/2021 Status: Signed    : Vicky Lloyd RN (Registered Nurse)       Call to patient per request. Information below provided to patient. She is interested in proceeding with angio poss PCI at this time. Verbalized she understands stents cannot be removed once placed. Patient verbalized understanding. No further questions at this time. [facility]

## 2021-07-04 NOTE — PROGRESS NOTES
"Progress Notes by Kecia Avelar RN at 6/8/2021 12:30 PM     Author: Kecia Avelar RN Service: -- Author Type: Registered Nurse    Filed: 6/8/2021  1:25 PM Encounter Date: 6/8/2021 Status: Signed    : Kecia Avelar RN (Registered Nurse)         Garmin Screening Note     Purpose: Electrocardiogram Clinical Validation Study       Caty Ng a 76 y.o. female , was seen at [facility] today to discuss participation in the Garmin study.   The consent form was reviewed on 06/08/2021.     The review of the study included:    Study purpose     Conflict of interest    Device description      Study visit(s)    Risks of participation    Benefits (if any)    Alternatives    Voluntary participation    Confidentiality     Compensation/costs of participation    Study stipends    Injury and legal rights    The subject was provided time to review the consent form and consider participation. her questions were answered to her satisfaction.   The patient has voluntarily agreed to participate in the above noted study.     The consent form and HIPPA was IRB approved on 23APR21 and was signed 06/08/21 at 1200.     The subject was provided with a copy of the consent form and HIPAA. A copy of the signed forms was forwarded to medical records.    No study procedures were done prior to Caty Ng providing informed consent.       Note time seated: 1150     Vitals  (TPBP)     Vitals:    06/08/21 1205   BP: 105/52   Patient Site: Left Arm   Patient Position: Sitting   Cuff Size: Adult Large   Pulse: 68   Resp: 13   Temp: 98.5  F (36.9  C)   TempSrc: Oral   SpO2: 92%   Weight: (!) 246 lb (111.6 kg)   Height: 5' 5\" (1.651 m)       Body mass index is 40.94 kg/m .  1945  76 y.o.    Past Medical History:   Diagnosis Date   ? Atrial fibrillation, rapid (H) 6/21/2012    Followed by Dr. Cobos, cardiology   ? DDD (degenerative disc disease), lumbar 11/23/2020   ? Heart failure, systolic, chronic (H) " 3/6/2014    Followed by Dr. Cobos, cardiology EF 45 - 50%   ? Morbid obesity with BMI of 40.0-44.9, adult (H) 4/25/2015        Current Outpatient Medications:   ?  amoxicillin (AMOXIL) 500 MG capsule, TAKE 4 CAPSULES BY MOUTH 1 HOUR BEFORE DENTAL APPOINTMENT, Disp: , Rfl:   ?  atorvastatin (LIPITOR) 10 MG tablet, Take 1 tablet (10 mg total) by mouth daily., Disp: 90 tablet, Rfl: 3  ?  carvediloL (COREG) 25 MG tablet, Take 0.5 tablets (12.5 mg total) by mouth 2 (two) times a day with meals., Disp: 90 tablet, Rfl: 1  ?  diltiazem (CARDIZEM CD) 240 MG 24 hr capsule, Take by mouth daily. , Disp: , Rfl:   ?  levothyroxine (SYNTHROID, LEVOTHROID) 75 MCG tablet, TAKE 1 TABLET DAILY, Disp: 90 tablet, Rfl: 2  ?  losartan (COZAAR) 25 MG tablet, Take 25 mg by mouth., Disp: , Rfl:   ?  methylPREDNISolone (MEDROL DOSEPACK) 4 mg tablet, Follow package directions, Disp: 21 tablet, Rfl: 0  ?  torsemide (DEMADEX) 20 MG tablet, TAKE 3 TABLETS(60 MG TOTAL)DAILY, Disp: 90 tablet, Rfl: 1  ?  warfarin ANTICOAGULANT (COUMADIN/JANTOVEN) 2 MG tablet, TAKE 1 AND 1/2 TABLETS EVERY Thursday and FRIDAY AND 1 TABLET  ALL OTHER DAYS OR AS       DIRECTED BY INR NURSES, Disp: , Rfl:   Allergies   Allergen Reactions   ? Lisinopril Cough   ? Metoprolol Other (See Comments)     fatigue   ? Trace Metals        Women of Child Bearing Potential:   []Pregnant  []Not Pregnant  [x]N/A    General Questions:     Yes No   or ?     [] [x]    Race: [] or     []   []Black or     [] or     [] or Other     [x]White     Gender:[x]Female      []Male     Lifestyle Habits?     Smoking/ Tobacco History   [x]Do not smoke or use tobacco products   []Smoke or use tobacco products 0-5 times per week  []Smoke or use tobacco products 6-12 times per week  []Smoke or use tobacco products 13 or greater times per week     Alcohol Use Average   [x]Do not consume  alcohol  []Consume 0-1 beverages per day   []Consume 2 beverages per day   []Consume 3 beverages per day   []Consume 4 or more beverages per day     Recreational Drug Use   [x]Have not used recreational drug(s) in the last year   []Used recreational drug(s) in the last week   []Used recreational drug(s) in the last month   []Used recreational drug(s) in the last year     Caffeine Intake:  []Consume ? 1 cups per week of drinks with caffeine   []Consume 2-3 cups per week of drinks with caffeine   []Consume 5-6 cups per week of drinks with caffeine   []Consumes 1 cup per day of drinks with caffeine   [x]Consumes 2-3 cups per days of drinks with caffeine   []Consumes 4 or more cups per day of drinks with caffeine     Exercise Habits:   [x]Exercise 0-29 minutes per week   []Exercise 30-59 minutes per week   []Exercise 1-2 hours per week  []Exercise 2-4 hours per week   []Exercise more than 4 hours per week     Wrist Hairness:  [x]No hair on wrist   []Sparse hair on wrist   []Entire wrist covered with light growth   []Extensive hair growth on wrist   []Not measured       Preferred Wrist to wear DCD on: [x]  left   []  right  Wrist circumference:      181   mm  Device wearing wrist skin fold thickness:       12.2  mm    Device Information:  Device Unique identifier ([id number]):   Number of practice trials? 1  Date ECG Obtained? 06/08/2021    30-second samples must be taken simultaneously. 1 minute rest between samples.    Samples taken simultaneously?  Yes[x] No[]    Sample 1: 30-second ECG Recording from   DCD, sample provided to intrinsic  Yes[x] No[]     Sample 1: 30-second ECG recording from  12-Lead ECG, sample provided to intrinsic Yes[x] No[]    30-second samples must be taken simultaneously. 1 minute rest between samples.    Samples taken simultaneously?  Yes[x] No[]    Sample 2: 30-second ECG Recording from   DCD, sample provided to intrinsic  Yes[x] No[]     Sample 2: 30-second ECG recording from  12-Lead ECG,  sample provided to intrinsic Yes[x] No[]    30-second samples must be taken simultaneously. 1 minute rest between samples.    Samples taken simultaneously?  Yes[x] No[]    Sample 3: 30-second ECG Recording from   DCD, sample provided to intrinsic  Yes[x] No[]     Sample 3: 30-second ECG recording from  12-Lead ECG, sample provided to intrinsic Yes[x] No[]      Subject has now completed their participation in the Garmin study.     Kecia Avelar RN

## 2021-07-04 NOTE — TELEPHONE ENCOUNTER
Telephone Encounter by Vicky Lloyd RN at 6/30/2021 10:56 AM     Author: Vicky Lloyd RN Service: -- Author Type: Registered Nurse    Filed: 6/30/2021 10:56 AM Encounter Date: 6/30/2021 Status: Signed    : Vicky Lloyd RN (Registered Nurse)       ----- Message from SARATH Lloyd sent at 6/30/2021 10:46 AM CDT -----  Regarding: MY PATIENT  General phone call:    Caller: PATIENT    Primary cardiologist: MY    Detailed reason for call: PATIENT WOULD LIKE YOU TO CALL HER RE: ANGIOGRAM    Best phone number: [phone]    Best time to contact: ANY    Ok to leave a detailed message? YES    Device? NO    Additional Info:

## 2021-07-05 ENCOUNTER — COMMUNICATION - HEALTHEAST (OUTPATIENT)
Dept: FAMILY MEDICINE | Facility: CLINIC | Age: 76
End: 2021-07-05

## 2021-07-05 NOTE — TELEPHONE ENCOUNTER
Telephone Encounter by Kathy Newell at 7/5/2021  1:17 PM     Author: Kathy Newell Service: -- Author Type: Patient Access    Filed: 7/5/2021  1:19 PM Encounter Date: 7/5/2021 Status: Signed    : Kathy Newell (Patient Access)       FYI - Status Update  Who is Calling: Patient  Update: Has questions regarding INR dosing, and when to schedule next INR. Requests call back from [facility]. Will be leaving home soon for her PreOp so requests call back after 3:30 pm  Okay to leave a detailed message?:  No - as she would like to speak with you.

## 2021-07-05 NOTE — TELEPHONE ENCOUNTER
Telephone Encounter by Nathalie Glaser RN at 7/5/2021  5:31 PM     Author: Nathalie Glaser RN Service: -- Author Type: Registered Nurse    Filed: 7/5/2021  5:33 PM Encounter Date: 7/5/2021 Status: Addendum    : Nathalie Glaser RN (Registered Nurse)    Related Notes: Original Note by Nathalie Glaser RN (Registered Nurse) filed at 7/5/2021  5:32 PM       Talked with Abi and scheduled inr for 7/15. Pre op indicates ok for 5 day hold without bridge for surgery 7/21, we will review on 7/15

## 2021-07-06 VITALS — HEIGHT: 64 IN | WEIGHT: 251 LBS | BODY MASS INDEX: 42.85 KG/M2

## 2021-07-06 RX ORDER — WARFARIN SODIUM 2 MG/1
TABLET ORAL
Qty: 105 TABLET | Refills: 0 | OUTPATIENT
Start: 2021-07-06

## 2021-07-06 NOTE — TELEPHONE ENCOUNTER
Past 14 days   Refused 2 weeks ago (6/22/2021):   OTHER (Pt is being followed by the [facility] Coumadin clinic)   [phone] [facility] - [place], [place] - [address] AT Portal to Registered [facility] Sites       warfarin ANTICOAGULANT (JANTOVEN ANTICOAGULANT) 2 MG tablet  Last Written Prescription Date:  3/16/2021  Last Fill Quantity: 105,   # refills: 0     Previously denied by [facility] and [facility]. Pharmacy notified previously 6/10/21  Per patient message: 7/1/2021:  Abi called to advise she has moved to [place] and has transferred care to a cardiologist at [facility]  Dr Cameron Soria    [facility] [address]    [address]    [place] [address] [phone]

## 2021-07-07 ENCOUNTER — COMMUNICATION - HEALTHEAST (OUTPATIENT)
Dept: ANTICOAGULATION | Facility: CLINIC | Age: 76
End: 2021-07-07

## 2021-07-07 ENCOUNTER — HOSPITAL ENCOUNTER (OUTPATIENT)
Dept: PHYSICAL MEDICINE AND REHAB | Facility: CLINIC | Age: 76
Discharge: HOME | End: 2021-07-07
Attending: PHYSICAL MEDICINE & REHABILITATION
Payer: MEDICARE

## 2021-07-07 DIAGNOSIS — Z79.01 ANTICOAGULATED ON WARFARIN: ICD-10-CM

## 2021-07-07 DIAGNOSIS — M54.16 LUMBAR RADICULOPATHY: ICD-10-CM

## 2021-07-07 LAB — POC INR - HE - HISTORICAL: 2.3 (ref 0.9–1.1)

## 2021-07-07 ASSESSMENT — MIFFLIN-ST. JEOR: SCORE: 1630.34

## 2021-07-07 NOTE — PATIENT INSTRUCTIONS - HE
Patient Instructions by Breyer, Nathan J, RN at 7/7/2021 11:00 AM     Author: Breyer, Nathan J, RN Service: -- Author Type: Registered Nurse    Filed: 7/7/2021 10:48 AM Date of Service: 7/7/2021 11:00 AM Status: Signed    : Breyer, Nathan J, RN (Registered Nurse)       Follow-up visit with Dr. Polo in 2 weeks to discuss injection outcome and determine care plan going forward.       DISCHARGE INSTRUCTIONS    During office hours (8:00 a.m.- 4:00 p.m.) questions or concerns may be answered  by calling [facility] Navigation Nurses at  [phone].  Messages received after hours will be returned the following business day.      In the case of an emergency, please dial 911 or seek assistance at the nearest Emergency Room/Urgent Care facility.     All Patients:    ? You may experience an increase in your symptoms for the first 2 days (It may take anywhere between 2 days- 2 weeks for the steroid to have maximum effect).    ? You may use ice on the injection site, as frequently as 20 minutes each hour if needed.    ? You may take your pain medicine.    ? You may continue taking your regular medication after your injection. If you have had a Medial Branch Block you may resume pain medication once your pain diary is completed.    ? You may shower. No swimming, tub bath or hot tub for 48 hours.  You may remove your bandaid/bandage as soon as you are home.    ? You may resume light activities, as tolerated.    ? Resume your usual diet as tolerated.    ? It is strongly advised that you do not drive for 1-3 hours post injection.    ? If you have had oral sedation:  Do not drive for 8 hours post injection.      ? If you have had IV sedation:  Do not drive for 24 hours post injection.  Do not operate hazardous machinery or make important personal/business decisions for 24 hours.      POSSIBLE STEROID SIDE EFFECTS (If steroid/cortisone was used for your procedure)    -If you experience these symptoms, it should only last  for a short period      Swelling of the legs                Skin redness (flushing)       Mouth (oral) irritation     Blood sugar (glucose) levels              Sweats                      Mood changes    Headache    Sleeplessness    Weakened immune system for up to 14 days, which could increase the risk of flores the COVID-19 virus and/or experiencing more severe symptoms of the disease, if exposed.    Decreased effectiveness of the flu vaccine if given within 2 weeks of the steroid.         POSSIBLE PROCEDURE SIDE EFFECTS  -Call the Spine Center if you are concerned    Increased Pain             Increased numbness/tingling        Nausea/Vomiting            Bruising/bleeding at site        Redness or swelling                                                Difficulty walking        Weakness             Fever greater than 100.5    *In the event of a severe headache after an epidural steroid injection that is relieved by lying down, please call the [facility] to speak with a clinical staff member*

## 2021-07-07 NOTE — TELEPHONE ENCOUNTER
Telephone Encounter by Nathalie Glaser RN at 7/7/2021 11:30 AM     Author: Nathalie Glaser RN Service: -- Author Type: Registered Nurse    Filed: 7/7/2021  1:31 PM Encounter Date: 7/7/2021 Status: Signed    : Nathalie Glaser RN (Registered Nurse)       ANTICOAGULATION MANAGEMENT     Caty Ng 76 y.o., female is on warfarin with Therapeutic INR result (goal range 2.0-3.0)    Recent labs: (last 7 days)     07/07/21  1110   INR 2.30*       ASSESSMENT     Source: Chart Review, Patient/Caregiver Call and Template      Warfarin dosing taken: Warfarin taken as instructed    Diet: No new diet changes affecting INR    Illness, Injury or hospitalization: spine clinic injection today    Medication changes: None    Signs or symptoms of bleeding or clotting: No    Previous INR: therapeutic last 2(+) visits    Additional findings: Upcoming surgery/procedure angiogram 7/21 instructions in pre op visit 7/5     PLAN     Recommended plan for temporary change(s) affecting INR:     Dosing instructions: Continue your current warfarin dose 2 mg daily until beginning warfarin hold for angiogram 7/16-20. Can resume 7/21 with surgeon's ok    Follow up no later than: 1 week after resumption  Telephone call with Abi who verbalizes understanding and agrees to plan    Lab visit scheduled    Plan made per [facility] anticoagulation protocol     Nathalie Glaser  Anticoagulation Clinic   [phone]    Anticoagulation Episode Summary     Current INR goal:  2.0-3.0   TTR:  83.1 % (7.8 mo)   Next INR check:  7/28/2021   INR from last check:  2.30 (7/7/2021)   Weekly max warfarin dose:     Target end date:     INR check location:     Preferred lab:     Send INR reminders to:  BREE ANDREW    Indications    Atrial fibrillation  rapid (H) (Resolved) [I48.91]           Comments:           Anticoagulation Care Providers     Provider Role Specialty Phone number    Keke Flores MD Referring Family Medicine [phone]

## 2021-07-07 NOTE — TELEPHONE ENCOUNTER
Telephone Encounter by Nicole Ronquillo at 7/7/2021  1:14 PM     Author: Nicole Ronquillo Service: -- Author Type: Patient Access    Filed: 7/7/2021  1:15 PM Encounter Date: 7/7/2021 Status: Signed    : Nicole Ronquillo (Patient Access)       Who is calling:  Patient   Reason for Call:  The patient is returning a josephine to [facility] regarding today's INR.  Okay to leave a detailed message: Yes

## 2021-07-10 VITALS — HEIGHT: 65 IN | BODY MASS INDEX: 41.48 KG/M2 | WEIGHT: 249 LBS

## 2021-07-12 DIAGNOSIS — I48.20 CHRONIC ATRIAL FIBRILLATION (H): ICD-10-CM

## 2021-07-12 RX ORDER — DILTIAZEM HYDROCHLORIDE 240 MG/1
CAPSULE, COATED, EXTENDED RELEASE ORAL
Qty: 90 CAPSULE | Refills: 1 | Status: SHIPPED | OUTPATIENT
Start: 2021-07-12 | End: 2021-07-16

## 2021-07-13 ENCOUNTER — TELEPHONE (OUTPATIENT)
Dept: ANTICOAGULATION | Facility: CLINIC | Age: 76
End: 2021-07-13

## 2021-07-13 DIAGNOSIS — I48.21 PERMANENT ATRIAL FIBRILLATION (H): Primary | ICD-10-CM

## 2021-07-13 DIAGNOSIS — I48.20 CHRONIC ATRIAL FIBRILLATION (H): ICD-10-CM

## 2021-07-13 DIAGNOSIS — Z79.01 LONG TERM (CURRENT) USE OF ANTICOAGULANTS: ICD-10-CM

## 2021-07-14 ENCOUNTER — PREP FOR PROCEDURE (OUTPATIENT)
Dept: CARDIOLOGY | Facility: CLINIC | Age: 76
End: 2021-07-14

## 2021-07-14 ENCOUNTER — TELEPHONE (OUTPATIENT)
Dept: CARDIOLOGY | Facility: CLINIC | Age: 76
End: 2021-07-14

## 2021-07-14 DIAGNOSIS — I25.10 CORONARY ARTERY DISEASE INVOLVING NATIVE CORONARY ARTERY OF NATIVE HEART WITHOUT ANGINA PECTORIS: Primary | ICD-10-CM

## 2021-07-14 DIAGNOSIS — R93.89 ABNORMAL COMPUTED TOMOGRAPHY ANGIOGRAPHY (CTA): ICD-10-CM

## 2021-07-14 DIAGNOSIS — R06.09 DYSPNEA ON EXERTION: ICD-10-CM

## 2021-07-14 RX ORDER — ASPIRIN 81 MG/1
325 TABLET ORAL DAILY
Status: CANCELLED | OUTPATIENT
Start: 2021-07-14 | End: 2021-07-16

## 2021-07-14 RX ORDER — FENTANYL CITRATE 50 UG/ML
25 INJECTION, SOLUTION INTRAMUSCULAR; INTRAVENOUS
Status: DISCONTINUED | OUTPATIENT
Start: 2021-07-14 | End: 2021-07-14 | Stop reason: HOSPADM

## 2021-07-14 RX ORDER — DIAZEPAM 5 MG
5 TABLET ORAL
Status: CANCELLED | OUTPATIENT
Start: 2021-07-21

## 2021-07-14 RX ORDER — LIDOCAINE 40 MG/G
CREAM TOPICAL
Status: CANCELLED | OUTPATIENT
Start: 2021-07-14

## 2021-07-14 RX ORDER — SODIUM CHLORIDE 9 MG/ML
INJECTION, SOLUTION INTRAVENOUS CONTINUOUS
Status: CANCELLED | OUTPATIENT
Start: 2021-07-21

## 2021-07-14 NOTE — TELEPHONE ENCOUNTER
Caty Ng  [address]  [facility] [address]  [phone] (home) none (work)    Procedure cardiologist:  Dr. Murphy  PCP:  Keke Flores  H&P completed by:  7/5/21 by Dr. Flores  Admit date 7/21/21  Arrival time:  6:30 am  Anticoagulation: Warfarin  CPAP: No  Previous PCI: Angiogram without PCI at [facility] 2012  Bypass Grafts: No  Renal Issues: No  Diabetic?: No  Device?: No      Patient Education/  Angiogram Teaching    Reason for Visit:  Telephone call to discuss pre-procedure education in preparation for: 7/14/21 Coronary Angiogram with possible Percutaneous Coronary Intervention    Procedure Prep:  Primary Cardiologist note dated: Dr. Soria 5/7/21  EKG results obtained, dated: AM of  Hemogram results obtained: AM of  Basic Metabolic Panel results obtained: AM of  Lipid Profile results obtained: 11/5/20  Pertinent cardiac test results: Echo 1/28/21, CTA Angiogram 3/16/21  COVID-19 test results obtained: 7/19/21 in [place]      Pre-procedure instructions  Patient instructed to be NPO after midnight.  Patient instructed to shower the evening before or the morning of the procedure.  Patient instructed to arrange for transportation home following procedure from a responsible family member of friend. No driving for at least 24 hours.  Patient instructed to have a responsible adult with them for 24 hours post-procedure.  Post-procedure follow up process.  Conscious sedation discussed.    Pre-procedure medication instructions  Patient instructed on antiplatelet medication.  Continue medications as scheduled, with a small amount of water on the day of the procedure unless indicated.  Patient instructed to take 325 mg of Aspirin am of procedure: NO, patient will be given in pre-procedure area.  Other medication: instructed to only take carvedilol and diltiazem a.m. of the procedure. HOLD torsemide AM of procedure. HOLD WARFARIN 4 FULL DAYS PRE-PROCEDURE. LAST DOSE OF WARFARIN  7/16/21. HOLD WARFARIN 7/17-7/21.       Patient states understanding of procedure and agrees to proceed.    *PATIENTS RECORDS AVAILABLE IN [facility] UNLESS OTHERWISE INDICATED*      Patient Active Problem List   Diagnosis     Advanced directives, counseling/discussion     Morbid obesity due to excess calories (H)     Hypertriglyceridemia     Atrial fibrillation with RVR (H)     Health Care Home     Mitral valve regurgitation     Systolic heart failure (H)     BROCK (obstructive sleep apnea)     Hyperlipidemia LDL goal <40     Calcification of coronary artery     Long term current use of anticoagulant therapy     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 atrophy of thyroid     History of chondrosarcoma     S/p total knee replacement, bilateral     Primary osteoarthritis of right hip     Scoliosis     DDD (degenerative disc disease), lumbar     Chronic pain of both feet     Coronary artery disease involving native coronary artery of native heart without angina pectoris     Non-ischemic cardiomyopathy (H)       Current Outpatient Medications   Medication Sig Dispense Refill     albuterol (PROAIR HFA/PROVENTIL HFA/VENTOLIN HFA) 108 (90 BASE) MCG/ACT Inhaler Inhale 2 puffs into the lungs every 6 hours as needed for shortness of breath / dyspnea or wheezing Profile Rx: patient will contact pharmacy when needed 1 Inhaler 0     ASPIRIN NOT PRESCRIBED (INTENTIONAL) Please choose reason not prescribed, below       atorvastatin (LIPITOR) 10 MG tablet Take 10 mg by mouth daily       carvedilol (COREG) 25 MG tablet Take 0.5 tablets (12.5 mg) by mouth 2 times daily (with meals) 90 tablet 3     diltiazem ER COATED BEADS (CARDIZEM CD/CARTIA XT) 240 MG 24 hr capsule TAKE 1 CAPSULE DAILY 90 capsule 1     levothyroxine (SYNTHROID/LEVOTHROID) 75 MCG tablet +++NEED APPT++TAKE 1 TABLET DAILY 30 tablet 0     losartan (COZAAR) 25 MG tablet Take 1 tablet (25 mg) by mouth daily 90 tablet 0     torsemide (DEMADEX) 20 MG tablet TAKE 2 TABLETS EVERY   tablet 3     warfarin ANTICOAGULANT (JANTOVEN ANTICOAGULANT) 2 MG tablet TAKE 1 AND 1/2 TABLETS EVERY THURSDAY AND FRIDAY AND 1 TABLET  ALL OTHER DAYS OR AS DIRECTED BY INTERNATIONAL NORMALIZED RATIO NURSES 105 tablet 0       Allergies   Allergen Reactions     Lisinopril      cough     Metal [Staples] Other (See Comments)     Metal allergy not clearly specified - patient had a stainless steel screw put in her foot that needed to be removed due to allergy/hypersensitivity      Metoprolol Fatigue         Vicky Lloyd, RN

## 2021-07-15 DIAGNOSIS — Z11.59 ENCOUNTER FOR SCREENING FOR OTHER VIRAL DISEASES: Primary | ICD-10-CM

## 2021-07-16 ENCOUNTER — TELEPHONE (OUTPATIENT)
Dept: CARDIOLOGY | Facility: CLINIC | Age: 76
End: 2021-07-16

## 2021-07-16 ENCOUNTER — LAB (OUTPATIENT)
Dept: LAB | Facility: CLINIC | Age: 76
End: 2021-07-16
Payer: MEDICARE

## 2021-07-16 ENCOUNTER — ANTICOAGULATION THERAPY VISIT (OUTPATIENT)
Dept: ANTICOAGULATION | Facility: CLINIC | Age: 76
End: 2021-07-16

## 2021-07-16 DIAGNOSIS — I48.20 CHRONIC ATRIAL FIBRILLATION (H): ICD-10-CM

## 2021-07-16 DIAGNOSIS — I48.21 PERMANENT ATRIAL FIBRILLATION (H): ICD-10-CM

## 2021-07-16 DIAGNOSIS — Z11.59 ENCOUNTER FOR SCREENING FOR OTHER VIRAL DISEASES: ICD-10-CM

## 2021-07-16 DIAGNOSIS — Z79.01 LONG TERM (CURRENT) USE OF ANTICOAGULANTS: ICD-10-CM

## 2021-07-16 LAB — INR BLD: 3.2 (ref 0.9–1.1)

## 2021-07-16 PROCEDURE — 85610 PROTHROMBIN TIME: CPT

## 2021-07-16 PROCEDURE — 36416 COLLJ CAPILLARY BLOOD SPEC: CPT

## 2021-07-16 RX ORDER — DILTIAZEM HYDROCHLORIDE 240 MG/1
CAPSULE, COATED, EXTENDED RELEASE ORAL
Qty: 10 CAPSULE | Refills: 1 | Status: SHIPPED | OUTPATIENT
Start: 2021-07-16 | End: 2021-12-13

## 2021-07-16 NOTE — PROGRESS NOTES
ANTICOAGULATION MANAGEMENT     Caty GAYTAN Ng 76 year old female is on warfarin with supratherapeutic INR result. (Goal INR 2.0-3.0)    Recent labs: (last 7 days)     07/16/21  1135   INR 3.2*       ASSESSMENT     Source(s): Chart Review and Patient/Caregiver Call       Warfarin doses taken: Warfarin taken as instructed    Diet: No new diet changes identified    New illness, injury, or hospitalization: No    Medication/supplement changes: None noted    Signs or symptoms of bleeding or clotting: No    Previous INR: Therapeutic last 2(+) visits    Additional findings: Upcoming surgery/procedure 7/21 angiogram, starts warfarin hold today     PLAN     Recommended plan for no diet, medication or health factor changes affecting INR     Dosing Instructions: Hold 5 doses then continue your current warfarin dose with next INR in 1 week       Summary  As of 7/16/2021    Full warfarin instructions:  7/16: Hold; 7/17: Hold; 7/18: Hold; 7/19: Hold; 7/20: Hold; Otherwise 2 mg every day   Next INR check:  7/28/2021             Telephone call with Caty who verbalizes understanding and agrees to plan    Lab visit scheduled    Education provided: None required    Plan made per [facility] anticoagulation protocol also see 7/5/21pre op    Nathalie Glaser, WILMA  Anticoagulation Clinic  7/16/2021    _______________________________________________________________________     Anticoagulation Episode Summary     Current INR goal:  2.0-3.0   TTR:  81.8 % (8.1 mo)   Target end date:     Send INR reminders to:  BREE ANDREW       Comments:           Anticoagulation Care Providers     Provider Role Specialty Phone number    Keke Flores Referring Family Medicine [phone]

## 2021-07-16 NOTE — TELEPHONE ENCOUNTER
----- Message from Rahel Shi sent at 7/16/2021  9:43 AM CDT -----      Caller: Patient  Primary cardiologist: Dr. Soria    Detailed reason for call: Patient stated that she will be short 2 pills she runs out on Sunday. She gets mail delivery and she thinks they won't be here in time. Please advise    Best phone number: [phone]  Best time to contact: today  Ok to leave a detailed message? yes  Device? No    Additional Info:

## 2021-07-19 ENCOUNTER — APPOINTMENT (OUTPATIENT)
Dept: LAB | Facility: CLINIC | Age: 76
End: 2021-07-19
Payer: MEDICARE

## 2021-07-19 PROCEDURE — U0005 INFEC AGEN DETEC AMPLI PROBE: HCPCS

## 2021-07-19 PROCEDURE — U0003 INFECTIOUS AGENT DETECTION BY NUCLEIC ACID (DNA OR RNA); SEVERE ACUTE RESPIRATORY SYNDROME CORONAVIRUS 2 (SARS-COV-2) (CORONAVIRUS DISEASE [COVID-19]), AMPLIFIED PROBE TECHNIQUE, MAKING USE OF HIGH THROUGHPUT TECHNOLOGIES AS DESCRIBED BY CMS-2020-01-R: HCPCS

## 2021-07-20 LAB — SARS-COV-2 RNA RESP QL NAA+PROBE: NEGATIVE

## 2021-07-20 RX ORDER — WARFARIN SODIUM 2 MG/1
2 TABLET ORAL DAILY
COMMUNITY
End: 2022-07-14

## 2021-07-20 RX ORDER — TORSEMIDE 20 MG/1
20 TABLET ORAL DAILY
COMMUNITY
End: 2021-08-09

## 2021-07-21 ENCOUNTER — SURGERY (OUTPATIENT)
Age: 76
End: 2021-07-21
Payer: MEDICARE

## 2021-07-21 ENCOUNTER — HOSPITAL ENCOUNTER (OUTPATIENT)
Facility: HOSPITAL | Age: 76
Discharge: HOME | End: 2021-07-21
Attending: INTERNAL MEDICINE | Admitting: INTERNAL MEDICINE
Payer: MEDICARE

## 2021-07-21 VITALS
RESPIRATION RATE: 20 BRPM | SYSTOLIC BLOOD PRESSURE: 113 MMHG | TEMPERATURE: 97.5 F | HEART RATE: 79 BPM | DIASTOLIC BLOOD PRESSURE: 54 MMHG | OXYGEN SATURATION: 92 % | BODY MASS INDEX: 40.2 KG/M2 | HEIGHT: 65 IN | WEIGHT: 241.3 LBS

## 2021-07-21 DIAGNOSIS — I25.10 CORONARY ARTERY DISEASE INVOLVING NATIVE CORONARY ARTERY OF NATIVE HEART WITHOUT ANGINA PECTORIS: ICD-10-CM

## 2021-07-21 DIAGNOSIS — R06.09 DYSPNEA ON EXERTION: ICD-10-CM

## 2021-07-21 DIAGNOSIS — I42.8 NON-ISCHEMIC CARDIOMYOPATHY (H): ICD-10-CM

## 2021-07-21 DIAGNOSIS — R93.89 ABNORMAL COMPUTED TOMOGRAPHY ANGIOGRAPHY (CTA): ICD-10-CM

## 2021-07-21 PROBLEM — Z98.890 STATUS POST CORONARY ANGIOGRAM: Status: ACTIVE | Noted: 2021-07-21

## 2021-07-21 LAB
ABO/RH(D): NORMAL
ABO/RH(D): NORMAL
ANION GAP SERPL CALCULATED.3IONS-SCNC: 12 MMOL/L (ref 5–18)
ANTIBODY SCREEN: NEGATIVE
ATRIAL RATE - MUSE: NORMAL BPM
BUN SERPL-MCNC: 21 MG/DL (ref 8–28)
CALCIUM SERPL-MCNC: 9 MG/DL (ref 8.5–10.5)
CHLORIDE BLD-SCNC: 105 MMOL/L (ref 98–107)
CHOLEST SERPL-MCNC: 100 MG/DL
CO2 SERPL-SCNC: 27 MMOL/L (ref 22–31)
CREAT SERPL-MCNC: 0.8 MG/DL (ref 0.6–1.1)
DIASTOLIC BLOOD PRESSURE - MUSE: NORMAL MMHG
ERYTHROCYTE [DISTWIDTH] IN BLOOD BY AUTOMATED COUNT: 13.2 % (ref 10–15)
GFR SERPL CREATININE-BSD FRML MDRD: 73 ML/MIN/1.73M2
GLUCOSE BLD-MCNC: 122 MG/DL (ref 70–125)
HCT VFR BLD AUTO: 43.4 % (ref 35–47)
HDLC SERPL-MCNC: 42 MG/DL
HGB BLD-MCNC: 14.4 G/DL (ref 11.7–15.7)
HOLD SPECIMEN: NORMAL
INR PPP: 1.24 (ref 0.9–1.15)
INTERPRETATION ECG - MUSE: NORMAL
LDLC SERPL CALC-MCNC: 38 MG/DL
MCH RBC QN AUTO: 31.2 PG (ref 26.5–33)
MCHC RBC AUTO-ENTMCNC: 33.2 G/DL (ref 31.5–36.5)
MCV RBC AUTO: 94 FL (ref 78–100)
P AXIS - MUSE: NORMAL DEGREES
PLATELET # BLD AUTO: 240 10E3/UL (ref 150–450)
POTASSIUM BLD-SCNC: 3.6 MMOL/L (ref 3.5–5)
PR INTERVAL - MUSE: NORMAL MS
QRS DURATION - MUSE: 88 MS
QT - MUSE: 386 MS
QTC - MUSE: 464 MS
R AXIS - MUSE: 0 DEGREES
RBC # BLD AUTO: 4.62 10E6/UL (ref 3.8–5.2)
SODIUM SERPL-SCNC: 144 MMOL/L (ref 136–145)
SPECIMEN EXPIRATION DATE: NORMAL
SPECIMEN EXPIRATION DATE: NORMAL
SYSTOLIC BLOOD PRESSURE - MUSE: NORMAL MMHG
T AXIS - MUSE: 27 DEGREES
TRIGL SERPL-MCNC: 98 MG/DL
VENTRICULAR RATE- MUSE: 87 BPM
WBC # BLD AUTO: 10.3 10E3/UL (ref 4–11)

## 2021-07-21 PROCEDURE — C1887 CATHETER, GUIDING: HCPCS | Performed by: INTERNAL MEDICINE

## 2021-07-21 PROCEDURE — C1894 INTRO/SHEATH, NON-LASER: HCPCS | Performed by: INTERNAL MEDICINE

## 2021-07-21 PROCEDURE — 93458 L HRT ARTERY/VENTRICLE ANGIO: CPT | Mod: 26 | Performed by: INTERNAL MEDICINE

## 2021-07-21 PROCEDURE — 258N000003 HC RX IP 258 OP 636: Performed by: INTERNAL MEDICINE

## 2021-07-21 PROCEDURE — 99152 MOD SED SAME PHYS/QHP 5/>YRS: CPT | Performed by: INTERNAL MEDICINE

## 2021-07-21 PROCEDURE — 85027 COMPLETE CBC AUTOMATED: CPT | Performed by: INTERNAL MEDICINE

## 2021-07-21 PROCEDURE — 999N000054 HC STATISTIC EKG NON-CHARGEABLE

## 2021-07-21 PROCEDURE — 250N000009 HC RX 250: Performed by: INTERNAL MEDICINE

## 2021-07-21 PROCEDURE — 36592 COLLECT BLOOD FROM PICC: CPT | Performed by: INTERNAL MEDICINE

## 2021-07-21 PROCEDURE — 272N000001 HC OR GENERAL SUPPLY STERILE: Performed by: INTERNAL MEDICINE

## 2021-07-21 PROCEDURE — 85610 PROTHROMBIN TIME: CPT | Performed by: INTERNAL MEDICINE

## 2021-07-21 PROCEDURE — 255N000002 HC RX 255 OP 636: Performed by: INTERNAL MEDICINE

## 2021-07-21 PROCEDURE — 36415 COLL VENOUS BLD VENIPUNCTURE: CPT | Performed by: INTERNAL MEDICINE

## 2021-07-21 PROCEDURE — 80061 LIPID PANEL: CPT | Performed by: INTERNAL MEDICINE

## 2021-07-21 PROCEDURE — 250N000011 HC RX IP 250 OP 636: Performed by: INTERNAL MEDICINE

## 2021-07-21 PROCEDURE — 80048 BASIC METABOLIC PNL TOTAL CA: CPT | Performed by: INTERNAL MEDICINE

## 2021-07-21 PROCEDURE — 86900 BLOOD TYPING SEROLOGIC ABO: CPT | Performed by: INTERNAL MEDICINE

## 2021-07-21 PROCEDURE — 93458 L HRT ARTERY/VENTRICLE ANGIO: CPT | Performed by: INTERNAL MEDICINE

## 2021-07-21 PROCEDURE — 86900 BLOOD TYPING SEROLOGIC ABO: CPT | Mod: 91 | Performed by: INTERNAL MEDICINE

## 2021-07-21 PROCEDURE — 93005 ELECTROCARDIOGRAM TRACING: CPT

## 2021-07-21 PROCEDURE — 93010 ELECTROCARDIOGRAM REPORT: CPT | Mod: HOP | Performed by: INTERNAL MEDICINE

## 2021-07-21 PROCEDURE — 250N000013 HC RX MED GY IP 250 OP 250 PS 637: Performed by: INTERNAL MEDICINE

## 2021-07-21 RX ORDER — DILTIAZEM HYDROCHLORIDE 120 MG/1
240 CAPSULE, COATED, EXTENDED RELEASE ORAL DAILY
Status: DISCONTINUED | OUTPATIENT
Start: 2021-07-21 | End: 2021-07-21 | Stop reason: HOSPADM

## 2021-07-21 RX ORDER — FENTANYL CITRATE 50 UG/ML
INJECTION, SOLUTION INTRAMUSCULAR; INTRAVENOUS
Status: DISCONTINUED | OUTPATIENT
Start: 2021-07-21 | End: 2021-07-21 | Stop reason: HOSPADM

## 2021-07-21 RX ORDER — SODIUM CHLORIDE 9 MG/ML
INJECTION, SOLUTION INTRAVENOUS CONTINUOUS
Status: DISCONTINUED | OUTPATIENT
Start: 2021-07-21 | End: 2021-07-21 | Stop reason: HOSPADM

## 2021-07-21 RX ORDER — ACETAMINOPHEN 325 MG/1
650 TABLET ORAL EVERY 4 HOURS PRN
Status: DISCONTINUED | OUTPATIENT
Start: 2021-07-21 | End: 2021-07-21 | Stop reason: HOSPADM

## 2021-07-21 RX ORDER — HYDRALAZINE HYDROCHLORIDE 20 MG/ML
10 INJECTION INTRAMUSCULAR; INTRAVENOUS EVERY 4 HOURS PRN
Status: DISCONTINUED | OUTPATIENT
Start: 2021-07-21 | End: 2021-07-21 | Stop reason: HOSPADM

## 2021-07-21 RX ORDER — ASPIRIN 81 MG/1
81 TABLET ORAL DAILY
Status: DISCONTINUED | OUTPATIENT
Start: 2021-07-22 | End: 2021-07-21 | Stop reason: HOSPADM

## 2021-07-21 RX ORDER — DIAZEPAM 5 MG
5 TABLET ORAL
Status: COMPLETED | OUTPATIENT
Start: 2021-07-21 | End: 2021-07-21

## 2021-07-21 RX ORDER — ATROPINE SULFATE 0.1 MG/ML
0.5 INJECTION INTRAVENOUS
Status: DISCONTINUED | OUTPATIENT
Start: 2021-07-21 | End: 2021-07-21 | Stop reason: HOSPADM

## 2021-07-21 RX ORDER — OXYCODONE HYDROCHLORIDE 5 MG/1
5 TABLET ORAL EVERY 4 HOURS PRN
Status: DISCONTINUED | OUTPATIENT
Start: 2021-07-21 | End: 2021-07-21 | Stop reason: HOSPADM

## 2021-07-21 RX ORDER — LIDOCAINE 40 MG/G
CREAM TOPICAL
Status: DISCONTINUED | OUTPATIENT
Start: 2021-07-21 | End: 2021-07-21 | Stop reason: HOSPADM

## 2021-07-21 RX ORDER — ONDANSETRON 2 MG/ML
4 INJECTION INTRAMUSCULAR; INTRAVENOUS EVERY 6 HOURS PRN
Status: DISCONTINUED | OUTPATIENT
Start: 2021-07-21 | End: 2021-07-21 | Stop reason: HOSPADM

## 2021-07-21 RX ORDER — METOPROLOL TARTRATE 1 MG/ML
5 INJECTION, SOLUTION INTRAVENOUS
Status: DISCONTINUED | OUTPATIENT
Start: 2021-07-21 | End: 2021-07-21 | Stop reason: HOSPADM

## 2021-07-21 RX ORDER — TORSEMIDE 20 MG/1
20 TABLET ORAL DAILY
Status: DISCONTINUED | OUTPATIENT
Start: 2021-07-21 | End: 2021-07-21 | Stop reason: HOSPADM

## 2021-07-21 RX ORDER — NALOXONE HYDROCHLORIDE 0.4 MG/ML
0.4 INJECTION, SOLUTION INTRAMUSCULAR; INTRAVENOUS; SUBCUTANEOUS
Status: DISCONTINUED | OUTPATIENT
Start: 2021-07-21 | End: 2021-07-21 | Stop reason: HOSPADM

## 2021-07-21 RX ORDER — LOSARTAN POTASSIUM 25 MG/1
25 TABLET ORAL DAILY
Status: DISCONTINUED | OUTPATIENT
Start: 2021-07-21 | End: 2021-07-21 | Stop reason: HOSPADM

## 2021-07-21 RX ORDER — IODIXANOL 320 MG/ML
INJECTION, SOLUTION INTRAVASCULAR
Status: DISCONTINUED | OUTPATIENT
Start: 2021-07-21 | End: 2021-07-21 | Stop reason: HOSPADM

## 2021-07-21 RX ORDER — ONDANSETRON 4 MG/1
4 TABLET, ORALLY DISINTEGRATING ORAL EVERY 6 HOURS PRN
Status: DISCONTINUED | OUTPATIENT
Start: 2021-07-21 | End: 2021-07-21 | Stop reason: HOSPADM

## 2021-07-21 RX ORDER — NITROGLYCERIN 0.4 MG/1
0.4 TABLET SUBLINGUAL EVERY 5 MIN PRN
Status: DISCONTINUED | OUTPATIENT
Start: 2021-07-21 | End: 2021-07-21 | Stop reason: HOSPADM

## 2021-07-21 RX ORDER — ALBUTEROL SULFATE 90 UG/1
2 AEROSOL, METERED RESPIRATORY (INHALATION) EVERY 6 HOURS PRN
Status: DISCONTINUED | OUTPATIENT
Start: 2021-07-21 | End: 2021-07-21 | Stop reason: HOSPADM

## 2021-07-21 RX ORDER — ATORVASTATIN CALCIUM 40 MG/1
80 TABLET, FILM COATED ORAL DAILY
Status: DISCONTINUED | OUTPATIENT
Start: 2021-07-21 | End: 2021-07-21 | Stop reason: HOSPADM

## 2021-07-21 RX ORDER — NALOXONE HYDROCHLORIDE 0.4 MG/ML
0.4 INJECTION, SOLUTION INTRAMUSCULAR; INTRAVENOUS; SUBCUTANEOUS
Status: DISCONTINUED | OUTPATIENT
Start: 2021-07-21 | End: 2021-07-21

## 2021-07-21 RX ORDER — ASPIRIN 81 MG/1
81 TABLET, CHEWABLE ORAL ONCE
Status: DISCONTINUED | OUTPATIENT
Start: 2021-07-21 | End: 2021-07-21 | Stop reason: HOSPADM

## 2021-07-21 RX ORDER — NALOXONE HYDROCHLORIDE 0.4 MG/ML
0.2 INJECTION, SOLUTION INTRAMUSCULAR; INTRAVENOUS; SUBCUTANEOUS
Status: DISCONTINUED | OUTPATIENT
Start: 2021-07-21 | End: 2021-07-21

## 2021-07-21 RX ORDER — NALOXONE HYDROCHLORIDE 0.4 MG/ML
0.2 INJECTION, SOLUTION INTRAMUSCULAR; INTRAVENOUS; SUBCUTANEOUS
Status: DISCONTINUED | OUTPATIENT
Start: 2021-07-21 | End: 2021-07-21 | Stop reason: HOSPADM

## 2021-07-21 RX ORDER — WARFARIN SODIUM 2 MG/1
2 TABLET ORAL DAILY
Status: DISCONTINUED | OUTPATIENT
Start: 2021-07-21 | End: 2021-07-21 | Stop reason: HOSPADM

## 2021-07-21 RX ORDER — ATORVASTATIN CALCIUM 80 MG/1
80 TABLET, FILM COATED ORAL DAILY
Qty: 90 TABLET | Refills: 3 | Status: SHIPPED | OUTPATIENT
Start: 2021-07-21 | End: 2021-07-21

## 2021-07-21 RX ORDER — FENTANYL CITRATE 50 UG/ML
25 INJECTION, SOLUTION INTRAMUSCULAR; INTRAVENOUS
Status: DISCONTINUED | OUTPATIENT
Start: 2021-07-21 | End: 2021-07-21 | Stop reason: HOSPADM

## 2021-07-21 RX ORDER — CARVEDILOL 12.5 MG/1
12.5 TABLET ORAL 2 TIMES DAILY WITH MEALS
Status: DISCONTINUED | OUTPATIENT
Start: 2021-07-21 | End: 2021-07-21 | Stop reason: HOSPADM

## 2021-07-21 RX ORDER — FLUMAZENIL 0.1 MG/ML
0.2 INJECTION, SOLUTION INTRAVENOUS
Status: DISCONTINUED | OUTPATIENT
Start: 2021-07-21 | End: 2021-07-21 | Stop reason: HOSPADM

## 2021-07-21 RX ORDER — ATORVASTATIN CALCIUM 80 MG/1
80 TABLET, FILM COATED ORAL DAILY
Qty: 90 TABLET | Refills: 3 | Status: SHIPPED | OUTPATIENT
Start: 2021-07-21 | End: 2021-10-08

## 2021-07-21 RX ORDER — OXYCODONE HYDROCHLORIDE 5 MG/1
10 TABLET ORAL EVERY 4 HOURS PRN
Status: DISCONTINUED | OUTPATIENT
Start: 2021-07-21 | End: 2021-07-21 | Stop reason: HOSPADM

## 2021-07-21 RX ADMIN — MIDAZOLAM HYDROCHLORIDE 0.5 MG: 1 INJECTION, SOLUTION INTRAMUSCULAR; INTRAVENOUS at 09:05

## 2021-07-21 RX ADMIN — LIDOCAINE HYDROCHLORIDE 3 ML: 10 INJECTION, SOLUTION EPIDURAL; INFILTRATION; INTRACAUDAL; PERINEURAL at 09:04

## 2021-07-21 RX ADMIN — FENTANYL CITRATE 25 MCG: 50 INJECTION, SOLUTION INTRAMUSCULAR; INTRAVENOUS at 09:08

## 2021-07-21 RX ADMIN — IODIXANOL 87 ML: 320 INJECTION, SOLUTION INTRAVASCULAR at 09:18

## 2021-07-21 RX ADMIN — MIDAZOLAM HYDROCHLORIDE 0.5 MG: 1 INJECTION, SOLUTION INTRAMUSCULAR; INTRAVENOUS at 09:08

## 2021-07-21 RX ADMIN — SODIUM CHLORIDE: 9 INJECTION, SOLUTION INTRAVENOUS at 10:25

## 2021-07-21 RX ADMIN — FENTANYL CITRATE 25 MCG: 50 INJECTION, SOLUTION INTRAMUSCULAR; INTRAVENOUS at 08:59

## 2021-07-21 RX ADMIN — Medication 5 ML: at 09:06

## 2021-07-21 RX ADMIN — MIDAZOLAM HYDROCHLORIDE 0.5 MG: 1 INJECTION, SOLUTION INTRAMUSCULAR; INTRAVENOUS at 09:14

## 2021-07-21 RX ADMIN — FENTANYL CITRATE 25 MCG: 50 INJECTION, SOLUTION INTRAMUSCULAR; INTRAVENOUS at 09:14

## 2021-07-21 RX ADMIN — DIAZEPAM 5 MG: 5 TABLET ORAL at 08:24

## 2021-07-21 RX ADMIN — MIDAZOLAM HYDROCHLORIDE 0.5 MG: 1 INJECTION, SOLUTION INTRAMUSCULAR; INTRAVENOUS at 08:59

## 2021-07-21 RX ADMIN — SODIUM CHLORIDE: 9 INJECTION, SOLUTION INTRAVENOUS at 07:26

## 2021-07-21 RX ADMIN — FENTANYL CITRATE 25 MCG: 50 INJECTION, SOLUTION INTRAMUSCULAR; INTRAVENOUS at 09:05

## 2021-07-21 RX ADMIN — ASPIRIN 325 MG: 325 TABLET, FILM COATED ORAL at 08:24

## 2021-07-21 ASSESSMENT — MIFFLIN-ST. JEOR: SCORE: 1595.41

## 2021-07-21 NOTE — PLAN OF CARE
Problem: Arrhythmia/Dysrhythmia (Cardiac Catheterization)  Goal: Stable Heart Rate and Rhythm  Outcome: Adequate for Discharge     Problem: Bleeding (Cardiac Catheterization)  Goal: Absence of Bleeding  Outcome: Adequate for Discharge     Problem: Contrast-Induced Injury Risk (Cardiac Catheterization)  Goal: Absence of Contrast-Induced Injury  Outcome: Adequate for Discharge     Problem: Embolism (Cardiac Catheterization)  Goal: Absence of Embolism Signs and Symptoms  Outcome: Adequate for Discharge     Problem: Ongoing Anesthesia/Sedation Effects (Cardiac Catheterization)  Goal: Anesthesia/Sedation Recovery  Outcome: Adequate for Discharge     Problem: Pain (Cardiac Catheterization)  Goal: Acceptable Pain Control  Outcome: Adequate for Discharge     Problem: Vascular Access Protection (Cardiac Catheterization)  Goal: Absence of Vascular Access Complication  Outcome: Adequate for Discharge  Intervention: Prevent Access Site Complications  Recent Flowsheet Documentation  Taken 7/21/2021 1245 by Nicole Knutson RN  Activity Management:   ambulated in room   ambulated to bathroom  Taken 7/21/2021 0930 by Nicole Knutson RN  Activity Management: bedrest  Head of Bed (HOB) Positioning: HOB at 60 degrees     Patient was kept comfortable during post-procedure stay.VSS. Denies pain. Radial site remains dry & free from signs of bleeding. Appointments made & included in AVS. Dr. Murphy was able to speak with patient &  during recovery. Post-op instructions given to patient & spouse. Able to ask questions. Verbalized no concerns. Belongings returned. Discharged in stable condition.

## 2021-07-21 NOTE — PLAN OF CARE
Problem: Adult Inpatient Plan of Care  Goal: Plan of Care Review  Outcome: No Change  Goal: Patient-Specific Goal (Individualized)  Outcome: No Change  Goal: Absence of Hospital-Acquired Illness or Injury  Outcome: No Change  Goal: Optimal Comfort and Wellbeing  Outcome: No Change  Goal: Readiness for Transition of Care  Outcome: No Change     Problem: Arrhythmia/Dysrhythmia (Cardiac Catheterization)  Goal: Stable Heart Rate and Rhythm  Outcome: No Change     Problem: Bleeding (Cardiac Catheterization)  Goal: Absence of Bleeding  Outcome: No Change     Problem: Contrast-Induced Injury Risk (Cardiac Catheterization)  Goal: Absence of Contrast-Induced Injury  Outcome: No Change     Problem: Embolism (Cardiac Catheterization)  Goal: Absence of Embolism Signs and Symptoms  Outcome: No Change     Problem: Ongoing Anesthesia/Sedation Effects (Cardiac Catheterization)  Goal: Anesthesia/Sedation Recovery  Outcome: No Change     Problem: Pain (Cardiac Catheterization)  Goal: Acceptable Pain Control  Outcome: No Change     Problem: Vascular Access Protection (Cardiac Catheterization)  Goal: Absence of Vascular Access Complication  Outcome: No Change     Pt admitted for CA/P.PCi due to short of breath.  Pt prepped and ready for procedure.      Rachel Wisdom RN

## 2021-07-21 NOTE — PRE-PROCEDURE
GENERAL PRE-PROCEDURE:   Procedure:  Coronary angiogram with possible PCI  Date/Time:  7/21/2021 6:59 AM    Written consent obtained?: Yes    Risks and benefits: Risks, benefits and alternatives were discussed    Consent given by:  Patient  Patient states understanding of procedure being performed: Yes    Patient's understanding of procedure matches consent: Yes    Procedure consent matches procedure scheduled: Yes    Expected level of sedation:  Moderate  Appropriately NPO:  Yes  ASA Class:  Class 3- Severe systemic disease, definite functional limitations (LV dysfunction, moderate mitral insufficiency, permanent AF, Class III obesity; BMI 40.15kg/m2)  Mallampati  :  Grade 2- soft palate, base of uvula, tonsillar pillars, and portion of posterior pharyngeal wall visible  Lungs:  Lungs clear with good breath sounds bilaterally  Heart:  A-fib  History & Physical reviewed:  History and physical reviewed and no updates needed  Statement of review:  I have reviewed the lab findings, diagnostic data, medications, and the plan for sedation

## 2021-07-22 ENCOUNTER — DOCUMENTATION ONLY (OUTPATIENT)
Dept: ANTICOAGULATION | Facility: CLINIC | Age: 76
End: 2021-07-22

## 2021-07-22 NOTE — PROGRESS NOTES
ANTICOAGULATION  MANAGEMENT: Discharge Review    Caty Ng chart reviewed for anticoagulation continuity of care    Outpatient surgery/procedure on 7/21/21 for angiogram.    Discharge disposition: Home    Results:    Recent labs: (last 7 days)     07/16/21  1135 07/21/21  0705   INR 3.2* 1.24*     Anticoagulation inpatient management:     not applicable     Anticoagulation discharge instructions:     Warfarin dosing: home regimen continued   Bridging: No   INR goal change: No      Medication changes affecting anticoagulation: No    Additional factors affecting anticoagulation: No    Plan     No adjustment to anticoagulation plan needed  Patient has INR appointment scheduled for 7/28/21    Patient not contacted    No adjustment to Anticoagulation Calendar was required    Keke Welch RN

## 2021-07-30 ENCOUNTER — LAB (OUTPATIENT)
Dept: LAB | Facility: CLINIC | Age: 76
End: 2021-07-30
Payer: MEDICARE

## 2021-07-30 ENCOUNTER — ANTICOAGULATION THERAPY VISIT (OUTPATIENT)
Dept: ANTICOAGULATION | Facility: CLINIC | Age: 76
End: 2021-07-30

## 2021-07-30 DIAGNOSIS — Z79.01 LONG TERM (CURRENT) USE OF ANTICOAGULANTS: ICD-10-CM

## 2021-07-30 DIAGNOSIS — I48.21 PERMANENT ATRIAL FIBRILLATION (H): ICD-10-CM

## 2021-07-30 DIAGNOSIS — I48.20 CHRONIC ATRIAL FIBRILLATION (H): ICD-10-CM

## 2021-07-30 LAB — INR BLD: 3 (ref 0.9–1.1)

## 2021-07-30 PROCEDURE — 36416 COLLJ CAPILLARY BLOOD SPEC: CPT

## 2021-07-30 PROCEDURE — 85610 PROTHROMBIN TIME: CPT

## 2021-07-30 NOTE — PROGRESS NOTES
ANTICOAGULATION MANAGEMENT     Caty Ng 76 year old female is on warfarin with therapeutic INR result. (Goal INR 2.0-3.0)    Recent labs: (last 7 days)     07/30/21  1257   INR 3.0*       ASSESSMENT     Source(s): Chart Review, Patient/Caregiver Call and Template       Warfarin doses taken: Warfarin taken as instructed    Diet: No new diet changes identified    New illness, injury, or hospitalization: No    Medication/supplement changes: None noted except increased atorvastatin to 80 mg    Signs or symptoms of bleeding or clotting: No    Previous INR: Subtherapeutic after hold    Additional findings: None     PLAN     Recommended plan for no diet, medication or health factor changes affecting INR     Dosing Instructions: Continue your current warfarin dose with next INR in 2 weeks       Summary  As of 7/30/2021    Full warfarin instructions:  2 mg every day   Next INR check:  8/12/2021             Telephone call with Caty who verbalizes understanding and agrees to plan    Check at provider office visit 8/12    Education provided: None required    Plan made per [facility] anticoagulation protocol    Nathalie Glaser RN  Anticoagulation Clinic  7/30/2021    _______________________________________________________________________     Anticoagulation Episode Summary     Current INR goal:  2.0-3.0   TTR:  80.3 % (8.6 mo)   Target end date:     Send INR reminders to:  BREE ANDREW       Comments:           Anticoagulation Care Providers     Provider Role Specialty Phone number    Keke Flores Referring Family Medicine [phone]

## 2021-08-05 ENCOUNTER — OFFICE VISIT (OUTPATIENT)
Dept: PHYSICAL MEDICINE AND REHAB | Facility: CLINIC | Age: 76
End: 2021-08-05
Payer: MEDICARE

## 2021-08-05 VITALS — SYSTOLIC BLOOD PRESSURE: 106 MMHG | DIASTOLIC BLOOD PRESSURE: 64 MMHG | HEART RATE: 75 BPM

## 2021-08-05 DIAGNOSIS — M25.551 HIP PAIN, RIGHT: ICD-10-CM

## 2021-08-05 DIAGNOSIS — M48.061 SPINAL STENOSIS OF LUMBAR REGION, UNSPECIFIED WHETHER NEUROGENIC CLAUDICATION PRESENT: ICD-10-CM

## 2021-08-05 DIAGNOSIS — M54.16 LUMBAR RADICULAR PAIN: Primary | ICD-10-CM

## 2021-08-05 DIAGNOSIS — M47.816 LUMBAR FACET ARTHROPATHY: ICD-10-CM

## 2021-08-05 PROCEDURE — 99214 OFFICE O/P EST MOD 30 MIN: CPT | Performed by: PHYSICAL MEDICINE & REHABILITATION

## 2021-08-05 ASSESSMENT — PAIN SCALES - GENERAL: PAINLEVEL: MODERATE PAIN (5)

## 2021-08-05 NOTE — PATIENT INSTRUCTIONS
1. Xray of your right hip    2. I will contact you after I get the results with further recommendations

## 2021-08-05 NOTE — PROGRESS NOTES
Assessment/Plan:      Caty was seen today for back pain.    Diagnoses and all orders for this visit:    Lumbar radicular pain    Spinal stenosis of lumbar region, unspecified whether neurogenic claudication present    Lumbar facet arthropathy    Hip pain, right  -     XR Hip Right 2-3 Views; Future         Assessment: Pleasant 76 year old female with a history of atrial fibrillation with heart failure on warfarin with:     1.  2-year history of right lateral leg pain in the gluteal region to the knee.  This is consistent with lumbar radiculopathy along with potential right greater trochanteric bursitis versus hip osteoarthritis.  She has mild left lumbar scoliotic curve.  She has at least moderate spinal stenosis at L3-4 and mild to moderate L4-5 with facet arthropathy and broad-based disc bulge.  90% relief for 1 day after lumbar transforaminal epidural steroid injection still having pain from the right hip to the right knee with decreased range of motion of the hip.  Likely representing osteoarthritis which appears present on plain films from 2019.  2 days give good relief with  Medrol Dosepak the pain is then worsened.  Did not have MRI of the hip or lumbar spine as of yet due to inability to lay flat.  Request seated MRI.     2.  Myofascial pain right gluteal region greater trochanter and IT band.     3.  Right lateral hip pain.  She does have moderate hip osteoarthritis on plain films.  Likely worsening.        Discussion:    1.  We discussed the diagnosis and treatment options.  She has spinal stenosis at L4-5 with either radicular pain which has improved along with potential hip pain from osteoarthritis with decreased range of motion.  We discussed plain film imaging versus advanced imaging and injections.    2.  Plain films right hip to evaluate.    3.  Once I have the images of her right hip, if her hip osteoarthritis has progressed dramatically can consider surgical evaluation.  If there is still joint  space present, will then likely recommend right hip injection under fluoroscopic guidance given patient's body habitus.    4.  Follow-up with phone recommendations after x-rays.    It was our pleasure caring for your patient today, if there any questions or concerns please do not hesitate to contact us.      Subjective:   Patient ID: Caty Ng is a 76 year old female.    History of Present Illness: Patient presents for follow-up of right-sided low back lower extremity pain lateral leg to the knee.  This is after lumbar epidural and MRI.  Since last visit she had an MRI of the lumbar spine at [facility] which was reviewed.  She then had a right L4-5 transforaminal epidural steroid injection.  She had 90% relief of her symptoms for 1 day and then the symptoms have returned.  They are still slightly better with regards to the back and buttock.  She still has pain over the right lateral hip and right lateral knee worse with any standing or walking better with sitting but not for too long.  She has an issue with driving as pushing her foot down on the gas causes pain.  Pain is a 9/10 at worst 5/10 today 4/10 at best.  Does take Tylenol.      Imaging: MRI report and images were personally reviewed and discussed with the patient.  A plastic model was utilized during the discussion.  MRI of the lumbar spine personally reviewed.  This shows slight lumbar scoliotic curve.  Moderate to advanced degenerative disc changes T12-L1 through L4-5 with advanced disc height loss at L3-4 moderate facet arthropathy results in moderate central stenosis at L4-5 moderate facet arthropathy and disc degeneration results in a 2 mm spondylolisthesis.  There is a high intensity zone in the disc.  Disc height at L5-S1 with moderate facet arthropathy.  No foraminal or central stenosis.  Unable to review the hip.    At least moderate right hip osteoarthritis on plain films from 2019.    Review of Systems: Pertinent positives: Weakness of her right  leg with walking and driving..  Pertinent negatives: No numbness, tingling  .  No bowel or bladder incontinence.  No urinary retention.  No fevers, unintentional weight loss, balance changes, headaches, frequent falling, difficulty swallowing, or coordination difficulties.  All others reviewed are negative.           Past Medical History:   Diagnosis Date     Allergic rhinitis      Arrhythmia 2012     Atrial fibrillation with RVR (H)      Atrial fibrillation, rapid (H) 6/21/2012    Followed by Dr. 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 Cobos, cardiology EF 45 - 50%     Heart valve disease      Hyperlipidemia      Hyperlipidemia 2012     Hypertriglyceridemia      Mediastinal mass 2015    benign, [facility] consult      Mitral valve regurgitation 6/24/2012     Morbid obesity with BMI of 40.0-44.9, adult (H) 4/25/2015     Obesity      BROCK (obstructive sleep apnea)      Scoliosis      Sleep apnea 2010    per pt     Systolic heart failure (H)        The following portions of the patient's history were reviewed and updated as appropriate: allergies, current medications, past family history, past medical history, past social history, past surgical history and problem list.           Objective:   Physical Exam:    /64 (BP Location: Left arm, Patient Position: Sitting, Cuff Size: Adult Large)   Pulse 75   There is no height or weight on file to calculate BMI.      General: Alert and oriented with normal affect. Attention, knowledge, memory, and language are intact. No acute distress.   Eyes: Sclerae are clear.   Respirations: Unlabored. CV: No lower extremity edema.  Skin: No rashes seen.    Gait: In wheelchair, ambulates very cautiously mildly waddling gait.  Decreased range of motion of the right hip significantly compared to left internal and external rotation with reproduction of symptoms.  Sensation is intact to light touch throughout the   lower extremities.  Tenderness of the right greater trochanter.      Manual muscle testing reveals:  Right /Left out of 5     4 /5 hip flexors-giveaway  5/5 knee flexors  5/5 knee extensors  5/5 ankle plantar flexors  5/5 ankle dorsiflexors  5/5  EHL

## 2021-08-05 NOTE — LETTER
8/5/2021         RE: Caty Ng  [address]  [facility] [address]        Dear Colleague,    Thank you for referring your patient, Caty Ng, to the [facility] [place]. Please see a copy of my visit note below.    Assessment/Plan:      Caty was seen today for back pain.    Diagnoses and all orders for this visit:    Lumbar radicular pain    Spinal stenosis of lumbar region, unspecified whether neurogenic claudication present    Lumbar facet arthropathy    Hip pain, right  -     XR Hip Right 2-3 Views; Future         Assessment: Pleasant 76 year old female with a history of atrial fibrillation with heart failure on warfarin with:     1.  2-year history of right lateral leg pain in the gluteal region to the knee.  This is consistent with lumbar radiculopathy along with potential right greater trochanteric bursitis versus hip osteoarthritis.  She has mild left lumbar scoliotic curve.  She has at least moderate spinal stenosis at L3-4 and mild to moderate L4-5 with facet arthropathy and broad-based disc bulge.  90% relief for 1 day after lumbar transforaminal epidural steroid injection still having pain from the right hip to the right knee with decreased range of motion of the hip.  Likely representing osteoarthritis which appears present on plain films from 2019.  2 days give good relief with  Medrol Dosepak the pain is then worsened.  Did not have MRI of the hip or lumbar spine as of yet due to inability to lay flat.  Request seated MRI.     2.  Myofascial pain right gluteal region greater trochanter and IT band.     3.  Right lateral hip pain.  She does have moderate hip osteoarthritis on plain films.  Likely worsening.        Discussion:    1.  We discussed the diagnosis and treatment options.  She has spinal stenosis at L4-5 with either radicular pain which has improved along with potential hip pain from osteoarthritis with decreased range of motion.  We  discussed plain film imaging versus advanced imaging and injections.    2.  Plain films right hip to evaluate.    3.  Once I have the images of her right hip, if her hip osteoarthritis has progressed dramatically can consider surgical evaluation.  If there is still joint space present, will then likely recommend right hip injection under fluoroscopic guidance given patient's body habitus.    4.  Follow-up with phone recommendations after x-rays.    It was our pleasure caring for your patient today, if there any questions or concerns please do not hesitate to contact us.      Subjective:   Patient ID: Caty Ng is a 76 year old female.    History of Present Illness: Patient presents for follow-up of right-sided low back lower extremity pain lateral leg to the knee.  This is after lumbar epidural and MRI.  Since last visit she had an MRI of the lumbar spine at [facility] which was reviewed.  She then had a right L4-5 transforaminal epidural steroid injection.  She had 90% relief of her symptoms for 1 day and then the symptoms have returned.  They are still slightly better with regards to the back and buttock.  She still has pain over the right lateral hip and right lateral knee worse with any standing or walking better with sitting but not for too long.  She has an issue with driving as pushing her foot down on the gas causes pain.  Pain is a 9/10 at worst 5/10 today 4/10 at best.  Does take Tylenol.      Imaging: MRI report and images were personally reviewed and discussed with the patient.  A plastic model was utilized during the discussion.  MRI of the lumbar spine personally reviewed.  This shows slight lumbar scoliotic curve.  Moderate to advanced degenerative disc changes T12-L1 through L4-5 with advanced disc height loss at L3-4 moderate facet arthropathy results in moderate central stenosis at L4-5 moderate facet arthropathy and disc degeneration results in a 2 mm spondylolisthesis.  There is a high intensity  zone in the disc.  Disc height at L5-S1 with moderate facet arthropathy.  No foraminal or central stenosis.  Unable to review the hip.    At least moderate right hip osteoarthritis on plain films from 2019.    Review of Systems: Pertinent positives: Weakness of her right leg with walking and driving..  Pertinent negatives: No numbness, tingling  .  No bowel or bladder incontinence.  No urinary retention.  No fevers, unintentional weight loss, balance changes, headaches, frequent falling, difficulty swallowing, or coordination difficulties.  All others reviewed are negative.           Past Medical History:   Diagnosis Date     Allergic rhinitis      Arrhythmia 2012     Atrial fibrillation with RVR (H)      Atrial fibrillation, rapid (H) 6/21/2012    Followed by Dr. 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 Cobos, cardiology EF 45 - 50%     Heart valve disease      Hyperlipidemia      Hyperlipidemia 2012     Hypertriglyceridemia      Mediastinal mass 2015    benign, [facility] consult      Mitral valve regurgitation 6/24/2012     Morbid obesity with BMI of 40.0-44.9, adult (H) 4/25/2015     Obesity      BROCK (obstructive sleep apnea)      Scoliosis      Sleep apnea 2010    per pt     Systolic heart failure (H)        The following portions of the patient's history were reviewed and updated as appropriate: allergies, current medications, past family history, past medical history, past social history, past surgical history and problem list.           Objective:   Physical Exam:    /64 (BP Location: Left arm,  Patient Position: Sitting, Cuff Size: Adult Large)   Pulse 75   There is no height or weight on file to calculate BMI.      General: Alert and oriented with normal affect. Attention, knowledge, memory, and language are intact. No acute distress.   Eyes: Sclerae are clear.  Respirations: Unlabored. CV: No lower extremity edema.  Skin: No rashes seen.    Gait: In wheelchair, ambulates very cautiously mildly waddling gait.  Decreased range of motion of the right hip significantly compared to left internal and external rotation with reproduction of symptoms.  Sensation is intact to light touch throughout the   lower extremities.  Tenderness of the right greater trochanter.      Manual muscle testing reveals:  Right /Left out of 5     4 /5 hip flexors-giveaway  5/5 knee flexors  5/5 knee extensors  5/5 ankle plantar flexors  5/5 ankle dorsiflexors  5/5  EHL        Again, thank you for allowing me to participate in the care of your patient.        Sincerely,        Raul Polo, DO

## 2021-08-09 DIAGNOSIS — I50.32 CHRONIC DIASTOLIC HEART FAILURE (H): ICD-10-CM

## 2021-08-09 RX ORDER — TORSEMIDE 20 MG/1
60 TABLET ORAL DAILY
Qty: 270 TABLET | Refills: 1 | Status: SHIPPED | OUTPATIENT
Start: 2021-08-09 | End: 2021-11-30

## 2021-08-09 RX ORDER — CARVEDILOL 25 MG/1
12.5 TABLET ORAL 2 TIMES DAILY WITH MEALS
Qty: 90 TABLET | Refills: 3 | Status: SHIPPED | OUTPATIENT
Start: 2021-08-09 | End: 2021-08-13

## 2021-08-11 DIAGNOSIS — I50.32 CHRONIC DIASTOLIC HEART FAILURE (H): ICD-10-CM

## 2021-08-12 ENCOUNTER — OFFICE VISIT (OUTPATIENT)
Dept: CARDIOLOGY | Facility: CLINIC | Age: 76
End: 2021-08-12
Payer: MEDICARE

## 2021-08-12 ENCOUNTER — ANTICOAGULATION THERAPY VISIT (OUTPATIENT)
Dept: ANTICOAGULATION | Facility: CLINIC | Age: 76
End: 2021-08-12

## 2021-08-12 VITALS
BODY MASS INDEX: 40.44 KG/M2 | HEART RATE: 72 BPM | WEIGHT: 243 LBS | SYSTOLIC BLOOD PRESSURE: 96 MMHG | DIASTOLIC BLOOD PRESSURE: 60 MMHG | RESPIRATION RATE: 16 BRPM

## 2021-08-12 DIAGNOSIS — Z79.01 LONG TERM (CURRENT) USE OF ANTICOAGULANTS: ICD-10-CM

## 2021-08-12 DIAGNOSIS — R06.09 DYSPNEA ON EXERTION: ICD-10-CM

## 2021-08-12 DIAGNOSIS — I34.0 MITRAL VALVE INSUFFICIENCY, UNSPECIFIED ETIOLOGY: ICD-10-CM

## 2021-08-12 DIAGNOSIS — I48.21 PERMANENT ATRIAL FIBRILLATION (H): ICD-10-CM

## 2021-08-12 DIAGNOSIS — G47.33 OSA (OBSTRUCTIVE SLEEP APNEA): ICD-10-CM

## 2021-08-12 DIAGNOSIS — I42.8 NON-ISCHEMIC CARDIOMYOPATHY (H): ICD-10-CM

## 2021-08-12 DIAGNOSIS — I48.20 CHRONIC ATRIAL FIBRILLATION (H): ICD-10-CM

## 2021-08-12 DIAGNOSIS — I25.10 CORONARY ARTERY DISEASE INVOLVING NATIVE CORONARY ARTERY OF NATIVE HEART WITHOUT ANGINA PECTORIS: Primary | ICD-10-CM

## 2021-08-12 LAB — INR BLD: 2.9 (ref 0.9–1.1)

## 2021-08-12 PROCEDURE — 99213 OFFICE O/P EST LOW 20 MIN: CPT | Performed by: INTERNAL MEDICINE

## 2021-08-12 PROCEDURE — 85610 PROTHROMBIN TIME: CPT | Performed by: INTERNAL MEDICINE

## 2021-08-12 PROCEDURE — 36416 COLLJ CAPILLARY BLOOD SPEC: CPT | Performed by: INTERNAL MEDICINE

## 2021-08-12 RX ORDER — AMOXICILLIN 500 MG/1
CAPSULE ORAL
COMMUNITY
Start: 2021-04-01

## 2021-08-12 NOTE — PROGRESS NOTES
Reviewed results of coronary angiography with patient.  Left ventricular end-diastolic pressure is 13 so not significantly elevated.  She has mild coronary artery disease.    She is concerned about some lightheadedness which seems to get better if she hydrates and we will decrease her torsemide from 60 mg a day to 40 mg a day.    She does have untreated sleep apnea and I would like her to visit with one of our sleep physicians to see if there is an opportunity for treatment that she would tolerate.    We will also get pulmonary function test that is been 4 years since she has had any PFTs.  She may benefit from pulmonary consultation if these are significantly abnormal.    We talked about a chest x-ray but her recent over read of her CT scan did not suggest any pulmonary disease except for some atelectasis.    Thank you for allowing us to participate in her care.    25 minutes spent on chart review, patient visit, and documentation

## 2021-08-12 NOTE — PROGRESS NOTES
ANTICOAGULATION MANAGEMENT     Caty Ng 76 year old female is on warfarin with therapeutic INR result. (Goal INR 2.0-3.0)    Recent labs: (last 7 days)     08/12/21  1335   INR 2.9*       ASSESSMENT     Source(s): Chart Review and Patient/Caregiver Call       Warfarin doses taken: Warfarin taken as instructed    Diet: No new diet changes identified    New illness, injury, or hospitalization: No    Medication/supplement changes: None noted    Signs or symptoms of bleeding or clotting: No    Previous INR: Therapeutic last visit; previously outside of goal range    Additional findings: None     PLAN     Recommended plan for no diet, medication or health factor changes affecting INR     Dosing Instructions: Continue your current warfarin dose with next INR in 4 weeks       Summary  As of 8/12/2021    Full warfarin instructions:  2 mg every day   Next INR check:  9/9/2021             Telephone call with Caty who verbalizes understanding and agrees to plan    Lab visit scheduled    Education provided: None required    Plan made per [facility] anticoagulation protocol    Nathalie Glaser RN  Anticoagulation Clinic  8/12/2021    _______________________________________________________________________     Anticoagulation Episode Summary     Current INR goal:  2.0-3.0   TTR:  81.3 % (9 mo)   Target end date:     Send INR reminders to:  BREE ANDREW       Comments:           Anticoagulation Care Providers     Provider Role Specialty Phone number    Keke Flores [facility] [phone]

## 2021-08-12 NOTE — LETTER
8/12/2021    JUNO THOMPSON  [address]  [facility] [address]    RE: Caty LUKAS Ng       Dear Colleague,    I had the pleasure of seeing Caty Ng in the [facility].  Reviewed results of coronary angiography with patient.  Left ventricular end-diastolic pressure is 13 so not significantly elevated.  She has mild coronary artery disease.    She is concerned about some lightheadedness which seems to get better if she hydrates and we will decrease her torsemide from 60 mg a day to 40 mg a day.    She does have untreated sleep apnea and I would like her to visit with one of our sleep physicians to see if there is an opportunity for treatment that she would tolerate.    We will also get pulmonary function test that is been 4 years since she has had any PFTs.  She may benefit from pulmonary consultation if these are significantly abnormal.    We talked about a chest x-ray but her recent over read of her CT scan did not suggest any pulmonary disease except for some atelectasis.    Thank you for allowing us to participate in her care.    25 minutes spent on chart review, patient visit, and documentation      Thank you for allowing me to participate in the care of your patient.    Sincerely,     Cameron Soria MD     [facility]

## 2021-08-13 ENCOUNTER — MYC MEDICAL ADVICE (OUTPATIENT)
Dept: PHYSICAL MEDICINE AND REHAB | Facility: CLINIC | Age: 76
End: 2021-08-13

## 2021-08-13 RX ORDER — CARVEDILOL 25 MG/1
TABLET ORAL
Qty: 90 TABLET | Refills: 3 | Status: SHIPPED | OUTPATIENT
Start: 2021-08-13 | End: 2022-08-31

## 2021-08-16 ENCOUNTER — TELEPHONE (OUTPATIENT)
Dept: PHYSICAL MEDICINE AND REHAB | Facility: CLINIC | Age: 76
End: 2021-08-16

## 2021-08-16 DIAGNOSIS — M16.11 PRIMARY OSTEOARTHRITIS OF RIGHT HIP: Primary | ICD-10-CM

## 2021-08-16 NOTE — TELEPHONE ENCOUNTER
Phone call to patient to discuss PSP's response. Discussed his recommendation for orthopaedic referral. She has had orthopedic care about 15 years ago through [facility]. She is going to call her insurance to verify coverage and then call us back with where she wants referral sent to.

## 2021-08-16 NOTE — TELEPHONE ENCOUNTER
----- Message from Miguel Bell DO sent at 8/13/2021  7:51 AM CDT -----  Please call the patient let her know I have reviewed the x-ray of her right hip and Dr. Polo absence today.  He does show severe degenerative changes of the right hip which seem to have worsened over time from last x-rays.  I recommend either orthopedic consult versus fluoroscopic hip joint injection.

## 2021-08-16 NOTE — TELEPHONE ENCOUNTER
I have reviewed her x-rays and she has severe osteoarthritis of her right hip that has progressed.      I would recommend surgical evaluation with orthopedics.  Does she have an orthopedic surgeon/group that she prefers?

## 2021-08-16 NOTE — TELEPHONE ENCOUNTER
Phone call to patient to review results and covering provider's recommendations. Results given and explained. Patient states she had sent a message to [facility] and is awaiting his input as well. She reports she is interested in consolidating trips/care to as few as she can. Please advise.

## 2021-08-17 NOTE — TELEPHONE ENCOUNTER
Pt called back. She would like referral to be placed to [facility] with Dr. Kevin Zelaya at the [place] or [place] location. Pt informed that referral will be placed and then we will fax it.

## 2021-08-17 NOTE — TELEPHONE ENCOUNTER
Pt called back. She reports [facility] is in network for her insurance. She states she would like to call [facility] first to confirm the provider that she saw is still at the [place] location. She will call us back so we can send the referral for her.

## 2021-08-26 DIAGNOSIS — E03.9 HYPOTHYROIDISM, UNSPECIFIED TYPE: ICD-10-CM

## 2021-08-27 ENCOUNTER — TRANSFERRED RECORDS (OUTPATIENT)
Dept: HEALTH INFORMATION MANAGEMENT | Facility: CLINIC | Age: 76
End: 2021-08-27

## 2021-08-28 RX ORDER — LEVOTHYROXINE SODIUM 75 UG/1
TABLET ORAL
Qty: 90 TABLET | Refills: 0 | Status: SHIPPED | OUTPATIENT
Start: 2021-08-28 | End: 2021-11-16

## 2021-09-06 NOTE — TELEPHONE ENCOUNTER
Patient called at [phone]  No answer at this time  Left message on answering machine  for call back to ER  Prescription approved per [facility].     Kaleigh Loomis RN

## 2021-09-08 ENCOUNTER — HOSPITAL ENCOUNTER (OUTPATIENT)
Dept: RESPIRATORY THERAPY | Facility: HOSPITAL | Age: 76
Discharge: HOME | End: 2021-09-08
Attending: INTERNAL MEDICINE | Admitting: INTERNAL MEDICINE
Payer: MEDICARE

## 2021-09-08 DIAGNOSIS — R06.09 DYSPNEA ON EXERTION: ICD-10-CM

## 2021-09-08 LAB
DLCOCOR-%PRED-PRE: 73 %
DLCOCOR-PRE: 14.12 ML/MIN/MMHG
DLCOUNC-%PRED-PRE: 73 %
DLCOUNC-PRE: 14.16 ML/MIN/MMHG
DLCOUNC-PRED: 19.18 ML/MIN/MMHG
ERV-%PRED-PRE: 136 %
ERV-PRE: 0.13 L
ERV-PRED: 0.09 L
EXPTIME-PRE: 6.98 SEC
FEF2575-%PRED-POST: 68 %
FEF2575-%PRED-PRE: 54 %
FEF2575-POST: 1.18 L/SEC
FEF2575-PRE: 0.95 L/SEC
FEF2575-PRED: 1.74 L/SEC
FEFMAX-%PRED-PRE: 64 %
FEFMAX-PRE: 3.42 L/SEC
FEFMAX-PRED: 5.33 L/SEC
FEV1-%PRED-PRE: 54 %
FEV1-PRE: 1.15 L
FEV1FEV6-PRE: 78 %
FEV1FEV6-PRED: 79 %
FEV1FVC-PRE: 78 %
FEV1FVC-PRED: 78 %
FEV1SVC-PRE: 80 %
FEV1SVC-PRED: 70 %
FIFMAX-PRE: 1.5 L/SEC
FRCPLETH-%PRED-PRE: 77 %
FRCPLETH-PRE: 2.09 L
FRCPLETH-PRED: 2.72 L
FVC-%PRED-PRE: 54 %
FVC-PRE: 1.48 L
FVC-PRED: 2.72 L
HGB BLD-MCNC: 13.5 G/DL (ref 11.7–15.7)
IC-%PRED-PRE: 45 %
IC-PRE: 1.32 L
IC-PRED: 2.89 L
RVPLETH-%PRED-PRE: 92 %
RVPLETH-PRE: 1.97 L
RVPLETH-PRED: 2.13 L
TLCPLETH-%PRED-PRE: 69 %
TLCPLETH-PRE: 3.41 L
TLCPLETH-PRED: 4.94 L
VA-%PRED-PRE: 63 %
VA-PRE: 2.98 L
VC-%PRED-PRE: 48 %
VC-PRE: 1.44 L
VC-PRED: 2.98 L

## 2021-09-08 PROCEDURE — 94726 PLETHYSMOGRAPHY LUNG VOLUMES: CPT

## 2021-09-08 PROCEDURE — 250N000009 HC RX 250: Performed by: INTERNAL MEDICINE

## 2021-09-08 PROCEDURE — 94640 AIRWAY INHALATION TREATMENT: CPT

## 2021-09-08 PROCEDURE — 94726 PLETHYSMOGRAPHY LUNG VOLUMES: CPT | Mod: 26 | Performed by: INTERNAL MEDICINE

## 2021-09-08 PROCEDURE — 94729 DIFFUSING CAPACITY: CPT

## 2021-09-08 PROCEDURE — 94060 EVALUATION OF WHEEZING: CPT

## 2021-09-08 PROCEDURE — 94640 AIRWAY INHALATION TREATMENT: CPT | Mod: 26 | Performed by: INTERNAL MEDICINE

## 2021-09-08 PROCEDURE — 94060 EVALUATION OF WHEEZING: CPT | Mod: 26 | Performed by: INTERNAL MEDICINE

## 2021-09-08 PROCEDURE — 999N000157 HC STATISTIC RCP TIME EA 10 MIN

## 2021-09-08 PROCEDURE — 85018 HEMOGLOBIN: CPT | Performed by: INTERNAL MEDICINE

## 2021-09-08 PROCEDURE — 94729 DIFFUSING CAPACITY: CPT | Mod: 26 | Performed by: INTERNAL MEDICINE

## 2021-09-08 PROCEDURE — 36415 COLL VENOUS BLD VENIPUNCTURE: CPT | Performed by: INTERNAL MEDICINE

## 2021-09-08 RX ORDER — ALBUTEROL SULFATE 0.83 MG/ML
2.5 SOLUTION RESPIRATORY (INHALATION)
Status: COMPLETED | OUTPATIENT
Start: 2021-09-08 | End: 2021-09-08

## 2021-09-08 RX ADMIN — ALBUTEROL SULFATE 2.5 MG: 2.5 SOLUTION RESPIRATORY (INHALATION) at 11:06

## 2021-09-08 NOTE — PROGRESS NOTES
RESPIRATORY CARE NOTE    Complete Pulmonary Function Test completed by patient.  Good patient effort and cooperation. Albuterol 2.5 mg neb given for bronchodilation.  The results of this test meet the ATS standards for acceptability and repeatability, except Dlco Iv < 90% 0f VC in one effort. PT left in no distress.    Maria E Longoria, RT  9/8/2021

## 2021-09-09 ENCOUNTER — LAB (OUTPATIENT)
Dept: LAB | Facility: CLINIC | Age: 76
End: 2021-09-09
Payer: MEDICARE

## 2021-09-09 ENCOUNTER — ANTICOAGULATION THERAPY VISIT (OUTPATIENT)
Dept: ANTICOAGULATION | Facility: CLINIC | Age: 76
End: 2021-09-09

## 2021-09-09 DIAGNOSIS — I48.21 PERMANENT ATRIAL FIBRILLATION (H): ICD-10-CM

## 2021-09-09 DIAGNOSIS — I48.20 CHRONIC ATRIAL FIBRILLATION (H): ICD-10-CM

## 2021-09-09 DIAGNOSIS — Z79.01 LONG TERM (CURRENT) USE OF ANTICOAGULANTS: ICD-10-CM

## 2021-09-09 LAB — INR BLD: 2.2 (ref 0.9–1.1)

## 2021-09-09 PROCEDURE — 85610 PROTHROMBIN TIME: CPT

## 2021-09-09 NOTE — PROGRESS NOTES
ANTICOAGULATION MANAGEMENT     Caty Ng 76 year old female is on warfarin with therapeutic INR result. (Goal INR 2.0-3.0)    Recent labs: (last 7 days)     09/09/21  1257   INR 2.2*       ASSESSMENT     Source(s): Chart Review and Patient/Caregiver Call       Warfarin doses taken: Warfarin taken as instructed    Diet: No new diet changes identified    New illness, injury, or hospitalization: No    Medication/supplement changes: None noted    Signs or symptoms of bleeding or clotting: No    Previous INR: Therapeutic last 2(+) visits    Additional findings: None     PLAN     Recommended plan for no diet, medication or health factor changes affecting INR     Dosing Instructions: Continue your current warfarin dose with next INR in 4 weeks       Summary  As of 9/9/2021    Full warfarin instructions:  2 mg every day   Next INR check:  10/7/2021             Telephone call with Caty who verbalizes understanding and agrees to plan    Lab visit scheduled    Education provided: None required    Plan made per [facility] anticoagulation protocol    Nathalie Glaser RN  Anticoagulation Clinic  9/9/2021    _______________________________________________________________________     Anticoagulation Episode Summary     Current INR goal:  2.0-3.0   TTR:  83.0 % (9.9 mo)   Target end date:     Send INR reminders to:  BREE ANDREW       Comments:           Anticoagulation Care Providers     Provider Role Specialty Phone number    Keke Flores [facility] [phone]

## 2021-09-12 ENCOUNTER — HEALTH MAINTENANCE LETTER (OUTPATIENT)
Age: 76
End: 2021-09-12

## 2021-09-16 NOTE — TELEPHONE ENCOUNTER
RECORDS RECEIVED FROM: Primary osteoarthritis of right hip /Dr Zelaya at [facility]/ XR/ Medicare/ ortho con   DATE RECEIVED: Oct 7, 2021     NOTES STATUS DETAILS   OFFICE NOTE from referring provider In process-R    XRAYS (IMAGES & REPORTS) In process-R    09/16/21   3:34 PM   FAXED REQUEST TO [facility]  Joy Chavis CMA    09/22/21   2:26 PM   [facility] RECORDS SENT TO SCANNING  IMAGES RESOLVED IN PACS  COMPLETE  Joy Chavis CMA

## 2021-09-20 DIAGNOSIS — I50.32 CHRONIC DIASTOLIC HEART FAILURE (H): ICD-10-CM

## 2021-09-21 RX ORDER — LOSARTAN POTASSIUM 25 MG/1
25 TABLET ORAL DAILY
Qty: 90 TABLET | Refills: 3 | Status: SHIPPED | OUTPATIENT
Start: 2021-09-21 | End: 2022-10-06

## 2021-09-21 NOTE — TELEPHONE ENCOUNTER
LOSARTAN TAB 25MG  Last Written Prescription Date:  6/29/2021  Last Fill Quantity: 90,   # refills: 0  Last Office Visit :  8/12/2021  Future Office visit:  None  90 Tabs, 3 Refills sent to pharm 9/21/2021      Madison Rodriguez RN  Central Triage Red Flags/Med Refills      Warnings Override History for losartan (COZAAR) 25 MG tablet [[id number]]    Overridden by Jyothi Hahn CNP on Jul 21, 2021 10:59 AM   Allergy/Contraindication   1. LISINOPRIL [Level: Cross-sensitive Class Match] [Reason: Tolerated medication/side effects in past]      Overridden by Madison Rodriguez RN on Jun 29, 2021 7:24 AM   Allergy/Contraindication   1. LISINOPRIL [Level: Cross-sensitive Class Match] [Reason: Tolerated medication/side effects in past]

## 2021-09-24 DIAGNOSIS — M25.551 RIGHT HIP PAIN: Primary | ICD-10-CM

## 2021-10-07 ENCOUNTER — OFFICE VISIT (OUTPATIENT)
Dept: ORTHOPEDICS | Facility: CLINIC | Age: 76
End: 2021-10-07
Payer: MEDICARE

## 2021-10-07 ENCOUNTER — PRE VISIT (OUTPATIENT)
Dept: ORTHOPEDICS | Facility: CLINIC | Age: 76
End: 2021-10-07

## 2021-10-07 ENCOUNTER — ANCILLARY PROCEDURE (OUTPATIENT)
Dept: GENERAL RADIOLOGY | Facility: CLINIC | Age: 76
End: 2021-10-07
Attending: ORTHOPAEDIC SURGERY
Payer: MEDICARE

## 2021-10-07 VITALS — WEIGHT: 243 LBS | HEIGHT: 63 IN | BODY MASS INDEX: 43.05 KG/M2

## 2021-10-07 DIAGNOSIS — M25.551 RIGHT HIP PAIN: Primary | ICD-10-CM

## 2021-10-07 PROCEDURE — 73502 X-RAY EXAM HIP UNI 2-3 VIEWS: CPT | Mod: RT | Performed by: RADIOLOGY

## 2021-10-07 PROCEDURE — 99204 OFFICE O/P NEW MOD 45 MIN: CPT | Mod: GC | Performed by: ORTHOPAEDIC SURGERY

## 2021-10-07 RX ORDER — ATORVASTATIN CALCIUM 10 MG/1
TABLET, FILM COATED ORAL
COMMUNITY
Start: 2021-08-26 | End: 2021-10-08 | Stop reason: DRUGHIGH

## 2021-10-07 ASSESSMENT — ACTIVITIES OF DAILY LIVING (ADL)
ADL_SUM: 39
ADL_MEAN: 2.29
ADL_SUBSCALE_SCORE: 42.64

## 2021-10-07 ASSESSMENT — MIFFLIN-ST. JEOR: SCORE: 1571.37

## 2021-10-07 ASSESSMENT — HOOS S4: HOW SEVERE IS YOUR HIP JOINT STIFFNESS AFTER FIRST WAKENING IN THE MORNING?: MODERATE

## 2021-10-07 NOTE — PROGRESS NOTES
"Chief Complaint: Consult (Primary Osteoarthritis of the right hip / Dr. 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 Patient reports approximately 2 years of insidious right hip/knee pain. She notes intermittent anterior and lateral hip pain, and constant right lateral knee pain, described as \"pinching\", \"11/10\". Worse with standing, walking. Better when laying on her left side, and with tylenol. She uses a walker to ambulate.    She was seen by Raul Polo,  for lumbar stenosis and radicular pain, she received a lumbar transforaminal steroid injection in August, which relieved her symptoms 75% for a few hours.     Location of symptoms:  Right hip/knee  Onset: 2 years  Duration of symptoms: constant  Quality of symptoms: pinching  Severity: 11/10  Alleviating: activity modification, tylenol  Exacerbating: standing, walking, laying on back  Previous Treatments: Previous treatments include activity modification, oral pain medication    Current Status:  Results of the patient s Hip Disability and Osteoarthritis Outcome Score (HOOS)  are as follows (0-100 scales with 100 being the theoretical best):  Pain: 52.5  Symptoms: 45  ADLs: 42.64  Sports/Recreation: 6.25  Quality of Life: 6.25  ([email or web address].nu/)      Past Medical History:   Diagnosis Date     Allergic rhinitis      Arrhythmia 2012     Atrial fibrillation with RVR (H)      Atrial fibrillation, rapid (H) 6/21/2012    Followed by Dr. 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 Cobos, cardiology EF 45 - 50%     Heart valve disease      Hyperlipidemia      Hyperlipidemia 2012     Hypertriglyceridemia      Mediastinal mass 2015    benign, [facility] consult      Mitral valve regurgitation 6/24/2012     Morbid obesity with BMI of 40.0-44.9, adult (H) 4/25/2015     Obesity      BROCK (obstructive sleep apnea)      Scoliosis      Sleep apnea 2010    per pt     Systolic heart failure (H)        Past Surgical History:   Procedure Laterality Date     CV CORONARY ANGIOGRAM N/A 7/21/2021    Procedure: Coronary Angiogram;  Surgeon: Hai Murphy MD;  Location: [facility] CATH LAB CV     CV LEFT HEART CATH N/A 7/21/2021    Procedure: Left Heart Cath;  Surgeon: Hai Murphy MD;  Location: [facility] CATH LAB CV     HYSTERECTOMY, PAP NO LONGER INDICATED  1989     JOINT REPLACEMTN, KNEE RT/LT  2007    right     JOINT REPLACEMTN, KNEE RT/LT Left      SALPINGO OOPHORECTOMY,R/L/BRANDO Bilateral 2003     SURGICAL HISTORY OF -   1989    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       Current Outpatient Medications   Medication Sig Dispense Refill     albuterol (PROAIR HFA/PROVENTIL HFA/VENTOLIN HFA) 108 (90 BASE) MCG/ACT Inhaler Inhale 2 puffs into the lungs every 6 hours as needed for shortness of breath / dyspnea or wheezing Profile Rx: patient will contact pharmacy when needed 1 Inhaler 0     amoxicillin (AMOXIL) 500 MG capsule TAKE 4 CAPSULES BY MOUTH 1 HOUR BEFORE DENTAL APPOINTMENT       ANTIPLATELET MEDICATION NOT PRESCRIBED, INTENTIONAL, Antiplatelet medication not prescribed intentionally due to no PCI       atorvastatin (LIPITOR) 10 MG tablet        atorvastatin (LIPITOR) 80 MG tablet Take 1 tablet (80 mg) by mouth daily 90 tablet 3     carvedilol (COREG) 25 MG tablet TAKE 1/2 TABLET TWICE A DAYWITH MEALS 90 tablet 3     diltiazem ER COATED BEADS (CARDIZEM CD/CARTIA  XT) 240 MG 24 hr capsule TAKE 1 CAPSULE DAILY 10 capsule 1     levothyroxine (SYNTHROID/LEVOTHROID) 75 MCG tablet TAKE 1 TABLET DAILY. NEED  APPOINTMENT 90 tablet 0     losartan (COZAAR) 25 MG tablet Take 1 tablet (25 mg) by mouth daily 90 tablet 3     torsemide (DEMADEX) 20 MG tablet Take 3 tablets (60 mg) by mouth daily Take 3 tabs daily 270 tablet 1     warfarin ANTICOAGULANT (COUMADIN) 2 MG tablet Take 2 mg by mouth daily            Allergies   Allergen Reactions     Lisinopril      cough     Metal [Staples] Other (See Comments)     Metal allergy not clearly specified - patient had a stainless steel screw put in her foot that needed to be removed due to allergy/hypersensitivity      Metoprolol Fatigue       SOCIAL HISTORY:    Social History     Socioeconomic History     Marital status:      Spouse name: Bill      Number of children: 2     Years of education: Not on file     Highest education level: Not on file   Occupational History     Occupation: [employer]      Employer: RETIRED   Tobacco Use     Smoking status: Former Smoker     Packs/day: 0.50     Years: 20.00     Pack years: 10.00     Types: Cigarettes     Quit date: 1982     Years since quittin.7     Smokeless tobacco: Never Used   Substance and Sexual Activity     Alcohol use: Yes     Comment: 1 beers per week     Drug use: Never     Sexual activity: Yes     Partners: Male     Birth control/protection: Post-menopausal   Other Topics Concern     Parent/sibling w/ CABG, MI or angioplasty before 65F 55M? Yes   Social History Narrative     Not on file     Social Determinants of Health     Financial Resource Strain:      Difficulty of Paying Living Expenses:    Food Insecurity:      Worried About Running Out of Food in the Last Year:      Ran Out of Food in the Last Year:    Transportation Needs:      Lack of Transportation (Medical):      Lack of Transportation (Non-Medical):    Physical Activity:      Days of Exercise per Week:      Minutes of  "Exercise per Session:    Stress:      Feeling of Stress :    Social Connections:      Frequency of Communication with Friends and Family:      Frequency of Social Gatherings with Friends and Family:      Attends Jewish Services:      Active Member of Clubs or Organizations:      Attends Club or Organization Meetings:      Marital Status:    Intimate Partner Violence:      Fear of Current or Ex-Partner:      Emotionally Abused:      Physically Abused:      Sexually Abused:        FAMILY HISTORY: Reviewed in EMR      REVIEW OF SYSTEMS: Positive for that noted in past medical history and history of present illness and otherwise reviewed in EMR     PHYSICAL EXAM:    VITAL SIGNS: Ht 1.6 m (5' 3\")   Wt 110.2 kg (243 lb)   BMI 43.05 kg/m    RESP: Non labored breathing  MUSCULOSKELETAL:    RLE:  - Inspection: no gross deformity, ambulates with walker  - Palpation: no tenderness to palpation about the hip, some tenderness to palpation lateral knee  - ROM: limited assessment due to inability to lay down flat; flexion 90, IRF 5, ERF 30  - Pain in groin with FADIR   - Hip Strength: 4/5 flexion, 4/5 extension, 4/5 abd, 4/5 add  - SILT spn, dpn, tib, ewa, saph distribs    IMAGING:  Right hip/pelvis x-ray:   - Severe osteoarthritis of the right hip, post-surgical changes to bilateral superior and inferior pubic rami    ASSESSMENT:    1. Severe osteoarthritis, right hip  2. Lumbar radiulopathy    PLAN:  Mrs. Ng is a 75 y/o female with severe osteoarthritis of the right hip, as well as symptoms consistent with lumbar radiculopathy. Patient's exam revealed multiple potential pain generators, however the most consistent and reproducible pain came from stressing/ranging the right hip, with significant pain reproduced into the right groin. Patient would likely benefit from total hip arthroplasty, however patient's BMI is greater than 40 and to reduce complications from surgery, we recommend she work to reduce her weight prior to " any scheduled surgery. Patient understands importance of weight loss and is agreeable to plan.    - Referral to bariatric clinic to help reduce weight prior to potential right total hip arthroplasty, goal BMI less than 40    --  Wiflrid Diop MD  Orthopedic Surgery PGY-1

## 2021-10-07 NOTE — PROGRESS NOTES
I have personally examined this patient and have reviewed the clinical presentation and progress note with the resident. I agree with the treatment plan as outlined. The plan was formulated with the resident on the day of the resident's dictation    While suspect that there is a spine/SI component here (and the patient had a very strong positive response to epidural recently) the severe OA with bone lss in the hip and her symptomatic hip physical examination suggest that she would likely benefit significantly from considering arthroplasty. We discussed the procedure and some of the barriers to safe surgery. In her favor is the fact that she did not undergo radioation after her sarcoma resection. At issue may be her BMI which when combined with her lumbar spine degenerative changes and skeletal size consistent with requiring a smaller implant might suggest that she may want to consider weight loss prior to undergoing surgery. Discussed at length. She would like to be seen in the bariatric clinic for preoperative weight management. Happy to see back.

## 2021-10-07 NOTE — LETTER
"    10/7/2021         RE: Caty Ng  [address]  [place] [address]        Dear Colleague,    Thank you for referring your patient, Caty Ng, to the [facility] [place]. Please see a copy of my visit note below.    I have personally examined this patient and have reviewed the clinical presentation and progress note with the resident. I agree with the treatment plan as outlined. The plan was formulated with the resident on the day of the resident's dictation    While suspect that there is a spine/SI component here (and the patient had a very strong positive response to epidural recently) the severe OA with bone lss in the hip and her symptomatic hip physical examination suggest that she would likely benefit significantly from considering arthroplasty. We discussed the procedure and some of the barriers to safe surgery. In her favor is the fact that she did not undergo radioation after her sarcoma resection. At issue may be her BMI which when combined with her lumbar spine degenerative changes and skeletal size consistent with requiring a smaller implant might suggest that she may want to consider weight loss prior to undergoing surgery. Discussed at length. She would like to be seen in the bariatric clinic for preoperative weight management. Happy to see back.     Chief Complaint: Consult (Primary Osteoarthritis of the right hip / Dr. 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 Patient reports approximately 2 years of insidious right hip/knee pain. She notes intermittent anterior and lateral hip pain, and constant right lateral knee pain, described as \"pinching\", \"11/10\". Worse with standing, walking. Better when laying on her left side, " and with tylenol. She uses a walker to ambulate.    She was seen by Raul Polo DO for lumbar stenosis and radicular pain, she received a lumbar transforaminal steroid injection in August, which relieved her symptoms 75% for a few hours.     Location of symptoms:  Right hip/knee  Onset: 2 years  Duration of symptoms: constant  Quality of symptoms: pinching  Severity: 11/10  Alleviating: activity modification, tylenol  Exacerbating: standing, walking, laying on back  Previous Treatments: Previous treatments include activity modification, oral pain medication    Current Status:  Results of the patient s Hip Disability and Osteoarthritis Outcome Score (HOOS)  are as follows (0-100 scales with 100 being the theoretical best):  Pain: 52.5  Symptoms: 45  ADLs: 42.64  Sports/Recreation: 6.25  Quality of Life: 6.25  ([email or web address].nu/)      Past Medical History:   Diagnosis Date     Allergic rhinitis      Arrhythmia 2012     Atrial fibrillation with RVR (H)      Atrial fibrillation, rapid (H) 6/21/2012    Followed by Dr. 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 Cobos, cardiology EF 45 - 50%     Heart valve disease      Hyperlipidemia      Hyperlipidemia 2012     Hypertriglyceridemia      Mediastinal mass 2015    benign, [facility] consult      Mitral valve regurgitation 6/24/2012     Morbid obesity with BMI of 40.0-44.9, adult (H) 4/25/2015     Obesity      BROCK (obstructive sleep apnea)      Scoliosis      Sleep apnea 2010    per pt     Systolic heart failure (H)        Past Surgical History:   Procedure Laterality Date      CV CORONARY ANGIOGRAM N/A 7/21/2021    Procedure: Coronary Angiogram;  Surgeon: Hai Murphy MD;  Location: [facility] CATH LAB CV     CV LEFT HEART CATH N/A 7/21/2021    Procedure: Left Heart Cath;  Surgeon: Hai Murphy MD;  Location: [facility] CATH LAB CV     HYSTERECTOMY, PAP NO LONGER INDICATED  1989     JOINT REPLACEMTN, KNEE RT/LT  2007    right     JOINT REPLACEMTN, KNEE RT/LT Left      SALPINGO OOPHORECTOMY,R/L/BRANDO Bilateral 2003     SURGICAL HISTORY OF -   1989    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       Current Outpatient Medications   Medication Sig Dispense Refill     albuterol (PROAIR HFA/PROVENTIL HFA/VENTOLIN HFA) 108 (90 BASE) MCG/ACT Inhaler Inhale 2 puffs into the lungs every 6 hours as needed for shortness of breath / dyspnea or wheezing Profile Rx: patient will contact pharmacy when needed 1 Inhaler 0     amoxicillin (AMOXIL) 500 MG capsule TAKE 4 CAPSULES BY MOUTH 1 HOUR BEFORE DENTAL APPOINTMENT       ANTIPLATELET MEDICATION NOT PRESCRIBED, INTENTIONAL, Antiplatelet medication not prescribed intentionally due to no PCI       atorvastatin (LIPITOR) 10 MG tablet        atorvastatin (LIPITOR) 80 MG tablet Take 1 tablet (80 mg) by mouth daily 90 tablet 3     carvedilol (COREG) 25 MG tablet TAKE 1/2 TABLET TWICE A DAYWITH MEALS 90 tablet 3     diltiazem ER COATED BEADS (CARDIZEM CD/CARTIA XT) 240 MG 24 hr capsule TAKE 1 CAPSULE DAILY 10 capsule 1     levothyroxine (SYNTHROID/LEVOTHROID) 75 MCG tablet TAKE 1 TABLET DAILY. NEED  APPOINTMENT 90 tablet 0     losartan (COZAAR) 25 MG tablet Take 1 tablet (25 mg) by mouth daily 90 tablet 3     torsemide (DEMADEX) 20 MG tablet Take 3 tablets (60 mg) by mouth daily Take 3 tabs daily 270 tablet 1     warfarin ANTICOAGULANT (COUMADIN) 2 MG tablet Take 2 mg by mouth daily            Allergies   Allergen Reactions     Lisinopril      cough     Metal [Staples] Other (See Comments)     Metal allergy not clearly specified - patient had a  stainless steel screw put in her foot that needed to be removed due to allergy/hypersensitivity      Metoprolol Fatigue       SOCIAL HISTORY:    Social History     Socioeconomic History     Marital status:      Spouse name: Bill      Number of children: 2     Years of education: Not on file     Highest education level: Not on file   Occupational History     Occupation: taxes      Employer: RETIRED   Tobacco Use     Smoking status: Former Smoker     Packs/day: 0.50     Years: 20.00     Pack years: 10.00     Types: Cigarettes     Quit date: 1982     Years since quittin.7     Smokeless tobacco: Never Used   Substance and Sexual Activity     Alcohol use: Yes     Comment: 1 beers per week     Drug use: Never     Sexual activity: Yes     Partners: Male     Birth control/protection: Post-menopausal   Other Topics Concern     Parent/sibling w/ CABG, MI or angioplasty before 65F 55M? Yes   Social History Narrative     Not on file     Social Determinants of Health     Financial Resource Strain:      Difficulty of Paying Living Expenses:    Food Insecurity:      Worried About Running Out of Food in the Last Year:      Ran Out of Food in the Last Year:    Transportation Needs:      Lack of Transportation (Medical):      Lack of Transportation (Non-Medical):    Physical Activity:      Days of Exercise per Week:      Minutes of Exercise per Session:    Stress:      Feeling of Stress :    Social Connections:      Frequency of Communication with Friends and Family:      Frequency of Social Gatherings with Friends and Family:      Attends Latter day Services:      Active Member of Clubs or Organizations:      Attends Club or Organization Meetings:      Marital Status:    Intimate Partner Violence:      Fear of Current or Ex-Partner:      Emotionally Abused:      Physically Abused:      Sexually Abused:        FAMILY HISTORY: Reviewed in EMR      REVIEW OF SYSTEMS: Positive for that noted in past medical history and  "history of present illness and otherwise reviewed in EMR     PHYSICAL EXAM:    VITAL SIGNS: Ht 1.6 m (5' 3\")   Wt 110.2 kg (243 lb)   BMI 43.05 kg/m    RESP: Non labored breathing  MUSCULOSKELETAL:    RLE:  - Inspection: no gross deformity, ambulates with walker  - Palpation: no tenderness to palpation about the hip, some tenderness to palpation lateral knee  - ROM: limited assessment due to inability to lay down flat; flexion 90, IRF 5, ERF 30  - Pain in groin with FADIR   - Hip Strength: 4/5 flexion, 4/5 extension, 4/5 abd, 4/5 add  - SILT spn, dpn, tib, ewa, saph distribs    IMAGING:  Right hip/pelvis x-ray:   - Severe osteoarthritis of the right hip, post-surgical changes to bilateral superior and inferior pubic rami    ASSESSMENT:    1. Severe osteoarthritis, right hip  2. Lumbar radiulopathy    PLAN:  Mrs. Ng is a 75 y/o female with severe osteoarthritis of the right hip, as well as symptoms consistent with lumbar radiculopathy. Patient's exam revealed multiple potential pain generators, however the most consistent and reproducible pain came from stressing/ranging the right hip, with significant pain reproduced into the right groin. Patient would likely benefit from total hip arthroplasty, however patient's BMI is greater than 40 and to reduce complications from surgery, we recommend she work to reduce her weight prior to any scheduled surgery. Patient understands importance of weight loss and is agreeable to plan.    - Referral to bariatric clinic to help reduce weight prior to potential right total hip arthroplasty, goal BMI less than 40    --  Wilfrid Diop MD  Orthopedic Surgery PGY-1      "

## 2021-10-07 NOTE — NURSING NOTE
"Reason For Visit:   Chief Complaint   Patient presents with     Consult     Primary Osteoarthritis of the right hip / Dr. Zelaya [facility]       Ht 1.6 m (5' 3\")   Wt 110.2 kg (243 lb)   BMI 43.05 kg/m      Pain Assessment  Patient Currently in Pain: Yes  0-10 Pain Scale: 10 (Pain in knee is vvery sharp, originates in hip and travels to the knee)  Primary Pain Location: Hip  Other Pain Locations: Knee Right    Tay Jonas, EMT    "

## 2021-10-08 ENCOUNTER — TELEPHONE (OUTPATIENT)
Dept: CARDIOLOGY | Facility: CLINIC | Age: 76
End: 2021-10-08

## 2021-10-08 DIAGNOSIS — I42.8 NON-ISCHEMIC CARDIOMYOPATHY (H): ICD-10-CM

## 2021-10-08 RX ORDER — ATORVASTATIN CALCIUM 80 MG/1
80 TABLET, FILM COATED ORAL DAILY
Qty: 90 TABLET | Refills: 3 | Status: SHIPPED | OUTPATIENT
Start: 2021-10-08 | End: 2022-11-07

## 2021-10-08 NOTE — TELEPHONE ENCOUNTER
----- Message from Jumana Bonner sent at 10/8/2021  2:02 PM CDT -----  Regarding: LUIS pt  Patient has already contacted their pharmacy. The medication or refill issue is below:    Ordering Cardiologist: LUIS   Medication: Atorvastatin  Issue / Concern: She has a weeks and a half left - MY had originally written the script   Preferred Pharmacy/City: [facility] Mail order   Best Phone Number for Patient: [phone]    Additional Info:

## 2021-10-08 NOTE — TELEPHONE ENCOUNTER
Atorvastatin 80 mg sent.  Atorvastatin 10 mg discontinued.  Pt was discharged after coronary angiogram on 80 mg.  -latasha

## 2021-10-11 ENCOUNTER — ANTICOAGULATION THERAPY VISIT (OUTPATIENT)
Dept: ANTICOAGULATION | Facility: CLINIC | Age: 76
End: 2021-10-11

## 2021-10-11 ENCOUNTER — LAB (OUTPATIENT)
Dept: LAB | Facility: CLINIC | Age: 76
End: 2021-10-11
Payer: MEDICARE

## 2021-10-11 DIAGNOSIS — I48.21 PERMANENT ATRIAL FIBRILLATION (H): ICD-10-CM

## 2021-10-11 DIAGNOSIS — I48.20 CHRONIC ATRIAL FIBRILLATION (H): ICD-10-CM

## 2021-10-11 DIAGNOSIS — Z79.01 LONG TERM (CURRENT) USE OF ANTICOAGULANTS: ICD-10-CM

## 2021-10-11 LAB — INR BLD: 2.8 (ref 0.9–1.1)

## 2021-10-11 PROCEDURE — 85610 PROTHROMBIN TIME: CPT

## 2021-10-11 NOTE — PROGRESS NOTES
ANTICOAGULATION MANAGEMENT     Caty Ng 76 year old female is on warfarin with therapeutic  INR result. (Goal INR 2.0-3.0)    Recent labs: (last 7 days)     10/11/21  1316   INR 2.8*       ASSESSMENT     Source(s): Chart Review, Patient/Caregiver Call and Template       Warfarin doses taken: Warfarin taken as instructed    Diet: No new diet changes identified    New illness, injury, or hospitalization: No    Medication/supplement changes: None noted    Signs or symptoms of bleeding or clotting: No    Previous INR: Therapeutic last 2(+) visits    Additional findings: None     PLAN     Recommended plan for no diet, medication or health factor changes affecting INR     Dosing Instructions: Continue your current warfarin dose with next INR in 5 weeks       Summary  As of 10/11/2021    Full warfarin instructions:  2 mg every day   Next INR check:  11/15/2021             Telephone call with Caty who verbalizes understanding and agrees to plan and who agrees to plan and repeated back plan correctly    Patient elected to schedule next visit 11/15/21    Education provided: Goal range and significance of current result    Plan made per [facility] anticoagulation protocol    Lyudmila Zambrano RN  Anticoagulation Clinic  10/11/2021    _______________________________________________________________________     Anticoagulation Episode Summary     Current INR goal:  2.0-3.0   TTR:  84.7 % (11 mo)   Target end date:     Send INR reminders to:  BREE ANDREW       Comments:           Anticoagulation Care Providers     Provider Role Specialty Phone number    Keke Flores Referring Family Medicine [phone]

## 2021-11-15 ENCOUNTER — LAB (OUTPATIENT)
Dept: LAB | Facility: CLINIC | Age: 76
End: 2021-11-15
Payer: MEDICARE

## 2021-11-15 ENCOUNTER — ANTICOAGULATION THERAPY VISIT (OUTPATIENT)
Dept: ANTICOAGULATION | Facility: CLINIC | Age: 76
End: 2021-11-15

## 2021-11-15 DIAGNOSIS — I48.20 CHRONIC ATRIAL FIBRILLATION (H): ICD-10-CM

## 2021-11-15 DIAGNOSIS — Z79.01 LONG TERM (CURRENT) USE OF ANTICOAGULANTS: ICD-10-CM

## 2021-11-15 DIAGNOSIS — I48.21 PERMANENT ATRIAL FIBRILLATION (H): ICD-10-CM

## 2021-11-15 LAB — INR BLD: 3.1 (ref 0.9–1.1)

## 2021-11-15 PROCEDURE — 36416 COLLJ CAPILLARY BLOOD SPEC: CPT

## 2021-11-15 PROCEDURE — 85610 PROTHROMBIN TIME: CPT

## 2021-11-15 NOTE — PROGRESS NOTES
ANTICOAGULATION MANAGEMENT     Caty Ng 76 year old female is on warfarin with supratherapeutic INR result. (Goal INR 2.0-3.0)    Recent labs: (last 7 days)     11/15/21  1323   INR 3.1*       ASSESSMENT     Source(s): Chart Review and Template       Warfarin doses taken: Warfarin taken as instructed    Diet: No new diet changes identified    New illness, injury, or hospitalization: No    Medication/supplement changes: None noted    Signs or symptoms of bleeding or clotting: No    Previous INR: Therapeutic last 2(+) visits    Additional findings: None     PLAN     Recommended plan for no diet, medication or health factor changes affecting INR     Dosing Instructions: Continue your current warfarin dose with next INR in 2 weeks   Suggested she add a serving of green this week, then usual amount    Summary  As of 11/15/2021    Full warfarin instructions:  2 mg every day   Next INR check:  11/29/2021             Detailed voice message left for Caty with dosing instructions and follow up date.     Contact [phone] to schedule and with any changes, questions or concerns.     Education provided: None required and Impact of vitamin K foods on INR    Plan made per [facility] anticoagulation protocol    Nathalie Glaser RN  Anticoagulation Clinic  11/15/2021    _______________________________________________________________________     Anticoagulation Episode Summary     Current INR goal:  2.0-3.0   TTR:  82.9 % (1 y)   Target end date:     Send INR reminders to:  BREE ANDREW       Comments:           Anticoagulation Care Providers     Provider Role Specialty Phone number    Keke Flores Referring Family Medicine [phone]

## 2021-11-29 ENCOUNTER — LAB (OUTPATIENT)
Dept: LAB | Facility: CLINIC | Age: 76
End: 2021-11-29
Payer: MEDICARE

## 2021-11-29 ENCOUNTER — ANTICOAGULATION THERAPY VISIT (OUTPATIENT)
Dept: ANTICOAGULATION | Facility: CLINIC | Age: 76
End: 2021-11-29

## 2021-11-29 DIAGNOSIS — I48.20 CHRONIC ATRIAL FIBRILLATION (H): ICD-10-CM

## 2021-11-29 DIAGNOSIS — Z79.01 LONG TERM (CURRENT) USE OF ANTICOAGULANTS: ICD-10-CM

## 2021-11-29 DIAGNOSIS — I48.21 PERMANENT ATRIAL FIBRILLATION (H): ICD-10-CM

## 2021-11-29 LAB — INR BLD: 3.4 (ref 0.9–1.1)

## 2021-11-29 PROCEDURE — 36416 COLLJ CAPILLARY BLOOD SPEC: CPT

## 2021-11-29 PROCEDURE — 85610 PROTHROMBIN TIME: CPT

## 2021-11-29 NOTE — PROGRESS NOTES
ANTICOAGULATION MANAGEMENT     Caty Ng 76 year old female is on warfarin with supratherapeutic INR result. (Goal INR 2.0-3.0)    Recent labs: (last 7 days)     11/29/21  1321   INR 3.4*       ASSESSMENT     Source(s): Chart Review and Patient/Caregiver Call       Warfarin doses taken: Warfarin taken as instructed    Diet: No new diet changes identified    New illness, injury, or hospitalization: No    Medication/supplement changes: None noted    Signs or symptoms of bleeding or clotting: No    Previous INR: Supratherapeutic    Additional findings: None     PLAN     Recommended plan for no diet, medication or health factor changes affecting INR     Dosing Instructions: Hold dose then Decrease your warfarin dose (14% change) with next INR in 2 weeks       Summary  As of 11/29/2021    Full warfarin instructions:  1 mg every Tue, Fri; 2 mg all other days   Next INR check:  12/13/2021             Telephone call with Caty who verbalizes understanding and agrees to plan    Lab visit scheduled    Education provided: None required    Plan made per [facility] anticoagulation protocol    Nathalie Glaser RN  Anticoagulation Clinic  11/29/2021    _______________________________________________________________________     Anticoagulation Episode Summary     Current INR goal:  2.0-3.0   TTR:  79.1 % (1 y)   Target end date:     Send INR reminders to:  BREE ANDREW       Comments:           Anticoagulation Care Providers     Provider Role Specialty Phone number    Keke Flores Referring Family Medicine [phone]

## 2021-11-30 DIAGNOSIS — I50.32 CHRONIC DIASTOLIC HEART FAILURE (H): ICD-10-CM

## 2021-11-30 RX ORDER — TORSEMIDE 20 MG/1
40 TABLET ORAL DAILY
Qty: 180 TABLET | Refills: 1 | Status: SHIPPED | OUTPATIENT
Start: 2021-11-30 | End: 2022-08-11

## 2021-12-13 ENCOUNTER — LAB (OUTPATIENT)
Dept: LAB | Facility: CLINIC | Age: 76
End: 2021-12-13
Payer: MEDICARE

## 2021-12-13 ENCOUNTER — ANTICOAGULATION THERAPY VISIT (OUTPATIENT)
Dept: ANTICOAGULATION | Facility: CLINIC | Age: 76
End: 2021-12-13

## 2021-12-13 DIAGNOSIS — I48.20 CHRONIC ATRIAL FIBRILLATION (H): ICD-10-CM

## 2021-12-13 DIAGNOSIS — I48.21 PERMANENT ATRIAL FIBRILLATION (H): ICD-10-CM

## 2021-12-13 DIAGNOSIS — Z79.01 LONG TERM (CURRENT) USE OF ANTICOAGULANTS: ICD-10-CM

## 2021-12-13 LAB — INR BLD: 2.1 (ref 0.9–1.1)

## 2021-12-13 PROCEDURE — 85610 PROTHROMBIN TIME: CPT

## 2021-12-13 PROCEDURE — 36416 COLLJ CAPILLARY BLOOD SPEC: CPT

## 2021-12-13 NOTE — PROGRESS NOTES
ANTICOAGULATION MANAGEMENT     Caty Ng 76 year old female is on warfarin with therapeutic INR result. (Goal INR 2.0-3.0)    Recent labs: (last 7 days)     12/13/21  1049   INR 2.1*       ASSESSMENT     Source(s): Chart Review and Template       Warfarin doses taken: Warfarin taken as instructed    Diet: No new diet changes identified    New illness, injury, or hospitalization: No    Medication/supplement changes: None noted    Signs or symptoms of bleeding or clotting: No    Previous INR: Supratherapeutic    Additional findings: None     PLAN     Recommended plan for no diet, medication or health factor changes affecting INR     Dosing Instructions: Continue your current warfarin dose with next INR in 2 weeks       Summary  As of 12/13/2021    Full warfarin instructions:  1 mg every Tue, Fri; 2 mg all other days   Next INR check:  12/27/2021             Telephone call with Caty who verbalizes understanding and agrees to plan    Lab visit scheduled    Education provided: None required    Plan made per [facility]agulation protocol    Nathalie Glaser RN  Anticoagulation Clinic  12/13/2021    _______________________________________________________________________     Anticoagulation Episode Summary     Current INR goal:  2.0-3.0   TTR:  77.9 % (1 y)   Target end date:     Send INR reminders to:  BREE ANDREW       Comments:           Anticoagulation Care Providers     Provider Role Specialty Phone number    Keke Flores [facility] [phone]

## 2022-01-02 ENCOUNTER — HEALTH MAINTENANCE LETTER (OUTPATIENT)
Age: 77
End: 2022-01-02

## 2022-01-04 ENCOUNTER — ANTICOAGULATION THERAPY VISIT (OUTPATIENT)
Dept: ANTICOAGULATION | Facility: CLINIC | Age: 77
End: 2022-01-04

## 2022-01-04 ENCOUNTER — LAB (OUTPATIENT)
Dept: LAB | Facility: CLINIC | Age: 77
End: 2022-01-04
Payer: MEDICARE

## 2022-01-04 DIAGNOSIS — I48.21 PERMANENT ATRIAL FIBRILLATION (H): ICD-10-CM

## 2022-01-04 DIAGNOSIS — I48.20 CHRONIC ATRIAL FIBRILLATION (H): ICD-10-CM

## 2022-01-04 DIAGNOSIS — Z79.01 LONG TERM (CURRENT) USE OF ANTICOAGULANTS: ICD-10-CM

## 2022-01-04 LAB — INR BLD: 2.1 (ref 0.9–1.1)

## 2022-01-04 PROCEDURE — 36416 COLLJ CAPILLARY BLOOD SPEC: CPT

## 2022-01-04 PROCEDURE — 85610 PROTHROMBIN TIME: CPT

## 2022-01-04 NOTE — PROGRESS NOTES
ANTICOAGULATION MANAGEMENT     Caty Ng 76 year old female is on warfarin with therapeutic INR result. (Goal INR 2.0-3.0)    Recent labs: (last 7 days)     01/04/22  1340   INR 2.1*       ASSESSMENT     Source(s): Chart Review and Patient/Caregiver Call       Warfarin doses taken: Warfarin taken as instructed    Diet: No new diet changes identified    New illness, injury, or hospitalization: No    Medication/supplement changes: None noted    Signs or symptoms of bleeding or clotting: No    Previous INR: Therapeutic last visit; previously outside of goal range    Additional findings: None     PLAN     Recommended plan for no diet, medication or health factor changes affecting INR     Dosing Instructions: Continue your current warfarin dose with next INR in 3 weeks       Summary  As of 1/4/2022    Full warfarin instructions:  1 mg every Tue, Fri; 2 mg all other days   Next INR check:  1/24/2022             Telephone call with Caty who verbalizes understanding and agrees to plan    Lab visit scheduled    Education provided: Please call back if any changes to your diet, medications or how you've been taking warfarin and Contact [phone] with any changes, questions or concerns.     Plan made per [facility] anticoagulation protocol    Sheridan Isbell, RN  Anticoagulation Clinic  1/4/2022    _______________________________________________________________________     Anticoagulation Episode Summary     Current INR goal:  2.0-3.0   TTR:  77.9 % (1 y)   Target end date:     Send INR reminders to:  BREE ANDREW       Comments:           Anticoagulation Care Providers     Provider Role Specialty Phone number    Keke Flores Referring Family Medicine [phone]

## 2022-01-18 VITALS
SYSTOLIC BLOOD PRESSURE: 124 MMHG | DIASTOLIC BLOOD PRESSURE: 68 MMHG | WEIGHT: 252 LBS | HEIGHT: 63 IN | OXYGEN SATURATION: 92 % | BODY MASS INDEX: 44.65 KG/M2 | HEART RATE: 61 BPM

## 2022-01-18 VITALS — RESPIRATION RATE: 20 BRPM | HEART RATE: 100 BPM | SYSTOLIC BLOOD PRESSURE: 130 MMHG | DIASTOLIC BLOOD PRESSURE: 76 MMHG

## 2022-01-18 VITALS
RESPIRATION RATE: 13 BRPM | DIASTOLIC BLOOD PRESSURE: 52 MMHG | SYSTOLIC BLOOD PRESSURE: 105 MMHG | HEART RATE: 68 BPM | BODY MASS INDEX: 40.98 KG/M2 | HEIGHT: 65 IN | TEMPERATURE: 98.5 F | WEIGHT: 246 LBS | OXYGEN SATURATION: 92 %

## 2022-01-18 VITALS
SYSTOLIC BLOOD PRESSURE: 110 MMHG | WEIGHT: 251 LBS | HEART RATE: 77 BPM | DIASTOLIC BLOOD PRESSURE: 60 MMHG | BODY MASS INDEX: 43.08 KG/M2 | OXYGEN SATURATION: 95 %

## 2022-01-18 VITALS
DIASTOLIC BLOOD PRESSURE: 58 MMHG | WEIGHT: 255 LBS | BODY MASS INDEX: 43.54 KG/M2 | HEART RATE: 68 BPM | RESPIRATION RATE: 16 BRPM | HEIGHT: 64 IN | SYSTOLIC BLOOD PRESSURE: 90 MMHG

## 2022-01-18 VITALS
BODY MASS INDEX: 43.15 KG/M2 | WEIGHT: 251.4 LBS | RESPIRATION RATE: 16 BRPM | HEART RATE: 76 BPM | SYSTOLIC BLOOD PRESSURE: 124 MMHG | DIASTOLIC BLOOD PRESSURE: 72 MMHG

## 2022-01-24 ENCOUNTER — LAB (OUTPATIENT)
Dept: LAB | Facility: CLINIC | Age: 77
End: 2022-01-24
Payer: MEDICARE

## 2022-01-24 ENCOUNTER — ANTICOAGULATION THERAPY VISIT (OUTPATIENT)
Dept: ANTICOAGULATION | Facility: CLINIC | Age: 77
End: 2022-01-24

## 2022-01-24 DIAGNOSIS — I48.21 PERMANENT ATRIAL FIBRILLATION (H): ICD-10-CM

## 2022-01-24 DIAGNOSIS — I48.20 CHRONIC ATRIAL FIBRILLATION (H): ICD-10-CM

## 2022-01-24 DIAGNOSIS — Z79.01 LONG TERM (CURRENT) USE OF ANTICOAGULANTS: ICD-10-CM

## 2022-01-24 LAB — INR BLD: 1.9 (ref 0.9–1.1)

## 2022-01-24 PROCEDURE — 36416 COLLJ CAPILLARY BLOOD SPEC: CPT

## 2022-01-24 PROCEDURE — 85610 PROTHROMBIN TIME: CPT

## 2022-01-24 NOTE — PROGRESS NOTES
ANTICOAGULATION MANAGEMENT     Caty Ng 77 year old female is on warfarin with subtherapeutic INR result. (Goal INR 2.0-3.0)    Recent labs: (last 7 days)     01/24/22  1321   INR 1.9*       ASSESSMENT     Source(s): Chart Review and Patient/Caregiver Call       Warfarin doses taken: Warfarin taken as instructed    Diet: No new diet changes identified    New illness, injury, or hospitalization: No    Medication/supplement changes: None noted    Signs or symptoms of bleeding or clotting: No    Previous INR: Therapeutic last 2(+) visits    Additional findings: None     PLAN     Recommended plan for no diet, medication or health factor changes affecting INR   2 weeksDosing Instructions:  Increase your warfarin dose (8.3% change) with next INR in 2 weeks       Summary  As of 1/24/2022    Full warfarin instructions:  1 mg every Fri; 2 mg all other days   Next INR check:  2/7/2022             Telephone call with Caty who verbalizes understanding and agrees to plan    Lab visit scheduled    Education provided: None required    Plan made per [facility] anticoagulation protocol    Nathalie Glaser RN  Anticoagulation Clinic  1/24/2022    _______________________________________________________________________     Anticoagulation Episode Summary     Current INR goal:  2.0-3.0   TTR:  75.2 % (1 y)   Target end date:     Send INR reminders to:  BREE ANDREW       Comments:           Anticoagulation Care Providers     Provider Role Specialty Phone number    Keke Flores Referring Family Medicine [phone]

## 2022-02-15 ENCOUNTER — ANTICOAGULATION THERAPY VISIT (OUTPATIENT)
Dept: ANTICOAGULATION | Facility: CLINIC | Age: 77
End: 2022-02-15

## 2022-02-15 ENCOUNTER — LAB (OUTPATIENT)
Dept: LAB | Facility: CLINIC | Age: 77
End: 2022-02-15
Payer: MEDICARE

## 2022-02-15 DIAGNOSIS — Z79.01 LONG TERM (CURRENT) USE OF ANTICOAGULANTS: ICD-10-CM

## 2022-02-15 DIAGNOSIS — I48.20 CHRONIC ATRIAL FIBRILLATION (H): ICD-10-CM

## 2022-02-15 DIAGNOSIS — I48.21 PERMANENT ATRIAL FIBRILLATION (H): ICD-10-CM

## 2022-02-15 LAB — INR BLD: 2.6 (ref 0.9–1.1)

## 2022-02-15 PROCEDURE — 85610 PROTHROMBIN TIME: CPT

## 2022-02-15 PROCEDURE — 36416 COLLJ CAPILLARY BLOOD SPEC: CPT

## 2022-02-15 NOTE — PROGRESS NOTES
ANTICOAGULATION MANAGEMENT     Caty Ng 77 year old female is on warfarin with therapeutic INR result. (Goal INR 2.0-3.0)    Recent labs: (last 7 days)     02/15/22  1133   INR 2.6*       ASSESSMENT     Source(s): Chart Review, Patient/Caregiver Call and Template       Warfarin doses taken: Warfarin taken as instructed    Diet: No new diet changes identified    New illness, injury, or hospitalization: No    Medication/supplement changes: None noted    Signs or symptoms of bleeding or clotting: No    Previous INR: Subtherapeutic at 1.9 on 1/24/22.    Additional findings: None     PLAN     Recommended plan for no diet, medication or health factor changes affecting INR     Dosing Instructions:    Continue your current warfarin dose with next INR in 2-3 weeks       Summary  As of 2/15/2022    Full warfarin instructions:  1 mg every Fri; 2 mg all other days   Next INR check:  3/8/2022             Telephone call with   Abi ([phone]) who verbalizes understanding and agrees to plan    Lab visit scheduled - INR on 3/10/22 @ [facility].    Education provided: Importance of consistent vitamin K intake and Goal range and significance of current result    Plan made per [facility] anticoagulation protocol    Kelly Wise, RN  Anticoagulation Clinic  2/15/2022    _______________________________________________________________________     Anticoagulation Episode Summary     Current INR goal:  2.0-3.0   TTR:  74.4 % (1 y)   Target end date:     Send INR reminders to:  BREE ANDREW       Comments:           Anticoagulation Care Providers     Provider Role Specialty Phone number    Keke Flores Referring Family Medicine [phone]

## 2022-03-10 ENCOUNTER — LAB (OUTPATIENT)
Dept: LAB | Facility: CLINIC | Age: 77
End: 2022-03-10
Payer: MEDICARE

## 2022-03-10 ENCOUNTER — ANTICOAGULATION THERAPY VISIT (OUTPATIENT)
Dept: ANTICOAGULATION | Facility: CLINIC | Age: 77
End: 2022-03-10

## 2022-03-10 DIAGNOSIS — Z79.01 LONG TERM (CURRENT) USE OF ANTICOAGULANTS: ICD-10-CM

## 2022-03-10 DIAGNOSIS — I48.20 CHRONIC ATRIAL FIBRILLATION (H): ICD-10-CM

## 2022-03-10 DIAGNOSIS — I48.21 PERMANENT ATRIAL FIBRILLATION (H): ICD-10-CM

## 2022-03-10 LAB — INR BLD: 3.1 (ref 0.9–1.1)

## 2022-03-10 PROCEDURE — 36416 COLLJ CAPILLARY BLOOD SPEC: CPT

## 2022-03-10 PROCEDURE — 85610 PROTHROMBIN TIME: CPT

## 2022-03-10 NOTE — PROGRESS NOTES
ANTICOAGULATION MANAGEMENT     Caty Ng 77 year old female is on warfarin with supratherapeutic INR result. (Goal INR 2.0-3.0)    Recent labs: (last 7 days)     03/10/22  1126   INR 3.1*       ASSESSMENT       Source(s): Chart Review, Patient/Caregiver Call and Template       Warfarin doses taken: More warfarin taken than planned which may be affecting INR    Reported last Friday had taken a whole tablets, instead of half.    Diet: No new diet changes identified    New illness, injury, or hospitalization: No    Medication/supplement changes: None noted    Signs or symptoms of bleeding or clotting: No    Previous INR: Therapeutic last visit at 2.6; previously outside of goal range at 1.9    Additional findings: None       PLAN     Recommended plan for temporary change(s) affecting INR     Dosing Instructions:   (2mg tabs)    Continue your current warfarin dose with next INR in 2 weeks       Summary  As of 3/10/2022    Full warfarin instructions:  1 mg every Fri; 2 mg all other days   Next INR check:  3/24/2022             Telephone call with  Abi ([phone]) who verbalizes understanding and agrees to plan    - ensure to cut 2mg warfarin tablets in halves for every Friday dose.    Lab visit scheduled - INR on 3/29/22 @ [facility]   -  who drives her to appts - has chemotherapy on 3/17, and is usually down for the whole week after.    Education provided: Importance of consistent vitamin K intake, Goal range and significance of current result and Importance of taking warfarin as instructed    Plan made per [facility] anticoagulation protocol    Kelly Wise RN  Anticoagulation Clinic  3/10/2022    _______________________________________________________________________     Anticoagulation Episode Summary     Current INR goal:  2.0-3.0   TTR:  73.1 % (1 y)   Target end date:     Send INR reminders to:  BREE ANDREW       Comments:           Anticoagulation Care Providers     Provider Role Specialty Phone  number    Keke Flores [facility] [phone]

## 2022-03-29 ENCOUNTER — ANTICOAGULATION THERAPY VISIT (OUTPATIENT)
Dept: ANTICOAGULATION | Facility: CLINIC | Age: 77
End: 2022-03-29

## 2022-03-29 ENCOUNTER — LAB (OUTPATIENT)
Dept: LAB | Facility: CLINIC | Age: 77
End: 2022-03-29
Payer: MEDICARE

## 2022-03-29 DIAGNOSIS — I48.20 CHRONIC ATRIAL FIBRILLATION (H): ICD-10-CM

## 2022-03-29 DIAGNOSIS — I48.21 PERMANENT ATRIAL FIBRILLATION (H): ICD-10-CM

## 2022-03-29 DIAGNOSIS — Z79.01 LONG TERM (CURRENT) USE OF ANTICOAGULANTS: ICD-10-CM

## 2022-03-29 LAB — INR BLD: 2.5 (ref 0.9–1.1)

## 2022-03-29 PROCEDURE — 36416 COLLJ CAPILLARY BLOOD SPEC: CPT

## 2022-03-29 PROCEDURE — 85610 PROTHROMBIN TIME: CPT

## 2022-03-29 NOTE — PROGRESS NOTES
ANTICOAGULATION MANAGEMENT     Caty Ng 77 year old female is on warfarin with therapeutic INR result. (Goal INR 2.0-3.0)    Recent labs: (last 7 days)     03/29/22  1130   INR 2.5*       ASSESSMENT       Source(s): Chart Review, Patient/Caregiver Call and Template       Warfarin doses taken: Warfarin taken as instructed    Diet: No new diet changes identified    New illness, injury, or hospitalization: No    Medication/supplement changes: None noted    Signs or symptoms of bleeding or clotting: No    Previous INR: Supratherapeutic at 3.1 on 3/10/22.    Additional findings: None       PLAN     Recommended plan for no diet, medication or health factor changes affecting INR     Dosing Instructions:   (2mg tabs)    Continue your current warfarin dose with next INR in 2 weeks       Summary  As of 3/29/2022    Full warfarin instructions:  1 mg every Fri; 2 mg all other days   Next INR check:  4/12/2022             Telephone call with  Abi ([phone]) who verbalizes understanding and agrees to plan    Lab visit scheduled - INR on 4/21/22 @ [facility].   - Abi does not drive and  brings her to [facility].   - her  has chemotherapy on 4/7 and it takes him one wk to recover from being sick / fatigued.   - cannot come in till the week of 4/18.    Education provided: Importance of consistent vitamin K intake and Goal range and significance of current result    Plan made per [facility] anticoagulation protocol    Kelly Wise, RN  Anticoagulation Clinic  3/29/2022    _______________________________________________________________________     Anticoagulation Episode Summary     Current INR goal:  2.0-3.0   TTR:  76.1 % (1 y)   Target end date:     Send INR reminders to:  BREE ANDREW       Comments:           Anticoagulation Care Providers     Provider Role Specialty Phone number    Keke Flores Referring Family Medicine [phone]

## 2022-04-20 NOTE — DISCHARGE INSTRUCTIONS
Interventional Cardiology  Coronary Angiogram/Angioplasty/Stent/Atherectomy Discharge  Instructions -   Radial (wrist) Approach     The instructions below are to help you understand how to take care of yourself. There is also information about when to call the doctor or emergency services.    **Do not stop your aspirin or platelet inhibitor unless directed by your Cardiologist.  These medications help to prevent platelets in your blood from sticking together and forming a clot.   Examples of these medications are: Ticagrelor (Brilinta), Clopidogrel (Plavix), Prasugrel (Effient)    For 24 hours after procedure:    Do not subject hand/arm to any forceful movements for 24 hours, such as supporting weight when rising from a chair or bed.    Do not drive a car for 24 hours.    The dressing on the puncture site may be removed after 24 hours and left open to air. If minor oozing, you may apply a Band-Aid and remove after 12 hours.     You may shower on the day after your procedure. Do not take a tub bath or wash dishes (no soaking wrist) with the puncture site in water for 3 days after the procedure.    For 48 hours following the procedure:    Do not operate a lawnmower, motorcycle, chainsaw or all-terrain vehicle.    Do not lift anything heavier than 5-10 pounds with affected arm for 5 days.    Avoid excessive bending (flexion/extension) wrist movement.    Do not engage in vigorous exercise (i.e. tennis, golf) using the affected arm for 5 days after discharge.    You may return to work in 72 hours if no complications and no heavy lifting.    If bleeding should occur following discharge:    Sit down and apply firm pressure with your thumb against the puncture site and fingers against back of wrist for 10 minutes.    If the bleeding stops, continue to rest, keeping your wrist still for 2 hours. Notify your doctor as soon as possible.    If bleeding does not stop after 10 minutes or if there is a large amount of bleeding or  Push Fluids  Take tylenol/ibuprofen for pain  Antibiotics as prescribed-amoxicillin 500 mg twice daily x10 days finish complete course.   Warm liquids/throat lozenges for throat discomfort  You are contagious until you have been on antibiotics for 24hours.   Change tooth brush after completing 24hours of antibiotics  Go to ER for increased swelling to throat, inability to swallow or change in voice or any symptoms discussed with you as   Red flags.     Patient Education     Pharyngitis: Strep (Confirmed)    You have had a positive test for strep throat. Strep throat is a contagious illness. It's spread by coughing, kissing, sharing glasses or eating utensils, or by touching others after touching your mouth or nose. Symptoms include throat pain that is worse with swallowing, aching all over, headache, swollen lymph nodes at the front of the neck, and red swollen tonsils sometimes with white patches and fever. It's treated with antibiotic medicine. This should help you start to feel better in 1 to 2 days.   Home care  · Rest at home. Drink plenty of fluids so you won't get dehydrated.  · No work or school for the first 2 days of taking the antibiotics. You can then return to school or work if you are feeling better, have been taking the antibiotic for at least 24 hours and don't have a fever.   · Take antibiotic medicine for the full 10 days, even if you feel better. This is very important to ensure the infection is treated completely. It's also important to prevent medicine-resistant germs from developing. If you were given an antibiotic shot, you don't need any more antibiotics.  · You may use acetaminophen or ibuprofen to control pain or fever, unless another medicine was prescribed for this. Talk with your healthcare provider before taking these medicines if you have chronic liver or kidney disease or if you have had a stomach ulcer or gastrointestinal bleeding.  · Throat lozenges or sprays help reduce pain.  Gargling with warm saltwater will also reduce throat pain. Dissolve 1/2 teaspoon of salt in 1 glass of warm water. This may be useful just before meals.   · Soft foods and cool or warm fluids are best. Don't eat salty or spicy foods.    Follow-up care  Follow up with your healthcare provider or our staff if you don't get better over the next week.   When to get medical advice  Call your healthcare provider right away or get immediate medical care if any of these occur:   · Fever of 100.4ºF (38ºC) or higher, or as directed by your healthcare provider  · New or worsening ear pain, sinus pain, or headache  · Painful lumps in the back of neck  · Stiff neck  · Lymph nodes getting larger or becoming soft in the middle  · You have trouble swallowing liquids or you can't open your mouth wide because of throat pain  · Signs of dehydration. These include very dark urine or no urine, sunken eyes, and dizziness.  · Noisy breathing  · Muffled voice  · Rash  Call 911  Call 911right away if you:   · Have trouble breathing  · Can't swallow or talk    Prevention  Here are steps you can take to help prevent an infection:   · Wash your hands often with soap and clean, running water for at least 20 seconds.  · Don’t have close contact with people who have sore throats, colds, or other upper respiratory infections.  · Don’t smoke, and stay away from secondhand smoke.  [employer] last reviewed this educational content on 3/1/2020  © 6384-9354 [employer]. All rights reserved. This information is not intended as a substitute for professional medical care. Always follow your healthcare professional's instructions.            spurting, call 911 immediately. Do not drive yourself to the hospital.           Contact the [facility] Clinic at [phone] if you develop:    Fever over 100.4, that lasts more than one day    Redness, heat, or pus at the puncture site    Change in color or temperature of the hand or arm    Expect mild tingling of hand and tenderness at the puncture site for up to 3 days. You may take Tylenol or a pain medicine recommended by your doctor.                       Our Cardiac Rehab staff may visit briefly with you while your in the hospital.   If they miss you, someone will contact you after you are home.  You are encouraged to enroll in an Outpatient Cardiac Rehab Program       Your Procedural Physician was: Dr. Hai Murphy  the phone number is: [phone]    [facility]:  [phone]  If you are calling after hours, please listen to the entire voicemail, a live  will answer at the end of the message

## 2022-04-22 ENCOUNTER — NURSE TRIAGE (OUTPATIENT)
Dept: NURSING | Facility: CLINIC | Age: 77
End: 2022-04-22

## 2022-04-22 NOTE — TELEPHONE ENCOUNTER
Patient with cough and cold symptoms started. Tested positive for Covid-19 today.  Patient [facility]    Elsie Larios RN on 4/22/2022 at 3:28 PM  [facility]        COVID 19 Nurse Triage Plan/Patient Instructions    Please be aware that novel coronavirus (COVID-19) may be circulating in the community. If you develop symptoms such as fever, cough, or SOB or if you have concerns about the presence of another infection including coronavirus (COVID-19), please contact your health care provider or visit [email or web address].     Disposition/Instructions    Virtual Visit with provider recommended. Reference Visit Selection Guide.    Thank you for taking steps to prevent the spread of this virus.  o Limit your contact with others.  o Wear a simple mask to cover your cough.  o Wash your hands well and often.    Resources    [facility]: About COVID-19: [email or web address]    CDC: What to Do If You're Sick: www.cdc.gov/coronavirus/2019-ncov/about/steps-when-sick.html    CDC: Ending Home Isolation: www.cdc.gov/coronavirus/2019-ncov/hcp/disposition-in-home-patients.html     CDC: Caring for Someone: www.cdc.gov/coronavirus/2019-ncov/if-you-are-sick/care-for-someone.html     [facility]: Interim Guidance for Hospital Discharge to Home: www.health.[facility].mn.us/diseases/coronavirus/hcp/hospdischarge.pdf    [facility] clinical trials (COVID-19 research studies): clinicalaffairs.[facility]-clinical-trials     Below are the COVID-19 hotlines at the [facility] ([facility]). Interpreters are available.   o For health questions: Call 642-166-0927 or 1-724.768.2279 (7 a.m. to 7 p.m.)  o For questions about schools and childcare: Call 991-251-9967 or 1-431.303.6402 (7 a.m. to 7 p.m.)                   Reason for Disposition    Patient wants to be seen    HIGH RISK for severe COVID complications (e.g., weak immune  system, age > 64 years, obesity with BMI > 25, pregnant, chronic lung disease or other chronic medical condition) (Exception: Already seen by PCP and no new or worsening symptoms.)    Additional Information    Negative: Bluish (or gray) lips or face    Negative: Severe difficulty breathing (e.g., struggling for each breath, speaks in single words)    Negative: Rapid onset of cough and has hives    Negative: Coughing started suddenly after medicine, an allergic food or bee sting    Negative: Difficulty breathing after exposure to flames, smoke, or fumes    Negative: Sounds like a life-threatening emergency to the triager    Negative: Previous asthma attacks and this feels like asthma attack    Negative: Chest pain present when not coughing    Negative: Difficulty breathing    Negative: Passed out (i.e., fainted, collapsed and was not responding)    Negative: Patient sounds very sick or weak to the triager    Negative: Coughed up > 1 tablespoon (15 ml) blood (Exception: blood-tinged sputum)    Negative: Fever > 103 F (39.4 C)    Negative: Fever > 101 F (38.3 C) and over 60 years of age    Negative: Fever > 100.0 F (37.8 C) and has diabetes mellitus or a weak immune system (e.g., HIV positive, cancer chemotherapy, organ transplant, splenectomy, chronic steroids)    Negative: Fever > 100.0 F (37.8 C) and bedridden (e.g., nursing home patient, stroke, chronic illness, recovering from surgery)    Negative: Increasing ankle swelling    Negative: Wheezing is present    Negative: SEVERE coughing spells (e.g., whooping sound after coughing, vomiting after coughing)    Negative: Coughing up augie-colored (reddish-brown) or blood-tinged sputum    Negative: Fever present > 3 days (72 hours)    Negative: Fever returns after gone for over 24 hours and symptoms worse or not improved    Negative: Using nasal washes and pain medicine > 24 hours and sinus pain persists    Negative: Known COPD or other severe lung disease (i.e.,  bronchiectasis, cystic fibrosis, lung surgery) and worsening symptoms (i.e., increased sputum purulence or amount, increased breathing difficulty)    Negative: Continuous (nonstop) coughing interferes with work or school and no improvement using cough treatment per Care Advice    Negative: SEVERE difficulty breathing (e.g., struggling for each breath, speaks in single words)    Negative: Difficult to awaken or acting confused (e.g., disoriented, slurred speech)    Negative: Bluish (or gray) lips or face now    Negative: Shock suspected (e.g., cold/pale/clammy skin, too weak to stand, low BP, rapid pulse)    Negative: Sounds like a life-threatening emergency to the triager    Negative: [1] Diagnosed or suspected COVID-19 AND [2] symptoms lasting 3 or more weeks    Negative: [1] COVID-19 exposure AND [2] no symptoms    Negative: COVID-19 vaccine reaction suspected (e.g., fever, headache, muscle aches) occurring 1 to 3 days after getting vaccine    Negative: COVID-19 vaccine, questions about    Negative: [1] Lives with someone known to have influenza (flu test positive) AND [2] flu-like symptoms (e.g., cough, runny nose, sore throat, SOB; with or without fever)    Negative: [1] Adult with possible COVID-19 symptoms AND [2] triager concerned about severity of symptoms or other causes    Negative: COVID-19 and breastfeeding, questions about    Negative: SEVERE or constant chest pain or pressure  (Exception: Mild central chest pain, present only when coughing.)    Negative: MODERATE difficulty breathing (e.g., speaks in phrases, SOB even at rest, pulse 100-120)    Negative: Headache and stiff neck (can't touch chin to chest)    Negative: Chest pain or pressure    Negative: Patient sounds very sick or weak to the triager    Negative: [1] Fever > 100.0 F (37.8 C) AND [2] bedridden (e.g., nursing home patient, CVA, chronic illness, recovering from surgery)    Negative: [1] Fever > 101 F (38.3 C) AND [2] over 60 years of  age    Negative: Fever > 103 F (39.4 C)    Negative: MILD difficulty breathing (e.g., minimal/no SOB at rest, SOB with walking, pulse <100)    Protocols used: COUGH-A-OH, CORONAVIRUS (COVID-19) DIAGNOSED OR [id number] 1.18.2022

## 2022-04-23 ENCOUNTER — VIRTUAL VISIT (OUTPATIENT)
Dept: URGENT CARE | Facility: CLINIC | Age: 77
End: 2022-04-23
Payer: MEDICARE

## 2022-04-23 ENCOUNTER — HOME INFUSION (PRE-WILLOW HOME INFUSION) (OUTPATIENT)
Dept: PHARMACY | Facility: CLINIC | Age: 77
End: 2022-04-23

## 2022-04-23 DIAGNOSIS — I48.91 ATRIAL FIBRILLATION WITH RVR (H): ICD-10-CM

## 2022-04-23 DIAGNOSIS — U07.1 INFECTION DUE TO 2019 NOVEL CORONAVIRUS: Primary | ICD-10-CM

## 2022-04-23 PROCEDURE — 99214 OFFICE O/P EST MOD 30 MIN: CPT | Mod: 95 | Performed by: PHYSICIAN ASSISTANT

## 2022-04-23 NOTE — PROGRESS NOTES
Therapy: IV Abx  Insurance: Medicare (Primary) [facility] (Secondary)     Patient will have coverage for IV Abx through the [facility] plan at 100%. Medicare does not cover IV Abx in the home (Outpatient only).    Please contact Intake with any questions, [phone] or In Basket pool, [facility] ([phone]).

## 2022-04-25 ENCOUNTER — NURSE TRIAGE (OUTPATIENT)
Dept: NURSING | Facility: CLINIC | Age: 77
End: 2022-04-25
Payer: MEDICARE

## 2022-04-25 ENCOUNTER — HOME INFUSION (PRE-WILLOW HOME INFUSION) (OUTPATIENT)
Dept: PHARMACY | Facility: CLINIC | Age: 77
End: 2022-04-25
Payer: MEDICARE

## 2022-04-25 NOTE — TELEPHONE ENCOUNTER
Patient calling to ask about covid quarantine and INR appointment today. Patient reports symptoms started Wednesday, had virtual visit on 4/23/2022, and is scheduled for an infusion later today for antibodies.   Patient coughing a lot on the phone but reports she is drinking coffee and some went adilia the wrong way. Patient states symptoms are not worsening but have not improved. Denies shortness of breath, chest pain or pressure.   Protocol recommends home care.   Care advice given. Patient agrees to reschedule INR, notify anticoagulation clinic and call back with any worsening symptoms.     These guidelines are for isolating before returning to work, school or .       If you DO have symptoms:  o Stay home and away from others  - For at least 5 days after your symptoms started, AND   - You are fever free for 24 hours (with no medicine that reduces fever), AND  - Your other symptoms are better.  o Wear a mask for 10 full days any time you are around others.    If you DON'T have symptoms:  o Stay at home and away from others for at least 5 days after your positive test.  o Wear a mask for 10 full days any time you are around others.    There may be different guidelines for healthcare facilities. Please check with the specific sites before arriving.     If you've been told by a doctor that you were severely ill with COVID-19 or are immunocompromised, you should isolate for at least 10 days.    Kendra Cadena RN   04/25/22 12:08 PM  [facility] Nurse Advisor  COVID 19 Nurse Triage Plan/Patient Instructions    Please be aware that novel coronavirus (COVID-19) may be circulating in the community. If you develop symptoms such as fever, cough, or SOB or if you have concerns about the presence of another infection including coronavirus (COVID-19), please contact your health care provider or visit [email or web address].     Disposition/Instructions    Home care recommended. Follow home care protocol based  instructions.    Thank you for taking steps to prevent the spread of this virus.  o Limit your contact with others.  o Wear a simple mask to cover your cough.  o Wash your hands well and often.    Resources    [facility]: About COVID-19: [email or web address]    CDC: What to Do If You're Sick: www.cdc.gov/coronavirus/2019-ncov/about/steps-when-sick.html    CDC: Ending Home Isolation: www.cdc.gov/coronavirus/2019-ncov/hcp/disposition-in-home-patients.html     CDC: Caring for Someone: www.cdc.gov/coronavirus/2019-ncov/if-you-are-sick/care-for-someone.html     [facility]: Interim Guidance for Hospital Discharge to Home: www.[facility].mn./diseases/coronavirus/hcp/hospdischarge.pdf    [facility] clinical trials (COVID-19 research studies): clinicalaffairs.[facility]-clinical-trials     Below are the COVID-19 hotlines at the [place] [facility] ([facility]). Interpreters are available.   o For health questions: Call 055-290-7718 or 1-995.333.2662 (7 a.m. to 7 p.m.)  o For questions about schools and childcare: Call 277-376-5715 or 1-222.229.8630 (7 a.m. to 7 p.m.)   Reason for Disposition    [1] COVID-19 diagnosed by positive lab test (e.g., PCR, rapid self-test kit) AND [2] NO symptoms (e.g., cough, fever, others)    Additional Information    Negative: SEVERE difficulty breathing (e.g., struggling for each breath, speaks in single words)    Negative: Difficult to awaken or acting confused (e.g., disoriented, slurred speech)    Negative: Bluish (or gray) lips or face now    Negative: Shock suspected (e.g., cold/pale/clammy skin, too weak to stand, low BP, rapid pulse)    Negative: Sounds like a life-threatening emergency to the triager    Negative: [1] Diagnosed or suspected COVID-19 AND [2] symptoms lasting 3 or more weeks    Negative: [1] COVID-19 exposure AND [2] no symptoms    Negative: COVID-19 vaccine reaction suspected (e.g., fever, headache, muscle aches) occurring 1 to 3 days  after getting vaccine    Negative: COVID-19 vaccine, questions about    Negative: [1] Lives with someone known to have influenza (flu test positive) AND [2] flu-like symptoms (e.g., cough, runny nose, sore throat, SOB; with or without fever)    Negative: [1] Adult with possible COVID-19 symptoms AND [2] triager concerned about severity of symptoms or other causes    Negative: COVID-19 and breastfeeding, questions about    Negative: SEVERE or constant chest pain or pressure  (Exception: Mild central chest pain, present only when coughing.)    Negative: MODERATE difficulty breathing (e.g., speaks in phrases, SOB even at rest, pulse 100-120)    Negative: Headache and stiff neck (can't touch chin to chest)    Negative: Oxygen level (e.g., pulse oximetry) 90 percent or lower    Negative: Chest pain or pressure    Negative: Patient sounds very sick or weak to the triager    Negative: MILD difficulty breathing (e.g., minimal/no SOB at rest, SOB with walking, pulse <100)    Negative: Fever > 103 F (39.4 C)    Negative: [1] Fever > 101 F (38.3 C) AND [2] over 60 years of age    Negative: [1] Fever > 100.0 F (37.8 C) AND [2] bedridden (e.g., nursing home patient, CVA, chronic illness, recovering from surgery)    Negative: HIGH RISK for severe COVID complications (e.g., weak immune system, age > 64 years, obesity with BMI > 25, pregnant, chronic lung disease or other chronic medical condition) (Exception: Already seen by PCP and no new or worsening symptoms.)    Negative: [1] HIGH RISK patient AND [2] influenza is widespread in the community AND [3] ONE OR MORE respiratory symptoms: cough, sore throat, runny or stuffy nose    Negative: [1] HIGH RISK patient AND [2] influenza exposure within the last 7 days AND [3] ONE OR MORE respiratory symptoms: cough, sore throat, runny or stuffy nose    Negative: Oxygen level (e.g., pulse oximetry) 91 to 94 percent    Negative: [1] COVID-19 infection suspected by caller or triager AND [2]  mild symptoms (cough, fever, or others) AND [3] negative COVID-19 rapid test    Negative: Fever present > 3 days (72 hours)    Negative: [1] Fever returns after gone for over 24 hours AND [2] symptoms worse or not improved    Negative: [1] Continuous (nonstop) coughing interferes with work or school AND [2] no improvement using cough treatment per Care Advice    Negative: Cough present > 3 weeks    Protocols used: CORONAVIRUS (COVID-19) DIAGNOSED OR [id number] 1.18.2022

## 2022-04-26 ENCOUNTER — TELEPHONE (OUTPATIENT)
Dept: ANTICOAGULATION | Facility: CLINIC | Age: 77
End: 2022-04-26
Payer: MEDICARE

## 2022-04-26 NOTE — TELEPHONE ENCOUNTER
ANTICOAGULATION     Caty Ng is overdue for INR check.      Left message for patient to call and schedule lab appointment as soon as possible. If returning call, please schedule.     - cancelled INR and chemotherapy on 4/23 - suspicious for Covid.    Kelly Wise, RN

## 2022-04-27 ENCOUNTER — HOME INFUSION (PRE-WILLOW HOME INFUSION) (OUTPATIENT)
Dept: PHARMACY | Facility: CLINIC | Age: 77
End: 2022-04-27

## 2022-04-27 NOTE — PROGRESS NOTES
This is a recent snapshot of the patient's [facility] medical record.  For current drug dose and complete information and questions, call [phone] or In Basket pool, [facility] ([id number])  CSN Number:  [id number]

## 2022-04-29 ENCOUNTER — DOCUMENTATION ONLY (OUTPATIENT)
Dept: FAMILY MEDICINE | Facility: CLINIC | Age: 77
End: 2022-04-29

## 2022-04-29 ENCOUNTER — LAB (OUTPATIENT)
Dept: LAB | Facility: CLINIC | Age: 77
End: 2022-04-29
Payer: MEDICARE

## 2022-04-29 ENCOUNTER — ANTICOAGULATION THERAPY VISIT (OUTPATIENT)
Dept: ANTICOAGULATION | Facility: CLINIC | Age: 77
End: 2022-04-29

## 2022-04-29 DIAGNOSIS — I48.21 PERMANENT ATRIAL FIBRILLATION (H): ICD-10-CM

## 2022-04-29 DIAGNOSIS — Z79.01 LONG TERM (CURRENT) USE OF ANTICOAGULANTS: ICD-10-CM

## 2022-04-29 DIAGNOSIS — I48.21 PERMANENT ATRIAL FIBRILLATION (H): Primary | ICD-10-CM

## 2022-04-29 DIAGNOSIS — I48.20 CHRONIC ATRIAL FIBRILLATION (H): ICD-10-CM

## 2022-04-29 LAB — INR BLD: 2.6 (ref 0.9–1.1)

## 2022-04-29 PROCEDURE — 36416 COLLJ CAPILLARY BLOOD SPEC: CPT

## 2022-04-29 PROCEDURE — 85610 PROTHROMBIN TIME: CPT

## 2022-04-29 NOTE — PROGRESS NOTES
ANTICOAGULATION MANAGEMENT      Caty Ng due for annual renewal of referral to anticoagulation monitoring. Order pended for your review and signature.      ANTICOAGULATION SUMMARY      Warfarin indication(s)     Atrial fibrillation, permanent    Heart valve present?  NO       Current goal range   INR: 2.0-3.0     Goal appropriate for indication? Yes, INR 2-3 appropriate for hx of DVT, PE, hypercoagulable state, Afib, LVAD, or bileaflet AVR without risk factors     Current duration of therapy Indefinite/long term therapy   Time in Therapeutic Range (TTR)  (Goal > 60%) 83.1 %       Office visit with referring provider's group within last year Yes on 7/5/2021       Kelly Wise RN

## 2022-04-29 NOTE — PROGRESS NOTES
ANTICOAGULATION MANAGEMENT     Caty Ng 77 year old female is on warfarin with therapeutic INR result. (Goal INR 2.0-3.0)    Recent labs: (last 7 days)     04/29/22  1146   INR 2.6*       ASSESSMENT       Source(s): Chart Review, Patient/Caregiver Call and Template       Warfarin doses taken: Warfarin taken as instructed    Diet: No new diet changes identified    New illness, injury, or hospitalization: Yes:   Only complaint is feeling tired   Currently in covid quarantine d/t coughing and cold since 4/22.   Tested positive for Covid on 4/22.    Medication/supplement changes: None noted    Signs or symptoms of bleeding or clotting: No    Previous INR: Therapeutic last visit at 2.5; previously outside of goal rangeat 3.1    Additional findings: None       PLAN     Recommended plan for temporary change(s) affecting INR     Dosing Instructions:    continue your current warfarin dose with next INR in 2 weeks       Summary  As of 4/29/2022    Full warfarin instructions:  1 mg every Fri; 2 mg all other days   Next INR check:  5/13/2022             Telephone call with  Abi ([phone]) who verbalizes understanding and agrees to plan    Lab visit scheduled - INR on 5/18/22 @ [facility].   (pt does not drive and  drives her to [facility], who also has chemotherapy).    Education provided: Importance of consistent vitamin K intake and Goal range and significance of current result    Plan made per [facility] anticoagulation protocol    Kelly Wise, RN  Anticoagulation Clinic  4/29/2022    _______________________________________________________________________     Anticoagulation Episode Summary     Current INR goal:  2.0-3.0   TTR:  83.1 % (1 y)   Target end date:     Send INR reminders to:  BREE ANDREW       Comments:           Anticoagulation Care Providers     Provider Role Specialty Phone number    Keke Flores Referring Family Medicine [phone]

## 2022-05-02 PROBLEM — Z79.01 LONG TERM (CURRENT) USE OF ANTICOAGULANTS: Status: ACTIVE | Noted: 2022-05-02

## 2022-05-02 PROBLEM — I48.21 PERMANENT ATRIAL FIBRILLATION (H): Status: ACTIVE | Noted: 2022-05-02

## 2022-05-18 ENCOUNTER — LAB (OUTPATIENT)
Dept: LAB | Facility: CLINIC | Age: 77
End: 2022-05-18
Payer: MEDICARE

## 2022-05-18 ENCOUNTER — ANTICOAGULATION THERAPY VISIT (OUTPATIENT)
Dept: ANTICOAGULATION | Facility: CLINIC | Age: 77
End: 2022-05-18

## 2022-05-18 DIAGNOSIS — I48.21 PERMANENT ATRIAL FIBRILLATION (H): ICD-10-CM

## 2022-05-18 DIAGNOSIS — Z79.01 LONG TERM (CURRENT) USE OF ANTICOAGULANTS: ICD-10-CM

## 2022-05-18 DIAGNOSIS — Z79.01 LONG TERM (CURRENT) USE OF ANTICOAGULANTS: Primary | ICD-10-CM

## 2022-05-18 LAB — INR BLD: 2.1 (ref 0.9–1.1)

## 2022-05-18 PROCEDURE — 36416 COLLJ CAPILLARY BLOOD SPEC: CPT

## 2022-05-18 PROCEDURE — 85610 PROTHROMBIN TIME: CPT

## 2022-05-18 NOTE — PROGRESS NOTES
ANTICOAGULATION MANAGEMENT     Caty Ng 77 year old female is on warfarin with therapeutic INR result. (Goal INR 2.0-3.0)    Recent labs: (last 7 days)     05/18/22  1249   INR 2.1*       ASSESSMENT       Source(s): Chart Review and Patient/Caregiver Call       Warfarin doses taken: Warfarin taken as instructed    Diet: No new diet changes identified    New illness, injury, or hospitalization: No   Reported has recovered well from Covid-19 infection from 4/22/22    Medication/supplement changes: None noted    Signs or symptoms of bleeding or clotting: No    Previous INR: Therapeutic last 2 visits    Additional findings: None       PLAN     Recommended plan for no diet, medication or health factor changes affecting INR     Dosing Instructions:   (2mg tabs)    continue your current warfarin dose with next INR in 4 weeks       Summary  As of 5/18/2022    Full warfarin instructions:  1 mg every Fri; 2 mg all other days   Next INR check:  6/15/2022             Telephone call with  Ed ([phone])who verbalizes understanding and agrees to plan    Check at provider office visit - INR check on 6/6/22 during annual check with Dr. Flores.    Education provided: Please call back if any changes to your diet, medications or how you've been taking warfarin and Goal range and significance of current result    Plan made per [facility] anticoagulation protocol    Kelly Wise RN  Anticoagulation Clinic  5/18/2022    _______________________________________________________________________     Anticoagulation Episode Summary     Current INR goal:  2.0-3.0   TTR:  83.1 % (1 y)   Target end date:  Indefinite   Send INR reminders to:  BREE ANDREW    Indications    Long term (current) use of anticoagulants [Z79.01]  Permanent atrial fibrillation (H) [I48.21]           Comments:           Anticoagulation Care Providers     Provider Role Specialty Phone number    Keke Flores Referring Family Medicine  [phone]

## 2022-06-06 ENCOUNTER — MEDICAL CORRESPONDENCE (OUTPATIENT)
Dept: HEALTH INFORMATION MANAGEMENT | Facility: CLINIC | Age: 77
End: 2022-06-06

## 2022-06-06 ENCOUNTER — OFFICE VISIT (OUTPATIENT)
Dept: FAMILY MEDICINE | Facility: CLINIC | Age: 77
End: 2022-06-06
Payer: MEDICARE

## 2022-06-06 ENCOUNTER — ANTICOAGULATION THERAPY VISIT (OUTPATIENT)
Dept: ANTICOAGULATION | Facility: CLINIC | Age: 77
End: 2022-06-06

## 2022-06-06 VITALS
HEIGHT: 63 IN | DIASTOLIC BLOOD PRESSURE: 60 MMHG | WEIGHT: 237.3 LBS | HEART RATE: 100 BPM | OXYGEN SATURATION: 93 % | BODY MASS INDEX: 42.05 KG/M2 | SYSTOLIC BLOOD PRESSURE: 112 MMHG

## 2022-06-06 DIAGNOSIS — I48.20 CHRONIC ATRIAL FIBRILLATION (H): ICD-10-CM

## 2022-06-06 DIAGNOSIS — H61.22 IMPACTED CERUMEN OF LEFT EAR: ICD-10-CM

## 2022-06-06 DIAGNOSIS — I25.10 CORONARY ARTERY DISEASE INVOLVING NATIVE CORONARY ARTERY OF NATIVE HEART WITHOUT ANGINA PECTORIS: ICD-10-CM

## 2022-06-06 DIAGNOSIS — G47.33 OSA (OBSTRUCTIVE SLEEP APNEA): ICD-10-CM

## 2022-06-06 DIAGNOSIS — Z79.01 LONG TERM (CURRENT) USE OF ANTICOAGULANTS: ICD-10-CM

## 2022-06-06 DIAGNOSIS — M16.11 PRIMARY OSTEOARTHRITIS OF RIGHT HIP: ICD-10-CM

## 2022-06-06 DIAGNOSIS — I50.22 CHRONIC SYSTOLIC HEART FAILURE (H): ICD-10-CM

## 2022-06-06 DIAGNOSIS — I48.21 PERMANENT ATRIAL FIBRILLATION (H): ICD-10-CM

## 2022-06-06 DIAGNOSIS — I42.8 NON-ISCHEMIC CARDIOMYOPATHY (H): ICD-10-CM

## 2022-06-06 DIAGNOSIS — E66.01 MORBID OBESITY DUE TO EXCESS CALORIES (H): ICD-10-CM

## 2022-06-06 DIAGNOSIS — Z79.01 LONG TERM (CURRENT) USE OF ANTICOAGULANTS: Primary | ICD-10-CM

## 2022-06-06 DIAGNOSIS — Z85.830 HISTORY OF CHONDROSARCOMA: ICD-10-CM

## 2022-06-06 DIAGNOSIS — Z00.00 ENCOUNTER FOR MEDICARE ANNUAL WELLNESS EXAM: Primary | ICD-10-CM

## 2022-06-06 DIAGNOSIS — M51.369 DDD (DEGENERATIVE DISC DISEASE), LUMBAR: ICD-10-CM

## 2022-06-06 DIAGNOSIS — E03.4 HYPOTHYROIDISM DUE TO ACQUIRED ATROPHY OF THYROID: ICD-10-CM

## 2022-06-06 LAB
ALBUMIN SERPL-MCNC: 4 G/DL (ref 3.5–5)
ALP SERPL-CCNC: 83 U/L (ref 45–120)
ALT SERPL W P-5'-P-CCNC: 10 U/L (ref 0–45)
ANION GAP SERPL CALCULATED.3IONS-SCNC: 12 MMOL/L (ref 5–18)
AST SERPL W P-5'-P-CCNC: 13 U/L (ref 0–40)
BILIRUB SERPL-MCNC: 1 MG/DL (ref 0–1)
BUN SERPL-MCNC: 21 MG/DL (ref 8–28)
CALCIUM SERPL-MCNC: 9.7 MG/DL (ref 8.5–10.5)
CHLORIDE BLD-SCNC: 101 MMOL/L (ref 98–107)
CHOLEST SERPL-MCNC: 104 MG/DL
CO2 SERPL-SCNC: 27 MMOL/L (ref 22–31)
CREAT SERPL-MCNC: 0.82 MG/DL (ref 0.6–1.1)
FASTING STATUS PATIENT QL REPORTED: NO
GFR SERPL CREATININE-BSD FRML MDRD: 74 ML/MIN/1.73M2
GLUCOSE BLD-MCNC: 109 MG/DL (ref 70–125)
HDLC SERPL-MCNC: 39 MG/DL
INR BLD: 2.1 (ref 0.9–1.1)
LDLC SERPL CALC-MCNC: 39 MG/DL
POTASSIUM BLD-SCNC: 4.1 MMOL/L (ref 3.5–5)
PROT SERPL-MCNC: 7.9 G/DL (ref 6–8)
SODIUM SERPL-SCNC: 140 MMOL/L (ref 136–145)
TRIGL SERPL-MCNC: 128 MG/DL
TSH SERPL DL<=0.005 MIU/L-ACNC: 1.02 UIU/ML (ref 0.3–5)

## 2022-06-06 PROCEDURE — G0009 ADMIN PNEUMOCOCCAL VACCINE: HCPCS | Performed by: FAMILY MEDICINE

## 2022-06-06 PROCEDURE — 90732 PPSV23 VACC 2 YRS+ SUBQ/IM: CPT | Performed by: FAMILY MEDICINE

## 2022-06-06 PROCEDURE — 80053 COMPREHEN METABOLIC PANEL: CPT | Performed by: FAMILY MEDICINE

## 2022-06-06 PROCEDURE — 36416 COLLJ CAPILLARY BLOOD SPEC: CPT | Performed by: FAMILY MEDICINE

## 2022-06-06 PROCEDURE — 80061 LIPID PANEL: CPT | Performed by: FAMILY MEDICINE

## 2022-06-06 PROCEDURE — 69209 REMOVE IMPACTED EAR WAX UNI: CPT | Performed by: FAMILY MEDICINE

## 2022-06-06 PROCEDURE — 36415 COLL VENOUS BLD VENIPUNCTURE: CPT | Performed by: FAMILY MEDICINE

## 2022-06-06 PROCEDURE — 99214 OFFICE O/P EST MOD 30 MIN: CPT | Mod: 25 | Performed by: FAMILY MEDICINE

## 2022-06-06 PROCEDURE — G0439 PPPS, SUBSEQ VISIT: HCPCS | Performed by: FAMILY MEDICINE

## 2022-06-06 PROCEDURE — 85610 PROTHROMBIN TIME: CPT | Performed by: FAMILY MEDICINE

## 2022-06-06 PROCEDURE — 84443 ASSAY THYROID STIM HORMONE: CPT | Performed by: FAMILY MEDICINE

## 2022-06-06 ASSESSMENT — ENCOUNTER SYMPTOMS
HEMATURIA: 0
ABDOMINAL PAIN: 0
PARESTHESIAS: 0
PALPITATIONS: 0
HEMATOCHEZIA: 0
COUGH: 0
DIARRHEA: 0
MYALGIAS: 1
HEADACHES: 0
HEARTBURN: 0
DYSURIA: 0
CHILLS: 0
NAUSEA: 0
SHORTNESS OF BREATH: 1
WEAKNESS: 1
DIZZINESS: 0
NERVOUS/ANXIOUS: 0
BREAST MASS: 0
CONSTIPATION: 0
FREQUENCY: 1
ARTHRALGIAS: 1
FEVER: 0
EYE PAIN: 0

## 2022-06-06 ASSESSMENT — PATIENT HEALTH QUESTIONNAIRE - PHQ9
SUM OF ALL RESPONSES TO PHQ QUESTIONS 1-9: 8
10. IF YOU CHECKED OFF ANY PROBLEMS, HOW DIFFICULT HAVE THESE PROBLEMS MADE IT FOR YOU TO DO YOUR WORK, TAKE CARE OF THINGS AT HOME, OR GET ALONG WITH OTHER PEOPLE: SOMEWHAT DIFFICULT
SUM OF ALL RESPONSES TO PHQ QUESTIONS 1-9: 8

## 2022-06-06 ASSESSMENT — ACTIVITIES OF DAILY LIVING (ADL)
CURRENT_FUNCTION: HOUSEWORK REQUIRES ASSISTANCE
CURRENT_FUNCTION: PREPARING MEALS REQUIRES ASSISTANCE
CURRENT_FUNCTION: SHOPPING REQUIRES ASSISTANCE
CURRENT_FUNCTION: TRANSPORTATION REQUIRES ASSISTANCE

## 2022-06-06 NOTE — PROGRESS NOTES
ANTICOAGULATION MANAGEMENT     Caty Ng 77 year old female is on warfarin with therapeutic INR result. (Goal INR 2.0-3.0)    Recent labs: (last 7 days)     06/06/22  1428   INR 2.1*       ASSESSMENT       Source(s): Chart Review, Patient/Caregiver Call and Template       Warfarin doses taken: Warfarin taken as instructed    Diet: No new diet changes identified    New illness, injury, or hospitalization: No    Medication/supplement changes:  Yes.    Annual check up today with Dr. Flores.    Signs or symptoms of bleeding or clotting: No    Previous INR: Therapeutic last 3 visits    Additional findings:  Vaccinated today with Pneumococcal-23 valent.       PLAN     Recommended plan for no diet, medication or health factor changes affecting INR     Dosing Instructions:   (2MG TABS)   - continue your current warfarin dose with next INR in 4 weeks       Summary  As of 6/6/2022    Full warfarin instructions:  1 mg every Fri; 2 mg all other days   Next INR check:  7/4/2022             Detailed voice message left for  Abi ([phone]) with dosing instructions and follow up date.     Contact [phone] to schedule and with any changes, questions or concerns.     Education provided: Please call back if any changes to your diet, medications or how you've been taking warfarin, Importance of consistent vitamin K intake and Goal range and significance of current result    Plan made per [facility] anticoagulation protocol    Kelly Wise RN  Anticoagulation Clinic  6/6/2022    _______________________________________________________________________     Anticoagulation Episode Summary     Current INR goal:  2.0-3.0   TTR:  83.1 % (1 y)   Target end date:  Indefinite   Send INR reminders to:  BREE ANDREW    Indications    Long term (current) use of anticoagulants [Z79.01]  Permanent atrial fibrillation (H) (Resolved) [I48.21]           Comments:           Anticoagulation Care Providers     Provider Role Specialty  Phone number    Keke Flores [facility] [phone]

## 2022-06-06 NOTE — ASSESSMENT & PLAN NOTE
The patient has advanced osteoarthritis of her right hip.  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  I do think getting an opinion from [facility] would be a good idea as she is a little bit more of a complex case.

## 2022-06-06 NOTE — PROGRESS NOTES
The patient was provided with suggestions to help her develop a healthy physical lifestyle.  She is at risk for lack of exercise and has been provided with information to increase physical activity for the benefit of her well-being.  The patient was counseled and encouraged to consider modifying their diet and eating habits. She was provided with information on recommended healthy diet options.  The patient reports that she has difficulty with activities of daily living. I have asked that the patient make a follow up appointment in 12 weeks where this issue will be further evaluated and addressed.  Information on urinary incontinence and treatment options given to patient.  The patient was provided with suggestions to help her develop a healthy emotional lifestyle.  The patient's PHQ-9 score is consistent with mild depression. She was provided with information regarding depression and was advised to schedule a follow up appointment in 12 weeks to further address this issue.

## 2022-06-06 NOTE — PROGRESS NOTES
"SUBJECTIVE:   Caty Ng is a 77 year old female who presents for Preventive Visit.      HPI: Abi is a 77-year-old very pleasant female presenting today for an annual wellness visit and medication check.  The patient's  was diagnosed with metastatic lung cancer last fall and he continues to undergo regular treatment.  She continues to have significant mobility issues as well as pain involving her right leg.  She was diagnosed with advanced arthritis of her right hip but also complains of some pain around her right knee replacement.  Also, she may have an element of lumbar radiculopathy involving that leg.  She was recommended to lose some weight but consider hip replacement surgery.  However, she is quite anxious about going forward with that in part due to her history of sarcoma and the resulting surgery from that.  She and her  have considered going down to [facility] to discuss further.  The patient is otherwise doing all right.  She did have COVID this spring and received monoclonal antibody treatment.  She has been monitoring her oxygen saturation at home and it has been ranging between 89 and 93%.    Patient has been advised of split billing requirements and indicates understanding: Yes  Are you in the first 12 months of your Medicare coverage?  No    Healthy Habits:     In general, how would you rate your overall health?  Poor    Frequency of exercise:  None    Do you usually eat at least 4 servings of fruit and vegetables a day, include whole grains    & fiber and avoid regularly eating high fat or \"junk\" foods?  No    Taking medications regularly:  Yes    Medication side effects:  Other    Ability to successfully perform activities of daily living:  Transportation requires assistance, shopping requires assistance, preparing meals requires assistance and housework requires assistance    Home Safety:  No safety concerns identified    Hearing Impairment:  No hearing concerns    In " the past 6 months, have you been bothered by leaking of urine? Yes    In general, how would you rate your overall mental or emotional health?  Fair      PHQ-2 Total Score: 2    Additional concerns today:  No    Do you feel safe in your environment? Yes    Have you ever done Advance Care Planning? (For example, a Health Directive, POLST, or a discussion with a medical provider or your loved ones about your wishes): No, advance care planning information given to patient to review.  Advanced care planning was discussed at today's visit.       Fall risk  Fallen 2 or more times in the past year?: No  Any fall with injury in the past year?: No     Cognitive Screening Pt declined      Do you have sleep apnea, excessive snoring or daytime drowsiness?: yes    Reviewed and updated as needed this visit by clinical staff    Allergies                 Reviewed and updated as needed this visit by Provider                   Social History     Tobacco Use     Smoking status: Former Smoker     Packs/day: 0.50     Years: 20.00     Pack years: 10.00     Types: Cigarettes     Quit date: 1982     Years since quittin.4     Smokeless tobacco: Never Used   Substance Use Topics     Alcohol use: Yes     Comment: 1 beers per week         Alcohol Use 2022   Prescreen: >3 drinks/day or >7 drinks/week? Not Applicable         Current providers sharing in care for this patient include:   Patient Care Team:  Keke Flores as PCP - General  Cameron Soria MD as Assigned Heart and Vascular Provider  Keke Flores as Assigned PCP  Raul Polo DO as Assigned Neuroscience Provider  Juancarlos Haney MD as Assigned Musculoskeletal Provider    The following health maintenance items are reviewed in Epic and correct as of today:  Health Maintenance Due   Topic Date Due     ANNUAL REVIEW OF  ORDERS  Never done     ZOSTER IMMUNIZATION (2 of 3) 2012     MEDICARE ANNUAL WELLNESS VISIT  2020     Pneumococcal  Vaccine: 65+ Years (3 - PPSV23 or PCV20) 2020     COVID-19 Vaccine (3 - Booster for Moderna series) 2021     ALT  2021     TSH W/FREE T4 REFLEX  2021     BMP  2022     HF ACTION PLAN  2022     Patient Active Problem List   Diagnosis     Advanced directives, counseling/discussion     Morbid obesity due to excess calories (H)     Hypertriglyceridemia     Chronic atrial fibrillation (H)     Mitral valve regurgitation     Systolic heart failure (H)     BROCK (obstructive sleep apnea)     Hyperlipidemia LDL goal <40     Long term current use of anticoagulant therapy     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 atrophy of thyroid     History of chondrosarcoma     S/p total knee replacement, bilateral     Primary osteoarthritis of right hip     Scoliosis     DDD (degenerative disc disease), lumbar     Chronic pain of both feet     Coronary artery disease involving native coronary artery of native heart without angina pectoris     Non-ischemic cardiomyopathy (H)     Status post coronary angiogram     Dyspnea on exertion     Long term (current) use of anticoagulants     Past Surgical History:   Procedure Laterality Date     CV CORONARY ANGIOGRAM N/A 2021    Procedure: Coronary Angiogram;  Surgeon: Hai Murphy MD;  Location: [facility] CATH LAB CV     CV LEFT HEART CATH N/A 2021    Procedure: Left Heart Cath;  Surgeon: Hai Murphy MD;  Location: [facility] CATH LAB CV     HYSTERECTOMY, PAP NO LONGER INDICATED       JOINT REPLACEMTN, KNEE RT/LT      right     JOINT REPLACEMTN, KNEE RT/LT Left      SALPINGO OOPHORECTOMY,R/L/BRANDO Bilateral      SURGICAL HISTORY OF -       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       Social History     Tobacco Use     Smoking status: Former Smoker     Packs/day: 0.50     Years: 20.00     Pack years: 10.00     Types: Cigarettes     Quit date: 1982     Years since quittin.4     Smokeless tobacco: Never Used   Substance Use Topics      Alcohol use: Yes     Comment: 1 beers per week     Family History   Problem Relation Age of Onset     Heart Disease Mother      Cancer Mother      Diabetes Father      Musculoskeletal Disorder Father      Diabetes Paternal Grandmother      Diabetes Paternal Grandfather      Diabetes Sister      Thrombophilia Daughter         Factor V Leiden Deficiency     Diabetes Sister          Current Outpatient Medications   Medication Sig Dispense Refill     albuterol (PROAIR HFA/PROVENTIL HFA/VENTOLIN HFA) 108 (90 BASE) MCG/ACT Inhaler Inhale 2 puffs into the lungs every 6 hours as needed for shortness of breath / dyspnea or wheezing Profile Rx: patient will contact pharmacy when needed 1 Inhaler 0     amoxicillin (AMOXIL) 500 MG capsule TAKE 4 CAPSULES BY MOUTH 1 HOUR BEFORE DENTAL APPOINTMENT       ANTIPLATELET MEDICATION NOT PRESCRIBED, INTENTIONAL, Antiplatelet medication not prescribed intentionally due to no PCI       atorvastatin (LIPITOR) 80 MG tablet Take 1 tablet (80 mg) by mouth daily 90 tablet 3     carvedilol (COREG) 25 MG tablet TAKE 1/2 TABLET TWICE A DAYWITH MEALS 90 tablet 3     diltiazem ER COATED BEADS (CARDIZEM CD/CARTIA XT) 240 MG 24 hr capsule TAKE 1 CAPSULE DAILY 90 capsule 1     levothyroxine (SYNTHROID/LEVOTHROID) 75 MCG tablet TAKE 1 TABLET DAILY. NEED  APPOINTMENT 90 tablet 3     losartan (COZAAR) 25 MG tablet Take 1 tablet (25 mg) by mouth daily 90 tablet 3     torsemide (DEMADEX) 20 MG tablet Take 2 tablets (40 mg) by mouth daily 180 tablet 1     warfarin ANTICOAGULANT (COUMADIN) 2 MG tablet Take 2 mg by mouth daily       Allergies   Allergen Reactions     Lisinopril      cough     Metal [Staples] Other (See Comments)     Metal allergy not clearly specified - patient had a stainless steel screw put in her foot that needed to be removed due to allergy/hypersensitivity      Metoprolol Fatigue     Pneumonia Vaccine:For adults 65 years or older who do not have an immunocompromising condition,  "cerebrospinal fluid leak, or cochlear implant and want to receive PPSV23 ONLY: Administer 1 dose of PPSV23. Anyone who received any doses of PPSV23 before age 65 should receive 1 final dose of the vaccine at age 65 or older. Administer this last dose at least 5 years after the prior PPSV23 dose.  Mammogram Screening: Mammogram Screening - Patient over age 75, has elected to continue with screening.      Pertinent mammograms are reviewed under the imaging tab.    Review of Systems   Constitutional: Negative for chills and fever.   HENT: Negative for congestion, ear pain and hearing loss.    Eyes: Negative for pain and visual disturbance.   Respiratory: Positive for shortness of breath. Negative for cough.    Cardiovascular: Positive for peripheral edema. Negative for chest pain and palpitations.   Gastrointestinal: Negative for abdominal pain, constipation, diarrhea, heartburn, hematochezia and nausea.   Breasts:  Negative for tenderness, breast mass and discharge.   Genitourinary: Positive for frequency and urgency. Negative for dysuria, genital sores, hematuria, pelvic pain, vaginal bleeding and vaginal discharge.   Musculoskeletal: Positive for arthralgias and myalgias.   Skin: Negative for rash.   Neurological: Positive for weakness. Negative for dizziness, headaches and paresthesias.   Psychiatric/Behavioral: Negative for mood changes. The patient is not nervous/anxious.      Constitutional, HEENT, cardiovascular, pulmonary, gi and gu systems are negative, except as otherwise noted.    OBJECTIVE:   /60 (BP Location: Right arm, Patient Position: Right side, Cuff Size: Adult Large)   Pulse 100   Ht 1.6 m (5' 3\")   Wt 107.6 kg (237 lb 4.8 oz)   SpO2 93%   BMI 42.04 kg/m   Estimated body mass index is 42.04 kg/m  as calculated from the following:    Height as of this encounter: 1.6 m (5' 3\").    Weight as of this encounter: 107.6 kg (237 lb 4.8 oz).  Physical Exam  GENERAL APPEARANCE: alert, no distress, " obese and sitting on walker seat  EYES: Eyes grossly normal to inspection, PERRL and conjunctivae and sclerae normal  HENT: left ear cerumen occluded; right ear normal. Cerumen successfully removed with irrigation  NECK: no adenopathy, no asymmetry, masses, or scars and thyroid normal to palpation  RESP: lungs clear to auscultation - no rales, rhonchi or wheezes  BREAST: normal without masses, tenderness or nipple discharge and no palpable axillary masses or adenopathy  CV: irregularly irregular  ABDOMEN: soft, nontender, no hepatosplenomegaly, no masses and bowel sounds normal  PSYCH: mentation appears normal and affect normal/bright    Labs reviewed in Epic    ASSESSMENT / PLAN:     Problem List Items Addressed This Visit        Respiratory    BROCK (obstructive sleep apnea)     Does not uses CPAP              Digestive    Morbid obesity due to excess calories (H)     The patient would likely benefit from losing some weight prior to any anticipated joint replacement surgery, and we discussed weight loss medication today such as Saxenda and she is going to look into that.           Relevant Orders    Primary Care - Care Coordination Referral       Endocrine    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 atrophy of thyroid     Currently taking levothyroxine 75 mcg daily.  Due for TSH.           Relevant Orders    TSH       Circulatory    Chronic atrial fibrillation (H)     The patient continues in atrial fibrillation and is on chronic Coumadin for a blood thinner.           Systolic heart failure (H)     The patient has a mildly low ejection fraction and continues on torsemide daily.  She is also on low-dose losartan as well as Coreg.  She is stable at this time with clear lungs and no symptoms to suggest heart failure exacerbation.           Coronary artery disease involving native coronary artery of native heart without angina pectoris     Stable without current symptoms of angina.           Relevant Orders    Comprehensive  "metabolic panel (BMP + Alb, Alk Phos, ALT, AST, Total. Bili, TP)    Lipid Profile (Chol, Trig, HDL, LDL calc)    Non-ischemic cardiomyopathy (H)       Musculoskeletal and Integumentary    Primary osteoarthritis of right hip     The patient has advanced osteoarthritis of her right hip.  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  I do think getting an opinion from [facility] would be a good idea as she is a little bit more of a complex case.           DDD (degenerative disc disease), lumbar    Relevant Orders    Primary Care - Care Coordination Referral       Other    History of chondrosarcoma      Other Visit Diagnoses     Encounter for Medicare annual wellness exam    -  Primary    Impacted cerumen of left ear        Relevant Orders    REMOVE IMPACTED CERUMEN (Completed)            COUNSELING:  Reviewed preventive health counseling, as reflected in patient instructions       Healthy diet/nutrition       Fall risk prevention       Osteoporosis prevention/bone health       Advanced Planning     Estimated body mass index is 42.04 kg/m  as calculated from the following:    Height as of this encounter: 1.6 m (5' 3\").    Weight as of this encounter: 107.6 kg (237 lb 4.8 oz).    Weight management plan: Discussed healthy diet and exercise guidelines    She reports that she quit smoking about 40 years ago. Her smoking use included cigarettes. She has a 10.00 pack-year smoking history. She has never used smokeless tobacco.      Appropriate preventive services were discussed with this patient, including applicable screening as appropriate for cardiovascular disease, diabetes, osteopenia/osteoporosis, and glaucoma.  As appropriate for age/gender, discussed screening for colorectal cancer, prostate cancer, breast cancer, and cervical cancer. Checklist reviewing preventive services available has been given to the patient.    Reviewed patients plan of care " and provided an AVS. The Complex Care Plan (for patients with higher acuity and needing more deliberate coordination of services) for Caty meets the Care Plan requirement. This Care Plan has been established and reviewed with the Patient and caregiver.    Counseling Resources:  ATP IV Guidelines  Pooled Cohorts Equation Calculator  Breast Cancer Risk Calculator  Breast Cancer: Medication to Reduce Risk  FRAX Risk Assessment  ICSI Preventive Guidelines  Dietary Guidelines for Americans, 2010  [employer]'s MyPlate  ASA Prophylaxis  Lung CA Screening    JUNO THOMPSON  [facility]    Identified Health Risks:  Answers for HPI/ROS submitted by the patient on 6/6/2022  If you checked off any problems, how difficult have these problems made it for you to do your work, take care of things at home, or get along with other people?: Somewhat difficult  PHQ9 TOTAL SCORE: 8

## 2022-06-06 NOTE — ASSESSMENT & PLAN NOTE
The patient would likely benefit from losing some weight prior to any anticipated joint replacement surgery, and we discussed weight loss medication today such as Saxenda and she is going to look into that.

## 2022-06-06 NOTE — PATIENT INSTRUCTIONS
Patient Education   Personalized Prevention Plan  You are due for the preventive services outlined below.  Your care team is available to assist you in scheduling these services.  If you have already completed any of these items, please share that information with your care team to update in your medical record.  Health Maintenance Due   Topic Date Due     ANNUAL REVIEW OF HM ORDERS  Never done     Zoster (Shingles) Vaccine (2 of 3) 03/01/2012     Pneumococcal Vaccine (3 - PPSV23 or PCV20) 08/28/2020     COVID-19 Vaccine (3 - Booster for Moderna series) 08/24/2021     Liver Monitoring Lab  11/05/2021     Thyroid Function Lab  11/05/2021     Basic Metabolic Panel  01/21/2022     Heart Failure Action Plan  02/25/2022     Your Health Risk Assessment indicates you feel you are not in good health    A healthy lifestyle helps keep the body fit and the mind alert. It helps protect you from disease, helps you fight disease, and helps prevent chronic disease (disease that doesn't go away) from getting worse. This is important as you get older and begin to notice twinges in muscles and joints and a decline in the strength and stamina you once took for granted. A healthy lifestyle includes good healthcare, good nutrition, weight control, recreation, and regular exercise. Avoid harmful substances and do what you can to keep safe. Another part of a healthy lifestyle is stay mentally active and socially involved.    Good healthcare     Have a wellness visit every year.     If you have new symptoms, let us know right away. Don't wait until the next checkup.     Take medicines exactly as prescribed and keep your medicines in a safe place. Tell us if your medicine causes problems.   Healthy diet and weight control     Eat 3 or 4 small, nutritious, low-fat, high-fiber meals a day. Include a variety of fruits, vegetables, and whole-grain foods.     Make sure you get enough calcium in your diet. Calcium, vitamin D, and exercise help  prevent osteoporosis (bone thinning).     If you live alone, try eating with others when you can. That way you get a good meal and have company while you eat it.     Try to keep a healthy weight. If you eat more calories than your body uses for energy, it will be stored as fat and you will gain weight.     Recreation   Recreation is not limited to sports and team events. It includes any activity that provides relaxation, interest, enjoyment, and exercise. Recreation provides an outlet for physical, mental, and social energy. It can give a sense of worth and achievement. It can help you stay healthy.    Mental Exercise and Social Involvement  Mental and emotional health is as important as physical health. Keep in touch with friends and family. Stay as active as possible. Continue to learn and challenge yourself.   Things you can do to stay mentally active are:    Learn something new, like a foreign language or musical instrument.     Play SCRABBLE or do crossword puzzles. If you cannot find people to play these games with you at home, you can play them with others on your computer through the Internet.     Join a games club--anything from card games to chess or checkers or lawn bowling.     Start a new hobby.     Go back to school.     Volunteer.     Read.   Keep up with world events.    Exercise for a Healthier Heart  You may wonder how you can improve the health of your heart. If you re thinking about exercise, you re on the right track. You don t need to become an athlete. But you do need a certain amount of brisk exercise to help strengthen your heart. If you have been diagnosed with a heart condition, your healthcare provider may advise exercise to help stabilize your condition. To help make exercise a habit, choose safe, fun activities.      Exercise with a friend. When activity is fun, you're more likely to stick with it.   Before you start  Check with your healthcare provider before starting an exercise  program. This is especially important if you have not been active for a while. It's also important if you have a long-term (chronic) health problem such as heart disease, diabetes, or obesity. Or if you are at high risk for having these problems.   Why exercise?  Exercising regularly offers many healthy rewards. It can help you do all of the following:     Improve your blood cholesterol level to help prevent further heart trouble    Lower your blood pressure to help prevent a stroke or heart attack    Control diabetes, or reduce your risk of getting this disease    Improve your heart and lung function    Reach and stay at a healthy weight    Make your muscles stronger so you can stay active    Prevent falls and fractures by slowing the loss of bone mass (osteoporosis)    Manage stress better    Reduce your blood pressure    Improve your sense of self and your body image  Exercise tips      Ease into your routine. Set small goals. Then build on them. If you are not sure what your activity level should be, talk with your healthcare provider first before starting an exercise routine.    Exercise on most days. Aim for a total of 150 minutes (2 hours and 30 minutes) or more of moderate-intensity aerobic activity each week. Or 75 minutes (1 hour and 15 minutes) or more of vigorous-intensity aerobic activity each week. Or try for a combination of both. Moderate activity means that you breathe heavier and your heart rate increases but you can still talk. Think about doing 40 minutes of moderate exercise, 3 to 4 times a week. For best results, activity should last for about 40 minutes to lower blood pressure and cholesterol. It's OK to work up to the 40-minute period over time. Examples of moderate-intensity activity are walking 1 mile in 15 minutes. Or doing 30 to 45 minutes of yard work.    Step up your daily activity level.  Along with your exercise program, try being more active the whole day. Walk instead of drive. Or  park further away so that you take more steps each day. Do more household tasks or yard work. You may not be able to meet the advised mount of physical activity. But doing some moderate- or vigorous-intensity aerobic activity can help reduce your risk for heart disease. Your healthcare provider can help you figure out what is best for you.    Choose 1 or more activities you enjoy.  Walking is one of the easiest things you can do. You can also try swimming, riding a bike, dancing, or taking an exercise class.    When to call your healthcare provider  Call your healthcare provider if you have any of these:     Chest pain or feel dizzy or lightheaded    Burning, tightness, pressure, or heaviness in your chest, neck, shoulders, back, or arms    Abnormal shortness of breath    More joint or muscle pain    A very fast or irregular heartbeat (palpitations)  [employer] last reviewed this educational content on 7/1/2019 2000-2021 [employer]. All rights reserved. This information is not intended as a substitute for professional medical care. Always follow your healthcare professional's instructions.          Understanding USDA MyPlate  The USDA has guidelines to help you make healthy food choices. These are called MyPlate. MyPlate shows the food groups that make up healthy meals using the image of a place setting. Before you eat, think about the healthiest choices for what to put on your plate or in your cup or bowl. To learn more about building a healthy plate, visit www.choosemyplate.gov.    The food groups    Fruits. Any fruit or 100% fruit juice counts as part of the Fruit Group. Fruits may be fresh, canned, frozen, or dried, and may be whole, cut-up, or pureed. Make 1/2 of your plate fruits and vegetables.    Vegetables. Any vegetable or 100% vegetable juice counts as a member of the Vegetable Group. Vegetables may be fresh, frozen, canned, or dried. They can be served raw or cooked and may be whole,  cut-up, or mashed. Make 1/2 of your plate fruits and vegetables.    Grains. All foods made from grains are part of the Grains Group. These include wheat, rice, oats, cornmeal, and barley. Grains are often used to make foods such as bread, pasta, oatmeal, cereal, tortillas, and grits. Grains should be no more than 1/4 of your plate. At least half of your grains should be whole grains.    Protein. This group includes meat, poultry, seafood, beans and peas, eggs, processed soy products (such as tofu), nuts (including nut butters), and seeds. Make protein choices no more than 1/4 of your plate. Meat and poultry choices should be lean or low fat.    Dairy. The Dairy Group includes all fluid milk products and foods made from milk that contain calcium, such as yogurt and cheese. (Foods that have little calcium, such as cream, butter, and cream cheese, are not part of this group.) Most dairy choices should be low-fat or fat-free.    Oils. Oils aren't a food group, but they do contain essential nutrients. However it's important to watch your intake of oils. These are fats that are liquid at room temperature. They include canola, corn, olive, soybean, vegetable, and sunflower oil. Foods that are mainly oil include mayonnaise, certain salad dressings, and soft margarines. You likely already get your daily oil allowance from the foods you eat.  Things to limit  Eating healthy also means limiting these things in your diet:       Salt (sodium). Many processed foods have a lot of sodium. To keep sodium intake down, eat fresh vegetables, meats, poultry, and seafood when possible. Purchase low-sodium, reduced-sodium, or no-salt-added food products at the store. And don't add salt to your meals at home. Instead, season them with herbs and spices such as dill, oregano, cumin, and paprika. Or try adding flavor with lemon or lime zest and juice.    Saturated fat. Saturated fats are most often found in animal products such as beef, pork,  and chicken. They are often solid at room temperature, such as butter. To reduce your saturated fat intake, choose leaner cuts of meat and poultry. And try healthier cooking methods such as grilling, broiling, roasting, or baking. For a simple lower-fat swap, use plain nonfat yogurt instead of mayonnaise when making potato salad or macaroni salad.    Added sugars. These are sugars added to foods. They are in foods such as ice cream, candy, soda, fruit drinks, sports drinks, energy drinks, cookies, pastries, jams, and syrups. Cut down on added sugars by sharing sweet treats with a family member or friend. You can also choose fruit for dessert, and drink water or other unsweetened beverages.     [employer] last reviewed this educational content on 6/1/2020 2000-2021 [employer]. All rights reserved. This information is not intended as a substitute for professional medical care. Always follow your healthcare professional's instructions.        Activities of Daily Living    Your Health Risk Assessment indicates you have difficulties with activities of daily living such as housework, bathing, preparing meals, taking medication, etc. Please make a follow up appointment for us to address this issue in more detail.    Urinary Incontinence, Female (Adult)   Urinary incontinence means loss of bladder control. This problem affects many women, especially as they get older. If you have incontinence, you may be embarrassed to ask for help. But know that this problem can be treated.   Types of Incontinence  There are different types of incontinence. Two of the main types are described here. You can have more than one type.     Stress incontinence. With this type, urine leaks when pressure (stress) is put on the bladder. This may happen when you cough, sneeze, or laugh. Stress incontinence most often occurs because the pelvic floor muscles that support the bladder and urethra are weak. This can happen after pregnancy  and vaginal childbirth or a hysterectomy. It can also be due to excess body weight or hormone changes.    Urge incontinence (also called overactive bladder). With this type, a sudden urge to urinate is felt often. This may happen even though there may not be much urine in the bladder. The need to urinate often during the night is common. Urge incontinence most often occurs because of bladder spasms. This may be due to bladder irritation or infection. Damage to bladder nerves or pelvic muscles, constipation, and certain medicines can also lead to urge incontinence.  Treatment depends on the cause. Further evaluation is needed to find the type you have. This will likely include an exam and certain tests. Based on the results, you and your healthcare provider can then plan treatment. Until a diagnosis is made, the home care tips below can help ease symptoms.   Home care    Do pelvic floor muscle exercises, if they are prescribed. The pelvic floor muscles help support the bladder and urethra. Many women find that their symptoms improve when doing special exercises that strengthen these muscles. To do the exercises, contract the muscles you would use to stop your stream of urine. But do this when you re not urinating. Hold for 10 seconds, then relax. Repeat 10 to 20 times in a row, at least 3 times a day. Your healthcare provider may give you other instructions for how to do the exercises and how often.    Keep a bladder diary. This helps track how often and how much you urinate over a set period of time. Bring this diary with you to your next visit with the provider. The information can help your provider learn more about your bladder problem.    Lose weight, if advised to by your provider. Extra weight puts pressure on the bladder. Your provider can help you create a weight-loss plan that s right for you. This may include exercising more and making certain diet changes.    Don't have foods and drinks that may irritate  the bladder. These can include alcohol and caffeinated drinks.    Quit smoking. Smoking and other tobacco use can lead to a long-term (chronic) cough that strains the pelvic floor muscles. Smoking may also damage the bladder and urethra. Talk with your provider about treatments or methods you can use to quit smoking.    If drinking large amounts of fluid makes you have symptoms, you may be advised to limit your fluid intake. You may also be advised to drink most of your fluids during the day and to limit fluids at night.    If you re worried about urine leakage or accidents, you may wear absorbent pads to catch urine. Change the pads often. This helps reduce discomfort. It may also reduce the risk of skin or bladder infections.    Follow-up care  Follow up with your healthcare provider, or as directed. It may take some to find the right treatment for your problem. But healthy lifestyle changes can be made right away. These include such things as exercising on a regular basis, eating a healthy diet, losing weight (if needed), and quitting smoking. Your treatment plan may include special therapies or medicines. Certain procedures or surgery may also be options. Talk about any questions you have with your provider.   When to seek medical advice  Call the healthcare provider right away if any of these occur:    Fever of 100.4 F (38 C) or higher, or as directed by your provider    Bladder pain or fullness    Belly swelling    Nausea or vomiting    Back pain    Weakness, dizziness, or fainting  [employer] last reviewed this educational content on 1/1/2020 2000-2021 [employer]. All rights reserved. This information is not intended as a substitute for professional medical care. Always follow your healthcare professional's instructions.        Your Health Risk Assessment indicates you feel you are not in good emotional health.    Recreation   Recreation is not limited to sports and team events. It includes  "any activity that provides relaxation, interest, enjoyment, and exercise. Recreation provides an outlet for physical, mental, and social energy. It can give a sense of worth and achievement. It can help you stay healthy.    Mental Exercise and Social Involvement  Mental and emotional health is as important as physical health. Keep in touch with friends and family. Stay as active as possible. Continue to learn and challenge yourself.   Things you can do to stay mentally active are:    Learn something new, like a foreign language or musical instrument.     Play SCRABBLE or do crossword puzzles. If you cannot find people to play these games with you at home, you can play them with others on your computer through the Internet.     Join a games club--anything from card games to chess or checkers or lawn bowling.     Start a new hobby.     Go back to school.     Volunteer.     Read.   Keep up with world events.    Depression and Suicide in Older Adults    Nearly 2 million older Americans have some type of depression. Some of them even take their own lives. Yet depression among older adults is often ignored. Learn the warning signs. You may help spare a loved one needless pain. You may also save a life.   What is depression?  Depression is a common and serious illness that affects the way you think and feel. It is not a normal part of aging, nor is it a sign of weakness, a character flaw, or something you can snap out of. Most people with depression need treatment to get better. The most common symptom is a feeling of deep sadness. People who are depressed also may seem tired and listless. And nothing seems to give them pleasure. It s normal to grieve or be sad sometimes. But sadness lessens or passes with time. Depression rarely goes away or improves on its own. A person with clinical depression can't \"snap out of it.\" Other symptoms of depression are:     Sleeping more or less than normal    Eating more or less than " normal    Having headaches, stomachaches, or other pains that don t go away    Feeling nervous,  empty,  or worthless    Crying a great deal    Thinking or talking about suicide or death    Loss of interest in activities previously enjoyed    Social isolation    Feeling confused or forgetful  What causes it?  The causes of depression aren t fully known. But it is thought to result from a complex blend of these factors:     Biochemistry. Certain chemicals in the brain play a role.    Genes. Depression does run in families.    Life stress. Life stresses can also trigger depression in some people. Older adults often face many stressors, such as death of friends or a spouse, health problems, and financial concerns.    Chronic conditions. This includes conditions such as diabetes, heart disease, or cancer. These can cause symptoms of depression. Medicine side effects can cause changes in thoughts and behaviors.  How you can help  Often, depressed people may not want to ask for help. When they do, they may be ignored. Or, they may receive the wrong treatment. You can help by showing parents and older friends love and support. If they seem depressed, don t lecture the person, ignore the symptoms, or discount the symptoms as a  normal  part of aging -which they are not. Get involved, listen, and show interest and support.   Help them understand that depression is a treatable illness. Tell them you can help them find the right treatment. Offer to go to their healthcare provider's appointment with them for support when the symptoms are discussed. With their approval, contact a local mental health center, social service agency, or hospital about services.   You can be an advocate for him or her at healthcare appointments. Many older adults have chronic illnesses that can cause symptoms of depression. Medicine side effects can change thoughts and behaviors. You can help make sure that the healthcare provider looks at all of  these factors. He or she should refer your family member or friend to a mental healthcare provider when needed. in some cases, untreated depression can lead to a misdiagnosis. A person may be diagnosed with a brain disorder such as dementia. If the healthcare provider does not take the issue of depression seriously, help your family member or friend to find another provider.   Don't be afraid to ask  If you think an older person you care about could be suicidal, ask,  Have you thought about suicide?  Most people will tell you the truth. If they say  yes,  they may already have a plan for how and when they will attempt it. Find out as much as you can. The more detailed the plan, and the easier it is to carry out, the more danger the person is in right now. Tell the person you are there for them and do not want them to harm him or herself. Don't wait to get help for the person. Call the person's healthcare provider, local hospital, or emergency services.   To learn more    National Suicide Prevention Lifeline (crisis hotline) 318-050-PBMS (061-304-4910)    National Deane of Mental Ivmjps775-496-2027hjm.[facility].nih.gov    National Ajo on Mental Qygphhk904-425-2565zpy.matt.org    Mental Health Rizyysz729-607-7231jzx.[facility].org    National Suicide Sjjjefx061-IKTMVDS (885-962-2195)    Call 911  Never leave the person alone. A person who is actively suicidal needs psychiatric care right away. They will need constant supervision. Never leave the person out of sight. Call 911 or the national 24-hour suicide crisis hotline at 913-860-QFQB (348-556-2290). You can also take the person to the closest emergency room.   [employer] last reviewed this educational content on 5/1/2020 2000-2021 [employer]. All rights reserved. This information is not intended as a substitute for professional medical care. Always follow your healthcare professional's instructions.

## 2022-06-06 NOTE — ASSESSMENT & PLAN NOTE
The patient has a mildly low ejection fraction and continues on torsemide daily.  She is also on low-dose losartan as well as Coreg.  She is stable at this time with clear lungs and no symptoms to suggest heart failure exacerbation.

## 2022-06-07 ENCOUNTER — PATIENT OUTREACH (OUTPATIENT)
Dept: CARE COORDINATION | Facility: CLINIC | Age: 77
End: 2022-06-07

## 2022-06-07 ENCOUNTER — PATIENT OUTREACH (OUTPATIENT)
Dept: NURSING | Facility: CLINIC | Age: 77
End: 2022-06-07
Payer: MEDICARE

## 2022-06-07 NOTE — PROGRESS NOTES
Clinic Care Coordination Contact  [facility]/Voicemail     Clinical Data: Care Coordinator Outreach  Outreach attempted x 1.  Left message on patient's voicemail with call back information and requested return call.  Plan: Care Coordinator will try to reach patient again in 1-2 business days.    Reason for Referral: Advance Care Planning  Complex Medical Concerns/Education    ACP: Health Care Directive      Fabiola Tanner  [facility] Health Worker  [facility] Care Coordination   Office: [phone]

## 2022-06-07 NOTE — PROGRESS NOTES
Clinic Care Coordination Contact  Community Health Worker Initial Outreach    CHW Initial Information Gathering:  Referral Source: PCP  Preferred Hospital: [facility]  [phone]  Current living arrangement:: I live in a private home with spouse  Type of residence:: Other (Condo)  Community Resources: Other (see comment) ([employer] meals)  Supplies Currently Used at Home: Incontinence Supplies  Equipment Currently Used at Home: cane, straight, walker, rolling  Informal Support system:: Family, Neighbors  No PCP office visit in Past Year: No (6/6/22 with Dr. Flores)  Transportation means:: Family  CHW Additional Questions  MyChart active?: Yes  Patient sent Social Determinants of Health questionnaire?: Yes    Patient accepts CC: Yes. Patient scheduled for assessment with WILMA Arriola on 6/10/22 at 1:00. Patient noted desire to discuss support with completing Health Care Directive and support with Medical Conditions.     ** CHW sent Care Coordination Questionnaires through [employer].    Fabiola Tanner  [facility] hospital Health Worker  [facility] Care Coordination   Office: [phone]

## 2022-06-10 ENCOUNTER — TELEPHONE (OUTPATIENT)
Dept: CARE COORDINATION | Facility: CLINIC | Age: 77
End: 2022-06-10

## 2022-06-10 ENCOUNTER — PATIENT OUTREACH (OUTPATIENT)
Dept: NURSING | Facility: CLINIC | Age: 77
End: 2022-06-10
Payer: MEDICARE

## 2022-06-10 DIAGNOSIS — E66.01 MORBID OBESITY DUE TO EXCESS CALORIES (H): ICD-10-CM

## 2022-06-10 DIAGNOSIS — M51.369 DDD (DEGENERATIVE DISC DISEASE), LUMBAR: ICD-10-CM

## 2022-06-10 ASSESSMENT — ACTIVITIES OF DAILY LIVING (ADL): DEPENDENT_IADLS:: TRANSPORTATION

## 2022-06-10 NOTE — TELEPHONE ENCOUNTER
Spoke to Abi for Enrollment to [facility].  She stated at her PCP appointment 6/6 that Dr. Flores was going to place a Referral for both Medical Cannabis and the [facility].  [facility] RN is unable to view either referral.  Unclear if these are not viewable in Epic.    Patient is asking for an update on these Referrals.  Okay for team to send the response either in Humounohart to patient directly or to call Abi at [phone].

## 2022-06-10 NOTE — LETTER
[facility]  Patient Centered Plan of Care  About Me:        Patient Name:  Caty Ng    YOB: 1945  Age:         77 year old   Aidan MRN:    [id number] Telephone Information:  Home Phone [phone]   Mobile [phone]       Address:  [address]  [place] [address] Email address:  [email or web address]      Emergency Contact(s)    Name Relationship Lgl Grd Work Phone Home Phone Mobile Phone   1. JACKIE NG Spouse No none [phone]   2. ANIYA DSOUZA* Daughter No none none [phone]           Primary language:  English     needed? No   [facility]:  [phone] op. 1  Other communication barriers:None    Preferred Method of Communication:  Phone  Current living arrangement: I live in a private home with spouse    Mobility Status/ Medical Equipment: Independent w/Device        Health Maintenance  Health Maintenance Reviewed: Up to date      My Access Plan  Medical Emergency 911   Primary Clinic Line [facility] - [phone]   24 Hour Appointment Line [phone] or  [phone]) (toll-free)   24 Hour Nurse Line [phone] (toll-free)   Preferred Urgent Care No data recorded   Preferred Hospital [facility]  [phone]     Preferred Pharmacy [id number] - [address]     Behavioral Health Crisis Line The National Suicide Prevention Lifeline at 1-461.982.3858 or 911             My Care Team Members  Patient Care Team       Relationship Specialty Notifications Start End    Keke Flores PCP - General   11/6/20     Phone: [phone] Fax: [phone]         [address] [facility] [address]    Cameron Soria MD Assigned Heart and Vascular Provider   7/16/21     Phone: [phone] Fax: [phone]         [address] [place] [address]    Keke Flores Assigned PCP    7/16/21     Phone: [phone] Fax: [phone]         [address] [facility] [address]    Raul Polo DO Assigned Neuroscience Provider   8/8/21     Phone: [phone] Fax: [phone]         [address] [facility] [address]    Juancarlos Haney MD Assigned Musculoskeletal Provider   10/10/21     Phone: [phone] Fax: [phone]         [address] [facility] [address]    Zeinab Yoder, RN Lead Care Coordinator Primary Care - CC Admissions 6/10/22     Phone: [phone]         Fabiola Tanner Community Health Worker  Admissions 6/10/22     [phone]            My Care Plans  Self Management and Treatment Plan  Goals and (Comments)   Goals        General     Health Care Directive (pt-stated)      Notes - Note created  6/10/2022  2:18 PM by Zeinab Yoder, RN     # 3 Goal Statement: I will complete a Health Care Directive and have this scanned into my Medical Record in the next 3-6 months.  Date Goal set: 6/10/22  Barriers: numerous appointments  Strengths: motivated  Date to Achieve By: 3-6 months  Patient expressed understanding of goal: yes  Action steps to achieve this goal:  1. Care Coordination will mail me a Health Care Directive and any requested resources.  2. I will complete the Health Care Directive. My signature must be either notarized or witnessed by two adults who are not named as health care agents in the document. Additionally only one of the witnesses can be employed by my health care system. If I need a notary I can check with my bank, my place of [employer], [employer] stores, or look online at [email or web address] .  3. I will provide a copy of my Health Care Directive to my care team and/or email directly to [email or web address].   4. I can visit [email or web address] website, email [email or web address] or call [facility] at [phone] (M-Friday 6a to 5p) for more information including free classes  on completing a Health Care Directive.  5. I will contact my care team with any barriers, questions or assistance needed with this goal. Care coordinator will remain available as needed.            [facility] (pt-stated)      Notes - Note created  6/10/2022  2:24 PM by Zeinab Yoder, RN     #2 Goal Statement: I would like a Referral to the [facility] in the next 30-90 days.  Date Goal set: 6/10/22  Barriers: numerous appointments  Strengths: motivated  Date to Achieve By: 30-90 days  Patient expressed understanding of goal: yes  Action steps to achieve this goal:  1. Patient discussed the [facility] Referral at her appointment with PCP 6/6/22.  2. [facility] RN will send a note to the PCP to inquire on the status of the referral.  3. [facility] will update the patient in the next 1-2 weeks on the status of the referral.           Medical (pt-stated)      Notes - Note edited  6/10/2022  2:22 PM by Zeinab Yoder, RN     # 1 Goal Statement: I would like a referral for Medical Cannabis in the next 30-90 days.   Date Goal set: 6/10/22  Barriers: numerous appointments  Strengths: motivated  Date to Achieve By: 30-90 days  Patient expressed understanding of goal: yes  Action steps to achieve this goal:  1. Patient discussed this referral at her PCP appointment 6/6/22  2. The [facility] RN will send a note to the PCP to inquire on the status of the referral.  3. [facility] will update the patient on the status of the referral in the next 2 weeks.                 Action Plans on File:               Heart Failure       Advance Care Plans/Directives Type:   No data recorded    My Medical and Care Information  Problem List   Patient Active Problem List   Diagnosis     Advanced directives, counseling/discussion     Morbid obesity due to excess calories (H)     Hypertriglyceridemia     Chronic atrial fibrillation (H)     Mitral valve regurgitation     Systolic heart failure (H)     BROCK (obstructive sleep apnea)     Hyperlipidemia LDL goal <40     Long  term current use of anticoagulant therapy     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 atrophy of thyroid     History of chondrosarcoma     S/p total knee replacement, bilateral     Primary osteoarthritis of right hip     Scoliosis     DDD (degenerative disc disease), lumbar     Chronic pain of both feet     Coronary artery disease involving native coronary artery of native heart without angina pectoris     Non-ischemic cardiomyopathy (H)     Status post coronary angiogram     Dyspnea on exertion     Long term (current) use of anticoagulants      Current Medications and Allergies:  See printed Medication Report.    Care Coordination Start Date: 6/10/2022   Frequency of Care Coordination: monthly     Form Last Updated: 06/13/2022

## 2022-06-10 NOTE — LETTER
[facility] CARE COORDINATION  [address]  [place] [address]    June 13, 2022    Caty Ng  [address]  [place] [facility] [address]      Dear Caty,    I am a clinic care coordinator who works with JUNO THOMPSON with the [facility]. I wanted to thank you for spending the time to talk with me.  Below is a description of clinic care coordination and how I can further assist you.       The clinic care coordination team is made up of a registered nurse, , financial resource worker and community health worker who understand the health care system. The goal of clinic care coordination is to help you manage your health and improve access to the health care system. Our team works alongside your provider to assist you in determining your health and social needs. We can help you obtain health care and community resources, providing you with necessary information and education. We can work with you through any barriers and develop a care plan that helps coordinate and strengthen the communication between you and your care team.    Please feel free to contact me with any questions or concerns regarding care coordination and what we can offer.      We are focused on providing you with the highest-quality healthcare experience possible.    Sincerely,     Zeinab Yoder RN  Clinic Care Coordination    Enclosed: I have enclosed a copy of the Patient Centered Plan of Care. This has helpful information and goals that we have talked about. Please keep this in an easy to access place to use as needed.

## 2022-06-13 ENCOUNTER — MYC MEDICAL ADVICE (OUTPATIENT)
Dept: CARE COORDINATION | Facility: CLINIC | Age: 77
End: 2022-06-13
Payer: MEDICARE

## 2022-06-13 NOTE — PROGRESS NOTES
Clinic Care Coordination Contact    Clinic Care Coordination Contact  OUTREACH    Referral Information:  Referral Source: PCP    Primary Diagnosis: Cardiovascular - other    Chief Complaint   Patient presents with     Clinic Care Coordination - Initial        Clinic Utilization  Difficulty keeping appointments:: No  Compliance Concerns: No  No-Show Concerns: No  No PCP office visit in Past Year: No  Utilization    Hospital Admissions  1             ED Visits  0             No Show Count (past year)  1                Current as of: 6/13/2022 12:37 PM              Clinical Concerns:  Current Medical Concerns:    Patient Active Problem List   Diagnosis     Advanced directives, counseling/discussion     Morbid obesity due to excess calories (H)     Hypertriglyceridemia     Chronic atrial fibrillation (H)     Mitral valve regurgitation     Systolic heart failure (H)     BROCK (obstructive sleep apnea)     Hyperlipidemia LDL goal <40     Long term current use of anticoagulant therapy     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 atrophy of thyroid     History of chondrosarcoma     S/p total knee replacement, bilateral     Primary osteoarthritis of right hip     Scoliosis     DDD (degenerative disc disease), lumbar     Chronic pain of both feet     Coronary artery disease involving native coronary artery of native heart without angina pectoris     Non-ischemic cardiomyopathy (H)     Status post coronary angiogram     Dyspnea on exertion     Long term (current) use of anticoagulants         Education Provided to patient: yes   Pain  Pain (GOAL):: No  Health Maintenance Reviewed: Up to date  Clinical Pathway: None    Medication Management:  Medication review status: Medications reviewed and no changes reported per patient.        Patient completes her own MSU box independently     Functional Status:  Dependent ADLs:: Ambulation-walker  Dependent IADLs:: Transportation  Bed or wheelchair confined:: No  Mobility Status: Independent  w/Device    Living Situation:  Current living arrangement:: I live in a private home with spouse  Type of residence:: Other (Condo)    Lifestyle & Psychosocial Needs:    Social Determinants of Health     Tobacco Use: Medium Risk     Smoking Tobacco Use: Former Smoker     Smokeless Tobacco Use: Never Used   Alcohol Use: Not on file   Financial Resource Strain: Not on file   Food Insecurity: Not on file   Transportation Needs: Not on file   Physical Activity: Not on file   Stress: Not on file   Social Connections: Not on file   Intimate Partner Violence: Not on file   Depression: Not at risk     PHQ-2 Score: 2   Housing Stability: Not on file     Diet:: No added salt  Inadequate nutrition (GOAL):: No  Tube Feeding: No  Inadequate activity/exercise (GOAL):: No  Significant changes in sleep pattern (GOAL): No  Transportation means:: Family, Regular car     Episcopal or spiritual beliefs that impact treatment:: No  Mental health DX:: No  Mental health management concern (GOAL):: No  Informal Support system:: Family, Neighbors           Resources and Interventions:  Current Resources:      Community Resources: Other (see comment) (Optage meals)  Supplies Currently Used at Home: Incontinence Supplies  Equipment Currently Used at Home: cane, straight, walker, rolling  Employment Status: retired         Advance Care Plan/Directive  Advanced Care Plans/Directives on file:: In process  Advanced Care Plan/Directive Status: Other (comment field) (mailing HCD paperwork.  Goal created)    Referrals Placed: None    77 yr F PMH: as listed above.  Referral from PCP.  Patient and spouse live in a condo on the 3rd story with their door next to the elevator.  She ambulates with a rolling walker.  Previously had a hip replacement surgery.  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  She would like to get her hip replaced and has had several consults and referrals in the [facility].  She has attended [facility] and the   of [place].  She currently has a referral pending to the [facility] to determine next steps for a possible hip replacement.  She stated that Dr. Flores discussed a Medical Cannabis referral at her appointment 6/6.  Writer is unable to view either the [facility] referral or the medical cannabis referral.  Will send a telephone encounter and update patient via My chart per her request.  Goals made with patient.  Will mail a HCD form.      Goals:    Goals        General     Health Care Directive (pt-stated)      Notes - Note created  6/10/2022  2:18 PM by Zeinab Yoder, RN     # 3 Goal Statement: I will complete a Health Care Directive and have this scanned into my Medical Record in the next 3-6 months.  Date Goal set: 6/10/22  Barriers: numerous appointments  Strengths: motivated  Date to Achieve By: 3-6 months  Patient expressed understanding of goal: yes  Action steps to achieve this goal:  1. Care Coordination will mail me a Health Care Directive and any requested resources.  2. I will complete the Health Care Directive. My signature must be either notarized or witnessed by two adults who are not named as health care agents in the document. Additionally only one of the witnesses can be employed by my health care system. If I need a notary I can check with my bank, my place of [employer], [employer] stores, or look online at [email or web address] .  3. I will provide a copy of my Health Care Directive to my care team and/or email directly to [email or web address].   4. I can visit [email or web address] website, email [email or web address] or call [facility] at [phone] (M-Friday 6a to 5p) for more information including free classes on completing a Health Care Directive.  5. I will contact my care team with any barriers, questions or assistance needed with this goal. Care coordinator will remain available as needed.            [facility] (pt-stated)      Notes - Note created  6/10/2022   2:24 PM by Zeinab Yoder, RN     #2 Goal Statement: I would like a Referral to the [facility] in the next 30-90 days.  Date Goal set: 6/10/22  Barriers: numerous appointments  Strengths: motivated  Date to Achieve By: 30-90 days  Patient expressed understanding of goal: yes  Action steps to achieve this goal:  1. Patient discussed the [facility] Referral at her appointment with PCP 6/6/22.  2. [facility] RN will send a note to the PCP to inquire on the status of the referral.  3. [facility] will update the patient in the next 1-2 weeks on the status of the referral.           Medical (pt-stated)      Notes - Note edited  6/10/2022  2:22 PM by Zeinab Yoder, RN     # 1 Goal Statement: I would like a referral for Medical Cannabis in the next 30-90 days.   Date Goal set: 6/10/22  Barriers: numerous appointments  Strengths: motivated  Date to Achieve By: 30-90 days  Patient expressed understanding of goal: yes  Action steps to achieve this goal:  1. Patient discussed this referral at her PCP appointment 6/6/22  2. The [facility] RN will send a note to the PCP to inquire on the status of the referral.  3. CCC will update the patient on the status of the referral in the next 2 weeks.                Patient/Caregiver understanding: Patient stated an understanding.    Outreach Frequency: monthly  Future Appointments              In 3 weeks [facility], [facility]          Plan: CCC RN to send a communication to PCP regarding [facility] and Medical Cannabis referrals.  [facility] RN will update the patient on there status.

## 2022-07-05 ENCOUNTER — LAB (OUTPATIENT)
Dept: LAB | Facility: CLINIC | Age: 77
End: 2022-07-05
Payer: MEDICARE

## 2022-07-05 ENCOUNTER — ANTICOAGULATION THERAPY VISIT (OUTPATIENT)
Dept: ANTICOAGULATION | Facility: CLINIC | Age: 77
End: 2022-07-05

## 2022-07-05 DIAGNOSIS — I48.21 PERMANENT ATRIAL FIBRILLATION (H): ICD-10-CM

## 2022-07-05 DIAGNOSIS — Z79.01 LONG TERM (CURRENT) USE OF ANTICOAGULANTS: Primary | ICD-10-CM

## 2022-07-05 DIAGNOSIS — Z79.01 LONG TERM (CURRENT) USE OF ANTICOAGULANTS: ICD-10-CM

## 2022-07-05 LAB — INR BLD: 2 (ref 0.9–1.1)

## 2022-07-05 PROCEDURE — 85610 PROTHROMBIN TIME: CPT

## 2022-07-05 PROCEDURE — 36416 COLLJ CAPILLARY BLOOD SPEC: CPT

## 2022-07-05 NOTE — PROGRESS NOTES
ANTICOAGULATION MANAGEMENT     Caty Ng 77 year old female is on warfarin with therapeutic INR result. (Goal INR 2.0-3.0)    Recent labs: (last 7 days)     07/05/22  1303   INR 2.0*       ASSESSMENT       Source(s): Chart Review, Patient/Caregiver Call and Template       Warfarin doses taken: Warfarin taken as instructed    Diet: reported eating a little more Lentil beans may be affecting diet and INR    New illness, injury, or hospitalization: No    Medication/supplement changes: None noted    Signs or symptoms of bleeding or clotting: No    Previous INR: Therapeutic last 4 visits    Additional findings: None       PLAN     Recommended plan for temporary change(s) affecting INR     Dosing Instructions:   (2mg tabs)    continue your current warfarin dose with next INR in 4 weeks       Summary  As of 7/5/2022    Full warfarin instructions:  1 mg every Fri; 2 mg all other days   Next INR check:  8/9/2022             Telephone call with  Abi ([phone]) who verbalizes understanding and agrees to plan    - will ease up on Vitamin-K foods for the next 1-2 days, then resume her usual thereafter.    Lab visit scheduled - INR on 8/11/22 @ [facility].   (not able to come in till 8/11,  undergoing chemotherapy)    Education provided: Importance of consistent vitamin K intake, Impact of vitamin K foods on INR and Goal range and significance of current result    Plan made per [facility] anticoagulation protocol    Kelly Wise RN  Anticoagulation Clinic  7/5/2022    _______________________________________________________________________     Anticoagulation Episode Summary     Current INR goal:  2.0-3.0   TTR:  83.1 % (1 y)   Target end date:  Indefinite   Send INR reminders to:  BREE ANDREW    Indications    Long term (current) use of anticoagulants [Z79.01]  Permanent atrial fibrillation (H) (Resolved) [I48.21]           Comments:           Anticoagulation Care Providers     Provider Role Specialty Phone  number    Keke Flores [facility] [phone]

## 2022-07-19 ENCOUNTER — PATIENT OUTREACH (OUTPATIENT)
Dept: CARE COORDINATION | Facility: CLINIC | Age: 77
End: 2022-07-19

## 2022-07-19 NOTE — PROGRESS NOTES
Clinic Care Coordination Contact    Community Health Worker Follow Up    Care Gaps:     Health Maintenance Due   Topic Date Due     ZOSTER IMMUNIZATION (2 of 3) 03/01/2012     COVID-19 Vaccine (3 - Booster for Moderna series) 08/24/2021     HF ACTION PLAN  02/25/2022     CBC  07/21/2022       Patient accepted scheduling phone number for [facility]  to schedule independently     Goals:    Goals Addressed as of 7/19/2022 at 2:46 PM                    Today       Health Care Directive (pt-stated)   10%    Added 6/10/22 by Zeinab Yoder, RN      # 3 Goal Statement: I will complete a Health Care Directive and have this scanned into my Medical Record in the next 3-6 months.  Date Goal set: 6/10/22  Barriers: numerous appointments  Strengths: motivated  Date to Achieve By: 3-6 months  Patient expressed understanding of goal: yes  Action steps to achieve this goal:  1. Care Coordination will mail me a Health Care Directive and any requested resources. Completed  2. I will complete the Health Care Directive. My signature must be either notarized or witnessed by two adults who are not named as health care agents in the document. Additionally only one of the witnesses can be employed by my health care system. If I need a notary I can check with my bank, my place of [employer], [employer] stores, or look online at [email or web address] . Continuous (MB)  3. I will provide a copy of my Health Care Directive to my care team and/or email directly to [email or web address].   4. I can visit [email or web address] website, email [email or web address] or call [facility] at [phone] (M-Friday 6a to 5p) for more information including free classes on completing a Health Care Directive. Continuous (MB)  5. I will contact my care team with any barriers, questions or assistance needed with this goal. Care coordinator will remain available as needed. Continuous (MB)      Goal Updated: 7/19/22            [facility] (pt-stated)   50%    Added 6/10/22 by Zeinab Yoder, RN      #2 Goal Statement: I would like a Referral to the [facility] in the next 30-90 days.  Date Goal set: 6/10/22  Barriers: numerous appointments  Strengths: motivated  Date to Achieve By: 30-90 days  Patient expressed understanding of goal: yes  Action steps to achieve this goal:  1. Patient discussed the [facility] Referral at her appointment with PCP 6/6/22. Completed  2. [facility] RN will send a note to the PCP to inquire on the status of the referral. Completed  3. [facility] will update the patient in the next 1-2 weeks on the status of the referral. I spoke with the [facility] records and they received the request for Medical records. I will call the [facility] soon to see if they received my records and will try to schedule an appointment.      Goal Updated: 7/19/22           Medical (pt-stated)   50%    Added 6/10/22 by Zeinab Yoder, RN      # 1 Goal Statement: I would like a referral for Medical Cannabis in the next 30-90 days.   Date Goal set: 6/10/22  Barriers: numerous appointments  Strengths: motivated  Date to Achieve By: 30-90 days  Patient expressed understanding of goal: yes  Action steps to achieve this goal:  1. Patient discussed this referral at her PCP appointment 6/6/22. Completed  2. [facility] RN will send a note to the PCP to inquire on the status of the referral. Completed  3. [facility] will update the patient on the status of the referral in the next 2 weeks. I wanted to wait until I was seen at the [facility].      Goal Updated: 7/19/22                Intervention and Education during outreach: N/A    CHW Plan: CHW will follow up with patient in 1 month on 8/19/22.    Fabiola Tanner  Community Health Worker  [facility] Care Coordination   Office: [phone]

## 2022-07-26 ENCOUNTER — PATIENT OUTREACH (OUTPATIENT)
Dept: CARE COORDINATION | Facility: CLINIC | Age: 77
End: 2022-07-26

## 2022-07-26 NOTE — PROGRESS NOTES
Clinic Care Coordination Contact    Care Coordination Clinician Chart Review     Situation: Patient chart reviewed by care coordinator.?     Background: Initial assessment and enrollment to Care Coordination was 6/9/22.?? Patient centered goals were developed with participation from patient.? Lead CC handed patient off to CHW for continued outreach every 30 days.??     Assessment: Per chart review, patient outreach completed by CC CHW on 7/19/22.? Patient is actively working to accomplish goal(s).? Patient's goal(s) remain(s) appropriate at this time.? Patient is not due for updated Plan of Care.? Annual assessment will be due 6/9/23.      Goals        Health Care Directive (pt-stated)       # 3 Goal Statement: I will complete a Health Care Directive and have this scanned into my Medical Record in the next 3-6 months.  Date Goal set: 6/10/22  Barriers: numerous appointments  Strengths: motivated  Date to Achieve By: 3-6 months  Patient expressed understanding of goal: yes  Action steps to achieve this goal:  1. Care Coordination will mail me a Health Care Directive and any requested resources. Completed  2. I will complete the Health Care Directive. My signature must be either notarized or witnessed by two adults who are not named as health care agents in the document. Additionally only one of the witnesses can be employed by my health care system. If I need a notary I can check with my bank, my place of Christian, UPS stores, or look online at [email or web address] . Continuous (MB)  3. I will provide a copy of my Health Care Directive to my care team and/or email directly to [email or web address].   4. I can visit [email or web address] website, email [email or web address] or call [facility] at [phone] (M-Friday 6a to 5p) for more information including free classes on completing a Health Care Directive. Continuous (MB)  5. I will contact my care team with any barriers,  questions or assistance needed with this goal. Care coordinator will remain available as needed. Continuous (MB)      Goal Updated: 7/19/22           [facility] (pt-stated)       #2 Goal Statement: I would like a Referral to the [facility] in the next 30-90 days.  Date Goal set: 6/10/22  Barriers: numerous appointments  Strengths: motivated  Date to Achieve By: 30-90 days  Patient expressed understanding of goal: yes  Action steps to achieve this goal:  1. Patient discussed the [facility] Referral at her appointment with PCP 6/6/22. Completed  2. CCC RN will send a note to the PCP to inquire on the status of the referral. Completed  3. [facility] will update the patient in the next 1-2 weeks on the status of the referral. I spoke with the [facility] records and they received the request for Medical records. I will call the [facility] soon to see if they received my records and will try to schedule an appointment.      Goal Updated: 7/19/22           Medical (pt-stated)       # 1 Goal Statement: I would like a referral for Medical Cannabis in the next 30-90 days.   Date Goal set: 6/10/22  Barriers: numerous appointments  Strengths: motivated  Date to Achieve By: 30-90 days  Patient expressed understanding of goal: yes  Action steps to achieve this goal:  1. Patient discussed this referral at her PCP appointment 6/6/22. Completed  2. The CCC RN will send a note to the PCP to inquire on the status of the referral. Completed  3. [facility] will update the patient on the status of the referral in the next 2 weeks. I wanted to wait until I was seen at the [facility].      Goal Updated: 7/19/22            ??     Plan/Recommendations: The patient will continue working with Care Coordination to achieve above goal(s).? CHW will involve Lead CC as needed or if patient is ready to move to maintenance.? Lead CC will continue to monitor CHW s monthly outreaches and progress to goal(s) every 6 weeks.?     Plan of Care updated and  sent to patient: No and LCP 6/9/22

## 2022-08-11 ENCOUNTER — LAB (OUTPATIENT)
Dept: LAB | Facility: CLINIC | Age: 77
End: 2022-08-11
Payer: MEDICARE

## 2022-08-11 ENCOUNTER — ANTICOAGULATION THERAPY VISIT (OUTPATIENT)
Dept: ANTICOAGULATION | Facility: CLINIC | Age: 77
End: 2022-08-11

## 2022-08-11 DIAGNOSIS — I48.21 PERMANENT ATRIAL FIBRILLATION (H): ICD-10-CM

## 2022-08-11 DIAGNOSIS — Z79.01 LONG TERM (CURRENT) USE OF ANTICOAGULANTS: ICD-10-CM

## 2022-08-11 DIAGNOSIS — Z79.01 LONG TERM (CURRENT) USE OF ANTICOAGULANTS: Primary | ICD-10-CM

## 2022-08-11 LAB — INR BLD: 2.2 (ref 0.9–1.1)

## 2022-08-11 PROCEDURE — 85610 PROTHROMBIN TIME: CPT

## 2022-08-11 PROCEDURE — 36416 COLLJ CAPILLARY BLOOD SPEC: CPT

## 2022-08-11 NOTE — PROGRESS NOTES
ANTICOAGULATION MANAGEMENT     Caty Ng 77 year old female is on warfarin with therapeutic INR result. (Goal INR 2.0-3.0)    Recent labs: (last 7 days)     08/11/22  1317   INR 2.2*       ASSESSMENT       Source(s): Chart Review and Patient/Caregiver Call       Warfarin doses taken: Warfarin taken as instructed    Diet: No new diet changes identified    New illness, injury, or hospitalization: No    Medication/supplement changes: None noted    Signs or symptoms of bleeding or clotting: No    Previous INR: Therapeutic last 2(+) visits    Additional findings: None       PLAN     Recommended plan for no diet, medication or health factor changes affecting INR     Dosing Instructions: Continue your current warfarin dose with next INR in 4 weeks       Summary  As of 8/11/2022    Full warfarin instructions:  1 mg every Fri; 2 mg all other days   Next INR check:  9/8/2022             Telephone call with Abi who verbalizes understanding and agrees to plan    Lab visit scheduled    Education provided: None required    Plan made per [facility] anticoagulation protocol    Nathalie Glaser RN  Anticoagulation Clinic  8/11/2022    _______________________________________________________________________     Anticoagulation Episode Summary     Current INR goal:  2.0-3.0   TTR:  86.1 % (1 y)   Target end date:  Indefinite   Send INR reminders to:  BREE ANDREW    Indications    Long term (current) use of anticoagulants [Z79.01]  Permanent atrial fibrillation (H) (Resolved) [I48.21]           Comments:           Anticoagulation Care Providers     Provider Role Specialty Phone number    Keke Flores Referring Family Medicine [phone]

## 2022-08-15 DIAGNOSIS — I50.32 CHRONIC DIASTOLIC HEART FAILURE (H): ICD-10-CM

## 2022-08-17 NOTE — TELEPHONE ENCOUNTER
CARVEDILOL TAB 25MG      TAKE 1/2 TABLET TWICE A DAYWITH MEALS  Last Written Prescription Date:  8-13-21  Last Fill Quantity: 90,   # refills: 3  Last Office Visit : 7-23-20 RTC 1 y)  Future Office visit:  none    Routing refill request to provider for review/approval because:  Last appt > 18 M  ? Trans care-  Cards note [place]

## 2022-08-19 ENCOUNTER — TELEPHONE (OUTPATIENT)
Dept: CARDIOLOGY | Facility: CLINIC | Age: 77
End: 2022-08-19

## 2022-08-19 NOTE — TELEPHONE ENCOUNTER
Medication previously refilled on 8/15/22.     Health Call Center    Phone Message    May a detailed message be left on voicemail: no     Reason for Call: Medication Question or concern regarding medication   Prescription Clarification  Name of Medication:   carvedilol (COREG) 25 MG tablet  Prescribing Provider: Cameron Soria MD   Pharmacy: [facility] - [place], [place] - [address] AT PORTAL TO [facility] [address]   What on the order needs clarification?   Refill request.           Action Taken: Other: [place] cardiology    Travel Screening: Not Applicable

## 2022-08-22 ENCOUNTER — PATIENT OUTREACH (OUTPATIENT)
Dept: CARE COORDINATION | Facility: CLINIC | Age: 77
End: 2022-08-22

## 2022-08-23 ENCOUNTER — PATIENT OUTREACH (OUTPATIENT)
Dept: CARE COORDINATION | Facility: CLINIC | Age: 77
End: 2022-08-23

## 2022-08-23 RX ORDER — CARVEDILOL 25 MG/1
TABLET ORAL
Qty: 90 TABLET | OUTPATIENT
Start: 2022-08-23

## 2022-08-23 NOTE — PROGRESS NOTES
Clinic Care Coordination Contact    Community Health Worker Follow Up    Care Gaps:     Health Maintenance Due   Topic Date Due     ZOSTER IMMUNIZATION (2 of 3) 03/01/2012     LUNG CANCER SCREENING  09/07/2019     COVID-19 Vaccine (3 - Booster for Moderna series) 08/24/2021     HF ACTION PLAN  02/25/2022     CBC  07/21/2022       Postponed to next CHW follow up     Care Plan:    Goals Addressed                    This Visit's Progress       Health Care Directive (pt-stated)   60%      # 3 Goal Statement: I will complete a Health Care Directive and have this scanned into my Medical Record in the next 3-6 months.  Date Goal set: 6/10/22  Barriers: numerous appointments  Strengths: motivated  Date to Achieve By: 3-6 months  Patient expressed understanding of goal: yes  Action steps to achieve this goal:  1. Care Coordination will mail me a Health Care Directive and any requested resources. Completed  2. I will complete the Health Care Directive. My signature must be either notarized or witnessed by two adults who are not named as health care agents in the document. Additionally only one of the witnesses can be employed by my health care system. If I need a notary I can check with my bank, my place of Confucianist, [employer] stores, or look online at [email or web address] . I will bring my completed Health Care Directive to clinic at my next PCP appointment on 9/26/22.   3. I will provide a copy of my Health Care Directive to my care team and/or email directly to [email or web address].   4. I can visit [email or web address] website, email [email or web address] or call [facility] at [phone] (M-Friday 6a to 5p) for more information including free classes on completing a Health Care Directive. Continuous (MB)  5. I will contact my care team with any barriers, questions or assistance needed with this goal. Care coordinator will remain available as needed. Continuous (MB)      Goal Updated:  8/23/22           [facility] (pt-stated)   100%      I have accomplished getting a referral to the [facility] and am on their wait list now.     Personal Plan  If I am wanting to see where I am on the wait list I will call the [facility].            Medical (pt-stated)   50%      # 1 Goal Statement: I would like a referral for Medical Cannabis in the next 30-90 days.   Date Goal set: 6/10/22  Barriers: numerous appointments  Strengths: motivated  Date to Achieve By: 30-90 days  Patient expressed understanding of goal: yes  Action steps to achieve this goal:  1. Patient discussed this referral at her PCP appointment 6/6/22. Completed  2. The CCC RN will send a note to the PCP to inquire on the status of the referral. Completed  3. CCC will update the patient on the status of the referral in the next 2 weeks. I wanted to wait until I was seen at the [facility]. Continuous (MB)      Goal Updated: 8/23/22                Intervention and Education during outreach: N/A    CHW Plan: CHW will follow up with patient in 1 month on 9/29/22 after PCP appointment that is scheduled 9/26/22.    Fabiola Tanner  Community Health Worker  [facility] Care Coordination   [place], [facility]  Office: [phone]

## 2022-08-25 NOTE — PROGRESS NOTES
This is a recent snapshot of the patient's [facility] medical record.  For current drug dose and complete information and questions, call [phone] or In Basket pool, [facility] home infusion ([id number])  CSN Number:  [id number]

## 2022-08-30 DIAGNOSIS — I50.32 CHRONIC DIASTOLIC HEART FAILURE (H): ICD-10-CM

## 2022-08-31 RX ORDER — CARVEDILOL 25 MG/1
TABLET ORAL
Qty: 90 TABLET | Refills: 2 | Status: ON HOLD | OUTPATIENT
Start: 2022-08-31 | End: 2023-01-13

## 2022-08-31 NOTE — TELEPHONE ENCOUNTER
"    Last Written Prescription Date:  8/13/21  Last Fill Quantity: 90,  # refills: 3   Last office visit provider:  6/6/22     Requested Prescriptions   Pending Prescriptions Disp Refills     carvedilol (COREG) 25 MG tablet [Pharmacy Med Name: CARVEDILOL TAB 25MG] 90 tablet 3     Sig: TAKE 1/2 TABLET TWICE A DAYWITH MEALS       Beta-Blockers Protocol Failed - 8/30/2022  2:04 PM        Failed - Recent (12 mo) or future (30 days) visit within the authorizing provider's specialty     Patient has had an office visit with the authorizing provider or a provider within the authorizing providers department within the previous 12 mos or has a future within next 30 days. See \"Patient Info\" tab in inbasket, or \"Choose Columns\" in Meds & Orders section of the refill encounter.              Passed - Blood pressure under 140/90 in past 12 months     BP Readings from Last 3 Encounters:   06/06/22 112/60   08/12/21 96/60   08/05/21 106/64                 Passed - Patient is age 6 or older        Passed - Medication is active on med list             Karrie Alexis RN 08/31/22 5:02 PM  "

## 2022-09-06 ENCOUNTER — PATIENT OUTREACH (OUTPATIENT)
Dept: CARE COORDINATION | Facility: CLINIC | Age: 77
End: 2022-09-06

## 2022-09-06 NOTE — LETTER
[facility] CARE COORDINATION  [address]  [place] [address]    September 6, 2022        Caty Ng  [address]  [place] [address]          Dear Caty,     Attached is an updated Patient Centered Plan of Care for your continued enrollment in Care Coordination. Please let us know if you have additional questions, concerns, or goals that we can assist with.    Sincerely,    Zeinab Yoder RN  Clinic Care Coordination

## 2022-09-06 NOTE — PROGRESS NOTES
Clinic Care Coordination Contact    Care Coordination Clinician Chart Review     Situation: Patient chart reviewed by care coordinator.?     Background: Initial assessment and enrollment to Care Coordination was 6/10/22.?? Care plan(s) with patient-centered goal(s) were developed with participation from patient.? Lead CC handed patient off to CHW for continued outreach every 30 days.??     Assessment: Per chart review, patient outreach completed by CC CHW on 8/23/22.? Patient is actively working to accomplish goal(s).? Patient's goal(s) remain(s) appropriate at this time.? Patient is due for updated Plan of Care.? Annual assessment will be due 6/10/2023     Care Plan: Medical Cannabis     Problem: HP GENERAL PROBLEM     Goal: I would like a referral for Medical Cannabis in the next 30-90 days.     Start Date: 6/10/2022 Expected End Date: 9/10/2022    Note:     Barriers: numerous appointments  Strengths: motivated  Date to Achieve By: 30-90 days  Patient expressed understanding of goal: yes  Action steps to achieve this goal:  1. Patient discussed this referral at her PCP appointment 6/6/22. Completed  2. The CCC RN will send a note to the PCP to inquire on the status of the referral. Completed  3. [facility] will update the patient on the status of the referral in the next 2 weeks. I wanted to wait until I was seen at the [facility]. Continuous (MB)      Goal Updated: 8/23/22                    Care Plan: Health Care Directive     Problem: HP GENERAL PROBLEM     Goal: I will complete a Health Care Directive and have this scanned into my Medical Record in the next 3-6 months.     Start Date: 6/10/2022 Expected End Date: 12/10/2022    Note:     Barriers: numerous appointments  Strengths: motivated  Date to Achieve By: 3-6 months  Patient expressed understanding of goal: yes  Action steps to achieve this goal:  1. Care Coordination will mail me a Health Care Directive and any requested resources. Completed  2. I will  complete the Health Care Directive. My signature must be either notarized or witnessed by two adults who are not named as health care agents in the document. Additionally only one of the witnesses can be employed by my health care system. If I need a notary I can check with my bank, my place of Scientology, [employer] stores, or look online at [email or web address] . I will bring my completed Health Care Directive to clinic at my next PCP appointment on 9/26/22.   3. I will provide a copy of my Health Care Directive to my care team and/or email directly to [email or web address].   4. I can visit [email or web address]/[email or web address] website, email [email or web address] or call  [employer] at [phone] (M-Friday 6a to 5p) for more information including free classes on completing a Health Care Directive. Continuous (MB)  5. I will contact my care team with any barriers, questions or assistance needed with this goal. Care coordinator will remain available as needed. Continuous (MB)      Goal Updated: 8/23/22                        Plan/Recommendations: The patient will continue working with Care Coordination to achieve above goal(s).? CHW will involve Lead CC as needed or if patient is ready to move to maintenance.? Lead CC will continue to monitor CHW s monthly outreaches and progress to goal(s) every 6 weeks.    Plan of Care updated and sent to patient: Yes, RODGER 9/6/22

## 2022-09-06 NOTE — LETTER
[facility]  Patient Centered Plan of Care  About Me:        Patient Name:  Caty Ng    YOB: 1945  Age:         77 year old   Aidan MRN:    [id number] Telephone Information:  Home Phone [phone]   Mobile [phone]       Address:  [address]  [place] [address] Email address:  [email or web address]      Emergency Contact(s)    Name Relationship Lgl Grd Work Phone Home Phone Mobile Phone   1. JACKIE NG Spouse No none [phone]   2. ANIYA DSOUZA* Daughter No none none [phone]           Primary language:  English     needed? No   [employer]:  [phone] op. 1  Other communication barriers:None    Preferred Method of Communication:  Phone  Current living arrangement: I live in a private home with spouse    Mobility Status/ Medical Equipment: Independent w/Device        Health Maintenance  Health Maintenance Reviewed: Up to date      My Access Plan  Medical Emergency 911   Primary Clinic Line [facility] - [phone]   24 Hour Appointment Line [phone] or  [phone]) (toll-free)   24 Hour Nurse Line [phone] (toll-free)   Preferred Urgent Care No data recorded   Preferred Hospital [facility]  [phone]     [id number] [place] [address] [facility]     Behavioral Health Crisis Line The National Suicide Prevention Lifeline at 1-655.277.2837 or Text/Call 858             My Care Team Members  Patient Care Team       Relationship Specialty Notifications Start End    Keke Flores PCP - General   11/6/20     Phone: [phone] Fax: [phone]         [address] [facility] [address]    Cameron Soria MD Assigned Heart and Vascular Provider   7/16/21     Phone: [phone] Fax: [phone]         [address] [facility] [address] [facility] [address]    Keke Flores  Assigned PCP   7/16/21     Phone: [phone] Fax: [phone]         [address] [facility] [address]    Raul Polo DO Assigned Neuroscience Provider   8/8/21     Phone: [phone] Fax: [phone]         [address] [facility] [address]    Juancarlos Haney MD Assigned Musculoskeletal Provider   10/10/21     Phone: [phone] Fax: [phone]         [address] [facility] [address]    Zeinab Yoder, WILMA Lead Care Coordinator Primary Care - CC Admissions 6/10/22     Phone: [phone]         Fabiola Tanner Community Health Worker  Admissions 6/10/22     [phone]            My Care Plans  Self Management and Treatment Plan  Care Plan  Care Plan: Medical Cannabis     Problem: HP GENERAL PROBLEM     Goal: I would like a referral for Medical Cannabis in the next 30-90 days.     Start Date: 6/10/2022 Expected End Date: 9/10/2022    Note:     Barriers: numerous appointments  Strengths: motivated  Date to Achieve By: 30-90 days  Patient expressed understanding of goal: yes  Action steps to achieve this goal:  1. Patient discussed this referral at her PCP appointment 6/6/22. Completed  2. The CCC RN will send a note to the PCP to inquire on the status of the referral. Completed  3. [facility] will update the patient on the status of the referral in the next 2 weeks. I wanted to wait until I was seen at the [facility]. Continuous (MB)      Goal Updated: 8/23/22                    Care Plan: Health Care Directive     Problem: HP GENERAL PROBLEM     Goal: I will complete a Health Care Directive and have this scanned into my Medical Record in the next 3-6 months.     Start Date: 6/10/2022 Expected End Date: 12/10/2022    Note:     Barriers: numerous appointments  Strengths: motivated  Date to Achieve By: 3-6 months  Patient expressed understanding of goal: yes  Action steps to achieve this goal:  1. Care Coordination will mail me a Health Care Directive and any  requested resources. Completed  2. I will complete the Health Care Directive. My signature must be either notarized or witnessed by two adults who are not named as health care agents in the document. Additionally only one of the witnesses can be employed by my health care system. If I need a notary I can check with my bank, my place of Temple, UPS stores, or look online at www.[email or web address].[email or web address] . I will bring my completed Health Care Directive to clinic at my next PCP appointment on 9/26/22.   3. I will provide a copy of my Health Care Directive to my care team and/or email directly to [email or web address].   4. I can visit [email or web address] website, email [email or web address] or call  [employer] at [phone] (M-Friday 6a to 5p) for more information including free classes on completing a Health Care Directive. Continuous (MB)  5. I will contact my care team with any barriers, questions or assistance needed with this goal. Care coordinator will remain available as needed. Continuous (MB)      Goal Updated: 8/23/22                         Action Plans on File:               Heart Failure       Advance Care Plans/Directives Type:   No data recorded    My Medical and Care Information  Problem List   Patient Active Problem List   Diagnosis     Advanced directives, counseling/discussion     Morbid obesity due to excess calories (H)     Hypertriglyceridemia     Chronic atrial fibrillation (H)     Mitral valve regurgitation     Systolic heart failure (H)     BROCK (obstructive sleep apnea)     Hyperlipidemia LDL goal <40     Long term current use of anticoagulant therapy     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 atrophy of thyroid     History of chondrosarcoma     S/p total knee replacement, bilateral     Primary osteoarthritis of right hip     Scoliosis     DDD (degenerative disc disease), lumbar     Chronic pain of both feet     Coronary artery disease involving native coronary artery  of native heart without angina pectoris     Non-ischemic cardiomyopathy (H)     Status post coronary angiogram     Dyspnea on exertion     Long term (current) use of anticoagulants      Current Medications and Allergies:  See printed Medication Report.    Care Coordination Start Date: 6/10/2022   Frequency of Care Coordination: monthly     Form Last Updated: 09/06/2022

## 2022-09-08 ENCOUNTER — ANTICOAGULATION THERAPY VISIT (OUTPATIENT)
Dept: ANTICOAGULATION | Facility: CLINIC | Age: 77
End: 2022-09-08

## 2022-09-08 ENCOUNTER — LAB (OUTPATIENT)
Dept: LAB | Facility: CLINIC | Age: 77
End: 2022-09-08
Payer: MEDICARE

## 2022-09-08 ENCOUNTER — OFFICE VISIT (OUTPATIENT)
Dept: CARDIOLOGY | Facility: CLINIC | Age: 77
End: 2022-09-08

## 2022-09-08 VITALS
HEART RATE: 64 BPM | SYSTOLIC BLOOD PRESSURE: 116 MMHG | WEIGHT: 236.2 LBS | BODY MASS INDEX: 41.85 KG/M2 | HEIGHT: 63 IN | DIASTOLIC BLOOD PRESSURE: 64 MMHG | OXYGEN SATURATION: 94 % | RESPIRATION RATE: 18 BRPM

## 2022-09-08 DIAGNOSIS — I42.8 NON-ISCHEMIC CARDIOMYOPATHY (H): ICD-10-CM

## 2022-09-08 DIAGNOSIS — I34.0 NONRHEUMATIC MITRAL VALVE REGURGITATION: ICD-10-CM

## 2022-09-08 DIAGNOSIS — Z79.01 LONG TERM (CURRENT) USE OF ANTICOAGULANTS: ICD-10-CM

## 2022-09-08 DIAGNOSIS — R06.09 DYSPNEA ON EXERTION: Primary | ICD-10-CM

## 2022-09-08 DIAGNOSIS — I48.20 CHRONIC ATRIAL FIBRILLATION (H): ICD-10-CM

## 2022-09-08 DIAGNOSIS — I48.21 PERMANENT ATRIAL FIBRILLATION (H): ICD-10-CM

## 2022-09-08 DIAGNOSIS — Z79.01 LONG TERM (CURRENT) USE OF ANTICOAGULANTS: Primary | ICD-10-CM

## 2022-09-08 LAB — INR BLD: 2 (ref 0.9–1.1)

## 2022-09-08 PROCEDURE — 99214 OFFICE O/P EST MOD 30 MIN: CPT | Performed by: INTERNAL MEDICINE

## 2022-09-08 PROCEDURE — 85610 PROTHROMBIN TIME: CPT

## 2022-09-08 PROCEDURE — 36416 COLLJ CAPILLARY BLOOD SPEC: CPT

## 2022-09-08 NOTE — PROGRESS NOTES
"    [facility]  [phone]      Assessment/Recommendations   Patient with permanent atrial fibrillation as well as mild reduction left ventricular systolic function and mitral insufficiency.  Her functional capacity is stable but she is markedly limited by hip and knee problems.  She has mild peripheral edema which has been stable.    We will repeat an echocardiogram to look at her left ventricular ejection fraction as well as degree of mitral insufficiency.    We talked about switching her to a novel anticoagulant.  She will check out the price of Eliquis and Xarelto and if not prohibitive will call us back and we will switch her over.  Recent INRs have been reasonably stable in the 2-2.5 range.    If the echocardiogram is unremarkable, we will plan on seeing her back in 1 year, but of course to be happy to see her sooner if questions or problems arise.  30 minutes spent with chart review, patient visit, and documentation.     History of Present Illness/Subjective    Ms. Caty Ng is a 77 year old female with known permanent atrial fibrillation, mild reduction left ventricular systolic function, nonobstructive coronary artery disease.  She has been feeling about the same.  She is not very active and uses a walker to get around.  She likes to come to the [facility] as it is easier to get in and out from the parking lot.  She does get short of breath with activity but her hip and knee pain stopped her before shortness of breath.  She is not having orthopnea or paroxysmal nocturnal dyspnea and has mild peripheral edema which is stable.  No syncopal or near syncopal episodes.  No chest pains.         Physical Examination Review of Systems   /64 (BP Location: Left arm, Patient Position: Sitting, Cuff Size: Adult Large)   Pulse 64   Resp 18   Ht 1.6 m (5' 3\")   Wt 107.1 kg (236 lb 3.2 oz)   SpO2 94%   BMI 41.84 kg/m    Body mass index is 41.84 kg/m .  Wt Readings from Last 3 " "Encounters:   09/08/22 107.1 kg (236 lb 3.2 oz)   06/06/22 107.6 kg (237 lb 4.8 oz)   10/07/21 110.2 kg (243 lb)     General Appearance:   Alert, cooperative and in no acute distress.   ENT/Mouth: Patient wearing a mask.      EYES:  no scleral icterus, normal conjunctivae   Neck: JVP normal. No Hepatojugular reflux. Thyroid not visualized.   Chest/Lungs:   Lungs are clear to auscultation, equal chest wall expansion.   Cardiovascular:   S1, S2 without murmur ,clicks or rubs. Brachial, radial  pulses are intact and symetric. No carotid bruits noted   Abdomen:  Nontender. BS+.   Extremities: No cyanosis, clubbing and mild pretibial edema   Skin: no xanthelasma, warm.    Neurologic: normal arm movement bilateral, no tremors     Psychiatric: Appropriate affect.      Enc Vitals  BP: 116/64  Pulse: 64  Resp: 18  SpO2: 94 %  Weight: 107.1 kg (236 lb 3.2 oz)  Height: 160 cm (5' 3\")                                           Medical History  Surgical History Family History Social History   Past Medical History:   Diagnosis Date     Allergic rhinitis      Arrhythmia 2012     Atrial fibrillation with RVR (H)      Atrial fibrillation, rapid (H) 6/21/2012    Followed by Dr. 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 Cobos, cardiology EF 45 - 50%     Heart valve disease      Hyperlipidemia      Hyperlipidemia 2012     Hypertriglyceridemia      Mediastinal mass 2015    benign, [facility] consult      Mitral valve regurgitation 6/24/2012     Morbid obesity with BMI of 40.0-44.9, adult (H) 4/25/2015     Obesity      BROCK (obstructive " sleep apnea)      Scoliosis      Sleep apnea 2010    per pt     Systolic heart failure (H)     Past Surgical History:   Procedure Laterality Date     CV CORONARY ANGIOGRAM N/A 2021    Procedure: Coronary Angiogram;  Surgeon: Hai Murphy MD;  Location: [facility] CATH LAB CV     CV LEFT HEART CATH N/A 2021    Procedure: Left Heart Cath;  Surgeon: Hai Murphy MD;  Location: [facility] CATH LAB CV     HYSTERECTOMY, PAP NO LONGER INDICATED       JOINT REPLACEMTN, KNEE RT/LT  2007    right     JOINT REPLACEMTN, KNEE RT/LT Left      SALPINGO OOPHORECTOMY,R/L/BRANDO Bilateral      SURGICAL HISTORY OF -       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    Family History   Problem Relation Age of Onset     Heart Disease Mother      Cancer Mother      Diabetes Father      Musculoskeletal Disorder Father      Diabetes Paternal Grandmother      Diabetes Paternal Grandfather      Diabetes Sister      Thrombophilia Daughter         Factor V Leiden Deficiency     Diabetes Sister     Social History     Socioeconomic History     Marital status:      Spouse name: Bill      Number of children: 2     Years of education: Not on file     Highest education level: Not on file   Occupational History     Occupation: [employer]      Employer: RETIRED   Tobacco Use     Smoking status: Former Smoker     Packs/day: 0.50     Years: 20.00     Pack years: 10.00     Types: Cigarettes     Quit date: 1982     Years since quittin.7     Smokeless tobacco: Never Used   Vaping Use     Vaping Use: Never used   Substance and Sexual Activity     Alcohol use: Yes     Comment: 1 beers per week     Drug use: Never     Sexual activity: Yes     Partners: Male     Birth control/protection: Post-menopausal   Other Topics Concern     Parent/sibling w/ CABG, MI or angioplasty before 65F 55M? Yes   Social History Narrative     Not on file     Social Determinants of Health     Financial Resource Strain: Not on file   Food Insecurity:  Not on file   Transportation Needs: Not on file   Physical Activity: Not on file   Stress: Not on file   Social Connections: Not on file   Intimate Partner Violence: Not on file   Housing Stability: Not on file          Medications  Allergies   Current Outpatient Medications   Medication Sig Dispense Refill     albuterol (PROAIR HFA/PROVENTIL HFA/VENTOLIN HFA) 108 (90 BASE) MCG/ACT Inhaler Inhale 2 puffs into the lungs every 6 hours as needed for shortness of breath / dyspnea or wheezing Profile Rx: patient will contact pharmacy when needed 1 Inhaler 0     amoxicillin (AMOXIL) 500 MG capsule TAKE 4 CAPSULES BY MOUTH 1 HOUR BEFORE DENTAL APPOINTMENT       ANTIPLATELET MEDICATION NOT PRESCRIBED, INTENTIONAL, Antiplatelet medication not prescribed intentionally due to no PCI       atorvastatin (LIPITOR) 80 MG tablet Take 1 tablet (80 mg) by mouth daily 90 tablet 3     carvedilol (COREG) 25 MG tablet TAKE 1/2 TABLET TWICE A DAYWITH MEALS 90 tablet 2     diltiazem ER COATED BEADS (CARDIZEM CD/CARTIA XT) 240 MG 24 hr capsule TAKE 1 CAPSULE DAILY 90 capsule 1     levothyroxine (SYNTHROID/LEVOTHROID) 75 MCG tablet TAKE 1 TABLET DAILY. NEED  APPOINTMENT 90 tablet 3     losartan (COZAAR) 25 MG tablet Take 1 tablet (25 mg) by mouth daily 90 tablet 3     torsemide (DEMADEX) 20 MG tablet TAKE 2 TABLETS DAILY (DOSE DECREASE) 180 tablet 0     warfarin ANTICOAGULANT (JANTOVEN ANTICOAGULANT) 2 MG tablet Take 1/2 tablet (1mg) to 1 tablet (2mg) by mouth daily, or as directed.  Adjust dose based on INR results. 90 tablet 3    Allergies   Allergen Reactions     Lisinopril      cough     Metal [Staples] Other (See Comments)     Metal allergy not clearly specified - patient had a stainless steel screw put in her foot that needed to be removed due to allergy/hypersensitivity      Metoprolol Fatigue         Lab Results    Chemistry/lipid CBC Cardiac Enzymes/BNP/TSH/INR   Lab Results   Component Value Date    CHOL 104 06/06/2022    HDL 39  (L) 06/06/2022    TRIG 128 06/06/2022    BUN 21 06/06/2022     06/06/2022    CO2 27 06/06/2022    Lab Results   Component Value Date    WBC 10.3 07/21/2021    HGB 13.5 09/08/2021    HCT 43.4 07/21/2021    MCV 94 07/21/2021     07/21/2021    Lab Results   Component Value Date     (H) 12/03/2020    TSH 1.02 06/06/2022    INR 2.0 (H) 09/08/2022

## 2022-09-08 NOTE — LETTER
9/8/2022    JUNO L DONNA  [address]  [facility] [address]    RE: Caty Ng       Dear Colleague,     I had the pleasure of seeing Caty Ng in the [facility].      [facility]  [phone]      Assessment/Recommendations   Patient with permanent atrial fibrillation as well as mild reduction left ventricular systolic function and mitral insufficiency.  Her functional capacity is stable but she is markedly limited by hip and knee problems.  She has mild peripheral edema which has been stable.    We will repeat an echocardiogram to look at her left ventricular ejection fraction as well as degree of mitral insufficiency.    We talked about switching her to a novel anticoagulant.  She will check out the price of Eliquis and Xarelto and if not prohibitive will call us back and we will switch her over.  Recent INRs have been reasonably stable in the 2-2.5 range.    If the echocardiogram is unremarkable, we will plan on seeing her back in 1 year, but of course to be happy to see her sooner if questions or problems arise.  30 minutes spent with chart review, patient visit, and documentation.     History of Present Illness/Subjective    Ms. Caty Ng is a 77 year old female with known permanent atrial fibrillation, mild reduction left ventricular systolic function, nonobstructive coronary artery disease.  She has been feeling about the same.  She is not very active and uses a walker to get around.  She likes to come to the [facility] as it is easier to get in and out from the parking lot.  She does get short of breath with activity but her hip and knee pain stopped her before shortness of breath.  She is not having orthopnea or paroxysmal nocturnal dyspnea and has mild peripheral edema which is stable.  No syncopal or near syncopal episodes.  No chest pains.         Physical Examination Review of Systems   /64 (BP Location: Left arm, Patient  "Position: Sitting, Cuff Size: Adult Large)   Pulse 64   Resp 18   Ht 1.6 m (5' 3\")   Wt 107.1 kg (236 lb 3.2 oz)   SpO2 94%   BMI 41.84 kg/m    Body mass index is 41.84 kg/m .  Wt Readings from Last 3 Encounters:   09/08/22 107.1 kg (236 lb 3.2 oz)   06/06/22 107.6 kg (237 lb 4.8 oz)   10/07/21 110.2 kg (243 lb)     General Appearance:   Alert, cooperative and in no acute distress.   ENT/Mouth: Patient wearing a mask.      EYES:  no scleral icterus, normal conjunctivae   Neck: JVP normal. No Hepatojugular reflux. Thyroid not visualized.   Chest/Lungs:   Lungs are clear to auscultation, equal chest wall expansion.   Cardiovascular:   S1, S2 without murmur ,clicks or rubs. Brachial, radial  pulses are intact and symetric. No carotid bruits noted   Abdomen:  Nontender. BS+.   Extremities: No cyanosis, clubbing and mild pretibial edema   Skin: no xanthelasma, warm.    Neurologic: normal arm movement bilateral, no tremors     Psychiatric: Appropriate affect.      Enc Vitals  BP: 116/64  Pulse: 64  Resp: 18  SpO2: 94 %  Weight: 107.1 kg (236 lb 3.2 oz)  Height: 160 cm (5' 3\")                                           Medical History  Surgical History Family History Social History   Past Medical History:   Diagnosis Date     Allergic rhinitis      Arrhythmia 2012     Atrial fibrillation with RVR (H)      Atrial fibrillation, rapid (H) 6/21/2012    Followed by Dr. 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 Cobos, cardiology EF 45 - 50%     Heart valve disease      Hyperlipidemia      Hyperlipidemia 2012     " Hypertriglyceridemia      Mediastinal mass     benign, [facility] consult      Mitral valve regurgitation 2012     Morbid obesity with BMI of 40.0-44.9, adult (H) 2015     Obesity      BROCK (obstructive sleep apnea)      Scoliosis      Sleep apnea 2010    per pt     Systolic heart failure (H)     Past Surgical History:   Procedure Laterality Date     CV CORONARY ANGIOGRAM N/A 2021    Procedure: Coronary Angiogram;  Surgeon: Hai Murphy MD;  Location: [facility] CATH LAB CV     CV LEFT HEART CATH N/A 2021    Procedure: Left Heart Cath;  Surgeon: Hai Murphy MD;  Location: [facility] CATH LAB CV     HYSTERECTOMY, PAP NO LONGER INDICATED       JOINT REPLACEMTN, KNEE RT/LT      right     JOINT REPLACEMTN, KNEE RT/LT Left      SALPINGO OOPHORECTOMY,R/L/BRANDO Bilateral      SURGICAL HISTORY OF -       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    Family History   Problem Relation Age of Onset     Heart Disease Mother      Cancer Mother      Diabetes Father      Musculoskeletal Disorder Father      Diabetes Paternal Grandmother      Diabetes Paternal Grandfather      Diabetes Sister      Thrombophilia Daughter         Factor V Leiden Deficiency     Diabetes Sister     Social History     Socioeconomic History     Marital status:      Spouse name: Bill      Number of children: 2     Years of education: Not on file     Highest education level: Not on file   Occupational History     Occupation: taxes      Employer: RETIRED   Tobacco Use     Smoking status: Former Smoker     Packs/day: 0.50     Years: 20.00     Pack years: 10.00     Types: Cigarettes     Quit date: 1982     Years since quittin.7     Smokeless tobacco: Never Used   Vaping Use     Vaping Use: Never used   Substance and Sexual Activity     Alcohol use: Yes     Comment: 1 beers per week     Drug use: Never     Sexual activity: Yes     Partners: Male     Birth control/protection: Post-menopausal   Other Topics  Concern     Parent/sibling w/ CABG, MI or angioplasty before 65F 55M? Yes   Social History Narrative     Not on file     Social Determinants of Health     Financial Resource Strain: Not on file   Food Insecurity: Not on file   Transportation Needs: Not on file   Physical Activity: Not on file   Stress: Not on file   Social Connections: Not on file   Intimate Partner Violence: Not on file   Housing Stability: Not on file          Medications  Allergies   Current Outpatient Medications   Medication Sig Dispense Refill     albuterol (PROAIR HFA/PROVENTIL HFA/VENTOLIN HFA) 108 (90 BASE) MCG/ACT Inhaler Inhale 2 puffs into the lungs every 6 hours as needed for shortness of breath / dyspnea or wheezing Profile Rx: patient will contact pharmacy when needed 1 Inhaler 0     amoxicillin (AMOXIL) 500 MG capsule TAKE 4 CAPSULES BY MOUTH 1 HOUR BEFORE DENTAL APPOINTMENT       ANTIPLATELET MEDICATION NOT PRESCRIBED, INTENTIONAL, Antiplatelet medication not prescribed intentionally due to no PCI       atorvastatin (LIPITOR) 80 MG tablet Take 1 tablet (80 mg) by mouth daily 90 tablet 3     carvedilol (COREG) 25 MG tablet TAKE 1/2 TABLET TWICE A DAYWITH MEALS 90 tablet 2     diltiazem ER COATED BEADS (CARDIZEM CD/CARTIA XT) 240 MG 24 hr capsule TAKE 1 CAPSULE DAILY 90 capsule 1     levothyroxine (SYNTHROID/LEVOTHROID) 75 MCG tablet TAKE 1 TABLET DAILY. NEED  APPOINTMENT 90 tablet 3     losartan (COZAAR) 25 MG tablet Take 1 tablet (25 mg) by mouth daily 90 tablet 3     torsemide (DEMADEX) 20 MG tablet TAKE 2 TABLETS DAILY (DOSE DECREASE) 180 tablet 0     warfarin ANTICOAGULANT (JANTOVEN ANTICOAGULANT) 2 MG tablet Take 1/2 tablet (1mg) to 1 tablet (2mg) by mouth daily, or as directed.  Adjust dose based on INR results. 90 tablet 3    Allergies   Allergen Reactions     Lisinopril      cough     Metal [Staples] Other (See Comments)     Metal allergy not clearly specified - patient had a stainless steel screw put in her foot that needed  to be removed due to allergy/hypersensitivity      Metoprolol Fatigue         Lab Results    Chemistry/lipid CBC Cardiac Enzymes/BNP/TSH/INR   Lab Results   Component Value Date    CHOL 104 06/06/2022    HDL 39 (L) 06/06/2022    TRIG 128 06/06/2022    BUN 21 06/06/2022     06/06/2022    CO2 27 06/06/2022    Lab Results   Component Value Date    WBC 10.3 07/21/2021    HGB 13.5 09/08/2021    HCT 43.4 07/21/2021    MCV 94 07/21/2021     07/21/2021    Lab Results   Component Value Date     (H) 12/03/2020    TSH 1.02 06/06/2022    INR 2.0 (H) 09/08/2022                Thank you for allowing me to participate in the care of your patient.      Sincerely,     Cameron Soria MD     [facility]  cc:   No referring provider defined for this encounter.

## 2022-09-08 NOTE — PROGRESS NOTES
ANTICOAGULATION MANAGEMENT     Caty Ng 77 year old female is on warfarin with therapeutic INR result. (Goal INR 2.0-3.0)    Recent labs: (last 7 days)     09/08/22  1125   INR 2.0*       ASSESSMENT       Source(s): Chart Review and Template       Warfarin doses taken: Warfarin taken as instructed    Diet: No new diet changes identified    New illness, injury, or hospitalization: No    Medication/supplement changes: None noted    Signs or symptoms of bleeding or clotting: No    Previous INR: Therapeutic last 2(+) visits    Additional findings: None       PLAN     Recommended plan for no diet, medication or health factor changes affecting INR     Dosing Instructions: Continue your current warfarin dose with next INR in 4 weeks       Summary  As of 9/8/2022    Full warfarin instructions:  1 mg every Fri; 2 mg all other days   Next INR check:  10/6/2022             Detailed voice message left for Abi with dosing instructions and follow up date.     Lab visit scheduled    Education provided: None required    Plan made per [facility] anticoagulation protocol    Nathalie Glaser RN  Anticoagulation Clinic  9/8/2022    _______________________________________________________________________     Anticoagulation Episode Summary     Current INR goal:  2.0-3.0   TTR:  86.1 % (1 y)   Target end date:  Indefinite   Send INR reminders to:  BREE ANDREW    Indications    Long term (current) use of anticoagulants [Z79.01]  Permanent atrial fibrillation (H) (Resolved) [I48.21]           Comments:           Anticoagulation Care Providers     Provider Role Specialty Phone number    Keke Flores Referring Family Medicine [phone]

## 2022-09-25 DIAGNOSIS — I50.32 CHRONIC DIASTOLIC HEART FAILURE (H): ICD-10-CM

## 2022-09-26 ENCOUNTER — OFFICE VISIT (OUTPATIENT)
Dept: FAMILY MEDICINE | Facility: CLINIC | Age: 77
End: 2022-09-26
Payer: MEDICARE

## 2022-09-26 VITALS
BODY MASS INDEX: 41.36 KG/M2 | SYSTOLIC BLOOD PRESSURE: 110 MMHG | OXYGEN SATURATION: 97 % | HEART RATE: 59 BPM | DIASTOLIC BLOOD PRESSURE: 64 MMHG | WEIGHT: 233.5 LBS

## 2022-09-26 DIAGNOSIS — I42.8 NON-ISCHEMIC CARDIOMYOPATHY (H): Primary | ICD-10-CM

## 2022-09-26 DIAGNOSIS — I48.20 CHRONIC ATRIAL FIBRILLATION (H): ICD-10-CM

## 2022-09-26 DIAGNOSIS — M16.11 PRIMARY OSTEOARTHRITIS OF RIGHT HIP: ICD-10-CM

## 2022-09-26 DIAGNOSIS — I50.22 CHRONIC SYSTOLIC HEART FAILURE (H): ICD-10-CM

## 2022-09-26 DIAGNOSIS — R06.09 DYSPNEA ON EXERTION: ICD-10-CM

## 2022-09-26 PROCEDURE — 99213 OFFICE O/P EST LOW 20 MIN: CPT | Performed by: FAMILY MEDICINE

## 2022-09-26 NOTE — PROGRESS NOTES
Problem List Items Addressed This Visit        Respiratory    Dyspnea on exertion     Recently saw cardiologist who recommended a follow-up echocardiogram.  Shortness of breath is stable.              Circulatory    Chronic atrial fibrillation (H)     Rate controlled and continues on Coumadin.  Discussion regarding DOAC but that option is cost prohibitive for the patient so she we will plan to continue on Coumadin.  Continues on Coreg 25 mg twice daily.           Systolic heart failure (H)     The patient is currently on torsemide 40 mg daily.  However, she will not take her medication on Wednesdays or other days where she needs to leave the house as her frequency of urination is excessive to the point of needing to use the bathroom every 10 to 15 minutes up until dinnertime.  When she does not take the medication, she reports normal urination pattern.  I will check with her cardiologist to see if we could possibly switch her to furosemide and see if that works better for her.           Non-ischemic cardiomyopathy (H) - Primary    Relevant Medications    furosemide (LASIX) 40 MG tablet       Musculoskeletal and Integumentary    Primary osteoarthritis of right hip     The patient has an appointment in October down at [facility] with an orthopedist to discuss possible hip replacement.                Addend: discussed with cardiologist and will give a trial to Lasix 40mg daily instead of Torsemide.     Jessee Alex is a 77 year old who presents today for a follow-up visit.  The patient mentions that she has an appointment scheduled down at [facility] in October to address her terrible hip pain.  She continues to have dyspnea on exertion although it is stable and unchanged.  She has been referred for an echocardiogram by her cardiologist and will be getting that scheduled.  The patient does complain of significant frequent urination when she takes her torsemide 40 mg.  She supposed to take  that medication daily but does not take it if she has to leave the house or at all on Wednesdays due to events that she would not build to participate in because of how frequent the urination is.  The patient did look into one of the newer anticoagulant medication options for atrial fibrillation, but found that it would be over $500 per month so that is cost prohibitive for her and she will continue on Coumadin.  She otherwise has no new specific complaints or concerns.      Chief Complaint   Patient presents with     Follow Up      History of Present Illness       Reason for visit:  Follow up visit    She eats 0-1 servings of fruits and vegetables daily.She consumes 0 sweetened beverage(s) daily.She exercises with enough effort to increase her heart rate 10 to 19 minutes per day.  She exercises with enough effort to increase her heart rate 7 days per week.   She is taking medications regularly.             Objective    /64 (BP Location: Left arm, Patient Position: Left side, Cuff Size: Adult Large)   Pulse 59   Wt 105.9 kg (233 lb 8 oz)   SpO2 97%   BMI 41.36 kg/m    Body mass index is 41.36 kg/m .  Physical Exam   GENERAL: healthy, alert and no distress  RESP: lungs clear to auscultation - no rales, rhonchi or wheezes  CV: irregularly irregular rhythm and no peripheral edema      This note has been dictated using voice recognition software. Any grammatical or context distortions are unintentional and inherent to the software

## 2022-09-26 NOTE — ASSESSMENT & PLAN NOTE
The patient has an appointment in October down at [facility] with an orthopedist to discuss possible hip replacement.

## 2022-09-26 NOTE — ASSESSMENT & PLAN NOTE
Recently saw cardiologist who recommended a follow-up echocardiogram.  Shortness of breath is stable.

## 2022-09-26 NOTE — ASSESSMENT & PLAN NOTE
The patient is currently on torsemide 40 mg daily.  However, she will not take her medication on Wednesdays or other days where she needs to leave the house as her frequency of urination is excessive to the point of needing to use the bathroom every 10 to 15 minutes up until dinnertime.  When she does not take the medication, she reports normal urination pattern.  I will check with her cardiologist to see if we could possibly switch her to furosemide and see if that works better for her.

## 2022-09-26 NOTE — ASSESSMENT & PLAN NOTE
Rate controlled and continues on Coumadin.  Discussion regarding DOAC but that option is cost prohibitive for the patient so she we will plan to continue on Coumadin.  Continues on Coreg 25 mg twice daily.

## 2022-09-27 ENCOUNTER — TELEPHONE (OUTPATIENT)
Dept: FAMILY MEDICINE | Facility: CLINIC | Age: 77
End: 2022-09-27

## 2022-09-27 RX ORDER — FUROSEMIDE 40 MG
40 TABLET ORAL DAILY
Qty: 90 TABLET | Refills: 1 | Status: SHIPPED | OUTPATIENT
Start: 2022-09-27 | End: 2023-01-25

## 2022-09-27 NOTE — TELEPHONE ENCOUNTER
----- Message from Keke Flores sent at 9/27/2022  8:14 AM CDT -----  Please call patient and let her know that Dr. Soria feels that Lasix would be a reasonable alternative and will likely improve the issues with urinary frequency.  She should discontinue torsemide and start furosemide.  I just sent a prescription into her pharmacy and she takes 1 daily in the morning.  Please asked that she give me an update and let me know how that is working in 2 to 4 weeks.

## 2022-09-27 NOTE — TELEPHONE ENCOUNTER
Left message to call back for: patient  Information to relay to patient: see providers message below and relay

## 2022-09-27 NOTE — TELEPHONE ENCOUNTER
Patient returned call. Message relayed. No additional questions. Patient will update Dr. Flores in 2-4 weeks.

## 2022-09-29 ENCOUNTER — DOCUMENTATION ONLY (OUTPATIENT)
Dept: OTHER | Facility: CLINIC | Age: 77
End: 2022-09-29

## 2022-09-29 ENCOUNTER — HOSPITAL ENCOUNTER (OUTPATIENT)
Dept: CARDIOLOGY | Facility: HOSPITAL | Age: 77
Discharge: HOME | End: 2022-09-29
Attending: INTERNAL MEDICINE | Admitting: INTERNAL MEDICINE
Payer: MEDICARE

## 2022-09-29 ENCOUNTER — PATIENT OUTREACH (OUTPATIENT)
Dept: CARE COORDINATION | Facility: CLINIC | Age: 77
End: 2022-09-29

## 2022-09-29 DIAGNOSIS — I48.20 CHRONIC ATRIAL FIBRILLATION (H): ICD-10-CM

## 2022-09-29 DIAGNOSIS — I42.8 NON-ISCHEMIC CARDIOMYOPATHY (H): ICD-10-CM

## 2022-09-29 DIAGNOSIS — I34.0 NONRHEUMATIC MITRAL VALVE REGURGITATION: ICD-10-CM

## 2022-09-29 DIAGNOSIS — R06.09 DYSPNEA ON EXERTION: ICD-10-CM

## 2022-09-29 PROCEDURE — 999N000208 ECHOCARDIOGRAM COMPLETE

## 2022-09-29 PROCEDURE — 255N000002 HC RX 255 OP 636: Performed by: INTERNAL MEDICINE

## 2022-09-29 PROCEDURE — 93306 TTE W/DOPPLER COMPLETE: CPT | Mod: 26 | Performed by: INTERNAL MEDICINE

## 2022-09-29 RX ADMIN — PERFLUTREN 3 ML: 6.52 INJECTION, SUSPENSION INTRAVENOUS at 16:11

## 2022-09-29 NOTE — PROGRESS NOTES
Clinic Care Coordination Contact  [facility]/Voicemail     Clinical Data: Care Coordinator Outreach  Outreach attempted x 1.  Left message on patient's voicemail with call back information and requested return call.  Plan: Care Coordinator will try to reach patient again in 1-2 business days.    Next CHW outreach: 10/13/22    [facility] Work[facility]  [facility] Care Coordination   MorrisApryl mckinney [place], [place] [facility]  Office: [phone]

## 2022-10-06 ENCOUNTER — ANTICOAGULATION THERAPY VISIT (OUTPATIENT)
Dept: ANTICOAGULATION | Facility: CLINIC | Age: 77
End: 2022-10-06

## 2022-10-06 ENCOUNTER — LAB (OUTPATIENT)
Dept: LAB | Facility: CLINIC | Age: 77
End: 2022-10-06
Payer: MEDICARE

## 2022-10-06 DIAGNOSIS — Z79.01 LONG TERM (CURRENT) USE OF ANTICOAGULANTS: ICD-10-CM

## 2022-10-06 DIAGNOSIS — Z79.01 LONG TERM (CURRENT) USE OF ANTICOAGULANTS: Primary | ICD-10-CM

## 2022-10-06 DIAGNOSIS — I48.21 PERMANENT ATRIAL FIBRILLATION (H): ICD-10-CM

## 2022-10-06 LAB — INR BLD: 2.4 (ref 0.9–1.1)

## 2022-10-06 PROCEDURE — 85610 PROTHROMBIN TIME: CPT

## 2022-10-06 PROCEDURE — 36416 COLLJ CAPILLARY BLOOD SPEC: CPT

## 2022-10-06 RX ORDER — LOSARTAN POTASSIUM 25 MG/1
TABLET ORAL
Qty: 90 TABLET | Refills: 3 | Status: SHIPPED | OUTPATIENT
Start: 2022-10-06 | End: 2022-10-14

## 2022-10-06 NOTE — PROGRESS NOTES
ANTICOAGULATION MANAGEMENT     Caty Ng 77 year old female is on warfarin with therapeutic INR result. (Goal INR 2.0-3.0)    Recent labs: (last 7 days)     10/06/22  1346   INR 2.4*       ASSESSMENT       Source(s): Chart Review, Patient/Caregiver Call and Template       Warfarin doses taken: Warfarin taken as instructed    Diet: No new diet changes identified    New illness, injury, or hospitalization: No   Non-ischemic cardiomyopathy and systolic HF.    Medication/supplement changes:  Yes.    9/27/22 new RX Furosemide 40mg daily    Signs or symptoms of bleeding or clotting: No    Previous INR: Therapeutic last 7 visits    Additional findings: None       PLAN     Recommended plan for ongoing change(s) affecting INR     Dosing Instructions: Continue your current warfarin dose with next INR in 2 weeks       Summary  As of 10/6/2022    Full warfarin instructions:  1 mg every Fri; 2 mg all other days   Next INR check:  11/17/2022             Telephone call with  Abi ([phone]) who verbalizes understanding and agrees to plan    Lab visit scheduled - INR on 10/25/22 @ [facility]    Education provided: Importance of consistent vitamin K intake, Goal range and significance of current result and No interaction anticipated between warfarin and Furosemid    Plan made per [facility] anticoagulation protocol    Kelly Wise RN  Anticoagulation Clinic  10/6/2022    _______________________________________________________________________     Anticoagulation Episode Summary     Current INR goal:  2.0-3.0   TTR:  86.1 % (1 y)   Target end date:  Indefinite   Send INR reminders to:  BREE ANDREW    Indications    Long term (current) use of anticoagulants [Z79.01]  Permanent atrial fibrillation (H) (Resolved) [I48.21]           Comments:           Anticoagulation Care Providers     Provider Role Specialty Phone number    Keke Flores Referring Family Medicine [phone]

## 2022-10-07 ENCOUNTER — PATIENT OUTREACH (OUTPATIENT)
Dept: CARE COORDINATION | Facility: CLINIC | Age: 77
End: 2022-10-07

## 2022-10-07 NOTE — PROGRESS NOTES
Clinic Care Coordination Contact    Community Health Worker Follow Up    Care Gaps:     Health Maintenance Due   Topic Date Due     ZOSTER IMMUNIZATION (2 of 3) 03/01/2012     COVID-19 Vaccine (3 - Booster for Moderna series) 05/19/2021     HF ACTION PLAN  02/25/2022     CBC  07/21/2022     INFLUENZA VACCINE (1) 09/01/2022       Postponed to next CHW outreach     Care Plan:   Care Plan: Medical Cannabis     Problem: HP GENERAL PROBLEM     Goal: I would like a referral for Medical Cannabis in the next 30-90 days.     Start Date: 6/10/2022 Expected End Date: 9/10/2022    Note:     Barriers: numerous appointments  Strengths: motivated  Date to Achieve By: 30-90 days  Patient expressed understanding of goal: yes  Action steps to achieve this goal:  1. Patient discussed this referral at her PCP appointment 6/6/22. Completed  2. The CCC RN will send a note to the PCP to inquire on the status of the referral. Completed  3. [facility] will update the patient on the status of the referral in the next 2 weeks. I wanted to wait until I was seen at the [facility]. Continuous (MB)      Goal Updated: 8/23/22                    Care Plan: Health Care Directive     Problem: HP GENERAL PROBLEM     Goal: I will complete a Health Care Directive and have this scanned into my Medical Record in the next 3-6 months.     Start Date: 6/10/2022 Expected End Date: 12/10/2022    Note:     Barriers: numerous appointments  Strengths: motivated  Date to Achieve By: 3-6 months  Patient expressed understanding of goal: yes  Action steps to achieve this goal:  1. Care Coordination will mail me a Health Care Directive and any requested resources. Completed  2. I will complete the Health Care Directive. My signature must be either notarized or witnessed by two adults who are not named as health care agents in the document. Additionally only one of the witnesses can be employed by my health care system. If I need a notary I can check with my bank, my place  of [employer], [employer], or look online at [email or web address] . I will bring my completed Health Care Directive to clinic at my next PCP appointment on 9/26/22.   3. I will provide a copy of my Health Care Directive to my care team and/or email directly to [email or web address].   4. I can visit [email or web address] website, email [email or web address] or call [facility] Casie at [phone] (M-Friday 6a to 5p) for more information including free classes on completing a Health Care Directive. Continuous (MB)  5. I will contact my care team with any barriers, questions or assistance needed with this goal. Care coordinator will remain available as needed. Continuous (MB)      Goal Updated: 8/23/22                      ** CHW received a return call from patient 10/5/22 LMTCB    Intervention and Education during outreach: Patient was not able to talk with CHW about goals, patient was waiting for a phone call from [facility]. Patient asked for CHW to call back next week. Patient will be at [facility] Next 10/10-10/11.     CHW Plan: CHW will follow up with patient 10/14/22.    Fabiola Tanner  Community Health Worker  [facility] Care Coordination   Apryl Rivera Bl[place] [facility]  Office: [phone]

## 2022-10-14 ENCOUNTER — TELEPHONE (OUTPATIENT)
Dept: CARDIOLOGY | Facility: CLINIC | Age: 77
End: 2022-10-14

## 2022-10-14 DIAGNOSIS — I50.32 CHRONIC DIASTOLIC HEART FAILURE (H): ICD-10-CM

## 2022-10-14 DIAGNOSIS — I42.8 NON-ISCHEMIC CARDIOMYOPATHY (H): Primary | ICD-10-CM

## 2022-10-14 RX ORDER — LOSARTAN POTASSIUM 25 MG/1
25 TABLET ORAL DAILY
Qty: 90 TABLET | Refills: 3 | Status: SHIPPED | OUTPATIENT
Start: 2022-10-14 | End: 2023-09-27

## 2022-10-17 ENCOUNTER — PATIENT OUTREACH (OUTPATIENT)
Dept: CARE COORDINATION | Facility: CLINIC | Age: 77
End: 2022-10-17

## 2022-10-17 NOTE — PROGRESS NOTES
Clinic Care Coordination Contact    Community Health Worker Follow Up    Care Gaps:     Health Maintenance Due   Topic Date Due     HEPATITIS B IMMUNIZATION (1 of 3 - 3-dose series) Never done     ZOSTER IMMUNIZATION (2 of 3) 03/01/2012     COVID-19 Vaccine (3 - Booster for Moderna series) 05/19/2021     HF ACTION PLAN  02/25/2022     CBC  07/21/2022     INFLUENZA VACCINE (1) 09/01/2022       Patient accepted scheduling phone number for  Health tita  to schedule independently     Care Plan:   Care Plan: Medical Cannabis     Problem: HP GENERAL PROBLEM     Goal: I would like a referral for Medical Cannabis in the next 30-90 days.     Start Date: 6/10/2022 Expected End Date: 9/10/2022    This Visit's Progress: 70%    Note:     Barriers: numerous appointments  Strengths: motivated  Date to Achieve By: 30-90 days  Patient expressed understanding of goal: yes  Action steps to achieve this goal:  1. Patient discussed this referral at her PCP appointment 6/6/22. Completed  2. The CCC RN will send a note to the PCP to inquire on the status of the referral. Completed  3. [facility] will update the patient on the status of the referral in the next 2 weeks. I wanted to wait until I was seen at the [facility]. My appointment with the [facility] was rescheduled to 10/24/22.                      Care Plan: Health Care Directive     Problem: HP GENERAL PROBLEM     Goal: I have accomplished completeing my Health Care Directive and is now scanned into my Medical Record.  Completed 10/17/2022    Start Date: 6/10/2022 Expected End Date: 12/10/2022    This Visit's Progress: 100%    Note:     Personal Plan  If I have any questions about my Health Care Directive I will contact Tita Jason at [email or web address] website, email [email or web address] or call  [employer] Tita Jason at [phone] (M-Friday 6a to 5p).                        Intervention and Education during outreach: N/A    CHW Plan: CHW  Start antibiotic today per directions. Start Medrol Dosepak tomorrow morning and take per directions  Take probiotic over-the-counter daily like organic yogurt while taking antibiotic. Drink plenty of fluids.   Take Tylenol or ibuprofen as needed for feve will follow up with patient in 1 month on 11/17/22.    Fabiola Tanner  Community Health Worker  [facility] Care Coordination   Apryl Rivera [place], [place] [facility]  Office: [phone]

## 2022-10-19 NOTE — TELEPHONE ENCOUNTER
----- Message -----  From: Cameron Soria MD  Sent: 10/16/2022  12:13 PM CDT  To: Kimberly Dunaway RN    Valium 5 mg po 30' prior to test.    Thanks,    Cameron      ==  Called prescription for Valium to [facility] pharmacy per Dr. Soria:  Valium 5 mg with directions to take 1 tablet 30 minutes prior to MRI, must have .  # 1 with No Refills.   Patient updated. -ejb

## 2022-10-24 ENCOUNTER — TRANSFERRED RECORDS (OUTPATIENT)
Dept: HEALTH INFORMATION MANAGEMENT | Facility: CLINIC | Age: 77
End: 2022-10-24

## 2022-10-25 ENCOUNTER — PATIENT OUTREACH (OUTPATIENT)
Dept: CARE COORDINATION | Facility: CLINIC | Age: 77
End: 2022-10-25

## 2022-10-25 NOTE — PROGRESS NOTES
Clinic Care Coordination Contact    Care Coordination Clinician Chart Review     Situation: Patient chart reviewed by care coordinator.?     Background: Initial assessment and enrollment to Care Coordination was 6/10/22.?? Care plan(s) with patient-centered goal(s) were developed with participation from patient.? Lead CC handed patient off to CHW for continued outreach every 30 days.??     Assessment: Per chart review, patient outreach completed by CC CHW on 10/17/22.? Patient is actively working to accomplish goal(s).? Patient's goal(s) remain(s) appropriate at this time.? Patient is not due for updated Plan of Care.? Annual assessment will be due 6/10/23.     Care Plan: Medical Cannabis     Problem: HP GENERAL PROBLEM     Goal: I would like a referral for Medical Cannabis in the next 30-90 days.     Start Date: 6/10/2022 Expected End Date: 9/10/2022    This Visit's Progress: 70%    Note:     Barriers: numerous appointments  Strengths: motivated  Date to Achieve By: 30-90 days  Patient expressed understanding of goal: yes  Action steps to achieve this goal:  1. Patient discussed this referral at her PCP appointment 6/6/22. Completed  2. The CCC RN will send a note to the PCP to inquire on the status of the referral. Completed  3. [facility] will update the patient on the status of the referral in the next 2 weeks. I wanted to wait until I was seen at the [facility]. My appointment with the [facility] was rescheduled to 10/24/22.                      Care Plan: Health Care Directive     Problem: HP GENERAL PROBLEM     Goal: I have accomplished completeing my Health Care Directive and is now scanned into my Medical Record.  Completed 10/17/2022    Start Date: 6/10/2022 Expected End Date: 12/10/2022    This Visit's Progress: 100%    Note:     Personal Plan  If I have any questions about my Health Care Directive I will contact Aidan Jason at [email or web address] website, email [email or web address]  or call [facility] [employer] Cho[employer] at [phone] (M-Friday 6a to 5p).                    Care Plan: Hip Surgery     Problem: HP GENERAL PROBLEM     Goal: I would like to have my hip surgery completed     Note:     Barriers: medically complex  Strengths: motivated, family support  Patient expressed understanding of goal: per chart review  Action steps to achieve this goal:  1. I will continue to follow up with [facility] regarding my upcoming hip surgery at the end of November.                          Plan/Recommendations: The patient will continue working with Care Coordination to achieve above goal(s).? CHW will involve Lead CC as needed or if patient is ready to move to maintenance.? Lead CC will continue to monitor CHW s monthly outreaches and progress to goal(s) every 6 weeks.    Plan of Care updated and sent to patient: Brit and RODGER-9/6/2023

## 2022-10-31 ENCOUNTER — HOSPITAL ENCOUNTER (OUTPATIENT)
Dept: MRI IMAGING | Facility: HOSPITAL | Age: 77
Discharge: HOME | End: 2022-10-31
Attending: INTERNAL MEDICINE
Payer: MEDICARE

## 2022-10-31 DIAGNOSIS — I42.8 NON-ISCHEMIC CARDIOMYOPATHY (H): ICD-10-CM

## 2022-10-31 PROCEDURE — A9585 GADOBUTROL INJECTION: HCPCS | Performed by: INTERNAL MEDICINE

## 2022-10-31 PROCEDURE — G1010 CDSM STANSON: HCPCS

## 2022-10-31 PROCEDURE — 255N000002 HC RX 255 OP 636: Performed by: INTERNAL MEDICINE

## 2022-10-31 PROCEDURE — 999N000122 MR MYOCARDIUM  OVERREAD

## 2022-10-31 PROCEDURE — 75561 CARDIAC MRI FOR MORPH W/DYE: CPT | Mod: 26 | Performed by: INTERNAL MEDICINE

## 2022-10-31 PROCEDURE — G1010 CDSM STANSON: HCPCS | Performed by: INTERNAL MEDICINE

## 2022-10-31 RX ORDER — GADOBUTROL 604.72 MG/ML
19 INJECTION INTRAVENOUS ONCE
Status: COMPLETED | OUTPATIENT
Start: 2022-10-31 | End: 2022-10-31

## 2022-10-31 RX ADMIN — GADOBUTROL 19 ML: 604.72 INJECTION INTRAVENOUS at 12:09

## 2022-11-01 ENCOUNTER — LAB (OUTPATIENT)
Dept: LAB | Facility: CLINIC | Age: 77
End: 2022-11-01
Payer: MEDICARE

## 2022-11-01 ENCOUNTER — ANTICOAGULATION THERAPY VISIT (OUTPATIENT)
Dept: ANTICOAGULATION | Facility: CLINIC | Age: 77
End: 2022-11-01

## 2022-11-01 ENCOUNTER — TELEPHONE (OUTPATIENT)
Dept: FAMILY MEDICINE | Facility: CLINIC | Age: 77
End: 2022-11-01

## 2022-11-01 DIAGNOSIS — Z79.01 LONG TERM (CURRENT) USE OF ANTICOAGULANTS: ICD-10-CM

## 2022-11-01 DIAGNOSIS — Z79.01 LONG TERM (CURRENT) USE OF ANTICOAGULANTS: Primary | ICD-10-CM

## 2022-11-01 DIAGNOSIS — I48.21 PERMANENT ATRIAL FIBRILLATION (H): ICD-10-CM

## 2022-11-01 LAB — INR BLD: 2 (ref 0.9–1.1)

## 2022-11-01 PROCEDURE — 36416 COLLJ CAPILLARY BLOOD SPEC: CPT

## 2022-11-01 PROCEDURE — 85610 PROTHROMBIN TIME: CPT

## 2022-11-01 NOTE — TELEPHONE ENCOUNTER
Abi called to say her hip surgery will be at [place] and pre op will be done there as well and they will manage anticoagulation. We scheduled inr 11/15

## 2022-11-01 NOTE — TELEPHONE ENCOUNTER
Reason for Call:  Other call back    Detailed comments: Patient return INR nurse call.    Phone Number Patient can be reached at: Home number on file [phone] (home)    Best Time: Any    Can we leave a detailed message on this number? YES    Call taken on 11/1/2022 at 5:06 PM by Annie Fish

## 2022-11-01 NOTE — PROGRESS NOTES
ANTICOAGULATION MANAGEMENT     Caty Ng 77 year old female is on warfarin with therapeutic INR result. (Goal INR 2.0-3.0)    Recent labs: (last 7 days)     11/01/22  1417   INR 2.0*       ASSESSMENT       Source(s): Chart Review and Template       Warfarin doses taken: Warfarin taken as instructed    Diet: No new diet changes identified    New illness, injury, or hospitalization: No    Medication/supplement changes: None noted    Signs or symptoms of bleeding or clotting: No    Previous INR: Therapeutic last 2(+) visits    Additional findings: Upcoming surgery/procedure hip replacement planned for 12/1/22       PLAN     Recommended plan for no diet, medication or health factor changes affecting INR     Dosing Instructions: Continue your current warfarin dose with next INR in 4 weeks       Summary  As of 11/1/2022    Full warfarin instructions:  1 mg every Fri; 2 mg all other days; Starting 11/1/2022   Next INR check:  11/29/2022             Detailed voice message left for Abi with dosing instructions and follow up date.     Contact [phone] to schedule and with any changes, questions or concerns.     Education provided:     None required    Plan made per [facility] anticoagulation protocol    Nathalie Glaser RN  Anticoagulation Clinic  11/1/2022    _______________________________________________________________________     Anticoagulation Episode Summary     Current INR goal:  2.0-3.0   TTR:  86.1 % (1 y)   Target end date:  Indefinite   Send INR reminders to:  BREE ANDREW    Indications    Long term (current) use of anticoagulants [Z79.01]  Permanent atrial fibrillation (H) (Resolved) [I48.21]           Comments:           Anticoagulation Care Providers     Provider Role Specialty Phone number    Keke Flores Referring Family Medicine [phone]

## 2022-11-07 DIAGNOSIS — I42.8 NON-ISCHEMIC CARDIOMYOPATHY (H): ICD-10-CM

## 2022-11-07 RX ORDER — ATORVASTATIN CALCIUM 80 MG/1
TABLET, FILM COATED ORAL
Qty: 90 TABLET | Refills: 3 | Status: SHIPPED | OUTPATIENT
Start: 2022-11-07 | End: 2023-10-26

## 2022-11-14 ENCOUNTER — PATIENT OUTREACH (OUTPATIENT)
Dept: CARE COORDINATION | Facility: CLINIC | Age: 77
End: 2022-11-14

## 2022-11-14 NOTE — PROGRESS NOTES
"Clinic Care Coordination Contact  [facility]/Voicemail    Clinical Data: Care Coordinator Outreach  Outreach attempted x 1.  Left message on patient's voicemail with call back information and requested return call.  Plan: Care Coordinator will try to reach patient again in 10 business days.    ** Patient having surgery \"ARTHROPLASTY REPLACEMENT TOTAL HIP\" on 12/1/22.    Next CHW outreach date: 11/30/22    [facility] Worker  [facility] Care Coordination   Apryl Rivera [place], [place] [facility]  Office: [phone]       "

## 2022-11-18 ENCOUNTER — LAB (OUTPATIENT)
Dept: CARDIOLOGY | Facility: CLINIC | Age: 77
End: 2022-11-18
Payer: MEDICARE

## 2022-11-18 ENCOUNTER — ANTICOAGULATION THERAPY VISIT (OUTPATIENT)
Dept: ANTICOAGULATION | Facility: CLINIC | Age: 77
End: 2022-11-18

## 2022-11-18 ENCOUNTER — OFFICE VISIT (OUTPATIENT)
Dept: CARDIOLOGY | Facility: CLINIC | Age: 77
End: 2022-11-18
Payer: MEDICARE

## 2022-11-18 VITALS
RESPIRATION RATE: 16 BRPM | BODY MASS INDEX: 43.05 KG/M2 | OXYGEN SATURATION: 93 % | WEIGHT: 243 LBS | SYSTOLIC BLOOD PRESSURE: 106 MMHG | DIASTOLIC BLOOD PRESSURE: 58 MMHG | HEART RATE: 90 BPM

## 2022-11-18 DIAGNOSIS — Z79.01 LONG TERM (CURRENT) USE OF ANTICOAGULANTS: ICD-10-CM

## 2022-11-18 DIAGNOSIS — Z79.01 LONG TERM (CURRENT) USE OF ANTICOAGULANTS: Primary | ICD-10-CM

## 2022-11-18 DIAGNOSIS — I42.0 DILATED CARDIOMYOPATHY (H): ICD-10-CM

## 2022-11-18 DIAGNOSIS — I48.21 PERMANENT ATRIAL FIBRILLATION (H): Primary | ICD-10-CM

## 2022-11-18 DIAGNOSIS — I48.20 CHRONIC ATRIAL FIBRILLATION (H): ICD-10-CM

## 2022-11-18 DIAGNOSIS — I25.10 CORONARY ARTERY DISEASE INVOLVING NATIVE CORONARY ARTERY OF NATIVE HEART WITHOUT ANGINA PECTORIS: Primary | ICD-10-CM

## 2022-11-18 DIAGNOSIS — I50.20 SYSTOLIC HEART FAILURE, UNSPECIFIED HF CHRONICITY (H): ICD-10-CM

## 2022-11-18 LAB
ERYTHROCYTE [SEDIMENTATION RATE] IN BLOOD BY WESTERGREN METHOD: 13 MM/HR (ref 0–20)
INR POINT OF CARE: 1.9 (ref 0.9–1.1)
NT-PROBNP SERPL-MCNC: 717 PG/ML (ref 0–900)
TOTAL PROTEIN SERUM FOR ELP: 6.9 G/DL (ref 6.4–8.3)

## 2022-11-18 PROCEDURE — 86039 ANTINUCLEAR ANTIBODIES (ANA): CPT | Performed by: INTERNAL MEDICINE

## 2022-11-18 PROCEDURE — 83880 ASSAY OF NATRIURETIC PEPTIDE: CPT | Performed by: INTERNAL MEDICINE

## 2022-11-18 PROCEDURE — 85652 RBC SED RATE AUTOMATED: CPT | Performed by: INTERNAL MEDICINE

## 2022-11-18 PROCEDURE — 84155 ASSAY OF PROTEIN SERUM: CPT | Performed by: INTERNAL MEDICINE

## 2022-11-18 PROCEDURE — 86038 ANTINUCLEAR ANTIBODIES: CPT | Performed by: INTERNAL MEDICINE

## 2022-11-18 PROCEDURE — 36415 COLL VENOUS BLD VENIPUNCTURE: CPT | Performed by: INTERNAL MEDICINE

## 2022-11-18 PROCEDURE — 99214 OFFICE O/P EST MOD 30 MIN: CPT | Mod: 25 | Performed by: INTERNAL MEDICINE

## 2022-11-18 PROCEDURE — 84165 PROTEIN E-PHORESIS SERUM: CPT

## 2022-11-18 PROCEDURE — 85610 PROTHROMBIN TIME: CPT

## 2022-11-18 RX ORDER — SENNOSIDES 8.6 MG
1300 CAPSULE ORAL 2 TIMES DAILY
COMMUNITY

## 2022-11-18 RX ORDER — PHENOL 1.4 %
1 AEROSOL, SPRAY (ML) MUCOUS MEMBRANE
COMMUNITY

## 2022-11-18 NOTE — LETTER
11/18/2022    JUNO THOMPSON MD  [address] [facility] [address]    RE: Caty Ng       Dear Colleague,     I had the pleasure of seeing Caty Ng in the [facility].      [facility]  [phone]          Assessment/Recommendations   Patient with permanent atrial fibrillation, reduced left ventricular systolic function which dropped further into the low 30% range.  This was confirmed by cardiac MRI which did not show any infiltrative disorders and did not show significant mitral insufficiency.  The etiology could be related to atrial fibrillation and a persistent rapid ventricular response and I would like to get a Holter monitor to rule this out.  We will get some cardiomyopathy labs as well and a recent TSH was unremarkable.    She is scheduled to have hip surgery in [place] in the very first part of December.  She does not have any evidence of pulmonary vascular congestion at this time and her functional capacity is stable.  I would prefer spinal and anesthesia but I think that she would be relatively low risk for general anesthesia as this is not a procedure with significant blood loss or length.    I would like to see her back after her hip surgery and we may consider titrating her off of diltiazem and increasing her beta-blocker because of the reduced left ventricular systolic function.  We will not make any changes today given that she has surgery coming up soon.    Thank you for allowing us to precipitate in her care.  35 minutes spent with chart review, patient visit, and documentation     History of Present Illness/Subjective    Ms. Caty Ng is a 77 year old female with atrial fibrillation which is now permanent, heart failure with preserved ejection fraction but now reduction left ventricular ejection fraction, hyperlipidemia and obstructive sleep apnea which is not treated.  She had difficulties with fluid retention and shortness of  breath in the past but this has been quite stable.  Her echocardiogram showed reduction in her left ventricular ejection fraction so we confirmed that with MRI and there was no infiltrative disorder and the mitral insufficiency was judged to be mild.    She denies orthopnea, paroxysmal nocturnal dyspnea and does have chronic peripheral edema which has been stable.  She has not had any chest pain, palpitations, syncopal or near syncopal episodes.  She did have an coronary angiogram with mild obstructive coronary artery disease.  LVEDP was 13.    She is scheduled for hip surgery and December at the [facility].         Physical Examination Review of Systems   /58 (BP Location: Right arm, Patient Position: Sitting, Cuff Size: Adult Large)   Pulse 90   Resp 16   Wt 110.2 kg (243 lb)   SpO2 93%   BMI 43.05 kg/m    Body mass index is 43.05 kg/m .  Wt Readings from Last 3 Encounters:   11/18/22 110.2 kg (243 lb)   09/26/22 105.9 kg (233 lb 8 oz)   09/08/22 107.1 kg (236 lb 3.2 oz)     General Appearance:   Alert, cooperative and in no acute distress.   ENT/Mouth: Patient wearing a mask.      EYES:  no scleral icterus, normal conjunctivae   Neck: JVP normal. No Hepatojugular reflux. Thyroid not visualized.   Chest/Lungs:   Lungs are clear to auscultation, equal chest wall expansion.   Cardiovascular:   S1, S2 without murmur ,clicks or rubs. Brachial, radial  pulses are intact and symetric. No carotid bruits noted   Abdomen:  Nontender.    Extremities: No cyanosis, clubbing and mild pretibial edema bilaterally but more prominent on the right   Skin: no xanthelasma, warm.    Neurologic: normal arm movement bilateral, no tremors     Psychiatric: Appropriate affect.      Enc Vitals  BP: 106/58  Pulse: 90  Resp: 16  SpO2: 93 %  Weight: 110.2 kg (243 lb)                                           Medical History  Surgical History Family History Social History   Past Medical History:   Diagnosis Date     Allergic  rhinitis      Arrhythmia 2012     Atrial fibrillation with RVR (H)      Atrial fibrillation, rapid (H) 6/21/2012    Followed by Dr. 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 Cobos, cardiology EF 45 - 50%     Heart valve disease      Hyperlipidemia      Hyperlipidemia 2012     Hypertriglyceridemia      Mediastinal mass 2015    benign, [facility] consult      Mitral valve regurgitation 6/24/2012     Morbid obesity with BMI of 40.0-44.9, adult (H) 4/25/2015     Obesity      BROCK (obstructive sleep apnea)      Scoliosis      Sleep apnea 2010    per pt     Systolic heart failure (H)     Past Surgical History:   Procedure Laterality Date     CV CORONARY ANGIOGRAM N/A 7/21/2021    Procedure: Coronary Angiogram;  Surgeon: Hai Murphy MD;  Location: [facility] CATH LAB CV     CV LEFT HEART CATH N/A 7/21/2021    Procedure: Left Heart Cath;  Surgeon: Hai Murphy MD;  Location: [facility] CATH LAB CV     HYSTERECTOMY, PAP NO LONGER INDICATED  1989     JOINT REPLACEMTN, KNEE RT/LT  2007    right     JOINT REPLACEMTN, KNEE RT/LT Left      SALPINGO OOPHORECTOMY,R/L/BRANDO Bilateral 2003     SURGICAL HISTORY OF -   1989    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    Family History   Problem Relation Age of Onset     Heart Disease Mother      Cancer Mother      Diabetes Father      Musculoskeletal Disorder Father      Diabetes Paternal Grandmother      Diabetes Paternal Grandfather      Diabetes Sister      Thrombophilia Daughter         Factor V Leiden Deficiency     Diabetes Sister     Social History     Socioeconomic History      Marital status:      Spouse name: Bill      Number of children: 2     Years of education: Not on file     Highest education level: Not on file   Occupational History     Occupation: taxes      Employer: RETIRED   Tobacco Use     Smoking status: Former     Packs/day: 0.50     Years: 20.00     Pack years: 10.00     Types: Cigarettes     Quit date: 1982     Years since quittin.9     Smokeless tobacco: Never   Vaping Use     Vaping Use: Never used   Substance and Sexual Activity     Alcohol use: Yes     Comment: 1 beers per week     Drug use: Never     Sexual activity: Yes     Partners: Male     Birth control/protection: Post-menopausal   Other Topics Concern     Parent/sibling w/ CABG, MI or angioplasty before 65F 55M? Yes   Social History Narrative     Not on file     Social Determinants of Health     Financial Resource Strain: Not on file   Food Insecurity: Not on file   Transportation Needs: Not on file   Physical Activity: Not on file   Stress: Not on file   Social Connections: Not on file   Intimate Partner Violence: Not on file   Housing Stability: Not on file          Medications  Allergies   Current Outpatient Medications   Medication Sig Dispense Refill     acetaminophen (TYLENOL) 650 MG CR tablet Take 1,300 mg by mouth       amoxicillin (AMOXIL) 500 MG capsule TAKE 4 CAPSULES BY MOUTH 1 HOUR BEFORE DENTAL APPOINTMENT       atorvastatin (LIPITOR) 80 MG tablet TAKE 1 TABLET DAILY 90 tablet 3     carvedilol (COREG) 25 MG tablet TAKE 1/2 TABLET TWICE A DAYWITH MEALS 90 tablet 2     diltiazem ER COATED BEADS (CARDIZEM CD/CARTIA XT) 240 MG 24 hr capsule TAKE 1 CAPSULE DAILY 90 capsule 1     furosemide (LASIX) 40 MG tablet Take 1 tablet (40 mg) by mouth daily 90 tablet 1     levothyroxine (SYNTHROID/LEVOTHROID) 75 MCG tablet TAKE 1 TABLET DAILY. NEED  APPOINTMENT 90 tablet 3     losartan (COZAAR) 25 MG tablet Take 1 tablet (25 mg) by mouth daily 90 tablet 3     Melatonin 10 MG TABS tablet Take 1  tablet by mouth nightly as needed       warfarin ANTICOAGULANT (JANTOVEN ANTICOAGULANT) 2 MG tablet Take 1/2 tablet (1mg) to 1 tablet (2mg) by mouth daily, or as directed.  Adjust dose based on INR results. 90 tablet 3    Allergies   Allergen Reactions     Lisinopril      cough     Metal [Staples] Other (See Comments)     Metal allergy not clearly specified - patient had a stainless steel screw put in her foot that needed to be removed due to allergy/hypersensitivity      Metoprolol Fatigue     Trace Metals      Other reaction(s): Other (see comments)  Metal allergy not clearly specified - patient had a stainless steel screw put in her foot that needed to be removed due to allergy/hypersensitivity.  Also react to earrings.         Lab Results    Chemistry/lipid CBC Cardiac Enzymes/BNP/TSH/INR   Lab Results   Component Value Date    CHOL 104 06/06/2022    HDL 39 (L) 06/06/2022    TRIG 128 06/06/2022    BUN 21 06/06/2022     06/06/2022    CO2 27 06/06/2022    Lab Results   Component Value Date    WBC 10.3 07/21/2021    HGB 13.5 09/08/2021    HCT 43.4 07/21/2021    MCV 94 07/21/2021     07/21/2021    Lab Results   Component Value Date     (H) 12/03/2020    TSH 1.02 06/06/2022    INR 1.9 (A) 11/18/2022                Thank you for allowing me to participate in the care of your patient.      Sincerely,     Cameron Soria MD     [facility]  cc:   No referring provider defined for this encounter.

## 2022-11-18 NOTE — PROGRESS NOTES
[facility]  [phone]          Assessment/Recommendations   Patient with permanent atrial fibrillation, reduced left ventricular systolic function which dropped further into the low 30% range.  This was confirmed by cardiac MRI which did not show any infiltrative disorders and did not show significant mitral insufficiency.  The etiology could be related to atrial fibrillation and a persistent rapid ventricular response and I would like to get a Holter monitor to rule this out.  We will get some cardiomyopathy labs as well and a recent TSH was unremarkable.    She is scheduled to have hip surgery in [place] in the very first part of December.  She does not have any evidence of pulmonary vascular congestion at this time and her functional capacity is stable.  I would prefer spinal and anesthesia but I think that she would be relatively low risk for general anesthesia as this is not a procedure with significant blood loss or length.    I would like to see her back after her hip surgery and we may consider titrating her off of diltiazem and increasing her beta-blocker because of the reduced left ventricular systolic function.  We will not make any changes today given that she has surgery coming up soon.    Thank you for allowing us to precipitate in her care.  35 minutes spent with chart review, patient visit, and documentation     History of Present Illness/Subjective    Ms. Caty Ng is a 77 year old female with atrial fibrillation which is now permanent, heart failure with preserved ejection fraction but now reduction left ventricular ejection fraction, hyperlipidemia and obstructive sleep apnea which is not treated.  She had difficulties with fluid retention and shortness of breath in the past but this has been quite stable.  Her echocardiogram showed reduction in her left ventricular ejection fraction so we confirmed that with MRI and there was no infiltrative disorder and the  mitral insufficiency was judged to be mild.    She denies orthopnea, paroxysmal nocturnal dyspnea and does have chronic peripheral edema which has been stable.  She has not had any chest pain, palpitations, syncopal or near syncopal episodes.  She did have an coronary angiogram with mild obstructive coronary artery disease.  LVEDP was 13.    She is scheduled for hip surgery and December at the [facility].         Physical Examination Review of Systems   /58 (BP Location: Right arm, Patient Position: Sitting, Cuff Size: Adult Large)   Pulse 90   Resp 16   Wt 110.2 kg (243 lb)   SpO2 93%   BMI 43.05 kg/m    Body mass index is 43.05 kg/m .  Wt Readings from Last 3 Encounters:   11/18/22 110.2 kg (243 lb)   09/26/22 105.9 kg (233 lb 8 oz)   09/08/22 107.1 kg (236 lb 3.2 oz)     General Appearance:   Alert, cooperative and in no acute distress.   ENT/Mouth: Patient wearing a mask.      EYES:  no scleral icterus, normal conjunctivae   Neck: JVP normal. No Hepatojugular reflux. Thyroid not visualized.   Chest/Lungs:   Lungs are clear to auscultation, equal chest wall expansion.   Cardiovascular:   S1, S2 without murmur ,clicks or rubs. Brachial, radial  pulses are intact and symetric. No carotid bruits noted   Abdomen:  Nontender.    Extremities: No cyanosis, clubbing and mild pretibial edema bilaterally but more prominent on the right   Skin: no xanthelasma, warm.    Neurologic: normal arm movement bilateral, no tremors     Psychiatric: Appropriate affect.      Enc Vitals  BP: 106/58  Pulse: 90  Resp: 16  SpO2: 93 %  Weight: 110.2 kg (243 lb)                                           Medical History  Surgical History Family History Social History   Past Medical History:   Diagnosis Date     Allergic rhinitis      Arrhythmia 2012     Atrial fibrillation with RVR (H)      Atrial fibrillation, rapid (H) 6/21/2012    Followed by Dr. 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 Cobos, cardiology EF 45 - 50%     Heart valve disease      Hyperlipidemia      Hyperlipidemia 2012     Hypertriglyceridemia      Mediastinal mass 2015    benign, [facility] consult      Mitral valve regurgitation 6/24/2012     Morbid obesity with BMI of 40.0-44.9, adult (H) 4/25/2015     Obesity      BROCK (obstructive sleep apnea)      Scoliosis      Sleep apnea 2010    per pt     Systolic heart failure (H)     Past Surgical History:   Procedure Laterality Date     CV CORONARY ANGIOGRAM N/A 7/21/2021    Procedure: Coronary Angiogram;  Surgeon: Hai Murphy MD;  Location: [facility] CATH LAB CV     CV LEFT HEART CATH N/A 7/21/2021    Procedure: Left Heart Cath;  Surgeon: Hai Murphy MD;  Location: [facility] CATH LAB CV     HYSTERECTOMY, PAP NO LONGER INDICATED  1989     JOINT REPLACEMTN, KNEE RT/LT  2007    right     JOINT REPLACEMTN, KNEE RT/LT Left      SALPINGO OOPHORECTOMY,R/L/BRANDO Bilateral 2003     SURGICAL HISTORY OF -   1989    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    Family History   Problem Relation Age of Onset     Heart Disease Mother      Cancer Mother      Diabetes Father      Musculoskeletal Disorder Father      Diabetes Paternal Grandmother      Diabetes Paternal Grandfather      Diabetes Sister      Thrombophilia Daughter         Factor V Leiden Deficiency     Diabetes Sister     Social History     Socioeconomic History     Marital status:      Spouse name: Bill      Number of children: 2     Years of education: Not on file     Highest education level: Not on file   Occupational History     Occupation: [employer]       Employer: RETIRED   Tobacco Use     Smoking status: Former     Packs/day: 0.50     Years: 20.00     Pack years: 10.00     Types: Cigarettes     Quit date: 1982     Years since quittin.9     Smokeless tobacco: Never   Vaping Use     Vaping Use: Never used   Substance and Sexual Activity     Alcohol use: Yes     Comment: 1 beers per week     Drug use: Never     Sexual activity: Yes     Partners: Male     Birth control/protection: Post-menopausal   Other Topics Concern     Parent/sibling w/ CABG, MI or angioplasty before 65F 55M? Yes   Social History Narrative     Not on file     Social Determinants of Health     Financial Resource Strain: Not on file   Food Insecurity: Not on file   Transportation Needs: Not on file   Physical Activity: Not on file   Stress: Not on file   Social Connections: Not on file   Intimate Partner Violence: Not on file   Housing Stability: Not on file          Medications  Allergies   Current Outpatient Medications   Medication Sig Dispense Refill     acetaminophen (TYLENOL) 650 MG CR tablet Take 1,300 mg by mouth       amoxicillin (AMOXIL) 500 MG capsule TAKE 4 CAPSULES BY MOUTH 1 HOUR BEFORE DENTAL APPOINTMENT       atorvastatin (LIPITOR) 80 MG tablet TAKE 1 TABLET DAILY 90 tablet 3     carvedilol (COREG) 25 MG tablet TAKE 1/2 TABLET TWICE A DAYWITH MEALS 90 tablet 2     diltiazem ER COATED BEADS (CARDIZEM CD/CARTIA XT) 240 MG 24 hr capsule TAKE 1 CAPSULE DAILY 90 capsule 1     furosemide (LASIX) 40 MG tablet Take 1 tablet (40 mg) by mouth daily 90 tablet 1     levothyroxine (SYNTHROID/LEVOTHROID) 75 MCG tablet TAKE 1 TABLET DAILY. NEED  APPOINTMENT 90 tablet 3     losartan (COZAAR) 25 MG tablet Take 1 tablet (25 mg) by mouth daily 90 tablet 3     Melatonin 10 MG TABS tablet Take 1 tablet by mouth nightly as needed       warfarin ANTICOAGULANT (JANTOVEN ANTICOAGULANT) 2 MG tablet Take 1/2 tablet (1mg) to 1 tablet (2mg) by mouth daily, or as directed.  Adjust dose based on INR  results. 90 tablet 3    Allergies   Allergen Reactions     Lisinopril      cough     Metal [Staples] Other (See Comments)     Metal allergy not clearly specified - patient had a stainless steel screw put in her foot that needed to be removed due to allergy/hypersensitivity      Metoprolol Fatigue     Trace Metals      Other reaction(s): Other (see comments)  Metal allergy not clearly specified - patient had a stainless steel screw put in her foot that needed to be removed due to allergy/hypersensitivity.  Also react to earrings.         Lab Results    Chemistry/lipid CBC Cardiac Enzymes/BNP/TSH/INR   Lab Results   Component Value Date    CHOL 104 06/06/2022    HDL 39 (L) 06/06/2022    TRIG 128 06/06/2022    BUN 21 06/06/2022     06/06/2022    CO2 27 06/06/2022    Lab Results   Component Value Date    WBC 10.3 07/21/2021    HGB 13.5 09/08/2021    HCT 43.4 07/21/2021    MCV 94 07/21/2021     07/21/2021    Lab Results   Component Value Date     (H) 12/03/2020    TSH 1.02 06/06/2022    INR 1.9 (A) 11/18/2022

## 2022-11-18 NOTE — PROGRESS NOTES
ANTICOAGULATION MANAGEMENT     Caty Ng 77 year old female is on warfarin with subtherapeutic INR result. (Goal INR 2.0-3.0)    Recent labs: (last 7 days)     11/18/22  1322   INR 1.9*       ASSESSMENT       Source(s): Chart Review, Patient/Caregiver Call and Template       Warfarin doses taken: Warfarin taken as instructed    Diet: No new diet changes identified    New illness, injury, or hospitalization: No.   Follow-up visit with Cardiologist, Dr. Soria - permanent atrial fibrillation.  Recommended f/u after hip surgery, may consider med change.   Primary end-stage osteoarthritis of right hip.    Medication/supplement changes: None noted    Signs or symptoms of bleeding or clotting: No    Previous INR: Therapeutic last 7(+) visits    Additional findings:  Yes.     - Reported, she is scheduled for right total hip replacement on 12/1/22 @ [place], [place].   - per Cardiology visit on 11/18/22, Dr. Cameron Soria -  I would prefer spinal and anesthesia but I think that she would be relatively low risk for general anesthesia as this is not a procedure with significant blood loss or length.     PATIENT ALREADY INSTRUCTED PER [facility] (Vascular Dept):      Recommendations: (see E-consult on 11/16/22 with outlined instructions).      1. Hold warfarin 5 days prior to the procedure (last dose November 25).      2. Resume warfarin at the usual dose as soon as it is safe from the surgical  perspective (evening of the procedure or the day after).      3. INR check with the usual provider 3-4 days after re-initiation of warfarin.      4. Start on enoxaparin 40 milligrams subcutaneous injection once a day as soon as it is safe from the surgical perspective, and continued until the INR is in the therapeutic range (between 2 and 3).      5. If the patient is in [place] still by that time, please contact our Coumadin Clinic to arrange for of follow-up in [place].         PLAN     Recommended plan for no diet,  medication or health factor changes affecting INR     Dosing Instructions: booster dose then continue your current warfarin dose with next INR in 5 days       Summary  As of 11/18/2022    Full warfarin instructions:  11/18: 2 mg; 11/26: Hold; 11/27: Hold; 11/28: Hold; 11/29: Hold; 11/30: Hold; Otherwise 1 mg every Fri; 2 mg all other days; Starting 11/18/2022   Next INR check:  11/23/2022             Telephone call with Abi who verbalizes understanding and agrees to plan    Lab visit scheduled 11/23, we will review all hold/bridge plans    Education provided:     Contact [phone] with any changes, questions or concerns.     Plan made per [facility] anticoagulation protocol    Nathalie Glaser RN  Anticoagulation Clinic  11/18/2022    _______________________________________________________________________     Anticoagulation Episode Summary     Current INR goal:  2.0-3.0   TTR:  85.3 % (1 y)   Target end date:  Indefinite   Send INR reminders to:  BREE ANDREW    Indications    Long term (current) use of anticoagulants [Z79.01]  Permanent atrial fibrillation (H) (Resolved) [I48.21]           Comments:           Anticoagulation Care Providers     Provider Role Specialty Phone number    Keke Flores MD Referring Family Medicine [phone]

## 2022-11-19 ENCOUNTER — HEALTH MAINTENANCE LETTER (OUTPATIENT)
Age: 77
End: 2022-11-19

## 2022-11-21 ENCOUNTER — TELEPHONE (OUTPATIENT)
Dept: FAMILY MEDICINE | Facility: CLINIC | Age: 77
End: 2022-11-21

## 2022-11-21 DIAGNOSIS — Z79.01 LONG TERM (CURRENT) USE OF ANTICOAGULANTS: Primary | ICD-10-CM

## 2022-11-21 LAB
ALBUMIN SERPL ELPH-MCNC: 3.8 G/DL (ref 3.7–5.1)
ALPHA1 GLOB SERPL ELPH-MCNC: 0.3 G/DL (ref 0.2–0.4)
ALPHA2 GLOB SERPL ELPH-MCNC: 0.7 G/DL (ref 0.5–0.9)
ANA PAT SER IF-IMP: ABNORMAL
ANA SER QL IF: POSITIVE
ANA TITR SER IF: ABNORMAL {TITER}
B-GLOBULIN SERPL ELPH-MCNC: 0.8 G/DL (ref 0.6–1)
GAMMA GLOB SERPL ELPH-MCNC: 1.3 G/DL (ref 0.7–1.6)
M PROTEIN SERPL ELPH-MCNC: 0.2 G/DL
PROT PATTERN SERPL ELPH-IMP: ABNORMAL

## 2022-11-21 NOTE — TELEPHONE ENCOUNTER
Reason for Call:  Other call back    Detailed comments: Patient calling because they moved surgery date to January and wants to discuss this with inr nurse    Phone Number Patient can be reached at: Cell number on file:    Telephone Information:   Mobile [phone]       Best Time: Anytime    Can we leave a detailed message on this number? YES    Call taken on 11/21/2022 at 10:14 AM by Evita Bond

## 2022-11-21 NOTE — TELEPHONE ENCOUNTER
Called and spoke with Abi,     - she reported getting a call from [facility] - her surgery scheduled for 12/1/22 has been rescheduled for 1/6/2023, for right total hip replacement.     - she was already given instructions per [facility] Vascular Dept - to hold warfarin for 5 days and to bridge with Enoxaparin.  (see E-consult of 11/16/22).     - rescheduled INR on 12/2/22 @ [facility].

## 2022-11-22 DIAGNOSIS — I48.20 CHRONIC ATRIAL FIBRILLATION (H): ICD-10-CM

## 2022-11-22 DIAGNOSIS — E03.9 HYPOTHYROIDISM, UNSPECIFIED TYPE: ICD-10-CM

## 2022-11-22 RX ORDER — LEVOTHYROXINE SODIUM 75 UG/1
TABLET ORAL
Qty: 90 TABLET | Refills: 1 | Status: SHIPPED | OUTPATIENT
Start: 2022-11-22 | End: 2023-05-21

## 2022-11-22 RX ORDER — DILTIAZEM HYDROCHLORIDE 240 MG/1
CAPSULE, COATED, EXTENDED RELEASE ORAL
Qty: 90 CAPSULE | Refills: 1 | Status: ON HOLD | OUTPATIENT
Start: 2022-11-22 | End: 2023-01-13

## 2022-11-22 NOTE — TELEPHONE ENCOUNTER
Prescription approved per [facility] Refill Protocol.    Amanda Caro RN  [facility]

## 2022-11-23 DIAGNOSIS — I42.8 NON-ISCHEMIC CARDIOMYOPATHY (H): ICD-10-CM

## 2022-11-23 DIAGNOSIS — I50.32 CHRONIC DIASTOLIC HEART FAILURE (H): Primary | ICD-10-CM

## 2022-11-30 ENCOUNTER — TELEPHONE (OUTPATIENT)
Dept: FAMILY MEDICINE | Facility: CLINIC | Age: 77
End: 2022-11-30

## 2022-11-30 NOTE — TELEPHONE ENCOUNTER
Called and spoke with Abi,     - rescheduled INR to coincide with husbands appt for 12/12/22 @ [facility].     - reported has not missed any doses of warfarin and staying consistent with diet intake of Vitamin-K.

## 2022-11-30 NOTE — PROGRESS NOTES
Surgery has been rescheduled on 1/6/2023 for total hip replacement at [facility].  (see telephone message of 11/2122)

## 2022-11-30 NOTE — TELEPHONE ENCOUNTER
Reason for Call:  INR    Who is calling?  Patient     Phone number:  [phone]    Fax number:      Name of caller: Caty Ng    INR Value:      Are there any other concerns:  Yes: Needs to reschedule 12/02 appointment due to husbands chemo. Would like to know if she can put her INR off till 12/12 when her  has appointment at the clinic.      Can we leave a detailed message on this number? YES    Phone number patient can be reached at: Home number on file [phone] (home)      Call taken on 11/30/2022 at 10:43 AM by Fallon Steele

## 2022-12-01 ENCOUNTER — PATIENT OUTREACH (OUTPATIENT)
Dept: CARE COORDINATION | Facility: CLINIC | Age: 77
End: 2022-12-01

## 2022-12-01 NOTE — PROGRESS NOTES
Clinic Care Coordination Contact  [facility]/Voicemail    ** Patient is scheduled for surgery today 12/1/22.     Plan: Care Coordinator will try to reach patient again in 3-5 business days.    Next CHW outreach 12/8/22    Fabiola Tanner  [facility] Health Worker  [facility] Care Coordination   Apryl Rivera [facility]  Office: [phone]

## 2022-12-06 ENCOUNTER — PATIENT OUTREACH (OUTPATIENT)
Dept: CARE COORDINATION | Facility: CLINIC | Age: 77
End: 2022-12-06

## 2022-12-06 NOTE — LETTER
[facility] CARE COORDINATION  [address]  [place] [address]    December 6, 2022        Caty Ng  [address]  [facility] [address]          Dear Caty,     Attached is an updated Patient Centered Plan of Care for your continued enrollment in Care Coordination. Please let us know if you have additional questions, concerns, or goals that we can assist with.    Sincerely,    Zeinab Yoder RN  Clinic Care Coordination            [facility]  Patient Centered Plan of Care  About Me:        Patient Name:  Caty Ng    YOB: 1945  Age:         77 year old   [place] MRN:    [id number] Telephone Information:  Home Phone [phone]   Mobile [phone]       Address:  [address]  [facility] [place] [facility] [address] Email address:  [email or web address] (P)      Emergency Contact(s)    Name Relationship Lgl Grd Work Phone Home Phone Mobile Phone   1. JACKIE NG Spouse No  [phone]   2. ANA DSOUZA* Daughter No   [phone]           Primary language:  English     needed? No   [place] [employer] [facility]:  [phone] op. 1  Other communication barriers:None    Preferred Method of Communication:  Phone  Current living arrangement: I live in a private home with spouse    Mobility Status/ Medical Equipment: Independent w/Device        Health Maintenance  Health Maintenance Reviewed: Up to date      My Access Plan  Medical Emergency 911   Primary Clinic [facility] - [phone]   24 Hour Appointment Line [phone] or  [phone]) (toll-free)   24 Hour Nurse Line [phone] (toll-free)   Preferred Urgent Care No data recorded   Preferred Hospital [facility]  [phone]     Preferred Pharmacy [id number] [address]     Behavioral Health Crisis Line The National Suicide Prevention Lifeline at  0-986-856-8852 or Text/Call 988             My Care Team Members  Patient Care Team       Relationship Specialty Notifications Start End    Keke Flores MD PCP - General   11/6/20     Phone: [phone] Fax: [phone]         [address] [facility] [address]    Cameron Soria MD Assigned Heart and Vascular Provider   7/16/21     Phone: [phone] Fax: [phone]         [address] [facility] [address]    Keke Flores MD Assigned PCP   7/16/21     Phone: [phone] Fax: [phone]         [address] [facility] [address]    Raul Polo DO Assigned Neuroscience Provider   8/8/21     Phone: [phone] Fax: [phone]         [address] [facility] [address]    Juancarlos Haney MD Assigned Musculoskeletal Provider   10/10/21     Phone: [phone] Fax: [phone]         [address] [facility] [address]    Zeinab Yoder, WILMA Lead Care Coordinator Primary Care - CC Admissions 6/10/22     Phone: [phone]         Fabiola Tanner Community Health Worker  Admissions 6/10/22     [phone]            My Care Plans  Self Management and Treatment Plan  Care Plan  Care Plan: Medical Cannabis     Problem: HP GENERAL PROBLEM     Goal: I would like a referral for Medical Cannabis in the next 30-90 days.     Start Date: 6/10/2022 Expected End Date: 9/10/2022    This Visit's Progress: 70%    Note:     Barriers: numerous appointments  Strengths: motivated  Date to Achieve By: 30-90 days  Patient expressed understanding of goal: yes  Action steps to achieve this goal:  1. Patient discussed this referral at her PCP appointment 6/6/22. Completed  2. The CCC RN will send a note to the PCP to inquire on the status of the referral. Completed  3. [facility] will update the patient on the status of the referral in the next 2 weeks. I wanted to wait until I was seen at the [facility]. My appointment with the [facility] was  rescheduled to 10/24/22.                      Care Plan: Health Care Directive     Problem: HP GENERAL PROBLEM     Goal: I have accomplished completeing my Health Care Directive and is now scanned into my Medical Record.  Completed 10/17/2022    Start Date: 6/10/2022 Expected End Date: 12/10/2022    This Visit's Progress: 100%    Note:     Personal Plan  If I have any questions about my Health Care Directive I will contact [employer] at [email or web address], email [email or web address] or call  [employer] at [phone] (M-Friday 6a to 5p).                    Care Plan: Hip Surgery     Problem: HP GENERAL PROBLEM     Goal: I would like to have my hip surgery completed     Note:     Barriers: medically complex  Strengths: motivated, family support  Patient expressed understanding of goal: per chart review  Action steps to achieve this goal:  1. Surgery currently scheduled for January 2023.                           Action Plans on File:               Heart Failure       Advance Care Plans/Directives Type:   No data recorded    My Medical and Care Information  Problem List   Patient Active Problem List   Diagnosis     Advanced directives, counseling/discussion     Morbid obesity due to excess calories (H)     Hypertriglyceridemia     Chronic atrial fibrillation (H)     Mitral valve regurgitation     Systolic heart failure (H)     BROCK (obstructive sleep apnea)     Hyperlipidemia LDL goal <40     Long term current use of anticoagulant therapy     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 atrophy of thyroid     History of chondrosarcoma     S/p total knee replacement, bilateral     Primary osteoarthritis of right hip     Scoliosis     DDD (degenerative disc disease), lumbar     Chronic pain of both feet     Coronary artery disease involving native coronary artery of native heart without angina pectoris     Non-ischemic cardiomyopathy (H)     Status post coronary angiogram      Dyspnea on exertion     Long term (current) use of anticoagulants      Current Medications and Allergies:  See printed Medication Report.    Care Coordination Start Date: 6/10/2022   Frequency of Care Coordination: monthly     Form Last Updated: 12/06/2022

## 2022-12-06 NOTE — PROGRESS NOTES
Clinic Care Coordination Contact    Care Coordination Clinician Chart Review     Situation: Patient chart reviewed by care coordinator.?     Background: Initial assessment and enrollment to Care Coordination was 6/10/22.?? Care plan(s) with patient-centered goal(s) were developed with participation from patient.? Lead CC handed patient off to CHW for continued outreach every 30 days.??     Assessment: Per chart review, patient outreach completed by CC CHW on 11/14/22.? Patient is actively working to accomplish goal(s).? Patient's goal(s) remain(s) appropriate at this time.? Patient is due for updated Plan of Care.? Annual assessment will be due 6/10/23.     Care Plan: Medical Cannabis     Problem: HP GENERAL PROBLEM     Goal: I would like a referral for Medical Cannabis in the next 30-90 days.     Start Date: 6/10/2022 Expected End Date: 9/10/2022    This Visit's Progress: 70%    Note:     Barriers: numerous appointments  Strengths: motivated  Date to Achieve By: 30-90 days  Patient expressed understanding of goal: yes  Action steps to achieve this goal:  1. Patient discussed this referral at her PCP appointment 6/6/22. Completed  2. The CCC RN will send a note to the PCP to inquire on the status of the referral. Completed  3. [facility] will update the patient on the status of the referral in the next 2 weeks. I wanted to wait until I was seen at the [facility]. My appointment with the [facility] was rescheduled to 10/24/22.                      Care Plan: Health Care Directive     Problem: HP GENERAL PROBLEM     Goal: I have accomplished completeing my Health Care Directive and is now scanned into my Medical Record.  Completed 10/17/2022    Start Date: 6/10/2022 Expected End Date: 12/10/2022    This Visit's Progress: 100%    Note:     Personal Plan  If I have any questions about my Health Care Directive I will contact Aidan Jason at [email or web address] website, email [email or web address] or  call [facility] [employer] at [phone] (M-Friday 6a to 5p).                    Care Plan: Hip Surgery     Problem: HP GENERAL PROBLEM     Goal: I would like to have my hip surgery completed     Note:     Barriers: medically complex  Strengths: motivated, family support  Patient expressed understanding of goal: per chart review  Action steps to achieve this goal:  1. Surgery currently scheduled for January 2023.                          Plan/Recommendations: The patient will continue working with Care Coordination to achieve above goal(s).? CHW will involve Lead CC as needed or if patient is ready to move to maintenance.? Lead CC will continue to monitor CHW s monthly outreaches and progress to goal(s) every 6 weeks.    Plan of Care updated and sent to patient: Yes, RODGER 12/6/22

## 2022-12-08 ENCOUNTER — PATIENT OUTREACH (OUTPATIENT)
Dept: CARE COORDINATION | Facility: CLINIC | Age: 77
End: 2022-12-08

## 2022-12-08 NOTE — PROGRESS NOTES
Clinic Care Coordination Contact  [facility]/Voicemail    Clinical Data: Care Coordinator Outreach  Outreach attempted x 1.  Left message on patient's voicemail with call back information and requested return call.  Plan: Care Coordinator will try to reach patient again in 10 business days.    ** 12/5/22 PC from patient LMTCB    [facility] Health Worker  [facility] Care Coordination   [facility]  Office: [phone]

## 2022-12-12 ENCOUNTER — LAB (OUTPATIENT)
Dept: LAB | Facility: CLINIC | Age: 77
End: 2022-12-12
Payer: MEDICARE

## 2022-12-12 ENCOUNTER — ANTICOAGULATION THERAPY VISIT (OUTPATIENT)
Dept: ANTICOAGULATION | Facility: CLINIC | Age: 77
End: 2022-12-12

## 2022-12-12 DIAGNOSIS — Z79.01 LONG TERM (CURRENT) USE OF ANTICOAGULANTS: Primary | ICD-10-CM

## 2022-12-12 DIAGNOSIS — Z79.01 LONG TERM (CURRENT) USE OF ANTICOAGULANTS: ICD-10-CM

## 2022-12-12 DIAGNOSIS — I48.21 PERMANENT ATRIAL FIBRILLATION (H): ICD-10-CM

## 2022-12-12 LAB — INR BLD: 2.1 (ref 0.9–1.1)

## 2022-12-12 PROCEDURE — 85610 PROTHROMBIN TIME: CPT

## 2022-12-12 PROCEDURE — 36416 COLLJ CAPILLARY BLOOD SPEC: CPT

## 2022-12-12 NOTE — PROGRESS NOTES
ANTICOAGULATION MANAGEMENT     Caty gN 77 year old female is on warfarin with therapeutic INR result. (Goal INR 2.0-3.0)    Recent labs: (last 7 days)     12/12/22  1440   INR 2.1*       ASSESSMENT       Source(s): Chart Review, Patient/Caregiver Call and Template       Warfarin doses taken: Warfarin taken as instructed    Diet: No new diet changes identified    New illness, injury, or hospitalization: Yes:    Osteoarthritis of right hip.    Medication/supplement changes: None noted    Signs or symptoms of bleeding or clotting: No    Previous INR: Subtherapeutic at 1.9 on 11/18/22.    Additional findings:  Scheduled surgery on 1/6/2023 for right total hip arthroplasty @ [facility].   - see previous education (11/16/22) provided and outlined for patient by [facility], re: 5 days warfarin HOLD and bridging instructions.       PLAN     Unable to reach Abi ([phone]) today.    Left message to continue current dose of warfarin 2 mg tonight. Request call back for assessment.    Follow up required to assess for changes     - verify if patient has any questions, re:  Upcoming scheduled right hip arthroplasy.   - also go over again, with warfarin hold and bridge instructions, per [facility].    Kelly Wise RN  Anticoagulation Clinic  12/12/2022

## 2022-12-13 NOTE — PROGRESS NOTES
ANTICOAGULATION MANAGEMENT     Caty Ng 77 year old female is on warfarin with therapeutic INR result. (Goal INR 2.0-3.0)    Recent labs: (last 7 days)     12/12/22  1440   INR 2.1*       ASSESSMENT        Source(s): Chart Review, Patient/Caregiver Call and Template       Warfarin doses taken: Warfarin taken as instructed    Diet: No new diet changes identified    New illness, injury, or hospitalization: Yes:    Osteoarthritis of right hip.    Medication/supplement changes: None noted    Signs or symptoms of bleeding or clotting: No    Previous INR: Subtherapeutic at 1.9 on 11/18/22.    Additional findings:  Scheduled surgery on 1/6/2023 for right total hip arthroplasty @ [facility].   - see previous education (11/16/22) provided and outlined for patient by [facility], re: 5 days warfarin HOLD and bridging instructions.     - Abi just received letter from [facility] with warfarin instructions. She was informed, might not need bridging post procedure.     PLAN     Recommended plan for no diet, medication or health factor changes affecting INR     Dosing Instructions: Continue your current warfarin dose with next INR in 2 weeks       Summary  As of 12/12/2022    Full warfarin instructions:  1/1: Hold; 1/2: Hold; 1/3: Hold; 1/4: Hold; 1/5: Hold; Otherwise 1 mg every Fri; 2 mg all other days; Starting 12/12/2022   Next INR check:  1/3/2023             Telephone call with  Abi ([phone]) who verbalizes understanding and agrees to plan    Patient offered & declined to schedule next visit    Education provided:     Taking warfarin: Importance of taking warfarin as instructed    Goal range and lab monitoring: goal range and significance of current result    Plan made per [facility] anticoagulation protocol    Kelly Wise, RN  Anticoagulation Clinic  12/13/2022    _______________________________________________________________________     Anticoagulation Episode Summary     Current INR goal:  2.0-3.0   TTR:   86.3 % (1 y)   Target end date:  Indefinite   Send INR reminders to:  BREE ANDREW    Indications    Long term (current) use of anticoagulants [Z79.01]  Permanent atrial fibrillation (H) (Resolved) [I48.21]           Comments:           Anticoagulation Care Providers     Provider Role Specialty Phone number    Keke Flores MD Referring Family Medicine [phone]

## 2022-12-19 ENCOUNTER — HOSPITAL ENCOUNTER (OUTPATIENT)
Dept: CARDIOLOGY | Facility: HOSPITAL | Age: 77
Discharge: HOME | End: 2022-12-19
Attending: INTERNAL MEDICINE | Admitting: INTERNAL MEDICINE
Payer: MEDICARE

## 2022-12-19 DIAGNOSIS — I50.20 SYSTOLIC HEART FAILURE, UNSPECIFIED HF CHRONICITY (H): ICD-10-CM

## 2022-12-19 DIAGNOSIS — I25.10 CORONARY ARTERY DISEASE INVOLVING NATIVE CORONARY ARTERY OF NATIVE HEART WITHOUT ANGINA PECTORIS: ICD-10-CM

## 2022-12-19 DIAGNOSIS — I48.20 CHRONIC ATRIAL FIBRILLATION (H): ICD-10-CM

## 2022-12-19 DIAGNOSIS — I42.0 DILATED CARDIOMYOPATHY (H): ICD-10-CM

## 2022-12-19 PROCEDURE — 93226 XTRNL ECG REC<48 HR SCAN A/R: CPT

## 2022-12-21 PROCEDURE — 93227 XTRNL ECG REC<48 HR R&I: CPT | Performed by: INTERNAL MEDICINE

## 2022-12-22 ENCOUNTER — PATIENT OUTREACH (OUTPATIENT)
Dept: CARE COORDINATION | Facility: CLINIC | Age: 77
End: 2022-12-22

## 2022-12-22 NOTE — PROGRESS NOTES
Clinic Care Coordination Contact  [facility]/Voicemail    Clinical Data: Care Coordinator Outreach  Outreach attempted x 1.  Left message on patient's voicemail with call back information and requested return call.  Plan: Care Coordinator will try to reach patient again in 10 business days.    ** 12/19/22 Rtn PC from pt LMTCB    Next CHW outreach: 1/5/23    [facility] Worker  [facility] Care Coordination   BelkApryl jang [place], [place] [facility]  Office: [phone]

## 2023-01-03 ENCOUNTER — TRANSFERRED RECORDS (OUTPATIENT)
Dept: HEALTH INFORMATION MANAGEMENT | Facility: CLINIC | Age: 78
End: 2023-01-03

## 2023-01-05 ENCOUNTER — PATIENT OUTREACH (OUTPATIENT)
Dept: CARE COORDINATION | Facility: CLINIC | Age: 78
End: 2023-01-05

## 2023-01-05 NOTE — PROGRESS NOTES
Clinic Care Coordination Contact  [facility]/Voicemail    Clinical Data: Care Coordinator Outreach  Outreach attempted x 2.  Left message on patient's voicemail with call back information and requested return call.  Plan: Care Coordinator will try to reach patient again in 10 business days.    ** CHW will make 1 more attempt before sending to [facility] RN for dis enrollment recommendations.    Next CHW outreach: 1/19/23    Fabiola Tanner  [facility] Health Worker  [facility] Care Coordination   Apryl Rivera [place], [place] [facility]  Office: [phone]

## 2023-01-06 ENCOUNTER — HOSPITAL ENCOUNTER (INPATIENT)
Facility: HOSPITAL | Age: 78
LOS: 7 days | Discharge: HOME | DRG: 291 | End: 2023-01-13
Attending: EMERGENCY MEDICINE | Admitting: INTERNAL MEDICINE
Payer: MEDICARE

## 2023-01-06 ENCOUNTER — APPOINTMENT (OUTPATIENT)
Dept: RADIOLOGY | Facility: HOSPITAL | Age: 78
DRG: 291 | End: 2023-01-06
Attending: EMERGENCY MEDICINE
Payer: MEDICARE

## 2023-01-06 DIAGNOSIS — J96.01 ACUTE RESPIRATORY FAILURE WITH HYPOXIA (H): ICD-10-CM

## 2023-01-06 DIAGNOSIS — I50.32 CHRONIC DIASTOLIC HEART FAILURE (H): ICD-10-CM

## 2023-01-06 DIAGNOSIS — J40 BRONCHITIS: Primary | ICD-10-CM

## 2023-01-06 DIAGNOSIS — Z79.01 LONG TERM (CURRENT) USE OF ANTICOAGULANTS: ICD-10-CM

## 2023-01-06 DIAGNOSIS — I50.23 ACUTE ON CHRONIC HFREF (HEART FAILURE WITH REDUCED EJECTION FRACTION) (H): ICD-10-CM

## 2023-01-06 DIAGNOSIS — I48.21 PERMANENT ATRIAL FIBRILLATION (H): ICD-10-CM

## 2023-01-06 DIAGNOSIS — R79.89 ELEVATED BRAIN NATRIURETIC PEPTIDE (BNP) LEVEL: ICD-10-CM

## 2023-01-06 PROBLEM — I25.10 CORONARY ARTERY DISEASE INVOLVING NATIVE CORONARY ARTERY OF NATIVE HEART WITHOUT ANGINA PECTORIS: Status: ACTIVE | Noted: 2021-06-30

## 2023-01-06 PROBLEM — J10.1 INFLUENZA DUE TO INFLUENZA VIRUS, TYPE A, HUMAN: Status: ACTIVE | Noted: 2023-01-06

## 2023-01-06 LAB
ANION GAP SERPL CALCULATED.3IONS-SCNC: 10 MMOL/L (ref 7–15)
APTT PPP: 29 SECONDS (ref 22–38)
BASOPHILS # BLD AUTO: 0 10E3/UL (ref 0–0.2)
BASOPHILS NFR BLD AUTO: 0 %
BUN SERPL-MCNC: 12.9 MG/DL (ref 8–23)
CALCIUM SERPL-MCNC: 9.1 MG/DL (ref 8.8–10.2)
CHLORIDE SERPL-SCNC: 100 MMOL/L (ref 98–107)
CREAT SERPL-MCNC: 0.63 MG/DL (ref 0.51–0.95)
DEPRECATED HCO3 PLAS-SCNC: 32 MMOL/L (ref 22–29)
EOSINOPHIL # BLD AUTO: 0.1 10E3/UL (ref 0–0.7)
EOSINOPHIL NFR BLD AUTO: 1 %
ERYTHROCYTE [DISTWIDTH] IN BLOOD BY AUTOMATED COUNT: 14 % (ref 10–15)
FLUAV RNA SPEC QL NAA+PROBE: POSITIVE
FLUBV RNA RESP QL NAA+PROBE: NEGATIVE
GFR SERPL CREATININE-BSD FRML MDRD: >90 ML/MIN/1.73M2
GLUCOSE SERPL-MCNC: 122 MG/DL (ref 70–99)
HCT VFR BLD AUTO: 43.3 % (ref 35–47)
HGB BLD-MCNC: 13.8 G/DL (ref 11.7–15.7)
HOLD SPECIMEN: NORMAL
IMM GRANULOCYTES # BLD: 0 10E3/UL
IMM GRANULOCYTES NFR BLD: 0 %
INR PPP: 1.26 (ref 0.85–1.15)
LYMPHOCYTES # BLD AUTO: 1 10E3/UL (ref 0.8–5.3)
LYMPHOCYTES NFR BLD AUTO: 13 %
MCH RBC QN AUTO: 30.3 PG (ref 26.5–33)
MCHC RBC AUTO-ENTMCNC: 31.9 G/DL (ref 31.5–36.5)
MCV RBC AUTO: 95 FL (ref 78–100)
MONOCYTES # BLD AUTO: 0.6 10E3/UL (ref 0–1.3)
MONOCYTES NFR BLD AUTO: 8 %
NEUTROPHILS # BLD AUTO: 5.8 10E3/UL (ref 1.6–8.3)
NEUTROPHILS NFR BLD AUTO: 78 %
NRBC # BLD AUTO: 0 10E3/UL
NRBC BLD AUTO-RTO: 0 /100
NT-PROBNP SERPL-MCNC: 1122 PG/ML (ref 0–900)
PLATELET # BLD AUTO: 246 10E3/UL (ref 150–450)
POTASSIUM SERPL-SCNC: 3.8 MMOL/L (ref 3.4–5.3)
RBC # BLD AUTO: 4.55 10E6/UL (ref 3.8–5.2)
RSV RNA SPEC NAA+PROBE: NEGATIVE
SARS-COV-2 RNA RESP QL NAA+PROBE: NEGATIVE
SODIUM SERPL-SCNC: 142 MMOL/L (ref 136–145)
TROPONIN T SERPL HS-MCNC: 9 NG/L
WBC # BLD AUTO: 7.5 10E3/UL (ref 4–11)

## 2023-01-06 PROCEDURE — 85730 THROMBOPLASTIN TIME PARTIAL: CPT | Performed by: EMERGENCY MEDICINE

## 2023-01-06 PROCEDURE — 80048 BASIC METABOLIC PNL TOTAL CA: CPT | Performed by: EMERGENCY MEDICINE

## 2023-01-06 PROCEDURE — 36415 COLL VENOUS BLD VENIPUNCTURE: CPT | Performed by: EMERGENCY MEDICINE

## 2023-01-06 PROCEDURE — 250N000013 HC RX MED GY IP 250 OP 250 PS 637: Performed by: EMERGENCY MEDICINE

## 2023-01-06 PROCEDURE — 99223 1ST HOSP IP/OBS HIGH 75: CPT | Performed by: INTERNAL MEDICINE

## 2023-01-06 PROCEDURE — 84484 ASSAY OF TROPONIN QUANT: CPT | Performed by: EMERGENCY MEDICINE

## 2023-01-06 PROCEDURE — 83880 ASSAY OF NATRIURETIC PEPTIDE: CPT | Performed by: EMERGENCY MEDICINE

## 2023-01-06 PROCEDURE — 71046 X-RAY EXAM CHEST 2 VIEWS: CPT

## 2023-01-06 PROCEDURE — 250N000011 HC RX IP 250 OP 636: Performed by: EMERGENCY MEDICINE

## 2023-01-06 PROCEDURE — C9803 HOPD COVID-19 SPEC COLLECT: HCPCS

## 2023-01-06 PROCEDURE — 85025 COMPLETE CBC W/AUTO DIFF WBC: CPT | Performed by: EMERGENCY MEDICINE

## 2023-01-06 PROCEDURE — 85610 PROTHROMBIN TIME: CPT | Performed by: EMERGENCY MEDICINE

## 2023-01-06 PROCEDURE — 87637 SARSCOV2&INF A&B&RSV AMP PRB: CPT | Performed by: EMERGENCY MEDICINE

## 2023-01-06 PROCEDURE — 96374 THER/PROPH/DIAG INJ IV PUSH: CPT

## 2023-01-06 PROCEDURE — 93005 ELECTROCARDIOGRAM TRACING: CPT | Performed by: EMERGENCY MEDICINE

## 2023-01-06 PROCEDURE — 120N000001 HC R&B MED SURG/OB

## 2023-01-06 PROCEDURE — 99285 EMERGENCY DEPT VISIT HI MDM: CPT | Mod: 25

## 2023-01-06 RX ORDER — CARVEDILOL 12.5 MG/1
12.5 TABLET ORAL 2 TIMES DAILY WITH MEALS
Status: DISCONTINUED | OUTPATIENT
Start: 2023-01-07 | End: 2023-01-07

## 2023-01-06 RX ORDER — LEVOTHYROXINE SODIUM 25 UG/1
75 TABLET ORAL DAILY
Status: DISCONTINUED | OUTPATIENT
Start: 2023-01-07 | End: 2023-01-13 | Stop reason: HOSPADM

## 2023-01-06 RX ORDER — SENNOSIDES 8.6 MG
1300 CAPSULE ORAL 2 TIMES DAILY
Status: DISCONTINUED | OUTPATIENT
Start: 2023-01-07 | End: 2023-01-06 | Stop reason: CLARIF

## 2023-01-06 RX ORDER — ATORVASTATIN CALCIUM 40 MG/1
80 TABLET, FILM COATED ORAL DAILY
Status: DISCONTINUED | OUTPATIENT
Start: 2023-01-07 | End: 2023-01-13 | Stop reason: HOSPADM

## 2023-01-06 RX ORDER — DILTIAZEM HYDROCHLORIDE 120 MG/1
240 CAPSULE, COATED, EXTENDED RELEASE ORAL DAILY
Status: DISCONTINUED | OUTPATIENT
Start: 2023-01-07 | End: 2023-01-08

## 2023-01-06 RX ORDER — FUROSEMIDE 10 MG/ML
40 INJECTION INTRAMUSCULAR; INTRAVENOUS ONCE
Status: COMPLETED | OUTPATIENT
Start: 2023-01-06 | End: 2023-01-06

## 2023-01-06 RX ORDER — ACETAMINOPHEN 325 MG/1
650 TABLET ORAL 4 TIMES DAILY
Status: DISCONTINUED | OUTPATIENT
Start: 2023-01-07 | End: 2023-01-13 | Stop reason: HOSPADM

## 2023-01-06 RX ORDER — OSELTAMIVIR PHOSPHATE 75 MG/1
75 CAPSULE ORAL ONCE
Status: COMPLETED | OUTPATIENT
Start: 2023-01-06 | End: 2023-01-06

## 2023-01-06 RX ORDER — LOSARTAN POTASSIUM 25 MG/1
25 TABLET ORAL DAILY
Status: DISCONTINUED | OUTPATIENT
Start: 2023-01-07 | End: 2023-01-13 | Stop reason: HOSPADM

## 2023-01-06 RX ADMIN — OSELTAMIVIR PHOSPHATE 75 MG: 75 CAPSULE ORAL at 17:33

## 2023-01-06 RX ADMIN — FUROSEMIDE 40 MG: 10 INJECTION, SOLUTION INTRAVENOUS at 17:33

## 2023-01-06 ASSESSMENT — ACTIVITIES OF DAILY LIVING (ADL)
ADLS_ACUITY_SCORE: 39
ADLS_ACUITY_SCORE: 39
ADLS_ACUITY_SCORE: 35
ADLS_ACUITY_SCORE: 39

## 2023-01-06 NOTE — ED NOTES
"    ED Provider In Triage Note  [facility]  Encounter Date: Jan 6, 2023    Chief Complaint   Patient presents with     Shortness of Breath         Use of Intrepreter: N/A    Brief HPI:   Caty Ng is a 77 year old female presenting to the Emergency Department with a chief complaint of SOB x few days. pOx 87% on RA at home. +cough x 4 days. Supposed to have hip surgery today but it was cancelled due to cough and SOB.    No COPD, chest pain, fever. +\"low ejection fracture\".    Off her coumadin (afib) x 6 days to prepare for hip surgery.    Brief Physical Exam:  /56   Pulse 89   Temp 99  F (37.2  C) (Temporal)   Resp 20   Ht 1.626 m (5' 4\")   Wt 108.9 kg (240 lb)   SpO2 (!) 89%   BMI 41.20 kg/m    General: Non-toxic appearing  HEENT: Atraumatic  Resp: No respiratory distress  Abdomen: Non-peritoneal  Neuro: Alert, oriented, answers questions appropriately  Psych: Behavior appropriate  Musculoskeletal: atraumatic      Plan Initiated in Triage:  Orders Placed This Encounter     Chest XR,  PA & LAT     [place] Draw     INR     PTT     Nt probnp inpatient     Troponin T, High Sensitivity (now)     Basic metabolic panel     Symptomatic Influenza A/B & SARS-CoV2 (COVID-19) Virus PCR Multiplex       Patient arriving to the ED with problem as above. A medical screening exam was performed.     PIT Dispo:   Return to lobby while awaiting workup and ED bed availability        Keke Grover MD on 1/6/2023 at 12:47 PM      Patient was evaluated by the Physician in Triage due to a limitation of available rooms in the Emergency Department. A plan of care was discussed based on the information obtained on the initial evaluation and patient was counseled to return back to the Emergency Department lobby after this initial evalutaiton until results were obtained or a room became available in the Emergency Department. Patient was counseled not to leave prior to receiving the results of " their workup.            Keke Grover MD  01/06/23 1151

## 2023-01-06 NOTE — ED TRIAGE NOTES
-Patient arrives from home with spouse with complaints of shortness of breath. Reports ongoing cough for about 4 days with increased shortness of breath. Reports home SpO2 was 87%, was 89% in triage. Placed on 1L via NC. Patient alert. Denies chest pain, recent illness, or nausea. Has been off blood thinner for scheduled surgery that was supposed to be today.      Triage Assessment     Row Name 01/06/23 1249       Triage Assessment (Adult)    Airway WDL WDL       Cognitive/Neuro/Behavioral WDL    Cognitive/Neuro/Behavioral WDL WDL

## 2023-01-06 NOTE — ED PROVIDER NOTES
Emergency Department Encounter     Evaluation Date & Time:   No admission date for patient encounter.    CHIEF COMPLAINT:  Shortness of Breath      Triage Note:  -Patient arrives from home with spouse with complaints of shortness of breath. Reports ongoing cough for about 4 days with increased shortness of breath. Reports home SpO2 was 87%, was 89% in triage. Placed on 1L via NC. Patient alert. Denies chest pain, recent illness, or nausea. Has been off blood thinner for scheduled surgery that was supposed to be today.      Triage Assessment     Row Name 23 1249       Triage Assessment (Adult)    Airway WDL WDL       Cognitive/Neuro/Behavioral WDL    Cognitive/Neuro/Behavioral WDL WDL                    Impression and Plan       FINAL IMPRESSION:    ICD-10-CM    1. Acute respiratory failure with hypoxia (H)  J96.01       2. Elevated brain natriuretic peptide (BNP) level  R79.89             ED COURSE & MEDICAL DECISION MAKIN:44 PM I met with the patient, obtained history, performed an initial exam, and discussed options and plan for diagnostics and treatment here in the ED. PPE worn including surgical mask, surgical gloves, surgical gown.  6:03 PM I spoke with the hospitalist Dr. Lozano who accepts the patient.    77 year old female, history of CAD, cardiomyopathy with CHF, mitral valve regurgitation, chronic atrial fibrillation (on Coumadin), HTN, HLD, obesity and BROCK, who presents for evaluation of shortness of breath that started a couple of days ago, worse today, associated with hypoxia (O2 sats at home 83-87%). She reports cough productive of yellow-green sputum for the past 4 days; no associated fever, body aches or other URI symptoms.  She denies associated chest pain.    On presentation, patient hypoxic to 89% on RA - improved to low-mid 90%s on 2L supplemental O2 via NC.     On exam, she has no respiratory distress with very coarse breath sounds diffusely.    EKG performed and demonstrated  atrial fibrillation with controlled ventricular rate, premature aberrantly conducted complexes, low voltage QRS, new inferior Q waves, poor R wave progression and no ST-T wave elevation consistent with ACS or pericarditis. Troponin unremarkable (9) and I do not suspect cardiac etiology.    CXR performed and demonstrated enlarged cardiac silhouette; hyperinflated lungs with slight flattening of diaphragmatic curvature; no airspace opacities or interstitial thickening; no pleural effusion.     BNP elevated (1,122) without clinical or radiographic evidence of acute CHF.  Patient given 40 Mg IV Lasix.    Influenza A returned positive; RSV and COVID tests negative.  Patient given po Tamiflu.    Labs otherwise remarkable for no leukocytosis, anemia, significant electrolyte derangement or renal impairment.    On reassessment off supplemental O2, patient hypoxic (78% with minimal exertion transferring into a bed), thus decision made to admit patient for further monitoring and management.  Patient hemodynamically stable throughout ED course.      At the conclusion of the encounter I discussed the results of all the tests and the disposition. The questions were answered. The patient and family acknowledged understanding and were agreeable with the care plan.      MEDICATIONS GIVEN IN THE EMERGENCY DEPARTMENT:  Medications   atorvastatin (LIPITOR) tablet 80 mg (80 mg Oral Given 1/7/23 0804)   carvedilol (COREG) tablet 12.5 mg (12.5 mg Oral Given 1/7/23 0817)   diltiazem ER COATED BEADS (CARDIZEM CD/CARTIA XT) 24 hr capsule 240 mg (240 mg Oral Given 1/7/23 0817)   levothyroxine (SYNTHROID/LEVOTHROID) tablet 75 mcg (75 mcg Oral Given 1/7/23 0803)   losartan (COZAAR) tablet 25 mg (25 mg Oral Given 1/7/23 0817)   Warfarin Dose Required Daily - Pharmacist Managed (has no administration in time range)   acetaminophen (TYLENOL) tablet 650 mg (650 mg Oral Given 1/7/23 0803)   oseltamivir (TAMIFLU) capsule 75 mg (75 mg Oral Given  1/7/23 0804)   enoxaparin ANTICOAGULANT (LOVENOX) injection 105 mg (105 mg Subcutaneous Given 1/7/23 0111)   furosemide (LASIX) injection 60 mg (60 mg Intravenous Given 1/7/23 0647)   cefuroxime (CEFTIN) tablet 250 mg (250 mg Oral Given 1/7/23 0803)   oseltamivir (TAMIFLU) capsule 75 mg (75 mg Oral Given 1/6/23 1733)   furosemide (LASIX) injection 40 mg (40 mg Intravenous Given 1/6/23 1733)   warfarin ANTICOAGULANT (COUMADIN) tablet 2 mg (2 mg Oral Given 1/7/23 0110)       NEW PRESCRIPTIONS STARTED AT TODAY'S ED VISIT:  Current Discharge Medication List          HPI     History obtained from: Patient      Caty Ng is a 77 year old female, history of CAD, cardiomyopathy with CHF, mitral valve regurgitation, chronic atrial fibrillation (on Coumadin), HTN, HLD, obesity and BROCK, who presents to this ED ambulatory for evaluation of shortness of breath.     The shortness of breath started a couple of days ago, however worsening today.  She does have a pulse oximeter at home and her oxygen levels were reading 83-87% on room air.  She endorses an associated cough productive of yellow-green sputum for the past 4 days; denies associated fever, body aches or other URI symptoms.  She denies associated chest pain.    Her Coumadin has been held for the past 6 days as she was supposed to have a hip replacement today, however due to her hypoxia and cough, this was postponed.    In the ED the patient was placed on supplemental O2 in the ED and feels mildly improved this.    She has otherwise been in her usual state of health and denies abdominal pain, N/V/D or other concerns.    REVIEW OF SYSTEMS:  All other systems reviewed and are negative.      Medical History     Past Medical History:   Diagnosis Date     Allergic rhinitis      Arrhythmia 01/01/2012     Atrial fibrillation, chronic (H) 06/21/2012     Cancer (H) 01/01/1989     Chondrosarcoma (H) 1989     Colon adenoma      Coronary artery disease involving native coronary  artery of native heart without angina pectoris 2021     DDD (degenerative disc disease), lumbar 2020     Degenerative joint disease of right hip      Heart failure, systolic, chronic (H) 2014     Hyperlipidemia 2012     Hypertriglyceridemia      Mediastinal mass      Mitral valve regurgitation 2012     Moderate mitral regurgitation      Morbid obesity with BMI of 40.0-44.9, adult (H) 2015     BROCK (obstructive sleep apnea)      Scoliosis      Sleep apnea 2010       Past Surgical History:   Procedure Laterality Date     CV CORONARY ANGIOGRAM N/A 2021    Procedure: Coronary Angiogram;  Surgeon: Hai Murphy MD;  Location: [facility] CATH LAB CV     CV LEFT HEART CATH N/A 2021    Procedure: Left Heart Cath;  Surgeon: Hai Murphy MD;  Location: [facility] CATH LAB CV     HYSTERECTOMY, PAP NO LONGER INDICATED       JOINT REPLACEMTN, KNEE RT/LT      right     JOINT REPLACEMTN, KNEE RT/LT Left      SALPINGO OOPHORECTOMY,R/L/BRANDO Bilateral      SURGICAL HISTORY OF -       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       Family History   Problem Relation Age of Onset     Heart Disease Mother      Cancer Mother      Diabetes Father      Musculoskeletal Disorder Father      Diabetes Paternal Grandmother      Diabetes Paternal Grandfather      Diabetes Sister      Thrombophilia Daughter         Factor V Leiden Deficiency     Diabetes Sister        Social History     Tobacco Use     Smoking status: Former     Packs/day: 0.50     Years: 20.00     Pack years: 10.00     Types: Cigarettes     Quit date: 1982     Years since quittin.0     Smokeless tobacco: Never   Vaping Use     Vaping Use: Never used   Substance Use Topics     Alcohol use: Yes     Comment: 1 beers per week     Drug use: Never       No current outpatient medications on file.      Physical Exam     First Vitals:  Patient Vitals for the past 24 hrs:   BP Temp Temp src Pulse Resp SpO2  "Height Weight   01/07/23 0810 115/60 97.5  F (36.4  C) Axillary 109 20 92 % -- --   01/07/23 0553 -- -- -- -- -- -- -- 108.8 kg (239 lb 14.4 oz)   01/07/23 0355 118/57 98.3  F (36.8  C) Oral 89 20 92 % -- --   01/06/23 2337 117/66 98.2  F (36.8  C) Oral 86 20 91 % -- --   01/06/23 1958 114/67 98  F (36.7  C) Oral 84 20 93 % 1.626 m (5' 4\") --   01/06/23 1915 113/66 -- -- 86 -- 91 % -- --   01/06/23 1900 107/59 -- -- 86 -- 93 % -- --   01/06/23 1845 122/60 -- -- 89 -- 93 % -- --   01/06/23 1830 105/62 -- -- 87 -- 91 % -- --   01/06/23 1829 105/62 -- -- 86 -- 93 % -- --   01/06/23 1815 119/55 -- -- 81 -- 94 % -- --   01/06/23 1800 116/58 -- -- -- -- -- -- --   01/06/23 1745 122/65 -- -- 79 -- 96 % -- --   01/06/23 1721 -- -- -- 80 -- 97 % -- --   01/06/23 1715 -- -- -- 82 -- 95 % -- --   01/06/23 1710 -- -- -- 83 -- (!) 78 % -- --   01/06/23 1253 -- -- -- -- -- 92 % -- --   01/06/23 1248 108/56 99  F (37.2  C) Temporal 89 20 (!) 89 % 1.626 m (5' 4\") 108.9 kg (240 lb)       PHYSICAL EXAM:   Physical Exam    GENERAL: Awake, alert.  In no acute distress.   HEENT: Normocephalic, atraumatic. Pupils equal, round and reactive. Conjunctiva normal.   NECK: No stridor.  PULMONARY: No respiratory distress. She has coarse breath sounds diffusely without audible wheezes or rales.  CARDIO: Irregularly irregular rhythm with normal rate.  No significant murmur, rub or gallop.  Radial pulses strong and symmetrical.  ABDOMINAL: Abdomen soft, non-distended and non-tender to palpation.    EXTREMITIES: Mild and symmetrical BL lower extremity swelling.      NEURO: Alert and oriented to person, place and time.  Cranial nerves grossly intact.  No focal motor deficit.  PSYCH: Normal mood and affect.  SKIN: No rashes.     Results     LAB:  All pertinent labs reviewed and interpreted  Labs Ordered and Resulted from Time of ED Arrival to Time of ED Departure   INR - Abnormal       Result Value    INR 1.26 (*)    NT PROBNP INPATIENT - Abnormal    " N terminal Pro BNP Inpatient 1,122 (*)    BASIC METABOLIC PANEL - Abnormal    Sodium 142      Potassium 3.8      Chloride 100      Carbon Dioxide (CO2) 32 (*)     Anion Gap 10      Urea Nitrogen 12.9      Creatinine 0.63      Calcium 9.1      Glucose 122 (*)     GFR Estimate >90     INFLUENZA A/B & SARS-COV2 PCR MULTIPLEX - Abnormal    Influenza A PCR Positive (*)     Influenza B PCR Negative      RSV PCR Negative      SARS CoV2 PCR Negative     PARTIAL THROMBOPLASTIN TIME - Normal    aPTT 29     TROPONIN T, HIGH SENSITIVITY - Normal    Troponin T, High Sensitivity 9     CBC WITH PLATELETS AND DIFFERENTIAL    WBC Count 7.5      RBC Count 4.55      Hemoglobin 13.8      Hematocrit 43.3      MCV 95      MCH 30.3      MCHC 31.9      RDW 14.0      Platelet Count 246      % Neutrophils 78      % Lymphocytes 13      % Monocytes 8      % Eosinophils 1      % Basophils 0      % Immature Granulocytes 0      NRBCs per 100 WBC 0      Absolute Neutrophils 5.8      Absolute Lymphocytes 1.0      Absolute Monocytes 0.6      Absolute Eosinophils 0.1      Absolute Basophils 0.0      Absolute Immature Granulocytes 0.0      Absolute NRBCs 0.0         RADIOLOGY:  Chest XR,  PA & LAT   Final Result   IMPRESSION:       Enlarged cardiac silhouette. Vascular pedicle width is normal.      Hyperinflated lungs with slight flattening of diaphragmatic curvature. No airspace opacities or interstitial thickening. Incidental note of an azygos fissure, normal anatomic variant.      No pleural effusion.      Diffuse thoracic spine degenerative osteophytes and mild disc space narrowing. Reciprocal curvature of the upper thoracic spine. No aggressive or destructive bone lesions.          EC2023, 13:13; atrial fibrillation with controlled ventricular rate of 83 bpm; premature aberrantly conducted complexes; low voltage QRS; inferior Q waves; poor R wave progression; no ST-T wave elevation consistent with ACS or pericarditis; compared to previous  EKG dated 7/21/2021, inferior Q waves are new, otherwise no significant changes    EKG independently reviewed and interpreted by Mechelle Dawn MD      I, Rick Adorno, am serving as a scribe to document services personally performed by Mechelle Dawn MD based on my observation and the provider's statements to me. I, Mechelle Dawn MD attest that Rick Adorno is acting in a scribe capacity, has observed my performance of the services and has documented them in accordance with my direction.    Mechelle Dawn MD  Emergency Medicine  [facility] EMERGENCY DEPARTMENT         Mechelle Dawn MD  01/07/23 0831

## 2023-01-07 ENCOUNTER — APPOINTMENT (OUTPATIENT)
Dept: OCCUPATIONAL THERAPY | Facility: HOSPITAL | Age: 78
DRG: 291 | End: 2023-01-07
Attending: INTERNAL MEDICINE
Payer: MEDICARE

## 2023-01-07 PROBLEM — J40 BRONCHITIS: Status: ACTIVE | Noted: 2023-01-07

## 2023-01-07 LAB
ANION GAP SERPL CALCULATED.3IONS-SCNC: 7 MMOL/L (ref 7–15)
BUN SERPL-MCNC: 12.1 MG/DL (ref 8–23)
CALCIUM SERPL-MCNC: 9.1 MG/DL (ref 8.8–10.2)
CHLORIDE SERPL-SCNC: 98 MMOL/L (ref 98–107)
CREAT SERPL-MCNC: 0.69 MG/DL (ref 0.51–0.95)
DEPRECATED HCO3 PLAS-SCNC: 35 MMOL/L (ref 22–29)
GFR SERPL CREATININE-BSD FRML MDRD: 90 ML/MIN/1.73M2
GLUCOSE SERPL-MCNC: 95 MG/DL (ref 70–99)
HOLD SPECIMEN: NORMAL
INR PPP: 1.24 (ref 0.85–1.15)
MAGNESIUM SERPL-MCNC: 2 MG/DL (ref 1.7–2.3)
POTASSIUM SERPL-SCNC: 3.6 MMOL/L (ref 3.4–5.3)
SODIUM SERPL-SCNC: 140 MMOL/L (ref 136–145)

## 2023-01-07 PROCEDURE — 250N000011 HC RX IP 250 OP 636: Performed by: INTERNAL MEDICINE

## 2023-01-07 PROCEDURE — 99222 1ST HOSP IP/OBS MODERATE 55: CPT | Mod: 25 | Performed by: INTERNAL MEDICINE

## 2023-01-07 PROCEDURE — 250N000013 HC RX MED GY IP 250 OP 250 PS 637: Performed by: HOSPITALIST

## 2023-01-07 PROCEDURE — 97165 OT EVAL LOW COMPLEX 30 MIN: CPT | Mod: GO

## 2023-01-07 PROCEDURE — 80048 BASIC METABOLIC PNL TOTAL CA: CPT | Performed by: INTERNAL MEDICINE

## 2023-01-07 PROCEDURE — 36415 COLL VENOUS BLD VENIPUNCTURE: CPT | Performed by: INTERNAL MEDICINE

## 2023-01-07 PROCEDURE — 85610 PROTHROMBIN TIME: CPT | Performed by: INTERNAL MEDICINE

## 2023-01-07 PROCEDURE — 250N000013 HC RX MED GY IP 250 OP 250 PS 637: Performed by: INTERNAL MEDICINE

## 2023-01-07 PROCEDURE — 120N000001 HC R&B MED SURG/OB

## 2023-01-07 PROCEDURE — 99232 SBSQ HOSP IP/OBS MODERATE 35: CPT | Performed by: HOSPITALIST

## 2023-01-07 PROCEDURE — 97530 THERAPEUTIC ACTIVITIES: CPT | Mod: GO

## 2023-01-07 PROCEDURE — 83735 ASSAY OF MAGNESIUM: CPT | Performed by: INTERNAL MEDICINE

## 2023-01-07 RX ORDER — FUROSEMIDE 10 MG/ML
60 INJECTION INTRAMUSCULAR; INTRAVENOUS EVERY 8 HOURS
Status: DISCONTINUED | OUTPATIENT
Start: 2023-01-07 | End: 2023-01-07

## 2023-01-07 RX ORDER — FUROSEMIDE 20 MG
20 TABLET ORAL
Status: DISCONTINUED | OUTPATIENT
Start: 2023-01-07 | End: 2023-01-07

## 2023-01-07 RX ORDER — CARVEDILOL 12.5 MG/1
25 TABLET ORAL 2 TIMES DAILY WITH MEALS
Status: DISCONTINUED | OUTPATIENT
Start: 2023-01-07 | End: 2023-01-13 | Stop reason: HOSPADM

## 2023-01-07 RX ORDER — WARFARIN SODIUM 2 MG/1
2 TABLET ORAL ONCE
Status: COMPLETED | OUTPATIENT
Start: 2023-01-07 | End: 2023-01-07

## 2023-01-07 RX ORDER — ENOXAPARIN SODIUM 150 MG/ML
1 INJECTION SUBCUTANEOUS EVERY 12 HOURS
Status: DISCONTINUED | OUTPATIENT
Start: 2023-01-07 | End: 2023-01-08

## 2023-01-07 RX ORDER — WARFARIN SODIUM 2 MG/1
2 TABLET ORAL
Status: COMPLETED | OUTPATIENT
Start: 2023-01-07 | End: 2023-01-07

## 2023-01-07 RX ORDER — FUROSEMIDE 20 MG
20 TABLET ORAL
Status: DISCONTINUED | OUTPATIENT
Start: 2023-01-07 | End: 2023-01-11

## 2023-01-07 RX ORDER — SPIRONOLACTONE 25 MG/1
25 TABLET ORAL DAILY
Status: DISCONTINUED | OUTPATIENT
Start: 2023-01-07 | End: 2023-01-13 | Stop reason: HOSPADM

## 2023-01-07 RX ORDER — OSELTAMIVIR PHOSPHATE 75 MG/1
75 CAPSULE ORAL 2 TIMES DAILY
Status: COMPLETED | OUTPATIENT
Start: 2023-01-07 | End: 2023-01-11

## 2023-01-07 RX ORDER — CEFUROXIME AXETIL 250 MG/1
250 TABLET ORAL EVERY 12 HOURS SCHEDULED
Status: DISCONTINUED | OUTPATIENT
Start: 2023-01-07 | End: 2023-01-07

## 2023-01-07 RX ORDER — CEFUROXIME AXETIL 250 MG/1
250 TABLET ORAL EVERY 12 HOURS SCHEDULED
Status: DISCONTINUED | OUTPATIENT
Start: 2023-01-07 | End: 2023-01-08

## 2023-01-07 RX ADMIN — ENOXAPARIN SODIUM 105 MG: 120 INJECTION SUBCUTANEOUS at 01:11

## 2023-01-07 RX ADMIN — ACETAMINOPHEN 650 MG: 325 TABLET ORAL at 08:03

## 2023-01-07 RX ADMIN — OSELTAMIVIR PHOSPHATE 75 MG: 75 CAPSULE ORAL at 20:15

## 2023-01-07 RX ADMIN — OSELTAMIVIR PHOSPHATE 75 MG: 75 CAPSULE ORAL at 08:04

## 2023-01-07 RX ADMIN — CEFUROXIME AXETIL 250 MG: 250 TABLET ORAL at 08:03

## 2023-01-07 RX ADMIN — DILTIAZEM HYDROCHLORIDE 240 MG: 120 CAPSULE, COATED, EXTENDED RELEASE ORAL at 08:17

## 2023-01-07 RX ADMIN — CEFUROXIME AXETIL 250 MG: 250 TABLET ORAL at 02:45

## 2023-01-07 RX ADMIN — LOSARTAN POTASSIUM 25 MG: 25 TABLET, FILM COATED ORAL at 08:17

## 2023-01-07 RX ADMIN — CARVEDILOL 12.5 MG: 12.5 TABLET, FILM COATED ORAL at 08:17

## 2023-01-07 RX ADMIN — ACETAMINOPHEN 650 MG: 325 TABLET ORAL at 01:09

## 2023-01-07 RX ADMIN — ACETAMINOPHEN 650 MG: 325 TABLET ORAL at 20:15

## 2023-01-07 RX ADMIN — ACETAMINOPHEN 650 MG: 325 TABLET ORAL at 17:33

## 2023-01-07 RX ADMIN — ENOXAPARIN SODIUM 105 MG: 120 INJECTION SUBCUTANEOUS at 11:42

## 2023-01-07 RX ADMIN — WARFARIN SODIUM 2 MG: 2 TABLET ORAL at 17:33

## 2023-01-07 RX ADMIN — WARFARIN SODIUM 2 MG: 2 TABLET ORAL at 01:10

## 2023-01-07 RX ADMIN — CEFUROXIME AXETIL 250 MG: 250 TABLET ORAL at 20:15

## 2023-01-07 RX ADMIN — ACETAMINOPHEN 650 MG: 325 TABLET ORAL at 13:09

## 2023-01-07 RX ADMIN — FUROSEMIDE 60 MG: 10 INJECTION, SOLUTION INTRAMUSCULAR; INTRAVENOUS at 06:47

## 2023-01-07 RX ADMIN — ATORVASTATIN CALCIUM 80 MG: 40 TABLET, FILM COATED ORAL at 08:04

## 2023-01-07 RX ADMIN — LEVOTHYROXINE SODIUM 75 MCG: 0.03 TABLET ORAL at 08:03

## 2023-01-07 ASSESSMENT — ACTIVITIES OF DAILY LIVING (ADL)
WALKING_OR_CLIMBING_STAIRS: AMBULATION DIFFICULTY, ASSISTANCE 1 PERSON
WALKING_OR_CLIMBING_STAIRS_DIFFICULTY: YES
ADLS_ACUITY_SCORE: 29
EQUIPMENT_CURRENTLY_USED_AT_HOME: WALKER, STANDARD;CANE, STRAIGHT
ADLS_ACUITY_SCORE: 29
ADLS_ACUITY_SCORE: 31
ADLS_ACUITY_SCORE: 39
FALL_HISTORY_WITHIN_LAST_SIX_MONTHS: NO
ADLS_ACUITY_SCORE: 31
WEAR_GLASSES_OR_BLIND: YES
DOING_ERRANDS_INDEPENDENTLY_DIFFICULTY: YES
VISION_MANAGEMENT: YES
ADLS_ACUITY_SCORE: 31
TRANSFERRING: 2-->COMPLETELY DEPENDENT
ADLS_ACUITY_SCORE: 29
ADLS_ACUITY_SCORE: 31
ADLS_ACUITY_SCORE: 31
TRANSFERRING: 0-->ASSISTANCE NEEDED (DEVELOPMENTALLY APPROPRIATE)
DIFFICULTY_EATING/SWALLOWING: NO
CONCENTRATING,_REMEMBERING_OR_MAKING_DECISIONS_DIFFICULTY: YES
ADLS_ACUITY_SCORE: 29
DRESSING/BATHING_DIFFICULTY: NO
TOILETING_ISSUES: NO

## 2023-01-07 NOTE — PROVIDER NOTIFICATION
BP 84/51, Patient reports mild dizziness.  Hospitalist, Dr. Canales updated - will hold scheduled Spironolactone dose.    María Harris RN

## 2023-01-07 NOTE — CONSULTS
Thank you, Dr. Perea ref. provider found, for asking the [facility] Care team to see Caty Ng to evaluate heart failure.      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  Resume previous oral dose of furosemide and start spironolactone       History of Present Illness/Subjective    Ms. Caty Ng is a 77 year old female who was admitted because of persistent cough and low oxygen saturation  The patient reports that she has had dry nonproductive cough for about 7 days.  She denies fever chills or sputum production.  She has been checking oxygen saturation at home.  She got low reading yesterday.  She did not feel much different but her daughter urged her to come to the emergency room.  She denies recent weight gain, PND, orthopnea.  She has not had chest pains or heart palpitations.  In emergency room she was found to have positive influenza A test.  Chest x-ray did not show pulmonary vascular congestion.  BNP was minimally elevated  She has a history of nonischemic dilated cardiomyopathy with moderately depressed LV systolic function.  LVEF has been in mid 30s.  She had minimal coronary artery disease on coronary angiogram  She has had permanent atrial fibrillation for a number of years.  She is on warfarin    ECG: Personally reviewed.  Atrial fibrillation no acute changes    Cardiac MRI: October 2022  1.  Normal left ventricular size with  [facility] Hospitalist Progress Note     Name:  Jenn Enciso  Age:79 y.o. Sex:male   :  1942    MRN:  [id number]     Admit Date:  2021    Reason for Admission:  Hemorrhagic cerebrovascular accident (CVA) [facility]) [I61.9]    Assessment & Plan       Hemorrhagic CVA s/p EVD 21: Followed by neurosurgery and neurology  Possible d/c EVD -  NPO  NGT feeds  SLP following  GI consulted for PEG placement for tomorrow   Goal sodium 140 to 150, currently on NS IVF  PT,OT      Sepsis due to Pneumonia:   zosyn EOT 21  On room air        Elevated troponin:  Followed by cardiology, due to demand ischemia       DEIN on CKD:  followup BMP,stable  On NS IVF    CAD:  Continued lipitor    HTN:  Continued norvasc, Metoprolol    IV cardene as needed for goal < 140/90  Also Has prn IV antihypertensives       Diet:  DIET NPO  DIET TUBE FEEDING  DVT PPx: anticoagulation contra indicated due to hemorrhagic CVA  GI Ppx:  none  Code: Full Code    Dispo/Discharge Planning: pending, will need PEG        Hospital Course/Subjective:     Mr. Sandi Carranza is a 69 yo male PMH of HTN, CAD, HLP, COPD, admitted 21 with acute right basal ganglia hemorrhagic CVA. Neurosurgery did not recommend acute interventions on admit. On 21 he was more drowsy and repeat CT head showed increased IVH and concern for early hydrocephalus. He is s/p EVD 21. Repeat CT head  stable. CODE S called on 21 due to decreased responsiveness. CT head no overall changes. EEG shows abnormal findings consistent with known NS hemorrhage. Neurology following. Goal sodium is 140 to 150. He was covered with antibiotics for pneumonia, possible aspiration. Cardiology followed for elevated troponin, feeling this was due to demand ischemia. He is receiving NGT feeds and  will require PEG. SLP following. GI consulted for PEG placement. He has resumed antihypertensives via NGT and also required IV cardene. Discharge plans pending. "severe reduction in systolic function. The quantified left  ventricular ejection fraction is 32%.  Normal myocardial wall thickness.  No focal fibrosis or scarring  noted on LGE.  Findings most consistent with non-ischemic cardiomyopathy.  Pre-contrast T1 elevated at 1159  ms.   2.  Normal right ventricular size with moderate reduction in systolic function.  RVEF: 40%.   3.  Severe bilateral atrial enlargement.   4.  There is mild mitral regurgitation.  Chest X-Ray: Negative     Physical Examination Review of Systems   /60 (BP Location: Left arm, Patient Position: Sitting, Cuff Size: Adult Large)   Pulse 109   Temp 97.5  F (36.4  C) (Axillary)   Resp 20   Ht 1.626 m (5' 4\")   Wt 108.8 kg (239 lb 14.4 oz)   SpO2 92%   BMI 41.18 kg/m    Body mass index is 41.18 kg/m .  Wt Readings from Last 3 Encounters:   01/07/23 108.8 kg (239 lb 14.4 oz)   11/18/22 110.2 kg (243 lb)   09/26/22 105.9 kg (233 lb 8 oz)       Intake/Output Summary (Last 24 hours) at 1/7/2023 0910  Last data filed at 1/7/2023 0800  Gross per 24 hour   Intake 600 ml   Output 525 ml   Net 75 ml     General Appearance:   Alert, cooperative, no distress, appears stated age   Head/ENT: Normocephalic, without obvious abnormality. Membranes moist.      EYES:  No scleral icterus   Neck: Supple, symmetrical, trachea midline, no adenopathy, thyroid: not enlarged, symmetric, no carotid bruit or JVD   Chest/Lungs:   lungs are clear to auscultation, respirations unlabored. No tenderness or deformity   Cardiovascular:   Irregular rhythm, S1, S2 normal, no murmur, rub or gallop.   Abdomen:  Soft, non-tender, bowel sounds active all four quadrants,  no masses, no organomegaly   Extremities: no cyanosis or clubbing. No edema   Skin: Skin color, texture, turgor normal, no rashes or lesions.    Neurologic: Alert and oriented x 3, moving all four extremities.    Psychiatric: Normal affect.      10 system review of systems completed see history of present " Subjective/24 hr Events (04/19/21):    Sleeping, RN states had stool but seems constipated , was more alert earlier today     Objective:     Patient Vitals for the past 24 hrs:   Temp Pulse Resp BP SpO2   04/19/21 1200 98.2 °F (36.8 °C) 63 (!) 31 (!) 91/53 98 %   04/19/21 1130  61 25 129/65 99 %   04/19/21 1100  64 27 125/79 100 %   04/19/21 1030  67 27 123/67 98 %   04/19/21 1000  61 22 118/67 100 %   04/19/21 0930  93 25 (!) 116/58 100 %   04/19/21 0900  64 17 117/62 99 %   04/19/21 0844  78  132/68    04/19/21 0830  77 22 132/67 99 %   04/19/21 0800 97.8 °F (36.6 °C) 78 (!) 46 (!) 145/70 100 %   04/19/21 0730  73 17 (!) 150/74 100 %   04/19/21 0700  79 (!) 40 (!) 153/80 100 %   04/19/21 0600  70 17 (!) 147/72 98 %   04/19/21 0500  77 25 126/65 98 %   04/19/21 0400  79 22 123/78 99 %   04/19/21 0327    135/65    04/19/21 0301 98 °F (36.7 °C) 71 16 (!) 145/70 99 %   04/19/21 0201  72 21 134/69 99 %   04/19/21 0101  75 (!) 33 129/65 100 %   04/19/21 0000  68 22 128/64 100 %   04/18/21 2300 98.3 °F (36.8 °C) 68 (!) 31 138/73 100 %   04/18/21 2200 98 °F (36.7 °C) 61 17 139/68 100 %   04/18/21 2101  65 28 125/68 99 %   04/18/21 2001  81 28 136/71 98 %   04/18/21 1900 98.1 °F (36.7 °C) 81 20 139/86 99 %   04/18/21 1801  79 23 (!) 145/79 99 %   04/18/21 1730  79 20 135/75 99 %   04/18/21 1700  79 30 126/65 99 %   04/18/21 1630  79 (!) 32 131/71 100 %   04/18/21 1600  76 (!) 34 137/78 98 %   04/18/21 1530  75 16 126/75 98 %   04/18/21 1500 98.7 °F (37.1 °C) 73 21 (!) 149/77 99 %   04/18/21 1430  72 23 (!) 140/81 99 %     Oxygen Therapy  O2 Sat (%): 98 % (04/19/21 1200)  Pulse via Oximetry: 65 beats per minute (04/19/21 1200)  O2 Device: None (Room air) (04/19/21 1200)  Skin Assessment: Clean, dry, & intact (04/19/21 1200)  Skin Protection for O2 Device: N/A (04/19/21 1200)  O2 Flow Rate (L/min): 2 l/min (04/13/21 0700)    Body mass index is 19.46 kg/m².     Physical Exam:   General: No acute distress, sleeping soundly  Lungs: Clear to auscultation bilaterally anterior   Cardiovascular: Regular rate and rhythm with normal S1 and S2, no edema    Abdomen: Soft, nontender, nondistended, normoactive bowel sounds   Extremities: No cyanosis    Neuro: sleeping      Data Review:  I have reviewed all labs, meds, and studies from the last 24 hours:    Labs:    Recent Results (from the past 24 hour(s))   GLUCOSE, POC    Collection Time: 04/18/21  5:21 PM   Result Value Ref Range    Glucose (POC) 108 (H) 65 - 100 mg/dL    Performed by Ashutosh    GLUCOSE, POC    Collection Time: 04/18/21 11:49 PM   Result Value Ref Range    Glucose (POC) 112 (H) 65 - 100 mg/dL    Performed by Harry    METABOLIC PANEL, BASIC    Collection Time: 04/19/21  4:40 AM   Result Value Ref Range    Sodium 142 138 - 145 mmol/L    Potassium 3.9 3.5 - 5.1 mmol/L    Chloride 112 (H) 98 - 107 mmol/L    CO2 25 21 - 32 mmol/L    Anion gap 5 (L) 7 - 16 mmol/L    Glucose 108 (H) 65 - 100 mg/dL    BUN 31 (H) 8 - 23 MG/DL    Creatinine 0.77 (L) 0.8 - 1.5 MG/DL    GFR est AA >60 >60 ml/min/1.73m2    GFR est non-AA >60 >60 ml/min/1.73m2    Calcium 8.8 8.3 - 10.4 MG/DL   PHOSPHORUS    Collection Time: 04/19/21  4:40 AM   Result Value Ref Range    Phosphorus 3.6 2.3 - 3.7 MG/DL   MAGNESIUM    Collection Time: 04/19/21  4:40 AM   Result Value Ref Range    Magnesium 2.0 1.8 - 2.4 mg/dL   GLUCOSE, POC    Collection Time: 04/19/21  5:39 AM   Result Value Ref Range    Glucose (POC) 116 (H) 65 - 100 mg/dL    Performed by Harry    GLUCOSE, POC    Collection Time: 04/19/21 11:30 AM   Result Value Ref Range    Glucose (POC) 120 (H) 65 - 100 mg/dL    Performed by Judith        All Micro Results     Procedure Component Value Units Date/Time    CULTURE, BLOOD [[id number]] Collected: 04/09/21 1053    Order Status: Completed Specimen: Blood Updated: 04/14/21 0721     Special Requests: --        RIGHT  HAND       Culture result: NO illness and inpatient H&P (reviewed) for further details.          Lab Results    Chemistry/lipid CBC Cardiac Enzymes/BNP/TSH/INR   Recent Labs   Lab Test 06/06/22  1424 08/28/19  0928 09/09/15  0837   CHOL 104   < > 96   HDL 39*   < > 41*   LDL 39   < > 32   TRIG 128   < > 115   CHOLHDLRATIO  --   --  2.3    < > = values in this interval not displayed.     Recent Labs   Lab Test 06/06/22  1424 07/21/21  0706 05/17/21  1306   LDL 39 38 49     Recent Labs   Lab Test 01/07/23  0548      POTASSIUM 3.6   CHLORIDE 98   CO2 35*   GLC 95   BUN 12.1   CR 0.69   GFRESTIMATED 90   JANELLE 9.1     Recent Labs   Lab Test 01/07/23  0548 01/06/23  1424 06/06/22  1424   CR 0.69 0.63 0.82     Recent Labs   Lab Test 05/17/21  1306   A1C 5.7*          Recent Labs   Lab Test 01/06/23  1424   WBC 7.5   HGB 13.8   HCT 43.3   MCV 95        Recent Labs   Lab Test 01/06/23  1424 09/08/21  1050 07/21/21  0706   HGB 13.8 13.5 14.4    No results for input(s): TROPONINI in the last 58946 hours.  Recent Labs   Lab Test 01/06/23  1424 11/18/22  1412 12/03/20  1438 05/21/18  1058 05/04/18  1359   BNP  --   --  184*  --   --    NTBNPI 1,122*  --   --   --   --    NTBNP  --  717  --  1,012* 1,174*     Recent Labs   Lab Test 06/06/22  1424   TSH 1.02     Recent Labs   Lab Test 01/07/23  0548 01/06/23  1424 12/12/22  1440   INR 1.24* 1.26* 2.1*        Medical History  Surgical History Family History Social History   Past Medical History:   Diagnosis Date     Allergic rhinitis      Arrhythmia 01/01/2012     Atrial fibrillation, chronic (H) 06/21/2012    Followed by Dr. 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 Cobos, cardiology EF 45 - 50%     Hyperlipidemia 2012     Hypertriglyceridemia      Mediastinal mass     benign, [facility] consult      Mitral valve regurgitation 2012     Moderate mitral regurgitation      Morbid obesity with BMI of 40.0-44.9, adult (H) 2015     BROCK (obstructive sleep apnea)      Scoliosis      Sleep apnea 2010    per pt     Past Surgical History:   Procedure Laterality Date     CV CORONARY ANGIOGRAM N/A 2021    Procedure: Coronary Angiogram;  Surgeon: Hai Murphy MD;  Location: [facility] CATH LAB CV     CV LEFT HEART CATH N/A 2021    Procedure: Left Heart Cath;  Surgeon: Hai Murphy MD;  Location: [facility] CATH LAB CV     HYSTERECTOMY, PAP NO LONGER INDICATED       JOINT REPLACEMTN, KNEE RT/LT      right     JOINT REPLACEMTN, KNEE RT/LT Left      SALPINGO OOPHORECTOMY,R/L/BRANDO Bilateral      SURGICAL HISTORY OF -       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     No premature CAD, SCD,cardiomyopathy   Social History     Socioeconomic History     Marital status:      Spouse name: Bill      Number of children: 2     Years of education: Not on file     Highest education level: Not on file   Occupational History     Occupation: taxes      Employer: RETIRED   Tobacco Use     Smoking status: Former     Packs/day: 0.50     Years: 20.00     Pack years: 10.00     Types: Cigarettes     Quit date: 1982     Years since quittin.0     Smokeless tobacco: Never   Vaping Use     Vaping Use: Never used   Substance and Sexual Activity     Alcohol use: Yes     Comment: 1 beers per week     Drug use: Never     Sexual activity: Yes     Partners: Male     Birth control/protection: Post-menopausal   Other Topics Concern     Parent/sibling w/ CABG, MI or angioplasty before 65F 55M? Yes   Social History Narrative     Not on file  GROWTH 5 DAYS       CULTURE, BLOOD [[id number]] Collected: 04/09/21 1058    Order Status: Completed Specimen: Blood Updated: 04/14/21 0721     Special Requests: --        RIGHT  Antecubital       Culture result: NO GROWTH 5 DAYS       COVID-19 RAPID TEST [[id number]] Collected: 04/07/21 1221    Order Status: Completed Specimen: Nasopharyngeal Updated: 04/07/21 1301     Specimen source Nasopharyngeal        COVID-19 rapid test Not detected        Comment:      The specimen is NEGATIVE for SARS-CoV-2, the novel coronavirus associated with COVID-19. A negative result does not rule out COVID-19. This test has been authorized by the FDA under an Emergency Use Authorization (EUA) for use by authorized laboratories.         Fact sheet for Healthcare Providers: ConventionUpdate.co.nz  Fact sheet for Patients: ConventionUpdate.co.nz       Methodology: Isothermal Nucleic Acid Amplification               EKG Results     Procedure 720 Value Units Date/Time    EKG, 12 LEAD, SUBSEQUENT [[id number]] Collected: 04/03/21 0821    Order Status: Completed Updated: 04/03/21 1227     Ventricular Rate 74 BPM      Atrial Rate 74 BPM      P-R Interval 160 ms      QRS Duration 94 ms      Q-T Interval 388 ms      QTC Calculation (Bezet) 430 ms      Calculated P Axis 71 degrees      Calculated R Axis -61 degrees      Calculated T Axis 88 degrees      Diagnosis --     Sinus rhythm with occasional Premature ventricular complexes  Left anterior fascicular block  ST & T wave abnormality, consider lateral ischemia  Abnormal ECG  When compared with ECG of 02-APR-2021 18:26,  Premature ventricular complexes are now Present  Left anterior fascicular block is now Present  Confirmed by Reagna Colby ([id number]) on 4/3/2021 12:27:42 PM      Initial ECG [[id number]] Collected: 04/02/21 1826    Order Status: Completed Updated: 04/03/21 0732     Ventricular Rate 77 BPM      Atrial Rate 78 BPM      P-R Interval 156 ms     Social Determinants of Health     Financial Resource Strain: Not on file   Food Insecurity: Not on file   Transportation Needs: Not on file   Physical Activity: Not on file   Stress: Not on file   Social Connections: Not on file   Intimate Partner Violence: Not on file   Housing Stability: Not on file         Medications  Allergies   Current Facility-Administered Medications Ordered in Epic   Medication Dose Route Frequency Provider Last Rate Last Admin     acetaminophen (TYLENOL) tablet 650 mg  650 mg Oral 4x Daily Omer Gama MD   650 mg at 01/07/23 0803     atorvastatin (LIPITOR) tablet 80 mg  80 mg Oral Daily Omer Gama MD   80 mg at 01/07/23 0804     carvedilol (COREG) tablet 12.5 mg  12.5 mg Oral BID w/meals Omer Gama MD   12.5 mg at 01/07/23 0817     cefuroxime (CEFTIN) tablet 250 mg  250 mg Oral Q12H [facility] (08/20) Omer Gama MD   250 mg at 01/07/23 0803     diltiazem ER COATED BEADS (CARDIZEM CD/CARTIA XT) 24 hr capsule 240 mg  240 mg Oral Daily Omer Gama MD   240 mg at 01/07/23 0817     enoxaparin ANTICOAGULANT (LOVENOX) injection 105 mg  1 mg/kg Subcutaneous Q12H Omer Gama MD   105 mg at 01/07/23 0111     furosemide (LASIX) injection 60 mg  60 mg Intravenous Q8H Omer Gama MD   60 mg at 01/07/23 0647     levothyroxine (SYNTHROID/LEVOTHROID) tablet 75 mcg  75 mcg Oral Daily Omer Gama MD   75 mcg at 01/07/23 0803     losartan (COZAAR) tablet 25 mg  25 mg Oral Daily Omer Gama MD   25 mg at 01/07/23 0817     oseltamivir (TAMIFLU) capsule 75 mg  75 mg Oral BID Omer Gama MD   75 mg at 01/07/23 0804     warfarin ANTICOAGULANT (COUMADIN) tablet 2 mg  2 mg Oral ONCE at 18:00 Petra Canales MD         Warfarin Dose Required Daily - Pharmacist Managed  1 each Oral See Admin Instructions Omer Gama MD         No current [facility]-ordered outpatient medications on file.       Allergies   Allergen Reactions     Lisinopril      cough     Metal [Staples] Other (See Comments)  QRS Duration 96 ms      Q-T Interval 356 ms      QTC Calculation (Bezet) 402 ms      Calculated P Axis 76 degrees      Calculated R Axis 69 degrees      Calculated T Axis 79 degrees      Diagnosis --     Normal sinus rhythm  Normal ECG  When compared with ECG of 28-AUG-2017 21:42,  Questionable change in QRS axis  Nonspecific T wave abnormality, improved in Lateral leads  Confirmed by Souleymane De La Cruz ([id number]) on 4/3/2021 7:32:01 AM      EKG 12 LEAD INITIAL [[id number]]     Order Status: Canceled           Other Studies:  Ct Head Wo Cont    Result Date: 4/3/2021  NONCONTRAST HEAD CT CLINICAL HISTORY:  Follow-up intracranial hemorrhage. TECHNIQUE:  Axial images were obtained with spiral technique. Radiation dose reduction was achieved using one or all of the following techniques: automated exposure control, weight-based dosing, iterative reconstruction. COMPARISON:  CT yesterday and 0617 hours today. REPORT:   Standard noncontrast head CT demonstrates continued increase in size of the right thalamic hemorrhage with maximum diameter now measured at approximately 3.6 cm compared with 2.9 cm earlier today. Moreover, there is new bilateral lateral intraventricular extension of hemorrhage, primarily localized to the atria. There is associated slight increase in bilateral lateral ventriculomegaly with biatrial diameter now measuring approximately 4.2 cm compared with 3.8 cm this morning. Mass effect is little changed with midline shift from right to left of approximately 5 mm compared with 4 mm this morning. Extensive small vessel ischemic disease and small focal lateral right frontal infarct appear unchanged. INTERVAL INCREASE IN SIZE OF THE RIGHT THALAMIC HEMORRHAGE WITH NEW BILATERAL LATERAL INTRAVENTRICULAR EXTENSION AND SLIGHT WORSENING OF VENTRICULOMEGALY.      Ct Head Wo Cont    Result Date: 4/3/2021  EXAM: CT HEAD WO CONT HISTORY: .  TECHNIQUE: Axial images of the brain were performed without the     Metal allergy not clearly specified - patient had a stainless steel screw put in her foot that needed to be removed due to allergy/hypersensitivity      Metoprolol Fatigue     Trace Metals      Other reaction(s): Other (see comments)  Metal allergy not clearly specified - patient had a stainless steel screw put in her foot that needed to be removed due to allergy/hypersensitivity.  Also react to earrings.                                                administration of intravenous contrast. Images were obtained axial plane and coronal reformatted images were submitted. CT scan performed using appropriate/available dose optimization/reduction/ALARA techniques. COMPARISON: None. FINDINGS: The known right thalamic hemorrhage is again observed. There is surrounding edema. The body of this hemorrhage has not changed significantly. Currently it measures 2.4 x 2.9 cm when previously it measured 2.4 x 2.7 cm. There has been interval increase in an area of adjacent or extension of the hemorrhage posteriorly. Previously it measured 4.5 mm in width. Currently it measures 1.5 cm in width and 1.3 cm AP. There is persistent intraventricular hemorrhage in the posterior horn of the right lateral ventricle. Interval development of a small amount of hemorrhage in the left posterior horn. There is mild midline shift of the posterior septum pellucidum of approximately 4.1 mm. The lateral ventricles are mildly dilated, stable. Chronic ischemic white matter changes are again observed. Question small chronic infarction in the right frontal lobe. Interval increase in the size of the right thalamic hemorrhage. Interval development of mild right to left midline shift of the posterior aspect of the septum pellucidum. Stable hemorrhage in the posterior horn of the right lateral ventricle. Interval development of a small amount of hemorrhage in the posterior horn of the left lateral ventricle. Other findings as above. [facility]    Result Date: 4/3/2021  RIGHT UPPER QUADRANT ULTRASOUND. HISTORY: Transaminitis. COMPARISON: No relevant comparison studies available. FINDINGS: Ultrasonographic Ramon's sign: is reported as negative Gallstones: There are intraluminal echogenic foci, probably stones. Gallbladder Wall: not thickened. Common Bile Duct: is not dilated, 4 mm. Intrahepatic Biliary Tree: is not dilated.  Liver: Uniform parenchyma Included portion of the pancreas and right kidney: are unremarkable. Vasculature: Aorta: Normal caliber. IVC:  Patent. Portal vein: Hepatopedal flow. Probable gallstones. No biliary tree obstruction. No findings to suggest acute cholecystitis. Xr Chest Port    Result Date: 4/2/2021  CHEST X-RAY, one view. HISTORY:  Chest pain. TECHNIQUE:  AP upright portable view. COMPARISON: August 2017 FINDINGS: Lungs: Atelectasis or scar right base, similar to prior exam. Costophrenic angles: are sharp. Heart size: is normal. Pulmonary vasculature: is unremarkable. Aorta: Arch calcifications. Included portion of the upper abdomen: is unremarkable. Bones: No gross bony lesions. Other: None. Negative for acute change. Ct Code Neuro Head Wo Contrast    Result Date: 4/2/2021  CT HEAD WITHOUT CONTRAST HISTORY:  CVA. COMPARISON: None. TECHNIQUE: Axial imaging was performed without intravenous contrast utilizing 5mm slice thickness. Sagittal and coronal reformats were performed. Radiation dose reduction techniques were used for this study. Our CT scanner uses one or all of the following: Automated exposure control, adjustment of the MAS or KUB according to patient's size and iterative reconstruction. FINDINGS:    *BRAIN:    -  There are no early signs of territorial or lacunar infarction by CT.    -  2.7 x 2.4 cm acute hemorrhage into the right basal ganglia and thalamus. Right lateral ventricle intraventricular hemorrhage.    -  No gross white matter abnormality by CT. *VISUALIZED PARANASAL SINUSES: Well aerated. *MASTOIDS:  Clear. *CALVARIUM AND SCALP: Unremarkable. Acute right basal ganglia hematoma likely on the basis of hypertension. Right intraventricular hemorrhage.  Date of Dictation: 4/2/2021 6:17 PM          Current Meds:   Current Facility-Administered Medications   Medication Dose Route Frequency    bisacodyL (DULCOLAX) suppository 10 mg  10 mg Rectal DAILY PRN    insulin lispro (HUMALOG) injection   SubCUTAneous Q6H    metoprolol tartrate (LOPRESSOR) tablet 50 mg  50 mg Per NG tube Q12H    amLODIPine (NORVASC) tablet 10 mg  10 mg Per NG tube DAILY    lip protectant (BLISTEX) ointment 1 Each  1 Each Topical PRN    0.9% sodium chloride infusion  75 mL/hr IntraVENous CONTINUOUS    NUTRITIONAL SUPPORT ELECTROLYTE PRN ORDERS   Does Not Apply PRN    acetaminophen (TYLENOL) solution 650 mg  650 mg Per NG tube Q4H PRN    atorvastatin (LIPITOR) tablet 40 mg  40 mg Per NG tube QHS    metoprolol (LOPRESSOR) injection 5 mg  5 mg IntraVENous Q6H PRN    [Held by provider] morphine injection 1 mg  1 mg IntraVENous Q4H PRN    [Held by provider] oxyCODONE IR (ROXICODONE) tablet 5 mg  5 mg Oral Q6H PRN    enalaprilat (VASOTEC) injection 1.25 mg  1.25 mg IntraVENous Q6H PRN    lidocaine 4 % patch 1 Patch  1 Patch TransDERmal Q24H    niCARdipine (CARDENE) 25 mg in 0.9% sodium chloride (MBP/ADV) 250 mL infusion  0-15 mg/hr IntraVENous TITRATE    sodium chloride (NS) flush 5-40 mL  5-40 mL IntraVENous Q8H    sodium chloride (NS) flush 5-40 mL  5-40 mL IntraVENous PRN       Problem List:  Hospital Problems as of 4/19/2021 Date Reviewed: 4/4/2021          Codes Class Noted - Resolved POA    Severe protein-calorie malnutrition ([facility].) ICD-10-CM: E43  ICD-9-CM: 262  4/9/2021 - Present Yes        Dysphagia following cerebral infarction ICD-10-CM: I69.391  ICD-9-CM: 438.82  4/6/2021 - Present Yes        Elevated liver enzymes ICD-10-CM: R74.8  ICD-9-CM: 790.5  4/6/2021 - Present Yes        Elevated troponin ICD-10-CM: R77.8  ICD-9-CM: 790.6  4/4/2021 - Present Yes        * (Principal) Hemorrhagic cerebrovascular accident (CVA) ([facility] ICD-10-CM: I61.9  ICD-9-CM: 068  4/2/2021 - Present Yes        Acute left-sided weakness ICD-10-CM: R53.1  ICD-9-CM: 728.87  4/2/2021 - Present Yes        S/P PTCA (percutaneous transluminal coronary angioplasty) ICD-10-CM: Z98.61  ICD-9-CM: V45.82  Unknown - Present Yes        Chronic tuberculosis ICD-10-CM: A15.9  ICD-9-CM: 011.90  Unknown - Present Yes        Coronary atherosclerosis of native coronary vessel ICD-10-CM: I25.10  ICD-9-CM: 414.01  Unknown - Present Yes        Hyperlipidemia ICD-10-CM: E78.5  ICD-9-CM: 272.4  Unknown - Present Yes        Iron deficiency anemia ICD-10-CM: D50.9  ICD-9-CM: 280.9  1/25/2015 - Present Yes        Microcytic anemia ICD-10-CM: D50.9  ICD-9-CM: 280.9  1/21/2015 - Present     Overview Signed 4/6/2016  9:52 AM by Antione Cortes     Iron deficiency                    Part of this note was written by using a voice dictation software and the note has been proof read but may still contain some grammatical/other typographical errors.     Signed By: Be Chong MD   [employer] [facility]    April 19, 2021  9:08 AM

## 2023-01-07 NOTE — PROGRESS NOTES
[facility]    Medicine Progress Note - Hospitalist Service    Date of Admission:  1/6/2023    Assessment & Plan            Caty Ng is a 77 year old female with history of obesity, afib, BROCK, HLD, hypothryoid, non-obstructive CAD, and Chronic systolic heart failure who presents with cough/dyspnea/sputum and hypoxia and is found to have positive Influenza A and acute on chronic systolic heart failure. Hospital Day: 2     Principal Problem:    Acute respiratory failure with hypoxia - multifactorial due to influenza likely exacerbating underlying heart failure.  No sign of PNA on CXR but with cough with green sputum for 9 days, is on Abx for bronchitis. Oxygen to maintain O2 sats > 90%, wean as tolerated    Acute on chronic HFrEF (heart failure with reduced ejection fraction) - with hypoxia, crackles on exam, BLE edema, and elevated NT Pro BNP - Transitioned from IV to PO diuresis today. Patient noting lackluster UOP. May need to intensify diuresis, but low BPs currently limiting this. continue daily weights, education, and close f/u with CHF clinic after discharge.    Influenza A Infection - Tamiflu    Bronchitis - ceftin x5-7 days     Active Problems:    Morbid obesity due to excess calories Body mass index is 41.2 kg/m .    Chronic atrial fibrillation - rate stable, continue coreg, diltiazem, and warfarin as at home.  Pharmacy to dose warfarin.  Cardiology recommend trying to wean down on diltiazem as tolerated.  Currently still on same home dose.    Moderate mitral regurgitation - continue losartan and coreg as at home.  Diurese as above.    BROCK (obstructive sleep apnea) - does not tolerate CPAP. Will be on oxygen for now.    Hyperlipidemia  - continue lipitor as at home    Hypothyroidism - continue levothyroxine as at home    Coronary artery disease involving native coronary artery of native heart without angina pectoris - non-obstructive, continue lipitor, coreg, and warfarin as at  "home     Subtherapeutic INR - bridging with Lovenox       DVT Prophylaxis: Moderate risk. Coumadin and bridging with Lovenox  Diet: 2 Gram Sodium Diet Other - please comment    Henderson Catheter: Not present  Lines: None     Cardiac Monitoring: ACTIVE order. Indication: Acute decompensated heart failure (48 hours)  Code Status: Full Code      Clinically Significant Risk Factors Present on Admission               # Drug Induced Coagulation Defect: home medication list includes an anticoagulant medication    # Hypertension: home medication list includes antihypertensive(s)   # Acute Respiratory Failure: Documented O2 saturation < 91%.  Continue supplemental oxygen as needed     # Severe Obesity: Estimated body mass index is 41.18 kg/m  as calculated from the following:    Height as of this encounter: 1.626 m (5' 4\").    Weight as of this encounter: 108.8 kg (239 lb 14.4 oz).           Disposition Plan   Disposition: Home    Expected Discharge Date: 01/08/2023    Discharge Delays: IV Medication - consider oral or Home Infusion         Medically ready to discharge today: No     The patient's care was discussed with the Patient.    Petra Canales MD  Hospitalist Service  [facility]  Securely message with [employer] (more info)  Text page via [facility] Paging/Directory   ______________________________________________________________________      Physical Exam   Vital Signs: Temp: (P) 98.3  F (36.8  C) Temp src: (P) Oral BP: 94/44 Pulse: 89   Resp: (P) 18 SpO2: (P) 92 % O2 Device: (P) None (Room air) Oxygen Delivery: (P) 2 LPM  Weight: 239 lbs 14.4 oz  General: in no apparent distress, non-toxic and alert female sitting in bedside chair oriented x3  HEENT: Head normocephalic atraumatic, oral mucosa moist. Sclerae anicteric  CV: Regular rhythm, normal rate, no murmurs  Resp: No wheezes, no rales or rhonchi, no focal consolidations  GI: Belly soft, nondistended, nontender, bowel sounds present  Skin: No rashes " or lesions  Extremities: 2+ pitting edema bilateral ankles  Psych: Normal affect, mood dysthymic  Neuro: CNII-XII grossly intact, moving all 4 extremities        Medical Decision Making             Data   Recent Results (from the past 12 hour(s))   INR    Collection Time: 01/07/23  5:48 AM   Result Value Ref Range    INR 1.24 (H) 0.85 - 1.15   Basic metabolic panel    Collection Time: 01/07/23  5:48 AM   Result Value Ref Range    Sodium 140 136 - 145 mmol/L    Potassium 3.6 3.4 - 5.3 mmol/L    Chloride 98 98 - 107 mmol/L    Carbon Dioxide (CO2) 35 (H) 22 - 29 mmol/L    Anion Gap 7 7 - 15 mmol/L    Urea Nitrogen 12.1 8.0 - 23.0 mg/dL    Creatinine 0.69 0.51 - 0.95 mg/dL    Calcium 9.1 8.8 - 10.2 mg/dL    Glucose 95 70 - 99 mg/dL    GFR Estimate 90 >60 mL/min/1.73m2   Magnesium    Collection Time: 01/07/23  5:48 AM   Result Value Ref Range    Magnesium 2.0 1.7 - 2.3 mg/dL   Extra Purple Top Tube    Collection Time: 01/07/23  5:48 AM   Result Value Ref Range    Hold Specimen JIC      Interval History   Patient currently feels a little bit mentally foggy, dizzy.  She is sitting up in the chair and nurse recording some low blood pressures.  Currently denies shortness of breath on 2 L nasal cannula.  Continues to have pitting ankle edema and does not feel she is having much urine output, nurses charted 550 cc this shift.  Spironolactone dose held due to low blood pressure.  May need medication adjustment to achieve diuresis.  Not ready for discharge.

## 2023-01-07 NOTE — PHARMACY-ADMISSION MEDICATION HISTORY
"7/18/2018       RE: Aleksandra Michel  [address] [address]     Dear Colleague,    Thank you for referring your patient, Aleksandra Michel, to the [facility] UROLOGY [facility] [place] [facility]. Please see a copy of my visit note below.    July 18, 2018    Return visit    Patient returns today for follow up. She denies any changes in her health since last visit.    Voiding diary shows she urinates between 1 and 14 oz at a time.  Her ins and outs seem to match with about 50oz fluid intake and 48 oz output with a few drops of urine leakage noted.    Ht 1.594 m (5' 2.75\")  Wt 59 kg (130 lb)  BMI 23.21 kg/m2  She is comfortable, in no distress, non-labored breathing.  Abdomen is soft, non-tender, non-distended.  Normal external female genitalia, there is a small urethral caruncle noted.  Negative CST.  Pelvic exam is remarkable for TVL 4    Cystoscopy Note: After informed consent was obtained patient was prepped and draped in the standard fashion.  The flexible cystoscope was inserted into a normal appearing urethral meatus.  The urothelium was carefully examined and there was fine irregularity to the bladder along patient's left lateral wall but there were no obvious tumors, masses, stones, foreign bodies, or other urothelial abnormalities noted.  Bilateral ureteral orifices were noted in the normal orthotopic position and both effluxed clear urine.  The cystoscope was retroflexed and the bladder neck was unremarkable.  The urethra was carefully examined upon removing the cystoscope and was unremarkable.  Patient tolerated the procedure without complications noted.      Urodynamics reviewed:  First desire 272 mL, Strong desire 352mL, assisted 475 mL.  She has normal compliance.  There was no USI, DO/DOI.  Uroflow shows void of 548.5mL with a Qmax 23.4 a Pdet max 1, with a post test PVR of 0mL    A/P: 72 year old F with stage I vaginal vault " Pharmacy Note - Admission Medication History    Pertinent Provider Information: Warfarin has been on hold since 12/31 for planned procedure today.     ______________________________________________________________________    Prior To Admission (PTA) med list completed and updated in EMR.       PTA Med List   Medication Sig Last Dose     acetaminophen (TYLENOL) 650 MG CR tablet Take 1,300 mg by mouth 2 times daily 1/6/2023 at am     amoxicillin (AMOXIL) 500 MG capsule TAKE 4 CAPSULES BY MOUTH 1 HOUR BEFORE DENTAL APPOINTMENT      atorvastatin (LIPITOR) 80 MG tablet TAKE 1 TABLET DAILY 1/5/2023 at pm     carvedilol (COREG) 25 MG tablet TAKE 1/2 TABLET TWICE A DAYWITH MEALS 1/6/2023 at am     diltiazem ER COATED BEADS (CARDIZEM CD/CARTIA XT) 240 MG 24 hr capsule TAKE 1 CAPSULE DAILY 1/6/2023 at am     furosemide (LASIX) 40 MG tablet Take 1 tablet (40 mg) by mouth daily 1/6/2023 at am     levothyroxine (SYNTHROID/LEVOTHROID) 75 MCG tablet TAKE 1 TABLET DAILY 1/5/2023 at pm     losartan (COZAAR) 25 MG tablet Take 1 tablet (25 mg) by mouth daily 1/5/2023     Melatonin 10 MG TABS tablet Take 1 tablet by mouth nightly as needed      warfarin ANTICOAGULANT (JANTOVEN ANTICOAGULANT) 2 MG tablet Take 1/2 tablet (1mg) to 1 tablet (2mg) by mouth daily, or as directed.  Adjust dose based on INR results. (Patient taking differently: Take 1/2 tablet (1mg) to 1 tablet (2mg) by mouth daily, or as directed.  Adjust dose based on INR results. (Take 1 mg on Fridays and 2 mg all other days of the week).) 12/31/2022       Information source(s): Patient  Method of interview communication: in-person    Summary of Changes to PTA Med List  New: none  Discontinued: none  Changed: warfarin, acetaminophen    Patient was asked about OTC/herbal products specifically.  PTA med list reflects this.    In the past week, patient estimated taking medication this percent of the time:  greater than 90%.    Allergies were reviewed, assessed, and updated  prolapse, small volume urinary incontinence without sensory awareness, myofascial tenderness of the pelvic floor, pelvic floor dysfunction    Urine cytology today, if negative can monitor the bladder lesion seen    Discussed with patient that there is no indication for me to do any pelvic surgery at this time and recommend that she consider a pessary to help with the urine leakage.  She is agreeable to this and will see me at the [facility] for a pessary fitting sometime next month.    They will think about whether or not they want to proceed with a bladder biopsy now or later.  She will contact the  for surgery scheduling if they decide to proceed    RTC in September    Pricila Liang MD MPH   of Urology    CC  Patient Care Team:  Stephany Hannon APRN CNP as PCP - General (Family Practice)  STEPHANY HANNON     with the patient.      Patient does not use any multi-dose medications prior to admission.    The information provided in this note is only as accurate as the sources available at the time of the update(s).    Thank you for the opportunity to participate in the care of this patient.    Nadine King [facility]  1/6/2023 6:15 PM

## 2023-01-07 NOTE — PLAN OF CARE
Problem: Gas Exchange Impaired  Goal: Optimal Gas Exchange  1/7/2023 1538 by María Harris, RN  Outcome: Progressing  Patient reports improvement in work of breathing.  Cough still present, patient reports sputum is now more yellow in color.  Patient remains on supplemental O2 at 2L.  Isolation precautions in place.    Problem: Heart Failure  Goal: Stable Heart Rate and Rhythm  1/7/2023 1538 by María Harris, RN  Outcome: Progressing  Heart rhythm = Atrial fibrillation with PVCs    Patient reports improvement in lower extremity edema.   Encouraged elevation as tolerated.  Cardiology following.    María Harris, RN

## 2023-01-07 NOTE — PROGRESS NOTES
01/07/23 1455   Appointment Info   Signing Clinician's Name / Credentials (OT) Mary Reyes, OTR/L   Living Environment   People in Home spouse   Current Living Arrangements   (condo)   Home Accessibility no concerns   Transportation Anticipated family or friend will provide   Self-Care   Current Activity Tolerance good   Equipment Currently Used at Home walker, rolling  (4WW and FWW)   Fall history within last six months no   Instrumental Activities of Daily Living (IADL)   IADL Comments indep. drsg, bathing, laundry,spouse drives and grocery shops   General Information   Onset of Illness/Injury or Date of Surgery 01/06/23   Patient/Family Therapy Goal Statement (OT) none stated   Additional Occupational Profile Info/Pertinent History of Current Problem Caty Ng is a 78 yo F with h/o obesity, afib, BROCK, HLD, hypothryoid, non-obstructive CAD, and Chronic systolic heart failure who presents with cough/dyspnea/sputum and hypoxia and is found to have positive Influenza A and acute on chronic systolic heart failure   Existing Precautions/Restrictions oxygen therapy device and L/min   Cognitive Status Examination   Affect/Mental Status (Cognitive) WFL   Follows Commands WFL   Visual Perception   Visual Impairment/Limitations corrective lenses for reading   Range of Motion Comprehensive   General Range of Motion no range of motion deficits identified   Strength Comprehensive (MMT)   General Manual Muscle Testing (MMT) Assessment no strength deficits identified   Coordination   Upper Extremity Coordination No deficits were identified   Transfers   Transfers toilet transfer;bed-chair transfer   Transfer Skill: Bed to Chair/Chair to Bed   Bed-Chair [place] (Transfers) modified independence   Toilet Transfer   Type (Toilet Transfer) sit-stand;stand-sit   [place] Level (Toilet Transfer) modified independence   Balance   Balance Assessment standing balance: dynamic   Balance Comments WFL   Activities of Daily  Living   BADL Assessment/Intervention grooming;toileting   Grooming Assessment/Training   [place] Level (Grooming) modified independence   Toileting   [place] Level (Toileting) modified independence   Clinical Impression   Criteria for Skilled Therapeutic Interventions Met (OT) Yes, treatment indicated   OT Diagnosis decreased ADLs   OT Problem List-Impairments impacting ADL activity tolerance impaired;mobility   Assessment of Occupational Performance 1-3 Performance Deficits   Identified Performance Deficits activity tolerance for ADLs   Planned Therapy Interventions (OT) ADL retraining;home program guidelines;progressive activity/exercise   Clinical Decision Making Complexity (OT) low complexity   Anticipated Equipment Needs Upon Discharge (OT)   (cont. to assess)   Risk & Benefits of therapy have been explained evaluation/treatment results reviewed;care plan/treatment goals reviewed;patient   OT Total Evaluation Time   OT Eval, Low Complexity Minutes (33287) 8   OT Goals   Therapy Frequency (OT) Daily   OT Predicted Duration/Target Date for Goal Attainment 01/12/23   OT Goals Cardiac Phase 1   OT: Understanding of cardiac education to maximize quality of life, condition management, and health outcomes Patient   OT: Perform aerobic activity with stable cardiovascular response intermittent  (8 minutes UE exercise)   OT: Functional/aerobic ambulation tolerance with stable cardiovascular response in order to return to home and community environment Modified independent;Rolling walker;100 feet   Interventions   Interventions Quick Adds Self-Care/Home Management;Therapeutic Activity   Self-Care/Home Management   Self-Care/Home Mgmt/ADL, Compensatory, Meal Prep Minutes (04157) 6   Symptoms Noted During/After Treatment (Meal Preparation/Planning Training) fatigue   Treatment Detail/Skilled Intervention amb. to BR mod I w/ FWW, completed toileting and g/h mod I w/ FWW, no LOB, slower moving, no dizziness    Therapeutic Activities   Therapeutic Activity Minutes (66408) 15   Symptoms noted during/after treatment fatigue   Treatment Detail/Skilled Intervention bp 96/46 o2 sats 91% on 2L nc, HR 86 after amb. in room w FWW , amb.approx 25 ft, no dizziness,reviewed CHF education booklet-signs and sx of worsening heart failure, low sodium diet and daily weights,   OT Discharge Planning   OT Plan CHF ed, cardiac walk in rm (+ influenza),  UE ex,   Total Session Time   Timed Code Treatment Minutes 21   Total Session Time (sum of timed and untimed services) 29

## 2023-01-07 NOTE — PHARMACY-ANTICOAGULATION SERVICE
Clinical Pharmacy - Warfarin Dosing Consult     Pharmacy has been consulted to manage this patient s warfarin therapy.  Indication: Atrial Fibrillation  Therapy Goal: INR 2-3  Provider/Team: Hospitalist  Warfarin Prior to Admission: Yes (Had been holding x6 days for expected surgery (cancelled))  Warfarin PTA Regimen: 1 mg on Friday, 2 mg all other days  Significant drug interactions: All increase bleeding risk: New: oseltamivir; Previous/Home meds: acetaminophen, levothyroxine  Recent documented change in oral intake/nutrition: Unknown    INR   Date Value Ref Range Status   01/06/2023 1.26 (H) 0.85 - 1.15 Final   12/12/2022 2.1 (H) 0.9 - 1.1 Final     Pt was holding warfarin for 6 days, prior to an anticipated surgery (cancelled).    Patient will also be bridged with enoxaparin.    Recommend warfarin 2 mg now.  Pharmacy will monitor Caty Ng daily and order warfarin doses to achieve specified goal.      Please contact pharmacy as soon as possible if the warfarin needs to be held for a procedure or if the warfarin goals change.

## 2023-01-07 NOTE — CONSULTS
NUTRITION EDUCATION      REASON FOR ASSESSMENT:  To educate on 2 gram Na    NUTRITION HISTORY:  Information obtained from pt    Pt states she used to follow a 2,000 mg Na diet but hasn't been as strict as she used to be.  She has had diet education in the past but allowed RD to review guidelines.  She has not been reading food labels-encouraged her to do so.    CURRENT DIET:  2 gram Na, 1800 ml FR    NUTRITION DIAGNOSIS:  Food- and nutrition-related knowledge deficit R/t Chronic systolic heart failure as evidenced by need for therapeutic diet    INTERVENTIONS:    Nutrition Prescription:  2 gram Na    Implementation:      *  Nutrition Education (Content):   A)  Provided handout low sodium nutrition therapy, sodium free seasoning sheet, low sodium shopping guidelines   B)  Discussed label reading, foods high and low in sodium, appropriate sodium free seasonings      *  Nutrition Education (Application):   A)  Discussed current eating habits and recommended alternative food choices      *  Anticipate good compliance      *  Diet Education - refer to Education Flowsheet    Goals:      *  Patient will verbalize understanding of diet met      *  All of the above goals met during the education session    Follow Up/Monitoring:      *  Provided RD contact information for future questions      *  Recommended Out-Patient Nutrition Referral, if further diet instructions are needed

## 2023-01-07 NOTE — H&P
[facility]    History and Physical - Hospitalist Service       Date of Admission:  1/6/2023    Assessment & Plan      Caty Ng is a 76 yo F with h/o obesity, afib, BROCK, HLD, hypothryoid, non-obstructive CAD, and Chronic systolic heart failure who presents with cough/dyspnea/sputum and hypoxia and is found to have positive Influenza A and acute on chronic systolic heart failure.  Will treat with Tamiflu presumptively given hospitalization.  Start CHF protocol with IV diuretic. Her regular cardiologist is Dr. Cameron Soria - will have their group see in AM.  Had an echo just in Sept 22 so will not order one now and defer to cardiology if there is any reason to repeat at this point.    Principal Problem:    Acute respiratory failure with hypoxia - multifactorial due to influenza likely exacerbating underlying heart failure.  No sign of PNA on CXR but does have cough with green sputum for 9 days so will add some Abx for bronchitis and diurese. Oxygen to maintain O2 sats > 90%    Acute on chronic HFrEF (heart failure with reduced ejection fraction) - with hypoxia, crackles on exam, BLE edema, and elevated NT Pro BNP - initiate CHF protocol with IV diuresis, daily weights, education, and close f/u with CHF clinic after discharge.    Type A influenza Infection - Tamiflu    Bronchitis - ceftin    Active Problems:    Morbid obesity due to excess calories Body mass index is 41.2 kg/m .    Chronic atrial fibrillation - rate stable, continue coreg, diltiazem, and warfarin as at home    Moderate mitral regurgitation - continue losartan and coreg as at home.  Diurese as above.    BROCK (obstructive sleep apnea) - does not tolerate CPAP. Will be on oxygen for now.    Hyperlipidemia  - continue lipitor as at home    Hypothyroidism - continue levothyroxine as at home    Coronary artery disease involving native coronary artery of native heart without angina pectoris - non-obstructive, continue lipitor,  "coreg, and warfarin as at home             Diet:   2 gram sodium diet  DVT Prophylaxis: Warfarin  Henderson Catheter: Not present  Lines: None     Cardiac Monitoring: None  Code Status:   Full    Clinically Significant Risk Factors Present on Admission               # Drug Induced Coagulation Defect: home medication list includes an anticoagulant medication    # Hypertension: home medication list includes antihypertensive(s)   # Acute Respiratory Failure: Documented O2 saturation < 91%.  Continue supplemental oxygen as needed     # Severe Obesity: Estimated body mass index is 41.2 kg/m  as calculated from the following:    Height as of this encounter: 1.626 m (5' 4\").    Weight as of this encounter: 108.9 kg (240 lb).           Disposition Plan      Expected Discharge Date: 01/08/2023                  Omer Gama MD  Hospitalist Service  [facility]  Securely message with [email or web address] (more info)  Text page via [employer] Paging/Directory     ______________________________________________________________________    Chief Complaint   Cough and dyspnea    History is obtained from the patient and electronic health record    History of Present Illness   Caty gN is a 78 yo F with h/o obesity, afib, BROCK, HLD, hypothryoid, non-obstructive CAD, and Chronic systolic heart failure who presents with cough/dyspnea/sputum and hypoxia.  The patient states her symptoms started about 9 days ago and over the last few days she started having yellow-green sputum and started getting more short of breath.  She denies any fevers.  She has some chronic lower extreme edema but this has worsened in the past week or so.  Today she checked her oxygen saturation and it was down as low 79% so she came to the emergency department.  She denies any chest pain or palpitations.  No abdominal pain or nausea vomiting or diarrhea no melena or hematochezia.      Past Medical History    Past Medical History:   Diagnosis Date     " Allergic rhinitis      Arrhythmia 01/01/2012     Atrial fibrillation, chronic (H) 06/21/2012    Followed by Dr. 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 Cobos, cardiology EF 45 - 50%     Hyperlipidemia 01/01/2012     Hypertriglyceridemia      Mediastinal mass 2015    benign, [facility] consult      Mitral valve regurgitation 06/24/2012     Moderate mitral regurgitation      Morbid obesity with BMI of 40.0-44.9, adult (H) 04/25/2015     BROCK (obstructive sleep apnea)      Scoliosis      Sleep apnea 01/01/2010    per pt       Past Surgical History   Past Surgical History:   Procedure Laterality Date     CV CORONARY ANGIOGRAM N/A 7/21/2021    Procedure: Coronary Angiogram;  Surgeon: Hai Murphy MD;  Location: [facility] CATH LAB CV     CV LEFT HEART CATH N/A 7/21/2021    Procedure: Left Heart Cath;  Surgeon: Hai Murphy MD;  Location: [facility] CATH LAB CV     HYSTERECTOMY, PAP NO LONGER INDICATED  1989     JOINT REPLACEMTN, KNEE RT/LT  2007    right     JOINT REPLACEMTN, KNEE RT/LT Left      SALPINGO OOPHORECTOMY,R/L/BRANDO Bilateral 2003     SURGICAL HISTORY OF -   1989    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       Prior to Admission Medications   Prior to Admission Medications   Prescriptions Last Dose Informant Patient Reported? Taking?   Melatonin 10 MG TABS tablet   Yes Yes   Sig: Take 1 tablet by mouth nightly as needed   acetaminophen (TYLENOL) 650 MG CR tablet 1/6/2023 at am  Yes Yes   Sig: Take 1,300 mg by mouth 2 times daily   amoxicillin (AMOXIL) 500 MG  capsule   Yes Yes   Sig: TAKE 4 CAPSULES BY MOUTH 1 HOUR BEFORE DENTAL APPOINTMENT   atorvastatin (LIPITOR) 80 MG tablet 2023 at pm  No Yes   Sig: TAKE 1 TABLET DAILY   carvedilol (COREG) 25 MG tablet 2023 at am  No Yes   Sig: TAKE 1/2 TABLET TWICE A DAYWITH MEALS   diltiazem ER COATED BEADS (CARDIZEM CD/CARTIA XT) 240 MG 24 hr capsule 2023 at am  No Yes   Sig: TAKE 1 CAPSULE DAILY   furosemide (LASIX) 40 MG tablet 2023 at am  No Yes   Sig: Take 1 tablet (40 mg) by mouth daily   levothyroxine (SYNTHROID/LEVOTHROID) 75 MCG tablet 2023 at pm  No Yes   Sig: TAKE 1 TABLET DAILY   losartan (COZAAR) 25 MG tablet 2023  No Yes   Sig: Take 1 tablet (25 mg) by mouth daily   warfarin ANTICOAGULANT (JANTOVEN ANTICOAGULANT) 2 MG tablet 2022  No Yes   Sig: Take 1/2 tablet (1mg) to 1 tablet (2mg) by mouth daily, or as directed.  Adjust dose based on INR results.   Patient taking differently: Take 1/2 tablet (1mg) to 1 tablet (2mg) by mouth daily, or as directed.  Adjust dose based on INR results. (Take 1 mg on  and 2 mg all other days of the week).      Facility-Administered Medications: None        Review of Systems    The 10 point Review of Systems is negative other than noted in the HPI.    Social History   I have reviewed this patient's social history and updated it with pertinent information if needed.  Social History     Tobacco Use     Smoking status: Former     Packs/day: 0.50     Years: 20.00     Pack years: 10.00     Types: Cigarettes     Quit date: 1982     Years since quittin.0     Smokeless tobacco: Never   Vaping Use     Vaping Use: Never used   Substance Use Topics     Alcohol use: Yes     Comment: 1 beers per week     Drug use: Never       Family History   I have reviewed this patient's family history and updated it with pertinent information if needed.  Family History   Problem Relation Age of Onset     Heart Disease Mother      Cancer Mother      Diabetes Father       Musculoskeletal Disorder Father      Diabetes Paternal Grandmother      Diabetes Paternal Grandfather      Diabetes Sister      Thrombophilia Daughter         Factor V Leiden Deficiency     Diabetes Sister         Physical Exam   Vital Signs: Temp: 98  F (36.7  C) Temp src: Oral BP: 114/67 Pulse: 84   Resp: 20 SpO2: 93 % O2 Device: Nasal cannula Oxygen Delivery: 2 LPM  Weight: 240 lbs 0 oz    General Appearance:    Older F in NAD   Head:    Normocephalic, without obvious abnormality, atraumatic   Eyes:    PERRL, conjunctiva/corneas clear, EOM's intact,both eyes    Ears:    Normal external ear canals no drainage or erythema bilat.   Nose:   Nares normal by gross inspection,  mucosa normal, no drainage or sinus tenderness   Throat:   Lips, mucosa, and tongue normal; teeth and gums normal   Neck:   Supple, symmetrical, trachea midline, no adenopathy;        thyroid:  No enlargement/tenderness/nodules   Back:     Symmetric, no curvature, ROM normal, no CVA tenderness   Lungs:     occas crackle at the bases bilaterally, no wheezes   Chest wall:    No tenderness or deformity   Heart:    Regular rate and rhythm, S1 and S2 normal, I/VI systolic murmur, no rubs, JVD proximately 6 cm, 1-2+ bilateral lower extremity edema   Abdomen:     Soft, non-tender, bowel sounds active all four quadrants,     no masses, no hepatosplenomegaly   Musculoskeletal:   Extremities are warm and non-tender, atraumatic, no joint swelling or tenderness   Pulses:   2+ and symmetric all extremities   Skin:   Skin color, texture, turgor normal, no rashes or lesions on exposed areas, please see nursing assessment for full skin assessment   Neurologic:      Psychiatric:  Alert, cranial nerves II through XII intact, oriented, motor strength 5 out of 5 upper and lower extremities symmetric, sensory gross intact light touch    Affect is calm and normal         Medical Decision Making       80 MINUTES SPENT BY ME on the date of service doing chart review,  history, exam, documentation & further activities per the note.  NOTE(S)/MEDICAL RECORDS REVIEWED over the past 24 hours: 5  Tests ORDERED & REVIEWED in the past 24 hours:  - See lab/imaging results included in the data section of the note  - BMP  - CBC  - BNP  - Coags/INR  - Magnesium  - Troponin  Medical complexity over the past 24 hours:  - Intensive monitoring for MEDICATION TOXICITY  of IV lasix - need to follow closely    Data     I have personally reviewed the following data over the past 24 hrs:    7.5  \   13.8   / 246     142 100 12.9 /  122 (H)   3.8 32 (H) 0.63 \       Trop: 9 BNP: 1,122 (H)       INR:  1.26 (H) PTT:  29   D-dimer:  N/A Fibrinogen:  N/A       Imaging results reviewed over the past 24 hrs:   Recent Results (from the past 24 hour(s))   Chest XR,  PA & LAT    Narrative    EXAM: XR CHEST 2 VIEWS  LOCATION: [facility]  DATE/TIME: 1/6/2023 3:32 PM    INDICATION: Shortness of breath, cough  COMPARISON: Cardiac MRI 10/31/2022      Impression    IMPRESSION:     Enlarged cardiac silhouette. Vascular pedicle width is normal.    Hyperinflated lungs with slight flattening of diaphragmatic curvature. No airspace opacities or interstitial thickening. Incidental note of an azygos fissure, normal anatomic variant.    No pleural effusion.    Diffuse thoracic spine degenerative osteophytes and mild disc space narrowing. Reciprocal curvature of the upper thoracic spine. No aggressive or destructive bone lesions.

## 2023-01-07 NOTE — PLAN OF CARE
Problem: Plan of Care - These are the overarching goals to be used throughout the patient stay.    Goal: Plan of Care Review  Description: The Plan of Care Review/Shift note should be completed every shift.  The Outcome Evaluation is a brief statement about your assessment that the patient is improving, declining, or no change.  This information will be displayed automatically on your shift note.  1/7/2023 0733 by Luana Campos RN  Outcome: Progressing     Problem: Gas Exchange Impaired  Goal: Optimal Gas Exchange  Outcome: Progressing   Goal Outcome Evaluation:       Pt a&o x 4. Denies any pain or discomfort. SOB improved with oxygen on at 1-2 LPM. Ambulating independently to the bathroom with walker. On telemetry monitoring, on anti-biotics, on IV lasix. VS stable. Pt on chair the whole night, refused to lay down due to shortness of breath. Heart failure care map education tool given per order.

## 2023-01-08 LAB
HOLD SPECIMEN: NORMAL
INR PPP: 1.28 (ref 0.85–1.15)
POTASSIUM SERPL-SCNC: 3.8 MMOL/L (ref 3.4–5.3)

## 2023-01-08 PROCEDURE — 85610 PROTHROMBIN TIME: CPT | Performed by: INTERNAL MEDICINE

## 2023-01-08 PROCEDURE — 87077 CULTURE AEROBIC IDENTIFY: CPT | Performed by: HOSPITALIST

## 2023-01-08 PROCEDURE — 250N000011 HC RX IP 250 OP 636: Performed by: INTERNAL MEDICINE

## 2023-01-08 PROCEDURE — 87070 CULTURE OTHR SPECIMN AEROBIC: CPT | Performed by: HOSPITALIST

## 2023-01-08 PROCEDURE — 99233 SBSQ HOSP IP/OBS HIGH 50: CPT | Mod: 25 | Performed by: INTERNAL MEDICINE

## 2023-01-08 PROCEDURE — 99232 SBSQ HOSP IP/OBS MODERATE 35: CPT | Performed by: HOSPITALIST

## 2023-01-08 PROCEDURE — 250N000013 HC RX MED GY IP 250 OP 250 PS 637: Performed by: HOSPITALIST

## 2023-01-08 PROCEDURE — 250N000013 HC RX MED GY IP 250 OP 250 PS 637: Performed by: INTERNAL MEDICINE

## 2023-01-08 PROCEDURE — 36415 COLL VENOUS BLD VENIPUNCTURE: CPT | Performed by: INTERNAL MEDICINE

## 2023-01-08 PROCEDURE — 84132 ASSAY OF SERUM POTASSIUM: CPT | Performed by: INTERNAL MEDICINE

## 2023-01-08 PROCEDURE — 87205 SMEAR GRAM STAIN: CPT | Performed by: HOSPITALIST

## 2023-01-08 PROCEDURE — 120N000001 HC R&B MED SURG/OB

## 2023-01-08 RX ORDER — WARFARIN SODIUM 2.5 MG/1
2.5 TABLET ORAL
Status: COMPLETED | OUTPATIENT
Start: 2023-01-08 | End: 2023-01-08

## 2023-01-08 RX ORDER — GUAIFENESIN 200 MG/10ML
15 LIQUID ORAL EVERY 6 HOURS PRN
Status: DISCONTINUED | OUTPATIENT
Start: 2023-01-08 | End: 2023-01-13 | Stop reason: HOSPADM

## 2023-01-08 RX ORDER — BENZONATATE 100 MG/1
100 CAPSULE ORAL 3 TIMES DAILY PRN
Status: DISCONTINUED | OUTPATIENT
Start: 2023-01-08 | End: 2023-01-13 | Stop reason: HOSPADM

## 2023-01-08 RX ORDER — CEFUROXIME AXETIL 500 MG/1
500 TABLET ORAL EVERY 12 HOURS SCHEDULED
Status: COMPLETED | OUTPATIENT
Start: 2023-01-08 | End: 2023-01-11

## 2023-01-08 RX ORDER — DIGOXIN 0.25 MG/ML
500 INJECTION INTRAMUSCULAR; INTRAVENOUS ONCE
Status: COMPLETED | OUTPATIENT
Start: 2023-01-08 | End: 2023-01-08

## 2023-01-08 RX ORDER — DIGOXIN 125 MCG
125 TABLET ORAL DAILY
Status: DISCONTINUED | OUTPATIENT
Start: 2023-01-09 | End: 2023-01-13 | Stop reason: HOSPADM

## 2023-01-08 RX ORDER — ENOXAPARIN SODIUM 100 MG/ML
40 INJECTION SUBCUTANEOUS EVERY 12 HOURS
Status: DISCONTINUED | OUTPATIENT
Start: 2023-01-09 | End: 2023-01-13 | Stop reason: HOSPADM

## 2023-01-08 RX ADMIN — ENOXAPARIN SODIUM 105 MG: 120 INJECTION SUBCUTANEOUS at 11:50

## 2023-01-08 RX ADMIN — CARVEDILOL 25 MG: 12.5 TABLET, FILM COATED ORAL at 16:41

## 2023-01-08 RX ADMIN — ENOXAPARIN SODIUM 105 MG: 120 INJECTION SUBCUTANEOUS at 00:23

## 2023-01-08 RX ADMIN — ACETAMINOPHEN 650 MG: 325 TABLET ORAL at 16:41

## 2023-01-08 RX ADMIN — WARFARIN SODIUM 2.5 MG: 2.5 TABLET ORAL at 17:39

## 2023-01-08 RX ADMIN — DILTIAZEM HYDROCHLORIDE 240 MG: 120 CAPSULE, COATED, EXTENDED RELEASE ORAL at 11:43

## 2023-01-08 RX ADMIN — OSELTAMIVIR PHOSPHATE 75 MG: 75 CAPSULE ORAL at 08:10

## 2023-01-08 RX ADMIN — DIGOXIN 500 MCG: 0.25 INJECTION INTRAMUSCULAR; INTRAVENOUS at 12:55

## 2023-01-08 RX ADMIN — ACETAMINOPHEN 650 MG: 325 TABLET ORAL at 20:18

## 2023-01-08 RX ADMIN — LEVOTHYROXINE SODIUM 75 MCG: 0.03 TABLET ORAL at 08:03

## 2023-01-08 RX ADMIN — CEFUROXIME AXETIL 250 MG: 250 TABLET ORAL at 08:10

## 2023-01-08 RX ADMIN — GUAIFENESIN 15 ML: 100 SOLUTION ORAL at 15:59

## 2023-01-08 RX ADMIN — ACETAMINOPHEN 650 MG: 325 TABLET ORAL at 08:10

## 2023-01-08 RX ADMIN — GUAIFENESIN 15 ML: 100 SOLUTION ORAL at 22:05

## 2023-01-08 RX ADMIN — ACETAMINOPHEN 650 MG: 325 TABLET ORAL at 12:53

## 2023-01-08 RX ADMIN — BENZONATATE 100 MG: 100 CAPSULE ORAL at 09:47

## 2023-01-08 RX ADMIN — FUROSEMIDE 20 MG: 20 TABLET ORAL at 17:47

## 2023-01-08 RX ADMIN — CEFUROXIME AXETIL 500 MG: 500 TABLET ORAL at 20:18

## 2023-01-08 RX ADMIN — ATORVASTATIN CALCIUM 80 MG: 40 TABLET, FILM COATED ORAL at 08:10

## 2023-01-08 RX ADMIN — CARVEDILOL 25 MG: 12.5 TABLET, FILM COATED ORAL at 11:43

## 2023-01-08 RX ADMIN — OSELTAMIVIR PHOSPHATE 75 MG: 75 CAPSULE ORAL at 20:18

## 2023-01-08 ASSESSMENT — ACTIVITIES OF DAILY LIVING (ADL)
ADLS_ACUITY_SCORE: 31
ADLS_ACUITY_SCORE: 29
ADLS_ACUITY_SCORE: 31

## 2023-01-08 NOTE — PLAN OF CARE
Problem: Plan of Care - These are the overarching goals to be used throughout the patient stay.    Goal: Plan of Care Review  Description: The Plan of Care Review/Shift note should be completed every shift.  The Outcome Evaluation is a brief statement about your assessment that the patient is improving, declining, or no change.  This information will be displayed automatically on your shift note.  Outcome: Progressing     Problem: Gas Exchange Impaired  Goal: Optimal Gas Exchange  Outcome: Progressing     Problem: Heart Failure  Goal: Effective Oxygenation and Ventilation  Outcome: Progressing   Goal Outcome Evaluation:       Pt denies any pain or discomfort. On oral antibiotics. Oxygen @ 2LPM. Ambulating room independently. On telemetry. VSS stable. Still sitting in chair, refuse to lay on bed.

## 2023-01-08 NOTE — PROGRESS NOTES
[facility]    Medicine Progress Note - Hospitalist Service    Date of Admission:  1/6/2023    Assessment & Plan            Caty Ng is a 77 year old female with history of obesity, afib, BROCK, HLD, hypothryoid, non-obstructive CAD, and Chronic systolic heart failure who presents with cough/dyspnea/sputum and hypoxia and is found to have positive Influenza A and acute on chronic systolic heart failure. Hospital Day: 3     Principal Problem:    Acute respiratory failure with hypoxia - multifactorial due to influenza likely exacerbating underlying heart failure.  No sign of PNA on CXR but with cough with green sputum for 9 days, is on Abx for bronchitis. Oxygen to maintain O2 sats > 90%, wean as tolerated    Acute on chronic HFrEF (heart failure with reduced ejection fraction) - with hypoxia, crackles on exam, BLE edema, and elevated NT Pro BNP - Tolerating oral diuresis. continue daily weights, education, and close f/u with CHF clinic after discharge.    Influenza A Infection - Tamiflu x5 days, stop date entered. Antitussives PRN    Bronchitis - ceftin x5 days. Stop date entered. Patient with ongoing productive cough, will order sputum culture.    RLQ abd pain - unclear cause. Possibly abd wall muscle strain from cough. No induration or tenderness. Monitor symptomatically.     Active Problems:    Morbid obesity due to excess calories Body mass index is 41.2 kg/m .    Chronic atrial fibrillation - Some RVR this morning after Coreg and diltiazem held for hypotension. Cardiology now adding digoxin and stopping diltiazem. Coreg increased to 25mg BID. Pharmacy to dose warfarin.     Moderate mitral regurgitation - med adjustments as above.  Diurese as above.    BROCK (obstructive sleep apnea) - does not tolerate CPAP. Will be on oxygen for now.    Hyperlipidemia  - continue lipitor as at home    Hypothyroidism - continue levothyroxine as at home    Coronary artery disease involving native  "coronary artery of native heart without angina pectoris - non-obstructive, continue lipitor, coreg, and warfarin as at home     Subtherapeutic INR - no need for full bridging with Lovenox as only indication is A fib, but given obesity ensure good DVT proph with Lovenox 40mg BID.       DVT Prophylaxis: Moderate risk. Lovenox. Coumadin with subtherapeutic INR  Diet: 2 Gram Sodium Diet Other - please comment    Henderson Catheter: Not present  Lines: None     Cardiac Monitoring: ACTIVE order. Indication: Tachyarrhythmias, acute (48 hours)  Code Status: Full Code      Clinically Significant Risk Factors                         # Severe Obesity: Estimated body mass index is 41.18 kg/m  as calculated from the following:    Height as of this encounter: 1.626 m (5' 4\").    Weight as of this encounter: 108.8 kg (239 lb 14.4 oz)., PRESENT ON ADMISSION         Disposition Plan   Disposition: Home     Expected Discharge Date: 01/09/2023    Discharge Delays: IV Medication - consider oral or Home Infusion         Medically ready to discharge today: No     The patient's care was discussed with the Patient.    Petra Canales MD  Hospitalist Service  [facility]  Securely message with [employer] (more info)  Text page via [employer] Paging/Directory   ______________________________________________________________________      Physical Exam   Vital Signs: Temp: 99.1  F (37.3  C) Temp src: Oral BP: 120/63 Pulse: 95   Resp: 20 SpO2: 92 % O2 Device: Nasal cannula Oxygen Delivery: 1 LPM  Weight: 239 lbs 14.4 oz  General: in no apparent distress, non-toxic and alert female sitting in bedside chair oriented x3  HEENT: Head normocephalic atraumatic, oral mucosa moist. Sclerae anicteric  Skin: No rashes or lesions  Extremities: 2+ pitting edema bilateral ankles  Psych: Normal affect, mood dysthymic  Neuro: CNII-XII grossly intact, moving all 4 extremities        Medical Decision Making             Data   Recent Results (from the " past 12 hour(s))   INR    Collection Time: 01/08/23  6:11 AM   Result Value Ref Range    INR 1.28 (H) 0.85 - 1.15   Potassium    Collection Time: 01/08/23  6:11 AM   Result Value Ref Range    Potassium 3.8 3.4 - 5.3 mmol/L   Extra Purple Top Tube    Collection Time: 01/08/23  6:11 AM   Result Value Ref Range    Hold Specimen JIC      Interval History   Patient feels her urine output is a little bit improved today.  Continues to require 2 L nasal cannula, complains of dyspnea on exertion.  Complains of productive cough, feels she would be able to give sputum specimen, culture ordered.  As needed meds for cough.  Not ready for discharge.  Patient complains mood is low, I encouraged her to visit with  today.  complains of pain RLQ abdomen, worse with cough, no abnormality on palpation, passing gas well. monitor symptomatically.

## 2023-01-08 NOTE — SIGNIFICANT EVENT
11:40: -125bpm. Dr. Canales notified. BP was rechecked (120/63) and since patient now meeting criteria for administration of Coreg and Diltiazem, these medications were given (previously held due to lower BP)    Shortly thereafter, patient was evaluated by Cardiologist. New order for IV Digoxin. Unclear if ordering provider aware of recent medications given.    12:40: HR 89-107bpm Paged on-call Cardiologist to clarify if IV Digoxin is still to be given. Awaiting response at this time.    Upon re-assessment HR 80-90bpm.     Remains on cardiac monitoring.    María Harris RN

## 2023-01-09 ENCOUNTER — APPOINTMENT (OUTPATIENT)
Dept: OCCUPATIONAL THERAPY | Facility: HOSPITAL | Age: 78
DRG: 291 | End: 2023-01-09
Payer: MEDICARE

## 2023-01-09 ENCOUNTER — TELEPHONE (OUTPATIENT)
Dept: CARDIOLOGY | Facility: CLINIC | Age: 78
End: 2023-01-09

## 2023-01-09 ENCOUNTER — PATIENT OUTREACH (OUTPATIENT)
Dept: CARE COORDINATION | Facility: CLINIC | Age: 78
End: 2023-01-09

## 2023-01-09 DIAGNOSIS — I50.9 ACUTE DECOMPENSATED HEART FAILURE (H): Primary | ICD-10-CM

## 2023-01-09 LAB
ANION GAP SERPL CALCULATED.3IONS-SCNC: 8 MMOL/L (ref 7–15)
BUN SERPL-MCNC: 15.3 MG/DL (ref 8–23)
CALCIUM SERPL-MCNC: 9.1 MG/DL (ref 8.8–10.2)
CHLORIDE SERPL-SCNC: 95 MMOL/L (ref 98–107)
CREAT SERPL-MCNC: 0.66 MG/DL (ref 0.51–0.95)
DEPRECATED HCO3 PLAS-SCNC: 34 MMOL/L (ref 22–29)
GFR SERPL CREATININE-BSD FRML MDRD: >90 ML/MIN/1.73M2
GLUCOSE SERPL-MCNC: 104 MG/DL (ref 70–99)
HOLD SPECIMEN: NORMAL
INR PPP: 1.28 (ref 0.85–1.15)
POTASSIUM SERPL-SCNC: 3.6 MMOL/L (ref 3.4–5.3)
SODIUM SERPL-SCNC: 137 MMOL/L (ref 136–145)

## 2023-01-09 PROCEDURE — 97530 THERAPEUTIC ACTIVITIES: CPT | Mod: GO

## 2023-01-09 PROCEDURE — 250N000013 HC RX MED GY IP 250 OP 250 PS 637: Performed by: HOSPITALIST

## 2023-01-09 PROCEDURE — 36415 COLL VENOUS BLD VENIPUNCTURE: CPT | Performed by: INTERNAL MEDICINE

## 2023-01-09 PROCEDURE — 80048 BASIC METABOLIC PNL TOTAL CA: CPT | Performed by: HOSPITALIST

## 2023-01-09 PROCEDURE — 85610 PROTHROMBIN TIME: CPT | Performed by: INTERNAL MEDICINE

## 2023-01-09 PROCEDURE — 250N000013 HC RX MED GY IP 250 OP 250 PS 637: Performed by: INTERNAL MEDICINE

## 2023-01-09 PROCEDURE — 250N000011 HC RX IP 250 OP 636: Performed by: HOSPITALIST

## 2023-01-09 PROCEDURE — 99232 SBSQ HOSP IP/OBS MODERATE 35: CPT | Performed by: INTERNAL MEDICINE

## 2023-01-09 PROCEDURE — 120N000001 HC R&B MED SURG/OB

## 2023-01-09 PROCEDURE — 99233 SBSQ HOSP IP/OBS HIGH 50: CPT | Performed by: INTERNAL MEDICINE

## 2023-01-09 PROCEDURE — 36415 COLL VENOUS BLD VENIPUNCTURE: CPT | Performed by: HOSPITALIST

## 2023-01-09 PROCEDURE — 97535 SELF CARE MNGMENT TRAINING: CPT | Mod: GO

## 2023-01-09 RX ORDER — WARFARIN SODIUM 2.5 MG/1
2.5 TABLET ORAL
Status: COMPLETED | OUTPATIENT
Start: 2023-01-09 | End: 2023-01-09

## 2023-01-09 RX ADMIN — LEVOTHYROXINE SODIUM 75 MCG: 0.03 TABLET ORAL at 08:45

## 2023-01-09 RX ADMIN — CEFUROXIME AXETIL 500 MG: 500 TABLET ORAL at 20:38

## 2023-01-09 RX ADMIN — ATORVASTATIN CALCIUM 80 MG: 40 TABLET, FILM COATED ORAL at 08:45

## 2023-01-09 RX ADMIN — FUROSEMIDE 20 MG: 20 TABLET ORAL at 17:10

## 2023-01-09 RX ADMIN — ACETAMINOPHEN 650 MG: 325 TABLET ORAL at 08:45

## 2023-01-09 RX ADMIN — CEFUROXIME AXETIL 500 MG: 500 TABLET ORAL at 08:51

## 2023-01-09 RX ADMIN — DIGOXIN 125 MCG: 125 TABLET ORAL at 08:52

## 2023-01-09 RX ADMIN — ACETAMINOPHEN 650 MG: 325 TABLET ORAL at 20:38

## 2023-01-09 RX ADMIN — FUROSEMIDE 20 MG: 20 TABLET ORAL at 08:51

## 2023-01-09 RX ADMIN — CARVEDILOL 25 MG: 12.5 TABLET, FILM COATED ORAL at 08:51

## 2023-01-09 RX ADMIN — ENOXAPARIN SODIUM 40 MG: 40 INJECTION SUBCUTANEOUS at 00:14

## 2023-01-09 RX ADMIN — SPIRONOLACTONE 25 MG: 25 TABLET, FILM COATED ORAL at 08:52

## 2023-01-09 RX ADMIN — OSELTAMIVIR PHOSPHATE 75 MG: 75 CAPSULE ORAL at 08:52

## 2023-01-09 RX ADMIN — ENOXAPARIN SODIUM 40 MG: 40 INJECTION SUBCUTANEOUS at 12:11

## 2023-01-09 RX ADMIN — ACETAMINOPHEN 650 MG: 325 TABLET ORAL at 12:11

## 2023-01-09 RX ADMIN — WARFARIN SODIUM 2.5 MG: 2.5 TABLET ORAL at 17:11

## 2023-01-09 RX ADMIN — CARVEDILOL 25 MG: 12.5 TABLET, FILM COATED ORAL at 17:10

## 2023-01-09 RX ADMIN — OSELTAMIVIR PHOSPHATE 75 MG: 75 CAPSULE ORAL at 20:40

## 2023-01-09 RX ADMIN — ACETAMINOPHEN 650 MG: 325 TABLET ORAL at 17:11

## 2023-01-09 ASSESSMENT — ACTIVITIES OF DAILY LIVING (ADL)
ADLS_ACUITY_SCORE: 29

## 2023-01-09 NOTE — PLAN OF CARE
Problem: Plan of Care - These are the overarching goals to be used throughout the patient stay.    Goal: Plan of Care Review  Description: The Plan of Care Review/Shift note should be completed every shift.  The Outcome Evaluation is a brief statement about your assessment that the patient is improving, declining, or no change.  This information will be displayed automatically on your shift note.  Outcome: Progressing     Problem: Gas Exchange Impaired  Goal: Optimal Gas Exchange  Outcome: Progressing     Problem: Heart Failure  Goal: Stable Heart Rate and Rhythm  Outcome: Progressing   Goal Outcome Evaluation:       Prn robitussin given for cough. Pt stated it has been helping her.      Denies any pain or discomfort. On Oxygen weaned at 1-2LPM. Ambulating to bathroom independently with walker.      Monitoring tele A. Fib with PVC. HR irregularly fluctuates 70-80, .

## 2023-01-09 NOTE — PROGRESS NOTES
Cardiology Progress Note  Primary cardiologist is Dr. Cameron Soria.  Assessment:  77-year-old female with history obesity, atrial fibrillation, obstructive sleep apnea, nonobstructive coronary artery disease with chronic LV dysfunction who follows with Dr. Cameron Soria.  Cardiac MRI completed a few months ago reported ejection fraction of 32%.  Patient admitted with acute respiratory insufficiency and hypoxia felt to be at least in part related to influenza with some exacerbation of her congestive heart failure.  Patient did endorse some greenish sputum for 9 days and was on antibiotics for bronchitis.    1.  Acute on chronic heart failure.  Input and output are approximately equal.  Net output of 235 cc if I's and O's are accurate.  Weight is 20 and 43 pounds compared to 239 pounds on January 7 but different scales.  N-terminal proBNP was 1120.  Renal function is stable.    2.  Permanent atrial fibrillation with higher heart rate likely in part related to the acute illness.  Diltiazem was discontinued with digoxin added to the medication regimen.  Blood pressure improved.  Rate control is improved.  She currently has been on warfarin although there has been some recent discussion about the potential change to one of the newer anticoagulant agents.  Her initial evaluation indicates that they were quite possibly but I did write the names of the medications down and asked her to be in discussion with her insurance.    Current cardiovascular medications include atorvastatin 80 mg daily.  Carvedilol 25 mg p.o. twice daily, Ceftin, digoxin she was given 1 dose of 0.5 mg and now receiving 0.125 mg daily.  Lovenox.  Current furosemide dose is 20 mg p.o. twice daily.  Synthroid.  Losartan 25 mg daily that was placed on hold.  Spironolactone 25 mg daily      Likely  Principal Problem:    Acute respiratory failure with hypoxia (H)  Active Problems:    Morbid obesity due to excess calories (H)    Chronic atrial  fibrillation (H)    Moderate mitral regurgitation    BROCK (obstructive sleep apnea)    Hyperlipidemia LDL goal <40    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 atrophy of thyroid    Coronary artery disease involving native coronary artery of native heart without angina pectoris    Acute on chronic HFrEF (heart failure with reduced ejection fraction) (H)    Elevated brain natriuretic peptide (BNP) level    Type A influenza Infection    Bronchitis      Plan:  1.  We will continue with current cardiovascular medication regimen with addition of digoxin and discontinuation of diltiazem.  2.  Blood pressure improved and creatinine improved would recommend resumption of losartan.  May benefit from eventual change to Entresto.  Can defer to Dr. Soria.  3.  INR is subtherapeutic at 1.28.  We will determine that pharmacy. is dosing her warfarin.  Appears that the Lovenox was changed to 40 mg every 12 hours this morning.    Subjective:   Caty Ng is seen in follow-up.  First visit for this examiner.  Notes reviewed.  Patient admitted with acute influenza a and has a history of chronic systolic heart failure with mild elevation in proBNP.  She has a history of mild nonobstructive coronary artery disease and permanent atrial fibrillation.  Carvedilol dose was increased this admission.  She reports that she has been feeling well.  She feels as if her breathing is improved.  She does indicate to me that Dr. Soria was to receive a note from a cardiologist at the [facility] who apparently saw her after her hip related surgery.  There was some medication changes recommendations.  Patient indicates that IT was discussed but started her limited follow-up her insurance it may be prohibitive.  She however did not have much time to investigate prior to being admitted to the hospital.  He does endorse feeling a little lightheaded.    Objective:   Vital signs in last 24 hours:  VITALS: /71 (BP Location: Left arm)   Pulse 82   Temp  "98.4  F (36.9  C) (Oral)   Resp 19   Ht 1.626 m (5' 4\")   Wt 110.6 kg (243 lb 13.3 oz)   SpO2 93%   BMI 41.85 kg/m    BMI: Body mass index is 41.85 kg/m .  Wt Readings from Last 3 Encounters:   23 110.6 kg (243 lb 13.3 oz)   22 110.2 kg (243 lb)   22 105.9 kg (233 lb 8 oz)       Intake/Output Summary (Last 24 hours) at 2023 1124  Last data filed at 2023 0856  Gross per 24 hour   Intake 660 ml   Output 1225 ml   Net -565 ml       Physical Exam:  General: Sitting in the chair in no acute distress.   Neck: Supple, patient currently seated in the chair and therefore jugular venous pressure difficult to assess.   Lungs: Mild expiratory wheeze   COR: Irregular, soft systolic murmur   Abd: nondistended, BS present, nontender   Extrem: No significant edema, warm    Cardiographics:    ECG: Atrial fibrillation, low voltage, occasional premature ventricular complexes or aberrantly conducted complexes.  Possible inferior infarct.  Echocardiogram:   10/31/22 10:41 AM 10/31/22 12:09 PM [id number] [facility]      PACS Images     Show images for MRI Cardiac w/contrast     Study Result    Narrative & Impression                                                    [facility]                                                      CMR Report       MRN:                  [id number]                                  Name:            CELSA HALE                                  :                  1945                                  Scan Date:   2022-10-31 10:41:09                                   Electronically signed by Husam Serrato  16:09:23     VITALS   ==========================================================================================================     HEIGHT: 62.99 in    (160.00 cm)  WEIGHT: 233.51 lbs    (105.92 kgs)  BSA: 2.07 m^2     SUMMARY   " ==========================================================================================================     1.  Normal left ventricular size with severe reduction in systolic function. The quantified left  ventricular ejection fraction is 32%.  Normal myocardial wall thickness.  No focal fibrosis or scarring  noted on LGE.  Findings most consistent with non-ischemic cardiomyopathy.  Pre-contrast T1 elevated at 1159  ms.   2.  Normal right ventricular size with moderate reduction in systolic function.  RVEF: 40%.   3.  Severe bilateral atrial enlargement.   4.  There is mild mitral regurgitation.     Coronary artery disease involving native coronary artery of native heart without angina pectoris [I25.10 (ICD-10-CM)]   Non-ischemic cardiomyopathy (H) [I42.8 (ICD-10-CM)]     Pre Procedure Diagnosis    stable known CADcardiomyopathy       Conclusion    Caty Ng is a 76 year old old female with cardiomyopathy, AF, high BMI 40, HL, hypothyroidism here for evaluation of mildly decreased LVEF of 45%, abnormal CTA cors.     - non-obstructive CAD, normal L-sided filling pressures  - continue ASA 81mg daily indefinitely,increase atorva to 80  - continue aggressive risk factor modification              Findings:  LM:no obstruction  LAD:extraluminal mid-vessel Ca2+ but no obstruction  Lcx:moderate size OM1 w/ 40% narrowing  RCA:poximal 10-20% irregularity, no obstruction     LVEDP:13    Telemetry: Atrial fibrillation with controlled ventricular response    Imaging:   Chest X-Ray:   Study Result    Narrative & Impression   EXAM: XR CHEST 2 VIEWS  LOCATION: [facility]  DATE/TIME: 1/6/2023 3:32 PM     INDICATION: Shortness of breath, cough  COMPARISON: Cardiac MRI 10/31/2022                                                                      IMPRESSION:      Enlarged cardiac silhouette. Vascular pedicle width is normal.     Hyperinflated lungs with slight flattening of diaphragmatic curvature. No  airspace opacities or interstitial thickening. Incidental note of an azygos fissure, normal anatomic variant.     No pleural effusion.     Diffuse thoracic spine degenerative osteophytes and mild disc space narrowing. Reciprocal curvature of the upper thoracic spine. No aggressive or destructive bone lesions          Lab Results    Chemistry/lipid CBC Cardiac Enzymes/BNP/TSH/INR   Recent Labs   Lab Test 06/06/22  1424 08/28/19  0928 09/09/15  0837   CHOL 104   < > 96   HDL 39*   < > 41*   LDL 39   < > 32   TRIG 128   < > 115   CHOLHDLRATIO  --   --  2.3    < > = values in this interval not displayed.     Recent Labs   Lab Test 06/06/22  1424 07/21/21  0706 05/17/21  1306   LDL 39 38 49     Recent Labs   Lab Test 01/09/23  0505      POTASSIUM 3.6   CHLORIDE 95*   CO2 34*   *   BUN 15.3   CR 0.66   GFRESTIMATED >90   JANELLE 9.1     Recent Labs   Lab Test 01/09/23  0505 01/07/23  0548 01/06/23  1424   CR 0.66 0.69 0.63     Recent Labs   Lab Test 05/17/21  1306   A1C 5.7*          Recent Labs   Lab Test 01/06/23  1424   WBC 7.5   HGB 13.8   HCT 43.3   MCV 95        Recent Labs   Lab Test 01/06/23  1424 09/08/21  1050 07/21/21  0706   HGB 13.8 13.5 14.4    No results for input(s): TROPONINI in the last 81780 hours.  Recent Labs   Lab Test 01/06/23  1424 11/18/22  1412 12/03/20  1438 05/21/18  1058 05/04/18  1359   BNP  --   --  184*  --   --    NTBNPI 1,122*  --   --   --   --    NTBNP  --  717  --  1,012* 1,174*     Recent Labs   Lab Test 06/06/22  1424   TSH 1.02     Recent Labs   Lab Test 01/09/23  0532 01/08/23  0611 01/07/23  0548   INR 1.28* 1.28* 1.24*

## 2023-01-09 NOTE — PROGRESS NOTES
Clinic Care Coordination Contact  Ambulatory Care Coordination to Inpatient Care Management   Hand-In Communication    Date:  January 9, 2023  Name: aCty Ng is enrolled in Ambulatory Care Coordination program and I am the Lead Care Coordinator.  CC Contact Information: Epic Insickweathersket + phone  Payor Source: Payor: MEDICARE / Plan: MEDICARE / Product Type: Medicare /   Current services in place:     Please see the CC Snaphot and Care Management Flowsheets for specific  details of this Caty Ng care plan.   Additional details/specific concerns r/t this admission:    No additional concerns at this time .    I will follow this admission in Epic. Please feel free to contact me with questions or for further collaboration in discharge planning.

## 2023-01-09 NOTE — PLAN OF CARE
Problem: Plan of Care - These are the overarching goals to be used throughout the patient stay.    Goal: Optimal Comfort and Wellbeing  1/8/2023 1853 by María Harris, RN  Outcome: Progressing  Patient c/o abdominal pain this morning. Pain has since resolved. MD aware of new complaint.     Problem: Heart Failure  Goal: Stable Heart Rate and Rhythm  1/8/2023 1853 by María Harris, RN  Outcome: Progressing  See previous note by writer regarding period of tachycardia & subsequent interventions. Cardiology following.    Problem: Heart Failure  Goal: Effective Oxygenation and Ventilation  1/8/2023 1853 by María Harris, RN  Outcome: Progressing  Weaned from 2L to 1L via nasal cannula.   Patient continues to c/o cough - sometimes productive. Sputum specimen sent to lab. PRN Robitussin given.     María Harris RN

## 2023-01-09 NOTE — PLAN OF CARE
Problem: Plan of Care - These are the overarching goals to be used throughout the patient stay.    Goal: Optimal Comfort and Wellbeing  Outcome: Progressing   Goal Outcome Evaluation:       Denies pain this am. Up t bathroom independently with walker, gait steady. Sitting in chair and nurse encouraged patient to elevate lower extremities.  Problem: Gas Exchange Impaired  Goal: Optimal Gas Exchange  Outcome: Progressing     O2 at 1.5L. o2 saturation 93%. Shortness of breath with activity. Patient has productive frequent cough. Cough syrup as ordered.

## 2023-01-09 NOTE — PLAN OF CARE
Physical Therapy    Per OT, Pt able to ambulate with SBA/ Mod I. PT services not indicated. Will complete orders. OT will continue to mobilize pt per CHF protocol. Thanks.       Rossana Soria, PT, DPT  1/9/2023

## 2023-01-10 ENCOUNTER — TELEPHONE (OUTPATIENT)
Dept: ANTICOAGULATION | Facility: CLINIC | Age: 78
End: 2023-01-10

## 2023-01-10 ENCOUNTER — APPOINTMENT (OUTPATIENT)
Dept: CARDIOLOGY | Facility: HOSPITAL | Age: 78
DRG: 291 | End: 2023-01-10
Attending: INTERNAL MEDICINE
Payer: MEDICARE

## 2023-01-10 ENCOUNTER — APPOINTMENT (OUTPATIENT)
Dept: OCCUPATIONAL THERAPY | Facility: HOSPITAL | Age: 78
DRG: 291 | End: 2023-01-10
Payer: MEDICARE

## 2023-01-10 LAB
ANION GAP SERPL CALCULATED.3IONS-SCNC: 8 MMOL/L (ref 7–15)
BUN SERPL-MCNC: 13.9 MG/DL (ref 8–23)
CALCIUM SERPL-MCNC: 8.9 MG/DL (ref 8.8–10.2)
CHLORIDE SERPL-SCNC: 99 MMOL/L (ref 98–107)
CREAT SERPL-MCNC: 0.6 MG/DL (ref 0.51–0.95)
DEPRECATED HCO3 PLAS-SCNC: 32 MMOL/L (ref 22–29)
GFR SERPL CREATININE-BSD FRML MDRD: >90 ML/MIN/1.73M2
GLUCOSE SERPL-MCNC: 100 MG/DL (ref 70–99)
HOLD SPECIMEN: NORMAL
INR PPP: 1.4 (ref 0.85–1.15)
LVEF ECHO: NORMAL
MAGNESIUM SERPL-MCNC: 2.1 MG/DL (ref 1.7–2.3)
POTASSIUM SERPL-SCNC: 3.7 MMOL/L (ref 3.4–5.3)
SODIUM SERPL-SCNC: 139 MMOL/L (ref 136–145)

## 2023-01-10 PROCEDURE — 255N000002 HC RX 255 OP 636: Performed by: INTERNAL MEDICINE

## 2023-01-10 PROCEDURE — 250N000013 HC RX MED GY IP 250 OP 250 PS 637: Performed by: INTERNAL MEDICINE

## 2023-01-10 PROCEDURE — 93306 TTE W/DOPPLER COMPLETE: CPT | Mod: 26 | Performed by: INTERNAL MEDICINE

## 2023-01-10 PROCEDURE — 36415 COLL VENOUS BLD VENIPUNCTURE: CPT | Performed by: INTERNAL MEDICINE

## 2023-01-10 PROCEDURE — 120N000001 HC R&B MED SURG/OB

## 2023-01-10 PROCEDURE — 99232 SBSQ HOSP IP/OBS MODERATE 35: CPT | Performed by: INTERNAL MEDICINE

## 2023-01-10 PROCEDURE — 97530 THERAPEUTIC ACTIVITIES: CPT | Mod: GO

## 2023-01-10 PROCEDURE — 250N000011 HC RX IP 250 OP 636: Performed by: HOSPITALIST

## 2023-01-10 PROCEDURE — 97535 SELF CARE MNGMENT TRAINING: CPT | Mod: GO

## 2023-01-10 PROCEDURE — 85610 PROTHROMBIN TIME: CPT | Performed by: INTERNAL MEDICINE

## 2023-01-10 PROCEDURE — 80048 BASIC METABOLIC PNL TOTAL CA: CPT

## 2023-01-10 PROCEDURE — 250N000013 HC RX MED GY IP 250 OP 250 PS 637: Performed by: HOSPITALIST

## 2023-01-10 PROCEDURE — 83735 ASSAY OF MAGNESIUM: CPT

## 2023-01-10 RX ORDER — WARFARIN SODIUM 2.5 MG/1
2.5 TABLET ORAL
Status: COMPLETED | OUTPATIENT
Start: 2023-01-10 | End: 2023-01-10

## 2023-01-10 RX ADMIN — ACETAMINOPHEN 650 MG: 325 TABLET ORAL at 20:51

## 2023-01-10 RX ADMIN — OSELTAMIVIR PHOSPHATE 75 MG: 75 CAPSULE ORAL at 20:51

## 2023-01-10 RX ADMIN — FUROSEMIDE 20 MG: 20 TABLET ORAL at 08:57

## 2023-01-10 RX ADMIN — LEVOTHYROXINE SODIUM 75 MCG: 0.03 TABLET ORAL at 08:56

## 2023-01-10 RX ADMIN — ACETAMINOPHEN 650 MG: 325 TABLET ORAL at 08:58

## 2023-01-10 RX ADMIN — SPIRONOLACTONE 25 MG: 25 TABLET, FILM COATED ORAL at 08:58

## 2023-01-10 RX ADMIN — ACETAMINOPHEN 650 MG: 325 TABLET ORAL at 12:39

## 2023-01-10 RX ADMIN — PERFLUTREN 5 ML: 6.52 INJECTION, SUSPENSION INTRAVENOUS at 14:08

## 2023-01-10 RX ADMIN — OSELTAMIVIR PHOSPHATE 75 MG: 75 CAPSULE ORAL at 08:59

## 2023-01-10 RX ADMIN — WARFARIN SODIUM 2.5 MG: 2.5 TABLET ORAL at 17:00

## 2023-01-10 RX ADMIN — LOSARTAN POTASSIUM 25 MG: 25 TABLET, FILM COATED ORAL at 08:57

## 2023-01-10 RX ADMIN — ENOXAPARIN SODIUM 40 MG: 40 INJECTION SUBCUTANEOUS at 00:12

## 2023-01-10 RX ADMIN — ACETAMINOPHEN 650 MG: 325 TABLET ORAL at 16:55

## 2023-01-10 RX ADMIN — DIGOXIN 125 MCG: 125 TABLET ORAL at 08:59

## 2023-01-10 RX ADMIN — ENOXAPARIN SODIUM 40 MG: 40 INJECTION SUBCUTANEOUS at 12:39

## 2023-01-10 RX ADMIN — ATORVASTATIN CALCIUM 80 MG: 40 TABLET, FILM COATED ORAL at 08:58

## 2023-01-10 RX ADMIN — CARVEDILOL 25 MG: 12.5 TABLET, FILM COATED ORAL at 08:56

## 2023-01-10 RX ADMIN — CEFUROXIME AXETIL 500 MG: 500 TABLET ORAL at 20:51

## 2023-01-10 RX ADMIN — CEFUROXIME AXETIL 500 MG: 500 TABLET ORAL at 08:59

## 2023-01-10 ASSESSMENT — ACTIVITIES OF DAILY LIVING (ADL)
ADLS_ACUITY_SCORE: 29
ADLS_ACUITY_SCORE: 34
ADLS_ACUITY_SCORE: 33
ADLS_ACUITY_SCORE: 29
ADLS_ACUITY_SCORE: 33
ADLS_ACUITY_SCORE: 33
ADLS_ACUITY_SCORE: 29
ADLS_ACUITY_SCORE: 34
ADLS_ACUITY_SCORE: 29

## 2023-01-10 NOTE — PLAN OF CARE
Occupational Therapy Discharge Summary    Reason for therapy discharge:    All goals and outcomes met, no further needs identified.    Progress towards therapy goal(s). See goals on Care Plan in [facility] electronic health record for goal details.  Goals met    Therapy recommendation(s):    Recommend home with family support as needed.

## 2023-01-10 NOTE — PLAN OF CARE
"  Problem: Plan of Care - These are the overarching goals to be used throughout the patient stay.    Goal: Plan of Care Review  Description: The Plan of Care Review/Shift note should be completed every shift.  The Outcome Evaluation is a brief statement about your assessment that the patient is improving, declining, or no change.  This information will be displayed automatically on your shift note.  Outcome: Progressing  Flowsheets (Taken 1/9/2023 0546)  Plan of Care Reviewed With: patient  Goal: Patient-Specific Goal (Individualized)  Description: You can add care plan individualizations to a care plan. Examples of Individualization might be:  \"Parent requests to be called daily at 9am for status\", \"I have a hard time hearing out of my right ear\", or \"Do not touch me to wake me up as it startles me\".  Outcome: Progressing  Goal: Absence of Hospital-Acquired Illness or Injury  Outcome: Progressing  Intervention: Identify and Manage Fall Risk  Recent Flowsheet Documentation  Taken 1/9/2023 1630 by Amie Sheth RN  Safety Promotion/Fall Prevention:   room organization consistent   nonskid shoes/slippers when out of bed   mobility aid in reach   clutter free environment maintained  Intervention: Prevent Skin Injury  Recent Flowsheet Documentation  Taken 1/9/2023 1630 by Amie Sheth RN  Body Position: position changed independently  Goal: Optimal Comfort and Wellbeing  Outcome: Progressing  Goal: Readiness for Transition of Care  Outcome: Progressing   Goal Outcome Evaluation:      Plan of Care Reviewed With: patient    Patient states she is feeling better.  She still has a cough and is on 1.5L of oxygen via nasal cannula.  She remains independent in her room and has decided to sleep in the recliner chair.  She denies pain and she is hoping to go home tomorrow.                 "

## 2023-01-10 NOTE — PLAN OF CARE
Problem: Plan of Care - These are the overarching goals to be used throughout the patient stay.    Goal: Absence of Hospital-Acquired Illness or Injury  Intervention: Identify and Manage Fall Risk  Recent Flowsheet Documentation  Taken 1/10/2023 0900 by Katelyn Jacques, RN  Safety Promotion/Fall Prevention:   nonskid shoes/slippers when out of bed   clutter free environment maintained     Problem: Risk for Delirium  Goal: Improved Attention and Thought Clarity  Intervention: Maximize Cognitive Function  Recent Flowsheet Documentation  Taken 1/10/2023 0900 by Katelyn Jacques RN  Reorientation Measures:   clock in view   glasses use encouraged     Problem: Gas Exchange Impaired  Goal: Optimal Gas Exchange  Outcome: Progressing     Problem: Heart Failure  Goal: Optimal Functional Ability  Intervention: Optimize Functional Ability  Recent Flowsheet Documentation  Taken 1/10/2023 0900 by Katelyn Jacques, RN  Activity Management: ambulated to bathroom   Goal Outcome Evaluation:       Patient alert and oriented. Up in the chair for both meals, ambulated to the bathroom independently. Oxygen weaned to 1 L via NC, oxygen saturation between 91 and 92.

## 2023-01-10 NOTE — TELEPHONE ENCOUNTER
ANTICOAGULATION     Caty Ng is overdue for INR check.      Currently an inpatient @ [facility] for acute resp. failure w/ hypoxia.    Kelly Wise RN

## 2023-01-10 NOTE — PROGRESS NOTES
[facility]    Medicine Progress Note - Hospitalist Service    Date of Admission:  1/6/2023    Assessment & Plan            Caty Ng is a 77 year old female with history of obesity, afib, BROCK, HLD, hypothryoid, non-obstructive CAD, and Chronic systolic heart failure who presents with cough/dyspnea/sputum and hypoxia and is found to have positive Influenza A and acute on chronic systolic heart failure. Hospital Day: 4       1/9 :     On 2 liters of oxygen  Cardiology following : on digoxin, discontinued cardizem  Monitor BP and creatinine  Considering entresto      Not medically ready for discharge at this time.  Discharge in 1-2 days.      A/p:       Principal Problem:    Acute respiratory failure with hypoxia - multifactorial due to influenza likely exacerbating underlying heart failure.  No sign of PNA on CXR but with cough with green sputum for 9 days, is on Abx for bronchitis. Oxygen to maintain O2 sats > 90%, wean as tolerated    Acute on chronic HFrEF (heart failure with reduced ejection fraction) - with hypoxia, crackles on exam, BLE edema, and elevated NT Pro BNP - Tolerating oral diuresis. continue daily weights, education, and close f/u with CHF clinic after discharge.    Influenza A Infection - Tamiflu x5 days, stop date entered. Antitussives PRN    Bronchitis - ceftin x5 days. Stop date entered. Patient with ongoing productive cough, will order sputum culture.    RLQ abd pain - unclear cause. Possibly abd wall muscle strain from cough. No induration or tenderness. Monitor symptomatically.     Active Problems:    Morbid obesity due to excess calories Body mass index is 41.2 kg/m .    Chronic atrial fibrillation - Some RVR this morning after Coreg and diltiazem held for hypotension. Cardiology now adding digoxin and stopping diltiazem. Coreg increased to 25mg BID. Pharmacy to dose warfarin.     Moderate mitral regurgitation - med adjustments as above.  Diurese as above.    BROCK  "(obstructive sleep apnea) - does not tolerate CPAP. Will be on oxygen for now.    Hyperlipidemia  - continue lipitor as at home    Hypothyroidism - continue levothyroxine as at home    Coronary artery disease involving native coronary artery of native heart without angina pectoris - non-obstructive, continue lipitor, coreg, and warfarin as at home     Subtherapeutic INR - no need for full bridging with Lovenox as only indication is A fib, but given obesity ensure good DVT proph with Lovenox 40mg BID.       DVT Prophylaxis: Moderate risk. Lovenox. Coumadin with subtherapeutic INR  Diet: 2 Gram Sodium Diet Other - please comment    Henderson Catheter: Not present  Lines: None     Cardiac Monitoring: ACTIVE order. Indication: Tachyarrhythmias, acute (48 hours)  Code Status: Full Code      Clinically Significant Risk Factors                         # Severe Obesity: Estimated body mass index is 41.85 kg/m  as calculated from the following:    Height as of this encounter: 1.626 m (5' 4\").    Weight as of this encounter: 110.6 kg (243 lb 13.3 oz)., PRESENT ON ADMISSION         Disposition Plan   Disposition: Home     Expected Discharge Date: 01/10/2023    Discharge Delays: IV Medication - consider oral or Home Infusion         Medically ready to discharge today: No     The patient's care was discussed with the Patient.    Joey Ortiz MD  Hospitalist Service  [facility]  Securely message with [employer] (more info)  Text page via [facility] Paging/Directory   ______________________________________________________________________      Physical Exam   Vital Signs: Temp: 99  F (37.2  C) Temp src: Oral BP: 121/65 Pulse: 79   Resp: 17 SpO2: 94 % O2 Device: Nasal cannula Oxygen Delivery: 2 LPM  Weight: 243 lbs 13.26 oz  General: in no apparent distress, non-toxic and alert female sitting in bedside chair oriented x3  HEENT: Head normocephalic atraumatic, oral mucosa moist. Sclerae anicteric  Skin: No rashes or " lesions  Extremities: 2+ pitting edema bilateral ankles  Psych: Normal affect, mood dysthymic  Neuro: CNII-XII grossly intact, moving all 4 extremities        Medical Decision Making             Data   No results found for this or any previous visit (from the past 12 hour(s)).

## 2023-01-10 NOTE — PROGRESS NOTES
Patient on tele monitoring. Had a 7 beat run of V tach at 03:37. Monitor tech thought it may be aberrant A-fib with PVC. Called and updated House officer. House officer will assess.   Caregiver

## 2023-01-10 NOTE — PROGRESS NOTES
Cardiology Progress Note    Assessment:  77-year-old female with history obesity, atrial fibrillation, obstructive sleep apnea, nonobstructive coronary artery disease with chronic LV dysfunction who follows with Dr. Cameron Soria.  Cardiac MRI completed a few months ago reported ejection fraction of 32%.  Patient admitted with acute respiratory insufficiency and hypoxia felt to be at least in part related to influenza with some exacerbation of her congestive heart failure.  Patient did endorse some greenish sputum for 9 days and was on antibiotics for bronchitis.  Patient had a consultation with cardiology at the [facility] recently.  Recommendations included stopping diltiazem which has been completed.  Consider changing carvedilol to metoprolol although rate control is improved.  Increase losartan to 50 mg daily.  There is also some suggestion about repeating a stress test at some point.  In addition there was some suggestion about excluding amyloidosis.  It is noted that the patient had a cardiac MRI in October and can we review with my MRI colleagues.    1.  Acute on chronic heart failure.  Net output of 940 cc.  Weight 243 pounds yesterday not weighed today.  N-terminal proBNP was 1120.  Renal function today shows a potassium of 3.7 with stable BUN and creatinine.  Currently receiving furosemide 20 mg p.o. twice daily.  Losartan 25 mg daily    2.  Permanent atrial fibrillation with high heart rates at least in part related to acute illness.  Diltiazem was discontinued this admission and digoxin was added to the medication regimen.  Heart rate is improved.  Patient is on warfarin with some recent discussions regarding the newer anticoagulant agents.  She was going to check into the cost of this can be followed up with Dr. Soria.  Heart rates been in the 60s to 90s range.    3.  Nonobstructive coronary artery disease based on coronary angiogram from July 2021.    4.Permanent atrial fibrillation.  INR has  been subtherapeutic and therefore has been on Lovenox.  INR remains subtherapeutic at 1.4.    5.  Nonsustained ventricular tachycardia.  No symptoms within the setting of LV dysfunction and reported untreated sleep apnea.  Patient indicates that she has been diagnosed with sleep apnea number of years ago but was not able to tolerate CPAP secondary to claustrophobia.  I indicated to her that it might be reasonable to reassess sleep apnea as there has been some change in the treatment options.     Principal Problem:    Acute respiratory failure with hypoxia (H)  Active Problems:    Morbid obesity due to excess calories (H)    Chronic atrial fibrillation (H)    Moderate mitral regurgitation    BROCK (obstructive sleep apnea)    Hyperlipidemia LDL goal <40    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 atrophy of thyroid    Coronary artery disease involving native coronary artery of native heart without angina pectoris    Acute on chronic HFrEF (heart failure with reduced ejection fraction) (H)    Elevated brain natriuretic peptide (BNP) level    Type A influenza Infection    Bronchitis    Current cardiovascular medications include digoxin 0.125 mg daily.  Atorvastatin 80 mg daily, carvedilol 25 mg p.o. twice daily, antibiotics with cefuroxime losartan 25 mg daily, spironolactone 25 mg daily  Plan:  1.  We will recheck echocardiogram to reevaluate LV function  2.  We will discuss with my EP colleagues the ventricular tachycardia tracing from the setting of LV dysfunction.  3.  Patient  is on spironolactone and losartan.  Therefore we will monitor potassium.  We will try to increase losartan to 50 mg daily.  May ultimately benefit from switching to Entresto.  Also my benefit from SGLT2 inhibitors in the setting of LV dysfunction.    Subjective:   Caty Ng is seen in follow-up.  Nursing notes reviewed.  .  Patient reports that she is comfortable.  She states she has not slept in bed and typically does at home but does not feel as  "if her breathing is more difficult.  She denies chest pain, orthopnea or PND otherwise.  She denies syncope.    Objective:   Vital signs in last 24 hours:  VITALS: /65 (BP Location: Left arm)   Pulse 92   Temp 98  F (36.7  C) (Oral)   Resp 20   Ht 1.626 m (5' 4\")   Wt 110.6 kg (243 lb 13.3 oz)   SpO2 90%   BMI 41.85 kg/m    BMI: Body mass index is 41.85 kg/m .  Wt Readings from Last 3 Encounters:   23 110.6 kg (243 lb 13.3 oz)   22 110.2 kg (243 lb)   22 105.9 kg (233 lb 8 oz)       Intake/Output Summary (Last 24 hours) at 1/10/2023 1102  Last data filed at 1/10/2023 0800  Gross per 24 hour   Intake 820 ml   Output 1525 ml   Net -705 ml       Physical Exam:  General: Sitting in a chair no acute distress with intermittent cough.   Neck: Jugular venous pressure assessment is challenging.  She is currently sitting in the chair.   Lungs: Decreased breath sounds bilaterally   COR: Distant, irregular,   Abd: nondistended, BS present, nontender   Extrem: Mild edema  Neuro: No focal deficits.    Cardiographics:    ECG: Atrial fibrillation, low voltage, occasional premature ventricular complexes or aberrantly conducted complexes.  Possible inferior infarct.  Echocardiogram:   10/31/22 10:41 AM 10/31/22 12:09 PM [id number] [facility]        PACS Images      Show images for MRI Cardiac w/contrast     Study Result     Narrative & Impression                                                    [facility]                                                      CMR Report       MRN:                  [id number]                                  Name:            CELSA HALE                                  :                  1945                                  Scan Date:   2022-10-31 10:41:09                                   Electronically signed by Husam Serrato  " 16:09:23     VITALS   ==========================================================================================================     HEIGHT: 62.99 in    (160.00 cm)  WEIGHT: 233.51 lbs    (105.92 kgs)  BSA: 2.07 m^2     SUMMARY   ==========================================================================================================     1.  Normal left ventricular size with severe reduction in systolic function. The quantified left  ventricular ejection fraction is 32%.  Normal myocardial wall thickness.  No focal fibrosis or scarring  noted on LGE.  Findings most consistent with non-ischemic cardiomyopathy.  Pre-contrast T1 elevated at 1159  ms.   2.  Normal right ventricular size with moderate reduction in systolic function.  RVEF: 40%.   3.  Severe bilateral atrial enlargement.   4.  There is mild mitral regurgitation.      1.  Normal left ventricular size with severe reduction in systolic function. The quantified left  ventricular ejection fraction is 32%.  Normal myocardial wall thickness.  No focal fibrosis or scarring  noted on LGE.  Findings most consistent with non-ischemic cardiomyopathy.  Pre-contrast T1 elevated at 1159  ms.   2.  Normal right ventricular size with moderate reduction in systolic function.  RVEF: 40%.   3.  Severe bilateral atrial enlargement.   4.  There is mild mitral regurgitation.   ==========================================================================================================  REPORT FINDINGS:     LEFT VENTRICLE: LV wall thickness is normal. LV cavity size is normal. LV systolic function is normal. There is no LV  mass/thrombus. LV systolic function is normal. Quantitative LVEF 32 %.     RIGHT VENTRICLE: RV wall thickness is normal. RV cavity size is normal. RV systolic function is normal. There is no RV  mass/thrombus. Quantitative RVEF 40 %.     LA/RA SEPTUM: The atrial septum is intact. The atrial septum is intact. The atrial septum is intact.     LEFT ATRIUM: LA is  severely enlarged.     RIGHT ATRIUM: RA is severely enlarged.     PERICARDIUM: Pericardium is normal. There is no pericardial effusion.     PLEURAL EFFUSION: There is no pleural effusion.     AORTIC VALVE: The aortic valve annulus is normal in size. Aortic valve is trileaflet. There is no aortic regurgitation.  There is no aortic stenosis. The aortic valve annulus is normal in size. The aortic valve annulus is normal  in size.     MITRAL VALVE: The mitral valve annulus is normal in size. Mitral valve leaflets are normal. There is mild mitral  regurgitation. There is no mitral stenosis.      TRICUSPID VALVE: The tricuspid valve annulus is normal in size. Tricuspid valve leaflets are normal. There is mild tricuspid  regurgitation. There is no tricuspid stenosis.     PULMONIC VALVE: The pulmonic valve annulus is normal in size. Pulmonic valve leaflets are normal. There is no pulmonic  regurgitation. There is no pulmonic stenosis.      AORTIC ROOT: The aortic root is normal.     CHEST:  Normal thoracic aortic size and its major branches.       Caty Ng is a 76 year old old female with cardiomyopathy, AF, high BMI 40, HL, hypothyroidism here for evaluation of mildly decreased LVEF of 45%, abnormal CTA cors.     - non-obstructive CAD, normal L-sided filling pressures  - continue ASA 81mg daily indefinitely,increase atorva to 80  - continue aggressive risk factor modification              Findings:  LM:no obstruction  LAD:extraluminal mid-vessel Ca2+ but no obstruction  Lcx:moderate size OM1 w/ 40% narrowing  RCA:poximal 10-20% irregularity, no obstruction         Telemetry, atrial fibrillation with adequate rate control.  Nonsustained ventricular tachycardia during the early morning fastest occurring at approximately 0412 hrs.  This   Lab Results    Chemistry/lipid CBC Cardiac Enzymes/BNP/TSH/INR   Recent Labs   Lab Test 06/06/22  1424 08/28/19  0928 09/09/15  0837   CHOL 104   < > 96   HDL 39*   < > 41*   LDL 39    < > 32   TRIG 128   < > 115   CHOLHDLRATIO  --   --  2.3    < > = values in this interval not displayed.     Recent Labs   Lab Test 06/06/22  1424 07/21/21  0706 05/17/21  1306   LDL 39 38 49     Recent Labs   Lab Test 01/10/23  0451      POTASSIUM 3.7   CHLORIDE 99   CO2 32*   *   BUN 13.9   CR 0.60   GFRESTIMATED >90   JANELLE 8.9     Recent Labs   Lab Test 01/10/23  0451 01/09/23  0505 01/07/23  0548   CR 0.60 0.66 0.69     Recent Labs   Lab Test 05/17/21  1306   A1C 5.7*          Recent Labs   Lab Test 01/06/23  1424   WBC 7.5   HGB 13.8   HCT 43.3   MCV 95        Recent Labs   Lab Test 01/06/23  1424 09/08/21  1050 07/21/21  0706   HGB 13.8 13.5 14.4    No results for input(s): TROPONINI in the last 40115 hours.  Recent Labs   Lab Test 01/06/23  1424 11/18/22  1412 12/03/20  1438 05/21/18  1058 05/04/18  1359   BNP  --   --  184*  --   --    NTBNPI 1,122*  --   --   --   --    NTBNP  --  717  --  1,012* 1,174*     Recent Labs   Lab Test 06/06/22  1424   TSH 1.02     Recent Labs   Lab Test 01/10/23  0451 01/09/23  0532 01/08/23  0611   INR 1.40* 1.28* 1.28*

## 2023-01-10 NOTE — PROVIDER NOTIFICATION
Called House officer with update of tele monitoring showing 11 beat run of V tachycardia at 0412. Labs ordered.

## 2023-01-11 ENCOUNTER — APPOINTMENT (OUTPATIENT)
Dept: RADIOLOGY | Facility: HOSPITAL | Age: 78
DRG: 291 | End: 2023-01-11
Attending: INTERNAL MEDICINE
Payer: MEDICARE

## 2023-01-11 LAB
ANION GAP SERPL CALCULATED.3IONS-SCNC: 8 MMOL/L (ref 7–15)
BUN SERPL-MCNC: 10.9 MG/DL (ref 8–23)
CALCIUM SERPL-MCNC: 9.2 MG/DL (ref 8.8–10.2)
CHLORIDE SERPL-SCNC: 99 MMOL/L (ref 98–107)
CREAT SERPL-MCNC: 0.55 MG/DL (ref 0.51–0.95)
DEPRECATED HCO3 PLAS-SCNC: 33 MMOL/L (ref 22–29)
GFR SERPL CREATININE-BSD FRML MDRD: >90 ML/MIN/1.73M2
GLUCOSE SERPL-MCNC: 92 MG/DL (ref 70–99)
HOLD SPECIMEN: NORMAL
INR PPP: 1.56 (ref 0.85–1.15)
MAGNESIUM SERPL-MCNC: 2.2 MG/DL (ref 1.7–2.3)
POTASSIUM SERPL-SCNC: 4.2 MMOL/L (ref 3.4–5.3)
SODIUM SERPL-SCNC: 140 MMOL/L (ref 136–145)

## 2023-01-11 PROCEDURE — 120N000001 HC R&B MED SURG/OB

## 2023-01-11 PROCEDURE — 83735 ASSAY OF MAGNESIUM: CPT | Performed by: INTERNAL MEDICINE

## 2023-01-11 PROCEDURE — 250N000013 HC RX MED GY IP 250 OP 250 PS 637: Performed by: HOSPITALIST

## 2023-01-11 PROCEDURE — 85610 PROTHROMBIN TIME: CPT | Performed by: INTERNAL MEDICINE

## 2023-01-11 PROCEDURE — 71045 X-RAY EXAM CHEST 1 VIEW: CPT

## 2023-01-11 PROCEDURE — 250N000013 HC RX MED GY IP 250 OP 250 PS 637: Performed by: INTERNAL MEDICINE

## 2023-01-11 PROCEDURE — 250N000011 HC RX IP 250 OP 636: Performed by: HOSPITALIST

## 2023-01-11 PROCEDURE — 99232 SBSQ HOSP IP/OBS MODERATE 35: CPT | Performed by: INTERNAL MEDICINE

## 2023-01-11 PROCEDURE — 36415 COLL VENOUS BLD VENIPUNCTURE: CPT | Performed by: INTERNAL MEDICINE

## 2023-01-11 PROCEDURE — 250N000011 HC RX IP 250 OP 636: Performed by: INTERNAL MEDICINE

## 2023-01-11 PROCEDURE — 80048 BASIC METABOLIC PNL TOTAL CA: CPT | Performed by: INTERNAL MEDICINE

## 2023-01-11 RX ORDER — WARFARIN SODIUM 3 MG/1
3 TABLET ORAL
Status: COMPLETED | OUTPATIENT
Start: 2023-01-11 | End: 2023-01-11

## 2023-01-11 RX ORDER — FUROSEMIDE 10 MG/ML
20 INJECTION INTRAMUSCULAR; INTRAVENOUS EVERY 12 HOURS
Status: DISCONTINUED | OUTPATIENT
Start: 2023-01-11 | End: 2023-01-12

## 2023-01-11 RX ADMIN — ATORVASTATIN CALCIUM 80 MG: 40 TABLET, FILM COATED ORAL at 08:25

## 2023-01-11 RX ADMIN — BENZONATATE 100 MG: 100 CAPSULE ORAL at 00:08

## 2023-01-11 RX ADMIN — FUROSEMIDE 20 MG: 20 TABLET ORAL at 08:24

## 2023-01-11 RX ADMIN — DIGOXIN 125 MCG: 125 TABLET ORAL at 08:23

## 2023-01-11 RX ADMIN — ACETAMINOPHEN 650 MG: 325 TABLET ORAL at 21:52

## 2023-01-11 RX ADMIN — SPIRONOLACTONE 25 MG: 25 TABLET, FILM COATED ORAL at 08:24

## 2023-01-11 RX ADMIN — LEVOTHYROXINE SODIUM 75 MCG: 0.03 TABLET ORAL at 08:24

## 2023-01-11 RX ADMIN — ACETAMINOPHEN 650 MG: 325 TABLET ORAL at 16:44

## 2023-01-11 RX ADMIN — OSELTAMIVIR PHOSPHATE 75 MG: 75 CAPSULE ORAL at 21:52

## 2023-01-11 RX ADMIN — CEFUROXIME AXETIL 500 MG: 500 TABLET ORAL at 08:23

## 2023-01-11 RX ADMIN — CARVEDILOL 25 MG: 12.5 TABLET, FILM COATED ORAL at 08:25

## 2023-01-11 RX ADMIN — ENOXAPARIN SODIUM 40 MG: 40 INJECTION SUBCUTANEOUS at 13:00

## 2023-01-11 RX ADMIN — FUROSEMIDE 20 MG: 10 INJECTION, SOLUTION INTRAMUSCULAR; INTRAVENOUS at 16:48

## 2023-01-11 RX ADMIN — OSELTAMIVIR PHOSPHATE 75 MG: 75 CAPSULE ORAL at 08:25

## 2023-01-11 RX ADMIN — ACETAMINOPHEN 650 MG: 325 TABLET ORAL at 12:59

## 2023-01-11 RX ADMIN — CARVEDILOL 25 MG: 12.5 TABLET, FILM COATED ORAL at 16:45

## 2023-01-11 RX ADMIN — LOSARTAN POTASSIUM 25 MG: 25 TABLET, FILM COATED ORAL at 08:24

## 2023-01-11 RX ADMIN — BENZONATATE 100 MG: 100 CAPSULE ORAL at 21:54

## 2023-01-11 RX ADMIN — ACETAMINOPHEN 650 MG: 325 TABLET ORAL at 08:24

## 2023-01-11 RX ADMIN — WARFARIN SODIUM 3 MG: 3 TABLET ORAL at 17:00

## 2023-01-11 RX ADMIN — ENOXAPARIN SODIUM 40 MG: 40 INJECTION SUBCUTANEOUS at 00:08

## 2023-01-11 RX ADMIN — BENZONATATE 100 MG: 100 CAPSULE ORAL at 14:17

## 2023-01-11 ASSESSMENT — ACTIVITIES OF DAILY LIVING (ADL)
ADLS_ACUITY_SCORE: 33

## 2023-01-11 NOTE — PROGRESS NOTES
Care Management Follow Up    Length of Stay (days): 5    Expected Discharge Date: 01/11/2023     Concerns to be Addressed:     Discharge planning  Patient plan of care discussed at interdisciplinary rounds: Yes    Anticipated Discharge Disposition: Home     Anticipated Discharge Services:  n/a  Anticipated Discharge DME:  n/a    Patient/family educated on Medicare website which has current facility and service quality ratings:    Education Provided on the Discharge Plan:  n/a  Patient/Family in Agreement with the Plan:  yes    Referrals Placed by CM/SW:  n/a  Private pay costs discussed: Not applicable    Additional Information:  No needs anticipated from CM at discharge per pt; independent at baseline. Plans to have surgery on 3/1 (rescheduled due to flu+). Therapy recommending home with assist. Pt to follow up with PCP post discharge if needs arise. Family to transport home.  12:08 PM    ALETHEA Parr  1/11/2023

## 2023-01-11 NOTE — PROGRESS NOTES
"   [facility]  Cardiac Electrophysiology     Progress Note: Elliott Abdi MD    Primary Care: Keke Brian    Primary Cardiologist: Cameron Soria MD    Assessment:         Possible ventricular arrhythmia: I was asked by Dr. Mcdermott to review the patient's cardiac telemetry.  Reviewed episodes of \"VT\" which on 6-lead reviewed demonstrate that this is artifact with ongoing rate controlled atrial fibrillation demonstrated particularly in lead V6.  No evidence of true ventricular arrhythmia.    Recommend continued current medical therapy.    Call if questions.    Principal Problem:    Acute respiratory failure with hypoxia (H)  Active Problems:    Morbid obesity due to excess calories (H)    Chronic atrial fibrillation (H)    Moderate mitral regurgitation    BROCK (obstructive sleep apnea)    Hyperlipidemia LDL goal <40    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 atrophy of thyroid    Coronary artery disease involving native coronary artery of native heart without angina pectoris    Acute on chronic HFrE    "

## 2023-01-11 NOTE — PROGRESS NOTES
[facility]    Medicine Progress Note - Hospitalist Service    Date of Admission:  1/6/2023    Assessment & Plan            Caty Ng is a 77 year old female with history of obesity, afib, BROCK, HLD, hypothryoid, non-obstructive CAD, and Chronic systolic heart failure who presents with cough/dyspnea/sputum and hypoxia and is found to have positive Influenza A and acute on chronic systolic heart failure. Hospital Day: 5       1/10 :     On 1 liter of oxygen  Cardiology following : on digoxin, discontinued cardizem  Monitor BP and creatinine  Considering entresto      Not medically ready for discharge at this time.  Discharge in 1-2 days.      A/p:       Principal Problem:    Acute respiratory failure with hypoxia - multifactorial due to influenza likely exacerbating underlying heart failure.  No sign of PNA on CXR but with cough with green sputum for 9 days, is on Abx for bronchitis. Oxygen to maintain O2 sats > 90%, wean as tolerated    Acute on chronic HFrEF (heart failure with reduced ejection fraction) - with hypoxia, crackles on exam, BLE edema, and elevated NT Pro BNP - Tolerating oral diuresis. continue daily weights, education, and close f/u with CHF clinic after discharge.    Influenza A Infection - Tamiflu x5 days, stop date entered. Antitussives PRN    Bronchitis - ceftin x5 days. Stop date entered. Patient with ongoing productive cough, will order sputum culture.    RLQ abd pain - unclear cause. Possibly abd wall muscle strain from cough. No induration or tenderness. Monitor symptomatically.     Active Problems:    Morbid obesity due to excess calories Body mass index is 41.2 kg/m .    Chronic atrial fibrillation - Some RVR this morning after Coreg and diltiazem held for hypotension. Cardiology now adding digoxin and stopping diltiazem. Coreg increased to 25mg BID. Pharmacy to dose warfarin.     Moderate mitral regurgitation - med adjustments as above.  Diurese as above.    BROCK  "(obstructive sleep apnea) - does not tolerate CPAP. Will be on oxygen for now.    Hyperlipidemia  - continue lipitor as at home    Hypothyroidism - continue levothyroxine as at home    Coronary artery disease involving native coronary artery of native heart without angina pectoris - non-obstructive, continue lipitor, coreg, and warfarin as at home     Subtherapeutic INR - no need for full bridging with Lovenox as only indication is A fib, but given obesity ensure good DVT proph with Lovenox 40mg BID.       DVT Prophylaxis: Moderate risk. Lovenox. Coumadin with subtherapeutic INR  Diet: 2 Gram Sodium Diet Other - please comment    Henderson Catheter: Not present  Lines: None     Cardiac Monitoring: ACTIVE order. Indication: Tachyarrhythmias, acute (48 hours)  Code Status: Full Code      Clinically Significant Risk Factors                         # Severe Obesity: Estimated body mass index is 41.85 kg/m  as calculated from the following:    Height as of this encounter: 1.626 m (5' 4\").    Weight as of this encounter: 110.6 kg (243 lb 13.3 oz).          Disposition Plan   Disposition: Home     Expected Discharge Date: 01/11/2023    Discharge Delays: IV Medication - consider oral or Home Infusion  Procedure Pending (enter procedure & time in comments)    Discharge Comments: ECHO     Medically ready to discharge today: No     The patient's care was discussed with the Patient.    Joey Ortiz MD  Hospitalist Service  [facility]  Securely message with [employer] (more info)  Text page via [email or web address] Paging/Directory   ______________________________________________________________________      Physical Exam   Vital Signs: Temp: 98.2  F (36.8  C) Temp src: Oral BP: 123/60 Pulse: 87   Resp: 17 SpO2: 91 % O2 Device: Nasal cannula Oxygen Delivery: 1 LPM  Weight: 243 lbs 13.26 oz  General: in no apparent distress, non-toxic and alert female sitting in bedside chair oriented x3  HEENT: Head normocephalic atraumatic, " oral mucosa moist. Sclerae anicteric  Skin: No rashes or lesions  Extremities: 2+ pitting edema bilateral ankles  Psych: Normal affect, mood dysthymic  Neuro: CNII-XII grossly intact, moving all 4 extremities        Medical Decision Making             Data   Recent Results (from the past 12 hour(s))   Echocardiogram Complete    Collection Time: 01/10/23  2:15 PM   Result Value Ref Range    LVEF  45-50% (mildly reduced)

## 2023-01-11 NOTE — PLAN OF CARE
"  Problem: Plan of Care - These are the overarching goals to be used throughout the patient stay.    Goal: Plan of Care Review  Description: The Plan of Care Review/Shift note should be completed every shift.  The Outcome Evaluation is a brief statement about your assessment that the patient is improving, declining, or no change.  This information will be displayed automatically on your shift note.  Outcome: Progressing  Flowsheets (Taken 1/10/2023 2220)  Plan of Care Reviewed With: patient  Overall Patient Progress: improving  Goal: Patient-Specific Goal (Individualized)  Description: You can add care plan individualizations to a care plan. Examples of Individualization might be:  \"Parent requests to be called daily at 9am for status\", \"I have a hard time hearing out of my right ear\", or \"Do not touch me to wake me up as it startles me\".  Outcome: Progressing  Goal: Absence of Hospital-Acquired Illness or Injury  Outcome: Progressing  Intervention: Identify and Manage Fall Risk  Recent Flowsheet Documentation  Taken 1/10/2023 2015 by Amie Sheth RN  Safety Promotion/Fall Prevention:   room organization consistent   nonskid shoes/slippers when out of bed   mobility aid in reach   clutter free environment maintained  Taken 1/10/2023 1620 by Amie Sheth RN  Safety Promotion/Fall Prevention:   room organization consistent   nonskid shoes/slippers when out of bed   mobility aid in reach   clutter free environment maintained  Intervention: Prevent Skin Injury  Recent Flowsheet Documentation  Taken 1/10/2023 2015 by Amie Sheth RN  Body Position: position changed independently  Taken 1/10/2023 1620 by Amie Sheth RN  Body Position: position changed independently  Goal: Optimal Comfort and Wellbeing  Outcome: Progressing  Goal: Readiness for Transition of Care  Outcome: Progressing   Goal Outcome Evaluation:      Plan of Care Reviewed With: patient    Overall Patient Progress: improvingOverall " Patient Progress: improving       Patient is independent in her room.  She was hoping to go home today.  She was off oxygen for about 25 minutes today but then she felt short of breath and when her sats were taken they were only at 84%.  She was put back on 1L and remains on that much via nasal cannula.  She may need an O2 evaluation again tomorrow and may need to discharge with oxygen for a short time.

## 2023-01-11 NOTE — PLAN OF CARE
"  Problem: Plan of Care - These are the overarching goals to be used throughout the patient stay.    Goal: Plan of Care Review  Description: The Plan of Care Review/Shift note should be completed every shift.  The Outcome Evaluation is a brief statement about your assessment that the patient is improving, declining, or no change.  This information will be displayed automatically on your shift note.  Outcome: Progressing  Goal: Patient-Specific Goal (Individualized)  Description: You can add care plan individualizations to a care plan. Examples of Individualization might be:  \"Parent requests to be called daily at 9am for status\", \"I have a hard time hearing out of my right ear\", or \"Do not touch me to wake me up as it startles me\".  Outcome: Progressing  Goal: Absence of Hospital-Acquired Illness or Injury  Outcome: Progressing  Intervention: Identify and Manage Fall Risk  Recent Flowsheet Documentation  Taken 1/11/2023 0008 by Taty Garcia, RN  Safety Promotion/Fall Prevention:   assistive device/personal items within reach   clutter free environment maintained   lighting adjusted  Intervention: Prevent Skin Injury  Recent Flowsheet Documentation  Taken 1/11/2023 0008 by Taty Garcia, RN  Body Position:   position changed independently   weight shifting  Goal: Optimal Comfort and Wellbeing  Outcome: Progressing  Goal: Readiness for Transition of Care  Outcome: Progressing     Problem: Risk for Delirium  Goal: Optimal Coping  Outcome: Progressing  Goal: Improved Behavioral Control  Outcome: Progressing  Goal: Improved Attention and Thought Clarity  Outcome: Progressing  Goal: Improved Sleep  Outcome: Progressing     Problem: Gas Exchange Impaired  Goal: Optimal Gas Exchange  Outcome: Progressing     Problem: Heart Failure  Goal: Optimal Coping  Outcome: Progressing  Goal: Optimal Cardiac Output  Outcome: Progressing  Goal: Stable Heart Rate and Rhythm  Outcome: Progressing  Goal: Optimal Functional " Ability  Outcome: Progressing  Intervention: Optimize Functional Ability  Recent Flowsheet Documentation  Taken 1/11/2023 0008 by Taty Garcia, RN  Activity Management: ambulated to bathroom  Goal: Fluid and Electrolyte Balance  Outcome: Progressing  Goal: Improved Oral Intake  Outcome: Progressing  Goal: Effective Oxygenation and Ventilation  Outcome: Progressing  Intervention: Promote Airway Secretion Clearance  Recent Flowsheet Documentation  Taken 1/11/2023 0008 by Taty Garcia, RN  Cough And Deep Breathing: done independently per patient  Activity Management: ambulated to bathroom  Goal: Effective Breathing Pattern During Sleep  Outcome: Progressing   Goal Outcome Evaluation:       Pt a/o without c/o pain but c/o frequent loose cough. Prn tesslon given x1. Pt o2 sats in mid 90's on 1L nc. Will monitor. On room air this morning, will monitor. Sats.

## 2023-01-11 NOTE — PROGRESS NOTES
Cardiology Progress Note    Assessment:  77-year-old female with a history obesity, atrial fibrillation, obstructive sleep apnea, nonobstructive coronary artery disease who follows with Dr. Cameron Soria.  Cardiac MRI completed a few months ago reported ejection fraction of 32%.  Patient admitted with acute respiratory insufficiency and hypoxia felt to be at least in part related to influenza with some exacerbation of heart failure.  Patient did endorse and greenish sputum for 9 days.  Patient was recently seen by cardiology at the [facility] were she was seen in postop for an orthopedic procedure which was placed on hold.    1.  Acute on chronic congestive heart failure with reduced ejection fraction.  Net output of 1400 cc.  No weight recorded today.  Echocardiogram completed yesterday suggested an improvement in ejection fraction to 45 to 50%.  Severe left atrial enlargement.  Mild decrease in RV systolic function.  Coronary angiogram completed July 2021 demonstrated mild nonobstructive disease.  Question of amyloid was brought up by the [facility] cardiologist.  There was no reported suggestions of amyloid on the MRI and we will ask our MRI team to reevaluate the imaging.  I did ask my MRI colleague to review who indicated that there is no findings consistent with amyloid on the MRI.    2.  Permanent atrial fibrillation with elevated heart rates.  Patient changed from diltiazem to digoxin this admission.  Carvedilol increased to 25 mg p.o. twice daily.  There was some suggestion about potential change to metoprolol per the [facility] note.  The patient tells me that previously she had a fair amount of fatigue with metoprolol.  INR today remains subtherapeutic at 1.56.  Pharmacy is adjusting.  INR goal of 2-3.    3.  Question nonsustained ventricular tachycardia.  Very much appreciate input from my EP colleagues with a note in the chart record.  Based on his review findings are most consistent with artifact  and no true findings of ventricular tachycardia.    4.  Respiratory insufficiency.  Influenza.  Hyperinflated lungs on chest x-ray this admission.  Still requiring 1 L of oxygen.  Patient with cough and greenish sputum.  And has been administered antibiotics per primary care.  Patient also receiving Tamiflu.  Plan discussed with the hospital team.  We will check a proBNP which was not available on admission.  A repeat chest x-ray is pending.    Current cardiovascular medications include atorvastatin 80 mg daily, carvedilol 25 mg p.o. twice daily, digoxin 0.125 mg daily, warfarin, furosemide 20 mg p.o. twice daily.  Losartan 25 mg daily.      Principal Problem:    Acute respiratory failure with hypoxia (H)  Active Problems:    Morbid obesity due to excess calories (H)    Chronic atrial fibrillation (H)    Moderate mitral regurgitation    BROCK (obstructive sleep apnea)    Hyperlipidemia LDL goal <40    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 atrophy of thyroid    Coronary artery disease involving native coronary artery of native heart without angina pectoris    Acute on chronic HFrEF (heart failure with reduced ejection fraction) (H)    Elevated brain natriuretic peptide (BNP) level    Type A influenza Infection    Bronchitis      Plan:  1.  proBNP and PA and lateral chest x-ray.  2.  We discussed deep breathing exercises.  3.  Addendum: Chest x-ray does suggest interstitial edema with some bibasilar opacities.  chemistries are stable.  Will change to IV furosemide 20 mg IV every 12 hours with close monitoring of I's and O's.  4.  I did talk with her about consider switching to the newer anticoagulant agents.  She is going to contact her insurance provider regarding Eliquis or Xarelto and update me.  Subjective:   Caty Ng is seen in follow-up.  Appreciate input from Dr. Abdi regarding the tracings.  Patient reporting that she feels well.  She is up ambulating in the room.    Objective:   Vital signs in last 24  "hours:  VITALS: /64 (BP Location: Left arm)   Pulse 87   Temp 97.8  F (36.6  C) (Oral)   Resp 20   Ht 1.626 m (5' 4\")   Wt 110.6 kg (243 lb 13.3 oz)   SpO2 93%   BMI 41.85 kg/m    BMI: Body mass index is 41.85 kg/m .  Wt Readings from Last 3 Encounters:   23 110.6 kg (243 lb 13.3 oz)   22 110.2 kg (243 lb)   22 105.9 kg (233 lb 8 oz)       Intake/Output Summary (Last 24 hours) at 2023 1032  Last data filed at 2023 0443  Gross per 24 hour   Intake 990 ml   Output 1450 ml   Net -460 ml       Physical Exam:  General: Alert, no acute distress   Neck: Patient was up walking to the bathroom unable to accurately assess jugular venous pressure.   Lungs: Decreased breath sounds at the bases.   COR: RRR, distant heart tones, soft systolic murmur   Abd: nondistended, BS present   Extrem: Mild edema    Cardiographics:    Echocardiogram Complete  Order: [id number]   Status: Edited Result - FINAL      Visible to patient: Yes (seen)      Next appt: 2023 at 07:50 AM in Cardiology (Gia Chawla, RACHEL CNP)      0 Result Notes  Details    Reading Physician Reading Date Result Priority   Husam Serrato DO  [phone] 1/10/2023      Narrative & Impression  [id number]  [id number]  [id number]  [id number]^MELISSA^CONRAD^[place] [address]     Name: CELSA HALE  MRN: [id number]  : 1945  Study Date: 01/10/2023 01:18 PM  Age: 77 yrs  Gender: Female  Patient Location: [facility]  Reason For Study: Cardiomyopathy  Ordering Physician: CONRAD TORRES  Performed By: BETH     BSA: 2.1 m2  Height: 64 in  Weight: 243 lb  HR: 110  BP: 128/65 mmHg  ______________________________________________________________________________  Procedure  Complete Portable Echo Adult. Definity (NDC #13168-295) given intravenously.  Lot [id number], exp 1 Feb 24.  ______________________________________________________________________________  Interpretation Summary   "   1. The left ventricle is normal in size. Left ventricular function is  decreased. The ejection fraction is 45-50% (mildly reduced). There is mild  global hypokinesia of the left ventricle.  2. Mildly decreased right ventricular systolic function  3. The left atrium is severely dilated.  4. There is mild (1+) mitral regurgitation.  5. There is mild to moderate (1-2+) tricuspid regurgitation.  6. Inferior vena cava not well visualized for estimation of right atrial  pressure.  ______________________________________________________________________________  Left Ventricle  The left ventricle is normal in size. Left ventricular function is decreased.  The ejection fraction is 45-50% (mildly reduced). There is mild concentric  left ventricular hypertrophy. Diastolic function not assessed due to atrial  fibrillation. There is mild global hypokinesia of the left ventricle. Septal  motion is consistent with conduction abnormality.     Right Ventricle  The right ventricle is not well visualized. The right ventricle is normal  size. Mildly decreased right ventricular systolic function. A moderator band  is seen in the right ventricle.     Atria  The left atrium is severely dilated. The right atrium is moderately dilated.     Mitral Valve  The mitral valve leaflets appear thickened, but open well. There is mild (1+)  mitral regurgitation. There is no mitral valve stenosis.     Tricuspid Valve  Tricuspid valve leaflets appear normal. There is no evidence of tricuspid  stenosis or clinically significant tricuspid regurgitation. The right  ventricular systolic pressure is approximated at 31.9 mmHg plus the right  atrial pressure. There is mild to moderate (1-2+) tricuspid regurgitation.  There is no tricuspid stenosis.     Aortic Valve  The aortic valve is not well visualized. There is mild trileaflet aortic  sclerosis. There is trace aortic regurgitation. No hemodynamically significant  valvular aortic stenosis.     Pulmonic  Valve  The pulmonic valve is not well visualized.     Vessels  The aorta root is normal. Normal size ascending aorta. Inferior vena cava not  well visualized for estimation of right atrial pressure.     Pericardium  There is no pericardial effusion.     ______________________________________________________________________________  MMode/2D Measurements & Calculations     ECG January 6, 2023, low voltage, atrial fibrillation, possible inferior infarct, occasional PVC or aberrant conduction.    Imaging:   Chest X-Ray:  Exam Information    Exam Date Exam Time Exam Date Exam Time Accession # Performing Department Results    1/11/23 12:03 PM 1/11/23 12:03 PM [id number] [facility] Diagnostic Imaging      PACS Images     Show images for XR Chest Port 1 View     Study Result    Narrative & Impression   EXAM: XR CHEST PORT 1 VIEW  LOCATION: [facility]  DATE/TIME: 1/11/2023 12:03 PM     INDICATION: SOB  COMPARISON: 01/06/2023                                                                       IMPRESSION:  Bibasilar opacities represent pleural fluid and atelectasis. Underlying airspace disease is possible. No pneumothorax. There is interstitial edema. The cardiomediastinal silhouette is at the upper limits of normal in size. An opacity over   the right upper mediastinum could represent calcification or an external structure.         Lab Results    Chemistry/lipid CBC Cardiac Enzymes/BNP/TSH/INR   Recent Labs   Lab Test 06/06/22  1424 08/28/19  0928 09/09/15  0837   CHOL 104   < > 96   HDL 39*   < > 41*   LDL 39   < > 32   TRIG 128   < > 115   CHOLHDLRATIO  --   --  2.3    < > = values in this interval not displayed.     Recent Labs   Lab Test 06/06/22  1424 07/21/21  0706 05/17/21  1306   LDL 39 38 49     Recent Labs   Lab Test 01/11/23  0554      POTASSIUM 4.2   CHLORIDE 99   CO2 33*   GLC 92   BUN 10.9   CR 0.55   GFRESTIMATED >90   JANELLE 9.2     Recent  Labs   Lab Test 01/11/23  0554 01/10/23  0451 01/09/23  0505   CR 0.55 0.60 0.66     Recent Labs   Lab Test 05/17/21  1306   A1C 5.7*          Recent Labs   Lab Test 01/06/23  1424   WBC 7.5   HGB 13.8   HCT 43.3   MCV 95        Recent Labs   Lab Test 01/06/23  1424 09/08/21  1050 07/21/21  0706   HGB 13.8 13.5 14.4    No results for input(s): TROPONINI in the last 73088 hours.  Recent Labs   Lab Test 01/06/23  1424 11/18/22  1412 12/03/20  1438 05/21/18  1058 05/04/18  1359   BNP  --   --  184*  --   --    NTBNPI 1,122*  --   --   --   --    NTBNP  --  717  --  1,012* 1,174*     Recent Labs   Lab Test 06/06/22  1424   TSH 1.02     Recent Labs   Lab Test 01/11/23  0554 01/10/23  0451 01/09/23  0532   INR 1.56* 1.40* 1.28*

## 2023-01-12 LAB
ANION GAP SERPL CALCULATED.3IONS-SCNC: 6 MMOL/L (ref 7–15)
BACTERIA SPT CULT: ABNORMAL
BACTERIA SPT CULT: ABNORMAL
BUN SERPL-MCNC: 11.3 MG/DL (ref 8–23)
CALCIUM SERPL-MCNC: 8.9 MG/DL (ref 8.8–10.2)
CHLORIDE SERPL-SCNC: 100 MMOL/L (ref 98–107)
CREAT SERPL-MCNC: 0.55 MG/DL (ref 0.51–0.95)
DEPRECATED HCO3 PLAS-SCNC: 32 MMOL/L (ref 22–29)
GFR SERPL CREATININE-BSD FRML MDRD: >90 ML/MIN/1.73M2
GLUCOSE SERPL-MCNC: 93 MG/DL (ref 70–99)
GRAM STAIN RESULT: ABNORMAL
INR PPP: 1.66 (ref 0.85–1.15)
POTASSIUM SERPL-SCNC: 4.1 MMOL/L (ref 3.4–5.3)
SODIUM SERPL-SCNC: 138 MMOL/L (ref 136–145)

## 2023-01-12 PROCEDURE — 250N000013 HC RX MED GY IP 250 OP 250 PS 637: Performed by: INTERNAL MEDICINE

## 2023-01-12 PROCEDURE — 250N000011 HC RX IP 250 OP 636: Performed by: HOSPITALIST

## 2023-01-12 PROCEDURE — 80048 BASIC METABOLIC PNL TOTAL CA: CPT | Performed by: INTERNAL MEDICINE

## 2023-01-12 PROCEDURE — 99232 SBSQ HOSP IP/OBS MODERATE 35: CPT | Performed by: INTERNAL MEDICINE

## 2023-01-12 PROCEDURE — 250N000013 HC RX MED GY IP 250 OP 250 PS 637: Performed by: HOSPITALIST

## 2023-01-12 PROCEDURE — 99232 SBSQ HOSP IP/OBS MODERATE 35: CPT | Performed by: STUDENT IN AN ORGANIZED HEALTH CARE EDUCATION/TRAINING PROGRAM

## 2023-01-12 PROCEDURE — 85610 PROTHROMBIN TIME: CPT | Performed by: INTERNAL MEDICINE

## 2023-01-12 PROCEDURE — 120N000001 HC R&B MED SURG/OB

## 2023-01-12 PROCEDURE — 250N000011 HC RX IP 250 OP 636: Performed by: INTERNAL MEDICINE

## 2023-01-12 PROCEDURE — 36415 COLL VENOUS BLD VENIPUNCTURE: CPT | Performed by: INTERNAL MEDICINE

## 2023-01-12 RX ORDER — WARFARIN SODIUM 3 MG/1
3 TABLET ORAL
Status: COMPLETED | OUTPATIENT
Start: 2023-01-12 | End: 2023-01-12

## 2023-01-12 RX ORDER — FUROSEMIDE 20 MG
20 TABLET ORAL
Status: DISCONTINUED | OUTPATIENT
Start: 2023-01-13 | End: 2023-01-13 | Stop reason: HOSPADM

## 2023-01-12 RX ADMIN — ATORVASTATIN CALCIUM 80 MG: 40 TABLET, FILM COATED ORAL at 09:04

## 2023-01-12 RX ADMIN — ENOXAPARIN SODIUM 40 MG: 40 INJECTION SUBCUTANEOUS at 22:37

## 2023-01-12 RX ADMIN — ACETAMINOPHEN 650 MG: 325 TABLET ORAL at 12:35

## 2023-01-12 RX ADMIN — SPIRONOLACTONE 25 MG: 25 TABLET, FILM COATED ORAL at 09:04

## 2023-01-12 RX ADMIN — ACETAMINOPHEN 650 MG: 325 TABLET ORAL at 20:34

## 2023-01-12 RX ADMIN — ENOXAPARIN SODIUM 40 MG: 40 INJECTION SUBCUTANEOUS at 12:35

## 2023-01-12 RX ADMIN — WARFARIN SODIUM 3 MG: 3 TABLET ORAL at 17:23

## 2023-01-12 RX ADMIN — CARVEDILOL 25 MG: 12.5 TABLET, FILM COATED ORAL at 09:03

## 2023-01-12 RX ADMIN — ACETAMINOPHEN 650 MG: 325 TABLET ORAL at 09:03

## 2023-01-12 RX ADMIN — CARVEDILOL 25 MG: 12.5 TABLET, FILM COATED ORAL at 16:14

## 2023-01-12 RX ADMIN — FUROSEMIDE 20 MG: 10 INJECTION, SOLUTION INTRAMUSCULAR; INTRAVENOUS at 06:20

## 2023-01-12 RX ADMIN — LEVOTHYROXINE SODIUM 75 MCG: 0.03 TABLET ORAL at 09:03

## 2023-01-12 RX ADMIN — ACETAMINOPHEN 650 MG: 325 TABLET ORAL at 16:14

## 2023-01-12 RX ADMIN — DIGOXIN 125 MCG: 125 TABLET ORAL at 09:04

## 2023-01-12 RX ADMIN — LOSARTAN POTASSIUM 25 MG: 25 TABLET, FILM COATED ORAL at 09:05

## 2023-01-12 RX ADMIN — FUROSEMIDE 20 MG: 10 INJECTION, SOLUTION INTRAMUSCULAR; INTRAVENOUS at 16:13

## 2023-01-12 RX ADMIN — ENOXAPARIN SODIUM 40 MG: 40 INJECTION SUBCUTANEOUS at 01:20

## 2023-01-12 ASSESSMENT — ACTIVITIES OF DAILY LIVING (ADL)
ADLS_ACUITY_SCORE: 33

## 2023-01-12 NOTE — PROGRESS NOTES
Cardiology Progress Note    Assessment:  77-year-old female with a history of atrial fibrillation, obstructive sleep apnea, nonobstructive coronary artery disease who follows with Dr. Cameron Soria.  Cardiac MRI a number of months ago reported an EF of 32%.  Patient admitted with acute respiratory insufficiency and hypoxia with influenza as well as productive greenish sputum possibly representing bronchitis.    1.  Acute on chronic congestive heart failure with mildly reduced ejection fraction.  Echocardiogram this admission reported an EF of 45 to 50% with mild decrease in RV systolic function.  Patient has sleep apnea that has gone untreated she reports having claustrophobia.  Coronary angiogram from July 2021 reported mild nonobstructive disease.  [facility] consultation question amyloid.  I did review the MRI with my MRI colleagues who did not feel that there was findings suggesting of amyloid.  Patient was still requiring oxygen and x-ray yesterday suggested some persistent interstitial edema.  She was started on IV Lasix yesterday.  Net urine output of 1.8 L.  Unfortunately no weight recorded today.  Renal function and potassium stable.  N-terminal proBNP was 1122 on August 16.    2.  Question of ventricular dysrhythmias.  Appreciate input from my EP colleague Dr. Abdi.  Please see note.  Findings were felt to be more artifactual than true ventricular tachycardia.    3.  Persistent atrial fibrillation.  Now on carvedilol 25 mg p.o. twice daily in combination with digoxin.  Rate control improved.  Patient reported previous intolerance to metoprolol.  Principal Problem:    Acute respiratory failure with hypoxia (H)  Active Problems:    Morbid obesity due to excess calories (H)    Chronic atrial fibrillation (H)    Moderate mitral regurgitation    BROCK (obstructive sleep apnea)    Hyperlipidemia LDL goal <40    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 atrophy of thyroid    Coronary artery disease involving native  "coronary artery of native heart without angina pectoris    Acute on chronic HFrEF (heart failure with reduced ejection fraction) (H)    Elevated brain natriuretic peptide (BNP) level    Type A influenza Infection    Bronchitis      Plan:  1.  We will plan to try to wean oxygen.  Patient is hoping to be discharged.  2.  We will request follow-up in the heart failure clinic and with Dr. Soria.  3.  Should follow-up with her primary care physician and should have laboratory studies in the next week including a chemistry profile, magnesium, and digoxin level.  Digoxin and spironolactone are reviewed.  4.Additional cardiovascular medications that may be considered as an outpatient include SGLT2 inhibitors given recent infectious process would suggested these to be reevaluated upon follow-up.  5. Patient is considering changing to one of the newer anticoagulant agents but is still concerned about the cost and is having ongoing discussion with her .  6.  INR goal is 2-3.  Still mildly reduced at 1.66.    Current cardiovascular medications include atorvastatin 80 mg daily, carvedilol 25 mg p.o. twice daily, digoxin 0.125 mg daily, lipid, Lasix 20 mg IV every 12 hours with most recent dose 0620 hrs., warfarin, spironolactone 25 mg daily    Subjective:   Caty Ng is seen in follow-up.  Chart notes reviewed.  She was placed on IV furosemide overnight secondary to ongoing oxygen requirement with 1 L and x-ray suggesting interstitial edema.  She reports that she is feeling better today.  Her cough is improved.  Symptoms of shortness of breath are improved.    Objective:   Vital signs in last 24 hours:  VITALS: /71 (BP Location: Left arm)   Pulse 102   Temp 98.2  F (36.8  C) (Oral)   Resp 18   Ht 1.626 m (5' 4\")   Wt 110.6 kg (243 lb 13.3 oz)   SpO2 95%   BMI 41.85 kg/m    BMI: Body mass index is 41.85 kg/m .  Wt Readings from Last 3 Encounters:   01/09/23 110.6 kg (243 lb 13.3 oz)   11/18/22 110.2 kg " (243 lb)   22 105.9 kg (233 lb 8 oz)       Intake/Output Summary (Last 24 hours) at 2023 1153  Last data filed at 2023 0754  Gross per 24 hour   Intake 960 ml   Output 2475 ml   Net -1515 ml       Physical Exam:  General: Sitting comfortably in bed in no acute distress.  Intermittent cough.   Neck: Jugular venous pressure somewhat challenging to assess as she was seated upright in chair.   Lungs: Improved air exchange, no significant crackles.   COR: Irregular, distant, soft systolic murmur   Abd: nondistended, BS present, soft   Extrem: Mild edema.  Neuro: Alert and oriented.    Cardiographics:    Echocardiogram Complete  Order: [id number]   Status: Edited Result - FINAL      Visible to patient: Yes (seen)      Next appt: 2023 at 07:50 AM in Cardiology (Gia Chawla, APRN CNP)      0 Result Notes  Details    Reading Physician Reading Date Result Priority   Husam Serrato DO  [phone] 1/10/2023      Narrative & Impression  [id number]  [id number]  [id number]  [id number]^MELISSA^CONRAD^[place] [address]     Name: CELSA HALE  MRN: [id number]  : 1945  Study Date: 01/10/2023 01:18 PM  Age: 77 yrs  Gender: Female  Patient [facility]: [facility]  Reason For Study: Cardiomyopathy  Ordering Physician: CONRAD TORRES  Performed By: BETH     BSA: 2.1 m2  Height: 64 in  Weight: 243 lb  HR: 110  BP: 128/65 mmHg  ______________________________________________________________________________  Procedure  Complete Portable Echo Adult. Definity (NDC #75489-840) given intravenously.  Lot [id number], exp .  ______________________________________________________________________________  Interpretation Summary     1. The left ventricle is normal in size. Left ventricular function is  decreased. The ejection fraction is 45-50% (mildly reduced). There is mild  global hypokinesia of the left ventricle.  2. Mildly decreased right ventricular systolic  function  3. The left atrium is severely dilated.  4. There is mild (1+) mitral regurgitation.  5. There is mild to moderate (1-2+) tricuspid regurgitation.  6. Inferior vena cava not well visualized for estimation of right atrial  pressure.  ______________________________________________________________________________  Left Ventricle  The left ventricle is normal in size. Left ventricular function is decreased.  The ejection fraction is 45-50% (mildly reduced). There is mild concentric  left ventricular hypertrophy. Diastolic function not assessed due to atrial  fibrillation. There is mild global hypokinesia of the left ventricle. Septal  motion is consistent with conduction abnormality.     Right Ventricle  The right ventricle is not well visualized. The right ventricle is normal  size. Mildly decreased right ventricular systolic function. A moderator band  is seen in the right ventricle.     Atria  The left atrium is severely dilated. The right atrium is moderately dilated.     Mitral Valve  The mitral valve leaflets appear thickened, but open well. There is mild (1+)  mitral regurgitation. There is no mitral valve stenosis.     Tricuspid Valve  Tricuspid valve leaflets appear normal. There is no evidence of tricuspid  stenosis or clinically significant tricuspid regurgitation. The right  ventricular systolic pressure is approximated at 31.9 mmHg plus the right  atrial pressure. There is mild to moderate (1-2+) tricuspid regurgitation.  There is no tricuspid stenosis.     Aortic Valve  The aortic valve is not well visualized. There is mild trileaflet aortic  sclerosis. There is trace aortic regurgitation. No hemodynamically significant  valvular aortic stenosis.     Pulmonic Valve  The pulmonic valve is not well visualized.     Vessels  The aorta root is normal. Normal size ascending aorta. Inferior vena cava not  well visualized for estimation of right atrial pressure.     Pericardium  There is no pericardial  effusion.     ECG 2023, low voltage, atrial fibrillation, possible inferior infarct, occasional PVC or aberrant conduction.                                                  [facility]                                                      CMR Report       MRN:                  [id number]                                  Name:            CELSA HALE                                  :                  1945                                  Scan Date:   2022-10-31 10:41:09                                   Electronically signed by Husam Serrato  16:09:23     VITALS   ==========================================================================================================     HEIGHT: 62.99 in    (160.00 cm)  WEIGHT: 233.51 lbs    (105.92 kgs)  BSA: 2.07 m^2     SUMMARY   ==========================================================================================================     1.  Normal left ventricular size with severe reduction in systolic function. The quantified left  ventricular ejection fraction is 32%.  Normal myocardial wall thickness.  No focal fibrosis or scarring  noted on LGE.  Findings most consistent with non-ischemic cardiomyopathy.  Pre-contrast T1 elevated at 1159  ms.   2.  Normal right ventricular size with moderate reduction in systolic function.  RVEF: 40%.   3.  Severe bilateral atrial enlargement.   4.  There is mild mitral regurgitation.   ==========================================================================================================  REPORT FINDINGS:     LEFT VENTRICLE: LV wall thickness is normal. LV cavity size is normal. LV systolic function is normal. There is no LV  mass/thrombus. LV systolic function is normal. Quantitative LVEF 32 %.     RIGHT VENTRICLE: RV wall thickness is normal. RV cavity size is normal. RV systolic function is normal. There is no RV  mass/thrombus. Quantitative RVEF 40 %.     LA/RA SEPTUM: The atrial septum is  intact. The atrial septum is intact. The atrial septum is intact.     LEFT ATRIUM: LA is severely enlarged.     RIGHT ATRIUM: RA is severely enlarged.     PERICARDIUM: Pericardium is normal. There is no pericardial effusion.     PLEURAL EFFUSION: There is no pleural effusion.     AORTIC VALVE: The aortic valve annulus is normal in size. Aortic valve is trileaflet. There is no aortic regurgitation.  There is no aortic stenosis. The aortic valve annulus is normal in size. The aortic valve annulus is normal  in size.     MITRAL VALVE: The mitral valve annulus is normal in size. Mitral valve leaflets are normal. There is mild mitral  regurgitation. There is no mitral stenosis.      TRICUSPID VALVE: The tricuspid valve annulus is normal in size. Tricuspid valve leaflets are normal. There is mild tricuspid  regurgitation. There is no tricuspid stenosis.     PULMONIC VALVE: The pulmonic valve annulus is normal in size. Pulmonic valve leaflets are normal. There is no pulmonic  regurgitation. There is no pulmonic stenosis.      AORTIC ROOT: The aortic root is normal.     CHEST:  Normal thoracic aortic size and its major branches.   Imaging:   Chest X-Ray:  Study Result    Narrative & Impression   EXAM: XR CHEST PORT 1 VIEW  LOCATION: [facility]  DATE/TIME: 1/11/2023 12:03 PM     INDICATION: SOB  COMPARISON: 01/06/2023                                                                       IMPRESSION:  Bibasilar opacities represent pleural fluid and atelectasis. Underlying airspace disease is possible. No pneumothorax. There is interstitial edema. The cardiomediastinal silhouette is at the upper limits of normal in size. An opacity over   the right upper mediastinum could represent calcification or an external structure.         Lab Results    Chemistry/lipid CBC Cardiac Enzymes/BNP/TSH/INR   Recent Labs   Lab Test 06/06/22  1424 08/28/19  0928 09/09/15  0837   CHOL 104   < > 96   HDL 39*   < > 41*    LDL 39   < > 32   TRIG 128   < > 115   CHOLHDLRATIO  --   --  2.3    < > = values in this interval not displayed.     Recent Labs   Lab Test 06/06/22  1424 07/21/21  0706 05/17/21  1306   LDL 39 38 49     Recent Labs   Lab Test 01/12/23  0613      POTASSIUM 4.1   CHLORIDE 100   CO2 32*   GLC 93   BUN 11.3   CR 0.55   GFRESTIMATED >90   JANELLE 8.9     Recent Labs   Lab Test 01/12/23  0613 01/11/23  0554 01/10/23  0451   CR 0.55 0.55 0.60     Recent Labs   Lab Test 05/17/21  1306   A1C 5.7*          Recent Labs   Lab Test 01/06/23  1424   WBC 7.5   HGB 13.8   HCT 43.3   MCV 95        Recent Labs   Lab Test 01/06/23  1424 09/08/21  1050 07/21/21  0706   HGB 13.8 13.5 14.4    No results for input(s): TROPONINI in the last 89131 hours.  Recent Labs   Lab Test 01/06/23  1424 11/18/22  1412 12/03/20  1438 05/21/18  1058 05/04/18  1359   BNP  --   --  184*  --   --    NTBNPI 1,122*  --   --   --   --    NTBNP  --  717  --  1,012* 1,174*     Recent Labs   Lab Test 06/06/22  1424   TSH 1.02     Recent Labs   Lab Test 01/12/23  0613 01/11/23  0554 01/10/23  0451   INR 1.66* 1.56* 1.40*

## 2023-01-12 NOTE — PLAN OF CARE
Problem: Plan of Care - These are the overarching goals to be used throughout the patient stay.    Goal: Optimal Comfort and Wellbeing  Outcome: Progressing   Goal Outcome Evaluation: Denies pain    Problem: Gas Exchange Impaired  Goal: Optimal Gas Exchange  Outcome: Progressing  VSS on 1L O2 nasal canula     Sleeping quietly in recliner, states this isn't her usual. Doesn't use oxygen at home. 1800mL fluid restriction, strict I&O.

## 2023-01-12 NOTE — PROGRESS NOTES
[facility]    Medicine Progress Note - Hospitalist Service    Date of Admission:  1/6/2023    Assessment & Plan            Caty Ng is a 77 year old female with history of obesity, afib, BROCK, HLD, hypothryoid, non-obstructive CAD, and Chronic systolic heart failure who presents with cough/dyspnea/sputum and hypoxia and is found to have positive Influenza A and acute on chronic systolic heart failure. Hospital Day: 6       1/11:     On 1 liter of oxygen  Cardiology following : on digoxin, discontinued cardizem  Monitor BP and creatinine  Repeated CXR : pulmonary congestion, on iv lasix      Not medically ready for discharge at this time.  Discharge in 1-2 days.      A/p:       Principal Problem:    Acute respiratory failure with hypoxia - multifactorial due to influenza likely exacerbating underlying heart failure.  No sign of PNA on CXR but with cough with green sputum for 9 days, is on Abx for bronchitis. Oxygen to maintain O2 sats > 90%, wean as tolerated    Acute on chronic HFrEF (heart failure with reduced ejection fraction) - with hypoxia, crackles on exam, BLE edema, and elevated NT Pro BNP - Tolerating oral diuresis. continue daily weights, education, and close f/u with CHF clinic after discharge.    Influenza A Infection - Tamiflu x5 days, stop date entered. Antitussives PRN    Bronchitis - ceftin x5 days. Stop date entered. Patient with ongoing productive cough, will order sputum culture.    RLQ abd pain - unclear cause. Possibly abd wall muscle strain from cough. No induration or tenderness. Monitor symptomatically.     Active Problems:    Morbid obesity due to excess calories Body mass index is 41.2 kg/m .    Chronic atrial fibrillation - Some RVR this morning after Coreg and diltiazem held for hypotension. Cardiology now adding digoxin and stopping diltiazem. Coreg increased to 25mg BID. Pharmacy to dose warfarin.     Moderate mitral regurgitation - med adjustments as  "above.  Diurese as above.    BROCK (obstructive sleep apnea) - does not tolerate CPAP. Will be on oxygen for now.    Hyperlipidemia  - continue lipitor as at home    Hypothyroidism - continue levothyroxine as at home    Coronary artery disease involving native coronary artery of native heart without angina pectoris - non-obstructive, continue lipitor, coreg, and warfarin as at home     Subtherapeutic INR - no need for full bridging with Lovenox as only indication is A fib, but given obesity ensure good DVT proph with Lovenox 40mg BID.       DVT Prophylaxis: Moderate risk. Lovenox. Coumadin with subtherapeutic INR  Diet: 2 Gram Sodium Diet Other - please comment    Henderson Catheter: Not present  Lines: None     Cardiac Monitoring: None  Code Status: Full Code      Clinically Significant Risk Factors                         # Severe Obesity: Estimated body mass index is 41.85 kg/m  as calculated from the following:    Height as of this encounter: 1.626 m (5' 4\").    Weight as of this encounter: 110.6 kg (243 lb 13.3 oz).          Disposition Plan   Disposition: Home     Expected Discharge Date: 01/11/2023    Discharge Delays: IV Medication - consider oral or Home Infusion  Procedure Pending (enter procedure & time in comments)    Discharge Comments: ECHO     Medically ready to discharge today: No     The patient's care was discussed with the Patient.    Joey Ortiz MD  Hospitalist Service  [facility]  Securely message with [employer] (more info)  Text page via [facility] Paging/Directory   ______________________________________________________________________      Physical Exam   Vital Signs: Temp: 97.9  F (36.6  C) Temp src: Oral BP: 123/70 Pulse: 98   Resp: 20 SpO2: 93 % O2 Device: Nasal cannula Oxygen Delivery: 1 LPM  Weight: 243 lbs 13.26 oz  General: in no apparent distress, non-toxic and alert female sitting in bedside chair oriented x3  HEENT: Head normocephalic atraumatic, oral mucosa moist. " Sclerae anicteric  Skin: No rashes or lesions  Extremities: 2+ pitting edema bilateral ankles  Psych: Normal affect, mood dysthymic  Neuro: CNII-XII grossly intact, moving all 4 extremities        Medical Decision Making             Data   Recent Results (from the past 12 hour(s))   Magnesium    Collection Time: 01/11/23  2:36 PM   Result Value Ref Range    Magnesium 2.2 1.7 - 2.3 mg/dL

## 2023-01-12 NOTE — DISCHARGE INSTRUCTIONS
Schedule follow-up with primary care physician and should have laboratory studies in the next week including a chemistry profile, magnesium, and digoxin level.

## 2023-01-12 NOTE — PROGRESS NOTES
[facility]    Medicine Progress Note - Hospitalist Service    Date of Admission:  1/6/2023    Assessment & Plan   Caty Ng is a 77 year old female with history of obesity, afib, BROCK, HLD, hypothryoid, non-obstructive CAD, and Chronic systolic heart failure who presents with cough/dyspnea/sputum and hypoxia and is found to have positive Influenza A and acute on chronic systolic heart failure.     Acute respiratory failure with hypoxia - multifactorial due to influenza likely exacerbating underlying heart failure.  No sign of PNA on CXR but with cough with green sputum for 9 days, is on Abx for bronchitis. Oxygen to maintain O2 sats > 90%, wean as tolerated  Has been on room air today since late morning.  Sputum culture growing strep pneumoniae which is sensitive to ceftriaxone which is a third-generation.  Patient has just completed second generation cephalosporin (cefuroxime)  -We will keep monitoring patient clinically for now    Acute on chronic HFrEF (heart failure with reduced ejection fraction) - with hypoxia, crackles on exam, BLE edema, and elevated NT Pro BNP - Tolerating oral diuresis. continue daily weights, education, and close f/u with CHF clinic after discharge.    Influenza A Infection - Tamiflu x5 days, stop date entered. Antitussives PRN    Bronchitis - ceftin x5 days.  Completed .patient with ongoing productive cough, sputum culture growing strep pneumonia which is sensitive to ceftriaxone.  Patient has just finished second generation cephalosporin, cefuroxime    RLQ abd pain - unclear cause. Possibly abd wall muscle strain from cough. No induration or tenderness. Monitor symptomatically.     Active Problems:  Morbid obesity due to excess calories Body mass index is 41.2 kg/m .  Chronic atrial fibrillation -   Digoxin 125 mcg oral daily  Carvedilol 25 mg oral twice daily   Pharmacy to dose warfarin.   Moderate mitral regurgitation - med adjustments as above.  Diurese  "as above.  BROCK (obstructive sleep apnea) - does not tolerate CPAP.   Hyperlipidemia  - continue lipitor as at home  Hypothyroidism - continue levothyroxine as at home  Coronary artery disease involving native coronary artery of native heart without angina pectoris - non-obstructive, continue lipitor, coreg, and warfarin as at home   Subtherapeutic INR - no need for full bridging with Lovenox as only indication is A fib, but given obesity ensure good DVT proph with Lovenox 40mg BID.         DVT Prophylaxis: Moderate risk. Lovenox. Coumadin with subtherapeutic INR  Diet: 2 Gram Sodium Diet Other - please comment    Henderson Catheter: Not present  Lines: None     Cardiac Monitoring: None  Code Status: Full Code          Clinically Significant Risk Factors                            # Severe Obesity: Estimated body mass index is 41.85 kg/m  as calculated from the following:    Height as of this encounter: 1.626 m (5' 4\").    Weight as of this encounter: 110.6 kg (243 lb 13.3 oz).             Diet: 2 Gram Sodium Diet Other - please comment    DVT Prophylaxis: Warfarin  Henderson Catheter: Not present  Lines: None     Cardiac Monitoring: None  Code Status: Full Code      Clinically Significant Risk Factors                        # Severe Obesity: Estimated body mass index is 41.85 kg/m  as calculated from the following:    Height as of this encounter: 1.626 m (5' 4\").    Weight as of this encounter: 110.6 kg (243 lb 13.3 oz).          Disposition Plan      Expected Discharge Date: 01/13/2023    Discharge Delays: IV Medication - consider oral or Home Infusion  Procedure Pending (enter procedure & time in comments)    Discharge Comments: CHANTAL Hi MD  Hospitalist Service  [facility]  Securely message with [employer] (more info)  Text page via [facility] Paging/Directory   ______________________________________________________________________    Interval History   Patient new to me today.  " Chart, labs and imaging reviewed.  Patient was sitting on chair at the bedside.  She was on 1 L oxygen via nasal cannula which was later on discontinued and has been saturating above 91% on room air.  Management plan discussed with the patient and she expressed understanding.    Physical Exam   Vital Signs: Temp: 98.2  F (36.8  C) Temp src: Oral BP: 134/71 Pulse: 102   Resp: 18 SpO2: 91 % (lowest ambulating to br) O2 Device: None (Room air) Oxygen Delivery: 1 LPM  Weight: 243 lbs 13.26 oz    General Appearance: No distress noted.  On 1 L oxygen via nasal cannula  Respiratory: Diminished air entry bilaterally basally  Cardiovascular: S1 and S2 heard, irregular  GI: Obese, nontender, normoactive bowel sounds  Skin: Intact and warm      Medical Decision Making       45 MINUTES SPENT BY ME on the date of service doing chart review, history, exam, documentation & further activities per the note.      Data

## 2023-01-13 VITALS
TEMPERATURE: 98.6 F | BODY MASS INDEX: 41.63 KG/M2 | DIASTOLIC BLOOD PRESSURE: 58 MMHG | HEART RATE: 96 BPM | OXYGEN SATURATION: 92 % | RESPIRATION RATE: 18 BRPM | WEIGHT: 243.83 LBS | SYSTOLIC BLOOD PRESSURE: 141 MMHG | HEIGHT: 64 IN

## 2023-01-13 LAB
ANION GAP SERPL CALCULATED.3IONS-SCNC: 5 MMOL/L (ref 7–15)
BUN SERPL-MCNC: 12.4 MG/DL (ref 8–23)
CALCIUM SERPL-MCNC: 9.4 MG/DL (ref 8.8–10.2)
CHLORIDE SERPL-SCNC: 96 MMOL/L (ref 98–107)
CREAT SERPL-MCNC: 0.57 MG/DL (ref 0.51–0.95)
DEPRECATED HCO3 PLAS-SCNC: 33 MMOL/L (ref 22–29)
GFR SERPL CREATININE-BSD FRML MDRD: >90 ML/MIN/1.73M2
GLUCOSE SERPL-MCNC: 94 MG/DL (ref 70–99)
HOLD SPECIMEN: NORMAL
INR PPP: 1.7 (ref 0.85–1.15)
POTASSIUM SERPL-SCNC: 3.9 MMOL/L (ref 3.4–5.3)
SODIUM SERPL-SCNC: 134 MMOL/L (ref 136–145)

## 2023-01-13 PROCEDURE — 85610 PROTHROMBIN TIME: CPT | Performed by: INTERNAL MEDICINE

## 2023-01-13 PROCEDURE — 99232 SBSQ HOSP IP/OBS MODERATE 35: CPT | Performed by: INTERNAL MEDICINE

## 2023-01-13 PROCEDURE — 250N000013 HC RX MED GY IP 250 OP 250 PS 637: Performed by: HOSPITALIST

## 2023-01-13 PROCEDURE — 250N000013 HC RX MED GY IP 250 OP 250 PS 637: Performed by: INTERNAL MEDICINE

## 2023-01-13 PROCEDURE — 36415 COLL VENOUS BLD VENIPUNCTURE: CPT | Performed by: INTERNAL MEDICINE

## 2023-01-13 PROCEDURE — 80048 BASIC METABOLIC PNL TOTAL CA: CPT | Performed by: STUDENT IN AN ORGANIZED HEALTH CARE EDUCATION/TRAINING PROGRAM

## 2023-01-13 PROCEDURE — 36415 COLL VENOUS BLD VENIPUNCTURE: CPT | Performed by: STUDENT IN AN ORGANIZED HEALTH CARE EDUCATION/TRAINING PROGRAM

## 2023-01-13 PROCEDURE — 99239 HOSP IP/OBS DSCHRG MGMT >30: CPT | Performed by: STUDENT IN AN ORGANIZED HEALTH CARE EDUCATION/TRAINING PROGRAM

## 2023-01-13 RX ORDER — WARFARIN SODIUM 3 MG/1
3 TABLET ORAL
Status: DISCONTINUED | OUTPATIENT
Start: 2023-01-13 | End: 2023-01-13 | Stop reason: HOSPADM

## 2023-01-13 RX ORDER — GUAIFENESIN 200 MG/10ML
15 LIQUID ORAL EVERY 6 HOURS PRN
Qty: 180 ML | Refills: 0 | Status: SHIPPED | OUTPATIENT
Start: 2023-01-13 | End: 2023-07-26

## 2023-01-13 RX ORDER — BENZONATATE 100 MG/1
100 CAPSULE ORAL 3 TIMES DAILY PRN
Qty: 30 CAPSULE | Refills: 0 | Status: SHIPPED | OUTPATIENT
Start: 2023-01-13 | End: 2023-07-26

## 2023-01-13 RX ORDER — WARFARIN SODIUM 2 MG/1
3 TABLET ORAL DAILY
Qty: 30 TABLET | Refills: 0 | Status: SHIPPED | OUTPATIENT
Start: 2023-01-13 | End: 2023-07-05

## 2023-01-13 RX ORDER — SPIRONOLACTONE 25 MG/1
25 TABLET ORAL DAILY
Qty: 30 TABLET | Refills: 0 | Status: SHIPPED | OUTPATIENT
Start: 2023-01-14 | End: 2023-01-23

## 2023-01-13 RX ORDER — CARVEDILOL 25 MG/1
25 TABLET ORAL 2 TIMES DAILY WITH MEALS
Qty: 60 TABLET | Refills: 0 | Status: SHIPPED | OUTPATIENT
Start: 2023-01-13 | End: 2023-01-23

## 2023-01-13 RX ORDER — DIGOXIN 125 MCG
125 TABLET ORAL DAILY
Qty: 14 TABLET | Refills: 0 | Status: SHIPPED | OUTPATIENT
Start: 2023-01-14 | End: 2023-01-26

## 2023-01-13 RX ADMIN — SPIRONOLACTONE 25 MG: 25 TABLET, FILM COATED ORAL at 09:22

## 2023-01-13 RX ADMIN — ACETAMINOPHEN 650 MG: 325 TABLET ORAL at 12:39

## 2023-01-13 RX ADMIN — ACETAMINOPHEN 650 MG: 325 TABLET ORAL at 09:20

## 2023-01-13 RX ADMIN — FUROSEMIDE 20 MG: 20 TABLET ORAL at 05:58

## 2023-01-13 RX ADMIN — DIGOXIN 125 MCG: 125 TABLET ORAL at 09:22

## 2023-01-13 RX ADMIN — LEVOTHYROXINE SODIUM 75 MCG: 0.03 TABLET ORAL at 09:21

## 2023-01-13 RX ADMIN — ATORVASTATIN CALCIUM 80 MG: 40 TABLET, FILM COATED ORAL at 09:21

## 2023-01-13 RX ADMIN — CARVEDILOL 25 MG: 12.5 TABLET, FILM COATED ORAL at 09:22

## 2023-01-13 RX ADMIN — LOSARTAN POTASSIUM 25 MG: 25 TABLET, FILM COATED ORAL at 09:21

## 2023-01-13 ASSESSMENT — ACTIVITIES OF DAILY LIVING (ADL)
ADLS_ACUITY_SCORE: 33

## 2023-01-13 NOTE — PLAN OF CARE
Patient Is A&Ox4. Rated pain a 3 after Tylenol. Sitting up in chair with feet elevated. O2 sats are 92 to 93% on RA.  Problem: Plan of Care - These are the overarching goals to be used throughout the patient stay.    Goal: Optimal Comfort and Wellbeing  Outcome: Progressing     Problem: Gas Exchange Impaired  Goal: Optimal Gas Exchange  Outcome: Progressing     Problem: Heart Failure  Goal: Optimal Coping  Outcome: Progressing  Goal: Effective Oxygenation and Ventilation  Outcome: Progressing   Goal Outcome Evaluation:

## 2023-01-13 NOTE — DISCHARGE SUMMARY
[facility]  Hospitalist Discharge Summary      Date of Admission:  1/6/2023  Date of Discharge:  1/13/2023  1:04 PM  Discharging Provider: Rosy Hi MD  Discharge Service: Hospitalist Service    Discharge Diagnoses   Acute on chronic respiratory failure secondary to decompensated HFrEF  Influenza A infection  Bronchitis  BROCK  CAD  Morbid obesity  Hyperlipidemia  Hypothyroidism        Follow-ups Needed After Discharge   Follow-up Appointments     Follow-up and recommended labs and tests       Follow up with primary care provider, JUNO THOMPSON, within 7 days   for hospital follow- up.  The following labs/tests are recommended:   BMP,Mg, serum digoxin level .  Patient also needs to follow with his   cardiologist.             Unresulted Labs Ordered in the Past 30 Days of this Admission     No orders found from 12/7/2022 to 1/7/2023.      These results will be followed up by PCP    Discharge Disposition   Discharged to home  Condition at discharge: Stable      Hospital Course   Caty Ng is a 77 year old female with history of obesity, afib, BROCK, HLD, hypothryoid, non-obstructive CAD, and Chronic systolic heart failure who presents with cough/dyspnea/sputum and hypoxia and is found to have positive Influenza A and acute on chronic systolic heart failure.     Acute respiratory failure with hypoxia - multifactorial due to influenza likely exacerbating underlying heart failure.  No sign of PNA on CXR but with cough with green sputum for 9 days, is on Abx for bronchitis. Oxygen to maintain O2 sats > 90%, wean as tolerated  Has been on room air for >24 hrs   Sputum culture growing strep pneumoniae which is sensitive to ceftriaxone which is a third-generation.  Patient has just completed second generation cephalosporin (cefuroxime)  Patient remained fever free.  Cough has been gradually improving  Acute on chronic HFrEF (heart failure with reduced ejection fraction) - with  "hypoxia, crackles on exam, BLE edema, and elevated NT Pro BNP - Tolerating oral diuresis. continue daily weights, education, and close f/u with CHF clinic after discharge.  Influenza A Infection - Tamiflu x5 days, stop date entered. Antitussives PRN  Bronchitis - ceftin x5 days.  Completed .patient with ongoing productive cough, sputum culture growing strep pneumonia which is sensitive to ceftriaxone.  just finished second generation cephalosporin, cefuroxime  RLQ abd pain - unclear cause. Possibly abd wall muscle strain from cough. No induration or tenderness. Monitor symptomatically.   Morbid obesity due to excess calories Body mass index is 41.2 kg/m .  Chronic atrial fibrillation -   Digoxin 125 mcg oral daily  Carvedilol 25 mg oral twice daily   Pharmacy to dose warfarin.   Moderate mitral regurgitation - med adjustments as above.  Diurese as above.  BROCK (obstructive sleep apnea) - does not tolerate CPAP.   Hyperlipidemia  - continue lipitor as at home  Hypothyroidism - continue levothyroxine as at home  Coronary artery disease involving native coronary artery of native heart without angina pectoris - non-obstructive, continue lipitor, coreg, and warfarin as at home   Subtherapeutic INR - no need for full bridging with Lovenox as only indication is A fib, but given obesity ensure good DVT proph with Lovenox 40mg BID.     Is clinically stable enough to be discharged home today.  Cleared by cardiology for discharge.      DVT Prophylaxis: Moderate risk. Lovenox. Coumadin with subtherapeutic INR  Diet: 2 Gram Sodium Diet Other - please comment    Henderson Catheter: Not present  Lines: None     Cardiac Monitoring: None  Code Status: Full Code          Clinically Significant Risk Factors                            # Severe Obesity: Estimated body mass index is 41.85 kg/m  as calculated from the following:    Height as of this encounter: 1.626 m (5' 4\").    Weight as of this encounter: 110.6 kg (243 lb 13.3 oz).   "          Consultations This Hospital Stay   PHARMACY TO DOSE WARFARIN  CORE CLINIC EVALUATION IP CONSULT  OCCUPATIONAL THERAPY ADULT IP CONSULT  NUTRITION SERVICES ADULT IP CONSULT  CARE MANAGEMENT / SOCIAL WORK IP CONSULT  CARDIOLOGY IP CONSULT  PHYSICAL THERAPY ADULT IP CONSULT    Code Status   Full Code    Time Spent on this Encounter   I, Rosy Hi MD, personally saw the patient today and spent greater than 30 minutes discharging this patient.       Rosy Hi MD  [address] [address]  Phone: [phone]  Fax: [phone]  ______________________________________________________________________    Physical Exam   Vital Signs: Temp: 98.6  F (37  C) Temp src: Oral BP: (!) 141/58 Pulse: 96   Resp: 18 SpO2: 92 % O2 Device: None (Room air)    Weight: 243 lbs 13.26 oz  General Appearance:  No distress noted.  On 1 L oxygen via nasal cannula  Respiratory: Diminished air entry bilaterally basally  Cardiovascular: S1 and S2 heard, irregular  GI: Obese, nontender, normoactive bowel sounds  Skin: Intact and warm       Primary Care Physician   JUNO THOMPSON    Discharge Orders      Reason for your hospital stay    SOB     Follow-up and recommended labs and tests     Follow up with primary care provider, JUNO THOMPSON, within 7 days for hospital follow- up.  The following labs/tests are recommended: BMP,Mg, serum digoxin level .  Patient also needs to follow with his cardiologist.     Activity    Your activity upon discharge: activity as tolerated     Diet    Follow this diet upon discharge: Orders Placed This Encounter      2 Gram Sodium Diet Other - please comment       Significant Results and Procedures   Most Recent 3 CBC's:Recent Labs   Lab Test 01/06/23  1424 09/08/21  1050 07/21/21  0706 12/03/20  1438   WBC 7.5  --  10.3 6.5   HGB 13.8 13.5 14.4 14.0   MCV 95  --  94 92     --  240 214     Most Recent 3  BMP's:Recent Labs   Lab Test 01/13/23  0541 01/12/23  0613 01/11/23  0554   * 138 140   POTASSIUM 3.9 4.1 4.2   CHLORIDE 96* 100 99   CO2 33* 32* 33*   BUN 12.4 11.3 10.9   CR 0.57 0.55 0.55   ANIONGAP 5* 6* 8   JANELLE 9.4 8.9 9.2   GLC 94 93 92     Most Recent 2 LFT's:Recent Labs   Lab Test 06/06/22  1424 11/05/20  1049   AST 13 12   ALT 10 11   ALKPHOS 83 80   BILITOTAL 1.0 0.8     Most Recent 3 INR's:Recent Labs   Lab Test 01/13/23  0541 01/12/23  0613 01/11/23  0554   INR 1.70* 1.66* 1.56*     Most Recent INR's and Anticoagulation Dosing History:  Anticoagulation Dose History     Recent Dosing and Labs Latest Ref Rng & Units 1/7/2023 1/8/2023 1/9/2023 1/10/2023 1/11/2023 1/12/2023 1/13/2023    Warfarin 2 mg - 2 mg - - - - - -    Warfarin 2.5 mg - - 2.5 mg 2.5 mg 2.5 mg - - -    Warfarin 3 mg - - - - - 3 mg 3 mg -    INR 0.85 - 1.15 - 1.28(H) 1.28(H) 1.40(H) 1.56(H) 1.66(H) 1.70(H)          Discharge Medications   Current Discharge Medication List      START taking these medications    Details   benzonatate (TESSALON) 100 MG capsule Take 1 capsule (100 mg) by mouth 3 times daily as needed for cough  Qty: 30 capsule, Refills: 0    Associated Diagnoses: Bronchitis      digoxin (LANOXIN) 125 MCG tablet Take 1 tablet (125 mcg) by mouth daily  Qty: 14 tablet, Refills: 0    Associated Diagnoses: Acute on chronic HFrEF (heart failure with reduced ejection fraction) (H)      guaiFENesin (ROBITUSSIN) 20 mg/mL liquid Take 15 mLs by mouth every 6 hours as needed for cough  Qty: 180 mL, Refills: 0    Associated Diagnoses: Bronchitis      spironolactone (ALDACTONE) 25 MG tablet Take 1 tablet (25 mg) by mouth daily  Qty: 30 tablet, Refills: 0    Associated Diagnoses: Acute on chronic HFrEF (heart failure with reduced ejection fraction) (H)         CONTINUE these medications which have CHANGED    Details   carvedilol (COREG) 25 MG tablet Take 1 tablet (25 mg) by mouth 2 times daily (with meals)  Qty: 60 tablet, Refills: 0     Associated Diagnoses: Chronic diastolic heart failure (H)      warfarin ANTICOAGULANT (JANTOVEN ANTICOAGULANT) 2 MG tablet Take 1.5 tablets (3 mg) by mouth daily Take 1/2 tablet (1mg) to 1 tablet (2mg) by mouth daily, or as directed.  Adjust dose based on INR results.  Qty: 30 tablet, Refills: 0    Associated Diagnoses: Long term (current) use of anticoagulants; Permanent atrial fibrillation (H)         CONTINUE these medications which have NOT CHANGED    Details   acetaminophen (TYLENOL) 650 MG CR tablet Take 1,300 mg by mouth 2 times daily      amoxicillin (AMOXIL) 500 MG capsule TAKE 4 CAPSULES BY MOUTH 1 HOUR BEFORE DENTAL APPOINTMENT      atorvastatin (LIPITOR) 80 MG tablet TAKE 1 TABLET DAILY  Qty: 90 tablet, Refills: 3    Associated Diagnoses: Non-ischemic cardiomyopathy (H)      furosemide (LASIX) 40 MG tablet Take 1 tablet (40 mg) by mouth daily  Qty: 90 tablet, Refills: 1    Associated Diagnoses: Non-ischemic cardiomyopathy (H)      levothyroxine (SYNTHROID/LEVOTHROID) 75 MCG tablet TAKE 1 TABLET DAILY  Qty: 90 tablet, Refills: 1    Associated Diagnoses: Hypothyroidism, unspecified type      losartan (COZAAR) 25 MG tablet Take 1 tablet (25 mg) by mouth daily  Qty: 90 tablet, Refills: 3    Associated Diagnoses: Chronic diastolic heart failure (H)      Melatonin 10 MG TABS tablet Take 1 tablet by mouth nightly as needed         STOP taking these medications       diltiazem ER COATED BEADS (CARDIZEM CD/CARTIA XT) 240 MG 24 hr capsule Comments:   Reason for Stopping:             Allergies   Allergies   Allergen Reactions     Lisinopril      cough     Metal [Staples] Other (See Comments)     Metal allergy not clearly specified - patient had a stainless steel screw put in her foot that needed to be removed due to allergy/hypersensitivity      Metoprolol Fatigue     Trace Metals      Other reaction(s): Other (see comments)  Metal allergy not clearly specified - patient had a stainless steel screw put in her foot  that needed to be removed due to allergy/hypersensitivity.  Also react to earrings.

## 2023-01-13 NOTE — PLAN OF CARE
"  Problem: Plan of Care - These are the overarching goals to be used throughout the patient stay.    Goal: Plan of Care Review  Description: The Plan of Care Review/Shift note should be completed every shift.  The Outcome Evaluation is a brief statement about your assessment that the patient is improving, declining, or no change.  This information will be displayed automatically on your shift note.  Outcome: Met  Goal: Patient-Specific Goal (Individualized)  Description: You can add care plan individualizations to a care plan. Examples of Individualization might be:  \"Parent requests to be called daily at 9am for status\", \"I have a hard time hearing out of my right ear\", or \"Do not touch me to wake me up as it startles me\".  Outcome: Met  Goal: Absence of Hospital-Acquired Illness or Injury  Outcome: Met  Intervention: Identify and Manage Fall Risk  Recent Flowsheet Documentation  Taken 1/13/2023 0920 by Radha Clark, RN  Safety Promotion/Fall Prevention:   assistive device/personal items within reach   clutter free environment maintained   mobility aid in reach   room organization consistent  Intervention: Prevent Skin Injury  Recent Flowsheet Documentation  Taken 1/13/2023 0920 by Radha Clark, RN  Body Position:   position changed independently   weight shifting  Goal: Optimal Comfort and Wellbeing  Outcome: Met  Goal: Readiness for Transition of Care  Outcome: Met   Goal Outcome Evaluation:  Pt to discharge home.  to pickup at curbside.                      "

## 2023-01-13 NOTE — PROGRESS NOTES
Cardiology Progress Note    Assessment:  77-year-old female with a history of atrial fibrillation, obstructive sleep apnea, nonobstructive coronary artery disease who follows with Dr. Cameron Soria.  Cardiac MRI a number of months ago reported an EF of 32%.  Patient admitted with acute respiratory insufficiency and hypoxia with influenza as well as productive greenish sputum possibly representing bronchitis.     1.  Acute on chronic congestive heart failure with mildly reduced ejection fraction.  Echocardiogram this admission reported an EF of 45 to 50% with mild decrease in RV systolic function.  Patient has sleep apnea that has gone untreated she reports having claustrophobia.  Coronary angiogram from July 2021 reported mild nonobstructive disease.  [facility] consultation question amyloid.  I did review the MRI with my MRI colleagues who did not feel that there was findings suggesting of amyloid.  Patient was still requiring oxygen and x-ray yesterday suggested some persistent interstitial edema.  She was started on IV Lasix yesterday.  Net urine output of 1.8 L.  Unfortunately no weight recorded today.  Renal function and potassium stable.  N-terminal proBNP was 1122 on August 16.  Net urine output of 695 cc.  No weight recorded today.  Patient has been on room air.     2.  Question of ventricular dysrhythmias.  Appreciate input from my EP colleague Dr. Abdi.  Please see note.  Findings were felt to be more artifactual than true ventricular tachycardia.     3.  Persistent atrial fibrillation.  Now on carvedilol 25 mg p.o. twice daily in combination with digoxin.  Rate control improved.  Patient reported previous intolerance to metoprolol.  INR today 1.70.  Patient is contemplating switching to the newer anticoagulant agents but needs to determine further the cost of her medications as well as her 's medications and will update Dr. Soria.  Principal Problem:    Acute respiratory failure with hypoxia  "(H)  Active Problems:    Morbid obesity due to excess calories (H)    Chronic atrial fibrillation (H)    Moderate mitral regurgitation    BROCK (obstructive sleep apnea)    Hyperlipidemia LDL goal <40    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 atrophy of thyroid    Coronary artery disease involving native coronary artery of native heart without angina pectoris    Acute on chronic HFrEF (heart failure with reduced ejection fraction) (H)    Elevated brain natriuretic peptide (BNP) level    Type A influenza Infection  Principal Problem:    Acute respiratory failure with hypoxia (H)  Active Problems:    Morbid obesity due to excess calories (H)    Chronic atrial fibrillation (H)    Moderate mitral regurgitation    BROCK (obstructive sleep apnea)    Hyperlipidemia LDL goal <40    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 atrophy of thyroid    Coronary artery disease involving native coronary artery of native heart without angina pectoris    Acute on chronic HFrEF (heart failure with reduced ejection fraction) (H)    Elevated brain natriuretic peptide (BNP) level    Type A influenza Infection    Bronchitis      Plan:  Will request follow-up in the CHF clinic.  We will request follow-up with Dr. Cameron Soria.  As noted yesterday should follow-up with her primary care physician and have her laboratory studies in the next week including a chemistry profile magnesium and digoxin level.  We favor reevaluation of sleep apnea.  History of prior intolerance to CPAP but this was a number of years ago.    Subjective:   Caty Ng is seen in follow-up.  Chart notes reviewed.  She reports that she is feeling better.  Her breathing is more comfortable.    Objective:   Vital signs in last 24 hours:  VITALS: BP (!) 141/58 (BP Location: Left arm)   Pulse 96   Temp 98.6  F (37  C) (Oral)   Resp 18   Ht 1.626 m (5' 4\")   Wt 110.6 kg (243 lb 13.3 oz)   SpO2 92%   BMI 41.85 kg/m    BMI: Body mass index is 41.85 kg/m .  Wt Readings from Last 3 " Encounters:   23 110.6 kg (243 lb 13.3 oz)   22 110.2 kg (243 lb)   22 105.9 kg (233 lb 8 oz)       Intake/Output Summary (Last 24 hours) at 2023 1145  Last data filed at 2023 0629  Gross per 24 hour   Intake 1260 ml   Output 600 ml   Net 660 ml       Physical Exam:  General: Sitting comfortably in the chair in no acute distress.   Neck: Jugular venous pressure is challenging to assess.  She is examined in the chair probably mildly increased.   Lungs: Improved air exchange.  No significant crackles.   COR: Irregularly irregular rhythm, soft systolic murmur   Abd: nondistended, BS present, nontender   Extrem: Trace to mild edema  Neuro: Alert and oriented.    Cardiographics:      Order: [id number]   Status: Edited Result - FINAL      Visible to patient: Yes (seen)      Next appt: 2023 at 07:50 AM in Cardiology (Gia Chawla, APRN CNP)      0 Result Notes  Details     Reading Physician Reading Date Result Priority   Husam Serraot DO  [phone] 1/10/2023        Narrative & Impression  [id number]  [id number]  [id number]  [id number]^MELISSA^CONRAD^[place] [address]     Name: CELSA HALE  MRN: [id number]  : 1945  Study Date: 01/10/2023 01:18 PM  Age: 77 yrs  Gender: Female  Patient Location: [facility]  Reason For Study: Cardiomyopathy  Ordering Physician: CONRAD TORRES  Performed By: BETH     BSA: 2.1 m2  Height: 64 in  Weight: 243 lb  HR: 110  BP: 128/65 mmHg  ______________________________________________________________________________  Procedure  Complete Portable Echo Adult. Definity (NDC #94440-653) given intravenously.  Lot [id number], exp .  ______________________________________________________________________________  Interpretation Summary     1. The left ventricle is normal in size. Left ventricular function is  decreased. The ejection fraction is 45-50% (mildly reduced). There is mild  global hypokinesia  of the left ventricle.  2. Mildly decreased right ventricular systolic function  3. The left atrium is severely dilated.  4. There is mild (1+) mitral regurgitation.  5. There is mild to moderate (1-2+) tricuspid regurgitation.  6. Inferior vena cava not well visualized for estimation of right atrial  pressure.  ______________________________________________________________________________  Left Ventricle  The left ventricle is normal in size. Left ventricular function is decreased.  The ejection fraction is 45-50% (mildly reduced). There is mild concentric  left ventricular hypertrophy. Diastolic function not assessed due to atrial  fibrillation. There is mild global hypokinesia of the left ventricle. Septal  motion is consistent with conduction abnormality.     Right Ventricle  The right ventricle is not well visualized. The right ventricle is normal  size. Mildly decreased right ventricular systolic function. A moderator band  is seen in the right ventricle.     Atria  The left atrium is severely dilated. The right atrium is moderately dilated.     Mitral Valve  The mitral valve leaflets appear thickened, but open well. There is mild (1+)  mitral regurgitation. There is no mitral valve stenosis.     Tricuspid Valve  Tricuspid valve leaflets appear normal. There is no evidence of tricuspid  stenosis or clinically significant tricuspid regurgitation. The right  ventricular systolic pressure is approximated at 31.9 mmHg plus the right  atrial pressure. There is mild to moderate (1-2+) tricuspid regurgitation.  There is no tricuspid stenosis.     Aortic Valve  The aortic valve is not well visualized. There is mild trileaflet aortic  sclerosis. There is trace aortic regurgitation. No hemodynamically significant  valvular aortic stenosis.     Pulmonic Valve  The pulmonic valve is not well visualized.     Vessels  The aorta root is normal. Normal size ascending aorta. Inferior vena cava not  well visualized for  estimation of right atrial pressure.     Pericardium  There is no pericardial effusion.      ECG January 6, 2023, low voltage, atrial fibrillation, possible inferior infarct, occasional PVC or aberrant conduction.                                                   [facility]       Lab Results    Chemistry/lipid CBC Cardiac Enzymes/BNP/TSH/INR   Recent Labs   Lab Test 06/06/22  1424 08/28/19  0928 09/09/15  0837   CHOL 104   < > 96   HDL 39*   < > 41*   LDL 39   < > 32   TRIG 128   < > 115   CHOLHDLRATIO  --   --  2.3    < > = values in this interval not displayed.     Recent Labs   Lab Test 06/06/22  1424 07/21/21  0706 05/17/21  1306   LDL 39 38 49     Recent Labs   Lab Test 01/13/23  0541   *   POTASSIUM 3.9   CHLORIDE 96*   CO2 33*   GLC 94   BUN 12.4   CR 0.57   GFRESTIMATED >90   JANELLE 9.4     Recent Labs   Lab Test 01/13/23  0541 01/12/23  0613 01/11/23  0554   CR 0.57 0.55 0.55     Recent Labs   Lab Test 05/17/21  1306   A1C 5.7*          Recent Labs   Lab Test 01/06/23  1424   WBC 7.5   HGB 13.8   HCT 43.3   MCV 95        Recent Labs   Lab Test 01/06/23  1424 09/08/21  1050 07/21/21  0706   HGB 13.8 13.5 14.4    No results for input(s): TROPONINI in the last 93802 hours.  Recent Labs   Lab Test 01/06/23  1424 11/18/22  1412 12/03/20  1438 05/21/18  1058 05/04/18  1359   BNP  --   --  184*  --   --    NTBNPI 1,122*  --   --   --   --    NTBNP  --  717  --  1,012* 1,174*     Recent Labs   Lab Test 06/06/22  1424   TSH 1.02     Recent Labs   Lab Test 01/13/23  0541 01/12/23  0613 01/11/23  0554   INR 1.70* 1.66* 1.56*

## 2023-01-13 NOTE — PROGRESS NOTES
Care Management Follow Up    Length of Stay (days): 7    Expected Discharge Date: 01/14/2023     Concerns to be Addressed:       Patient plan of care discussed at interdisciplinary rounds: Yes    Anticipated Discharge Disposition: Home     Anticipated Discharge Services:    Anticipated Discharge DME:      Patient/family educated on Medicare website which has current facility and service quality ratings:    Education Provided on the Discharge Plan:    Patient/Family in Agreement with the Plan:      Referrals Placed by CM/SW:    Private pay costs discussed: Not applicable    Additional Information:  Cardiology following patient.     Social Hx:  Patient from home with spouse. No CM needs identified at this time. Family to transport at time of discharge.     CM to continue to follow care progression and assist with discharge planning as needed.     Stacy Gr RN

## 2023-01-13 NOTE — PROGRESS NOTES
Care Management Discharge Note    Discharge Date: 01/13/2023       Discharge Disposition: Home    Discharge Services:      Discharge DME:      Discharge Transportation: family or friend will provide    Private pay costs discussed: Not applicable    PAS Confirmation Code:    Patient/family educated on Medicare website which has current facility and service quality ratings:      Education Provided on the Discharge Plan:    Persons Notified of Discharge Plans: Patient - Per Team  Patient/Family in Agreement with the Plan: yes    Handoff Referral Completed: Yes    Additional Information:  Patient to discharge home, family/ friend to transport. No CM needs identified at this time.     Stacy Gr RN

## 2023-01-13 NOTE — PLAN OF CARE
Problem: Plan of Care - These are the overarching goals to be used throughout the patient stay.    Goal: Plan of Care Review  Description: The Plan of Care Review/Shift note should be completed every shift.  The Outcome Evaluation is a brief statement about your assessment that the patient is improving, declining, or no change.  This information will be displayed automatically on your shift note.  Outcome: Progressing  Goal: Absence of Hospital-Acquired Illness or Injury  Intervention: Identify and Manage Fall Risk  Recent Flowsheet Documentation  Taken 1/13/2023 0040 by Meghan Vazquez RN  Safety Promotion/Fall Prevention:   assistive device/personal items within reach   clutter free environment maintained   mobility aid in reach   room organization consistent  Intervention: Prevent Skin Injury  Recent Flowsheet Documentation  Taken 1/13/2023 0040 by Meghan Vazquez RN  Body Position:   position changed independently   weight shifting  Goal: Optimal Comfort and Wellbeing  Outcome: Progressing     Problem: Plan of Care - These are the overarching goals to be used throughout the patient stay.    Goal: Optimal Comfort and Wellbeing  Outcome: Progressing     Problem: Gas Exchange Impaired  Goal: Optimal Gas Exchange  Outcome: Progressing  Intervention: Optimize Oxygenation and Ventilation  Recent Flowsheet Documentation  Taken 1/13/2023 0040 by Meghan Vazquez RN  Head of Bed (HOB) Positioning: HOB at 45 degrees     Problem: Heart Failure  Goal: Optimal Coping  Outcome: Progressing  Goal: Optimal Functional Ability  Intervention: Optimize Functional Ability  Recent Flowsheet Documentation  Taken 1/13/2023 0040 by Meghan Vazquez RN  Activity Management: ambulated to bathroom  Goal: Effective Oxygenation and Ventilation  Intervention: Promote Airway Secretion Clearance  Recent Flowsheet Documentation  Taken 1/13/2023 0040 by Meghan Vazquez RN  Cough And Deep Breathing: done independently per patient  Activity  Management: ambulated to bathroom  Intervention: Optimize Oxygenation and Ventilation  Recent Flowsheet Documentation  Taken 1/13/2023 0040 by Meghan Vazquez RN  Head of Bed (HOB) Positioning: HOB at 45 degrees  Goal: Effective Breathing Pattern During Sleep  Intervention: Monitor and Manage Obstructive Sleep Apnea  Recent Flowsheet Documentation  Taken 1/13/2023 0040 by Meghan Vazquez RN  Medication Review/Management: medications reviewed     Problem: Heart Failure  Goal: Effective Oxygenation and Ventilation  Intervention: Promote Airway Secretion Clearance  Recent Flowsheet Documentation  Taken 1/13/2023 0040 by Meghan Vazquez RN  Cough And Deep Breathing: done independently per patient  Activity Management: ambulated to bathroom     Goal Outcome Evaluation:  Pt is alert and oriented. Coughs infrequently with dyspnea on exertion at 91% room air. No noted apnea. BLE with edema +2, sleeps in the reclining chair with LE elevated.

## 2023-01-16 ENCOUNTER — DOCUMENTATION ONLY (OUTPATIENT)
Dept: ANTICOAGULATION | Facility: CLINIC | Age: 78
End: 2023-01-16

## 2023-01-16 ENCOUNTER — TELEPHONE (OUTPATIENT)
Dept: CARE COORDINATION | Facility: CLINIC | Age: 78
End: 2023-01-16

## 2023-01-16 ENCOUNTER — PATIENT OUTREACH (OUTPATIENT)
Dept: CARE COORDINATION | Facility: CLINIC | Age: 78
End: 2023-01-16

## 2023-01-16 DIAGNOSIS — I42.8 NON-ISCHEMIC CARDIOMYOPATHY (H): Primary | ICD-10-CM

## 2023-01-16 DIAGNOSIS — Z79.01 LONG TERM (CURRENT) USE OF ANTICOAGULANTS: Primary | ICD-10-CM

## 2023-01-16 DIAGNOSIS — I34.0 MITRAL VALVE INSUFFICIENCY, UNSPECIFIED ETIOLOGY: ICD-10-CM

## 2023-01-16 ASSESSMENT — ACTIVITIES OF DAILY LIVING (ADL): DEPENDENT_IADLS:: TRANSPORTATION

## 2023-01-16 NOTE — TELEPHONE ENCOUNTER
Writer called patient for post hospital follow up call.  She is in need of a follow up appointment and lab draw within the next 7 days.    Could your team please reach out to her to schedule?

## 2023-01-16 NOTE — PROGRESS NOTES
Clinic Care Coordination Contact    Clinic Care Coordination Contact  OUTREACH with Post Discharge Assessment    Referral Information:  Referral Source: PCP    Primary Diagnosis: Cardiovascular - other    Chief Complaint   Patient presents with     Clinic Care Coordination - Post Hospital        [place] Utilization:   Clinic Utilization  Difficulty keeping appointments:: No  Compliance Concerns: No  No-Show Concerns: No  No PCP office visit in Past Year: No  Utilization    Hospital Admissions  1             ED Visits  1             No Show Count (past year)  0                Current as of: 1/16/2023  9:20 AM              Clinical Concerns:  Current Medical Concerns:    Patient Active Problem List   Diagnosis     Advanced directives, counseling/discussion     Morbid obesity due to excess calories (H)     Hypertriglyceridemia     Chronic atrial fibrillation (H)     Moderate mitral regurgitation     Systolic heart failure (H)     BROCK (obstructive sleep apnea)     Hyperlipidemia LDL goal <40     Long term current use of anticoagulant therapy     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 atrophy of thyroid     History of chondrosarcoma     S/p total knee replacement, bilateral     Primary osteoarthritis of right hip     Scoliosis     DDD (degenerative disc disease), lumbar     Chronic pain of both feet     Coronary artery disease involving native coronary artery of native heart without angina pectoris     Non-ischemic cardiomyopathy (H)     Acute on chronic HFrEF (heart failure with reduced ejection fraction) (H)     Status post coronary angiogram     Dyspnea on exertion     Long term (current) use of anticoagulants     Elevated brain natriuretic peptide (BNP) level     Acute respiratory failure with hypoxia (H)     Type A influenza Infection     Bronchitis         Current Behavioral Concerns:     Education Provided to patient: yes   Pain  Pain (GOAL):: No  Health Maintenance Reviewed: Up to date  Clinical Pathway:  None    Admission:    Admission Date: 01/06/23   Reason for Admission: Shortness of Breath  Discharge:   Discharge Date: 01/13/23  Discharge Diagnosis: Acute on chronic respiratory failure secondary to decompensated HFrEF  Influenza A infection  Bronchitis  BROCK  CAD  Morbid obesity  Hyperlipidemia  Hypothyroidism    Enrollment  Outreach Frequency: monthly    Discharge Assessment  How are you doing now that you are home?: Doing much better.  Still very tired and coughing at times.  How are your symptoms? (Red Flag symptoms escalate to triage hotline per guidelines): Improved  Do you feel your condition is stable enough to be safe at home until your provider visit?: Yes  Does the patient have their discharge instructions? : Yes  Does the patient have questions regarding their discharge instructions? : No  Were you started on any new medications or were there changes to any of your previous medications? : Yes  Does the patient have all of their medications?: No (see comment) (Patient did not have Robitussin.  She will ask her pharmacy or  OTC)  Do you have questions regarding any of your medications? : Yes (see comment) (Full Med Rec completed.  Answered all questions.)  Do you have all of your needed medical supplies or equipment (DME)?  (i.e. oxygen tank, CPAP, cane, etc.): Yes  Discharge follow-up appointment scheduled within 14 calendar days? : No  Is patient agreeable to assistance with scheduling? : Yes (Writer will send a note to PCP team.)    Post-op (CHW CTA Only)  If the patient had a surgery or procedure, do they have any questions for a nurse?: No    Post-op (Clinicians Only)  Did the patient have surgery or a procedure: No  Eating & Drinking: eating and drinking without complaints/concerns  PO Intake: regular diet    Medication Management:  Medication review status: Medications reviewed and no changes reported per patient.        Patient did not have Robitussin.  She will check with her pharmacy or  purchase over the counter.  Went through all medications.  Complete med rec.     Functional Status:  Dependent ADLs:: Ambulation-walker  Dependent IADLs:: Transportation  Bed or wheelchair confined:: No  Mobility Status: Independent w/Device    Living Situation:  Current living arrangement:: I live in a private home with spouse  Type of residence:: Other (Condo)    Lifestyle & Psychosocial Needs:    Social Determinants of Health     Tobacco Use: Medium Risk     Smoking Tobacco Use: Former     Smokeless Tobacco Use: Never     Passive Exposure: Not on file   Alcohol Use: Not on file   Financial Resource Strain: Not on file   Food Insecurity: Not on file   Transportation Needs: Not on file   Physical Activity: Not on file   Stress: Not on file   Social Connections: Not on file   Intimate Partner Violence: Not on file   Depression: Not at risk     PHQ-2 Score: 2   Housing Stability: Not on file     Diet:: No added salt  Inadequate nutrition (GOAL):: No  Tube Feeding: No  Inadequate activity/exercise (GOAL):: No  Significant changes in sleep pattern (GOAL): No  Transportation means:: Family, Regular car     Jehovah's witness or spiritual beliefs that impact treatment:: No  Mental health DX:: No  Mental health management concern (GOAL):: No  Informal Support system:: Family, Neighbors             Resources and Interventions:  Current Resources:      Community Resources: Other (see comment) ([employer] meals)  Supplies Currently Used at Home: Incontinence Supplies  Equipment Currently Used at Home: cane, straight, walker, rolling  Employment Status: retired         Advance Care Plan/Directive  Advanced Care Plans/Directives on file:: In process  Advanced Care Plan/Directive Status: Other (comment field) (mailing HCD paperwork.  Goal created)    Referrals Placed: None     Care Plan:   Care Plan: Medical Cannabis     Problem: HP GENERAL PROBLEM     Goal: I would like a referral for Medical Cannabis in the next 30-90 days.     Start  Date: 6/10/2022 Expected End Date: 9/10/2022    This Visit's Progress: 70%    Note:     Barriers: numerous appointments  Strengths: motivated  Date to Achieve By: 30-90 days  Patient expressed understanding of goal: yes  Action steps to achieve this goal:  1. Patient discussed this referral at her PCP appointment 6/6/22. Completed  2. The CCC RN will send a note to the PCP to inquire on the status of the referral. Completed  3. [facility] will update the patient on the status of the referral in the next 2 weeks. I wanted to wait until I was seen at the [facility]. My appointment with the [facility] was rescheduled to 10/24/22.                      Care Plan: Health Care Directive     Problem: HP GENERAL PROBLEM     Goal: I have accomplished completeing my Health Care Directive and is now scanned into my Medical Record.  Completed 10/17/2022    Start Date: 6/10/2022 Expected End Date: 12/10/2022    This Visit's Progress: 100%    Note:     Personal Plan  If I have any questions about my Health Care Directive I will contact [employer] at [email or web address] website, email [email or web address] or call [facility] [employer] at [phone] (M-Friday 6a to 5p).                    Care Plan: Hip Surgery     Problem: HP GENERAL PROBLEM     Goal: I would like to have my hip surgery completed     Note:     Barriers: medically complex  Strengths: motivated, family support  Patient expressed understanding of goal: per chart review  Action steps to achieve this goal:  1. Surgery has to be rescheduled due to recent hospitalization in January 2023.                          Patient/Caregiver understanding: Patient stated an understanding.    Outreach Frequency: monthly  Future Appointments              In 1 week Gia Chawla, APRN CNP [facility]    In 1 week ARA [facility] HEART FAILURE RN [facility]    In 2  Cameron Zaldivar MD [facility]          Plan: Writer will send a note to PCP's team asking for a f/u appointment.  Patient will contact Cardiology to inquire if she needs an appointment any sooner.

## 2023-01-16 NOTE — TELEPHONE ENCOUNTER
Left message to call back for: patient x1  Information to relay to patient: See below and help schedule accordingly.

## 2023-01-16 NOTE — PROGRESS NOTES
BMP, dig, and magnesium orders placed.  -rah    ===View-only below this line===    ----- Message -----  From: Pavel Mcdermott MD  Sent: 1/13/2023  12:24 PM CST  To: [facility] Scheduling Registration Pool - [facility]    Please arrange follow-up as soon as possible with the CHF clinic as well as Dr. Cameron Soria.  She should have a basic metabolic profile, digoxin and magnesium next week at our office.  Thank

## 2023-01-16 NOTE — TELEPHONE ENCOUNTER
Spoke with patient. Patient declined any provider but Dr. Flores. Dr. Flores next same day was 1/23/23. Patient scheduled.

## 2023-01-18 ENCOUNTER — ANTICOAGULATION THERAPY VISIT (OUTPATIENT)
Dept: ANTICOAGULATION | Facility: CLINIC | Age: 78
End: 2023-01-18

## 2023-01-18 ENCOUNTER — LAB (OUTPATIENT)
Dept: LAB | Facility: CLINIC | Age: 78
End: 2023-01-18
Payer: MEDICARE

## 2023-01-18 DIAGNOSIS — Z79.01 LONG TERM (CURRENT) USE OF ANTICOAGULANTS: ICD-10-CM

## 2023-01-18 DIAGNOSIS — I34.0 MITRAL VALVE INSUFFICIENCY, UNSPECIFIED ETIOLOGY: ICD-10-CM

## 2023-01-18 DIAGNOSIS — I48.21 PERMANENT ATRIAL FIBRILLATION (H): ICD-10-CM

## 2023-01-18 DIAGNOSIS — I42.8 NON-ISCHEMIC CARDIOMYOPATHY (H): ICD-10-CM

## 2023-01-18 DIAGNOSIS — I50.23 ACUTE ON CHRONIC HFREF (HEART FAILURE WITH REDUCED EJECTION FRACTION) (H): ICD-10-CM

## 2023-01-18 DIAGNOSIS — Z79.01 LONG TERM (CURRENT) USE OF ANTICOAGULANTS: Primary | ICD-10-CM

## 2023-01-18 LAB
ANION GAP SERPL CALCULATED.3IONS-SCNC: 13 MMOL/L (ref 7–15)
BUN SERPL-MCNC: 17 MG/DL (ref 8–23)
CALCIUM SERPL-MCNC: 9.5 MG/DL (ref 8.8–10.2)
CHLORIDE SERPL-SCNC: 102 MMOL/L (ref 98–107)
CREAT SERPL-MCNC: 0.73 MG/DL (ref 0.51–0.95)
DEPRECATED HCO3 PLAS-SCNC: 27 MMOL/L (ref 22–29)
DIGOXIN SERPL-MCNC: 0.7 NG/ML (ref 0.6–2)
GFR SERPL CREATININE-BSD FRML MDRD: 85 ML/MIN/1.73M2
GLUCOSE SERPL-MCNC: 119 MG/DL (ref 70–99)
INR BLD: 2.4 (ref 0.9–1.1)
MAGNESIUM SERPL-MCNC: 1.9 MG/DL (ref 1.7–2.3)
POTASSIUM SERPL-SCNC: 4.6 MMOL/L (ref 3.4–5.3)
SODIUM SERPL-SCNC: 142 MMOL/L (ref 136–145)

## 2023-01-18 PROCEDURE — 80048 BASIC METABOLIC PNL TOTAL CA: CPT

## 2023-01-18 PROCEDURE — 36416 COLLJ CAPILLARY BLOOD SPEC: CPT

## 2023-01-18 PROCEDURE — 80162 ASSAY OF DIGOXIN TOTAL: CPT

## 2023-01-18 PROCEDURE — 83735 ASSAY OF MAGNESIUM: CPT

## 2023-01-18 PROCEDURE — 85610 PROTHROMBIN TIME: CPT

## 2023-01-18 PROCEDURE — 36415 COLL VENOUS BLD VENIPUNCTURE: CPT

## 2023-01-18 NOTE — PROGRESS NOTES
ANTICOAGULATION MANAGEMENT     Caty Ng 78 year old female is on warfarin with therapeutic INR result. (Goal INR 2.0-3.0)    Recent labs: (last 7 days)     01/18/23  1500   INR 2.4*       ASSESSMENT       Source(s): Chart Review and Patient/Caregiver Call       Warfarin doses taken: Warfarin taken as instructed    Diet: No new diet changes identified    New illness, injury, or hospitalization: yes acute resp failure/chf    Medication/supplement changes: None noted    Signs or symptoms of bleeding or clotting: No    Previous INR: Subtherapeutic    Additional findings: None       PLAN     Recommended plan for no diet, medication or health factor changes affecting INR     Dosing Instructions: Continue your current warfarin dose with next INR in 1 week with follow-up heart care visit    Summary  As of 1/18/2023    Full warfarin instructions:  1 mg every Fri; 2 mg all other days   Next INR check:  1/27/2023             Telephone call with Abi who verbalizes understanding and agrees to plan    Check at provider office visit 1/27    Education provided:     None required    Plan made per [facility] anticoagulation protocol    Nathalie Glaser RN  Anticoagulation Clinic  1/18/2023    _______________________________________________________________________     Anticoagulation Episode Summary     Current INR goal:  2.0-3.0   TTR:  80.6 % (11.8 mo)   Target end date:  Indefinite   Send INR reminders to:  BREE ANDREW    Indications    Long term (current) use of anticoagulants [Z79.01]  Permanent atrial fibrillation (H) (Resolved) [I48.21]           Comments:           Anticoagulation Care Providers     Provider Role Specialty Phone number    Keke Flores MD Referring Family Medicine [phone]

## 2023-01-23 ENCOUNTER — PATIENT OUTREACH (OUTPATIENT)
Dept: CARE COORDINATION | Facility: CLINIC | Age: 78
End: 2023-01-23

## 2023-01-23 ENCOUNTER — OFFICE VISIT (OUTPATIENT)
Dept: FAMILY MEDICINE | Facility: CLINIC | Age: 78
End: 2023-01-23
Payer: MEDICARE

## 2023-01-23 ENCOUNTER — ANTICOAGULATION THERAPY VISIT (OUTPATIENT)
Dept: ANTICOAGULATION | Facility: CLINIC | Age: 78
End: 2023-01-23

## 2023-01-23 VITALS — HEART RATE: 65 BPM | SYSTOLIC BLOOD PRESSURE: 124 MMHG | OXYGEN SATURATION: 97 % | DIASTOLIC BLOOD PRESSURE: 68 MMHG

## 2023-01-23 DIAGNOSIS — Z79.01 LONG TERM (CURRENT) USE OF ANTICOAGULANTS: Primary | ICD-10-CM

## 2023-01-23 DIAGNOSIS — Z79.01 LONG TERM (CURRENT) USE OF ANTICOAGULANTS: ICD-10-CM

## 2023-01-23 DIAGNOSIS — R35.89 POLYURIA: ICD-10-CM

## 2023-01-23 DIAGNOSIS — I48.21 PERMANENT ATRIAL FIBRILLATION (H): ICD-10-CM

## 2023-01-23 DIAGNOSIS — I50.32 CHRONIC DIASTOLIC HEART FAILURE (H): ICD-10-CM

## 2023-01-23 DIAGNOSIS — I50.23 ACUTE ON CHRONIC HFREF (HEART FAILURE WITH REDUCED EJECTION FRACTION) (H): Primary | ICD-10-CM

## 2023-01-23 DIAGNOSIS — I42.8 NON-ISCHEMIC CARDIOMYOPATHY (H): ICD-10-CM

## 2023-01-23 LAB — INR BLD: 2.4 (ref 0.9–1.1)

## 2023-01-23 PROCEDURE — 85610 PROTHROMBIN TIME: CPT | Performed by: FAMILY MEDICINE

## 2023-01-23 PROCEDURE — 36416 COLLJ CAPILLARY BLOOD SPEC: CPT | Performed by: FAMILY MEDICINE

## 2023-01-23 PROCEDURE — 99495 TRANSJ CARE MGMT MOD F2F 14D: CPT | Performed by: FAMILY MEDICINE

## 2023-01-23 RX ORDER — SPIRONOLACTONE 25 MG/1
25 TABLET ORAL DAILY
Qty: 90 TABLET | Refills: 3 | Status: SHIPPED | OUTPATIENT
Start: 2023-01-23 | End: 2024-01-01

## 2023-01-23 RX ORDER — CARVEDILOL 25 MG/1
25 TABLET ORAL 2 TIMES DAILY WITH MEALS
Qty: 180 TABLET | Refills: 3 | Status: SHIPPED | OUTPATIENT
Start: 2023-01-23 | End: 2023-02-01

## 2023-01-23 NOTE — PROGRESS NOTES
Post Discharge Outreach 1/16/2023   Admission Date 1/6/2023   Reason for Admission Shortness of Breath   Discharge Date 1/13/2023   Discharge Diagnosis Acute on chronic respiratory failure secondary to decompensated HFrEF  Influenza A infection  Bronchitis  BROCK  CAD  Morbid obesity  Hyperlipidemia  Hypothyroidism   How are you doing now that you are home? Doing much better.  Still very tired and coughing at times.   How are your symptoms? (Red Flag symptoms escalate to triage hotline per guidelines) Improved   Do you feel your condition is stable enough to be safe at home until your provider visit? Yes   Does the patient have their discharge instructions?  Yes   Does the patient have questions regarding their discharge instructions?  No   Were you started on any new medications or were there changes to any of your previous medications?  Yes   Does the patient have all of their medications? No (see comment)   Do you have questions regarding any of your medications?  Yes (see comment)   Do you have all of your needed medical supplies or equipment (DME)?  (i.e. oxygen tank, CPAP, cane, etc.) Yes   Discharge follow-up appointment scheduled within 14 calendar days?  No     Hospital Follow-up Visit:    Problem List Items Addressed This Visit        Circulatory    Acute on chronic HFrEF (heart failure with reduced ejection fraction) (H) - Primary    Relevant Medications    spironolactone (ALDACTONE) 25 MG tablet    Other Relevant Orders    BNP-N terminal pro       Other    Long term (current) use of anticoagulants   Other Visit Diagnoses     Chronic diastolic heart failure (H)        Relevant Medications    carvedilol (COREG) 25 MG tablet    Polyuria        Relevant Orders    UA Macro with Reflex to Micro and Culture - lab collect    Permanent atrial fibrillation (H)            I reviewed the patient's discharge summary today and performed an exam which was unremarkable for anything concerning.  She seems to overall be  improving.  I recommended checking a urinalysis today but she was unable to leave a sample.  I recommended checking a BNP and added that as a lab to the blood that was drawn on the 18th.  She has a follow-up appointment in the next week with her cardiologist and I recommended discussing further with him her concerns regarding change in medication and her current symptoms of excessive urination if it is not due to a urinary tract infection.  The patient has surgery scheduled down at [facility] in March.        HPI: Abi is a very pleasant 78-year-old female presenting today for a hospital follow-up visit.  The patient was admitted to the hospital on January 6 with complications from influenza a including acute on chronic congestive heart failure and hypoxia.  The patient had the addition of spironolactone as well as an increase in her carvedilol during that hospitalization.  She continues to cough although it is getting better.  She denies significant shortness of breath.  Her one complaint is regarding excessive urination and incontinence.  The patient has a longer standing history of urinary incontinence but states that it has worsened since her hospitalization.  In addition, she is noticing an increase in frequency and urgency of urination.  She denies any burning with urination.    Hospital/Nursing Home/IP Rehab Facility: [facility]  Date of Admission: 01/06/2023  Date of Discharge: 01/13/2023  Reason(s) for Admission: SOB    Was your hospitalization related to COVID-19? No   Problems taking medications regularly:  None  Medication changes since discharge: None  Problems adhering to non-medication therapy:  None    Summary of hospitalization:  [facility] discharge summary reviewed  Diagnostic Tests/Treatments reviewed.  Follow up needed: none  Other Healthcare Providers Involved in Patient s Care:         Specialist appointment - cardiology  Update since  discharge: improved.   Plan of care communicated with patient and family    Objective:   General: well appearing female in no distress  Lungs: clear  Heart: irregularly irregular

## 2023-01-23 NOTE — TELEPHONE ENCOUNTER
Medication Request  Medication name: furosemide (LASIX) 40 MG tablet  Requested Pharmacy: [facility]  When was patient last seen?:  01/23/23  Patient offered appointment:  No  Okay to leave a detailed message: no

## 2023-01-23 NOTE — PROGRESS NOTES
Clinic Care Coordination Contact    Community Health Worker Follow Up    Care Gaps:     Health Maintenance Due   Topic Date Due     ZOSTER IMMUNIZATION (2 of 3) 03/01/2012     COVID-19 Vaccine (3 - Booster for Moderna series) 05/19/2021     HF ACTION PLAN  02/25/2022     PHQ-2 (once per calendar year)  01/01/2023       Patient accepted scheduling phone number for [facility]  to schedule independently     Care Plan:   Care Plan: Medical Cannabis     Problem: HP GENERAL PROBLEM     Goal: I would like a referral for Medical Cannabis in the next 30-90 days.     Start Date: 6/10/2022 Expected End Date: 9/10/2022    This Visit's Progress: 70% Recent Progress: 70%    Note:     Barriers: numerous appointments  Strengths: motivated  Date to Achieve By: 30-90 days  Patient expressed understanding of goal: yes  Action steps to achieve this goal:  1. Patient discussed this referral at her PCP appointment 6/6/22. Completed  2. The CCC RN will send a note to the PCP to inquire on the status of the referral. Completed  3. [facility] will update the patient on the status of the referral in the next 2 weeks. I wanted to wait until I was seen at the [facility]. My appointment with the [facility] is 1/27/23.                      Care Plan: Health Care Directive     Problem: HP GENERAL PROBLEM     Goal: I have accomplished completeing my Health Care Directive and is now scanned into my Medical Record.  Completed 10/17/2022    Start Date: 6/10/2022 Expected End Date: 12/10/2022    This Visit's Progress: 100%    Note:     Personal Plan  If I have any questions about my Health Care Directive I will contact Aidan Jason at [email or web address], email [email or web address] or call [facility] Nancy Jason at [phone] (M-Friday 6a to 5p).                    Care Plan: Hip Surgery     Problem: HP GENERAL PROBLEM     Goal: I would like to have my hip surgery completed     This Visit's Progress:  30%    Note:     Barriers: medically complex  Strengths: motivated, family support  Patient expressed understanding of goal: per chart review  Action steps to achieve this goal:  1. Surgery has to be rescheduled due to recent hospitalization in January 2023. I Ed delvis Dorsey CHW know that Abi has hip surgery scheduled 3/1/23.                          Intervention and Education during outreach: N/A    CHW Plan: CHW will follow up with patient on 2/23/23.    Fabiola Tanner  Community Health Worker  [facility] Care Coordination   [place] [facility] [facility]  Office: [phone]

## 2023-01-23 NOTE — PROGRESS NOTES
ANTICOAGULATION MANAGEMENT     Caty Ng 78 year old female is on warfarin with therapeutic INR result. (Goal INR 2.0-3.0)    Recent labs: (last 7 days)     01/23/23  1708   INR 2.4*       ASSESSMENT       Source(s): Chart Review       Warfarin doses taken: Warfarin taken as instructed    Diet: No new diet changes identified    New illness, injury, or hospitalization: No    Medication/supplement changes: None noted    Signs or symptoms of bleeding or clotting: No    Previous INR: Therapeutic last visit; previously outside of goal range    Additional findings: None       PLAN     Recommended plan for no diet, medication or health factor changes affecting INR     Dosing Instructions: Continue your current warfarin dose with next INR in 1 week       Summary  As of 1/23/2023    Full warfarin instructions:  1 mg every Fri; 2 mg all other days   Next INR check:  2/1/2023             Detailed voice message left for Abi with dosing instructions and follow up date.     Check at provider office visit feb 1    Education provided:     Contact [phone] with any changes, questions or concerns.     Plan made per [facility] anticoagulation protocol    Nathalie Glaser RN  Anticoagulation Clinic  1/23/2023    _______________________________________________________________________     Anticoagulation Episode Summary     Current INR goal:  2.0-3.0   TTR:  82.1 % (11.8 mo)   Target end date:  Indefinite   Send INR reminders to:  BREE ANDREW    Indications    Long term (current) use of anticoagulants [Z79.01]  Permanent atrial fibrillation (H) (Resolved) [I48.21]           Comments:           Anticoagulation Care Providers     Provider Role Specialty Phone number    Keke Flores MD Referring Family Medicine [phone]

## 2023-01-25 RX ORDER — FUROSEMIDE 40 MG
40 TABLET ORAL DAILY
Qty: 90 TABLET | Refills: 3 | Status: SHIPPED | OUTPATIENT
Start: 2023-01-25 | End: 2023-05-09

## 2023-01-25 NOTE — TELEPHONE ENCOUNTER
"Routing refill request to provider for review/approval because:  Early refill requested.    Last Written Prescription Date:  9/27/22  Last Fill Quantity: 90,  # refills: 1   Last office visit provider:  1/23/23     Requested Prescriptions   Pending Prescriptions Disp Refills     furosemide (LASIX) 40 MG tablet 90 tablet 1     Sig: Take 1 tablet (40 mg) by mouth daily       Diuretics (Including Combos) Protocol Passed - 1/25/2023  8:46 AM        Passed - Blood pressure under 140/90 in past 12 months     BP Readings from Last 3 Encounters:   01/23/23 124/68   01/13/23 (!) 141/58   11/18/22 106/58                 Passed - Recent (12 mo) or future (30 days) visit within the authorizing provider's specialty     Patient has had an office visit with the authorizing provider or a provider within the authorizing providers department within the previous 12 mos or has a future within next 30 days. See \"Patient Info\" tab in inbasket, or \"Choose Columns\" in Meds & Orders section of the refill encounter.              Passed - Medication is active on med list        Passed - Patient is age 18 or older        Passed - No active pregancy on record        Passed - Normal serum creatinine on file in past 12 months     Recent Labs   Lab Test 01/18/23  1500   CR 0.73              Passed - Normal serum potassium on file in past 12 months     Recent Labs   Lab Test 01/18/23  1500   POTASSIUM 4.6                    Passed - Normal serum sodium on file in past 12 months     Recent Labs   Lab Test 01/18/23  1500                 Passed - No positive pregnancy test in past 12 months             Sky Molina RN 01/25/23 8:46 AM  "

## 2023-02-01 ENCOUNTER — OFFICE VISIT (OUTPATIENT)
Dept: CARDIOLOGY | Facility: CLINIC | Age: 78
End: 2023-02-01
Attending: INTERNAL MEDICINE
Payer: MEDICARE

## 2023-02-01 ENCOUNTER — ANTICOAGULATION THERAPY VISIT (OUTPATIENT)
Dept: ANTICOAGULATION | Facility: CLINIC | Age: 78
End: 2023-02-01

## 2023-02-01 ENCOUNTER — LAB (OUTPATIENT)
Dept: CARDIOLOGY | Facility: CLINIC | Age: 78
End: 2023-02-01
Payer: MEDICARE

## 2023-02-01 VITALS
RESPIRATION RATE: 16 BRPM | WEIGHT: 226 LBS | DIASTOLIC BLOOD PRESSURE: 66 MMHG | SYSTOLIC BLOOD PRESSURE: 96 MMHG | HEART RATE: 87 BPM | OXYGEN SATURATION: 93 % | BODY MASS INDEX: 38.79 KG/M2

## 2023-02-01 DIAGNOSIS — R35.89 POLYURIA: ICD-10-CM

## 2023-02-01 DIAGNOSIS — I48.21 PERMANENT ATRIAL FIBRILLATION (H): ICD-10-CM

## 2023-02-01 DIAGNOSIS — G47.33 OSA (OBSTRUCTIVE SLEEP APNEA): ICD-10-CM

## 2023-02-01 DIAGNOSIS — I50.32 CHRONIC DIASTOLIC HEART FAILURE (H): ICD-10-CM

## 2023-02-01 DIAGNOSIS — Z79.01 LONG TERM (CURRENT) USE OF ANTICOAGULANTS: Primary | ICD-10-CM

## 2023-02-01 DIAGNOSIS — I25.10 CORONARY ARTERY DISEASE INVOLVING NATIVE CORONARY ARTERY OF NATIVE HEART WITHOUT ANGINA PECTORIS: ICD-10-CM

## 2023-02-01 DIAGNOSIS — I48.20 CHRONIC ATRIAL FIBRILLATION (H): ICD-10-CM

## 2023-02-01 DIAGNOSIS — I50.20 HEART FAILURE WITH REDUCED EJECTION FRACTION (H): ICD-10-CM

## 2023-02-01 LAB
ALBUMIN UR-MCNC: 10 MG/DL
AMORPH CRY #/AREA URNS HPF: ABNORMAL /HPF
ANION GAP SERPL CALCULATED.3IONS-SCNC: 9 MMOL/L (ref 7–15)
APPEARANCE UR: ABNORMAL
BILIRUB UR QL STRIP: NEGATIVE
BUN SERPL-MCNC: 19.2 MG/DL (ref 8–23)
CALCIUM SERPL-MCNC: 9.2 MG/DL (ref 8.8–10.2)
CHLORIDE SERPL-SCNC: 100 MMOL/L (ref 98–107)
COLOR UR AUTO: ABNORMAL
CREAT SERPL-MCNC: 0.82 MG/DL (ref 0.51–0.95)
DEPRECATED HCO3 PLAS-SCNC: 29 MMOL/L (ref 22–29)
DIGOXIN SERPL-MCNC: 0.7 NG/ML (ref 0.6–2)
GFR SERPL CREATININE-BSD FRML MDRD: 74 ML/MIN/1.73M2
GLUCOSE SERPL-MCNC: 92 MG/DL (ref 70–99)
GLUCOSE UR STRIP-MCNC: NEGATIVE MG/DL
HGB UR QL STRIP: NEGATIVE
INR POINT OF CARE: 2.8 (ref 0.9–1.1)
KETONES UR STRIP-MCNC: NEGATIVE MG/DL
LEUKOCYTE ESTERASE UR QL STRIP: NEGATIVE
MAGNESIUM SERPL-MCNC: 2.2 MG/DL (ref 1.7–2.3)
NITRATE UR QL: NEGATIVE
PH UR STRIP: 5.5 [PH] (ref 5–7)
POTASSIUM SERPL-SCNC: 4.4 MMOL/L (ref 3.4–5.3)
RBC URINE: 0 /HPF
SODIUM SERPL-SCNC: 138 MMOL/L (ref 136–145)
SP GR UR STRIP: 1.02 (ref 1–1.03)
SQUAMOUS EPITHELIAL: 4 /HPF
UROBILINOGEN UR STRIP-MCNC: <2 MG/DL
WBC URINE: 0 /HPF

## 2023-02-01 PROCEDURE — 81001 URINALYSIS AUTO W/SCOPE: CPT

## 2023-02-01 PROCEDURE — 85610 PROTHROMBIN TIME: CPT | Performed by: NURSE PRACTITIONER

## 2023-02-01 PROCEDURE — 99214 OFFICE O/P EST MOD 30 MIN: CPT | Performed by: NURSE PRACTITIONER

## 2023-02-01 PROCEDURE — 80162 ASSAY OF DIGOXIN TOTAL: CPT | Performed by: NURSE PRACTITIONER

## 2023-02-01 PROCEDURE — 36416 COLLJ CAPILLARY BLOOD SPEC: CPT | Performed by: NURSE PRACTITIONER

## 2023-02-01 PROCEDURE — 80048 BASIC METABOLIC PNL TOTAL CA: CPT | Performed by: NURSE PRACTITIONER

## 2023-02-01 PROCEDURE — 36415 COLL VENOUS BLD VENIPUNCTURE: CPT | Performed by: NURSE PRACTITIONER

## 2023-02-01 PROCEDURE — 83735 ASSAY OF MAGNESIUM: CPT | Performed by: NURSE PRACTITIONER

## 2023-02-01 RX ORDER — CARVEDILOL 25 MG/1
TABLET ORAL
Qty: 180 TABLET | Refills: 3
Start: 2023-02-01 | End: 2023-06-03

## 2023-02-01 NOTE — PATIENT INSTRUCTIONS
Caty Ng,    It was a pleasure to see you today at [facility].     My recommendations after this visit include:  - Please follow up with Dr Soria in 2 months, cancel virtual appt tomorrow   - Please follow up with Gia Chawla in 4 months  - I have checked labs and will contact you with results  - Decrease carvedilol to 12.5 mg in the morning and 25 mg in the afternoon  - Continue to monitor your blood pressure and if still low and still dizzy please let me know    Gia Chawla, CNP

## 2023-02-01 NOTE — LETTER
2/1/2023    JUNO THOMPSON MD  [address] [facility] [address]    RE: Caty Ng       Dear Colleague,     I had the pleasure of seeing Caty Ng in the [facility].        Assessment/Recommendations   Assessment:    1.  heart failure with mildly reduced ejection fraction, ejection fraction 45-50%, NYHA class II: Compensated.  She has mild dyspnea on exertion.  She states her symptoms have improved since hospitalization.  Her weight has decreased since being home.  She follows a low-sodium diet.  We discussed her echocardiogram results, monitoring symptoms, following a low-sodium diet and monitoring daily weights.  She met with the nurse clinician for further education.  2.  Permanent atrial fibrillation: Rate controlled.  She continues warfarin for anticoagulation and digoxin and carvedilol for rate control  3.  BROCK: She does not tolerate CPAP  4.  CAD: Coronary angiogram from July 2021 showed mild nonobstructive coronary artery disease.  She continues atorvastatin.  5.  Hypotension: Blood pressure 96/66.  She does monitor her blood pressure at home and has been 100/60.  She is having symptoms of dizziness.    Plan:  1.   Heart failure medications:  - Beta blocker therapy with carvedilol decreased to 12.5 mg in the morning and 25 mg in the afternoon  - ARB therapy with losartan 25 mg daily  - Aldosterone antagonist with spironolactone 25 mg daily  - Diuretic therapy with furosemide  40 mg daily  2.  BMP, magnesium and digoxin level pending  3.  Continue to monitor blood pressure and call clinic if continue to be low and dizzy  4.  Continue monitoring daily weights and follow low-sodium diet    Caty Ng will follow up with Dr Soria in 2 months and in the heart failure clinic in 4 months.     History of Present Illness/Subjective    Ms. Caty Ng is a 78 year old female seen at [facility] heart failure clinic today for continued follow-up.  Her   Ed accompanies her today.  She follows up for heart failure with mildly reduced ejection fraction.  She was hospitalized January 6 to January 13 for acute heart failure and influenza A.  She was started on Tamiflu and diuresed.  She had an echocardiogram which showed ejection fraction of 45 to 50% with mild to moderate tricuspid regurgitation.  She has a past medical history significant for permanent atrial fibrillation, BROCK not on CPAP, morbid obesity, hypothyroidism, hyperlipidemia, nonobstructive CAD.    Today, she states she is feeling well.  She has mild dyspnea on exertion and fatigue.  She denies orthopnea, PND, palpitations, chest pain, abdominal fullness/bloating and lower extremity edema.      She is monitoring home weights which have decreased from 229 to 225 pounds.  She is following a low sodium diet.     ECHOCARDIOGRAM: 1/10/2023  Interpretation Summary     1. The left ventricle is normal in size. Left ventricular function is  decreased. The ejection fraction is 45-50% (mildly reduced). There is mild  global hypokinesia of the left ventricle.  2. Mildly decreased right ventricular systolic function  3. The left atrium is severely dilated.  4. There is mild (1+) mitral regurgitation.  5. There is mild to moderate (1-2+) tricuspid regurgitation.  6. Inferior vena cava not well visualized for estimation of right atrial  pressure.     Physical Examination Review of Systems   BP 96/66 (BP Location: Right arm, Patient Position: Sitting, Cuff Size: Adult Large)   Pulse 87   Resp 16   Wt 102.5 kg (226 lb)   SpO2 93%   BMI 38.79 kg/m    Body mass index is 38.79 kg/m .  Wt Readings from Last 3 Encounters:   02/01/23 102.5 kg (226 lb)   01/09/23 110.6 kg (243 lb 13.3 oz)   11/18/22 110.2 kg (243 lb)       General Appearance:   no acute distress   ENT/Mouth: Wearing mask   EYES:  no scleral icterus, normal conjunctivae   Neck: no thyromegaly   Chest/Lungs:   lungs are clear to auscultation, no rales or  wheezing, equal chest wall expansion    Cardiovascular:    Irregularly irregular. Normal first and second heart sounds with no murmurs, rubs, or gallops, no edema bilaterally    Abdomen:   Obese, bowel sounds are present   Extremities: no cyanosis or clubbing   Skin: no xanthelasma, warm.    Neurologic: normal  bilateral, no tremors     Psychiatric: alert and oriented x3                                              Medical History  Surgical History Family History Social History   Past Medical History:   Diagnosis Date     Allergic rhinitis      Arrhythmia 01/01/2012     Atrial fibrillation (H)      Atrial fibrillation, chronic (H) 06/21/2012    Followed by Dr. 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 Cobos, cardiology EF 45 - 50%     Hyperlipidemia 01/01/2012     Hypertriglyceridemia      Mediastinal mass 2015    benign, [facility] consult      Mitral valve regurgitation 06/24/2012     Moderate mitral regurgitation      Morbid obesity with BMI of 40.0-44.9, adult (H) 04/25/2015     BROCK (obstructive sleep apnea)      Scoliosis      Sleep apnea 01/01/2010    per pt     Thyroid disease     Past Surgical History:   Procedure Laterality Date     CV CORONARY ANGIOGRAM N/A 7/21/2021    Procedure: Coronary Angiogram;  Surgeon: Hai Murphy MD;  Location: [facility] CATH LAB CV     CV LEFT HEART CATH N/A 7/21/2021    Procedure: Left Heart Cath;  Surgeon: Hai Murphy MD;  Location: [facility] CATH LAB CV     HYSTERECTOMY, PAP NO LONGER  INDICATED       JOINT REPLACEMTN, KNEE RT/LT  2007    right     JOINT REPLACEMTN, KNEE RT/LT Left      SALPINGO OOPHORECTOMY,R/L/BRANDO Bilateral      SURGICAL HISTORY OF -       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    Family History   Problem Relation Age of Onset     Heart Disease Mother      Cancer Mother      Diabetes Father      Musculoskeletal Disorder Father      Diabetes Paternal Grandmother      Diabetes Paternal Grandfather      Diabetes Sister      Thrombophilia Daughter         Factor V Leiden Deficiency     Diabetes Sister     Social History     Socioeconomic History     Marital status:      Spouse name: Bill      Number of children: 2     Years of education: Not on file     Highest education level: Not on file   Occupational History     Occupation: [employer]      Employer: RETIRED   Tobacco Use     Smoking status: Former     Packs/day: 0.50     Years: 20.00     Pack years: 10.00     Types: Cigarettes     Quit date: 1982     Years since quittin.1     Smokeless tobacco: Never   Vaping Use     Vaping Use: Never used   Substance and Sexual Activity     Alcohol use: Yes     Comment: 1 beers per week     Drug use: Never     Sexual activity: Yes     Partners: Male     Birth control/protection: Post-menopausal   Other Topics Concern     Parent/sibling w/ CABG, MI or angioplasty before 65F 55M? Yes   Social History Narrative     Not on file     Social Determinants of Health     Financial Resource Strain: Not on file   Food Insecurity: Not on file   Transportation Needs: Not on file   Physical Activity: Not on file   Stress: Not on file   Social Connections: Not on file   Intimate Partner Violence: Not on file   Housing Stability: Not on file          Medications  Allergies   Current Outpatient Medications   Medication Sig Dispense Refill     acetaminophen (TYLENOL) 650 MG CR tablet Take 1,300 mg by mouth 2 times daily       amoxicillin (AMOXIL) 500 MG capsule TAKE 4 CAPSULES BY MOUTH 1  HOUR BEFORE DENTAL APPOINTMENT       atorvastatin (LIPITOR) 80 MG tablet TAKE 1 TABLET DAILY 90 tablet 3     carvedilol (COREG) 25 MG tablet Take 12.5 mg in the morning and 25 mg in the afternoon 180 tablet 3     digoxin (LANOXIN) 125 MCG tablet Take 1 tablet (125 mcg) by mouth daily 30 tablet 0     furosemide (LASIX) 40 MG tablet Take 1 tablet (40 mg) by mouth daily 90 tablet 3     levothyroxine (SYNTHROID/LEVOTHROID) 75 MCG tablet TAKE 1 TABLET DAILY 90 tablet 1     losartan (COZAAR) 25 MG tablet Take 1 tablet (25 mg) by mouth daily 90 tablet 3     Melatonin 10 MG TABS tablet Take 1 tablet by mouth nightly as needed       spironolactone (ALDACTONE) 25 MG tablet Take 1 tablet (25 mg) by mouth daily 90 tablet 3     warfarin ANTICOAGULANT (JANTOVEN ANTICOAGULANT) 2 MG tablet Take 1.5 tablets (3 mg) by mouth daily Take 1/2 tablet (1mg) to 1 tablet (2mg) by mouth daily, or as directed.  Adjust dose based on INR results. 30 tablet 0     benzonatate (TESSALON) 100 MG capsule Take 1 capsule (100 mg) by mouth 3 times daily as needed for cough (Patient not taking: Reported on 2/1/2023) 30 capsule 0     guaiFENesin (ROBITUSSIN) 20 mg/mL liquid Take 15 mLs by mouth every 6 hours as needed for cough (Patient not taking: Reported on 2/1/2023) 180 mL 0    Allergies   Allergen Reactions     Lisinopril      cough     Metal [Staples] Other (See Comments)     Metal allergy not clearly specified - patient had a stainless steel screw put in her foot that needed to be removed due to allergy/hypersensitivity      Metoprolol Fatigue     Trace Metals      Other reaction(s): Other (see comments)  Metal allergy not clearly specified - patient had a stainless steel screw put in her foot that needed to be removed due to allergy/hypersensitivity.  Also react to earrings.         Lab Results    Chemistry/lipid CBC Cardiac Enzymes/BNP/TSH/INR   Lab Results   Component Value Date    CHOL 104 06/06/2022    HDL 39 (L) 06/06/2022    TRIG 128  06/06/2022    BUN 17.0 01/18/2023     01/18/2023    CO2 27 01/18/2023    Lab Results   Component Value Date    WBC 7.5 01/06/2023    HGB 13.8 01/06/2023    HCT 43.3 01/06/2023    MCV 95 01/06/2023     01/06/2023    Lab Results   Component Value Date     (H) 12/03/2020    TSH 1.02 06/06/2022    INR 2.4 (H) 01/23/2023             This note has been dictated using voice recognition software. Any grammatical, typographical, or context distortions are unintentional and inherent to the software    30 minutes spent on the date of encounter doing chart review, review of outside records, review of test results, interpretation with above tests, patient visit, documentation and discussion with family.                Patient seen in clinic with  for HF education s/p recent hospital discharge  Reviewed HF Folder that includes the  HF Sx Awareness & and Weight log booklet highlighting :  __X_patient s type of heart failure  _X__Na management in diet  __X_importance of daily wts   _X__Fluid Guidelines  __X_medication review and importance of compliance     Instructed patient in signs and sx of heart failure, reiterated when to call clinic - reviewed HF hotline # [phone] and after hours call # [phone].  Majority of time was spent reviewing: Patients preferred foods and brands or stores that supply these foods with low sodium content. Patient enjoys salty foods and has a difficult time drinking enough fluid to get to 50oz a day. She does not go over 2 cups of coffee a day and does enjoy several low sodium options discussed. Went over different brands for pre-packaged foods and soups less than 600 mg. E-mailed pt a copy of a clinic resource tool for a visual aid for low sodium options to find in stores. Patient loves Gatorade but agreed to try lower sodium option Body armor.    Patient verbalized understanding of HF discussion.  Plan for f/u with continued HF education reviewed.  No formal f/u  scheduled with nurse clinician for continued education - will continue to reinforce HF management education.      Shaye Jacobsen RN BSN        Thank you for allowing me to participate in the care of your patient.      Sincerely,     RACHEL Bragg [facility]  cc:   Archie Durham MD  [address]  [place] [address]

## 2023-02-01 NOTE — PROGRESS NOTES
Patient seen in clinic with  for HF education s/p recent hospital discharge  Reviewed HF Folder that includes the  HF Sx Awareness & and Weight log booklet highlighting :  __X_patient s type of heart failure  _X__Na management in diet  __X_importance of daily wts   _X__Fluid Guidelines  __X_medication review and importance of compliance     Instructed patient in signs and sx of heart failure, reiterated when to call clinic - reviewed HF hotline # [phone] and after hours call # [phone].  Majority of time was spent reviewing: Patients preferred foods and brands or stores that supply these foods with low sodium content. Patient enjoys salty foods and has a difficult time drinking enough fluid to get to 50oz a day. She does not go over 2 cups of coffee a day and does enjoy several low sodium options discussed. Went over different brands for pre-packaged foods and soups less than 600 mg. E-mailed pt a copy of a clinic resource tool for a visual aid for low sodium options to find in stores. Patient loves Gatorade but agreed to try lower sodium option Body armor.    Patient verbalized understanding of HF discussion.  Plan for f/u with continued HF education reviewed.  No formal f/u scheduled with nurse clinician for continued education - will continue to reinforce HF management education.      Shaye Jacobsen RN BSN

## 2023-02-01 NOTE — PROGRESS NOTES
Assessment/Recommendations   Assessment:    1.  heart failure with mildly reduced ejection fraction, ejection fraction 45-50%, NYHA class II: Compensated.  She has mild dyspnea on exertion.  She states her symptoms have improved since hospitalization.  Her weight has decreased since being home.  She follows a low-sodium diet.  We discussed her echocardiogram results, monitoring symptoms, following a low-sodium diet and monitoring daily weights.  She met with the nurse clinician for further education.  2.  Permanent atrial fibrillation: Rate controlled.  She continues warfarin for anticoagulation and digoxin and carvedilol for rate control  3.  BROCK: She does not tolerate CPAP  4.  CAD: Coronary angiogram from July 2021 showed mild nonobstructive coronary artery disease.  She continues atorvastatin.  5.  Hypotension: Blood pressure 96/66.  She does monitor her blood pressure at home and has been 100/60.  She is having symptoms of dizziness.    Plan:  1.   Heart failure medications:  - Beta blocker therapy with carvedilol decreased to 12.5 mg in the morning and 25 mg in the afternoon  - ARB therapy with losartan 25 mg daily  - Aldosterone antagonist with spironolactone 25 mg daily  - Diuretic therapy with furosemide  40 mg daily  2.  BMP, magnesium and digoxin level pending  3.  Continue to monitor blood pressure and call clinic if continue to be low and dizzy  4.  Continue monitoring daily weights and follow low-sodium diet    Caty Ng will follow up with Dr Soria in 2 months and in the heart failure clinic in 4 months.     History of Present Illness/Subjective    Ms. Caty Ng is a 78 year old female seen at [facility] heart failure clinic today for continued follow-up.  Her  Ed accompanies her today.  She follows up for heart failure with mildly reduced ejection fraction.  She was hospitalized January 6 to January 13 for acute heart failure and influenza A.  She was started on  Tamiflu and diuresed.  She had an echocardiogram which showed ejection fraction of 45 to 50% with mild to moderate tricuspid regurgitation.  She has a past medical history significant for permanent atrial fibrillation, BROCK not on CPAP, morbid obesity, hypothyroidism, hyperlipidemia, nonobstructive CAD.    Today, she states she is feeling well.  She has mild dyspnea on exertion and fatigue.  She denies orthopnea, PND, palpitations, chest pain, abdominal fullness/bloating and lower extremity edema.      She is monitoring home weights which have decreased from 229 to 225 pounds.  She is following a low sodium diet.     ECHOCARDIOGRAM: 1/10/2023  Interpretation Summary     1. The left ventricle is normal in size. Left ventricular function is  decreased. The ejection fraction is 45-50% (mildly reduced). There is mild  global hypokinesia of the left ventricle.  2. Mildly decreased right ventricular systolic function  3. The left atrium is severely dilated.  4. There is mild (1+) mitral regurgitation.  5. There is mild to moderate (1-2+) tricuspid regurgitation.  6. Inferior vena cava not well visualized for estimation of right atrial  pressure.     Physical Examination Review of Systems   BP 96/66 (BP Location: Right arm, Patient Position: Sitting, Cuff Size: Adult Large)   Pulse 87   Resp 16   Wt 102.5 kg (226 lb)   SpO2 93%   BMI 38.79 kg/m    Body mass index is 38.79 kg/m .  Wt Readings from Last 3 Encounters:   02/01/23 102.5 kg (226 lb)   01/09/23 110.6 kg (243 lb 13.3 oz)   11/18/22 110.2 kg (243 lb)       General Appearance:   no acute distress   ENT/Mouth: Wearing mask   EYES:  no scleral icterus, normal conjunctivae   Neck: no thyromegaly   Chest/Lungs:   lungs are clear to auscultation, no rales or wheezing, equal chest wall expansion    Cardiovascular:    Irregularly irregular. Normal first and second heart sounds with no murmurs, rubs, or gallops, no edema bilaterally    Abdomen:   Obese, bowel sounds are  present   Extremities: no cyanosis or clubbing   Skin: no xanthelasma, warm.    Neurologic: normal  bilateral, no tremors     Psychiatric: alert and oriented x3                                              Medical History  Surgical History Family History Social History   Past Medical History:   Diagnosis Date     Allergic rhinitis      Arrhythmia 01/01/2012     Atrial fibrillation (H)      Atrial fibrillation, chronic (H) 06/21/2012    Followed by Dr. 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 Cobos, cardiology EF 45 - 50%     Hyperlipidemia 01/01/2012     Hypertriglyceridemia      Mediastinal mass 2015    benign, [facility] consult      Mitral valve regurgitation 06/24/2012     Moderate mitral regurgitation      Morbid obesity with BMI of 40.0-44.9, adult (H) 04/25/2015     BROCK (obstructive sleep apnea)      Scoliosis      Sleep apnea 01/01/2010    per pt     Thyroid disease     Past Surgical History:   Procedure Laterality Date     CV CORONARY ANGIOGRAM N/A 7/21/2021    Procedure: Coronary Angiogram;  Surgeon: Hai Murphy MD;  Location: [facility] CATH LAB CV     CV LEFT HEART CATH N/A 7/21/2021    Procedure: Left Heart Cath;  Surgeon: Hai Murphy MD;  Location: [facility] CATH LAB CV     HYSTERECTOMY, PAP NO LONGER INDICATED  1989     JOINT REPLACEMTN, KNEE RT/LT  2007    right     JOINT REPLACEMTN, KNEE RT/LT Left      SALPINGO OOPHORECTOMY,R/L/BRANDO Bilateral 2003     SURGICAL HISTORY OF -   1989    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 part of  pelvic bone    Family History   Problem Relation Age of Onset     Heart Disease Mother      Cancer Mother      Diabetes Father      Musculoskeletal Disorder Father      Diabetes Paternal Grandmother      Diabetes Paternal Grandfather      Diabetes Sister      Thrombophilia Daughter         Factor V Leiden Deficiency     Diabetes Sister     Social History     Socioeconomic History     Marital status:      Spouse name: Ed      Number of children: 2     Years of education: Not on file     Highest education level: Not on file   Occupational History     Occupation: [employer]      Employer: RETIRED   Tobacco Use     Smoking status: Former     Packs/day: 0.50     Years: 20.00     Pack years: 10.00     Types: Cigarettes     Quit date: 1982     Years since quittin.1     Smokeless tobacco: Never   Vaping Use     Vaping Use: Never used   Substance and Sexual Activity     Alcohol use: Yes     Comment: 1 beers per week     Drug use: Never     Sexual activity: Yes     Partners: Male     Birth control/protection: Post-menopausal   Other Topics Concern     Parent/sibling w/ CABG, MI or angioplasty before 65F 55M? Yes   Social History Narrative     Not on file     Social Determinants of Health     Financial Resource Strain: Not on file   Food Insecurity: Not on file   Transportation Needs: Not on file   Physical Activity: Not on file   Stress: Not on file   Social Connections: Not on file   Intimate Partner Violence: Not on file   Housing Stability: Not on file          Medications  Allergies   Current Outpatient Medications   Medication Sig Dispense Refill     acetaminophen (TYLENOL) 650 MG CR tablet Take 1,300 mg by mouth 2 times daily       amoxicillin (AMOXIL) 500 MG capsule TAKE 4 CAPSULES BY MOUTH 1 HOUR BEFORE DENTAL APPOINTMENT       atorvastatin (LIPITOR) 80 MG tablet TAKE 1 TABLET DAILY 90 tablet 3     carvedilol (COREG) 25 MG tablet Take 12.5 mg in the morning and 25 mg in the afternoon 180 tablet 3      digoxin (LANOXIN) 125 MCG tablet Take 1 tablet (125 mcg) by mouth daily 30 tablet 0     furosemide (LASIX) 40 MG tablet Take 1 tablet (40 mg) by mouth daily 90 tablet 3     levothyroxine (SYNTHROID/LEVOTHROID) 75 MCG tablet TAKE 1 TABLET DAILY 90 tablet 1     losartan (COZAAR) 25 MG tablet Take 1 tablet (25 mg) by mouth daily 90 tablet 3     Melatonin 10 MG TABS tablet Take 1 tablet by mouth nightly as needed       spironolactone (ALDACTONE) 25 MG tablet Take 1 tablet (25 mg) by mouth daily 90 tablet 3     warfarin ANTICOAGULANT (JANTOVEN ANTICOAGULANT) 2 MG tablet Take 1.5 tablets (3 mg) by mouth daily Take 1/2 tablet (1mg) to 1 tablet (2mg) by mouth daily, or as directed.  Adjust dose based on INR results. 30 tablet 0     benzonatate (TESSALON) 100 MG capsule Take 1 capsule (100 mg) by mouth 3 times daily as needed for cough (Patient not taking: Reported on 2/1/2023) 30 capsule 0     guaiFENesin (ROBITUSSIN) 20 mg/mL liquid Take 15 mLs by mouth every 6 hours as needed for cough (Patient not taking: Reported on 2/1/2023) 180 mL 0    Allergies   Allergen Reactions     Lisinopril      cough     Metal [Staples] Other (See Comments)     Metal allergy not clearly specified - patient had a stainless steel screw put in her foot that needed to be removed due to allergy/hypersensitivity      Metoprolol Fatigue     Trace Metals      Other reaction(s): Other (see comments)  Metal allergy not clearly specified - patient had a stainless steel screw put in her foot that needed to be removed due to allergy/hypersensitivity.  Also react to earrings.         Lab Results    Chemistry/lipid CBC Cardiac Enzymes/BNP/TSH/INR   Lab Results   Component Value Date    CHOL 104 06/06/2022    HDL 39 (L) 06/06/2022    TRIG 128 06/06/2022    BUN 17.0 01/18/2023     01/18/2023    CO2 27 01/18/2023    Lab Results   Component Value Date    WBC 7.5 01/06/2023    HGB 13.8 01/06/2023    HCT 43.3 01/06/2023    MCV 95 01/06/2023      01/06/2023    Lab Results   Component Value Date     (H) 12/03/2020    TSH 1.02 06/06/2022    INR 2.4 (H) 01/23/2023             This note has been dictated using voice recognition software. Any grammatical, typographical, or context distortions are unintentional and inherent to the software    30 minutes spent on the date of encounter doing chart review, review of outside records, review of test results, interpretation with above tests, patient visit, documentation and discussion with family.

## 2023-02-01 NOTE — PROGRESS NOTES
ANTICOAGULATION MANAGEMENT     Caty Ng 78 year old female is on warfarin with therapeutic INR result. (Goal INR 2.0-3.0)    Recent labs: (last 7 days)     02/01/23  1540   INR 2.8*       ASSESSMENT       Source(s): Chart Review and Template       Warfarin doses taken: Warfarin taken as instructed    Diet: No new diet changes identified    New illness, injury, or hospitalization: No    Medication/supplement changes: None noted carvedilol dose change  low sodium diet    Signs or symptoms of bleeding or clotting: No    Previous INR: Therapeutic last 2(+) visits    Additional findings: None       PLAN     Recommended plan for no diet, medication or health factor changes affecting INR     Dosing Instructions: Continue your current warfarin dose with next INR in 1-2 weeks       Summary  As of 2/1/2023    Full warfarin instructions:  1 mg every Fri; 2 mg all other days   Next INR check:  2/15/2023             Detailed voice message left for Abi with dosing instructions and follow up date.     Lab visit scheduled    Education provided:     Contact [phone] with any changes, questions or concerns.     Plan made per [facility] anticoagulation protocol    Nathalie Glaser RN  Anticoagulation Clinic  2/1/2023    _______________________________________________________________________     Anticoagulation Episode Summary     Current INR goal:  2.0-3.0   TTR:  83.4 % (11.8 mo)   Target end date:  Indefinite   Send INR reminders to:  BREE ANDREW    Indications    Long term (current) use of anticoagulants [Z79.01]  Permanent atrial fibrillation (H) (Resolved) [I48.21]           Comments:           Anticoagulation Care Providers     Provider Role Specialty Phone number    Keke Flores MD Referring Family Medicine [phone]

## 2023-02-13 NOTE — PROGRESS NOTES
"    Cardiology Progress Note    Assessment:    Influenza A infection, acute  Chronic systolic heart failure, possibly mild fluid overload secondary to viral infection  Nonischemic dilated cardiomyopathy with moderately depressed LV systolic function  Coronary artery disease, mild nonobstructive  Hypertension, mild, asymptomatic  Hypercholesterolemia on statin  Permanent atrial fibrillation with mildly elevated ventricular response failure likely as a result of acute pulmonary infection and discontinuation of diltiazem  Obesity    Plan:  Continue diuresis  Continue current dose of carvedilol, stop diltiazem because of the negative inotropic effect  Hold losartan because of borderline blood pressure  SIngle dose of IV digoxin followed by 0.125 mg a day to improve rate control and help with cardiomyopathy  Subjective:   Still mildly short of breath    Objective:   /63 (BP Location: Left arm, Patient Position: Sitting, Cuff Size: Adult Large)   Pulse (!) 129   Temp 99.1  F (37.3  C) (Oral)   Resp 20   Ht 1.626 m (5' 4\")   Wt 108.8 kg (239 lb 14.4 oz)   SpO2 92%   BMI 41.18 kg/m      Intake/Output Summary (Last 24 hours) at 1/8/2023 1153  Last data filed at 1/8/2023 1153  Gross per 24 hour   Intake 1400 ml   Output 1000 ml   Net 400 ml         Physical Exam:  GENERAL: no distress  NECK: No JVD  LUNGS: Clear to auscultation.  CARDIAC: irregular  rhythm, S1 & S2 normal.  No heaves, thrills, gallops or murmurs.  ABDOMEN: flat, negative hepatosplenomegaly, soft and non-tender.  EXTREMITIES: No evidence of cyanosis, clubbing or edema.    Current Facility-Administered Medications Ordered in Epic   Medication Dose Route Frequency Provider Last Rate Last Admin     acetaminophen (TYLENOL) tablet 650 mg  650 mg Oral 4x Daily Omer Gama MD   650 mg at 01/08/23 0810     atorvastatin (LIPITOR) tablet 80 mg  80 mg Oral Daily Omer Gmaa MD   80 mg at 01/08/23 0810     benzonatate (TESSALON) capsule 100 mg  100 mg " Oral TID PRN Petra Canales MD   100 mg at 01/08/23 0947     carvedilol (COREG) tablet 25 mg  25 mg Oral BID w/meals Petra Canales MD   25 mg at 01/08/23 0802     cefuroxime (CEFTIN) tablet 250 mg  250 mg Oral Q12H [facility] (08/20) Omer Gama MD   250 mg at 01/08/23 0810     diltiazem ER COATED BEADS (CARDIZEM CD/CARTIA XT) 24 hr capsule 240 mg  240 mg Oral Daily Petra Canales MD   240 mg at 01/08/23 0813     enoxaparin ANTICOAGULANT (LOVENOX) injection 105 mg  1 mg/kg Subcutaneous Q12H Omer Gama MD   105 mg at 01/08/23 1150     furosemide (LASIX) tablet 20 mg  20 mg Oral BID Petra Canales MD         guaiFENesin (ROBITUSSIN) 20 mg/mL solution 15 mL  15 mL Oral Q6H PRN Petra Canales MD         levothyroxine (SYNTHROID/LEVOTHROID) tablet 75 mcg  75 mcg Oral Daily Omer Gama MD   75 mcg at 01/08/23 0803     losartan (COZAAR) tablet 25 mg  25 mg Oral Daily Petra Canales MD   25 mg at 01/07/23 0817     oseltamivir (TAMIFLU) capsule 75 mg  75 mg Oral BID Omer Gama MD   75 mg at 01/08/23 0810     spironolactone (ALDACTONE) tablet 25 mg  25 mg Oral Daily Petra Canales MD         warfarin ANTICOAGULANT (COUMADIN) tablet 2.5 mg  2.5 mg Oral ONCE at 18:00 Petra Canales MD         Warfarin Dose Required Daily - Pharmacist Managed  1 each Oral See Admin Instructions Omer Gama MD         No current [facility]-ordered outpatient medications on file.       Cardiographics:    Telemetry: A. fib     Cardiac MRI: October 2022  1.  Normal left ventricular size with severe reduction in systolic function. The quantified left  ventricular ejection fraction is 32%.  Normal myocardial wall thickness.  No focal fibrosis or scarring  noted on LGE.  Findings most consistent with non-ischemic cardiomyopathy.  Pre-contrast T1 elevated at 1159  ms.   2.  Normal right ventricular size with moderate reduction in systolic function.  RVEF: 40%.   3.  Severe bilateral atrial enlargement.   4.  There is mild mitral  regurgitation.  Chest X-Ray: Negative     Lab Results    Chemistry/lipid CBC Cardiac Enzymes/BNP/TSH/INR   Recent Labs   Lab Test 06/06/22  1424 08/28/19  0928 09/09/15  0837   CHOL 104   < > 96   HDL 39*   < > 41*   LDL 39   < > 32   TRIG 128   < > 115   CHOLHDLRATIO  --   --  2.3    < > = values in this interval not displayed.     Recent Labs   Lab Test 06/06/22  1424 07/21/21  0706 05/17/21  1306   LDL 39 38 49     Recent Labs   Lab Test 01/08/23  0611 01/07/23  0548   NA  --  140   POTASSIUM 3.8 3.6   CHLORIDE  --  98   CO2  --  35*   GLC  --  95   BUN  --  12.1   CR  --  0.69   GFRESTIMATED  --  90   JANELLE  --  9.1     Recent Labs   Lab Test 01/07/23  0548 01/06/23  1424 06/06/22  1424   CR 0.69 0.63 0.82     Recent Labs   Lab Test 05/17/21  1306   A1C 5.7*          Recent Labs   Lab Test 01/06/23  1424   WBC 7.5   HGB 13.8   HCT 43.3   MCV 95        Recent Labs   Lab Test 01/06/23  1424 09/08/21  1050 07/21/21  0706   HGB 13.8 13.5 14.4    No results for input(s): TROPONINI in the last 55310 hours.  Recent Labs   Lab Test 01/06/23  1424 11/18/22  1412 12/03/20  1438 05/21/18  1058 05/04/18  1359   BNP  --   --  184*  --   --    NTBNPI 1,122*  --   --   --   --    NTBNP  --  717  --  1,012* 1,174*     Recent Labs   Lab Test 06/06/22  1424   TSH 1.02     Recent Labs   Lab Test 01/08/23  0611 01/07/23  0548 01/06/23  1424   INR 1.28* 1.24* 1.26*                   Controlled with current regime

## 2023-02-17 ENCOUNTER — TELEPHONE (OUTPATIENT)
Dept: CARDIOLOGY | Facility: CLINIC | Age: 78
End: 2023-02-17
Payer: MEDICARE

## 2023-02-17 DIAGNOSIS — I25.10 CORONARY ARTERY DISEASE INVOLVING NATIVE CORONARY ARTERY OF NATIVE HEART WITHOUT ANGINA PECTORIS: ICD-10-CM

## 2023-02-17 DIAGNOSIS — I50.23 ACUTE ON CHRONIC HFREF (HEART FAILURE WITH REDUCED EJECTION FRACTION) (H): Primary | ICD-10-CM

## 2023-02-17 NOTE — TELEPHONE ENCOUNTER
Dr. Yang lackey'rakesh ordering of NM Deborah.   Orders placed.   Patient was contacted and updated.   Transferred to scheduling to arrange. -trevon

## 2023-02-17 NOTE — TELEPHONE ENCOUNTER
Dr. Soria, okay to order NM Deborah as requested by cardiologist at [facility] for pre-op hip surgery? -[facility] Call Center    Phone Message    May a detailed message be left on voicemail: yes     Reason for Call: Other: Pt called in stating she need to have a chemical stress test but there is a  bad communication between [facility] and Dr. Soria's care team, pt is scheduled to have surgery March 1st. Pleae reach out to pt at [phone] to discuss.     Action Taken: Other: Cardiology    Travel Screening: Not Applicable     Thank you!  [facility]

## 2023-02-20 ENCOUNTER — TELEPHONE (OUTPATIENT)
Dept: FAMILY MEDICINE | Facility: CLINIC | Age: 78
End: 2023-02-20

## 2023-02-20 ENCOUNTER — LAB (OUTPATIENT)
Dept: LAB | Facility: CLINIC | Age: 78
End: 2023-02-20
Payer: MEDICARE

## 2023-02-20 ENCOUNTER — DOCUMENTATION ONLY (OUTPATIENT)
Dept: ANTICOAGULATION | Facility: CLINIC | Age: 78
End: 2023-02-20

## 2023-02-20 ENCOUNTER — ANTICOAGULATION THERAPY VISIT (OUTPATIENT)
Dept: ANTICOAGULATION | Facility: CLINIC | Age: 78
End: 2023-02-20

## 2023-02-20 DIAGNOSIS — I48.20 CHRONIC ATRIAL FIBRILLATION (H): ICD-10-CM

## 2023-02-20 DIAGNOSIS — Z79.01 LONG TERM (CURRENT) USE OF ANTICOAGULANTS: Primary | ICD-10-CM

## 2023-02-20 DIAGNOSIS — I48.21 PERMANENT ATRIAL FIBRILLATION (H): ICD-10-CM

## 2023-02-20 DIAGNOSIS — Z79.01 LONG TERM (CURRENT) USE OF ANTICOAGULANTS: ICD-10-CM

## 2023-02-20 LAB — INR BLD: 4 (ref 0.9–1.1)

## 2023-02-20 PROCEDURE — 85610 PROTHROMBIN TIME: CPT

## 2023-02-20 PROCEDURE — 36416 COLLJ CAPILLARY BLOOD SPEC: CPT

## 2023-02-20 NOTE — TELEPHONE ENCOUNTER
Patient Returning Call    Reason for call:  INR     Information relayed to patient:  Pt called back regarding INR no answer from number left in chart    Patient has additional questions:  No    What are your questions/concerns:  no    Could we send this information to you in [employer] or would you prefer to receive a phone call?:   Patient would prefer a phone call   Okay to leave a detailed message?: Yes at Home number on file [phone] (home)

## 2023-02-20 NOTE — PROGRESS NOTES
Total Hip Replacement 3/1/23    Hold plan needed from [facility] per [facility] visit note on 2/15/23.     Previous plan for this prcoedure was done at [facility] visit 11/16/22 but surgery was rescheduled several times:      Most recent Creatinine: 0.82 on 2/1/23    Priscila Monroe RN

## 2023-02-20 NOTE — PROGRESS NOTES
ANTICOAGULATION MANAGEMENT       ASSESSMENT       Source(s): Chart Review and Patient/Caregiver Call       Warfarin doses taken: Warfarin taken as instructed    Diet: No new diet changes identified    New illness, injury, or hospitalization: No    Medication/supplement changes: Increased Tylenol use for hip pain. Took a CBD gummy this morning before INR-most likely cause of elevated INR.    Signs or symptoms of bleeding or clotting: No    Previous INR: Therapeutic last 2(+) visits       PLAN     Recommended plan for temporary change(s) affecting INR     Dosing Instructions: hold dose then continue your current warfarin dose with next INR in 1 week after hip replacement.      We will call you later this week to verify warfarin hold and lovenox dosing instructions for upcoming hip replacement surgery on 3/1/23.    Summary  As of 2/20/2023    Full warfarin instructions:  2/20: Hold; Otherwise 1 mg every Fri; 2 mg all other days   Next INR check:               Telephone call with Abi who verbalizes understanding and agrees to plan    Contact [phone] to schedule and with any changes, questions or concerns.     Education provided:     Goal range and lab monitoring: goal range and significance of current result    Interaction IS anticipated between warfarin and CBD    Importance of notifying anticoagulation clinic for: upcoming surgeries and procedures 2 weeks in advance    Contact [phone] with any changes, questions or concerns.     Plan made per [facility] anticoagulation protocol    Priscila Monroe RN  Anticoagulation Clinic  2/20/2023    _______________________________________________________________________     Anticoagulation Episode Summary     Current INR goal:  2.0-3.0   TTR:  78.9 % (11.8 mo)   Target end date:  Indefinite   Send INR reminders to:  BREE ANDREW    Indications    Long term (current) use of anticoagulants [Z79.01]  Permanent atrial fibrillation (H) (Resolved) [I48.21]           Comments:            Anticoagulation Care Providers     Provider Role Specialty Phone number    Keke Flores MD Referring Family Medicine [phone]

## 2023-02-20 NOTE — PROGRESS NOTES
ANTICOAGULATION MANAGEMENT     Caty Ng 78 year old female is on warfarin with supratherapeutic INR result. (Goal INR 2.0-3.0)    Recent labs: (last 7 days)     02/20/23  1340   INR 4.0*       ASSESSMENT       Source(s): Chart Review and Template       Warfarin doses taken: Warfarin taken as instructed    Diet: No new diet changes identified    New illness, injury, or hospitalization: No    Medication/supplement changes: None noted    Signs or symptoms of bleeding or clotting: No    Previous INR: Therapeutic last 2(+) visits    Additional findings: Upcoming surgery/procedure GERRI 3/1/23. Message sent to [facility] 2/20/23 to review previously planned hold/bridge directions from Nov. 2022.       PLAN     Unable to reach Abi today.    Left message to hold warfarin tonight. Request call back for assessment. [employer] Message sent.    Follow up required to discuss out of range result  and discuss dosing instructions and confirm understanding of instructions    Priscila Monroe RN  Anticoagulation Clinic  2/20/2023

## 2023-02-21 ENCOUNTER — HOSPITAL ENCOUNTER (OUTPATIENT)
Dept: CARDIOLOGY | Facility: HOSPITAL | Age: 78
Discharge: HOME | End: 2023-02-21
Attending: INTERNAL MEDICINE
Payer: MEDICARE

## 2023-02-21 ENCOUNTER — HOSPITAL ENCOUNTER (OUTPATIENT)
Dept: NUCLEAR MEDICINE | Facility: HOSPITAL | Age: 78
Discharge: HOME | End: 2023-02-21
Attending: INTERNAL MEDICINE
Payer: MEDICARE

## 2023-02-21 DIAGNOSIS — I25.10 CORONARY ARTERY DISEASE INVOLVING NATIVE CORONARY ARTERY OF NATIVE HEART WITHOUT ANGINA PECTORIS: ICD-10-CM

## 2023-02-21 DIAGNOSIS — I50.23 ACUTE ON CHRONIC HFREF (HEART FAILURE WITH REDUCED EJECTION FRACTION) (H): ICD-10-CM

## 2023-02-21 LAB
CV STRESS CURRENT BP HE: NORMAL
CV STRESS CURRENT HR HE: 76
CV STRESS CURRENT HR HE: 78
CV STRESS CURRENT HR HE: 80
CV STRESS CURRENT HR HE: 82
CV STRESS CURRENT HR HE: 84
CV STRESS CURRENT HR HE: 84
CV STRESS CURRENT HR HE: 85
CV STRESS CURRENT HR HE: 85
CV STRESS CURRENT HR HE: 86
CV STRESS CURRENT HR HE: 88
CV STRESS CURRENT HR HE: 88
CV STRESS CURRENT HR HE: 91
CV STRESS CURRENT HR HE: 97
CV STRESS DEVIATION TIME HE: NORMAL
CV STRESS ECHO PERCENT HR HE: NORMAL
CV STRESS EXERCISE STAGE HE: NORMAL
CV STRESS FINAL RESTING BP HE: NORMAL
CV STRESS FINAL RESTING HR HE: 97
CV STRESS MAX HR HE: 92
CV STRESS MAX TREADMILL GRADE HE: 0
CV STRESS MAX TREADMILL SPEED HE: 0
CV STRESS PEAK DIA BP HE: NORMAL
CV STRESS PEAK SYS BP HE: NORMAL
CV STRESS PHASE HE: NORMAL
CV STRESS PROTOCOL HE: NORMAL
CV STRESS RESTING PT POSITION HE: NORMAL
CV STRESS ST DEVIATION AMOUNT HE: NORMAL
CV STRESS ST DEVIATION ELEVATION HE: NORMAL
CV STRESS ST EVELATION AMOUNT HE: NORMAL
CV STRESS TEST TYPE HE: NORMAL
CV STRESS TOTAL STAGE TIME MIN 1 HE: NORMAL
NUC STRESS EJECTION FRACTION: 54 %
RATE PRESSURE PRODUCT: NORMAL
STRESS ECHO BASELINE DIASTOLIC HE: 59
STRESS ECHO BASELINE HR: 86
STRESS ECHO BASELINE SYSTOLIC BP: 119
STRESS ECHO CALCULATED PERCENT HR: 64 %
STRESS ECHO LAST STRESS DIASTOLIC BP: 59
STRESS ECHO LAST STRESS HR: 86
STRESS ECHO LAST STRESS SYSTOLIC BP: 119
STRESS ECHO TARGET HR: 144

## 2023-02-21 PROCEDURE — 93017 CV STRESS TEST TRACING ONLY: CPT

## 2023-02-21 PROCEDURE — 343N000001 HC RX 343: Performed by: INTERNAL MEDICINE

## 2023-02-21 PROCEDURE — 78452 HT MUSCLE IMAGE SPECT MULT: CPT | Mod: 26 | Performed by: GENERAL ACUTE CARE HOSPITAL

## 2023-02-21 PROCEDURE — A9500 TC99M SESTAMIBI: HCPCS | Performed by: INTERNAL MEDICINE

## 2023-02-21 PROCEDURE — 250N000011 HC RX IP 250 OP 636: Performed by: INTERNAL MEDICINE

## 2023-02-21 PROCEDURE — 93018 CV STRESS TEST I&R ONLY: CPT | Performed by: GENERAL ACUTE CARE HOSPITAL

## 2023-02-21 PROCEDURE — G1010 CDSM STANSON: HCPCS | Performed by: GENERAL ACUTE CARE HOSPITAL

## 2023-02-21 PROCEDURE — 93016 CV STRESS TEST SUPVJ ONLY: CPT | Performed by: INTERNAL MEDICINE

## 2023-02-21 PROCEDURE — G1010 CDSM STANSON: HCPCS

## 2023-02-21 RX ORDER — CAFFEINE 200 MG
200 TABLET ORAL
Status: DISCONTINUED | OUTPATIENT
Start: 2023-02-21 | End: 2023-02-21 | Stop reason: HOSPADM

## 2023-02-21 RX ORDER — CAFFEINE CITRATE 20 MG/ML
60 SOLUTION INTRAVENOUS
Status: DISCONTINUED | OUTPATIENT
Start: 2023-02-21 | End: 2023-02-21 | Stop reason: HOSPADM

## 2023-02-21 RX ORDER — AMINOPHYLLINE 25 MG/ML
50 INJECTION, SOLUTION INTRAVENOUS
Status: DISCONTINUED | OUTPATIENT
Start: 2023-02-21 | End: 2023-02-21 | Stop reason: HOSPADM

## 2023-02-21 RX ORDER — REGADENOSON 0.08 MG/ML
0.4 INJECTION, SOLUTION INTRAVENOUS ONCE
Status: COMPLETED | OUTPATIENT
Start: 2023-02-21 | End: 2023-02-21

## 2023-02-21 RX ORDER — ALBUTEROL SULFATE 0.83 MG/ML
2.5 SOLUTION RESPIRATORY (INHALATION)
Status: DISCONTINUED | OUTPATIENT
Start: 2023-02-21 | End: 2023-02-21 | Stop reason: HOSPADM

## 2023-02-21 RX ADMIN — Medication 35.1 MILLICURIE: at 15:21

## 2023-02-21 RX ADMIN — Medication 8.35 MILLICURIE: at 12:50

## 2023-02-21 RX ADMIN — AMINOPHYLLINE 50 MG: 25 INJECTION, SOLUTION INTRAVENOUS at 14:20

## 2023-02-21 RX ADMIN — REGADENOSON 0.4 MG: 0.08 INJECTION, SOLUTION INTRAVENOUS at 14:15

## 2023-02-21 NOTE — PROGRESS NOTES
DANG-PROCEDURAL ANTICOAGULATION  MANAGEMENT    ASSESSMENT     Warfarin interruption plan for GERRI on 3/1/23.    Indication for Anticoagulation: Atrial Fibrillation      [id number] = 4 (CHF, Age >= 75 and Female)      Previously consulted with [facility] on dang procedure plan (11/16/22): 5 day hold with post procedure enoxaparin prophylaxis until INR therapeutic advised.    Dang-Procedure Risk stratification for thromboembolism: low (2022 Chest guidelines and 2017 ACC periprocedure pathway for NVAF Expert Consensus)    AFIB: 2022 CHEST Perioperative Management guidelines recommends against bridging for patients with atrial fibrillation except in high risk stratification patients.  NVAF: 2017 ACC periprocedure pathway for NVAF advises NO bridge for low risk stratification ([id number] score <=4 and no prior hx of stroke, TIA or systemic embolism)    Post Surgical VTE risk:      Caprini Score (Procedure specific Risk of post-op VTE in surgical patients): >= 9    VTE Prophylaxis, Post-Surgical: 2012 Chest guidelines recommend use of Caprini Score to guide post-surgical VTE prophylaxis. For Caprini Score >= 9: enoxaparin or heparin prophylaxis recommended for 30 days (or until therapeutic on warfarin).       RECOMMENDATION       Pre-Procedure:  o Hold warfarin for 6 days, until after procedure starting: Thur 2/23  - 6 day hold for recent INR 4.0; on low weekly total 13 mg/week; slower reversal anticipated  o No pre-procedure bridge      Post-Procedure, per inpatient team, suggest  o Resume warfarin dose if okay with provider doing procedure on night of procedure, 3/1 PM  o Start enoxaparin (Lovenox) 40 mg subq Q 24 hrs (prophylaxis dose) ~ 12-24 hours post procedure when okay with provider doing procedure. Continue until INR >= 2.0  o Recheck INR 3-5 days after hospital discharge    Plan routed to referring provider for approval  ?   Bonnie Owens, [facility]    SUBJECTIVE/OBJECTIVE     Caty Ng, a 78 year old  "female    Goal INR Range: 2.0-3.0     Wt Readings from Last 3 Encounters:   02/01/23 102.5 kg (226 lb)   01/09/23 110.6 kg (243 lb 13.3 oz)   11/18/22 110.2 kg (243 lb)      Ideal body weight: 54.7 kg (120 lb 9.5 oz)  Adjusted ideal body weight: 73.8 kg (162 lb 12.1 oz)     Estimated body mass index is 38.79 kg/m  as calculated from the following:    Height as of 1/6/23: 1.626 m (5' 4\").    Weight as of 2/1/23: 102.5 kg (226 lb).    Lab Results   Component Value Date    INR 4.0 (H) 02/20/2023    INR 2.8 (A) 02/01/2023    INR 2.4 (H) 01/23/2023     Lab Results   Component Value Date    HGB 13.8 01/06/2023    HGB 14.0 08/28/2019    HCT 43.3 01/06/2023    HCT 42.6 08/28/2019     01/06/2023     08/28/2019     Lab Results   Component Value Date    CR 0.82 02/01/2023    CR 0.73 01/18/2023    CR 0.57 01/13/2023     Estimated Creatinine Clearance: 68 mL/min (based on SCr of 0.82 mg/dL).    "

## 2023-02-22 ENCOUNTER — TELEPHONE (OUTPATIENT)
Dept: ANTICOAGULATION | Facility: CLINIC | Age: 78
End: 2023-02-22
Payer: MEDICARE

## 2023-02-22 RX ORDER — ENOXAPARIN SODIUM 100 MG/ML
40 INJECTION SUBCUTANEOUS DAILY
Qty: 2.8 ML | Refills: 0 | Status: SHIPPED | OUTPATIENT
Start: 2023-02-22 | End: 2023-03-09

## 2023-02-22 NOTE — PROGRESS NOTES
Spoke with Abi  Gave her verbal hold/bridge instruction  Will also send through [employer] Message per her request  INR appointment made for 3/6/36  She will call with any questions, concerns, or changes.  Discussed proper technique and appropriate sites for administering Lovenox.  Lovenox Rx sent in to [facility] pharmacy per Abi's request.

## 2023-02-22 NOTE — TELEPHONE ENCOUNTER
Patient left Vm on [facility] phone requesting a call back- noted no encounter started.    [facility] RN call back when able.     Adilene Schmitz, RN, BSN, PHN  Anticoagulation Nurse  [phone]

## 2023-02-23 ENCOUNTER — PATIENT OUTREACH (OUTPATIENT)
Dept: CARE COORDINATION | Facility: CLINIC | Age: 78
End: 2023-02-23
Payer: MEDICARE

## 2023-02-23 NOTE — PROGRESS NOTES
Clinic Care Coordination Contact    Community Health Worker Follow Up    Care Gaps:     Health Maintenance Due   Topic Date Due     ZOSTER IMMUNIZATION (2 of 3) 03/01/2012     COVID-19 Vaccine (3 - Booster for Moderna series) 05/19/2021     HF ACTION PLAN  02/25/2022     PHQ-2 (once per calendar year)  01/01/2023       Patient accepted scheduling phone number for [facility]  to schedule independently     Care Plan:   Care Plan: Medical Cannabis     Problem: HP GENERAL PROBLEM     Goal: I have accomplished receiving a referral for Medical Cannabis.  Completed 2/23/2023    Start Date: 6/10/2022 Expected End Date: 9/10/2022    This Visit's Progress: 100% Recent Progress: 70%    Note:     Personal Plan  I have received a referral for Medical Cannabis but have decided not to persue this at this time. If in the future I would like to look into Medical cannabis I will talk with my doctor at the [facility].                      Care Plan: Health Care Directive     Problem: HP GENERAL PROBLEM     Goal: I have accomplished completeing my Health Care Directive and is now scanned into my Medical Record.  Completed 10/17/2022    Start Date: 6/10/2022 Expected End Date: 12/10/2022    This Visit's Progress: 100%    Note:     Personal Plan  If I have any questions about my Health Care Directive I will contact [employer] at [email or web address] website, email [email or web address] or call [facility] Tita Jason at [phone] (M-Friday 6a to 5p).                    Care Plan: Hip Surgery     Problem: HP GENERAL PROBLEM     Goal: I would like to have my hip surgery completed     This Visit's Progress: 50% Recent Progress: 30%    Note:     Barriers: medically complex  Strengths: motivated, family support  Patient expressed understanding of goal: per chart review  Action steps to achieve this goal:  1. Surgery has to be rescheduled due to recent hospitalization in January 2023. I Bill let  Sun[facility] CHW know that Abi has hip surgery scheduled 3/1/23. Continuous (MB)                          Intervention and Education during outreach: N/A    CHW Plan: CHW will follow up with patient in 1 month on 3/23/23.    Fabiola Tanner  Community Health Worker  [facility] Care Coordination   [facility]  Office: [phone]

## 2023-02-25 ENCOUNTER — NURSE TRIAGE (OUTPATIENT)
Dept: NURSING | Facility: CLINIC | Age: 78
End: 2023-02-25
Payer: MEDICARE

## 2023-02-25 NOTE — TELEPHONE ENCOUNTER
Having Hip replacement next week at [facility], Is on Warfarin, was told to stop Warfarin, Rx sent for Lovenox. Has questions on Rx for when she is in hospital. This writer asked her to call [facility] on Monday to get further information.     ALCIRA BROWN RN  [facility] nurse advisors  2/25/2023  3:44 PM    Reason for Disposition    [1] Caller requesting NON-URGENT health information AND [2] PCP's office is the best resource    Additional Information    Negative: RN needs further essential information from caller in order to complete triage    Negative: Requesting regular office appointment    Protocols used: INFORMATION ONLY CALL - NO TRIAGE-A-

## 2023-02-27 ENCOUNTER — PATIENT OUTREACH (OUTPATIENT)
Dept: CARE COORDINATION | Facility: CLINIC | Age: 78
End: 2023-02-27
Payer: MEDICARE

## 2023-02-27 NOTE — PROGRESS NOTES
Clinic Care Coordination Contact  Care Coordination Clinician Chart Review    Situation: Patient chart reviewed by Care Coordinator.       Background: Care Coordination Program started: 6/10/2022. Initial assessment completed and patient-centered care plan(s) were developed with participation from patient. Lead CC handed patient off to CHW for continued outreaches.       Assessment: Per chart review, patient outreach completed by CC CHW on 2/23/23.  Patient is actively working to accomplish goal(s). Patient's goal(s) appropriate and relevant at this time. Patient is due for updated Plan of Care.  Assessments will be completed annually or as needed/with change of patient status.      Care Plan: Medical Cannabis     Problem: HP GENERAL PROBLEM     Goal: I have accomplished receiving a referral for Medical Cannabis.  Completed 2/23/2023    Start Date: 6/10/2022 Expected End Date: 9/10/2022    This Visit's Progress: 100% Recent Progress: 70%    Note:     Personal Plan  I have received a referral for Medical Cannabis but have decided not to persue this at this time. If in the future I would like to look into Medical cannabis I will talk with my doctor at the [facility].                      Care Plan: Health Care Directive     Problem: HP GENERAL PROBLEM     Goal: I have accomplished completeing my Health Care Directive and is now scanned into my Medical Record.  Completed 10/17/2022    Start Date: 6/10/2022 Expected End Date: 12/10/2022    This Visit's Progress: 100%    Note:     Personal Plan  If I have any questions about my Health Care Directive I will contact Aidan Jason at [email or web address], email [email or web address] or call [facility] Nancy Jason at [phone] (M-Friday 6a to 5p).                    Care Plan: Hip Surgery     Problem: HP GENERAL PROBLEM     Goal: I would like to have my hip surgery completed     This Visit's Progress: 50% Recent Progress: 30%     Note:     Barriers: medically complex  Strengths: motivated, family support  Patient expressed understanding of goal: per chart review  Action steps to achieve this goal:  1. Surgery has to be rescheduled due to recent hospitalization in January 2023. I Bill let [facility] CHW know that Abi has hip surgery scheduled 3/1/23. Continuous (MB)                             Plan/Recommendations: The patient will continue working with Care Coordination to achieve goal(s) as above. CHW will continue outreaches at minimum every 30 days and will involve Lead CC as needed or if patient is ready to move to Maintenance. Lead CC will continue to monitor CHW outreaches and patient's progress to goal(s) every 6 weeks.     Plan of Care updated and sent to patient: Yes, via [employer]

## 2023-02-27 NOTE — LETTER
[facility] CARE COORDINATION  [address]  [facility] [address]    February 27, 2023        Caty Ng  [address]  [facility] [address]          Dear Caty,     Attached is an updated Patient Centered Plan of Care for your continued enrollment in Care Coordination. Please let us know if you have additional questions, concerns, or goals that we can assist with.    Sincerely,    Zeinab Yoder RN  Clinic Care Coordination            [facility]  Patient Centered Plan of Care  About Me:        Patient Name:  Caty Ng    YOB: 1945  Age:         78 year old   [place] MRN:    [id number] Telephone Information:  Home Phone [phone]   Mobile [phone]       Address:  [address]  [facility] [address] Email address:  [email or web address]      Emergency Contact(s)    Name Relationship Lgl Grd Work Phone Home Phone Mobile Phone   1. JACKIE NG Spouse No  [phone]   2. ANA DSOUZA* Daughter No   [phone]           Primary language:  English     needed? No   [place] [employer]:  [phone] op. 1  Other communication barriers:None    Preferred Method of Communication:  Phone  Current living arrangement: I live in a private home with spouse    Mobility Status/ Medical Equipment: Independent w/Device        Health Maintenance  Health Maintenance Reviewed: Up to date      My Access Plan  Medical Emergency 911   Primary Clinic Line [facility] - [phone]   24 Hour Appointment Line [phone] or  [phone]) (toll-free)   24 Hour Nurse Line [phone] (toll-free)   Preferred Urgent Care No data recorded   Preferred Hospital [facility]  [phone]     Preferred Pharmacy [id number] [address] [facility]     Behavioral Health Crisis Line The National Suicide Prevention Lifeline at  1-234.890.4220 or Text/Call 988             My Care Team Members  Patient Care Team       Relationship Specialty Notifications Start End    Keke Flores MD PCP - General   11/6/20     Phone: [phone] Fax: [phone]         [address] [facility] [address]    Cameron Soria MD Assigned Heart and Vascular Provider   7/16/21     Phone: [phone] Fax: [phone]         [address] [facility] [address] [facility] [address]    Keke Flores MD Assigned PCP   7/16/21     Phone: [phone] Fax: [phone]         [address] [facility] [address]    Juancarlos Haney MD Assigned Musculoskeletal Provider   10/10/21     Phone: [phone] Fax: [phone]         [address] [facility] [address]    Zeinab Yoder, WILMA Lead Care Coordinator Primary Care - CC Admissions 6/10/22     Phone: [phone]         Fabiola Tanner Community Health Worker  Admissions 6/10/22     [phone]    Gia Chawla APRN CNP Nurse Practitioner Cardiovascular Disease  1/20/23     Phone: [phone] Fax: [phone]         [address] [facility], [address] [facility] [address]            My Care Plans  Self Management and Treatment Plan  Care Plan  Care Plan: Medical Cannabis     Problem: HP GENERAL PROBLEM             Care Plan: Health Care Directive     Problem: HP GENERAL PROBLEM             Care Plan: Hip Surgery     Problem: HP GENERAL PROBLEM     Goal: I would like to have my hip surgery completed     This Visit's Progress: 50% Recent Progress: 30%    Note:     Barriers: medically complex  Strengths: motivated, family support  Patient expressed understanding of goal: per chart review  Action steps to achieve this goal:  1. Surgery has to be rescheduled due to recent hospitalization in January 2023. I Ed let Sunita W know that Abi has hip surgery scheduled 3/1/23. Continuous (MB)                           Action Plans on File:               Heart Failure        Advance Care Plans/Directives Type:   No data recorded    My Medical and Care Information  Problem List   Patient Active Problem List   Diagnosis     Advanced directives, counseling/discussion     Morbid obesity due to excess calories (H)     Hypertriglyceridemia     Chronic atrial fibrillation (H)     Moderate mitral regurgitation     Systolic heart failure (H)     BROCK (obstructive sleep apnea)     Hyperlipidemia LDL goal <40     Long term current use of anticoagulant therapy     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 atrophy of thyroid     History of chondrosarcoma     S/p total knee replacement, bilateral     Primary osteoarthritis of right hip     Scoliosis     DDD (degenerative disc disease), lumbar     Chronic pain of both feet     Coronary artery disease involving native coronary artery of native heart without angina pectoris     Non-ischemic cardiomyopathy (H)     Acute on chronic HFrEF (heart failure with reduced ejection fraction) (H)     Status post coronary angiogram     Dyspnea on exertion     Long term (current) use of anticoagulants     Elevated brain natriuretic peptide (BNP) level     Acute respiratory failure with hypoxia (H)     Type A influenza Infection     Bronchitis     Heart failure with reduced ejection fraction (H)      Current Medications and Allergies:  See printed Medication Report.    Care Coordination Start Date: 6/10/2022   Frequency of Care Coordination: monthly     Form Last Updated: 02/27/2023

## 2023-03-05 ENCOUNTER — MEDICAL CORRESPONDENCE (OUTPATIENT)
Dept: HEALTH INFORMATION MANAGEMENT | Facility: CLINIC | Age: 78
End: 2023-03-05

## 2023-03-06 ENCOUNTER — MEDICAL CORRESPONDENCE (OUTPATIENT)
Dept: HEALTH INFORMATION MANAGEMENT | Facility: CLINIC | Age: 78
End: 2023-03-06

## 2023-03-06 ENCOUNTER — NURSE TRIAGE (OUTPATIENT)
Dept: NURSING | Facility: CLINIC | Age: 78
End: 2023-03-06

## 2023-03-06 NOTE — TELEPHONE ENCOUNTER
Can continue to hold losartan.  Schedule for follow-up appointment with PCP in 2 to 4 weeks for reevaluation.

## 2023-03-06 NOTE — TELEPHONE ENCOUNTER
Nurse Triage SBAR    Is this a 2nd Level Triage? YES, LICENSED PRACTITIONER REVIEW IS REQUIRED    Situation: Patient calling to ask PCP about stopping Losartan due to persistent low blood pressure. Protocol advises patient is seen in clinic today, but patient was recently discharged from hospital and advised to call PCP. Routing message to provider to review and advise. Consent: not needed    Background: Hip replacement 3/1, discharged Friday -Blood pressure went low prior to discharge and providers advised patient call PCP about stopping or adjusting Losartan.  BP - usually runs between 80 and 110 systolic  Patient has not taken Losartan since Friday    Assessment:   /64 now  No lightheadedness or dizziness   Weakness from recent surgery    Protocol Recommended Disposition:   See in office today, Call back by PCP today    Recommendation: Advised patient to wait for call back after sending message to provider. Care advice given. Patient verbalized understanding and agreed with plan.     Routed to provider to review and advise.    Does the patient meet one of the following criteria for ADS visit consideration? 16+ years old, with an [facility] PCP     TIP  Providers, please consider if this condition is appropriate for management at one of our Acute and Diagnostic Services sites.     If patient is a good candidate, please use dotphrase <dot>triageresponse and select Refer to ADS to document.     Jumana Garcia RN [facility] 3/6/2023 8:41 AM    Reason for Disposition    Systolic BP  while taking blood pressure medications and NOT dizzy, lightheaded or weak    Caller has NON-URGENT medicine question about med that PCP or specialist prescribed and triager unable to answer question    Additional Information    Negative: Systolic BP < 90 and feeling dizzy, lightheaded, or weak    Negative: Started suddenly after an allergic medicine, an allergic food, or bee sting    Negative: Shock suspected (e.g.,  cold/pale/clammy skin, too weak to stand, low BP, rapid pulse)    Negative: Difficult to awaken or acting confused (e.g., disoriented, slurred speech)    Negative: Fainted    Negative: Chest pain    Negative: Bleeding (e.g., vomiting blood, rectal bleeding or tarry stools, severe vaginal bleeding)    Negative: Extra heart beats or heart is beating fast (i.e., 'palpitations')    Negative: Sounds like a life-threatening emergency to the triager    Negative: Systolic BP < 80 and NOT dizzy, lightheaded or weak (feels normal)    Negative: Abdominal pain    Negative: Major surgery in the past month    Negative: Fever > 100.4 F (38.0 C)    Negative: Drinking very little and has signs of dehydration (e.g., no urine > 12 hours, very dry mouth, very lightheaded)    Negative: Fall in systolic BP > 20 mm Hg from normal and feeling dizzy, lightheaded, or weak    Negative: Patient sounds very sick or weak to the triager    Negative: Systolic BP < 90 and NOT dizzy, lightheaded or weak    Protocols used: BLOOD PRESSURE - LOW-A-OH, MEDICATION QUESTION CALL-A-OH

## 2023-03-06 NOTE — TELEPHONE ENCOUNTER
Patient notified of provider's message as written. Patient verbalized understanding. Follow-up appointment scheduled for 4/3/23  Encouraged patient to call the clinic with further questions/concerns.     Yg Cadena RN  [facility]

## 2023-03-07 NOTE — PROGRESS NOTES
Clinic Care Coordination - Chart Review Only    Situation/Background: Patient chart reviewed by care coordinator related to [facility] conversion.    Assessment: Patient continues to be followed by Clinic Care Coordination.    Plan: Patient's chart updated to align with [facility] program for ongoing patient management.       Burow's Graft Text: The defect edges were debeveled with a #15 scalpel blade.  Given the location of the defect, shape of the defect, the proximity to free margins and the presence of a standing cone deformity a Burow's skin graft was deemed most appropriate. The standing cone was removed and this tissue was then trimmed to the shape of the primary defect. The adipose tissue was also removed until only dermis and epidermis were left.  The skin margins of the secondary defect were undermined to an appropriate distance in all directions utilizing iris scissors.  The secondary defect was closed with interrupted buried subcutaneous sutures.  The skin edges were then re-apposed with running  sutures.  The skin graft was then placed in the primary defect and oriented appropriately.

## 2023-03-08 ENCOUNTER — TELEPHONE (OUTPATIENT)
Dept: FAMILY MEDICINE | Facility: CLINIC | Age: 78
End: 2023-03-08

## 2023-03-08 NOTE — TELEPHONE ENCOUNTER
Order/Referral Request    Who is requesting: Camilla DILLON    Orders being requested: Addition of Skilled nursing for INR to match Patient's INR schedule (will need frequency specified to RN and will need to be given a start date. Patient was originally supposed to be seen today for INR but rescheduled due to issues so if she needs INR done asap nurse needs it in the order)    Reason service is needed/diagnosis: INR visit- patient had right hip surgery and is struggling to get out of home for lab visits.    When are orders needed by: ASAP    Has this been discussed with Provider: Yes    Does patient have a preference on a Group/Provider/Facility? [facility]    Does patient have an appointment scheduled?: No    Where to send orders: [facility]    Could we send this information to you in [facility] or would you prefer to receive a phone call?:   Patient would prefer a phone call     Okay to leave a detailed message?: Yes at Other phone number:  [phone]  Camilla DILLON

## 2023-03-08 NOTE — TELEPHONE ENCOUNTER
Spoke with Camilla from [facility].  Verbal orders given for the INR to be completed ASAP and then weekly.  Adjustments should be completed by the anticoagulant team.  Camilla will give the clinic a call if unable to complete INR by tomorrow.

## 2023-03-09 ENCOUNTER — TELEPHONE (OUTPATIENT)
Dept: FAMILY MEDICINE | Facility: CLINIC | Age: 78
End: 2023-03-09
Payer: MEDICARE

## 2023-03-09 ENCOUNTER — ANTICOAGULATION THERAPY VISIT (OUTPATIENT)
Dept: ANTICOAGULATION | Facility: CLINIC | Age: 78
End: 2023-03-09
Payer: MEDICARE

## 2023-03-09 DIAGNOSIS — I48.20 CHRONIC ATRIAL FIBRILLATION (H): ICD-10-CM

## 2023-03-09 DIAGNOSIS — Z79.01 LONG TERM (CURRENT) USE OF ANTICOAGULANTS: Primary | ICD-10-CM

## 2023-03-09 LAB — INR (EXTERNAL): 1.2 (ref 2–3)

## 2023-03-09 RX ORDER — ENOXAPARIN SODIUM 100 MG/ML
40 INJECTION SUBCUTANEOUS DAILY
Qty: 2.8 ML | Refills: 0 | Status: SHIPPED | OUTPATIENT
Start: 2023-03-09 | End: 2023-03-20

## 2023-03-09 NOTE — PROGRESS NOTES
ANTICOAGULATION MANAGEMENT     Caty Ng 78 year old female is on warfarin with subtherapeutic INR result. (Goal INR 2.0-3.0)    Recent labs: (last 7 days)     03/09/23  1002   INR 1.2*       ASSESSMENT       Source(s): Chart Review and Home Care/Facility Nurse       Warfarin doses taken: Warfarin taken as instructed    Diet: No new diet changes identified; patient starting Ensure daily starting today-discussed importance of consistency and notifying INR clinic if decides to decrease or increase daily intake; patient aware this may take longer to get into range; not clear why INR is at 1.2 after a full week of warfarin    New illness, injury, or hospitalization: Yes: recovering from hip replacement surgery 3/1    Medication/supplement changes: cefadroxil for 10 days -this has potenial to increase INR; Oxycodone PRN    Signs or symptoms of bleeding or clotting: No    Previous INR: Supratherapeutic    Additional findings: Bridging with Enoxaparin until INR >= 2.0; refill sent in as patient had one dose left         PLAN     Recommended plan for temporary change(s) and ongoing change(s) affecting INR     Dosing Instructions: booster dose then Increase your warfarin dose (15.4% change) with next INR in 4 days       Summary  As of 3/9/2023    Full warfarin instructions:  3/9: 4 mg; Otherwise 3 mg every Sat; 2 mg all other days   Next INR check:  3/13/2023             Telephone call with Jyothi Modi  home care nurse who verbalizes understanding and agrees to plan and who agrees to plan and repeated back plan correctly    Orders given to  Homecare nurse/facility to recheck    Education provided:     Dietary considerations: importance of consistent vitamin K intake, Impact of protein intake on INR  and importance of notifying [facility] to changes in diet    Plan made with [facility] Pharmacist Jyothi Mallory, WILMA  Anticoagulation  Clinic  3/9/2023    _______________________________________________________________________     Anticoagulation Episode Summary     Current INR goal:  2.0-3.0   TTR:  76.7 % (11.8 mo)   Target end date:  Indefinite   Send INR reminders to:  BREE ANDREW    Indications    Long term (current) use of anticoagulants [Z79.01]  Permanent atrial fibrillation (H) (Resolved) [I48.21]           Comments:           Anticoagulation Care Providers     Provider Role Specialty Phone number    Keke Flores MD Referring Family Medicine [phone]

## 2023-03-09 NOTE — TELEPHONE ENCOUNTER
Reason for Call:  Home Health Care    Camilla with [facility] called regarding verbal orders    Orders are needed for this patient.     OT: Eval and treat, starting week of 3/13    Phone Number Homecare Nurse can be reached at: [phone]    Can we leave a detailed message on this number? YES    Call taken on 3/9/2023 at 12:19 PM by Heydi Dutton

## 2023-03-09 NOTE — TELEPHONE ENCOUNTER
The Home Care/Assisted Living/Nursing Facility is calling regarding an established patient.  Has the patient seen Home Care in the past or is currently residing in Assisted Living or Nursing Facility? Yes.     WILMA Sampson calling from [facility] requesting the following orders that are within the Home Care, Assisted Living or Nursing Home Eval and Treatment standing order and can be signed as standing order signature required by RN.    Preferred Call Back Number: [phone]    PT/OT/Speech Therapy    Any additional Orders:  Are there any orders requested, not stated above, that are outside of the standing order and must be routed to a licensed practitioner for approval?    No    Writer has verified Requestor will send fax to have orders signed.

## 2023-03-13 ENCOUNTER — ANTICOAGULATION THERAPY VISIT (OUTPATIENT)
Dept: ANTICOAGULATION | Facility: CLINIC | Age: 78
End: 2023-03-13

## 2023-03-13 DIAGNOSIS — Z79.01 LONG TERM (CURRENT) USE OF ANTICOAGULANTS: Primary | ICD-10-CM

## 2023-03-13 LAB — INR (EXTERNAL): 1.5 (ref 0.9–1.1)

## 2023-03-13 NOTE — PROGRESS NOTES
ANTICOAGULATION MANAGEMENT     Caty Ng 78 year old female is on warfarin with subtherapeutic INR result. (Goal INR 2.0-3.0)    Recent labs: (last 7 days)     03/13/23  1252   INR 1.5*       ASSESSMENT       Source(s): Chart Review and Home Care/Facility Nurse       Warfarin doses taken: Warfarin taken as instructed    Diet: Started ensure daily on 3/9/23 which may lower INR, continues one daily ensure    New illness, injury, or hospitalization: Yes: Hip replacement surgy on 3/1/23    Medication/supplement changes: oxycodone as needed, cefadroxil for 10 days    Signs or symptoms of bleeding or clotting: No    Previous INR: Subtherapeutic    Additional findings: Bridging with Enoxaparin until INR >= 2.0         PLAN     Recommended plan for temporary change(s) and ongoing change(s) affecting INR     Dosing Instructions: booster dose then Increase your warfarin dose (13.3% change) with next INR in 3 days       Summary  As of 3/13/2023    Full warfarin instructions:  3/13: 4 mg; Otherwise 3 mg every Tue, Thu, Sat; 2 mg all other days   Next INR check:  3/16/2023             Telephone call with Jyothi home care nurse who verbalizes understanding and agrees to plan and who agrees to plan and repeated back plan correctly    Orders given to  Homecare nurse/facility to recheck    Education provided:     Please call back if any changes to your diet, medications or how you've been taking warfarin    Plan made per [facility] anticoagulation protocol    Elizabeth Pickard RN  Anticoagulation Clinic  3/13/2023    _______________________________________________________________________     Anticoagulation Episode Summary     Current INR goal:  2.0-3.0   TTR:  76.7 % (11.8 mo)   Target end date:  Indefinite   Send INR reminders to:  BREE ALLEN    Indications    Long term (current) use of anticoagulants [Z79.01]  Permanent atrial fibrillation (H) (Resolved) [I48.21]           Comments:  BREE ANDREW [[id number]] when home care  discharges         Anticoagulation Care Providers     Provider Role Specialty Phone number    Keke Flores MD Referring Family Medicine [phone]

## 2023-03-14 ENCOUNTER — MEDICAL CORRESPONDENCE (OUTPATIENT)
Dept: HEALTH INFORMATION MANAGEMENT | Facility: CLINIC | Age: 78
End: 2023-03-14

## 2023-03-15 ENCOUNTER — MEDICAL CORRESPONDENCE (OUTPATIENT)
Dept: HEALTH INFORMATION MANAGEMENT | Facility: CLINIC | Age: 78
End: 2023-03-15

## 2023-03-16 ENCOUNTER — ANTICOAGULATION THERAPY VISIT (OUTPATIENT)
Dept: ANTICOAGULATION | Facility: CLINIC | Age: 78
End: 2023-03-16
Payer: MEDICARE

## 2023-03-16 DIAGNOSIS — Z79.01 LONG TERM (CURRENT) USE OF ANTICOAGULANTS: Primary | ICD-10-CM

## 2023-03-16 DIAGNOSIS — Z53.9 DIAGNOSIS NOT YET DEFINED: Primary | ICD-10-CM

## 2023-03-16 LAB — INR (EXTERNAL): 2.1 (ref 0.9–1.1)

## 2023-03-16 PROCEDURE — G0180 MD CERTIFICATION HHA PATIENT: HCPCS | Performed by: FAMILY MEDICINE

## 2023-03-16 NOTE — PROGRESS NOTES
ANTICOAGULATION MANAGEMENT     Caty Ng 78 year old female is on warfarin with therapeutic INR result. (Goal INR 2.0-3.0)    Recent labs: (last 7 days)     03/16/23  1110   INR 2.1*       ASSESSMENT       Source(s): Chart Review and Home Care/Facility Nurse       Warfarin doses taken: Warfarin taken as instructed    Diet: Continues on one ensure/day.    New illness, injury, or hospitalization: Continues to recover from hip replacement 03/01    Working with PT and OT.    Medication/supplement changes: None noted    Signs or symptoms of bleeding or clotting: No    Previous INR: Subtherapeutic    Additional findings: Has been bridging with lovenox         PLAN     Recommended plan for ongoing change(s) affecting INR     Dosing Instructions: Continue your current warfarin dose with next INR in 4 days       Summary  As of 3/16/2023    Full warfarin instructions:  3 mg every Tue, Thu, Sat; 2 mg all other days   Next INR check:  3/20/2023           STOP LOVENOX    Telephone call with Jyothi home care nurse who verbalizes understanding and agrees to plan    Orders given to  Homecare nurse/facility to recheck    Education provided:     Goal range and lab monitoring: goal range and significance of current result    Plan made per [facility] anticoagulation protocol    Guille Forrester RN  Anticoagulation Clinic  3/16/2023    _______________________________________________________________________     Anticoagulation Episode Summary     Current INR goal:  2.0-3.0   TTR:  76.2 % (11.8 mo)   Target end date:  Indefinite   Send INR reminders to:  BREE ALLEN    Indications    Long term (current) use of anticoagulants [Z79.01]  Permanent atrial fibrillation (H) (Resolved) [I48.21]           Comments:  BREE ANDREW [[id number]] when home care discharges         Anticoagulation Care Providers     Provider Role Specialty Phone number    Keke Flores MD Referring Family Medicine [phone]

## 2023-03-20 ENCOUNTER — ANTICOAGULATION THERAPY VISIT (OUTPATIENT)
Dept: ANTICOAGULATION | Facility: CLINIC | Age: 78
End: 2023-03-20
Payer: MEDICARE

## 2023-03-20 DIAGNOSIS — Z79.01 LONG TERM (CURRENT) USE OF ANTICOAGULANTS: Primary | ICD-10-CM

## 2023-03-20 DIAGNOSIS — I48.20 CHRONIC ATRIAL FIBRILLATION (H): ICD-10-CM

## 2023-03-20 LAB — INR (EXTERNAL): 1.7 (ref 0.9–1.1)

## 2023-03-20 RX ORDER — ENOXAPARIN SODIUM 100 MG/ML
40 INJECTION SUBCUTANEOUS EVERY 24 HOURS
Qty: 1.6 ML | Refills: 1 | Status: SHIPPED | OUTPATIENT
Start: 2023-03-20 | End: 2023-07-26

## 2023-03-20 NOTE — PROGRESS NOTES
ANTICOAGULATION MANAGEMENT     Caty Ng 78 year old female is on warfarin with subtherapeutic INR result. (Goal INR 2.0-3.0)    Recent labs: (last 7 days)     03/20/23  1144   INR 1.7*       ASSESSMENT       Source(s): Chart Review and Home Care/Facility Nurse       Warfarin doses taken: Warfarin taken as instructed    Diet: No new diet changes identified    New illness, injury, or hospitalization: Yes: hip replacement on 3/1/23    Medication/supplement changes: None noted    Signs or symptoms of bleeding or clotting: No    Previous INR: Therapeutic last visit; previously outside of goal range    Additional findings: None     Stopped Lovenox on 3/16/23    Will need to re-start Lovenox today         PLAN     Recommended plan for no diet, medication or health factor changes affecting INR     Dosing Instructions: Increase your warfarin dose (5.9% change) with next INR in 3 days       Summary  As of 3/20/2023    Full warfarin instructions:  2 mg every Sun, Wed, Fri; 3 mg all other days   Next INR check:  3/23/2023             Telephone call with Jyothi home care nurse who agrees to plan and repeated back plan correctly   Called home care nurse Jyothi and gave updated orders to boost warfarin to 4 mg today and patient needs to start bridging with Lovenox.  A new Rx will be sent.    Orders given to  Homecare nurse/facility to recheck    Education provided:     Symptom monitoring: monitoring for clotting signs and symptoms    Plan made with [facility] Pharmacist Aurelia Welch, RN  Anticoagulation Clinic  3/20/2023    _______________________________________________________________________     Anticoagulation Episode Summary     Current INR goal:  2.0-3.0   TTR:  75.3 % (11.8 mo)   Target end date:  Indefinite   Send INR reminders to:  BREE ALLEN    Indications    Long term (current) use of anticoagulants [Z79.01]  Permanent atrial fibrillation (H) (Resolved) [I48.21]           Comments:   BREE ANDREW [[id number]] when home care discharges         Anticoagulation Care Providers     Provider Role Specialty Phone number    Keke Flores MD Referring Family Medicine [phone]

## 2023-03-21 ENCOUNTER — MEDICAL CORRESPONDENCE (OUTPATIENT)
Dept: HEALTH INFORMATION MANAGEMENT | Facility: CLINIC | Age: 78
End: 2023-03-21

## 2023-03-23 ENCOUNTER — ANTICOAGULATION THERAPY VISIT (OUTPATIENT)
Dept: ANTICOAGULATION | Facility: CLINIC | Age: 78
End: 2023-03-23
Payer: MEDICARE

## 2023-03-23 ENCOUNTER — PATIENT OUTREACH (OUTPATIENT)
Dept: CARE COORDINATION | Facility: CLINIC | Age: 78
End: 2023-03-23
Payer: MEDICARE

## 2023-03-23 DIAGNOSIS — Z79.01 LONG TERM (CURRENT) USE OF ANTICOAGULANTS: Primary | ICD-10-CM

## 2023-03-23 LAB — INR (EXTERNAL): 2.2 (ref 0.9–1.1)

## 2023-03-23 NOTE — PROGRESS NOTES
Clinic Care Coordination Contact  [facility]/Voicemail    Clinical Data: Care Coordinator Outreach  Outreach attempted x 1.  Left message on patient's voicemail with call back information and requested return call.  Plan: Care Coordinator will try to reach patient again in 10 business days.    Next CHW outreach: 4/6/23    Fabiola Tanner  [facility] Health Worker  [facility] Care Coordination   [place] [facility]  Office: [phone]

## 2023-03-23 NOTE — PROGRESS NOTES
ANTICOAGULATION MANAGEMENT     Caty Ng 78 year old female is on warfarin with therapeutic INR result. (Goal INR 2.0-3.0)    Recent labs: (last 7 days)     03/23/23  1043   INR 2.2*       ASSESSMENT       Source(s): Chart Review and Home Care/Facility Nurse       Warfarin doses taken: Warfarin taken as instructed    Diet: No new diet changes identified    New illness, injury, or hospitalization: No    Medication/supplement changes: None noted    Signs or symptoms of bleeding or clotting: No    Previous INR: Subtherapeutic    Additional findings: Bridging with Enoxaparin until INR >= 2.0         PLAN     Recommended plan for no diet, medication or health factor changes affecting INR     Dosing Instructions: Continue your current warfarin dose Stop bridging with Enoxaparin with next INR in 3 days       Summary  As of 3/23/2023    Full warfarin instructions:  2 mg every Sun, Wed, Fri; 3 mg all other days   Next INR check:  3/27/2023             Telephone call with Jyothi home care nurse who agrees to plan and repeated back plan correctly    Orders given to  Homecare nurse/facility to recheck    Education provided:     Please call back if any changes to your diet, medications or how you've been taking warfarin    Symptom monitoring: monitoring for bleeding signs and symptoms and monitoring for clotting signs and symptoms    Contact [phone]  with any changes, questions or concerns.     Plan made per [facility] anticoagulation protocol    Wendy Abel RN  Anticoagulation Clinic  3/23/2023    _______________________________________________________________________     Anticoagulation Episode Summary     Current INR goal:  2.0-3.0   TTR:  74.8 % (11.8 mo)   Target end date:  Indefinite   Send INR reminders to:  BREE ALLEN    Indications    Long term (current) use of anticoagulants [Z79.01]  Permanent atrial fibrillation (H) (Resolved) [I48.21]           Comments:  BREE ANDREW [[id number]] when home care  discharges         Anticoagulation Care Providers     Provider Role Specialty Phone number    Keke Flores MD Referring Family Medicine [phone]

## 2023-03-27 ENCOUNTER — MEDICAL CORRESPONDENCE (OUTPATIENT)
Dept: HEALTH INFORMATION MANAGEMENT | Facility: CLINIC | Age: 78
End: 2023-03-27

## 2023-03-27 ENCOUNTER — ANTICOAGULATION THERAPY VISIT (OUTPATIENT)
Dept: ANTICOAGULATION | Facility: CLINIC | Age: 78
End: 2023-03-27
Payer: MEDICARE

## 2023-03-27 DIAGNOSIS — Z79.01 LONG TERM (CURRENT) USE OF ANTICOAGULANTS: Primary | ICD-10-CM

## 2023-03-27 LAB — INR (EXTERNAL): 2.3 (ref 0.9–1.1)

## 2023-03-27 NOTE — PROGRESS NOTES
ANTICOAGULATION MANAGEMENT     Caty Ng 78 year old female is on warfarin with therapeutic INR result. (Goal INR 2.0-3.0)    Recent labs: (last 7 days)     03/27/23  1328   INR 2.3*       ASSESSMENT       Source(s): Chart Review and Home Care/Facility Nurse       Warfarin doses taken: Warfarin taken as instructed    Diet: No new diet changes identified    New illness, injury, or hospitalization: No    Medication/supplement changes: None noted    Signs or symptoms of bleeding or clotting: No    Previous INR: Therapeutic last visit; previously outside of goal range    Additional findings: None         PLAN     Recommended plan for no diet, medication or health factor changes affecting INR     Dosing Instructions: Continue your current warfarin dose with next INR in 1 week       Summary  As of 3/27/2023    Full warfarin instructions:  2 mg every Sun, Wed, Fri; 3 mg all other days   Next INR check:  4/3/2023             Telephone call with Jyothi home care nurse who agrees to plan and repeated back plan correctly    Orders given to  Homecare nurse/facility to recheck    Education provided:     None required    Please call back if any changes to your diet, medications or how you've been taking warfarin    Plan made per [facility] anticoagulation protocol    Haily De Leon, RN  Anticoagulation Clinic  3/27/2023    _______________________________________________________________________     Anticoagulation Episode Summary     Current INR goal:  2.0-3.0   TTR:  74.9 % (11.8 mo)   Target end date:  Indefinite   Send INR reminders to:  BREE ALLEN    Indications    Long term (current) use of anticoagulants [Z79.01]  Permanent atrial fibrillation (H) (Resolved) [I48.21]           Comments:  BREE ANDREW [[id number]] when home care discharges         Anticoagulation Care Providers     Provider Role Specialty Phone number    Keke Flores MD Referring Family Medicine [phone]

## 2023-04-03 ENCOUNTER — MEDICAL CORRESPONDENCE (OUTPATIENT)
Dept: HEALTH INFORMATION MANAGEMENT | Facility: CLINIC | Age: 78
End: 2023-04-03

## 2023-04-03 ENCOUNTER — ANTICOAGULATION THERAPY VISIT (OUTPATIENT)
Dept: ANTICOAGULATION | Facility: CLINIC | Age: 78
End: 2023-04-03

## 2023-04-03 DIAGNOSIS — Z79.01 LONG TERM (CURRENT) USE OF ANTICOAGULANTS: Primary | ICD-10-CM

## 2023-04-03 LAB — INR (EXTERNAL): 2.8 (ref 2–3)

## 2023-04-03 NOTE — PROGRESS NOTES
ANTICOAGULATION MANAGEMENT     Caty Ng 78 year old female is on warfarin with therapeutic INR result. (Goal INR 2.0-3.0)    Recent labs: (last 7 days)     04/03/23  1003   INR 2.8       ASSESSMENT       Source(s): Chart Review and Home Care/Facility Nurse       Warfarin doses taken: Warfarin taken as instructed    Diet: No new diet changes identified    New illness, injury, or hospitalization: No    Medication/supplement changes: None noted    Signs or symptoms of bleeding or clotting: No    Previous INR: Therapeutic last visit; previously outside of goal range    Additional findings: None         PLAN     Recommended plan for no diet, medication or health factor changes affecting INR     Dosing Instructions: Continue your current warfarin dose with next INR in 1 week       Summary  As of 4/3/2023    Full warfarin instructions:  2 mg every Sun, Wed, Fri; 3 mg all other days   Next INR check:  4/10/2023             Telephone call with Abi who verbalizes understanding and agrees to plan and who agrees to plan and repeated back plan correctly    Orders given to  Homecare nurse/facility to recheck    Education provided:     Please call back if any changes to your diet, medications or how you've been taking warfarin    Plan made per [facility] anticoagulation protocol    Jyothi Tamayo, RN  Anticoagulation Clinic  4/3/2023    _______________________________________________________________________     Anticoagulation Episode Summary     Current INR goal:  2.0-3.0   TTR:  74.9 % (11.8 mo)   Target end date:  Indefinite   Send INR reminders to:  BREE ALLEN    Indications    Long term (current) use of anticoagulants [Z79.01]  Permanent atrial fibrillation (H) (Resolved) [I48.21]           Comments:  BREE ANDREW [[id number]] when home care discharges         Anticoagulation Care Providers     Provider Role Specialty Phone number    Keke Flores MD Referring Family Medicine [phone]

## 2023-04-05 ENCOUNTER — TELEPHONE (OUTPATIENT)
Dept: FAMILY MEDICINE | Facility: CLINIC | Age: 78
End: 2023-04-05
Payer: MEDICARE

## 2023-04-05 ENCOUNTER — MEDICAL CORRESPONDENCE (OUTPATIENT)
Dept: HEALTH INFORMATION MANAGEMENT | Facility: CLINIC | Age: 78
End: 2023-04-05

## 2023-04-05 DIAGNOSIS — I48.20 CHRONIC ATRIAL FIBRILLATION (H): Primary | ICD-10-CM

## 2023-04-05 NOTE — TELEPHONE ENCOUNTER
Forms/Letter Request    Type of form/letter: Plan of Care & Missed Visit Orders (2 different sets of forms)    Have you been seen for this request: Yes    Do we have the form/letter: Both forms are together in Dr. Flores mail box behind the .    Who is the form from? [facility]    Where did/will the form come from? form was faxed in    When is form/letter needed by: ASAP    How would you like the form/letter returned: Fax : [phone]    Patient Notified form requests are processed in 3-5 business days:Yes    Could we send this information to you in [employer] or would you prefer to receive a phone call?:   They would prefer a phone call     Okay to leave a detailed message?: No at Other phone number:  [phone]

## 2023-04-06 ENCOUNTER — PATIENT OUTREACH (OUTPATIENT)
Dept: CARE COORDINATION | Facility: CLINIC | Age: 78
End: 2023-04-06
Payer: MEDICARE

## 2023-04-06 NOTE — PROGRESS NOTES
Clinic Care Coordination Contact    Community Health Worker Follow Up    Care Gaps:     Health Maintenance Due   Topic Date Due     ZOSTER IMMUNIZATION (2 of 3) 03/01/2012     COVID-19 Vaccine (3 - Booster for Moderna series) 05/19/2021     HF ACTION PLAN  02/25/2022     PHQ-2 (once per calendar year)  01/01/2023       Patient accepted scheduling phone number for  [facility]  to schedule independently     Care Plan:   Care Plan: Medical Cannabis     Problem: HP GENERAL PROBLEM     Goal: I have accomplished receiving a referral for Medical Cannabis.  Completed 2/23/2023    Start Date: 6/10/2022 Expected End Date: 9/10/2022    This Visit's Progress: 100% Recent Progress: 70%    Note:     Personal Plan  I have received a referral for Medical Cannabis but have decided not to persue this at this time. If in the future I would like to look into Medical cannabis I will talk with my doctor at the [facility].                      Care Plan: Health Care Directive     Problem: HP GENERAL PROBLEM     Goal: I have accomplished completeing my Health Care Directive and is now scanned into my Medical Record.  Completed 10/17/2022    Start Date: 6/10/2022 Expected End Date: 12/10/2022    This Visit's Progress: 100%    Note:     Personal Plan  If I have any questions about my Health Care Directive I will contact [employer] at [email or web address], email [email or web address] or call [facility] Aidan Jason at [phone] (M-Friday 6a to 5p).                    Care Plan: Hip Surgery     Problem: HP GENERAL PROBLEM     Goal: I have accomplished having my hip surgery completed  Completed 4/6/2023    This Visit's Progress: 100% Recent Progress: 50%    Note:     Personal Plan  If I have any questions about my hip surgery I will call my surgeon @ [phone].                          Intervention and Education during outreach: Patient let CHW know that she is doing ok now after her  hip surgery. Patient still has home Care coming into the home also. Patient let CHW know she no other needs at this time.    CHW Plan: Patient has accomplished goals and has no other goals that this patient would like to work on with Clinic Care Coordination. Community Health Worker sent request to [facility] RN pool to review for Maintenance.    Fabiola Tanner  [facility] Health Worker  [facility] Care Coordination   Apryl Rivera [place], [place] [facility]  Office: [phone]

## 2023-04-07 ENCOUNTER — PATIENT OUTREACH (OUTPATIENT)
Dept: CARE COORDINATION | Facility: CLINIC | Age: 78
End: 2023-04-07
Payer: MEDICARE

## 2023-04-07 NOTE — PROGRESS NOTES
Clinic Care Coordination Contact    Situation-Received a request from Community Healthcare Worker   for patient to transition to [facility] Maintenance.    Background- Patient enrolled into [employer] Enrolled 6/10/22 RN Reassessment 1/16/23.  Patient seeking assistance with obtaining a referral to [facility], getting a medical cannabis referral, and completing a HCD.    Action-Chart review. All goals completed.  No new goals identified.  Will notify PCP.    Response: Patient to transition to Maintenance.  [facility] will continue to monitor chart and reach out to patient in 2 months to determine if Graduation is appropriate.

## 2023-04-10 ENCOUNTER — ANTICOAGULATION THERAPY VISIT (OUTPATIENT)
Dept: ANTICOAGULATION | Facility: CLINIC | Age: 78
End: 2023-04-10
Payer: MEDICARE

## 2023-04-10 ENCOUNTER — MEDICAL CORRESPONDENCE (OUTPATIENT)
Dept: HEALTH INFORMATION MANAGEMENT | Facility: CLINIC | Age: 78
End: 2023-04-10
Payer: MEDICARE

## 2023-04-10 DIAGNOSIS — Z79.01 LONG TERM (CURRENT) USE OF ANTICOAGULANTS: Primary | ICD-10-CM

## 2023-04-10 NOTE — PROGRESS NOTES
Received call from home care nurse Monika who states she was given the wrong lab specimen tube to collect the INR today so the sample was invalid.  She will be able to draw the INR tomorrow with a POC meter. Advised that patient should continue on 3mg warfarin tonight.

## 2023-04-11 ENCOUNTER — ANTICOAGULATION THERAPY VISIT (OUTPATIENT)
Dept: ANTICOAGULATION | Facility: CLINIC | Age: 78
End: 2023-04-11
Payer: MEDICARE

## 2023-04-11 ENCOUNTER — MEDICAL CORRESPONDENCE (OUTPATIENT)
Dept: HEALTH INFORMATION MANAGEMENT | Facility: CLINIC | Age: 78
End: 2023-04-11

## 2023-04-11 DIAGNOSIS — Z79.01 LONG TERM (CURRENT) USE OF ANTICOAGULANTS: Primary | ICD-10-CM

## 2023-04-11 LAB — INR (EXTERNAL): 3 (ref 0.9–1.1)

## 2023-04-11 NOTE — PROGRESS NOTES
ANTICOAGULATION MANAGEMENT     Caty Ng 78 year old female is on warfarin with therapeutic INR result. (Goal INR 2.0-3.0)    Recent labs: (last 7 days)     04/11/23  1244   INR 3.0*       ASSESSMENT       Source(s): Chart Review and Home Care/Facility Nurse       Warfarin doses taken: Warfarin taken as instructed    Diet: No new diet changes identified    New illness, injury, or hospitalization: No    Medication/supplement changes: 2,000-3,000mg tylenol/day. This is baseline.    Signs or symptoms of bleeding or clotting: No    Previous INR: Therapeutic last 2(+) visits    Additional findings: None         PLAN     Recommended plan for no diet, medication or health factor changes affecting INR     Dosing Instructions: Continue your current warfarin dose with next INR in 1 week       Summary  As of 4/11/2023    Full warfarin instructions:  2 mg every Sun, Wed, Fri; 3 mg all other days   Next INR check:  4/18/2023             Telephone call with Amie home care nurse who verbalizes understanding and agrees to plan    Orders given to  Homecare nurse/facility to recheck    Education provided:     Goal range and lab monitoring: goal range and significance of current result    Plan made per [facility] anticoagulation protocol    Guille Forrester RN  Anticoagulation Clinic  4/11/2023    _______________________________________________________________________     Anticoagulation Episode Summary     Current INR goal:  2.0-3.0   TTR:  74.9 % (11.8 mo)   Target end date:  Indefinite   Send INR reminders to:  BREE ALLEN    Indications    Long term (current) use of anticoagulants [Z79.01]  Permanent atrial fibrillation (H) (Resolved) [I48.21]           Comments:  BREE ANDREW [[id number]] when home care discharges         Anticoagulation Care Providers     Provider Role Specialty Phone number    Keke Flores MD Referring Family Medicine [phone]

## 2023-04-18 ENCOUNTER — MEDICAL CORRESPONDENCE (OUTPATIENT)
Dept: HEALTH INFORMATION MANAGEMENT | Facility: CLINIC | Age: 78
End: 2023-04-18
Payer: MEDICARE

## 2023-04-18 ENCOUNTER — ANTICOAGULATION THERAPY VISIT (OUTPATIENT)
Dept: ANTICOAGULATION | Facility: CLINIC | Age: 78
End: 2023-04-18
Payer: MEDICARE

## 2023-04-18 DIAGNOSIS — Z79.01 LONG TERM (CURRENT) USE OF ANTICOAGULANTS: Primary | ICD-10-CM

## 2023-04-18 LAB — INR (EXTERNAL): 3.2 (ref 0.9–1.1)

## 2023-04-18 NOTE — PROGRESS NOTES
ANTICOAGULATION MANAGEMENT     Caty Ng 78 year old female is on warfarin with supratherapeutic INR result. (Goal INR 2.0-3.0)    Recent labs: (last 7 days)     04/18/23  1134   INR 3.2*       ASSESSMENT       Source(s): Chart Review and Home Care/Facility Nurse       Warfarin doses taken: Warfarin taken as instructed    Diet: No new diet changes identified    Medication/supplement changes: None noted    New illness, injury, or hospitalization: No    Signs or symptoms of bleeding or clotting: No    Previous INR: Therapeutic last 2(+) visits, though INR has been trending up last 5 checks.    Additional findings: None         PLAN     Recommended plan for no diet, medication or health factor changes affecting INR     Dosing Instructions: decrease your warfarin dose (6% change) with next INR in 1 week       Summary  As of 4/18/2023    Full warfarin instructions:  3 mg every Mon, Thu, Sat; 2 mg all other days   Next INR check:  4/25/2023             Telephone call with Amie home care nurse who verbalizes understanding and agrees to plan    Orders given to  Homecare nurse/facility to recheck    Education provided:     Goal range and lab monitoring: goal range and significance of current result    Plan made with [facility] Pharmacist Aurelia Forrester RN  Anticoagulation Clinic  4/18/2023    _______________________________________________________________________     Anticoagulation Episode Summary     Current INR goal:  2.0-3.0   TTR:  72.9 % (11.7 mo)   Target end date:  Indefinite   Send INR reminders to:  BREE ALLEN    Indications    Long term (current) use of anticoagulants [Z79.01]  Permanent atrial fibrillation (H) (Resolved) [I48.21]           Comments:  BREE ANDREW [[id number]] when home care discharges         Anticoagulation Care Providers     Provider Role Specialty Phone number    Keke Flores MD Referring Family Medicine [phone]

## 2023-04-19 ENCOUNTER — OFFICE VISIT (OUTPATIENT)
Dept: CARDIOLOGY | Facility: CLINIC | Age: 78
End: 2023-04-19
Payer: MEDICARE

## 2023-04-19 VITALS
HEART RATE: 88 BPM | DIASTOLIC BLOOD PRESSURE: 58 MMHG | HEIGHT: 63 IN | SYSTOLIC BLOOD PRESSURE: 102 MMHG | WEIGHT: 231 LBS | RESPIRATION RATE: 16 BRPM | BODY MASS INDEX: 40.93 KG/M2

## 2023-04-19 DIAGNOSIS — I48.20 CHRONIC ATRIAL FIBRILLATION (H): ICD-10-CM

## 2023-04-19 DIAGNOSIS — I42.0 DILATED CARDIOMYOPATHY (H): ICD-10-CM

## 2023-04-19 DIAGNOSIS — I25.10 CORONARY ARTERY DISEASE INVOLVING NATIVE CORONARY ARTERY OF NATIVE HEART WITHOUT ANGINA PECTORIS: ICD-10-CM

## 2023-04-19 DIAGNOSIS — I50.20 SYSTOLIC HEART FAILURE, UNSPECIFIED HF CHRONICITY (H): ICD-10-CM

## 2023-04-19 PROCEDURE — 99213 OFFICE O/P EST LOW 20 MIN: CPT | Performed by: INTERNAL MEDICINE

## 2023-04-19 NOTE — LETTER
4/19/2023    JUNO THOMPSON MD  [address] [facility] [address]    RE: Caty Ng       Dear Colleague,     I had the pleasure of seeing Caty Ng in the [facility].      [facility]  [phone]        Assessment/Recommendations   Patient with known disease, permanent atrial fibrillation, reduction her left ventricular systolic function which is unexplained and now recent right hip surgery in [place].  She did have a pyrophosphate scan with no evidence of amyloid.  Her pharmacologic nuclear study showed that the left ventricular ejection fraction was 54% in February of this year.  Echo showed 45 to 50% in January and cardiac MRI showed ejection fraction of 32% in October 2022.  It is certainly possible to better control the ventricular response atrial fibrillation is resulted in improved ejection fraction.    She does not have any evidence of pulmonary vascular congestion today.  Blood pressure is optimized and she is gradually proving her exercise capacity after hip surgery although it is slow going.    I have not made any changes in her medical regimen at this time.  In about 1 month I would like to get an echocardiogram to see if her left ventricular ejection fraction has improved further and if it is stable or improved, I do not think further evaluation or medical changes would be warranted.    We will get back to her with the results of the echocardiogram and any further recommendations.  Plan on seeing her in follow-up in 3 months.    Thank you for allowing us to precipitate in her care.       History of Present Illness/Subjective    Ms. Caty Ng is a 78 year old female with known atrial fibrillation which has been longstanding and permanent.  She was noted to have a reduced left ventricular ejection fraction in the fall 2022 at the time she was contemplating hip surgery in [place].  Evaluation included a cardiac MRI which did not show  "infiltrative process and showed a significant reduction in ejection fraction at 32%.  She did not have evidence of pulmonary vascular congestion but her ventricular response atrial fibrillation was not particular well controlled.  She had some changes in her medication to control the ventricular response and did also see a cardiologist in [place] as a preoperative exam.  She had influenza A about the same time that she saw him and the surgery was delayed.  Pyrophosphate scan did not show evidence of amyloid.  Ultimately she had her surgery on March 1 and has had a slow but steady recovery.    She is not complaining of paroxysmal nocturnal dyspnea has some mild peripheral edema but is actually better than it had been in the past.  No palpitations, syncope or near syncopal episodes and no chest discomfort.           Physical Examination Review of Systems   /58 (BP Location: Right arm, Patient Position: Sitting, Cuff Size: Adult Large)   Pulse 88   Resp 16   Ht 1.6 m (5' 3\")   Wt 104.8 kg (231 lb)   BMI 40.92 kg/m    Body mass index is 40.92 kg/m .  Wt Readings from Last 3 Encounters:   04/19/23 104.8 kg (231 lb)   02/01/23 102.5 kg (226 lb)   01/09/23 110.6 kg (243 lb 13.3 oz)     General Appearance:   Alert, cooperative and in no acute distress.   ENT/Mouth: Patient not wearing a mask.      EYES:  no scleral icterus, normal conjunctivae   Neck: JVP normal. No Hepatojugular reflux. Thyroid not visualized.   Chest/Lungs:   Lungs are clear to auscultation, equal chest wall expansion.   Cardiovascular:   S1, S2 without murmur ,clicks or rubs. Brachial, radial  pulses are intact and symetric. No carotid bruits noted   Abdomen:  Nontender   Extremities: No cyanosis, clubbing and mild pretibial edema   Skin: no xanthelasma, warm.    Neurologic: normal arm movement bilateral, no tremors     Psychiatric: Appropriate affect.      Enc Vitals  BP: 102/58  Pulse: 88  Resp: 16  Weight: 104.8 kg (231 lb)  Height: 160 " "cm (5' 3\")                                           Medical History  Surgical History Family History Social History   Past Medical History:   Diagnosis Date    Allergic rhinitis     Arrhythmia 01/01/2012    Atrial fibrillation (H)     Atrial fibrillation, chronic (H) 06/21/2012    Followed by Dr. 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 Cobos, cardiology EF 45 - 50%    Hyperlipidemia 01/01/2012    Hypertriglyceridemia     Mediastinal mass 2015    benign, [facility] consult     Mitral valve regurgitation 06/24/2012    Moderate mitral regurgitation     Morbid obesity with BMI of 40.0-44.9, adult (H) 04/25/2015    BROCK (obstructive sleep apnea)     Scoliosis     Sleep apnea 01/01/2010    per pt    Thyroid disease     Past Surgical History:   Procedure Laterality Date    CV CORONARY ANGIOGRAM N/A 7/21/2021    Procedure: Coronary Angiogram;  Surgeon: Hai Murphy MD;  Location: [facility] CATH LAB CV    CV LEFT HEART CATH N/A 7/21/2021    Procedure: Left Heart Cath;  Surgeon: Hai Murphy MD;  Location: [facility] CATH LAB CV    HYSTERECTOMY, PAP NO LONGER INDICATED  1989    JOINT REPLACEMTN, KNEE RT/LT  2007    right    JOINT REPLACEMTN, KNEE RT/LT Left     SALPINGO OOPHORECTOMY,R/L/BRANDO Bilateral 2003    SURGICAL HISTORY OF -   1989    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    Family History   Problem Relation Age of Onset    Heart Disease Mother     Cancer Mother     Diabetes Father     Musculoskeletal Disorder Father     Diabetes Paternal " Grandmother     Diabetes Paternal Grandfather     Diabetes Sister     Thrombophilia Daughter         Factor V Leiden Deficiency    Diabetes Sister     Social History     Socioeconomic History    Marital status:      Spouse name: Bill     Number of children: 2    Years of education: Not on file    Highest education level: Not on file   Occupational History    Occupation: taxes      Employer: RETIRED   Tobacco Use    Smoking status: Former     Packs/day: 0.50     Years: 20.00     Pack years: 10.00     Types: Cigarettes     Quit date: 1982     Years since quittin.3    Smokeless tobacco: Never   Vaping Use    Vaping status: Never Used   Substance and Sexual Activity    Alcohol use: Yes     Comment: 1 beers per week    Drug use: Never    Sexual activity: Yes     Partners: Male     Birth control/protection: Post-menopausal   Other Topics Concern    Parent/sibling w/ CABG, MI or angioplasty before 65F 55M? Yes   Social History Narrative    Not on file     Social Determinants of Health     Financial Resource Strain: Not on file   Food Insecurity: Not on file   Transportation Needs: Not on file   Physical Activity: Not on file   Stress: Not on file   Social Connections: Not on file   Intimate Partner Violence: Not on file   Housing Stability: Not on file          Medications  Allergies   Current Outpatient Medications   Medication Sig Dispense Refill    acetaminophen (TYLENOL) 650 MG CR tablet Take 1,300 mg by mouth 2 times daily      amoxicillin (AMOXIL) 500 MG capsule TAKE 4 CAPSULES BY MOUTH 1 HOUR BEFORE DENTAL APPOINTMENT      atorvastatin (LIPITOR) 80 MG tablet TAKE 1 TABLET DAILY 90 tablet 3    carvedilol (COREG) 25 MG tablet Take 12.5 mg in the morning and 25 mg in the afternoon 180 tablet 3    digoxin (LANOXIN) 125 MCG tablet TAKE 1 TABLET DAILY 30 tablet 4    furosemide (LASIX) 40 MG tablet Take 1 tablet (40 mg) by mouth daily 90 tablet 3    levothyroxine (SYNTHROID/LEVOTHROID) 75 MCG tablet TAKE 1  TABLET DAILY 90 tablet 1    losartan (COZAAR) 25 MG tablet Take 1 tablet (25 mg) by mouth daily 90 tablet 3    spironolactone (ALDACTONE) 25 MG tablet Take 1 tablet (25 mg) by mouth daily 90 tablet 3    warfarin ANTICOAGULANT (JANTOVEN ANTICOAGULANT) 2 MG tablet Take 1.5 tablets (3 mg) by mouth daily Take 1/2 tablet (1mg) to 1 tablet (2mg) by mouth daily, or as directed.  Adjust dose based on INR results. 30 tablet 0    benzonatate (TESSALON) 100 MG capsule Take 1 capsule (100 mg) by mouth 3 times daily as needed for cough (Patient not taking: Reported on 2/1/2023) 30 capsule 0    enoxaparin ANTICOAGULANT (LOVENOX) 40 MG/0.4ML syringe Inject 0.4 mLs (40 mg) Subcutaneous every 24 hours (Patient not taking: Reported on 4/19/2023) 1.6 mL 1    guaiFENesin (ROBITUSSIN) 20 mg/mL liquid Take 15 mLs by mouth every 6 hours as needed for cough (Patient not taking: Reported on 2/1/2023) 180 mL 0    Melatonin 10 MG TABS tablet Take 1 tablet by mouth nightly as needed (Patient not taking: Reported on 4/19/2023)      Allergies   Allergen Reactions    Lisinopril      cough    Metal [Staples] Other (See Comments)     Metal allergy not clearly specified - patient had a stainless steel screw put in her foot that needed to be removed due to allergy/hypersensitivity     Metoprolol Fatigue    Trace Metals      Other reaction(s): Other (see comments)  Metal allergy not clearly specified - patient had a stainless steel screw put in her foot that needed to be removed due to allergy/hypersensitivity.  Also react to earrings.         Lab Results    Chemistry/lipid CBC Cardiac Enzymes/BNP/TSH/INR   Lab Results   Component Value Date    CHOL 104 06/06/2022    HDL 39 (L) 06/06/2022    TRIG 128 06/06/2022    BUN 19.2 02/01/2023     02/01/2023    CO2 29 02/01/2023    Lab Results   Component Value Date    WBC 7.5 01/06/2023    HGB 13.8 01/06/2023    HCT 43.3 01/06/2023    MCV 95 01/06/2023     01/06/2023    Lab Results   Component  Value Date     (H) 12/03/2020    TSH 1.02 06/06/2022    INR 3.2 (A) 04/18/2023                  Thank you for allowing me to participate in the care of your patient.      Sincerely,     Cameron Soria MD     [facility]  cc:   Cameron Soria MD  [address] [phone]

## 2023-04-19 NOTE — PROGRESS NOTES
[facility]  [phone]        Assessment/Recommendations   Patient with known disease, permanent atrial fibrillation, reduction her left ventricular systolic function which is unexplained and now recent right hip surgery in [place].  She did have a pyrophosphate scan with no evidence of amyloid.  Her pharmacologic nuclear study showed that the left ventricular ejection fraction was 54% in February of this year.  Echo showed 45 to 50% in January and cardiac MRI showed ejection fraction of 32% in October 2022.  It is certainly possible to better control the ventricular response atrial fibrillation is resulted in improved ejection fraction.    She does not have any evidence of pulmonary vascular congestion today.  Blood pressure is optimized and she is gradually proving her exercise capacity after hip surgery although it is slow going.    I have not made any changes in her medical regimen at this time.  In about 1 month I would like to get an echocardiogram to see if her left ventricular ejection fraction has improved further and if it is stable or improved, I do not think further evaluation or medical changes would be warranted.    We will get back to her with the results of the echocardiogram and any further recommendations.  Plan on seeing her in follow-up in 3 months.    Thank you for allowing us to precipitate in her care.       History of Present Illness/Subjective    Ms. Caty Ng is a 78 year old female with known atrial fibrillation which has been longstanding and permanent.  She was noted to have a reduced left ventricular ejection fraction in the fall 2022 at the time she was contemplating hip surgery in [place].  Evaluation included a cardiac MRI which did not show infiltrative process and showed a significant reduction in ejection fraction at 32%.  She did not have evidence of pulmonary vascular congestion but her ventricular response atrial fibrillation was not  "particular well controlled.  She had some changes in her medication to control the ventricular response and did also see a cardiologist in [place] as a preoperative exam.  She had influenza A about the same time that she saw him and the surgery was delayed.  Pyrophosphate scan did not show evidence of amyloid.  Ultimately she had her surgery on March 1 and has had a slow but steady recovery.    She is not complaining of paroxysmal nocturnal dyspnea has some mild peripheral edema but is actually better than it had been in the past.  No palpitations, syncope or near syncopal episodes and no chest discomfort.           Physical Examination Review of Systems   /58 (BP Location: Right arm, Patient Position: Sitting, Cuff Size: Adult Large)   Pulse 88   Resp 16   Ht 1.6 m (5' 3\")   Wt 104.8 kg (231 lb)   BMI 40.92 kg/m    Body mass index is 40.92 kg/m .  Wt Readings from Last 3 Encounters:   04/19/23 104.8 kg (231 lb)   02/01/23 102.5 kg (226 lb)   01/09/23 110.6 kg (243 lb 13.3 oz)     General Appearance:   Alert, cooperative and in no acute distress.   ENT/Mouth: Patient not wearing a mask.      EYES:  no scleral icterus, normal conjunctivae   Neck: JVP normal. No Hepatojugular reflux. Thyroid not visualized.   Chest/Lungs:   Lungs are clear to auscultation, equal chest wall expansion.   Cardiovascular:   S1, S2 without murmur ,clicks or rubs. Brachial, radial  pulses are intact and symetric. No carotid bruits noted   Abdomen:  Nontender   Extremities: No cyanosis, clubbing and mild pretibial edema   Skin: no xanthelasma, warm.    Neurologic: normal arm movement bilateral, no tremors     Psychiatric: Appropriate affect.      Enc Vitals  BP: 102/58  Pulse: 88  Resp: 16  Weight: 104.8 kg (231 lb)  Height: 160 cm (5' 3\")                                           Medical History  Surgical History Family History Social History   Past Medical History:   Diagnosis Date     Allergic rhinitis      Arrhythmia " 01/01/2012     Atrial fibrillation (H)      Atrial fibrillation, chronic (H) 06/21/2012    Followed by Dr. 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 Cobos, cardiology EF 45 - 50%     Hyperlipidemia 01/01/2012     Hypertriglyceridemia      Mediastinal mass 2015    benign, [facility] consult      Mitral valve regurgitation 06/24/2012     Moderate mitral regurgitation      Morbid obesity with BMI of 40.0-44.9, adult (H) 04/25/2015     BROCK (obstructive sleep apnea)      Scoliosis      Sleep apnea 01/01/2010    per pt     Thyroid disease     Past Surgical History:   Procedure Laterality Date     CV CORONARY ANGIOGRAM N/A 7/21/2021    Procedure: Coronary Angiogram;  Surgeon: Hai Murphy MD;  Location: [facility] CATH LAB CV     CV LEFT HEART CATH N/A 7/21/2021    Procedure: Left Heart Cath;  Surgeon: Hai Murphy MD;  Location: [facility] CATH LAB CV     HYSTERECTOMY, PAP NO LONGER INDICATED  1989     JOINT REPLACEMTN, KNEE RT/LT  2007    right     JOINT REPLACEMTN, KNEE RT/LT Left      SALPINGO OOPHORECTOMY,R/L/BRANDO Bilateral 2003     SURGICAL HISTORY OF -   1989    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    Family History   Problem Relation Age of Onset     Heart Disease Mother      Cancer Mother      Diabetes Father      Musculoskeletal Disorder Father      Diabetes Paternal Grandmother      Diabetes Paternal Grandfather      Diabetes Sister      Thrombophilia Daughter         Factor V Leiden Deficiency     Diabetes Sister     Social  History     Socioeconomic History     Marital status:      Spouse name: Bill      Number of children: 2     Years of education: Not on file     Highest education level: Not on file   Occupational History     Occupation: taxes      Employer: RETIRED   Tobacco Use     Smoking status: Former     Packs/day: 0.50     Years: 20.00     Pack years: 10.00     Types: Cigarettes     Quit date: 1982     Years since quittin.3     Smokeless tobacco: Never   Vaping Use     Vaping status: Never Used   Substance and Sexual Activity     Alcohol use: Yes     Comment: 1 beers per week     Drug use: Never     Sexual activity: Yes     Partners: Male     Birth control/protection: Post-menopausal   Other Topics Concern     Parent/sibling w/ CABG, MI or angioplasty before 65F 55M? Yes   Social History Narrative     Not on file     Social Determinants of Health     Financial Resource Strain: Not on file   Food Insecurity: Not on file   Transportation Needs: Not on file   Physical Activity: Not on file   Stress: Not on file   Social Connections: Not on file   Intimate Partner Violence: Not on file   Housing Stability: Not on file          Medications  Allergies   Current Outpatient Medications   Medication Sig Dispense Refill     acetaminophen (TYLENOL) 650 MG CR tablet Take 1,300 mg by mouth 2 times daily       amoxicillin (AMOXIL) 500 MG capsule TAKE 4 CAPSULES BY MOUTH 1 HOUR BEFORE DENTAL APPOINTMENT       atorvastatin (LIPITOR) 80 MG tablet TAKE 1 TABLET DAILY 90 tablet 3     carvedilol (COREG) 25 MG tablet Take 12.5 mg in the morning and 25 mg in the afternoon 180 tablet 3     digoxin (LANOXIN) 125 MCG tablet TAKE 1 TABLET DAILY 30 tablet 4     furosemide (LASIX) 40 MG tablet Take 1 tablet (40 mg) by mouth daily 90 tablet 3     levothyroxine (SYNTHROID/LEVOTHROID) 75 MCG tablet TAKE 1 TABLET DAILY 90 tablet 1     losartan (COZAAR) 25 MG tablet Take 1 tablet (25 mg) by mouth daily 90 tablet 3     spironolactone  (ALDACTONE) 25 MG tablet Take 1 tablet (25 mg) by mouth daily 90 tablet 3     warfarin ANTICOAGULANT (JANTOVEN ANTICOAGULANT) 2 MG tablet Take 1.5 tablets (3 mg) by mouth daily Take 1/2 tablet (1mg) to 1 tablet (2mg) by mouth daily, or as directed.  Adjust dose based on INR results. 30 tablet 0     benzonatate (TESSALON) 100 MG capsule Take 1 capsule (100 mg) by mouth 3 times daily as needed for cough (Patient not taking: Reported on 2/1/2023) 30 capsule 0     enoxaparin ANTICOAGULANT (LOVENOX) 40 MG/0.4ML syringe Inject 0.4 mLs (40 mg) Subcutaneous every 24 hours (Patient not taking: Reported on 4/19/2023) 1.6 mL 1     guaiFENesin (ROBITUSSIN) 20 mg/mL liquid Take 15 mLs by mouth every 6 hours as needed for cough (Patient not taking: Reported on 2/1/2023) 180 mL 0     Melatonin 10 MG TABS tablet Take 1 tablet by mouth nightly as needed (Patient not taking: Reported on 4/19/2023)      Allergies   Allergen Reactions     Lisinopril      cough     Metal [Staples] Other (See Comments)     Metal allergy not clearly specified - patient had a stainless steel screw put in her foot that needed to be removed due to allergy/hypersensitivity      Metoprolol Fatigue     Trace Metals      Other reaction(s): Other (see comments)  Metal allergy not clearly specified - patient had a stainless steel screw put in her foot that needed to be removed due to allergy/hypersensitivity.  Also react to earrings.         Lab Results    Chemistry/lipid CBC Cardiac Enzymes/BNP/TSH/INR   Lab Results   Component Value Date    CHOL 104 06/06/2022    HDL 39 (L) 06/06/2022    TRIG 128 06/06/2022    BUN 19.2 02/01/2023     02/01/2023    CO2 29 02/01/2023    Lab Results   Component Value Date    WBC 7.5 01/06/2023    HGB 13.8 01/06/2023    HCT 43.3 01/06/2023    MCV 95 01/06/2023     01/06/2023    Lab Results   Component Value Date     (H) 12/03/2020    TSH 1.02 06/06/2022    INR 3.2 (A) 04/18/2023

## 2023-04-24 ENCOUNTER — MEDICAL CORRESPONDENCE (OUTPATIENT)
Dept: HEALTH INFORMATION MANAGEMENT | Facility: CLINIC | Age: 78
End: 2023-04-24
Payer: MEDICARE

## 2023-04-25 ENCOUNTER — MEDICAL CORRESPONDENCE (OUTPATIENT)
Dept: HEALTH INFORMATION MANAGEMENT | Facility: CLINIC | Age: 78
End: 2023-04-25
Payer: MEDICARE

## 2023-04-25 ENCOUNTER — TELEPHONE (OUTPATIENT)
Dept: ANTICOAGULATION | Facility: CLINIC | Age: 78
End: 2023-04-25
Payer: MEDICARE

## 2023-04-25 NOTE — TELEPHONE ENCOUNTER
Amie, HC nurse, calls to let us know that she was supposed to check pt's INR today but she is at a hair appt.  She will check INR tomorrow, pt will take usual dose of 2 mg roderick Razo RN

## 2023-04-26 ENCOUNTER — ANTICOAGULATION THERAPY VISIT (OUTPATIENT)
Dept: ANTICOAGULATION | Facility: CLINIC | Age: 78
End: 2023-04-26
Payer: MEDICARE

## 2023-04-26 ENCOUNTER — MEDICAL CORRESPONDENCE (OUTPATIENT)
Dept: HEALTH INFORMATION MANAGEMENT | Facility: CLINIC | Age: 78
End: 2023-04-26
Payer: MEDICARE

## 2023-04-26 DIAGNOSIS — Z79.01 LONG TERM (CURRENT) USE OF ANTICOAGULANTS: Primary | ICD-10-CM

## 2023-04-26 LAB — INR (EXTERNAL): 3.1 (ref 0.9–1.1)

## 2023-04-26 NOTE — PROGRESS NOTES
ANTICOAGULATION MANAGEMENT     Caty Ng 78 year old female is on warfarin with supratherapeutic INR result. (Goal INR 2.0-3.0)    Recent labs: (last 7 days)     04/26/23  0000   INR 3.1*       ASSESSMENT       Source(s): Chart Review    Previous INR was Supratherapeutic    Medication, diet, health changes since last INR chart reviewed; none identified      Home Care nurse left VM at [facility] but did not leave her name on  message. Need to confirm pt dosing as she reported on VM pt was taking 2mg Sun/Wed/Fri and 3 rest of week but [facility] had a difference maintenance dose listed.        PLAN     Unable to reach home care nurse today.    Left message to call back to [facility]    Follow up required to confirm warfarin dose taken and assess for changes    Nolan Jeffers RN  Anticoagulation Clinic  4/26/2023

## 2023-04-26 NOTE — PROGRESS NOTES
ANTICOAGULATION MANAGEMENT     Caty Ng 78 year old female is on warfarin with supratherapeutic INR result. (Goal INR 2.0-3.0)    Recent labs: (last 7 days)     04/26/23  0000   INR 3.1*       ASSESSMENT       Source(s): Chart Review and Patient/Caregiver Call       Warfarin doses taken: Warfarin taken as instructed    Diet: No new diet changes identified    Medication/supplement changes: None noted    New illness, injury, or hospitalization: No    Signs or symptoms of bleeding or clotting: No    Previous result: Supratherapeutic    Additional findings: None         PLAN     Recommended plan for no diet, medication or health factor changes affecting INR     Dosing Instructions: decrease your warfarin dose (5.9% change) with next INR in 1 week       Summary  As of 4/26/2023    Full warfarin instructions:  3 mg every Mon, Sat; 2 mg all other days   Next INR check:  5/3/2023             Telephone call with Amie home care nurse who verbalizes understanding and agrees to plan    Orders given to  Homecare nurse/facility to recheck    Education provided:     None required    Plan made per [facility] anticoagulation protocol    Nolan Jeffers RN  Anticoagulation Clinic  4/26/2023    _______________________________________________________________________     Anticoagulation Episode Summary     Current INR goal:  2.0-3.0   TTR:  70.7 % (11.7 mo)   Target end date:  Indefinite   Send INR reminders to:  BREE ALLEN    Indications    Long term (current) use of anticoagulants [Z79.01]  Permanent atrial fibrillation (H) (Resolved) [I48.21]           Comments:  BREE ANDREW [[id number]] when home care discharges         Anticoagulation Care Providers     Provider Role Specialty Phone number    Keke Flores MD Referring Family Medicine [phone]

## 2023-05-02 ENCOUNTER — DOCUMENTATION ONLY (OUTPATIENT)
Dept: ANTICOAGULATION | Facility: CLINIC | Age: 78
End: 2023-05-02
Payer: MEDICARE

## 2023-05-02 ENCOUNTER — MEDICAL CORRESPONDENCE (OUTPATIENT)
Dept: HEALTH INFORMATION MANAGEMENT | Facility: CLINIC | Age: 78
End: 2023-05-02
Payer: MEDICARE

## 2023-05-02 DIAGNOSIS — I48.20 CHRONIC ATRIAL FIBRILLATION (H): Primary | ICD-10-CM

## 2023-05-02 NOTE — PROGRESS NOTES
ANTICOAGULATION CLINIC REFERRAL RENEWAL REQUEST       An annual renewal order is required for all patients referred to [facility].?  Please review and sign the pended referral order for Caty Ng.       ANTICOAGULATION SUMMARY      Warfarin indication(s)   Atrial Fibrillation    Mechanical heart valve present?  NO       Current goal range   INR: 2.0-3.0     Goal appropriate for indication? Goal INR 2-3, standard for indication(s) above     Time in Therapeutic Range (TTR)  (Goal > 60%) 71%       Office visit with referring provider's group within last year yes on 01/23/2023       Guille Forrester RN  [facility]

## 2023-05-03 ENCOUNTER — ANTICOAGULATION THERAPY VISIT (OUTPATIENT)
Dept: ANTICOAGULATION | Facility: CLINIC | Age: 78
End: 2023-05-03
Payer: MEDICARE

## 2023-05-03 DIAGNOSIS — I48.20 CHRONIC ATRIAL FIBRILLATION (H): ICD-10-CM

## 2023-05-03 DIAGNOSIS — Z79.01 LONG TERM (CURRENT) USE OF ANTICOAGULANTS: Primary | ICD-10-CM

## 2023-05-03 LAB — INR (EXTERNAL): 2.6 (ref 0.9–1.1)

## 2023-05-03 NOTE — PROGRESS NOTES
ANTICOAGULATION MANAGEMENT     Caty Ng 78 year old female is on warfarin with therapeutic INR result. (Goal INR 2.0-3.0)    Recent labs: (last 7 days)     05/03/23  1051   INR 2.6*       ASSESSMENT       Source(s): Chart Review and Home Care/Facility Nurse       Warfarin doses taken: Warfarin taken as instructed    Diet: No new diet changes identified    Medication/supplement changes: None noted    New illness, injury, or hospitalization: No    Signs or symptoms of bleeding or clotting: No    Previous result: Supratherapeutic    Additional findings: None         PLAN     Recommended plan for no diet, medication or health factor changes affecting INR     Dosing Instructions: Continue your current warfarin dose with next INR in 2 weeks       Summary  As of 5/3/2023    Full warfarin instructions:  3 mg every Mon, Fri; 2 mg all other days   Next INR check:  5/17/2023             Telephone call with Amie home care nurse who verbalizes understanding and agrees to plan    Orders given to  Homecare nurse/facility to recheck    Education provided:     Goal range and lab monitoring: goal range and significance of current result    Plan made per [facility] anticoagulation protocol    Guille Forrester RN  Anticoagulation Clinic  5/3/2023    _______________________________________________________________________     Anticoagulation Episode Summary     Current INR goal:  2.0-3.0   TTR:  70.3 % (11.8 mo)   Target end date:  Indefinite   Send INR reminders to:  BREE ALLEN    Indications    Long term (current) use of anticoagulants [Z79.01]  Permanent atrial fibrillation (H) (Resolved) [I48.21]  Chronic atrial fibrillation (H) [I48.20]           Comments:  BREE ANDREW [[id number]] when home care discharges         Anticoagulation Care Providers     Provider Role Specialty Phone number    Keke Flores MD Referring Family Medicine [phone]

## 2023-05-04 ENCOUNTER — MEDICAL CORRESPONDENCE (OUTPATIENT)
Dept: HEALTH INFORMATION MANAGEMENT | Facility: CLINIC | Age: 78
End: 2023-05-04
Payer: MEDICARE

## 2023-05-05 ENCOUNTER — MEDICAL CORRESPONDENCE (OUTPATIENT)
Dept: HEALTH INFORMATION MANAGEMENT | Facility: CLINIC | Age: 78
End: 2023-05-05
Payer: MEDICARE

## 2023-05-07 ENCOUNTER — MEDICAL CORRESPONDENCE (OUTPATIENT)
Dept: HEALTH INFORMATION MANAGEMENT | Facility: CLINIC | Age: 78
End: 2023-05-07
Payer: MEDICARE

## 2023-05-08 ENCOUNTER — PATIENT OUTREACH (OUTPATIENT)
Dept: CARE COORDINATION | Facility: CLINIC | Age: 78
End: 2023-05-08
Payer: MEDICARE

## 2023-05-08 DIAGNOSIS — I42.8 NON-ISCHEMIC CARDIOMYOPATHY (H): ICD-10-CM

## 2023-05-09 RX ORDER — FUROSEMIDE 40 MG
TABLET ORAL
Qty: 90 TABLET | Refills: 1 | Status: SHIPPED | OUTPATIENT
Start: 2023-05-09 | End: 2024-02-07

## 2023-05-09 NOTE — TELEPHONE ENCOUNTER
"Last Written Prescription Date:  1/25/2023 Change in pharmacy noted.   Last Fill Quantity: 90,  # refills: 3   Last office visit provider: 1/23/2023 with PCP Dr LESLIE Flores    Requested Prescriptions   Pending Prescriptions Disp Refills     furosemide (LASIX) 40 MG tablet [Pharmacy Med Name: FUROSEMIDE 40 MG TABLET] 90 tablet 1     Sig: TAKE 1 TABLET BY MOUTH EVERY DAY       Diuretics (Including Combos) Protocol Passed - 5/8/2023 12:29 PM        Passed - Blood pressure under 140/90 in past 12 months     BP Readings from Last 3 Encounters:   04/19/23 102/58   02/01/23 96/66   01/23/23 124/68                 Passed - Recent (12 mo) or future (30 days) visit within the authorizing provider's specialty     Patient has had an office visit with the authorizing provider or a provider within the authorizing providers department within the previous 12 mos or has a future within next 30 days. See \"Patient Info\" tab in inbasket, or \"Choose Columns\" in Meds & Orders section of the refill encounter.              Passed - Medication is active on med list        Passed - Patient is age 18 or older        Passed - No active pregancy on record        Passed - Normal serum creatinine on file in past 12 months     Recent Labs   Lab Test 02/01/23  1535   CR 0.82              Passed - Normal serum potassium on file in past 12 months     Recent Labs   Lab Test 02/01/23  1535   POTASSIUM 4.4                    Passed - Normal serum sodium on file in past 12 months     Recent Labs   Lab Test 02/01/23  1535                 Passed - No positive pregnancy test in past 12 months             Mindy Atkins RN 05/09/23 4:28 PM  "

## 2023-05-10 ENCOUNTER — MEDICAL CORRESPONDENCE (OUTPATIENT)
Dept: HEALTH INFORMATION MANAGEMENT | Facility: CLINIC | Age: 78
End: 2023-05-10
Payer: MEDICARE

## 2023-05-15 ENCOUNTER — MEDICAL CORRESPONDENCE (OUTPATIENT)
Dept: HEALTH INFORMATION MANAGEMENT | Facility: CLINIC | Age: 78
End: 2023-05-15
Payer: MEDICARE

## 2023-05-16 ENCOUNTER — MEDICAL CORRESPONDENCE (OUTPATIENT)
Dept: HEALTH INFORMATION MANAGEMENT | Facility: CLINIC | Age: 78
End: 2023-05-16
Payer: MEDICARE

## 2023-05-17 ENCOUNTER — ANTICOAGULATION THERAPY VISIT (OUTPATIENT)
Dept: ANTICOAGULATION | Facility: CLINIC | Age: 78
End: 2023-05-17
Payer: MEDICARE

## 2023-05-17 ENCOUNTER — MEDICAL CORRESPONDENCE (OUTPATIENT)
Dept: HEALTH INFORMATION MANAGEMENT | Facility: CLINIC | Age: 78
End: 2023-05-17
Payer: MEDICARE

## 2023-05-17 LAB — INR (EXTERNAL): 2.8 (ref 0.9–1.1)

## 2023-05-17 NOTE — PROGRESS NOTES
VM left at 9:37 -   Monika, [facility] nurse [phone], calls to report INR of 2.8    Elsie Razo RN

## 2023-05-17 NOTE — PROGRESS NOTES
ANTICOAGULATION MANAGEMENT     Caty Ng 78 year old female is on warfarin with therapeutic INR result. (Goal INR 2.0-3.0)    Recent labs: (last 7 days)     05/17/23  0937   INR 2.8*       ASSESSMENT       Source(s): Chart Review and Home Care/Facility Nurse       Warfarin doses taken: Warfarin taken as instructed    Diet: No new diet changes identified    Medication/supplement changes: None noted    New illness, injury, or hospitalization: Yes: Hx of right total hip. Will continue with home care as long as pt has Home PT/OT.    Signs or symptoms of bleeding or clotting: No    Previous result: Therapeutic last visit; previously outside of goal range    Additional findings: None         PLAN       Dosing Instructions: Continue your current warfarin dose with next INR in 2 weeks       Summary  As of 5/17/2023    Full warfarin instructions:  3 mg every Mon, Fri; 2 mg all other days   Next INR check:  5/31/2023             Telephone call with Monika home care nurse who verbalizes understanding and agrees to plan    Orders given to  Homecare nurse/facility to recheck    Education provided:     Contact [phone]  with any changes, questions or concerns.     Plan made per [facility] anticoagulation protocol    Fallon Sauceda RN  Anticoagulation Clinic  5/17/2023    _______________________________________________________________________     Anticoagulation Episode Summary     Current INR goal:  2.0-3.0   TTR:  70.3 % (11.8 mo)   Target end date:  Indefinite   Send INR reminders to:  BREE ALLEN    Indications    Long term (current) use of anticoagulants [Z79.01]  Permanent atrial fibrillation (H) (Resolved) [I48.21]  Chronic atrial fibrillation (H) [I48.20]           Comments:  BREE ANDREW [[id number]] when home care discharges         Anticoagulation Care Providers     Provider Role Specialty Phone number    Keke Flores MD Referring Family Medicine [phone]

## 2023-05-21 DIAGNOSIS — I50.32 CHRONIC DIASTOLIC HEART FAILURE (H): ICD-10-CM

## 2023-05-21 DIAGNOSIS — E03.9 HYPOTHYROIDISM, UNSPECIFIED TYPE: ICD-10-CM

## 2023-05-21 RX ORDER — LEVOTHYROXINE SODIUM 75 UG/1
TABLET ORAL
Qty: 90 TABLET | Refills: 2 | Status: SHIPPED | OUTPATIENT
Start: 2023-05-21 | End: 2024-02-26

## 2023-05-21 RX ORDER — CARVEDILOL 25 MG/1
TABLET ORAL
Qty: 90 TABLET | Refills: 2 | OUTPATIENT
Start: 2023-05-21

## 2023-05-21 NOTE — TELEPHONE ENCOUNTER
"Last Written Prescription Date:  11/22/2022  Last Fill Quantity: 90,  # refills: 1   Last office visit provider:  01/23/2023     Requested Prescriptions   Pending Prescriptions Disp Refills     levothyroxine (SYNTHROID/LEVOTHROID) 75 MCG tablet [Pharmacy Med Name: LEVOTHYROXIN TAB 75MCG] 90 tablet 1     Sig: TAKE 1 TABLET DAILY       Thyroid Protocol Passed - 5/21/2023  7:12 AM        Passed - Patient is 12 years or older        Passed - Recent (12 mo) or future (30 days) visit within the authorizing provider's specialty     Patient has had an office visit with the authorizing provider or a provider within the authorizing providers department within the previous 12 mos or has a future within next 30 days. See \"Patient Info\" tab in inbasket, or \"Choose Columns\" in Meds & Orders section of the refill encounter.              Passed - Medication is active on med list        Passed - Normal TSH on file in past 12 months     Recent Labs   Lab Test 06/06/22  1424   TSH 1.02              Passed - No active pregnancy on record     If patient is pregnant or has had a positive pregnancy test, please check TSH.          Passed - No positive pregnancy test in past 12 months     If patient is pregnant or has had a positive pregnancy test, please check TSH.           Refused Prescriptions Disp Refills     carvedilol (COREG) 25 MG tablet [Pharmacy Med Name: CARVEDILOL TAB 25MG] 90 tablet 2     Sig: TAKE 1/2 TABLET TWICE A DAYWITH MEALS       Beta-Blockers Protocol Passed - 5/21/2023  7:12 AM        Passed - Blood pressure under 140/90 in past 12 months     BP Readings from Last 3 Encounters:   04/19/23 102/58   02/01/23 96/66   01/23/23 124/68                 Passed - Patient is age 6 or older        Passed - Recent (12 mo) or future (30 days) visit within the authorizing provider's specialty     Patient has had an office visit with the authorizing provider or a provider within the authorizing providers department within the " "previous 12 mos or has a future within next 30 days. See \"Patient Info\" tab in inbasket, or \"Choose Columns\" in Meds & Orders section of the refill encounter.              Passed - Medication is active on med list             CASE LIM RN 05/21/23 8:07 AM  "

## 2023-05-23 ENCOUNTER — TELEPHONE (OUTPATIENT)
Dept: FAMILY MEDICINE | Facility: CLINIC | Age: 78
End: 2023-05-23
Payer: MEDICARE

## 2023-05-23 DIAGNOSIS — R29.898 WEAKNESS OF RIGHT HIP: Primary | ICD-10-CM

## 2023-05-23 NOTE — TELEPHONE ENCOUNTER
Abi is requesting a referral for PT since her HH PT is ending.  She is requesting [facility] out of [place].  Pended referral that needs to be completed.    Routing to Dr Flores to advise on referral and/or OV

## 2023-05-23 NOTE — TELEPHONE ENCOUNTER
Order/Referral Request    Who is requesting: Patient     Orders being requested: Physical Therapy referral    Reason service is needed/diagnosis: Right leg- had hip surgery 04/01/23. Muscle in right thigh strengthening.    Patient states that her Homehealth Physical Therapy is ending after 2 more visits this week, and that she needs to transition to in person physical therapy, she requests a referral to [facility].    When are orders needed by: ASAP    Has this been discussed with Provider: Yes    Does patient have a preference on a Group/Provider/Facility? [facility]    Does patient have an appointment scheduled?: 08/09/23    Where to send orders: Fax: [phone]  Attn: Ariana    Could we send this information to you in [facility] or would you prefer to receive a phone call?:   Patient would prefer a phone call     Okay to leave a detailed message?: Yes at Cell number on file:    Telephone Information:   Home [phone]     Please advise.

## 2023-05-23 NOTE — TELEPHONE ENCOUNTER
"Dr Flores request:  \"Please clarify with patient that this is for her hip pain following surgery? Is there any other symptom or problem she was having addressed?I am happy to place the referral\"    Left message to call back for: Abi  Information to relay to patient: Please clarify as noted in Dr Flores request.   "

## 2023-05-24 NOTE — TELEPHONE ENCOUNTER
Patient returned call with clarification. She needs post-op muscle strengthening after right hip surgery.

## 2023-05-26 ENCOUNTER — MEDICAL CORRESPONDENCE (OUTPATIENT)
Dept: HEALTH INFORMATION MANAGEMENT | Facility: CLINIC | Age: 78
End: 2023-05-26
Payer: MEDICARE

## 2023-05-26 DIAGNOSIS — Z53.9 DIAGNOSIS NOT YET DEFINED: Primary | ICD-10-CM

## 2023-05-26 PROCEDURE — 99207 PR MD RECERTIFICATION HHA PT: CPT | Performed by: FAMILY MEDICINE

## 2023-05-31 ENCOUNTER — TRANSFERRED RECORDS (OUTPATIENT)
Dept: HEALTH INFORMATION MANAGEMENT | Facility: CLINIC | Age: 78
End: 2023-05-31
Payer: MEDICARE

## 2023-06-01 ENCOUNTER — ANTICOAGULATION THERAPY VISIT (OUTPATIENT)
Dept: ANTICOAGULATION | Facility: CLINIC | Age: 78
End: 2023-06-01
Payer: MEDICARE

## 2023-06-01 ENCOUNTER — MEDICAL CORRESPONDENCE (OUTPATIENT)
Dept: HEALTH INFORMATION MANAGEMENT | Facility: CLINIC | Age: 78
End: 2023-06-01
Payer: MEDICARE

## 2023-06-01 DIAGNOSIS — Z79.01 LONG TERM (CURRENT) USE OF ANTICOAGULANTS: Primary | ICD-10-CM

## 2023-06-01 DIAGNOSIS — I48.20 CHRONIC ATRIAL FIBRILLATION (H): ICD-10-CM

## 2023-06-01 LAB — INR (EXTERNAL): 2.1 (ref 0.9–1.1)

## 2023-06-01 NOTE — PROGRESS NOTES
ANTICOAGULATION MANAGEMENT     Caty Ng 78 year old female is on warfarin with therapeutic INR result. (Goal INR 2.0-3.0)    Recent labs: (last 7 days)     06/01/23  1250   INR 2.1*       ASSESSMENT       Source(s): Chart Review, Patient/Caregiver Call and Home Care/Facility Nurse       Warfarin doses taken: Warfarin taken as instructed    Diet: No new diet changes identified    Medication/supplement changes: None noted    New illness, injury, or hospitalization: No    Signs or symptoms of bleeding or clotting: No    Previous result: Therapeutic last 2(+) visits    Additional findings: None         PLAN     Recommended plan for no diet, medication or health factor changes affecting INR     Dosing Instructions: Continue your current warfarin dose with next INR in 2 weeks       Summary  As of 6/1/2023    Full warfarin instructions:  3 mg every Mon, Fri; 2 mg all other days   Next INR check:  6/15/2023             Telephone call with [facility] home care nurse who verbalizes understanding and agrees to plan    Orders given to  Homecare nurse/facility to recheck    Education provided:     Please call back if any changes to your diet, medications or how you've been taking warfarin    Plan made per [facility] anticoagulation protocol    Keke Welch, RN  Anticoagulation Clinic  6/1/2023    _______________________________________________________________________     Anticoagulation Episode Summary     Current INR goal:  2.0-3.0   TTR:  70.3 % (11.8 mo)   Target end date:  Indefinite   Send INR reminders to:  BREE ALLEN    Indications    Long term (current) use of anticoagulants [Z79.01]  Permanent atrial fibrillation (H) (Resolved) [I48.21]  Chronic atrial fibrillation (H) [I48.20]           Comments:  BREE ANDREW [[id number]] when home care discharges         Anticoagulation Care Providers     Provider Role Specialty Phone number    Keke Flores MD Referring Family Medicine [phone]

## 2023-06-02 ENCOUNTER — PATIENT OUTREACH (OUTPATIENT)
Dept: CARE COORDINATION | Facility: CLINIC | Age: 78
End: 2023-06-02
Payer: MEDICARE

## 2023-06-02 NOTE — PROGRESS NOTES
Clinic Care Coordination Contact    Situation-Received a request from [facility] Worker   for patient to transition to [facility] Maintenance April 2023.     Background- Patient enrolled into [facility] Enrolled 6/10/22 RN Reassessment 1/16/23.  Patient seeking assistance with obtaining a referral to [facility], getting a medical cannabis referral, and completing a HCD.     Action-Chart review. All goals completed.  No new goals identified.  Will notify PCP.     Response: Patient to transitioned to Maintenance.  At next outreach okay for patient to Graduate from [facility].

## 2023-06-03 ENCOUNTER — TELEPHONE (OUTPATIENT)
Dept: NURSING | Facility: CLINIC | Age: 78
End: 2023-06-03
Payer: MEDICARE

## 2023-06-03 DIAGNOSIS — I50.32 CHRONIC DIASTOLIC HEART FAILURE (H): ICD-10-CM

## 2023-06-03 RX ORDER — CARVEDILOL 25 MG/1
TABLET ORAL
Qty: 180 TABLET | Refills: 3 | Status: SHIPPED | OUTPATIENT
Start: 2023-06-03 | End: 2024-07-09

## 2023-06-03 NOTE — TELEPHONE ENCOUNTER
Telephone call  Patient calling her cavedilol  Was not sent to the pharmacy and there are no refills with the Pharmacy.  Transferred it to the mail order  Pharmacy.  Routing to  Provider for FYI.    Blanca Espitia RN   [facility] Nurse Advisor  1:53 PM 6/3/2023

## 2023-06-06 ENCOUNTER — PATIENT OUTREACH (OUTPATIENT)
Dept: CARE COORDINATION | Facility: CLINIC | Age: 78
End: 2023-06-06
Payer: MEDICARE

## 2023-06-07 NOTE — PROGRESS NOTES
Pt called today 6/7/23 and left a voicemail at 1:10 pm stating that she has been discharged from home care. Pt is due for next INR on 6/15/23 but may need help to schedule an appointment in clinic.    Switched responsible [facility] group from [facility] to an[facility]    Please call her at [phone]

## 2023-06-08 ENCOUNTER — PATIENT OUTREACH (OUTPATIENT)
Dept: CARE COORDINATION | Facility: CLINIC | Age: 78
End: 2023-06-08
Payer: MEDICARE

## 2023-06-09 ENCOUNTER — TELEPHONE (OUTPATIENT)
Dept: FAMILY MEDICINE | Facility: CLINIC | Age: 78
End: 2023-06-09
Payer: MEDICARE

## 2023-06-09 NOTE — TELEPHONE ENCOUNTER
Order/Referral Request    Who is requesting: [facility]    Orders being requested: Physical Therapy referral     Reason service is needed/diagnosis: [facility] to [facility] without referral on file, needed to initiate services.     When are orders needed by: ASAP    Has this been discussed with Provider: Yes, PT referral already on file.    Does patient have a preference on a Group/Provider/Facility? [facility]     Does patient have an appointment scheduled?: No    Where to send orders: Fax- [phone]    Could we send this information to you in [facility] or would you prefer to receive a phone call?:   N/A

## 2023-06-15 ENCOUNTER — LAB (OUTPATIENT)
Dept: LAB | Facility: CLINIC | Age: 78
End: 2023-06-15
Payer: MEDICARE

## 2023-06-15 ENCOUNTER — ANTICOAGULATION THERAPY VISIT (OUTPATIENT)
Dept: ANTICOAGULATION | Facility: CLINIC | Age: 78
End: 2023-06-15

## 2023-06-15 DIAGNOSIS — I48.20 CHRONIC ATRIAL FIBRILLATION (H): ICD-10-CM

## 2023-06-15 DIAGNOSIS — Z79.01 LONG TERM (CURRENT) USE OF ANTICOAGULANTS: Primary | ICD-10-CM

## 2023-06-15 LAB — INR BLD: 2.8 (ref 0.9–1.1)

## 2023-06-15 PROCEDURE — 36416 COLLJ CAPILLARY BLOOD SPEC: CPT

## 2023-06-15 PROCEDURE — 85610 PROTHROMBIN TIME: CPT

## 2023-06-15 NOTE — PROGRESS NOTES
ANTICOAGULATION MANAGEMENT     Caty Ng 78 year old female is on warfarin with therapeutic INR result. (Goal INR 2.0-3.0)    Recent labs: (last 7 days)     06/15/23  1201   INR 2.8*       ASSESSMENT     Warfarin Lab Questionnaire    Warfarin Doses Last 7 Days      6/14/2023     2:23 PM   Dose in Tablet or Mg   TAB or MG? milligram (mg)     Pt Rptd Dose SUNDAY MONDAY TUESDAY WED THURS FRIDAY SATURDAY 6/14/2023   2:23 PM 25 37.5 25 25 25 37.5 25     **confirmed dosing with patient-- she has been taking 3mg M/F and 2mg AOD.**        6/14/2023   Warfarin Lab Questionnaire   Missed doses within past 14 days? No   Changes in diet or alcohol within past 14 days? No   Medication changes since last result? No   Injuries or illness since last result? No   New shortness of breath, severe headaches or sudden changes in vision since last result? No   Abnormal bleeding since last result? No   Upcoming surgery, procedure? No        Previous result: Therapeutic last 2(+) visits  Additional findings: None       PLAN     Recommended plan for no diet, medication or health factor changes affecting INR     Dosing Instructions: Continue your current warfarin dose with next INR in 4 weeks       Summary  As of 6/15/2023    Full warfarin instructions:  3 mg every Mon, Fri; 2 mg all other days   Next INR check:  7/13/2023             Telephone call with Abi who verbalizes understanding and agrees to plan    Lab visit scheduled    Education provided:     Contact [phone] with any changes, questions or concerns.     Plan made per [facility] anticoagulation protocol    Alysa Ariza RN  Anticoagulation Clinic  6/15/2023    _______________________________________________________________________     Anticoagulation Episode Summary     Current INR goal:  2.0-3.0   TTR:  70.3 % (11.8 mo)   Target end date:  Indefinite   Send INR reminders to:  BREE ANDREW    Indications    Long term (current) use of anticoagulants  [Z79.01]  Permanent atrial fibrillation (H) (Resolved) [I48.21]  Chronic atrial fibrillation (H) [I48.20]           Comments:           Anticoagulation Care Providers     Provider Role Specialty Phone number    Keke Flores MD Referring Family Medicine [phone]

## 2023-07-04 DIAGNOSIS — I50.23 ACUTE ON CHRONIC HFREF (HEART FAILURE WITH REDUCED EJECTION FRACTION) (H): ICD-10-CM

## 2023-07-04 DIAGNOSIS — Z79.01 LONG TERM (CURRENT) USE OF ANTICOAGULANTS: ICD-10-CM

## 2023-07-04 DIAGNOSIS — I48.21 PERMANENT ATRIAL FIBRILLATION (H): ICD-10-CM

## 2023-07-05 RX ORDER — WARFARIN SODIUM 2 MG/1
TABLET ORAL
Qty: 90 TABLET | Refills: 3 | Status: SHIPPED | OUTPATIENT
Start: 2023-07-05 | End: 2024-04-08

## 2023-07-05 RX ORDER — DIGOXIN 125 MCG
TABLET ORAL
Qty: 30 TABLET | Refills: 2 | Status: SHIPPED | OUTPATIENT
Start: 2023-07-05 | End: 2023-09-18

## 2023-07-05 NOTE — TELEPHONE ENCOUNTER
"Routing refill request to provider for review/approval because:  A break in medication  Warfarin    Last Written Prescription Date: 1-13-23  Last Fill Quantity: 30,  # refills: 0   Last office visit provider: 1-23-23     Requested Prescriptions   Pending Prescriptions Disp Refills     JANTOVEN ANTICOAGULANT 2 MG tablet [Pharmacy Med Name: JANTOVEN TAB 2MG] 90 tablet 3     Sig: TAKE 1/2 TO 1 TABLET (1MG TO 2MG) DAILY OR AS DIRECTED. ADJUST DOSE BASED ON INTERNATIONAL NORMALIZED RATIO RESULTS.       Vitamin K Antagonists Failed - 7/4/2023  7:08 PM        Failed - INR is within goal in the past 6 weeks     Confirm INR is within goal in the past 6 weeks.     Recent Labs   Lab Test 06/15/23  1201   INR 2.8*                       Passed - Recent (12 mo) or future (30 days) visit within the authorizing provider's specialty     Patient has had an office visit with the authorizing provider or a provider within the authorizing providers department within the previous 12 mos or has a future within next 30 days. See \"Patient Info\" tab in inbasket, or \"Choose Columns\" in Meds & Orders section of the refill encounter.              Passed - Medication is active on med list        Passed - Patient is 18 years of age or older        Passed - Patient is not pregnant        Passed - No positive pregnancy on file in past 12 months             Madhuri Mcintosh RN 07/04/23 7:38 PM  "

## 2023-07-13 ENCOUNTER — HOSPITAL ENCOUNTER (OUTPATIENT)
Dept: CARDIOLOGY | Facility: HOSPITAL | Age: 78
Discharge: HOME | End: 2023-07-13
Attending: INTERNAL MEDICINE | Admitting: INTERNAL MEDICINE
Payer: MEDICARE

## 2023-07-13 ENCOUNTER — ANTICOAGULATION THERAPY VISIT (OUTPATIENT)
Dept: ANTICOAGULATION | Facility: CLINIC | Age: 78
End: 2023-07-13

## 2023-07-13 ENCOUNTER — LAB (OUTPATIENT)
Dept: LAB | Facility: CLINIC | Age: 78
End: 2023-07-13
Payer: MEDICARE

## 2023-07-13 DIAGNOSIS — I25.10 CORONARY ARTERY DISEASE INVOLVING NATIVE CORONARY ARTERY OF NATIVE HEART WITHOUT ANGINA PECTORIS: ICD-10-CM

## 2023-07-13 DIAGNOSIS — I42.0 DILATED CARDIOMYOPATHY (H): ICD-10-CM

## 2023-07-13 DIAGNOSIS — Z79.01 LONG TERM (CURRENT) USE OF ANTICOAGULANTS: Primary | ICD-10-CM

## 2023-07-13 DIAGNOSIS — I48.20 CHRONIC ATRIAL FIBRILLATION (H): ICD-10-CM

## 2023-07-13 DIAGNOSIS — I50.20 SYSTOLIC HEART FAILURE, UNSPECIFIED HF CHRONICITY (H): ICD-10-CM

## 2023-07-13 LAB
INR BLD: 2.9 (ref 0.9–1.1)
LVEF ECHO: NORMAL

## 2023-07-13 PROCEDURE — 36416 COLLJ CAPILLARY BLOOD SPEC: CPT

## 2023-07-13 PROCEDURE — 85610 PROTHROMBIN TIME: CPT

## 2023-07-13 PROCEDURE — 255N000002 HC RX 255 OP 636: Performed by: INTERNAL MEDICINE

## 2023-07-13 PROCEDURE — 93306 TTE W/DOPPLER COMPLETE: CPT | Mod: 26 | Performed by: INTERNAL MEDICINE

## 2023-07-13 RX ADMIN — PERFLUTREN 2 ML: 6.52 INJECTION, SUSPENSION INTRAVENOUS at 13:25

## 2023-07-13 NOTE — PROGRESS NOTES
ANTICOAGULATION MANAGEMENT     Caty Ng 78 year old female is on warfarin with therapeutic INR result. (Goal INR 2.0-3.0)    Recent labs: (last 7 days)     07/13/23  1145   INR 2.9*       ASSESSMENT     Warfarin Lab Questionnaire    Warfarin Doses Last 7 Days    Pt Rptd Dose SUNDAY MONDAY TUESDAY WED THURS FRIDAY SATURDAY 7/13/2023  11:45 AM 1 1.5 1 1 1 1.5 1         7/13/2023   Warfarin Lab Questionnaire   Missed doses within past 14 days? No   Changes in diet or alcohol within past 14 days? No   Medication changes since last result? No   Injuries or illness since last result? No   New shortness of breath, severe headaches or sudden changes in vision since last result? No   Abnormal bleeding since last result? No   Upcoming surgery, procedure? No     Previous result: Therapeutic last 2(+) visits  Additional findings: None       PLAN     Recommended plan for no diet, medication or health factor changes affecting INR     Dosing Instructions: Continue your current warfarin dose with next INR in 5 weeks       Summary  As of 7/13/2023    Full warfarin instructions:  3 mg every Mon, Fri; 2 mg all other days   Next INR check:  8/17/2023             Detailed voice message left for Abi with dosing instructions and follow up date.     Check at provider office visit    Education provided:     Please call back if any changes to your diet, medications or how you've been taking warfarin    Goal range and lab monitoring: goal range and significance of current result    Plan made per [facility] anticoagulation protocol    Alysa Ariza, RN  Anticoagulation Clinic  7/13/2023    _______________________________________________________________________     Anticoagulation Episode Summary     Current INR goal:  2.0-3.0   TTR:  70.3 % (11.8 mo)   Target end date:  Indefinite   Send INR reminders to:  BREE ANDREW    Indications    Long term (current) use of anticoagulants [Z79.01]  Permanent atrial fibrillation (H)  (Resolved) [I48.21]  Chronic atrial fibrillation (H) [I48.20]           Comments:           Anticoagulation Care Providers     Provider Role Specialty Phone number    Keke Flores MD Referring Family Medicine [phone]

## 2023-07-26 ENCOUNTER — OFFICE VISIT (OUTPATIENT)
Dept: CARDIOLOGY | Facility: CLINIC | Age: 78
End: 2023-07-26
Attending: INTERNAL MEDICINE
Payer: MEDICARE

## 2023-07-26 VITALS
WEIGHT: 228 LBS | BODY MASS INDEX: 40.39 KG/M2 | OXYGEN SATURATION: 95 % | RESPIRATION RATE: 16 BRPM | SYSTOLIC BLOOD PRESSURE: 110 MMHG | HEART RATE: 50 BPM | DIASTOLIC BLOOD PRESSURE: 62 MMHG

## 2023-07-26 DIAGNOSIS — I50.20 SYSTOLIC HEART FAILURE, UNSPECIFIED HF CHRONICITY (H): ICD-10-CM

## 2023-07-26 DIAGNOSIS — I42.0 DILATED CARDIOMYOPATHY (H): ICD-10-CM

## 2023-07-26 DIAGNOSIS — I48.20 CHRONIC ATRIAL FIBRILLATION (H): ICD-10-CM

## 2023-07-26 DIAGNOSIS — I25.10 CORONARY ARTERY DISEASE INVOLVING NATIVE CORONARY ARTERY OF NATIVE HEART WITHOUT ANGINA PECTORIS: ICD-10-CM

## 2023-07-26 PROCEDURE — 99214 OFFICE O/P EST MOD 30 MIN: CPT | Performed by: INTERNAL MEDICINE

## 2023-07-26 NOTE — PROGRESS NOTES
[facility]  [phone]          Assessment/Recommendations   Patient with known permanent atrial fibrillation, now with mild reduction in left ventricular systolic function, minimal coronary artery disease on prior angiogram and unremarkable nuclear stress test.  She does have physical limitations related to orthopedic issues after hip replacement, primarily muscle in her right leg.  She does have shortness of breath with activity and I would like to recheck a Holter monitor within the next few months to make sure that her ventricular response to atrial fibrillation is well controlled.    Recent echocardiogram showed stable left ventricular ejection fraction which is mildly reduced.  Mild mitral insufficiency was noted.    We will continue her current medical regimen and get back to her with results of the Holter monitor when they are available.  Encourage activity to the degree possible with her orthopedic issues.       History of Present Illness/Subjective    Ms. Caty Ng is a 78 year old female with known atrial fibrillation which is permanent, probable heart failure with preserved ejection fraction or mildly reduced left ventricular ejection fraction.  She feels like her functional capacity is stable although she gets short of breath with mild activities.  She has difficulty with her right leg after hip surgery with right leg weakness.  She underwent physical therapy with minimal benefit.  She sleeps in a recliner not necessarily because of breathing however.  Peripheral edema seems to be stable and she admits that she does not take her diuretics on days where she is going to be out and about and this is day 3 without diuretics.           Physical Examination Review of Systems   /62 (BP Location: Right arm, Patient Position: Sitting, Cuff Size: Adult Large)   Pulse 50   Resp 16   Wt 103.4 kg (228 lb)   SpO2 95%   BMI 40.39 kg/m    Body mass index is 40.39 kg/m .  Wt  Readings from Last 3 Encounters:   07/26/23 103.4 kg (228 lb)   04/19/23 104.8 kg (231 lb)   02/01/23 102.5 kg (226 lb)     General Appearance:   Alert, cooperative and in no acute distress.   ENT/Mouth: Pink/moist oral mucosa   EYES:  no scleral icterus, normal conjunctivae   Neck: JVP normal. No Hepatojugular reflux. Thyroid not visualized.   Chest/Lungs:   Lungs are clear to auscultation, equal chest wall expansion.   Cardiovascular:   S1, S2 without murmur ,clicks or rubs.  Distant heart sounds brachial, radial and posterior tibial pulses are intact and symetric. No carotid bruits noted   Abdomen:  Nontender.    Extremities: No cyanosis, clubbing mild bilateral pretibial edema   Skin: no xanthelasma, warm.    Neurologic: normal arm movement bilateral, no tremors     Psychiatric: Appropriate affect.      Enc Vitals  BP: 110/62  Pulse: 50  Resp: 16  SpO2: 95 %  Weight: 103.4 kg (228 lb)                                           Medical History  Surgical History Family History Social History   Past Medical History:   Diagnosis Date    Allergic rhinitis     Arrhythmia 01/01/2012    Atrial fibrillation (H)     Atrial fibrillation, chronic (H) 06/21/2012    Followed by Dr. 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 Cobos, cardiology EF 45 - 50%    Hyperlipidemia 01/01/2012    Hypertriglyceridemia     Mediastinal mass 2015    benign, [facility] consult     Mitral valve regurgitation 06/24/2012    Moderate mitral regurgitation     Morbid obesity with BMI  of 40.0-44.9, adult (H) 2015    BROCK (obstructive sleep apnea)     Scoliosis     Sleep apnea 2010    per pt    Thyroid disease     Past Surgical History:   Procedure Laterality Date    CV CORONARY ANGIOGRAM N/A 2021    Procedure: Coronary Angiogram;  Surgeon: Hai Murphy MD;  Location: [facility] CATH LAB CV    CV LEFT HEART CATH N/A 2021    Procedure: Left Heart Cath;  Surgeon: Hai Murphy MD;  Location: [facility] CATH LAB CV    HYSTERECTOMY, PAP NO LONGER INDICATED      JOINT REPLACEMTN, KNEE RT/LT      right    JOINT REPLACEMTN, KNEE RT/LT Left     SALPINGO OOPHORECTOMY,R/L/BRANDO Bilateral     SURGICAL HISTORY OF -       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    Family History   Problem Relation Age of Onset    Heart Disease Mother     Cancer Mother     Diabetes Father     Musculoskeletal Disorder Father     Diabetes Paternal Grandmother     Diabetes Paternal Grandfather     Diabetes Sister     Thrombophilia Daughter         Factor V Leiden Deficiency    Diabetes Sister     Social History     Socioeconomic History    Marital status:      Spouse name: Bill     Number of children: 2    Years of education: Not on file    Highest education level: Not on file   Occupational History    Occupation: [employer]      Employer: RETIRED   Tobacco Use    Smoking status: Former     Packs/day: 0.50     Years: 20.00     Pack years: 10.00     Types: Cigarettes     Quit date: 1982     Years since quittin.5    Smokeless tobacco: Never   Vaping Use    Vaping Use: Never used   Substance and Sexual Activity    Alcohol use: Yes     Comment: 1 beers per week    Drug use: Never    Sexual activity: Yes     Partners: Male     Birth control/protection: Post-menopausal   Other Topics Concern    Parent/sibling w/ CABG, MI or angioplasty before 65F 55M? Yes   Social History Narrative    Not on file     Social Determinants of Health     Financial Resource Strain: Not on file   Food  Insecurity: Not on file   Transportation Needs: Not on file   Physical Activity: Not on file   Stress: Not on file   Social Connections: Not on file   Intimate Partner Violence: Not on file   Housing Stability: Not on file          Medications  Allergies   Current Outpatient Medications   Medication Sig Dispense Refill    acetaminophen (TYLENOL) 650 MG CR tablet Take 1,300 mg by mouth 2 times daily      amoxicillin (AMOXIL) 500 MG capsule TAKE 4 CAPSULES BY MOUTH 1 HOUR BEFORE DENTAL APPOINTMENT      atorvastatin (LIPITOR) 80 MG tablet TAKE 1 TABLET DAILY 90 tablet 3    carvedilol (COREG) 25 MG tablet Take 12.5 mg in the morning and 25 mg in the afternoon 180 tablet 3    digoxin (LANOXIN) 125 MCG tablet TAKE 1 TABLET DAILY 30 tablet 2    furosemide (LASIX) 40 MG tablet TAKE 1 TABLET BY MOUTH EVERY DAY 90 tablet 1    JANTOVEN ANTICOAGULANT 2 MG tablet TAKE 1/2 TO 1 TABLET (1MG TO 2MG) DAILY OR AS DIRECTED. ADJUST DOSE BASED ON INTERNATIONAL NORMALIZED RATIO RESULTS. 90 tablet 3    levothyroxine (SYNTHROID/LEVOTHROID) 75 MCG tablet TAKE 1 TABLET DAILY 90 tablet 2    losartan (COZAAR) 25 MG tablet Take 1 tablet (25 mg) by mouth daily 90 tablet 3    Melatonin 10 MG TABS tablet Take 1 tablet by mouth nightly as needed      spironolactone (ALDACTONE) 25 MG tablet Take 1 tablet (25 mg) by mouth daily 90 tablet 3    Allergies   Allergen Reactions    Lisinopril      cough    Metal [Staples] Other (See Comments)     Metal allergy not clearly specified - patient had a stainless steel screw put in her foot that needed to be removed due to allergy/hypersensitivity     Metoprolol Fatigue    Trace Minerals Cr-Cu-Mn-Zn      Other reaction(s): Other (see comments)  Metal allergy not clearly specified - patient had a stainless steel screw put in her foot that needed to be removed due to allergy/hypersensitivity.  Also react to earrings.         Lab Results    Chemistry/lipid CBC Cardiac Enzymes/BNP/TSH/INR   Lab Results   Component  Value Date    CHOL 104 06/06/2022    HDL 39 (L) 06/06/2022    TRIG 128 06/06/2022    BUN 19.2 02/01/2023     02/01/2023    CO2 29 02/01/2023    Lab Results   Component Value Date    WBC 7.5 01/06/2023    HGB 13.8 01/06/2023    HCT 43.3 01/06/2023    MCV 95 01/06/2023     01/06/2023    Lab Results   Component Value Date     (H) 12/03/2020    TSH 1.02 06/06/2022    INR 2.9 (H) 07/13/2023

## 2023-07-26 NOTE — LETTER
7/26/2023    JUNO THOMPSON MD  [address] [facility] [address]    RE: Caty Ng       Dear Colleague,     I had the pleasure of seeing Caty Ng in the [facility].      [facility]  [phone]          Assessment/Recommendations   Patient with known permanent atrial fibrillation, now with mild reduction in left ventricular systolic function, minimal coronary artery disease on prior angiogram and unremarkable nuclear stress test.  She does have physical limitations related to orthopedic issues after hip replacement, primarily muscle in her right leg.  She does have shortness of breath with activity and I would like to recheck a Holter monitor within the next few months to make sure that her ventricular response to atrial fibrillation is well controlled.    Recent echocardiogram showed stable left ventricular ejection fraction which is mildly reduced.  Mild mitral insufficiency was noted.    We will continue her current medical regimen and get back to her with results of the Holter monitor when they are available.  Encourage activity to the degree possible with her orthopedic issues.       History of Present Illness/Subjective    Ms. Caty Ng is a 78 year old female with known atrial fibrillation which is permanent, probable heart failure with preserved ejection fraction or mildly reduced left ventricular ejection fraction.  She feels like her functional capacity is stable although she gets short of breath with mild activities.  She has difficulty with her right leg after hip surgery with right leg weakness.  She underwent physical therapy with minimal benefit.  She sleeps in a recliner not necessarily because of breathing however.  Peripheral edema seems to be stable and she admits that she does not take her diuretics on days where she is going to be out and about and this is day 3 without diuretics.           Physical Examination Review of  Systems   /62 (BP Location: Right arm, Patient Position: Sitting, Cuff Size: Adult Large)   Pulse 50   Resp 16   Wt 103.4 kg (228 lb)   SpO2 95%   BMI 40.39 kg/m    Body mass index is 40.39 kg/m .  Wt Readings from Last 3 Encounters:   07/26/23 103.4 kg (228 lb)   04/19/23 104.8 kg (231 lb)   02/01/23 102.5 kg (226 lb)     General Appearance:   Alert, cooperative and in no acute distress.   ENT/Mouth: Pink/moist oral mucosa   EYES:  no scleral icterus, normal conjunctivae   Neck: JVP normal. No Hepatojugular reflux. Thyroid not visualized.   Chest/Lungs:   Lungs are clear to auscultation, equal chest wall expansion.   Cardiovascular:   S1, S2 without murmur ,clicks or rubs.  Distant heart sounds brachial, radial and posterior tibial pulses are intact and symetric. No carotid bruits noted   Abdomen:  Nontender.    Extremities: No cyanosis, clubbing mild bilateral pretibial edema   Skin: no xanthelasma, warm.    Neurologic: normal arm movement bilateral, no tremors     Psychiatric: Appropriate affect.      Enc Vitals  BP: 110/62  Pulse: 50  Resp: 16  SpO2: 95 %  Weight: 103.4 kg (228 lb)                                           Medical History  Surgical History Family History Social History   Past Medical History:   Diagnosis Date    Allergic rhinitis     Arrhythmia 01/01/2012    Atrial fibrillation (H)     Atrial fibrillation, chronic (H) 06/21/2012    Followed by Dr. 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 Cobos, cardiology EF 45 -  50%    Hyperlipidemia 2012    Hypertriglyceridemia     Mediastinal mass     benign, [facility] consult     Mitral valve regurgitation 2012    Moderate mitral regurgitation     Morbid obesity with BMI of 40.0-44.9, adult (H) 2015    BROCK (obstructive sleep apnea)     Scoliosis     Sleep apnea 2010    per pt    Thyroid disease     Past Surgical History:   Procedure Laterality Date    CV CORONARY ANGIOGRAM N/A 2021    Procedure: Coronary Angiogram;  Surgeon: Hai Murphy MD;  Location: [facility] CATH LAB CV    CV LEFT HEART CATH N/A 2021    Procedure: Left Heart Cath;  Surgeon: Hai Murphy MD;  Location: [facility] CATH LAB CV    HYSTERECTOMY, PAP NO LONGER INDICATED      JOINT REPLACEMTN, KNEE RT/LT      right    JOINT REPLACEMTN, KNEE RT/LT Left     SALPINGO OOPHORECTOMY,R/L/BRANDO Bilateral     SURGICAL HISTORY OF -       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    Family History   Problem Relation Age of Onset    Heart Disease Mother     Cancer Mother     Diabetes Father     Musculoskeletal Disorder Father     Diabetes Paternal Grandmother     Diabetes Paternal Grandfather     Diabetes Sister     Thrombophilia Daughter         Factor V Leiden Deficiency    Diabetes Sister     Social History     Socioeconomic History    Marital status:      Spouse name: Bill     Number of children: 2    Years of education: Not on file    Highest education level: Not on file   Occupational History    Occupation: taxes      Employer: RETIRED   Tobacco Use    Smoking status: Former     Packs/day: 0.50     Years: 20.00     Pack years: 10.00     Types: Cigarettes     Quit date: 1982     Years since quittin.5    Smokeless tobacco: Never   Vaping Use    Vaping Use: Never used   Substance and Sexual Activity    Alcohol use: Yes     Comment: 1 beers per week    Drug use: Never    Sexual activity: Yes     Partners: Male     Birth control/protection: Post-menopausal   Other  Topics Concern    Parent/sibling w/ CABG, MI or angioplasty before 65F 55M? Yes   Social History Narrative    Not on file     Social Determinants of Health     Financial Resource Strain: Not on file   Food Insecurity: Not on file   Transportation Needs: Not on file   Physical Activity: Not on file   Stress: Not on file   Social Connections: Not on file   Intimate Partner Violence: Not on file   Housing Stability: Not on file          Medications  Allergies   Current Outpatient Medications   Medication Sig Dispense Refill    acetaminophen (TYLENOL) 650 MG CR tablet Take 1,300 mg by mouth 2 times daily      amoxicillin (AMOXIL) 500 MG capsule TAKE 4 CAPSULES BY MOUTH 1 HOUR BEFORE DENTAL APPOINTMENT      atorvastatin (LIPITOR) 80 MG tablet TAKE 1 TABLET DAILY 90 tablet 3    carvedilol (COREG) 25 MG tablet Take 12.5 mg in the morning and 25 mg in the afternoon 180 tablet 3    digoxin (LANOXIN) 125 MCG tablet TAKE 1 TABLET DAILY 30 tablet 2    furosemide (LASIX) 40 MG tablet TAKE 1 TABLET BY MOUTH EVERY DAY 90 tablet 1    JANTOVEN ANTICOAGULANT 2 MG tablet TAKE 1/2 TO 1 TABLET (1MG TO 2MG) DAILY OR AS DIRECTED. ADJUST DOSE BASED ON INTERNATIONAL NORMALIZED RATIO RESULTS. 90 tablet 3    levothyroxine (SYNTHROID/LEVOTHROID) 75 MCG tablet TAKE 1 TABLET DAILY 90 tablet 2    losartan (COZAAR) 25 MG tablet Take 1 tablet (25 mg) by mouth daily 90 tablet 3    Melatonin 10 MG TABS tablet Take 1 tablet by mouth nightly as needed      spironolactone (ALDACTONE) 25 MG tablet Take 1 tablet (25 mg) by mouth daily 90 tablet 3    Allergies   Allergen Reactions    Lisinopril      cough    Metal [Staples] Other (See Comments)     Metal allergy not clearly specified - patient had a stainless steel screw put in her foot that needed to be removed due to allergy/hypersensitivity     Metoprolol Fatigue    Trace Minerals Cr-Cu-Mn-Zn      Other reaction(s): Other (see comments)  Metal allergy not clearly specified - patient had a stainless  steel screw put in her foot that needed to be removed due to allergy/hypersensitivity.  Also react to earrings.         Lab Results    Chemistry/lipid CBC Cardiac Enzymes/BNP/TSH/INR   Lab Results   Component Value Date    CHOL 104 06/06/2022    HDL 39 (L) 06/06/2022    TRIG 128 06/06/2022    BUN 19.2 02/01/2023     02/01/2023    CO2 29 02/01/2023    Lab Results   Component Value Date    WBC 7.5 01/06/2023    HGB 13.8 01/06/2023    HCT 43.3 01/06/2023    MCV 95 01/06/2023     01/06/2023    Lab Results   Component Value Date     (H) 12/03/2020    TSH 1.02 06/06/2022    INR 2.9 (H) 07/13/2023                  Thank you for allowing me to participate in the care of your patient.      Sincerely,     Cameron Soria MD     [facility]  cc:   Cameron Soria MD  [address]  [place] [address]

## 2023-07-27 ENCOUNTER — DOCUMENTATION ONLY (OUTPATIENT)
Dept: ANTICOAGULATION | Facility: CLINIC | Age: 78
End: 2023-07-27
Payer: MEDICARE

## 2023-07-27 ENCOUNTER — HOSPITAL ENCOUNTER (EMERGENCY)
Facility: HOSPITAL | Age: 78
Discharge: HOME | End: 2023-07-27
Admitting: PHYSICIAN ASSISTANT
Payer: MEDICARE

## 2023-07-27 VITALS
DIASTOLIC BLOOD PRESSURE: 79 MMHG | RESPIRATION RATE: 12 BRPM | WEIGHT: 228 LBS | BODY MASS INDEX: 40.4 KG/M2 | HEART RATE: 80 BPM | SYSTOLIC BLOOD PRESSURE: 140 MMHG | OXYGEN SATURATION: 93 % | TEMPERATURE: 97 F | HEIGHT: 63 IN

## 2023-07-27 DIAGNOSIS — I83.899 BLEEDING FROM VARICOSE VEIN: ICD-10-CM

## 2023-07-27 DIAGNOSIS — Z79.01 ANTICOAGULATED ON COUMADIN: ICD-10-CM

## 2023-07-27 PROCEDURE — 99283 EMERGENCY DEPT VISIT LOW MDM: CPT

## 2023-07-27 PROCEDURE — 12001 RPR S/N/AX/GEN/TRNK 2.5CM/<: CPT

## 2023-07-27 ASSESSMENT — ENCOUNTER SYMPTOMS: WOUND: 1

## 2023-07-27 NOTE — ED PROVIDER NOTES
EMERGENCY DEPARTMENT ENCOUNTER      NAME: Caty Ng  AGE: 78 year old female  YOB: 1945  MRN: [id number]  EVALUATION DATE & TIME: No admission date for patient encounter.    PCP: Keke Flores    ED PROVIDER: Ben Rivero PA-C      Chief Complaint   Patient presents with    cut leg shaving         FINAL IMPRESSION:  1. Bleeding from varicose vein    2. Anticoagulated on Coumadin          ED COURSE & MEDICAL DECISION MAKING:    Pertinent Labs & Imaging studies reviewed. (See chart for details)  3:16 PM I met the patient and performed my initial interview and exam. We also discussed plan for discharge as well as supportive cares at home and reasons for return to the ED including new or worsening symptoms. All questions and concerns addressed. Patient to be discharged by RN. They are agreeable and comfortable with plan.    78 year old female presents to the Emergency Department for evaluation of bleeding varicose vein.     ED Course as of 07/27/23 1633   Thu Jul 27, 2023   1632 Patient 78-year-old female, presents emergency department for evaluation of injury to the varicose vein on the right lower extremity.  Patient reports that she was shaving today, he is on Coumadin, he had a varicose vein on her leg.   wrapped it with a pressure dressing, she presents emergency department as she could not get the bleeding to stop.  On my examination here, the bleeding is completely controlled.  There is a clot over the area of the varicose vein.  No recurrence of bleeding on my examination.  No recurrence of bleeding with range of motion of the lower extremity.  Patient has no significant tenderness, or swelling of the area.  Dermabond was applied over the area so that the clot does not break loose.  Patient will be discharged.  No indication for any further labs or imaging, examination otherwise unremarkable.  She is agreeable with this plan.  Plan for discharge at this time.  "      Medical Decision Making    History:  Supplemental history from: Family Member/Significant Other  External Record(s) reviewed: [facility] Records    Work Up:  Chart documentation includes differential considered and any EKGs or imaging independently interpreted by provider, where specified.  In additional to work up documented, I considered the following work up: Documented in chart, if applicable.    External consultation:  Discussion of management with another provider: Documented in chart, if applicable    Complicating factors:  Care impacted by chronic illness: Anticoagulated State, Chronic Lung Disease, Heart Disease, and Hyperlipidemia  Care affected by social determinants of health: Access to Medical Care    Disposition considerations: Discharge. No recommendations on prescription strength medication(s). N/A.    At the conclusion of the encounter I discussed the results of all of the tests and the disposition. The questions were answered. The patient or family acknowledged understanding and was agreeable with the care plan.       0 minutes of critical care time     MEDICATIONS GIVEN IN THE EMERGENCY:  Medications - No data to display    NEW PRESCRIPTIONS STARTED AT TODAY'S ER VISIT  Discharge Medication List as of 7/27/2023  3:34 PM          =================================================================    HPI    Patient information was obtained from: Patient    Use of : N/A    Caty Ng is a 78 year old female with a pertinent history of CAD, HFrEF, chronic a-fib (on warfarin), BROCK, and HLD who presents to this ED by private car for evaluation of a bleeding varicose vein.    Per [facility] records, patient's last Tdap was in August 2018.    Patient reports that she was shaving around 1400 earlier today when she \"nicked\" a varicose vein in her right lower shin, causing bleeding. She states that it \"spurted like a fountain\" for a while.  placed bandages and coban bandages just prior to " arrival. At present, bleeding is controlled.     There are no other medical complaints expressed at this time.    REVIEW OF SYSTEMS   Review of Systems   Skin:  Positive for wound.   All other systems reviewed and are negative.       PAST MEDICAL HISTORY:  Past Medical History:   Diagnosis Date    Allergic rhinitis     Arrhythmia 01/01/2012    Atrial fibrillation (H)     Atrial fibrillation, chronic (H) 06/21/2012    Followed by Dr. 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 Cobos, cardiology EF 45 - 50%    Hyperlipidemia 01/01/2012    Hypertriglyceridemia     Mediastinal mass 2015    benign, [facility] consult     Mitral valve regurgitation 06/24/2012    Moderate mitral regurgitation     Morbid obesity with BMI of 40.0-44.9, adult (H) 04/25/2015    BROCK (obstructive sleep apnea)     Scoliosis     Sleep apnea 01/01/2010    per pt    Thyroid disease        PAST SURGICAL HISTORY:  Past Surgical History:   Procedure Laterality Date    CV CORONARY ANGIOGRAM N/A 7/21/2021    Procedure: Coronary Angiogram;  Surgeon: Hai Murphy MD;  Location: [facility] CATH LAB CV    CV LEFT HEART CATH N/A 7/21/2021    Procedure: Left Heart Cath;  Surgeon: Hai Murphy MD;  Location: [facility] CATH LAB CV    HYSTERECTOMY, PAP NO LONGER INDICATED  1989    JOINT REPLACEMTN, KNEE RT/LT  2007    right    JOINT REPLACEMTN, KNEE RT/LT Left     SALPINGO OOPHORECTOMY,R/L/BRANDO Bilateral 2003    SURGICAL HISTORY OF -   1989    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        CURRENT MEDICATIONS:    acetaminophen (TYLENOL) 650 MG CR tablet  amoxicillin (AMOXIL) 500 MG capsule  atorvastatin (LIPITOR) 80 MG tablet  carvedilol (COREG) 25 MG tablet  digoxin (LANOXIN) 125 MCG tablet  furosemide (LASIX) 40 MG tablet  JANTOVEN ANTICOAGULANT 2 MG tablet  levothyroxine (SYNTHROID/LEVOTHROID) 75 MCG tablet  losartan (COZAAR) 25 MG tablet  Melatonin 10 MG TABS tablet  spironolactone (ALDACTONE) 25 MG tablet         ALLERGIES:  Allergies   Allergen Reactions    Lisinopril      cough    Metal [Staples] Other (See Comments)     Metal allergy not clearly specified - patient had a stainless steel screw put in her foot that needed to be removed due to allergy/hypersensitivity     Metoprolol Fatigue    Trace Minerals Cr-Cu-Mn-Zn      Other reaction(s): Other (see comments)  Metal allergy not clearly specified - patient had a stainless steel screw put in her foot that needed to be removed due to allergy/hypersensitivity.  Also react to earrings.       FAMILY HISTORY:  Family History   Problem Relation Age of Onset    Heart Disease Mother     Cancer Mother     Diabetes Father     Musculoskeletal Disorder Father     Diabetes Paternal Grandmother     Diabetes Paternal Grandfather     Diabetes Sister     Thrombophilia Daughter         Factor V Leiden Deficiency    Diabetes Sister        SOCIAL HISTORY:   Social History     Socioeconomic History    Marital status:      Spouse name: Bill     Number of children: 2   Occupational History    Occupation: taxes      Employer: RETIRED   Tobacco Use    Smoking status: Former     Packs/day: 0.50     Years: 20.00     Pack years: 10.00     Types: Cigarettes     Quit date: 1982     Years since quittin.5    Smokeless tobacco: Never   Vaping Use    Vaping Use: Never used   Substance and Sexual Activity    Alcohol use: Yes     Comment: 1 beers per week    Drug use: Never    Sexual activity: Yes     Partners: Male     Birth control/protection:  "Post-menopausal   Other Topics Concern    Parent/sibling w/ CABG, MI or angioplasty before 65F 55M? Yes       VITALS:  BP (!) 140/79   Pulse 80   Temp 97  F (36.1  C) (Tympanic)   Resp 12   Ht 1.6 m (5' 3\")   Wt 103.4 kg (228 lb)   SpO2 93%   BMI 40.39 kg/m      PHYSICAL EXAM    Physical Exam  Vitals and nursing note reviewed.   Constitutional:       General: She is not in acute distress.     Appearance: Normal appearance. She is normal weight. She is not toxic-appearing or diaphoretic.   HENT:      Right Ear: External ear normal.      Left Ear: External ear normal.   Eyes:      Conjunctiva/sclera: Conjunctivae normal.   Cardiovascular:      Pulses: Normal pulses.   Musculoskeletal:         General: No swelling, tenderness or signs of injury.      Right lower leg: No edema.      Left lower leg: No edema.   Skin:     Comments: Area on the right lower extremity, varicose vein present.  This is now clotted off.  It does not have any surrounding erythema or redness.  Bleeding controlled, appears clotted.  No recurrence of bleeding with full range of motion of the right lower extremity.   Neurological:      Mental Status: She is alert. Mental status is at baseline.          LAB:  All pertinent labs reviewed and interpreted.  Labs Ordered and Resulted from Time of ED Arrival to Time of ED Departure - No data to display    RADIOLOGY:  Reviewed all pertinent imaging. Please see official radiology report.  No orders to display     PROCEDURE: Laceration Repair   INDICATIONS: Laceration   PROCEDURE PROVIDER: Joshua Rivero PA-C   SITE: Right lower extremity, bleeding varicose vein   TYPE/SIZE: simple, clean, and no foreign body visualized     FUNCTIONAL ASSESSMENT: Distal sensation, circulation, and motor intact   MEDICATION: NA           CLOSURE:  Superficial layer closed with Dermabond (medical glue)    Total number of sutures/staples placed: 0     Warren MERCEDES am serving as a scribe to document services " personally performed by Ben Rivero PA-C, based on my observation and the provider's statements to me. I, Ben Rivero PA-C, attest that Warren Alonzo is acting in a scribe capacity, has observed my performance of the services and has documented them in accordance with my direction.    Bne Rivero PA-C  Emergency Medicine  [facility] EMERGENCY DEPARTMENT  [facility] [facility] [address]  [phone]  Dept: [phone]       Ben Rivero PA-C  07/27/23 0758

## 2023-07-27 NOTE — ED TRIAGE NOTES
"  Pt was shaving her right leg at 1400 today when she hit a varcoise vein . It was \"spurting like a fountain\". Pt has a coban dressing on her leg tightly and no bleeding seen through that.  Pt is on coumadin      "

## 2023-07-27 NOTE — DISCHARGE INSTRUCTIONS
You were seen here in the emergency department for evaluation of varicose vein was bleeding.  The bleeding had completely stopped by the time my examination.  We did place some Dermabond over the area.  This will fall off on its own over the next 10 days.  If you have recurrence of bleeding, wrap it up like you did at home.  Otherwise, please follow-up with your primary care doctor.    For pain or fever you may use:  -Tylenol 650 mg every 6 hours.  Max 4000 mg in 24 hours  Do not use thismedication with alcohol as it can cause liver problems.  -Ibuprofen 600 mg every 6 hours.  Max 3500 mg in 24 hours  Do not take this medication if you have a history of a GI bleed or have kidney problems.  You may use both of these medications at the same time or you can alternate them every 3 hours.  For example, Tylenol at 6 AM, ibuprofen at 9 AM, Tylenol at noon, etc.

## 2023-07-27 NOTE — PROGRESS NOTES
ANTICOAGULATION  MANAGEMENT: Discharge Review    Caty Ng chart reviewed for anticoagulation continuity of care    Emergency room visit on 7/27/23 for leg laceration (cut leg while shaving).    Discharge disposition: Home    Results:  No results for input(s): INR, [id number], F2, ALMWH, AAUFH in the last 168 hours.    Anticoagulation inpatient management:     not applicable     Anticoagulation discharge instructions:     Warfarin dosing: home regimen continued   Bridging: No   INR goal change: No      Medication changes affecting anticoagulation: No    Additional factors affecting anticoagulation: No     PLAN     No adjustment to anticoagulation plan needed    Recommended follow up is scheduled  Patient not contacted    No adjustment to Anticoagulation Calendar was required      Alysa Ariza RN

## 2023-08-17 ENCOUNTER — OFFICE VISIT (OUTPATIENT)
Dept: FAMILY MEDICINE | Facility: CLINIC | Age: 78
End: 2023-08-17
Payer: MEDICARE

## 2023-08-17 ENCOUNTER — ANTICOAGULATION THERAPY VISIT (OUTPATIENT)
Dept: ANTICOAGULATION | Facility: CLINIC | Age: 78
End: 2023-08-17

## 2023-08-17 VITALS
WEIGHT: 229.7 LBS | DIASTOLIC BLOOD PRESSURE: 81 MMHG | BODY MASS INDEX: 40.7 KG/M2 | SYSTOLIC BLOOD PRESSURE: 117 MMHG | HEART RATE: 73 BPM | OXYGEN SATURATION: 93 % | HEIGHT: 63 IN

## 2023-08-17 DIAGNOSIS — Z78.0 POSTMENOPAUSAL STATUS: ICD-10-CM

## 2023-08-17 DIAGNOSIS — Z79.01 LONG TERM CURRENT USE OF ANTICOAGULANT THERAPY: ICD-10-CM

## 2023-08-17 DIAGNOSIS — I50.20 SYSTOLIC HEART FAILURE, UNSPECIFIED HF CHRONICITY (H): ICD-10-CM

## 2023-08-17 DIAGNOSIS — I48.20 CHRONIC ATRIAL FIBRILLATION (H): ICD-10-CM

## 2023-08-17 DIAGNOSIS — Z00.00 ENCOUNTER FOR MEDICARE ANNUAL WELLNESS EXAM: Primary | ICD-10-CM

## 2023-08-17 DIAGNOSIS — G47.33 OSA (OBSTRUCTIVE SLEEP APNEA): ICD-10-CM

## 2023-08-17 DIAGNOSIS — I42.8 NON-ISCHEMIC CARDIOMYOPATHY (H): ICD-10-CM

## 2023-08-17 DIAGNOSIS — Z79.01 LONG TERM (CURRENT) USE OF ANTICOAGULANTS: Primary | ICD-10-CM

## 2023-08-17 DIAGNOSIS — E78.5 HYPERLIPIDEMIA LDL GOAL <70: ICD-10-CM

## 2023-08-17 DIAGNOSIS — E03.4 HYPOTHYROIDISM DUE TO ACQUIRED ATROPHY OF THYROID: ICD-10-CM

## 2023-08-17 DIAGNOSIS — Z85.830 HISTORY OF CHONDROSARCOMA: ICD-10-CM

## 2023-08-17 DIAGNOSIS — E66.01 MORBID OBESITY DUE TO EXCESS CALORIES (H): ICD-10-CM

## 2023-08-17 DIAGNOSIS — I25.10 CORONARY ARTERY DISEASE INVOLVING NATIVE CORONARY ARTERY OF NATIVE HEART WITHOUT ANGINA PECTORIS: ICD-10-CM

## 2023-08-17 PROBLEM — G89.29 CHRONIC PAIN OF BOTH FEET: Status: RESOLVED | Noted: 2019-10-02 | Resolved: 2023-08-17

## 2023-08-17 PROBLEM — M79.671 CHRONIC PAIN OF BOTH FEET: Status: RESOLVED | Noted: 2019-10-02 | Resolved: 2023-08-17

## 2023-08-17 PROBLEM — J40 BRONCHITIS: Status: RESOLVED | Noted: 2023-01-07 | Resolved: 2023-08-17

## 2023-08-17 PROBLEM — J10.1 TYPE A INFLUENZA: Status: RESOLVED | Noted: 2023-01-06 | Resolved: 2023-08-17

## 2023-08-17 PROBLEM — M79.672 CHRONIC PAIN OF BOTH FEET: Status: RESOLVED | Noted: 2019-10-02 | Resolved: 2023-08-17

## 2023-08-17 PROBLEM — J96.01 ACUTE RESPIRATORY FAILURE WITH HYPOXIA (H): Status: RESOLVED | Noted: 2023-01-06 | Resolved: 2023-08-17

## 2023-08-17 LAB — INR BLD: 2.8 (ref 0.9–1.1)

## 2023-08-17 PROCEDURE — 0121A COVID-19 BIVALENT 12+ (PFIZER): CPT | Performed by: FAMILY MEDICINE

## 2023-08-17 PROCEDURE — 84443 ASSAY THYROID STIM HORMONE: CPT | Performed by: FAMILY MEDICINE

## 2023-08-17 PROCEDURE — 99214 OFFICE O/P EST MOD 30 MIN: CPT | Mod: 25 | Performed by: FAMILY MEDICINE

## 2023-08-17 PROCEDURE — 36415 COLL VENOUS BLD VENIPUNCTURE: CPT | Performed by: FAMILY MEDICINE

## 2023-08-17 PROCEDURE — 91312 COVID-19 BIVALENT 12+ (PFIZER): CPT | Performed by: FAMILY MEDICINE

## 2023-08-17 PROCEDURE — 80053 COMPREHEN METABOLIC PANEL: CPT | Performed by: FAMILY MEDICINE

## 2023-08-17 PROCEDURE — 80061 LIPID PANEL: CPT | Performed by: FAMILY MEDICINE

## 2023-08-17 PROCEDURE — 85610 PROTHROMBIN TIME: CPT | Performed by: FAMILY MEDICINE

## 2023-08-17 PROCEDURE — G0439 PPPS, SUBSEQ VISIT: HCPCS | Performed by: FAMILY MEDICINE

## 2023-08-17 ASSESSMENT — ENCOUNTER SYMPTOMS
CONSTIPATION: 0
DIARRHEA: 0
HEMATURIA: 0
COUGH: 0
MYALGIAS: 1
DIZZINESS: 0
PARESTHESIAS: 0
HEARTBURN: 0
ARTHRALGIAS: 0
PALPITATIONS: 0
CHILLS: 0
JOINT SWELLING: 0
SHORTNESS OF BREATH: 1
DYSURIA: 0
FEVER: 0
HEMATOCHEZIA: 0
WEAKNESS: 0
EYE PAIN: 0
NAUSEA: 0
HEADACHES: 0
ABDOMINAL PAIN: 0
BREAST MASS: 0
NERVOUS/ANXIOUS: 0
FREQUENCY: 1
SORE THROAT: 0

## 2023-08-17 ASSESSMENT — ACTIVITIES OF DAILY LIVING (ADL)
CURRENT_FUNCTION: PREPARING MEALS REQUIRES ASSISTANCE
CURRENT_FUNCTION: HOUSEWORK REQUIRES ASSISTANCE
CURRENT_FUNCTION: LAUNDRY REQUIRES ASSISTANCE
CURRENT_FUNCTION: TRANSPORTATION REQUIRES ASSISTANCE

## 2023-08-17 NOTE — PROGRESS NOTES
"SUBJECTIVE:   Abi is a 78 year old who presents for Preventive Visit.      8/17/2023    10:29 AM   Additional Questions   Roomed by as   Accompanied by          8/17/2023    10:29 AM   Patient Reported Additional Medications   Patient reports taking the following new medications no     HPI: Abi is a delightful 78-year-old female presenting today for an annual wellness visit and medication check.  The patient and her  currently live in their home independently.  She is receiving home care and physical therapy as she recovers from hip replacement surgery.  She feels she is making good progress and overall is doing reasonably well.  She has no specific complaints or concerns at today's visit.      Are you in the first 12 months of your Medicare coverage?  No    Healthy Habits:     In general, how would you rate your overall health?  Good    Frequency of exercise:  2-3 days/week    Duration of exercise:  Less than 15 minutes    Do you usually eat at least 4 servings of fruit and vegetables a day, include whole grains    & fiber and avoid regularly eating high fat or \"junk\" foods?  Yes    Taking medications regularly:  Yes    Ability to successfully perform activities of daily living:  Transportation requires assistance, preparing meals requires assistance, housework requires assistance and laundry requires assistance    Home Safety:  No safety concerns identified    Hearing Impairment:  No hearing concerns    In the past 6 months, have you been bothered by leaking of urine? Yes    In general, how would you rate your overall mental or emotional health?  Good    Additional concerns today:  No        Have you ever done Advance Care Planning? (For example, a Health Directive, POLST, or a discussion with a medical provider or your loved ones about your wishes): Yes, patient states has an Advance Care Planning document and will bring a copy to the clinic.       Fall risk  Fallen 2 or more times in the past " year?: No  Any fall with injury in the past year?: No    Cognitive Screening   1) Repeat 3 items (Leader, Season, Table)    2) Clock draw: NORMAL  3) 3 item recall: Recalls 3 objects  Results: 3 items recalled: COGNITIVE IMPAIRMENT LESS LIKELY    Mini-CogTM Copyright JENN Israel. Licensed by the author for use in [facility]; reprinted with permission (marielos@[facility]). All rights reserved.      Do you have sleep apnea, excessive snoring or daytime drowsiness? : no    Reviewed and updated as needed this visit by clinical staff    Allergies               Reviewed and updated as needed this visit by Provider                 Social History     Tobacco Use    Smoking status: Former     Packs/day: 0.50     Years: 20.00     Pack years: 10.00     Types: Cigarettes     Quit date: 1982     Years since quittin.6    Smokeless tobacco: Never   Substance Use Topics    Alcohol use: Yes     Comment: 1 beers per week             2023    10:32 AM   Alcohol Use   Prescreen: >3 drinks/day or >7 drinks/week? No     Do you have a current opioid prescription? No  Do you use any other controlled substances or medications that are not prescribed by a provider? None          Current providers sharing in care for this patient include:   Patient Care Team:  Keke Flores MD as PCP - General  Cameron Soria MD as Assigned Heart and Vascular Provider  Keke Flores MD as Assigned PCP  Gia Chawla APRN CNP as Nurse Practitioner (Cardiovascular Disease)    The following health maintenance items are reviewed in Epic and correct as of today:  Health Maintenance   Topic Date Due    ZOSTER IMMUNIZATION (2 of 3) 2012    HF ACTION PLAN  2022    INFLUENZA VACCINE (1) 2023    COVID-19 Vaccine (4 - Moderna series) 2023    CBC  2024    DIGOXIN  2024    BMP  2024    MEDICARE ANNUAL WELLNESS VISIT  2024    ALT  2024    LIPID  2024    TSH W/FREE  T4 REFLEX  08/17/2024    ANNUAL REVIEW OF HM ORDERS  08/17/2024    FALL RISK ASSESSMENT  08/17/2024    DEXA  05/24/2027    ADVANCE CARE PLANNING  08/17/2028    DTAP/TDAP/TD IMMUNIZATION (3 - Td or Tdap) 08/28/2029    HEPATITIS C SCREENING  Completed    PHQ-2 (once per calendar year)  Completed    Pneumococcal Vaccine: 65+ Years  Completed    IPV IMMUNIZATION  Aged Out    MENINGITIS IMMUNIZATION  Aged Out    MAMMO SCREENING  Discontinued    COLORECTAL CANCER SCREENING  Discontinued    LUNG CANCER SCREENING  Discontinued     Patient Active Problem List   Diagnosis    Morbid obesity due to excess calories (H)    Chronic atrial fibrillation (H)    Moderate mitral regurgitation    Systolic heart failure (H)    BROCK (obstructive sleep apnea)    Hyperlipidemia LDL goal <40    Long term current use of anticoagulant therapy    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 atrophy of thyroid    History of chondrosarcoma    S/p total knee replacement, bilateral    DDD (degenerative disc disease), lumbar    Coronary artery disease involving native coronary artery of native heart without angina pectoris    Non-ischemic cardiomyopathy (H)    Status post coronary angiogram    Dyspnea on exertion    Long term (current) use of anticoagulants    Elevated brain natriuretic peptide (BNP) level     Past Surgical History:   Procedure Laterality Date    CV CORONARY ANGIOGRAM N/A 7/21/2021    Procedure: Coronary Angiogram;  Surgeon: Hai Murphy MD;  Location: [facility] CATH LAB CV    CV LEFT HEART CATH N/A 7/21/2021    Procedure: Left Heart Cath;  Surgeon: Hai Murphy MD;  Location: [facility] CATH LAB CV    HYSTERECTOMY, PAP NO LONGER INDICATED  1989    JOINT REPLACEMTN, KNEE RT/LT  2007    right    JOINT REPLACEMTN, KNEE RT/LT Left     SALPINGO OOPHORECTOMY,R/L/BRANDO Bilateral 2003    SURGICAL HISTORY OF -   1989    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       Social History     Tobacco Use    Smoking status: Former     Packs/day: 0.50      Years: 20.00     Pack years: 10.00     Types: Cigarettes     Quit date: 1982     Years since quittin.6    Smokeless tobacco: Never   Substance Use Topics    Alcohol use: Yes     Comment: 1 beers per week     Family History   Problem Relation Age of Onset    Heart Disease Mother     Cancer Mother     Diabetes Father     Musculoskeletal Disorder Father     Diabetes Paternal Grandmother     Diabetes Paternal Grandfather     Diabetes Sister     Thrombophilia Daughter         Factor V Leiden Deficiency    Diabetes Sister          Current Outpatient Medications   Medication Sig Dispense Refill    acetaminophen (TYLENOL) 650 MG CR tablet Take 1,300 mg by mouth 2 times daily      amoxicillin (AMOXIL) 500 MG capsule TAKE 4 CAPSULES BY MOUTH 1 HOUR BEFORE DENTAL APPOINTMENT      atorvastatin (LIPITOR) 80 MG tablet TAKE 1 TABLET DAILY 90 tablet 3    carvedilol (COREG) 25 MG tablet Take 12.5 mg in the morning and 25 mg in the afternoon 180 tablet 3    digoxin (LANOXIN) 125 MCG tablet TAKE 1 TABLET DAILY 30 tablet 2    furosemide (LASIX) 40 MG tablet TAKE 1 TABLET BY MOUTH EVERY DAY 90 tablet 1    JANTOVEN ANTICOAGULANT 2 MG tablet TAKE 1/2 TO 1 TABLET (1MG TO 2MG) DAILY OR AS DIRECTED. ADJUST DOSE BASED ON INTERNATIONAL NORMALIZED RATIO RESULTS. 90 tablet 3    levothyroxine (SYNTHROID/LEVOTHROID) 75 MCG tablet TAKE 1 TABLET DAILY 90 tablet 2    losartan (COZAAR) 25 MG tablet Take 1 tablet (25 mg) by mouth daily 90 tablet 3    Melatonin 10 MG TABS tablet Take 1 tablet by mouth nightly as needed      spironolactone (ALDACTONE) 25 MG tablet Take 1 tablet (25 mg) by mouth daily 90 tablet 3     Allergies   Allergen Reactions    Lisinopril      cough    Metal [Staples] Other (See Comments)     Metal allergy not clearly specified - patient had a stainless steel screw put in her foot that needed to be removed due to allergy/hypersensitivity     Metoprolol Fatigue    Trace Minerals Cr-[id number]      Other reaction(s): Other (see  "comments)  Metal allergy not clearly specified - patient had a stainless steel screw put in her foot that needed to be removed due to allergy/hypersensitivity.  Also react to earrings.         Mammogram Screening: Mammogram Screening: Recommended mammography every 1-2 years with patient discussion and risk factor consideration      Pertinent mammograms are reviewed under the imaging tab.    Review of Systems   Constitutional:  Negative for chills and fever.   HENT:  Negative for congestion, ear pain, hearing loss and sore throat.    Eyes:  Negative for pain and visual disturbance.   Respiratory:  Positive for shortness of breath. Negative for cough.    Cardiovascular:  Negative for chest pain, palpitations and peripheral edema.   Gastrointestinal:  Negative for abdominal pain, constipation, diarrhea, heartburn, hematochezia and nausea.   Breasts:  Negative for tenderness, breast mass and discharge.   Genitourinary:  Positive for frequency and urgency. Negative for dysuria, genital sores, hematuria, pelvic pain, vaginal bleeding and vaginal discharge.   Musculoskeletal:  Positive for myalgias. Negative for arthralgias and joint swelling.   Skin:  Negative for rash.   Neurological:  Negative for dizziness, weakness, headaches and paresthesias.   Psychiatric/Behavioral:  Positive for mood changes. The patient is not nervous/anxious.          OBJECTIVE:   /81 (BP Location: Left arm, Patient Position: Left side, Cuff Size: Adult Large)   Pulse 73   Ht 1.6 m (5' 3\")   Wt 104.2 kg (229 lb 11.2 oz)   SpO2 93%   BMI 40.69 kg/m   Estimated body mass index is 40.69 kg/m  as calculated from the following:    Height as of this encounter: 1.6 m (5' 3\").    Weight as of this encounter: 104.2 kg (229 lb 11.2 oz).  Physical Exam  GENERAL APPEARANCE: healthy, alert and no distress  EYES: Eyes grossly normal to inspection, PERRL and conjunctivae and sclerae normal  RESP: lungs clear to auscultation - no rales, rhonchi or " wheezes  CV: irregularly irregular  MS: no musculoskeletal defects are noted and gait is age appropriate without ataxia  SKIN: no suspicious lesions or rashes  NEURO: Normal strength and tone, sensory exam grossly normal, mentation intact and speech normal  PSYCH: mentation appears normal and affect normal/bright    Labs reviewed in Epic    ASSESSMENT / PLAN:     Problem List Items Addressed This Visit          Respiratory    BROCK (obstructive sleep apnea)     Patient does not use CPAP; she and her  both state that she sleeps well at night and does not snore.             Digestive    Morbid obesity due to excess calories (H)     Weight is stable; she does try to adhere to a healthy diet.             Endocrine    Hyperlipidemia LDL goal <40    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 atrophy of thyroid     Due for updated TSH today         Relevant Orders    TSH (Completed)       Circulatory    Chronic atrial fibrillation (H)     The patient continues on Coumadin for this.  She is rate controlled and is on carvedilol.  The patient saw her cardiologist in July and has a Holter monitor scheduled to be performed in the near future.         Systolic heart failure (H)     The patient has a mildly depressed ejection fraction.  She is on Coreg, digoxin, losartan, spironolactone as well as Lasix daily.  Symptoms have been stable without any issues with dyspnea recently.         Coronary artery disease involving native coronary artery of native heart without angina pectoris     Stable without any angina.         Relevant Orders    Lipid panel reflex to direct LDL Non-fasting (Completed)    Non-ischemic cardiomyopathy (H)    Relevant Orders    Comprehensive metabolic panel (BMP + Alb, Alk Phos, ALT, AST, Total. 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  No sign of  "recurrence.          Other Visit Diagnoses       Encounter for Medicare annual wellness exam    -  Primary    Postmenopausal status        Relevant Orders    DX Hip/Pelvis/Spine                  COUNSELING:  Reviewed preventive health counseling, as reflected in patient instructions       Regular exercise       Healthy diet/nutrition       Fall risk prevention       Osteoporosis prevention/bone health       Colon cancer screening      BMI:   Estimated body mass index is 40.69 kg/m  as calculated from the following:    Height as of this encounter: 1.6 m (5' 3\").    Weight as of this encounter: 104.2 kg (229 lb 11.2 oz).   Weight management plan: Discussed healthy diet and exercise guidelines      She reports that she quit smoking about 41 years ago. Her smoking use included cigarettes. She has a 10.00 pack-year smoking history. She has never used smokeless tobacco.      Appropriate preventive services were discussed with this patient, including applicable screening as appropriate for cardiovascular disease, diabetes, osteopenia/osteoporosis, and glaucoma.  As appropriate for age/gender, discussed screening for colorectal cancer, prostate cancer, breast cancer, and cervical cancer. Checklist reviewing preventive services available has been given to the patient.    Reviewed patients plan of care and provided an AVS. The Basic Care Plan (routine screening as documented in Health Maintenance) for Caty meets the Care Plan requirement. This Care Plan has been established and reviewed with the Patient.        JUNO THOMPSON MD  [facility]    Identified Health Risks:  I have reviewed Opioid Use Disorder and Substance Use Disorder risk factors and made any needed referrals.   "

## 2023-08-17 NOTE — PROGRESS NOTES
ANTICOAGULATION MANAGEMENT     Caty Ng 78 year old female is on warfarin with therapeutic INR result. (Goal INR 2.0-3.0)    Recent labs: (last 7 days)     08/17/23  1210   INR 2.8*       ASSESSMENT     Warfarin Lab Questionnaire    Warfarin Doses Last 7 Days      8/17/2023    10:37 AM   Dose in Tablet or Mg   TAB or MG? milligram (mg)     Pt Rptd Dose SUNDAY MONDAY TUESDAY WED THURS FRIDAY SATURDAY 8/17/2023  10:37 AM 2 3 2 2 2 3 2         8/17/2023   Warfarin Lab Questionnaire   Missed doses within past 14 days? No   Changes in diet or alcohol within past 14 days? No   Medication changes since last result? No   Injuries or illness since last result? No   New shortness of breath, severe headaches or sudden changes in vision since last result? No   Abnormal bleeding since last result? Yes   If yes, please explain: nicked vericose vein-See ADT 7/27/23   Upcoming surgery, procedure? No   Best number to call with results? [phone]     Previous result: Therapeutic last 2(+) visits  Additional findings: None       PLAN     Recommended plan for no diet, medication or health factor changes affecting INR     Dosing Instructions: Continue your current warfarin dose with next INR in 6 weeks       Summary  As of 8/17/2023      Full warfarin instructions:  3 mg every Mon, Fri; 2 mg all other days   Next INR check:  9/28/2023               Telephone call with Abi who verbalizes understanding and agrees to plan    Lab visit scheduled    Education provided:   Goal range and lab monitoring: goal range and significance of current result  Contact [phone] with any changes, questions or concerns.     Plan made per [facility] anticoagulation protocol    Priscila Monroe RN  Anticoagulation Clinic  8/17/2023    _______________________________________________________________________     Anticoagulation Episode Summary       Current INR goal:  2.0-3.0   TTR:  70.3 % (11.8 mo)   Target end date:  Indefinite   Send INR reminders to:   [facility]    Indications    Long term (current) use of anticoagulants [Z79.01]  Permanent atrial fibrillation (H) (Resolved) [I48.21]  Chronic atrial fibrillation (H) [I48.20]             Comments:               Anticoagulation Care Providers       Provider Role Specialty Phone number    Keke Flores MD Referring Family Medicine [phone]

## 2023-08-18 LAB
ALBUMIN SERPL BCG-MCNC: 4.3 G/DL (ref 3.5–5.2)
ALP SERPL-CCNC: 96 U/L (ref 35–104)
ALT SERPL W P-5'-P-CCNC: 10 U/L (ref 0–50)
ANION GAP SERPL CALCULATED.3IONS-SCNC: 9 MMOL/L (ref 7–15)
AST SERPL W P-5'-P-CCNC: 24 U/L (ref 0–45)
BILIRUB SERPL-MCNC: 1 MG/DL
BUN SERPL-MCNC: 12.7 MG/DL (ref 8–23)
CALCIUM SERPL-MCNC: 9.6 MG/DL (ref 8.8–10.2)
CHLORIDE SERPL-SCNC: 102 MMOL/L (ref 98–107)
CHOLEST SERPL-MCNC: 90 MG/DL
CREAT SERPL-MCNC: 0.7 MG/DL (ref 0.51–0.95)
DEPRECATED HCO3 PLAS-SCNC: 28 MMOL/L (ref 22–29)
GFR SERPL CREATININE-BSD FRML MDRD: 89 ML/MIN/1.73M2
GLUCOSE SERPL-MCNC: 99 MG/DL (ref 70–99)
HDLC SERPL-MCNC: 33 MG/DL
LDLC SERPL CALC-MCNC: 32 MG/DL
NONHDLC SERPL-MCNC: 57 MG/DL
POTASSIUM SERPL-SCNC: 4.9 MMOL/L (ref 3.4–5.3)
PROT SERPL-MCNC: 7.8 G/DL (ref 6.4–8.3)
SODIUM SERPL-SCNC: 139 MMOL/L (ref 136–145)
TRIGL SERPL-MCNC: 124 MG/DL
TSH SERPL DL<=0.005 MIU/L-ACNC: 1.89 UIU/ML (ref 0.3–4.2)

## 2023-08-20 NOTE — ASSESSMENT & PLAN NOTE
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  No sign of recurrence.

## 2023-08-20 NOTE — ASSESSMENT & PLAN NOTE
The patient has a mildly depressed ejection fraction.  She is on Coreg, digoxin, losartan, spironolactone as well as Lasix daily.  Symptoms have been stable without any issues with dyspnea recently.

## 2023-08-20 NOTE — ASSESSMENT & PLAN NOTE
Patient does not use CPAP; she and her  both state that she sleeps well at night and does not snore.

## 2023-08-20 NOTE — PROGRESS NOTES
The patient reports that she has difficulty with activities of daily living. I have asked that the patient make a follow up appointment in 26 weeks where this issue will be further evaluated and addressed.  Information on urinary incontinence and treatment options given to patient.

## 2023-08-20 NOTE — ASSESSMENT & PLAN NOTE
The patient continues on Coumadin for this.  She is rate controlled and is on carvedilol.  The patient saw her cardiologist in July and has a Holter monitor scheduled to be performed in the near future.

## 2023-08-20 NOTE — PATIENT INSTRUCTIONS
Patient Education   Personalized Prevention Plan  You are due for the preventive services outlined below.  Your care team is available to assist you in scheduling these services.  If you have already completed any of these items, please share that information with your care team to update in your medical record.  Health Maintenance Due   Topic Date Due     Zoster (Shingles) Vaccine (2 of 3) 03/01/2012     Heart Failure Action Plan  02/25/2022     Activities of Daily Living    Your Health Risk Assessment indicates you have difficulties with activities of daily living such as housework, bathing, preparing meals, taking medication, etc. Please make a follow up appointment for us to address this issue in more detail.  Bladder Training: Care Instructions  Your Care Instructions     Bladder training is used to treat urge incontinence and stress incontinence. Urge incontinence means that the need to urinate comes on so fast that you can't get to a toilet in time. Stress incontinence means that you leak urine because of pressure on your bladder. For example, it may happen when you laugh, cough, or lift something heavy.  Bladder training can increase how long you can wait before you have to urinate. It can also help your bladder hold more urine. And it can give you better control over the urge to urinate.  It is important to remember that bladder training takes a few weeks to a few months to make a difference. You may not see results right away, but don't give up.  Follow-up care is a key part of your treatment and safety. Be sure to make and go to all appointments, and call your doctor if you are having problems. It's also a good idea to know your test results and keep a list of the medicines you take.  How can you care for yourself at home?  Work with your doctor to come up with a bladder training program that is right for you. You may use one or more of the following methods.  Delayed urination  In the beginning, try to  "keep from urinating for 5 minutes after you first feel the need to go.  While you wait, take deep, slow breaths to relax. Kegel exercises can also help you delay the need to go to the bathroom.  After some practice, when you can easily wait 5 minutes to urinate, try to wait 10 minutes before you urinate.  Slowly increase the waiting period until you are able to control when you have to urinate.  Scheduled urination  Empty your bladder when you first wake up in the morning.  Schedule times throughout the day when you will urinate.  Start by going to the bathroom every hour, even if you don't need to go.  Slowly increase the time between trips to the bathroom.  When you have found a schedule that works well for you, keep doing it.  If you wake up during the night and have to urinate, do it. Apply your schedule to waking hours only.  Kegel exercises  These tighten and strengthen pelvic muscles, which can help you control the flow of urine. (If doing these exercises causes pain, stop doing them and talk with your doctor.) To do Kegel exercises:  Squeeze your muscles as if you were trying not to pass gas. Or squeeze your muscles as if you were stopping the flow of urine. Your belly, legs, and buttocks shouldn't move.  Hold the squeeze for 3 seconds, then relax for 5 to 10 seconds.  Start with 3 seconds, then add 1 second each week until you are able to squeeze for 10 seconds.  Repeat the exercise 10 times a session. Do 3 to 8 sessions a day.  When should you call for help?  Watch closely for changes in your health, and be sure to contact your doctor if:    Your incontinence is getting worse.     You do not get better as expected.   Where can you learn more?  Go to https://www.[employer].net/patiented  Enter V684 in the search box to learn more about \"Bladder Training: Care Instructions.\"  Current as of: March 1, 2023               Content Version: 13.7    1886-1243 [employer].   Care instructions adapted " under license by your healthcare professional. If you have questions about a medical condition or this instruction, always ask your healthcare professional. [employer] disclaims any warranty or liability for your use of this information.

## 2023-09-12 ENCOUNTER — HOSPITAL ENCOUNTER (OUTPATIENT)
Dept: CARDIOLOGY | Facility: HOSPITAL | Age: 78
Discharge: HOME | End: 2023-09-12
Attending: INTERNAL MEDICINE | Admitting: INTERNAL MEDICINE
Payer: MEDICARE

## 2023-09-12 DIAGNOSIS — I50.20 SYSTOLIC HEART FAILURE, UNSPECIFIED HF CHRONICITY (H): ICD-10-CM

## 2023-09-12 DIAGNOSIS — I25.10 CORONARY ARTERY DISEASE INVOLVING NATIVE CORONARY ARTERY OF NATIVE HEART WITHOUT ANGINA PECTORIS: ICD-10-CM

## 2023-09-12 DIAGNOSIS — I48.20 CHRONIC ATRIAL FIBRILLATION (H): ICD-10-CM

## 2023-09-12 DIAGNOSIS — I42.0 DILATED CARDIOMYOPATHY (H): ICD-10-CM

## 2023-09-12 PROCEDURE — 93226 XTRNL ECG REC<48 HR SCAN A/R: CPT

## 2023-09-15 PROCEDURE — 93227 XTRNL ECG REC<48 HR R&I: CPT | Performed by: INTERNAL MEDICINE

## 2023-09-17 DIAGNOSIS — I50.23 ACUTE ON CHRONIC HFREF (HEART FAILURE WITH REDUCED EJECTION FRACTION) (H): ICD-10-CM

## 2023-09-18 RX ORDER — DIGOXIN 125 MCG
TABLET ORAL
Qty: 30 TABLET | Refills: 3 | Status: SHIPPED | OUTPATIENT
Start: 2023-09-18 | End: 2023-12-29

## 2023-09-21 ENCOUNTER — TELEPHONE (OUTPATIENT)
Dept: FAMILY MEDICINE | Facility: CLINIC | Age: 78
End: 2023-09-21
Payer: MEDICARE

## 2023-09-21 NOTE — TELEPHONE ENCOUNTER
Incoming call from patient confused about why her furosemide was sent to a local [facility] instead of the [facility] mail order like all her other meds. States she will pick it up from the pharmacy this time but wants future fills to be sent to [facility] mail service pharmacy.

## 2023-09-27 DIAGNOSIS — I50.32 CHRONIC DIASTOLIC HEART FAILURE (H): ICD-10-CM

## 2023-09-27 RX ORDER — LOSARTAN POTASSIUM 25 MG/1
25 TABLET ORAL DAILY
Qty: 90 TABLET | Refills: 3 | Status: SHIPPED | OUTPATIENT
Start: 2023-09-27 | End: 2024-07-09

## 2023-09-28 ENCOUNTER — LAB (OUTPATIENT)
Dept: LAB | Facility: CLINIC | Age: 78
End: 2023-09-28
Payer: MEDICARE

## 2023-09-28 ENCOUNTER — ANTICOAGULATION THERAPY VISIT (OUTPATIENT)
Dept: ANTICOAGULATION | Facility: CLINIC | Age: 78
End: 2023-09-28

## 2023-09-28 DIAGNOSIS — I48.20 CHRONIC ATRIAL FIBRILLATION (H): ICD-10-CM

## 2023-09-28 DIAGNOSIS — Z79.01 LONG TERM (CURRENT) USE OF ANTICOAGULANTS: Primary | ICD-10-CM

## 2023-09-28 LAB — INR BLD: 2.5 (ref 0.9–1.1)

## 2023-09-28 PROCEDURE — 85610 PROTHROMBIN TIME: CPT

## 2023-09-28 PROCEDURE — 36416 COLLJ CAPILLARY BLOOD SPEC: CPT

## 2023-09-28 NOTE — PROGRESS NOTES
ANTICOAGULATION MANAGEMENT     Caty Ng 78 year old female is on warfarin with therapeutic INR result. (Goal INR 2.0-3.0)    Recent labs: (last 7 days)     09/28/23  1116   INR 2.5*       ASSESSMENT     Warfarin Lab Questionnaire    Warfarin Doses Last 7 Days      9/28/2023    11:13 AM   Dose in Tablet or Mg   TAB or MG? milligram (mg)     Pt Rptd Dose SUNDAY MONDAY TUESDAY WED THURS FRIDAY SATURDAY 9/28/2023  11:13 AM 2 3 2 2 2 3 2         9/28/2023   Warfarin Lab Questionnaire   Missed doses within past 14 days? No   Changes in diet or alcohol within past 14 days? No   Medication changes since last result? No   Injuries or illness since last result? No   New shortness of breath, severe headaches or sudden changes in vision since last result? No   Abnormal bleeding since last result? No   Upcoming surgery, procedure? No     Previous result: Therapeutic last 2(+) visits  Additional findings: None       PLAN     Recommended plan for no diet, medication or health factor changes affecting INR     Dosing Instructions: Continue your current warfarin dose with next INR in 6 weeks       Summary  As of 9/28/2023      Full warfarin instructions:  3 mg every Mon, Fri; 2 mg all other days   Next INR check:  11/9/2023               Telephone call with Abi who verbalizes understanding and agrees to plan    Lab visit scheduled    Education provided:   Goal range and lab monitoring: goal range and significance of current result and Importance of therapeutic range    Plan made per [facility] anticoagulation protocol    Nolan Jeffers RN  Anticoagulation Clinic  9/28/2023    _______________________________________________________________________     Anticoagulation Episode Summary       Current INR goal:  2.0-3.0   TTR:  70.3 % (11.8 mo)   Target end date:  Indefinite   Send INR reminders to:  [facility]    Indications    Long term (current) use of anticoagulants [Z79.01]  Permanent atrial fibrillation (H) (Resolved)  [I48.21]  Chronic atrial fibrillation (H) [I48.20]             Comments:               Anticoagulation Care Providers       Provider Role Specialty Phone number    Keke Flores MD Referring Family Medicine [phone]

## 2023-10-26 DIAGNOSIS — I42.8 NON-ISCHEMIC CARDIOMYOPATHY (H): ICD-10-CM

## 2023-10-26 RX ORDER — ATORVASTATIN CALCIUM 80 MG/1
TABLET, FILM COATED ORAL
Qty: 90 TABLET | Refills: 3 | Status: SHIPPED | OUTPATIENT
Start: 2023-10-26

## 2023-11-09 ENCOUNTER — ANTICOAGULATION THERAPY VISIT (OUTPATIENT)
Dept: ANTICOAGULATION | Facility: CLINIC | Age: 78
End: 2023-11-09

## 2023-11-09 ENCOUNTER — LAB (OUTPATIENT)
Dept: LAB | Facility: CLINIC | Age: 78
End: 2023-11-09
Payer: MEDICARE

## 2023-11-09 DIAGNOSIS — I48.20 CHRONIC ATRIAL FIBRILLATION (H): ICD-10-CM

## 2023-11-09 DIAGNOSIS — Z79.01 LONG TERM (CURRENT) USE OF ANTICOAGULANTS: Primary | ICD-10-CM

## 2023-11-09 LAB — INR BLD: 2.6 (ref 0.9–1.1)

## 2023-11-09 PROCEDURE — 85610 PROTHROMBIN TIME: CPT

## 2023-11-09 PROCEDURE — 36416 COLLJ CAPILLARY BLOOD SPEC: CPT

## 2023-11-09 NOTE — PROGRESS NOTES
ANTICOAGULATION MANAGEMENT     Caty Ng 78 year old female is on warfarin with therapeutic INR result. (Goal INR 2.0-3.0)    Recent labs: (last 7 days)     11/09/23  1112   INR 2.6*       ASSESSMENT     Warfarin Lab Questionnaire    Warfarin Doses Last 7 Days      11/9/2023    11:13 AM   Dose in Tablet or Mg   TAB or MG? milligram (mg)     Pt Rptd Dose SUNDAY MONDAY TUESDAY WED THURS FRIDAY SATURDAY 11/9/2023  11:13 AM 2 3 2 2 2 3 2         11/9/2023   Warfarin Lab Questionnaire   Missed doses within past 14 days? No   Changes in diet or alcohol within past 14 days? No   Medication changes since last result? No   Injuries or illness since last result? No   New shortness of breath, severe headaches or sudden changes in vision since last result? No   Abnormal bleeding since last result? No   Upcoming surgery, procedure? No   Best number to call with results? [phone]     Previous result: Therapeutic last 2(+) visits  Additional findings: None       PLAN     Recommended plan for no diet, medication or health factor changes affecting INR     Dosing Instructions: Continue your current warfarin dose with next INR in 6 weeks       Summary  As of 11/9/2023      Full warfarin instructions:  3 mg every Mon, Fri; 2 mg all other days   Next INR check:  12/21/2023               Telephone call with Abi who verbalizes understanding and agrees to plan    Patient offered & declined to schedule next visit    Education provided:   Goal range and lab monitoring: goal range and significance of current result  Contact [phone] with any changes, questions or concerns.     Plan made per [facility] anticoagulation protocol    Marisela Funk, RN  Anticoagulation Clinic  11/9/2023    _______________________________________________________________________     Anticoagulation Episode Summary       Current INR goal:  2.0-3.0   TTR:  72.4% (11.8 mo)   Target end date:  Indefinite   Send INR reminders to:  BREE ANDREW     Indications    Long term (current) use of anticoagulants [Z79.01]  Permanent atrial fibrillation (H) (Resolved) [I48.21]  Chronic atrial fibrillation (H) [I48.20]             Comments:               Anticoagulation Care Providers       Provider Role Specialty Phone number    Keke Flores MD [facility] Family Medicine [phone]

## 2023-11-11 DIAGNOSIS — I50.23 ACUTE ON CHRONIC HFREF (HEART FAILURE WITH REDUCED EJECTION FRACTION) (H): ICD-10-CM

## 2023-11-13 RX ORDER — SPIRONOLACTONE 25 MG/1
25 TABLET ORAL DAILY
Qty: 90 TABLET | Refills: 3 | OUTPATIENT
Start: 2023-11-13

## 2023-12-17 NOTE — TELEPHONE ENCOUNTER
MEDICAL SCREENING EXAM (MSE) NOTE      Shilpa Dimas is a 39 year old female who presented to the ER today with extensive recent medical hx for dizziness and chest pain along with visits to other ERs. Here today with dizziness, chest pressure.       Brief Physical Exam  General: Well-appearing  Neurologic: alert to stimuli      This patient was screened by me for the purpose of initial evaluation.  I have screened the patient for an acute medical condition and have placed orders for the patient's diagnosis and treatment.       Manjinder Miranda, PA-C  12/17/23 [id number]     Called and left message for [facility] nurse with provider's message as written. Asked that they call back to the RN hotline at [phone] if any further questions or concerns. Routing to INR clinic as MABLE.    Keke Lemons RN  [facility]

## 2023-12-21 NOTE — PROGRESS NOTES
ANTICOAGULATION  MANAGEMENT: Discharge Review    Caty Ng chart reviewed for anticoagulation continuity of care    Hospital Admission on 1/6-13/2023 for cough/dyspnea/sputum.   - d/t acute respiratory failure w/ hypoxia and positive Influenza A / bronchitis,  and acute on chronic systolic heart failure.    Discharge disposition: Home    Results:    Recent labs: (last 7 days)     01/10/23  0451 01/11/23  0554 01/12/23  0613 01/13/23  0541   INR 1.40* 1.56* 1.66* 1.70*     Anticoagulation inpatient management:     held warfarin from 1/1-6/23 due to was scheduled for right total hip arthroplasty @ [facility] and resumed on 1/7/23 and more warfarin administered than maintenance regimen     Anticoagulation discharge instructions:     Warfarin dosing: home regimen continued   Bridging: No   INR goal change: No      Medication changes affecting anticoagulation: Yes:    STARTED on:     [id number]  3x/day PRN for cough          Digoxin 0.125mg one tab daily          Robitussin 15mls q6hrs, PRN for cough.          Spironolactone 25mg one tab daily.  (will interact with warfarin and potentially decrease INR).   CHANGED:          Coreg 25mg 2x/day.   STOPPED:       Diltiazem-ER   CONTINUED ALL OTHER MEDS .    Additional factors affecting anticoagulation: No     PLAN     Agree with discharge plan for follow up:   - Hosp f/u with PCP (Dr. Flores within 7 days and labs/tests are recommended:   BMP, Mg, serum digoxin level, and INR check.  Scheduled on 1/23/23.   - and Follow-up with Cardiologist.  Scheduled on 2/2/23 with Dr. Cameron Soria / [facility] for HF appt no 1/27/23.    Patient not contacted    Anticoagulation Calendar updated    Kelly Wise RN   pt mom [facility]

## 2023-12-28 ENCOUNTER — MYC MEDICAL ADVICE (OUTPATIENT)
Dept: ANTICOAGULATION | Facility: CLINIC | Age: 78
End: 2023-12-28
Payer: MEDICARE

## 2023-12-29 DIAGNOSIS — I50.23 ACUTE ON CHRONIC HFREF (HEART FAILURE WITH REDUCED EJECTION FRACTION) (H): ICD-10-CM

## 2023-12-29 RX ORDER — DIGOXIN 125 MCG
TABLET ORAL
Qty: 30 TABLET | Refills: 3 | Status: SHIPPED | OUTPATIENT
Start: 2023-12-29 | End: 2024-03-07

## 2024-01-01 RX ORDER — SPIRONOLACTONE 25 MG/1
25 TABLET ORAL DAILY
Qty: 90 TABLET | Refills: 1 | Status: SHIPPED | OUTPATIENT
Start: 2024-01-01 | End: 2024-02-14

## 2024-01-02 ENCOUNTER — LAB (OUTPATIENT)
Dept: LAB | Facility: CLINIC | Age: 79
End: 2024-01-02
Payer: MEDICARE

## 2024-01-02 ENCOUNTER — ANTICOAGULATION THERAPY VISIT (OUTPATIENT)
Dept: ANTICOAGULATION | Facility: CLINIC | Age: 79
End: 2024-01-02

## 2024-01-02 DIAGNOSIS — I48.20 CHRONIC ATRIAL FIBRILLATION (H): ICD-10-CM

## 2024-01-02 DIAGNOSIS — Z79.01 LONG TERM (CURRENT) USE OF ANTICOAGULANTS: Primary | ICD-10-CM

## 2024-01-02 LAB — INR BLD: 2.4 (ref 0.9–1.1)

## 2024-01-02 PROCEDURE — 36416 COLLJ CAPILLARY BLOOD SPEC: CPT

## 2024-01-02 PROCEDURE — 85610 PROTHROMBIN TIME: CPT

## 2024-01-02 NOTE — PROGRESS NOTES
ANTICOAGULATION MANAGEMENT     Caty Ng 78 year old female is on warfarin with therapeutic INR result. (Goal INR 2.0-3.0)    Recent labs: (last 7 days)     01/02/24  1318   INR 2.4*       ASSESSMENT     Warfarin Lab Questionnaire    Warfarin Doses Last 7 Days      1/2/2024     1:18 PM   Dose in Tablet or Mg   TAB or MG? milligram (mg)     Pt Rptd Dose SUNDAY MONDAY TUESDAY WED THURS FRIDAY SATURDAY 1/2/2024   1:18 PM 2 3 2 2 2 3 2         1/2/2024   Warfarin Lab Questionnaire   Missed doses within past 14 days? No   Changes in diet or alcohol within past 14 days? No   Medication changes since last result? No   Injuries or illness since last result? No   New shortness of breath, severe headaches or sudden changes in vision since last result? No   Abnormal bleeding since last result? No   Upcoming surgery, procedure? No   Best number to call with results? [phone]     Previous result: Therapeutic last 2(+) visits  Additional findings: None       PLAN     Recommended plan for no diet, medication or health factor changes affecting INR     Dosing Instructions: Continue your current warfarin dose with next INR in 6 weeks       Summary  As of 1/2/2024      Full warfarin instructions:  3 mg every Mon, Fri; 2 mg all other days   Next INR check:  2/13/2024               Telephone call with Abi who verbalizes understanding and agrees to plan and who agrees to plan and repeated back plan correctly    Lab visit scheduled    Education provided:   Please call back if any changes to your diet, medications or how you've been taking warfarin  Goal range and lab monitoring: goal range and significance of current result and Importance of therapeutic range    Plan made per [facility] anticoagulation protocol    Michelle Santana RN  Anticoagulation Clinic  1/2/2024    _______________________________________________________________________     Anticoagulation Episode Summary       Current INR goal:  2.0-3.0   TTR:  83.5% (11.8 mo)    Target end date:  Indefinite   Send INR reminders to:  BREE ANDREW    Indications    Long term (current) use of anticoagulants [Z79.01]  Permanent atrial fibrillation (H) (Resolved) [I48.21]  Chronic atrial fibrillation (H) [I48.20]             Comments:               Anticoagulation Care Providers       Provider Role Specialty Phone number    Keke Flores MD Referring Family Medicine [phone]

## 2024-02-06 DIAGNOSIS — I42.8 NON-ISCHEMIC CARDIOMYOPATHY (H): ICD-10-CM

## 2024-02-07 RX ORDER — FUROSEMIDE 40 MG
40 TABLET ORAL DAILY
Qty: 90 TABLET | Refills: 3 | Status: SHIPPED | OUTPATIENT
Start: 2024-02-07

## 2024-02-13 ENCOUNTER — ANTICOAGULATION THERAPY VISIT (OUTPATIENT)
Dept: ANTICOAGULATION | Facility: CLINIC | Age: 79
End: 2024-02-13

## 2024-02-13 ENCOUNTER — LAB (OUTPATIENT)
Dept: LAB | Facility: CLINIC | Age: 79
End: 2024-02-13
Payer: MEDICARE

## 2024-02-13 DIAGNOSIS — Z79.01 LONG TERM (CURRENT) USE OF ANTICOAGULANTS: Primary | ICD-10-CM

## 2024-02-13 DIAGNOSIS — I48.20 CHRONIC ATRIAL FIBRILLATION (H): ICD-10-CM

## 2024-02-13 LAB — INR BLD: 3.1 (ref 0.9–1.1)

## 2024-02-13 PROCEDURE — 36416 COLLJ CAPILLARY BLOOD SPEC: CPT

## 2024-02-13 PROCEDURE — 85610 PROTHROMBIN TIME: CPT

## 2024-02-13 NOTE — PROGRESS NOTES
ANTICOAGULATION MANAGEMENT     Caty Ng 79 year old female is on warfarin with supratherapeutic INR result. (Goal INR 2.0-3.0)    Recent labs: (last 7 days)     02/13/24  1322   INR 3.1*       ASSESSMENT     Warfarin Lab Questionnaire    Warfarin Doses Last 7 Days      2/13/2024     1:29 PM   Dose in Tablet or Mg   TAB or MG? milligram (mg)     Pt Rptd Dose SUNDAY MONDAY TUESDAY WED THURS FRIDAY SATURDAY 2/13/2024   1:29 PM 2 3 2 2 2 3 2         2/13/2024   Warfarin Lab Questionnaire   Missed doses within past 14 days? No   Changes in diet or alcohol within past 14 days? Less greens recently, will resume normal diet    Medication changes since last result? No   Injuries or illness since last result? No   New shortness of breath, severe headaches or sudden changes in vision since last result? No   Abnormal bleeding since last result? No   Upcoming surgery, procedure? No     Previous result: Therapeutic last 2(+) visits last INR out of range back in 04/23  Additional findings: None       PLAN     Recommended plan for temporary change(s) affecting INR     Dosing Instructions: Continue your current warfarin dose with next INR in 2-3 weeks       Summary  As of 2/13/2024      Full warfarin instructions:  3 mg every Mon, Fri; 2 mg all other days   Next INR check:  3/5/2024               Telephone call with Abi who verbalizes understanding and agrees to plan    Lab visit scheduled    Education provided:   Goal range and lab monitoring: goal range and significance of current result  Contact [phone] with any changes, questions or concerns.     Plan made per [facility] anticoagulation protocol    Marisela Funk, RN  Anticoagulation Clinic  2/13/2024    _______________________________________________________________________     Anticoagulation Episode Summary       Current INR goal:  2.0-3.0   TTR:  85.9% (1 y)   Target end date:  Indefinite   Send INR reminders to:  BREE ANDREW    Indications    Long term  (current) use of anticoagulants [Z79.01]  Permanent atrial fibrillation (H) (Resolved) [I48.21]  Chronic atrial fibrillation (H) [I48.20]             Comments:               Anticoagulation Care Providers       Provider Role Specialty Phone number    Keke Flores MD Referring Family Medicine [phone]

## 2024-02-14 DIAGNOSIS — I50.23 ACUTE ON CHRONIC HFREF (HEART FAILURE WITH REDUCED EJECTION FRACTION) (H): ICD-10-CM

## 2024-02-14 RX ORDER — SPIRONOLACTONE 25 MG/1
25 TABLET ORAL DAILY
Qty: 14 TABLET | Refills: 0 | Status: SHIPPED | OUTPATIENT
Start: 2024-02-14 | End: 2024-03-07

## 2024-02-14 RX ORDER — SPIRONOLACTONE 25 MG/1
25 TABLET ORAL DAILY
Qty: 90 TABLET | Refills: 3 | Status: SHIPPED | OUTPATIENT
Start: 2024-02-14

## 2024-02-14 NOTE — TELEPHONE ENCOUNTER
2 separate Requests for this medication.   Patient's mail order pharmacy changed, they are unable to transfer the RX so they need a new 90-day supply sent to them-   (&Pt is currently out of this Med and needs a short 2-week supply sent to her standing pharmacy to last until her mail order ships out- this has been requested in alt refill encounter same-day 02/14 as there are 2 different pharmacy's needed.)      Medication Request    Medication name:   spironolactone (ALDACTONE) 25 MG tablet   Dose, Frequency: 25 mg, DAILY     Requested Pharmacy: [facility]    When was patient last seen?:  08/17/23    Patient offered appointment:  No    Okay to leave a detailed message: yes

## 2024-02-14 NOTE — TELEPHONE ENCOUNTER
2 separate Requests for this medication.   Pt is currently out and needs a short-term 2-week supply to her standing pharmacy.   (& patient's mail order pharmacy changed, they are unable to transfer the RX so they need a new 90-day RX sent to them as well- requested in alt refill encounter same-day as there are 2 different pharmacy's needed.)      Medication Request    Medication name:   spironolactone (ALDACTONE) 25 MG tablet   Dose, Frequency: 25 mg, DAILY     Requested Pharmacy: [id number]    When was patient last seen?:  08/17/23    Patient offered appointment:  No    Okay to leave a detailed message: yes

## 2024-02-26 ENCOUNTER — TELEPHONE (OUTPATIENT)
Dept: CARDIOLOGY | Facility: CLINIC | Age: 79
End: 2024-02-26
Payer: MEDICARE

## 2024-02-26 DIAGNOSIS — E03.9 HYPOTHYROIDISM, UNSPECIFIED TYPE: ICD-10-CM

## 2024-02-26 RX ORDER — LEVOTHYROXINE SODIUM 75 UG/1
TABLET ORAL
Qty: 90 TABLET | Refills: 1 | Status: SHIPPED | OUTPATIENT
Start: 2024-02-26 | End: 2024-08-07

## 2024-02-26 NOTE — TELEPHONE ENCOUNTER
[facility] Call Center    Phone Message    May a detailed message be left on voicemail: yes     Reason for Call: Medication Refill Request    Has the patient contacted the pharmacy for the refill? Yes   Name of medication being requested: digoxin (LANOXIN) 125 MCG tablet   Provider who prescribed the medication: Gia Chawla  Pharmacy: [facility] for 90 days    Also needs a 2 week supply from a [employer]  [facility] [address] [place] [address]     Date medication is needed: ASAP  NOTE:  pt has an appt w/Miroslava Orantes on 3.10.24 at [facility]       Action Taken: Message routed to:  [facility] cardio    Travel Screening: Not Applicable    Thank you!  [facility]

## 2024-02-26 NOTE — TELEPHONE ENCOUNTER
Medication Request  Medication name: levothyroxine (SYNTHROID/LEVOTHROID) 75 MCG tablet   Requested [facility]acy: [facility]  When was patient last seen for this?:  8/17/2023  Patient offered appointment:  No  Okay to leave a detailed message: yes

## 2024-03-05 ENCOUNTER — ANTICOAGULATION THERAPY VISIT (OUTPATIENT)
Dept: ANTICOAGULATION | Facility: CLINIC | Age: 79
End: 2024-03-05

## 2024-03-05 ENCOUNTER — LAB (OUTPATIENT)
Dept: LAB | Facility: CLINIC | Age: 79
End: 2024-03-05
Payer: MEDICARE

## 2024-03-05 DIAGNOSIS — I48.20 CHRONIC ATRIAL FIBRILLATION (H): ICD-10-CM

## 2024-03-05 DIAGNOSIS — Z79.01 LONG TERM (CURRENT) USE OF ANTICOAGULANTS: Primary | ICD-10-CM

## 2024-03-05 LAB — INR BLD: 3.7 (ref 0.9–1.1)

## 2024-03-05 PROCEDURE — 85610 PROTHROMBIN TIME: CPT

## 2024-03-05 PROCEDURE — 36416 COLLJ CAPILLARY BLOOD SPEC: CPT

## 2024-03-05 NOTE — PROGRESS NOTES
ANTICOAGULATION MANAGEMENT     Caty Ng 79 year old female is on warfarin with supratherapeutic INR result. (Goal INR 2.0-3.0)    Recent labs: (last 7 days)     03/05/24  1329   INR 3.7*       ASSESSMENT     Source(s): Chart Review and Patient/Caregiver Call     Warfarin doses taken: Warfarin taken as instructed  Diet: Increased greens/vitamin K in diet; ongoing change since last INR  Medication/supplement changes: None noted  New illness, injury, or hospitalization: No  Signs or symptoms of bleeding or clotting: No  Previous result: Supratherapeutic  Additional findings: None       PLAN     Recommended plan for ongoing change(s) affecting INR     Dosing Instructions: hold dose then decrease your warfarin dose (6.2% change) with next INR in 2 weeks       Summary  As of 3/5/2024      Full warfarin instructions:  3/5: Hold; Otherwise 3 mg every Mon; 2 mg all other days   Next INR check:  3/19/2024               Telephone call with Abi who verbalizes understanding and agrees to plan and who agrees to plan and repeated back plan correctly    Lab visit scheduled    Education provided:   Goal range and lab monitoring: goal range and significance of current result  Contact [phone] with any changes, questions or concerns.     Plan made per [facility] anticoagulation protocol    Priscila Monroe RN  Anticoagulation Clinic  3/5/2024    _______________________________________________________________________     Anticoagulation Episode Summary       Current INR goal:  2.0-3.0   TTR:  84.2% (1 y)   Target end date:  Indefinite   Send INR reminders to:  BREE ANDREW    Indications    Long term (current) use of anticoagulants [Z79.01]  Permanent atrial fibrillation (H) (Resolved) [I48.21]  Chronic atrial fibrillation (H) [I48.20]             Comments:               Anticoagulation Care Providers       Provider Role Specialty Phone number    Keke Flores MD Referring Family Medicine [phone]

## 2024-03-06 NOTE — PROGRESS NOTES
HEART CARE ENCOUNTER CONSULTATON NOTE      [facility]  [phone]      Assessment/Recommendations   Assessment:   Chronic atrial fibrillation: Jantoven and digoxin, rates controlled today and on Holter monitor 9/2023  - Has been off of digoxin for 5 days due inability to get it refilled, location is likely cleared from her system at this time.  She has felt no palpitations, racing heart rates off of medication, although does not recall being symptomatic with atrial fibrillation in the past.  Upon chart review digoxin was added during hospitalization about 1 year ago with influenza.  RVR in setting of acute illness that did not respond to carvedilol and diltiazem.    Coronary artery disease: Nonobstructive on angiogram 2021 with nonischemic NM stress test 2/2023 - no angina   Hyperlipidemia: LDL 32 atorvastatin 80 mg  Hypertension: Borderline hypotensive 90/62, Losartan, spironolactone, carvedilol - asymptomatic   Nonischemic cardiomyopathy: LVEF 45-50% stable on most recent echo 7/2023      Plan:   Continue off digoxin evidence of controlled rate today.  Holter monitor for 24 hours to monitor for HR control off of digoxin, with uncontrolled heart rates would plan to restart digoxin  Continue current medications as prescribed        Follow up in 1 year or sooner as needed     History of Present Illness/Subjective    HPI: Caty Ng is a 79 year old female with PMHx of chronic AF, CAD, HLD, HTN, hypothyroidism presents for follow up. Some chronic shortness of breath with exertion is unchanged.  Patient is trying to be more active but has trouble with her hip from previous hip replacement 1 year ago.  Historically her blood pressure has been borderline low, she denies significant lightheadedness, occasional with position changes and this is stable.  She sleeps in a recliner due to right hip discomfort.    Has been off of digoxin for 5 days due inability to get it refilled, location is likely  "cleared from her system at this time.  She has felt no palpitations, racing heart rates off of medication, although does not recall being symptomatic with atrial fibrillation in the past.     She denies fatigue, lightheadedness, palpitations, chest pain, and lower extremity edema.      NM Lexiscan stress test 2/2023:    The regadenoson nuclear stress test is negative for inducible myocardial ischemia or infarction.    The left ventricular ejection fraction at stress is 54%.    The patient is at a low risk of future cardiac ischemic events.    A prior study was conducted on 11/23/2020.  This study has no change when compared with the prior study.    Echocardiogram 7/2023 Results:  1. The left ventricle is normal in size. Left ventricular function is  decreased. The ejection fraction is 45-50% (mildly reduced). There is mild  global hypokinesia of the left ventricle.  2. Normal right ventricle size and systolic function.  3. The left atrium is severely dilated. The right atrium is severely dilated.  4. There is mild (1+) mitral regurgitation.     Compared to 1/10/2023, no significant change noted.     Physical Examination  Review of Systems   Vitals: BP 90/62 (BP Location: Left arm, Patient Position: Sitting, Cuff Size: Adult Large)   Pulse 70   Resp 16   Ht 1.6 m (5' 3\")   Wt 105.2 kg (232 lb)   BMI 41.10 kg/m    BMI= Body mass index is 41.1 kg/m .  Wt Readings from Last 3 Encounters:   03/07/24 105.2 kg (232 lb)   08/17/23 104.2 kg (229 lb 11.2 oz)   07/27/23 103.4 kg (228 lb)           ENT/Mouth: membranes moist, no oral lesions or bleeding gums.      EYES:  no scleral icterus, normal conjunctivae       Chest/Lungs:   lungs are clear to auscultation, no rales or wheezing,  equal chest wall expansion    Cardiovascular:   Irregular. Normal first and second heart sounds with no murmurs, rubs, or gallops; radial pulses are intact, trace edema bilaterally        Extremities: no cyanosis or clubbing   Skin: no " xanthelasma, warm.    Neurologic:  no tremors     Psychiatric: alert and oriented x3, calm        Please refer above for cardiac ROS details.        Medical History  Surgical History Family History Social History   Past Medical History:   Diagnosis Date    Allergic rhinitis     Arrhythmia 01/01/2012    Atrial fibrillation (H)     Atrial fibrillation, chronic (H) 06/21/2012    Followed by Dr. 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 Cobos, cardiology EF 45 - 50%    Hyperlipidemia 01/01/2012    Hypertriglyceridemia     Mediastinal mass 2015    benign, [facility] consult     Mitral valve regurgitation 06/24/2012    Moderate mitral regurgitation     Morbid obesity with BMI of 40.0-44.9, adult (H) 04/25/2015    BROCK (obstructive sleep apnea)     Scoliosis     Sleep apnea 01/01/2010    per pt    Thyroid disease      Past Surgical History:   Procedure Laterality Date    CV CORONARY ANGIOGRAM N/A 7/21/2021    Procedure: Coronary Angiogram;  Surgeon: Hai Murphy MD;  Location: [facility] CATH LAB CV    CV LEFT HEART CATH N/A 7/21/2021    Procedure: Left Heart Cath;  Surgeon: Hai Murphy MD;  Location: [facility] CATH LAB CV    HYSTERECTOMY, PAP NO LONGER INDICATED  1989    JOINT REPLACEMTN, KNEE RT/LT  2007    right    JOINT REPLACEMTN, KNEE RT/LT Left     SALPINGO OOPHORECTOMY,R/L/BRANDO Bilateral 2003    SURGICAL HISTORY OF -   1989    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     Family History   Problem Relation Age of Onset    Heart Disease Mother      Cancer Mother     Diabetes Father     Musculoskeletal Disorder Father     Diabetes Paternal Grandmother     Diabetes Paternal Grandfather     Diabetes Sister     Thrombophilia Daughter         Factor V Leiden Deficiency    Diabetes Sister         Social History     Socioeconomic History    Marital status:      Spouse name: Ed     Number of children: 2    Years of education: Not on file    Highest education level: Not on file   Occupational History    Occupation: taxes      Employer: RETIRED   Tobacco Use    Smoking status: Former     Packs/day: 0.50     Years: 20.00     Additional pack years: 0.00     Total pack years: 10.00     Types: Cigarettes     Quit date: 1982     Years since quittin.2    Smokeless tobacco: Never   Vaping Use    Vaping Use: Never used   Substance and Sexual Activity    Alcohol use: Yes     Comment: 1 beers per week    Drug use: Never    Sexual activity: Yes     Partners: Male     Birth control/protection: Post-menopausal   Other Topics Concern    Parent/sibling w/ CABG, MI or angioplasty before 65F 55M? Yes   Social History Narrative    Not on file     Social Determinants of Health     Financial Resource Strain: Not on file   Food Insecurity: Not on file   Transportation Needs: Not on file   Physical Activity: Not on file   Stress: Not on file   Social Connections: Not on file   Interpersonal Safety: Not on file   Housing Stability: Not on file           Medications  Allergies   Current Outpatient Medications   Medication Sig Dispense Refill    acetaminophen (TYLENOL) 650 MG CR tablet Take 1,300 mg by mouth 2 times daily      amoxicillin (AMOXIL) 500 MG capsule TAKE 4 CAPSULES BY MOUTH 1 HOUR BEFORE DENTAL APPOINTMENT      atorvastatin (LIPITOR) 80 MG tablet TAKE 1 TABLET DAILY 90 tablet 3    carvedilol (COREG) 25 MG tablet Take 12.5 mg in the morning and 25 mg in the afternoon 180 tablet 3    furosemide (LASIX) 40 MG tablet TAKE 1 TABLET DAILY 90 tablet 3    JANTOVEN  ANTICOAGULANT 2 MG tablet TAKE 1/2 TO 1 TABLET (1MG TO 2MG) DAILY OR AS DIRECTED. ADJUST DOSE BASED ON INTERNATIONAL NORMALIZED RATIO RESULTS. 90 tablet 3    levothyroxine (SYNTHROID/LEVOTHROID) 75 MCG tablet TAKE 1 TABLET DAILY 90 tablet 1    losartan (COZAAR) 25 MG tablet TAKE 1 TABLET DAILY 90 tablet 3    Melatonin 10 MG TABS tablet Take 1 tablet by mouth nightly as needed      spironolactone (ALDACTONE) 25 MG tablet Take 1 tablet (25 mg) by mouth daily 14 tablet 0    spironolactone (ALDACTONE) 25 MG tablet Take 1 tablet (25 mg) by mouth daily 90 tablet 3    digoxin (LANOXIN) 125 MCG tablet TAKE 1 TABLET DAILY (Patient not taking: Reported on 3/7/2024) 30 tablet 3       Allergies   Allergen Reactions    Metal [Staples] Other (See Comments)     Metal allergy not clearly specified - patient had a stainless steel screw put in her foot that needed to be removed due to allergy/hypersensitivity     Trace Minerals Cr-Cu-Mn-Zn      Other reaction(s): Other (see comments)  Metal allergy not clearly specified - patient had a stainless steel screw put in her foot that needed to be removed due to allergy/hypersensitivity.  Also react to earrings.    Lisinopril      cough    Metoprolol Fatigue          Lab Results    Chemistry/lipid CBC Cardiac Enzymes/BNP/TSH/INR   Recent Labs   Lab Test 08/17/23  1210   CHOL 90   HDL 33*   LDL 32   TRIG 124     Recent Labs   Lab Test 08/17/23  1210 06/06/22  1424 07/21/21  0706   LDL 32 39 38     Recent Labs   Lab Test 08/17/23  1210      POTASSIUM 4.9   CHLORIDE 102   CO2 28   GLC 99   BUN 12.7   CR 0.70   GFRESTIMATED 89   JANELLE 9.6     Recent Labs   Lab Test 08/17/23  1210 02/01/23  1535 01/18/23  1500   CR 0.70 0.82 0.73     Recent Labs   Lab Test 05/17/21  1306   A1C 5.7*          Recent Labs   Lab Test 01/06/23  1424   WBC 7.5   HGB 13.8   HCT 43.3   MCV 95        Recent Labs   Lab Test 01/06/23  1424 09/08/21  1050 07/21/21  0706   HGB 13.8 13.5 14.4    No results for  "input(s): \"TROPONINI\" in the last 16804 hours.  Recent Labs   Lab Test 01/06/23  1424 11/18/22  1412 12/03/20  1438 05/21/18  1058 05/04/18  1359   BNP  --   --  184*  --   --    NTBNPI 1,122*  --   --   --   --    NTBNP  --  717  --  1,012* 1,174*     Recent Labs   Lab Test 08/17/23  1210   TSH 1.89     Recent Labs   Lab Test 03/05/24  1329 02/13/24  1322 01/02/24  1318   INR 3.7* 3.1* 2.4*          This note has been dictated using voice recognition software. Any grammatical, typographical, or context distortions are unintentional and inherent to the software    Miroslava Orantes PA-C                                       "

## 2024-03-07 ENCOUNTER — OFFICE VISIT (OUTPATIENT)
Dept: CARDIOLOGY | Facility: CLINIC | Age: 79
End: 2024-03-07
Payer: MEDICARE

## 2024-03-07 VITALS
WEIGHT: 232 LBS | SYSTOLIC BLOOD PRESSURE: 90 MMHG | HEART RATE: 70 BPM | DIASTOLIC BLOOD PRESSURE: 62 MMHG | RESPIRATION RATE: 16 BRPM | HEIGHT: 63 IN | BODY MASS INDEX: 41.11 KG/M2

## 2024-03-07 DIAGNOSIS — I48.20 CHRONIC ATRIAL FIBRILLATION (H): ICD-10-CM

## 2024-03-07 DIAGNOSIS — I42.8 NON-ISCHEMIC CARDIOMYOPATHY (H): Primary | ICD-10-CM

## 2024-03-07 PROCEDURE — 99214 OFFICE O/P EST MOD 30 MIN: CPT

## 2024-03-07 NOTE — PATIENT INSTRUCTIONS
It was a pleasure taking part in your care today:    - Schedule 24 hour Holter monitor  - Follow up in 1 year    Please call the [facility] clinic with any questions or concerns at [phone].     Miroslava Orantes PA-C

## 2024-03-07 NOTE — LETTER
3/7/2024    JUNO THOMPSON MD  [address] [facility] [id number]    RE: Caty Ng       Dear Colleague,     I had the pleasure of seeing Caty Ng in the [facility].    HEART CARE ENCOUNTER CONSULTATON NOTE      [facility]  [phone]      Assessment/Recommendations   Assessment:   Chronic atrial fibrillation: Jantoven and digoxin, rates controlled today and on Holter monitor 9/2023  - Has been off of digoxin for 5 days due inability to get it refilled, location is likely cleared from her system at this time.  She has felt no palpitations, racing heart rates off of medication, although does not recall being symptomatic with atrial fibrillation in the past.  Upon chart review digoxin was added during hospitalization about 1 year ago with influenza.  RVR in setting of acute illness that did not respond to carvedilol and diltiazem.    Coronary artery disease: Nonobstructive on angiogram 2021 with nonischemic NM stress test 2/2023 - no angina   Hyperlipidemia: LDL 32 atorvastatin 80 mg  Hypertension: Borderline hypotensive 90/62, Losartan, spironolactone, carvedilol - asymptomatic   Nonischemic cardiomyopathy: LVEF 45-50% stable on most recent echo 7/2023      Plan:   Continue off digoxin evidence of controlled rate today.  Holter monitor for 24 hours to monitor for HR control off of digoxin, with uncontrolled heart rates would plan to restart digoxin  Continue current medications as prescribed        Follow up in 1 year or sooner as needed     History of Present Illness/Subjective    HPI: Caty Ng is a 79 year old female with PMHx of chronic AF, CAD, HLD, HTN, hypothyroidism presents for follow up. Some chronic shortness of breath with exertion is unchanged.  Patient is trying to be more active but has trouble with her hip from previous hip replacement 1 year ago.  Historically her blood pressure has been borderline low, she denies significant  "lightheadedness, occasional with position changes and this is stable.  She sleeps in a recliner due to right hip discomfort.    Has been off of digoxin for 5 days due inability to get it refilled, location is likely cleared from her system at this time.  She has felt no palpitations, racing heart rates off of medication, although does not recall being symptomatic with atrial fibrillation in the past.     She denies fatigue, lightheadedness, palpitations, chest pain, and lower extremity edema.      NM Lexiscan stress test 2/2023:    The regadenoson nuclear stress test is negative for inducible myocardial ischemia or infarction.    The left ventricular ejection fraction at stress is 54%.    The patient is at a low risk of future cardiac ischemic events.    A prior study was conducted on 11/23/2020.  This study has no change when compared with the prior study.    Echocardiogram 7/2023 Results:  1. The left ventricle is normal in size. Left ventricular function is  decreased. The ejection fraction is 45-50% (mildly reduced). There is mild  global hypokinesia of the left ventricle.  2. Normal right ventricle size and systolic function.  3. The left atrium is severely dilated. The right atrium is severely dilated.  4. There is mild (1+) mitral regurgitation.     Compared to 1/10/2023, no significant change noted.     Physical Examination  Review of Systems   Vitals: BP 90/62 (BP Location: Left arm, Patient Position: Sitting, Cuff Size: Adult Large)   Pulse 70   Resp 16   Ht 1.6 m (5' 3\")   Wt 105.2 kg (232 lb)   BMI 41.10 kg/m    BMI= Body mass index is 41.1 kg/m .  Wt Readings from Last 3 Encounters:   03/07/24 105.2 kg (232 lb)   08/17/23 104.2 kg (229 lb 11.2 oz)   07/27/23 103.4 kg (228 lb)           ENT/Mouth: membranes moist, no oral lesions or bleeding gums.      EYES:  no scleral icterus, normal conjunctivae       Chest/Lungs:   lungs are clear to auscultation, no rales or wheezing,  equal chest wall " expansion    Cardiovascular:   Regular. Normal first and second heart sounds with no murmurs, rubs, or gallops; radial pulses are intact, trace edema bilaterally        Extremities: no cyanosis or clubbing   Skin: no xanthelasma, warm.    Neurologic:  no tremors     Psychiatric: alert and oriented x3, calm        Please refer above for cardiac ROS details.        Medical History  Surgical History Family History Social History   Past Medical History:   Diagnosis Date    Allergic rhinitis     Arrhythmia 01/01/2012    Atrial fibrillation (H)     Atrial fibrillation, chronic (H) 06/21/2012    Followed by Dr. 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 Cobos, cardiology EF 45 - 50%    Hyperlipidemia 01/01/2012    Hypertriglyceridemia     Mediastinal mass 2015    benign, [facility] consult     Mitral valve regurgitation 06/24/2012    Moderate mitral regurgitation     Morbid obesity with BMI of 40.0-44.9, adult (H) 04/25/2015    BROCK (obstructive sleep apnea)     Scoliosis     Sleep apnea 01/01/2010    per pt    Thyroid disease      Past Surgical History:   Procedure Laterality Date    CV CORONARY ANGIOGRAM N/A 7/21/2021    Procedure: Coronary Angiogram;  Surgeon: Hai Murphy MD;  Location: [facility] CATH LAB CV    CV LEFT HEART CATH N/A 7/21/2021    Procedure: Left Heart Cath;  Surgeon: Hai Murphy MD;  Location: [facility] CATH LAB CV    HYSTERECTOMY, PAP NO LONGER INDICATED  1989    JOINT REPLACEMTN, KNEE RT/LT  2007    right    JOINT REPLACEMTN,  KNEE RT/LT Left     SALPINGO OOPHORECTOMY,R/L/BRANDO Bilateral     SURGICAL HISTORY OF -       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     Family History   Problem Relation Age of Onset    Heart Disease Mother     Cancer Mother     Diabetes Father     Musculoskeletal Disorder Father     Diabetes Paternal Grandmother     Diabetes Paternal Grandfather     Diabetes Sister     Thrombophilia Daughter         Factor V Leiden Deficiency    Diabetes Sister         Social History     Socioeconomic History    Marital status:      Spouse name: Bill     Number of children: 2    Years of education: Not on file    Highest education level: Not on file   Occupational History    Occupation: [employer]      Employer: RETIRED   Tobacco Use    Smoking status: Former     Packs/day: 0.50     Years: 20.00     Additional pack years: 0.00     Total pack years: 10.00     Types: Cigarettes     Quit date: 1982     Years since quittin.2    Smokeless tobacco: Never   Vaping Use    Vaping Use: Never used   Substance and Sexual Activity    Alcohol use: Yes     Comment: 1 beers per week    Drug use: Never    Sexual activity: Yes     Partners: Male     Birth control/protection: Post-menopausal   Other Topics Concern    Parent/sibling w/ CABG, MI or angioplasty before 65F 55M? Yes   Social History Narrative    Not on file     Social Determinants of Health     Financial Resource Strain: Not on file   Food Insecurity: Not on file   Transportation Needs: Not on file   Physical Activity: Not on file   Stress: Not on file   Social Connections: Not on file   Interpersonal Safety: Not on file   Housing Stability: Not on file           Medications  Allergies   Current Outpatient Medications   Medication Sig Dispense Refill    acetaminophen (TYLENOL) 650 MG CR tablet Take 1,300 mg by mouth 2 times daily      amoxicillin (AMOXIL) 500 MG capsule TAKE 4 CAPSULES BY MOUTH 1 HOUR BEFORE DENTAL APPOINTMENT      atorvastatin (LIPITOR) 80 MG tablet  TAKE 1 TABLET DAILY 90 tablet 3    carvedilol (COREG) 25 MG tablet Take 12.5 mg in the morning and 25 mg in the afternoon 180 tablet 3    furosemide (LASIX) 40 MG tablet TAKE 1 TABLET DAILY 90 tablet 3    JANTOVEN ANTICOAGULANT 2 MG tablet TAKE 1/2 TO 1 TABLET (1MG TO 2MG) DAILY OR AS DIRECTED. ADJUST DOSE BASED ON INTERNATIONAL NORMALIZED RATIO RESULTS. 90 tablet 3    levothyroxine (SYNTHROID/LEVOTHROID) 75 MCG tablet TAKE 1 TABLET DAILY 90 tablet 1    losartan (COZAAR) 25 MG tablet TAKE 1 TABLET DAILY 90 tablet 3    Melatonin 10 MG TABS tablet Take 1 tablet by mouth nightly as needed      spironolactone (ALDACTONE) 25 MG tablet Take 1 tablet (25 mg) by mouth daily 14 tablet 0    spironolactone (ALDACTONE) 25 MG tablet Take 1 tablet (25 mg) by mouth daily 90 tablet 3    digoxin (LANOXIN) 125 MCG tablet TAKE 1 TABLET DAILY (Patient not taking: Reported on 3/7/2024) 30 tablet 3       Allergies   Allergen Reactions    Metal [Staples] Other (See Comments)     Metal allergy not clearly specified - patient had a stainless steel screw put in her foot that needed to be removed due to allergy/hypersensitivity     Trace Minerals Cr-Cu-Mn-Zn      Other reaction(s): Other (see comments)  Metal allergy not clearly specified - patient had a stainless steel screw put in her foot that needed to be removed due to allergy/hypersensitivity.  Also react to earrings.    Lisinopril      cough    Metoprolol Fatigue          Lab Results    Chemistry/lipid CBC Cardiac Enzymes/BNP/TSH/INR   Recent Labs   Lab Test 08/17/23  1210   CHOL 90   HDL 33*   LDL 32   TRIG 124     Recent Labs   Lab Test 08/17/23  1210 06/06/22  1424 07/21/21  0706   LDL 32 39 38     Recent Labs   Lab Test 08/17/23  1210      POTASSIUM 4.9   CHLORIDE 102   CO2 28   GLC 99   BUN 12.7   CR 0.70   GFRESTIMATED 89   JANELLE 9.6     Recent Labs   Lab Test 08/17/23  1210 02/01/23  1535 01/18/23  1500   CR 0.70 0.82 0.73     Recent Labs   Lab Test 05/17/21  1306   A1C 5.7*  "         Recent Labs   Lab Test 01/06/23  1424   WBC 7.5   HGB 13.8   HCT 43.3   MCV 95        Recent Labs   Lab Test 01/06/23  1424 09/08/21  1050 07/21/21  0706   HGB 13.8 13.5 14.4    No results for input(s): \"TROPONINI\" in the last 21323 hours.  Recent Labs   Lab Test 01/06/23  1424 11/18/22  1412 12/03/20  1438 05/21/18  1058 05/04/18  1359   BNP  --   --  184*  --   --    NTBNPI 1,122*  --   --   --   --    NTBNP  --  717  --  1,012* 1,174*     Recent Labs   Lab Test 08/17/23  1210   TSH 1.89     Recent Labs   Lab Test 03/05/24  1329 02/13/24  1322 01/02/24  1318   INR 3.7* 3.1* 2.4*          This note has been dictated using voice recognition software. Any grammatical, typographical, or context distortions are unintentional and inherent to the software    Miroslava Orantes PA-C        Thank you for allowing me to participate in the care of your patient.      Sincerely,     Miroslava Rajput PA-C     [facility]  cc:   Miroslava Orantes PA-C  [address]  [address]  [place] [address]      "

## 2024-03-14 ENCOUNTER — HOSPITAL ENCOUNTER (OUTPATIENT)
Dept: CARDIOLOGY | Facility: HOSPITAL | Age: 79
Discharge: HOME | End: 2024-03-14
Payer: MEDICARE

## 2024-03-14 DIAGNOSIS — I48.20 CHRONIC ATRIAL FIBRILLATION (H): ICD-10-CM

## 2024-03-14 PROCEDURE — 93225 XTRNL ECG REC<48 HRS REC: CPT

## 2024-03-18 PROCEDURE — 93227 XTRNL ECG REC<48 HR R&I: CPT | Performed by: INTERNAL MEDICINE

## 2024-03-19 ENCOUNTER — LAB (OUTPATIENT)
Dept: LAB | Facility: CLINIC | Age: 79
End: 2024-03-19
Payer: MEDICARE

## 2024-03-19 ENCOUNTER — DOCUMENTATION ONLY (OUTPATIENT)
Dept: ANTICOAGULATION | Facility: CLINIC | Age: 79
End: 2024-03-19

## 2024-03-19 ENCOUNTER — ANTICOAGULATION THERAPY VISIT (OUTPATIENT)
Dept: ANTICOAGULATION | Facility: CLINIC | Age: 79
End: 2024-03-19

## 2024-03-19 DIAGNOSIS — I48.20 CHRONIC ATRIAL FIBRILLATION (H): Primary | ICD-10-CM

## 2024-03-19 DIAGNOSIS — Z79.01 LONG TERM (CURRENT) USE OF ANTICOAGULANTS: Primary | ICD-10-CM

## 2024-03-19 DIAGNOSIS — I48.20 CHRONIC ATRIAL FIBRILLATION (H): ICD-10-CM

## 2024-03-19 LAB — INR BLD: 3 (ref 0.9–1.1)

## 2024-03-19 PROCEDURE — 36416 COLLJ CAPILLARY BLOOD SPEC: CPT

## 2024-03-19 PROCEDURE — 85610 PROTHROMBIN TIME: CPT

## 2024-03-19 NOTE — PROGRESS NOTES
ANTICOAGULATION CLINIC REFERRAL RENEWAL REQUEST       An annual renewal order is required for all patients referred to [facility].?  Please review and sign the pended referral order for Caty Ng.       ANTICOAGULATION SUMMARY      Warfarin indication(s)   Atrial Fibrillation    Mechanical heart valve present?  NO       Current goal range   INR: 2.0-3.0     Goal appropriate for indication? Goal INR 2-3, standard for indication(s) above     Time in Therapeutic Range (TTR)  (Goal > 60%) 83.9%       Office visit with referring provider's group within last year yes on 8/17/23       Marisela Funk RN  [facility]

## 2024-03-19 NOTE — PROGRESS NOTES
ANTICOAGULATION MANAGEMENT     Caty Ng 79 year old female is on warfarin with therapeutic INR result. (Goal INR 2.0-3.0)    Recent labs: (last 7 days)     03/19/24     INR 3.0*       ASSESSMENT     Source(s): Chart Review  Previous INR was Supratherapeutic held x1 then overall decreased by 6.2%  Medication, diet, health changes since last INR chart reviewed; none identified  Has been off Digoxin and will wear 24 hour holter monitor per cardiology note on 3/7/24          PLAN     Recommended plan for no diet, medication or health factor changes affecting INR     Dosing Instructions: Continue your current warfarin dose with next INR in 3 weeks       Summary  As of 3/19/2024      Full warfarin instructions:  3 mg every Mon; 2 mg all other days   Next INR check:  4/9/2024               Detailed voice message left for Abi with dosing instructions and follow up date.   Sent [employer] message with dosing and follow up instructions    Contact [phone] to schedule and with any changes, questions or concerns.     Education provided:   Goal range and lab monitoring: goal range and significance of current result  Contact [phone] with any changes, questions or concerns.     Plan made per [facility] anticoagulation protocol    Marisela Funk, RN  Anticoagulation Clinic  3/19/2024    _______________________________________________________________________     Anticoagulation Episode Summary       Current INR goal:  2.0-3.0   TTR:  83.9% (1 y)   Target end date:  Indefinite   Send INR reminders to:  BREE ANDREW    Indications    Long term (current) use of anticoagulants [Z79.01]  Permanent atrial fibrillation (H) (Resolved) [I48.21]  Chronic atrial fibrillation (H) [I48.20]             Comments:               Anticoagulation Care Providers       Provider Role Specialty Phone number    Keke Flores MD Referring Family Medicine [phone]

## 2024-04-07 DIAGNOSIS — Z79.01 LONG TERM (CURRENT) USE OF ANTICOAGULANTS: ICD-10-CM

## 2024-04-07 DIAGNOSIS — I48.21 PERMANENT ATRIAL FIBRILLATION (H): ICD-10-CM

## 2024-04-08 RX ORDER — WARFARIN SODIUM 2 MG/1
TABLET ORAL
Qty: 90 TABLET | Refills: 3 | Status: SHIPPED | OUTPATIENT
Start: 2024-04-08

## 2024-04-09 ENCOUNTER — LAB (OUTPATIENT)
Dept: LAB | Facility: CLINIC | Age: 79
End: 2024-04-09
Payer: MEDICARE

## 2024-04-09 ENCOUNTER — ANTICOAGULATION THERAPY VISIT (OUTPATIENT)
Dept: ANTICOAGULATION | Facility: CLINIC | Age: 79
End: 2024-04-09

## 2024-04-09 DIAGNOSIS — I48.20 CHRONIC ATRIAL FIBRILLATION (H): ICD-10-CM

## 2024-04-09 DIAGNOSIS — Z79.01 LONG TERM (CURRENT) USE OF ANTICOAGULANTS: Primary | ICD-10-CM

## 2024-04-09 LAB — INR BLD: 2.3 (ref 0.9–1.1)

## 2024-04-09 PROCEDURE — 85610 PROTHROMBIN TIME: CPT

## 2024-04-09 PROCEDURE — 36416 COLLJ CAPILLARY BLOOD SPEC: CPT

## 2024-04-09 NOTE — PROGRESS NOTES
ANTICOAGULATION MANAGEMENT     Caty Ng 79 year old female is on warfarin with therapeutic INR result. (Goal INR 2.0-3.0)    Recent labs: (last 7 days)     04/09/24  1303   INR 2.3*       ASSESSMENT     Source(s): Chart Review  Previous INR was Therapeutic last visit; previously outside of goal range  Medication, diet, health changes since last INR chart reviewed; none identified         PLAN     Recommended plan for no diet, medication or health factor changes affecting INR     Dosing Instructions: Continue your current warfarin dose with next INR in 4 weeks       Summary  As of 4/9/2024      Full warfarin instructions:  3 mg every Mon; 2 mg all other days   Next INR check:  5/7/2024               Detailed voice message left for Abi with dosing instructions and follow up date.   Sent [employer] message with dosing and follow up instructions    Contact [phone] to schedule and with any changes, questions or concerns.     Education provided:   Goal range and lab monitoring: goal range and significance of current result  Contact [phone] with any changes, questions or concerns.     Plan made per [facility] anticoagulation protocol    Marisela Funk, RN  Anticoagulation Clinic  4/9/2024    _______________________________________________________________________     Anticoagulation Episode Summary       Current INR goal:  2.0-3.0   TTR:  84.4% (1 y)   Target end date:  Indefinite   Send INR reminders to:  BREE ANDREW    Indications    Long term (current) use of anticoagulants [Z79.01]  Permanent atrial fibrillation (H) (Resolved) [I48.21]  Chronic atrial fibrillation (H) [I48.20]             Comments:               Anticoagulation Care Providers       Provider Role Specialty Phone number    Keke Flores MD Referring Family Medicine [phone]

## 2024-05-06 ENCOUNTER — ALLIED HEALTH/NURSE VISIT (OUTPATIENT)
Dept: FAMILY MEDICINE | Facility: CLINIC | Age: 79
End: 2024-05-06
Payer: MEDICARE

## 2024-05-06 ENCOUNTER — LAB (OUTPATIENT)
Dept: LAB | Facility: CLINIC | Age: 79
End: 2024-05-06
Payer: MEDICARE

## 2024-05-06 ENCOUNTER — ANTICOAGULATION THERAPY VISIT (OUTPATIENT)
Dept: ANTICOAGULATION | Facility: CLINIC | Age: 79
End: 2024-05-06

## 2024-05-06 DIAGNOSIS — I48.20 CHRONIC ATRIAL FIBRILLATION (H): ICD-10-CM

## 2024-05-06 DIAGNOSIS — Z23 NEED FOR VACCINATION: Primary | ICD-10-CM

## 2024-05-06 DIAGNOSIS — Z79.01 LONG TERM (CURRENT) USE OF ANTICOAGULANTS: Primary | ICD-10-CM

## 2024-05-06 LAB — INR BLD: 2.4 (ref 0.9–1.1)

## 2024-05-06 PROCEDURE — 36416 COLLJ CAPILLARY BLOOD SPEC: CPT

## 2024-05-06 PROCEDURE — 90480 ADMN SARSCOV2 VAC 1/ONLY CMP: CPT

## 2024-05-06 PROCEDURE — 91320 SARSCV2 VAC 30MCG TRS-SUC IM: CPT

## 2024-05-06 PROCEDURE — 99207 PR NO CHARGE NURSE ONLY: CPT

## 2024-05-06 PROCEDURE — 85610 PROTHROMBIN TIME: CPT

## 2024-05-06 NOTE — PROGRESS NOTES
ANTICOAGULATION MANAGEMENT     Caty Ng 79 year old female is on warfarin with therapeutic INR result. (Goal INR 2.0-3.0)    Recent labs: (last 7 days)     05/06/24  1112   INR 2.4*       ASSESSMENT     Source(s): Chart Review and Patient/Caregiver Call     Warfarin doses taken: Warfarin taken as instructed  Diet: No new diet changes identified  Medication/supplement changes: None noted  New illness, injury, or hospitalization: No  Signs or symptoms of bleeding or clotting: No  Previous result: Therapeutic last 2(+) visits  Additional findings: None       PLAN     Recommended plan for no diet, medication or health factor changes affecting INR     Dosing Instructions: Continue your current warfarin dose with next INR in 5 weeks       Summary  As of 5/6/2024      Full warfarin instructions:  3 mg every Mon; 2 mg all other days   Next INR check:  6/10/2024               Telephone call with Abi who verbalizes understanding and agrees to plan    Lab visit scheduled    Education provided:   Goal range and lab monitoring: goal range and significance of current result  Contact [phone] with any changes, questions or concerns.     Plan made per [facility] anticoagulation protocol    Priscila Monroe RN  Anticoagulation Clinic  5/6/2024    _______________________________________________________________________     Anticoagulation Episode Summary       Current INR goal:  2.0-3.0   TTR:  88.8% (1 y)   Target end date:  Indefinite   Send INR reminders to:  BREE ANDREW    Indications    Long term (current) use of anticoagulants [Z79.01]  Permanent atrial fibrillation (H) (Resolved) [I48.21]  Chronic atrial fibrillation (H) [I48.20]             Comments:               Anticoagulation Care Providers       Provider Role Specialty Phone number    Keke Flores MD Referring Family Medicine [phone]

## 2024-06-03 ENCOUNTER — TRANSFERRED RECORDS (OUTPATIENT)
Dept: HEALTH INFORMATION MANAGEMENT | Facility: CLINIC | Age: 79
End: 2024-06-03
Payer: MEDICARE

## 2024-06-10 ENCOUNTER — LAB (OUTPATIENT)
Dept: LAB | Facility: CLINIC | Age: 79
End: 2024-06-10
Payer: MEDICARE

## 2024-06-10 ENCOUNTER — ANTICOAGULATION THERAPY VISIT (OUTPATIENT)
Dept: ANTICOAGULATION | Facility: CLINIC | Age: 79
End: 2024-06-10

## 2024-06-10 DIAGNOSIS — I48.20 CHRONIC ATRIAL FIBRILLATION (H): ICD-10-CM

## 2024-06-10 DIAGNOSIS — Z79.01 LONG TERM (CURRENT) USE OF ANTICOAGULANTS: Primary | ICD-10-CM

## 2024-06-10 LAB — INR BLD: 2.1 (ref 0.9–1.1)

## 2024-06-10 PROCEDURE — 85610 PROTHROMBIN TIME: CPT

## 2024-06-10 PROCEDURE — 36416 COLLJ CAPILLARY BLOOD SPEC: CPT

## 2024-06-10 NOTE — PROGRESS NOTES
ANTICOAGULATION MANAGEMENT     Caty Ng 79 year old female is on warfarin with therapeutic INR result. (Goal INR 2.0-3.0)    Recent labs: (last 7 days)     06/10/24  1143   INR 2.1*       ASSESSMENT     Warfarin Lab Questionnaire    Warfarin Doses Last 7 Days      6/10/2024    11:40 AM   Dose in Tablet or Mg   TAB or MG? tablet (tab)     Pt Rptd Dose VOLODYMYR TUESDAY WED THURS FRIDAY SATURDAY   6/10/2024  11:40 AM 1 1 1 1 1 1         6/10/2024   Warfarin Lab Questionnaire   Missed doses within past 14 days? No   Changes in diet or alcohol within past 14 days? No   Medication changes since last result? No   Injuries or illness since last result? No   New shortness of breath, severe headaches or sudden changes in vision since last result? No   Abnormal bleeding since last result? No   Upcoming surgery, procedure? No   Best number to call with results? [phone]     Previous result: Therapeutic last 2(+) visits  Additional findings: None       PLAN     Recommended plan for no diet, medication or health factor changes affecting INR     Dosing Instructions: Continue your current warfarin dose with next INR in 4-5 weeks       Summary  As of 6/10/2024      Full warfarin instructions:  3 mg every Mon; 2 mg all other days   Next INR check:  7/15/2024               Telephone call with Abi who verbalizes understanding and agrees to plan    Lab visit scheduled    Education provided:   Please call back if any changes to your diet, medications or how you've been taking warfarin    Plan made per [facility] anticoagulation protocol    Nathalie Glaser RN  Anticoagulation Clinic  6/10/2024    _______________________________________________________________________     Anticoagulation Episode Summary       Current INR goal:  2.0-3.0   TTR:  88.8% (1 y)   Target end date:  Indefinite   Send INR reminders to:  BREE ANDREW    Indications    Long term (current) use of anticoagulants [Z79.01]  Permanent atrial fibrillation (H)  (Resolved) [I48.21]  Chronic atrial fibrillation (H) [I48.20]             Comments:               Anticoagulation Care Providers       Provider Role Specialty Phone number    Keke Flores MD Referring Family Medicine [phone]

## 2024-07-08 DIAGNOSIS — I50.32 CHRONIC DIASTOLIC HEART FAILURE (H): ICD-10-CM

## 2024-07-09 RX ORDER — LOSARTAN POTASSIUM 25 MG/1
25 TABLET ORAL DAILY
Qty: 90 TABLET | Refills: 1 | Status: SHIPPED | OUTPATIENT
Start: 2024-07-09

## 2024-07-09 RX ORDER — CARVEDILOL 25 MG/1
TABLET ORAL
Qty: 135 TABLET | Refills: 1 | Status: SHIPPED | OUTPATIENT
Start: 2024-07-09

## 2024-07-29 ENCOUNTER — ANTICOAGULATION THERAPY VISIT (OUTPATIENT)
Dept: ANTICOAGULATION | Facility: CLINIC | Age: 79
End: 2024-07-29

## 2024-07-29 ENCOUNTER — LAB (OUTPATIENT)
Dept: LAB | Facility: CLINIC | Age: 79
End: 2024-07-29
Payer: MEDICARE

## 2024-07-29 DIAGNOSIS — I48.20 CHRONIC ATRIAL FIBRILLATION (H): ICD-10-CM

## 2024-07-29 DIAGNOSIS — Z79.01 LONG TERM (CURRENT) USE OF ANTICOAGULANTS: Primary | ICD-10-CM

## 2024-07-29 LAB — INR BLD: 3.1 (ref 0.9–1.1)

## 2024-07-29 PROCEDURE — 85610 PROTHROMBIN TIME: CPT

## 2024-07-29 PROCEDURE — 36416 COLLJ CAPILLARY BLOOD SPEC: CPT

## 2024-07-29 NOTE — PROGRESS NOTES
ANTICOAGULATION MANAGEMENT     Caty Ng 79 year old female is on warfarin with supratherapeutic INR result. (Goal INR 2.0-3.0)    Recent labs: (last 7 days)     07/29/24  1127   INR 3.1*       ASSESSMENT     Warfarin Lab Questionnaire    Warfarin Doses Last 7 Days      7/29/2024    11:34 AM   Dose in Tablet or Mg   TAB or MG? tablet (tab)     Pt Rptd Dose SUNDAY MONDAY TUESDAY WED THURS FRIDAY SATURDAY 7/29/2024  11:34 AM 1 1.5 1 1 1 1 1         7/29/2024   Warfarin Lab Questionnaire   Missed doses within past 14 days? No   Changes in diet or alcohol within past 14 days? No   Medication changes since last result? No   Injuries or illness since last result? No   New shortness of breath, severe headaches or sudden changes in vision since last result? No   Abnormal bleeding since last result? No   Upcoming surgery, procedure? No   Best number to call with results? [phone]        Previous result: Therapeutic last 2(+) visits  Additional findings: None       PLAN     Recommended plan for no diet, medication or health factor changes affecting INR     Dosing Instructions: Continue your current warfarin dose with next INR in 2 weeks       Summary  As of 7/29/2024      Full warfarin instructions:  3 mg every Mon; 2 mg all other days   Next INR check:  8/12/2024               Detailed voice message left for Abi with dosing instructions and follow up date.     Contact [phone] to schedule and with any changes, questions or concerns.     Education provided: Please call back if any changes to your diet, medications or how you've been taking warfarin  Goal range and lab monitoring: goal range and significance of current result    Plan made per [facility] anticoagulation protocol    Alysa Ariza, RN  Anticoagulation Clinic  7/29/2024    _______________________________________________________________________     Anticoagulation Episode Summary       Current INR goal:  2.0-3.0   TTR:  87.4% (1 y)   Target end date:   Indefinite   Send INR reminders to:  BREE ANDREW    Indications    Long term (current) use of anticoagulants [Z79.01]  Permanent atrial fibrillation (H) (Resolved) [I48.21]  Chronic atrial fibrillation (H) [I48.20]             Comments:               Anticoagulation Care Providers       Provider Role Specialty Phone number    Keke Flores MD Referring Family Medicine [phone]

## 2024-08-06 DIAGNOSIS — E03.9 HYPOTHYROIDISM, UNSPECIFIED TYPE: ICD-10-CM

## 2024-08-07 RX ORDER — LEVOTHYROXINE SODIUM 75 UG/1
TABLET ORAL
Qty: 30 TABLET | Refills: 0 | Status: SHIPPED | OUTPATIENT
Start: 2024-08-07 | End: 2024-09-10

## 2024-08-19 ENCOUNTER — LAB (OUTPATIENT)
Dept: LAB | Facility: CLINIC | Age: 79
End: 2024-08-19
Payer: MEDICARE

## 2024-08-19 ENCOUNTER — ANTICOAGULATION THERAPY VISIT (OUTPATIENT)
Dept: ANTICOAGULATION | Facility: CLINIC | Age: 79
End: 2024-08-19

## 2024-08-19 DIAGNOSIS — Z79.01 LONG TERM (CURRENT) USE OF ANTICOAGULANTS: Primary | ICD-10-CM

## 2024-08-19 DIAGNOSIS — I48.20 CHRONIC ATRIAL FIBRILLATION (H): ICD-10-CM

## 2024-08-19 LAB — INR BLD: 3 (ref 0.9–1.1)

## 2024-08-19 PROCEDURE — 36416 COLLJ CAPILLARY BLOOD SPEC: CPT

## 2024-08-19 PROCEDURE — 85610 PROTHROMBIN TIME: CPT

## 2024-08-19 NOTE — PROGRESS NOTES
ANTICOAGULATION MANAGEMENT     Caty Ng 79 year old female is on warfarin with therapeutic INR result. (Goal INR 2.0-3.0)    Recent labs: (last 7 days)     08/19/24  1110   INR 3.0*       ASSESSMENT     Warfarin Lab Questionnaire    Warfarin Doses Last 7 Days      8/19/2024    11:16 AM   Dose in Tablet or Mg   TAB or MG? tablet (tab)     Pt Rptd Dose SUNDAY MONDAY TUESDAY WED THURS FRIDAY SATURDAY 8/19/2024  11:16 AM 1 1.5 1 1 1 1 1         8/19/2024   Warfarin Lab Questionnaire   Missed doses within past 14 days? No   Changes in diet or alcohol within past 14 days? No   Medication changes since last result? No   Injuries or illness since last result? No   New shortness of breath, severe headaches or sudden changes in vision since last result? No   Abnormal bleeding since last result? No   Upcoming surgery, procedure? No        Previous result: Supratherapeutic  Additional findings: None       PLAN     Recommended plan for no diet, medication or health factor changes affecting INR     Dosing Instructions: Continue your current warfarin dose with next INR in 4 weeks (it has been 3 weeks since last check)    Summary  As of 8/19/2024      Full warfarin instructions:  3 mg every Mon; 2 mg all other days   Next INR check:  9/16/2024               Detailed voice message left for Abi with dosing instructions and follow up date.     Contact [phone] to schedule and with any changes, questions or concerns.     Education provided: Please call back if any changes to your diet, medications or how you've been taking warfarin  Goal range and lab monitoring: goal range and significance of current result    Plan made per [facility] anticoagulation protocol    Alysa Ariza RN  Anticoagulation Clinic  8/19/2024    _______________________________________________________________________     Anticoagulation Episode Summary       Current INR goal:  2.0-3.0   TTR:  81.7% (1 y)   Target end date:  Indefinite   Send INR  reminders to:  BREE ANDREW    Indications    Long term (current) use of anticoagulants [Z79.01]  Permanent atrial fibrillation (H) (Resolved) [I48.21]  Chronic atrial fibrillation (H) [I48.20]             Comments:               Anticoagulation Care Providers       Provider Role Specialty Phone number    Keke Flores MD Referring Family Medicine [phone]

## 2024-09-09 ENCOUNTER — ANTICOAGULATION THERAPY VISIT (OUTPATIENT)
Dept: ANTICOAGULATION | Facility: CLINIC | Age: 79
End: 2024-09-09

## 2024-09-09 ENCOUNTER — LAB (OUTPATIENT)
Dept: LAB | Facility: CLINIC | Age: 79
End: 2024-09-09
Payer: MEDICARE

## 2024-09-09 DIAGNOSIS — I48.20 CHRONIC ATRIAL FIBRILLATION (H): ICD-10-CM

## 2024-09-09 DIAGNOSIS — Z79.01 LONG TERM (CURRENT) USE OF ANTICOAGULANTS: Primary | ICD-10-CM

## 2024-09-09 LAB — INR BLD: 2.1 (ref 0.9–1.1)

## 2024-09-09 PROCEDURE — 36416 COLLJ CAPILLARY BLOOD SPEC: CPT

## 2024-09-09 PROCEDURE — 85610 PROTHROMBIN TIME: CPT

## 2024-09-09 NOTE — PROGRESS NOTES
ANTICOAGULATION MANAGEMENT     Caty Ng 79 year old female is on warfarin with therapeutic INR result. (Goal INR 2.0-3.0)    Recent labs: (last 7 days)     09/09/24  1031   INR 2.1*       ASSESSMENT     Warfarin Lab Questionnaire    Warfarin Doses Last 7 Days    Pt Rptd Dose SUNDAY MONDAY TUESDAY WED THURS FRIDAY SATURDAY 9/9/2024  10:41 AM 1 1.5 1 1 1 1 1         9/9/2024   Warfarin Lab Questionnaire   Missed doses within past 14 days? No   Changes in diet or alcohol within past 14 days? No   Medication changes since last result? No   Injuries or illness since last result? No   New shortness of breath, severe headaches or sudden changes in vision since last result? No   Abnormal bleeding since last result? No   Upcoming surgery, procedure? No        Previous result: Therapeutic last visit; previously outside of goal range  Additional findings: None       PLAN     Recommended plan for no diet, medication or health factor changes affecting INR     Dosing Instructions: Continue your current warfarin dose with next INR in 4 weeks       Summary  As of 9/9/2024      Full warfarin instructions:  3 mg every Mon; 2 mg all other days   Next INR check:  10/7/2024               Detailed voice message left for Abi with dosing instructions and follow up date.   Sent [employer] message with dosing and follow up instructions    Contact [phone] to schedule and with any changes, questions or concerns.     Education provided: Please call back if any changes to your diet, medications or how you've been taking warfarin  Goal range and lab monitoring: goal range and significance of current result  Written instructions provided  Contact [phone] with any changes, questions or concerns.     Plan made per [facility] anticoagulation protocol    Priscila Monroe RN  Anticoagulation Clinic  9/9/2024    _______________________________________________________________________     Anticoagulation Episode Summary       Current INR goal:   2.0-3.0   TTR:  81.7% (1 y)   Target end date:  Indefinite   Send INR reminders to:  BREE ANDREW    Indications    Long term (current) use of anticoagulants [Z79.01]  Permanent atrial fibrillation (H) (Resolved) [I48.21]  Chronic atrial fibrillation (H) [I48.20]             Comments:               Anticoagulation Care Providers       Provider Role Specialty Phone number    Keke Flores MD Referring Family Medicine [phone]

## 2024-09-10 DIAGNOSIS — E03.9 HYPOTHYROIDISM, UNSPECIFIED TYPE: ICD-10-CM

## 2024-09-10 RX ORDER — LEVOTHYROXINE SODIUM 75 UG/1
TABLET ORAL
Qty: 30 TABLET | Refills: 0 | Status: SHIPPED | OUTPATIENT
Start: 2024-09-10 | End: 2024-09-25

## 2024-09-24 DIAGNOSIS — E03.9 HYPOTHYROIDISM, UNSPECIFIED TYPE: ICD-10-CM

## 2024-09-24 RX ORDER — LEVOTHYROXINE SODIUM 75 UG/1
TABLET ORAL
Qty: 30 TABLET | Refills: 11 | OUTPATIENT
Start: 2024-09-24

## 2024-09-25 ENCOUNTER — ANTICOAGULATION THERAPY VISIT (OUTPATIENT)
Dept: ANTICOAGULATION | Facility: CLINIC | Age: 79
End: 2024-09-25

## 2024-09-25 ENCOUNTER — OFFICE VISIT (OUTPATIENT)
Dept: FAMILY MEDICINE | Facility: CLINIC | Age: 79
End: 2024-09-25
Payer: MEDICARE

## 2024-09-25 VITALS
BODY MASS INDEX: 39.46 KG/M2 | OXYGEN SATURATION: 94 % | SYSTOLIC BLOOD PRESSURE: 119 MMHG | RESPIRATION RATE: 16 BRPM | WEIGHT: 222.7 LBS | HEART RATE: 110 BPM | HEIGHT: 63 IN | TEMPERATURE: 97.7 F | DIASTOLIC BLOOD PRESSURE: 83 MMHG

## 2024-09-25 DIAGNOSIS — Z78.0 POSTMENOPAUSAL STATUS: ICD-10-CM

## 2024-09-25 DIAGNOSIS — I48.20 CHRONIC ATRIAL FIBRILLATION (H): ICD-10-CM

## 2024-09-25 DIAGNOSIS — E78.5 HYPERLIPIDEMIA LDL GOAL <70: ICD-10-CM

## 2024-09-25 DIAGNOSIS — Z12.31 ENCOUNTER FOR SCREENING MAMMOGRAM FOR BREAST CANCER: ICD-10-CM

## 2024-09-25 DIAGNOSIS — E03.4 HYPOTHYROIDISM DUE TO ACQUIRED ATROPHY OF THYROID: ICD-10-CM

## 2024-09-25 DIAGNOSIS — I25.10 CORONARY ARTERY DISEASE INVOLVING NATIVE CORONARY ARTERY OF NATIVE HEART WITHOUT ANGINA PECTORIS: ICD-10-CM

## 2024-09-25 DIAGNOSIS — I34.0 MODERATE MITRAL REGURGITATION: ICD-10-CM

## 2024-09-25 DIAGNOSIS — E66.01 MORBID OBESITY DUE TO EXCESS CALORIES (H): ICD-10-CM

## 2024-09-25 DIAGNOSIS — Z00.00 ENCOUNTER FOR MEDICARE ANNUAL WELLNESS EXAM: Primary | ICD-10-CM

## 2024-09-25 DIAGNOSIS — I42.8 NON-ISCHEMIC CARDIOMYOPATHY (H): ICD-10-CM

## 2024-09-25 DIAGNOSIS — E03.9 HYPOTHYROIDISM, UNSPECIFIED TYPE: ICD-10-CM

## 2024-09-25 DIAGNOSIS — I50.20 SYSTOLIC HEART FAILURE, UNSPECIFIED HF CHRONICITY (H): ICD-10-CM

## 2024-09-25 DIAGNOSIS — G25.3 ESSENTIAL MYOCLONUS: ICD-10-CM

## 2024-09-25 DIAGNOSIS — Z79.01 LONG TERM (CURRENT) USE OF ANTICOAGULANTS: Primary | ICD-10-CM

## 2024-09-25 DIAGNOSIS — G47.33 OSA (OBSTRUCTIVE SLEEP APNEA): ICD-10-CM

## 2024-09-25 LAB
ERYTHROCYTE [DISTWIDTH] IN BLOOD BY AUTOMATED COUNT: 13.9 % (ref 10–15)
HCT VFR BLD AUTO: 42.5 % (ref 35–47)
HGB BLD-MCNC: 14.3 G/DL (ref 11.7–15.7)
INR BLD: 3.6 (ref 0.9–1.1)
MCH RBC QN AUTO: 31.6 PG (ref 26.5–33)
MCHC RBC AUTO-ENTMCNC: 33.6 G/DL (ref 31.5–36.5)
MCV RBC AUTO: 94 FL (ref 78–100)
PLATELET # BLD AUTO: 199 10E3/UL (ref 150–450)
RBC # BLD AUTO: 4.53 10E6/UL (ref 3.8–5.2)
WBC # BLD AUTO: 6.1 10E3/UL (ref 4–11)

## 2024-09-25 PROCEDURE — 36415 COLL VENOUS BLD VENIPUNCTURE: CPT | Performed by: FAMILY MEDICINE

## 2024-09-25 PROCEDURE — 99214 OFFICE O/P EST MOD 30 MIN: CPT | Mod: 25 | Performed by: FAMILY MEDICINE

## 2024-09-25 PROCEDURE — 80061 LIPID PANEL: CPT | Performed by: FAMILY MEDICINE

## 2024-09-25 PROCEDURE — G0008 ADMIN INFLUENZA VIRUS VAC: HCPCS | Performed by: FAMILY MEDICINE

## 2024-09-25 PROCEDURE — 90480 ADMN SARSCOV2 VAC 1/ONLY CMP: CPT | Performed by: FAMILY MEDICINE

## 2024-09-25 PROCEDURE — 84443 ASSAY THYROID STIM HORMONE: CPT | Performed by: FAMILY MEDICINE

## 2024-09-25 PROCEDURE — G0439 PPPS, SUBSEQ VISIT: HCPCS | Performed by: FAMILY MEDICINE

## 2024-09-25 PROCEDURE — 82306 VITAMIN D 25 HYDROXY: CPT | Performed by: FAMILY MEDICINE

## 2024-09-25 PROCEDURE — 91320 SARSCV2 VAC 30MCG TRS-SUC IM: CPT | Performed by: FAMILY MEDICINE

## 2024-09-25 PROCEDURE — 85027 COMPLETE CBC AUTOMATED: CPT | Performed by: FAMILY MEDICINE

## 2024-09-25 PROCEDURE — 85610 PROTHROMBIN TIME: CPT | Performed by: FAMILY MEDICINE

## 2024-09-25 PROCEDURE — 90662 IIV NO PRSV INCREASED AG IM: CPT | Performed by: FAMILY MEDICINE

## 2024-09-25 PROCEDURE — 80053 COMPREHEN METABOLIC PANEL: CPT | Performed by: FAMILY MEDICINE

## 2024-09-25 RX ORDER — CLONAZEPAM 0.5 MG/1
0.5 TABLET ORAL AT BEDTIME
Qty: 30 TABLET | Refills: 1 | Status: SHIPPED | OUTPATIENT
Start: 2024-09-25

## 2024-09-25 RX ORDER — LEVOTHYROXINE SODIUM 75 UG/1
TABLET ORAL
Qty: 90 TABLET | Refills: 3 | Status: SHIPPED | OUTPATIENT
Start: 2024-09-25

## 2024-09-25 NOTE — PATIENT INSTRUCTIONS
Patient Education   Preventive Care Advice   This is general advice given by our system to help you stay healthy. However, your care team may have specific advice just for you. Please talk to your care team about your preventive care needs.  Nutrition  Eat 5 or more servings of fruits and vegetables each day.  Try wheat bread, brown rice and whole grain pasta (instead of white bread, rice, and pasta).  Get enough calcium and vitamin D. Check the label on foods and aim for 100% of the RDA (recommended daily allowance).  Lifestyle  Exercise at least 150 minutes each week  (30 minutes a day, 5 days a week).  Do muscle strengthening activities 2 days a week. These help control your weight and prevent disease.  No smoking.  Wear sunscreen to prevent skin cancer.  Have a dental exam and cleaning every 6 months.  Yearly exams  See your health care team every year to talk about:  Any changes in your health.  Any medicines your care team has prescribed.  Preventive care, family planning, and ways to prevent chronic diseases.  Shots (vaccines)   HPV shots (up to age 26), if you've never had them before.  Hepatitis B shots (up to age 59), if you've never had them before.  COVID-19 shot: Get this shot when it's due.  Flu shot: Get a flu shot every year.  Tetanus shot: Get a tetanus shot every 10 years.  Pneumococcal, hepatitis A, and RSV shots: Ask your care team if you need these based on your risk.  Shingles shot (for age 50 and up)  General health tests  Diabetes screening:  Starting at age 35, Get screened for diabetes at least every 3 years.  If you are younger than age 35, ask your care team if you should be screened for diabetes.  Cholesterol test: At age 39, start having a cholesterol test every 5 years, or more often if advised.  Bone density scan (DEXA): At age 50, ask your care team if you should have this scan for osteoporosis (brittle bones).  Hepatitis C: Get tested at least once in your life.  STIs (sexually  transmitted infections)  Before age 24: Ask your care team if you should be screened for STIs.  After age 24: Get screened for STIs if you're at risk. You are at risk for STIs (including HIV) if:  You are sexually active with more than one person.  You don't use condoms every time.  You or a partner was diagnosed with a sexually transmitted infection.  If you are at risk for HIV, ask about PrEP medicine to prevent HIV.  Get tested for HIV at least once in your life, whether you are at risk for HIV or not.  Cancer screening tests  Cervical cancer screening: If you have a cervix, begin getting regular cervical cancer screening tests starting at age 21.  Breast cancer scan (mammogram): If you've ever had breasts, begin having regular mammograms starting at age 40. This is a scan to check for breast cancer.  Colon cancer screening: It is important to start screening for colon cancer at age 45.  Have a colonoscopy test every 10 years (or more often if you're at risk) Or, ask your provider about stool tests like a FIT test every year or Cologuard test every 3 years.  To learn more about your testing options, visit:   .  For help making a decision, visit:   https://bit.ly/dt04010.  Prostate cancer screening test: If you have a prostate, ask your care team if a prostate cancer screening test (PSA) at age 55 is right for you.  Lung cancer screening: If you are a current or former smoker ages 50 to 80, ask your care team if ongoing lung cancer screenings are right for you.  For informational purposes only. Not to replace the advice of your health care provider. Copyright   2023 [employer]. All rights reserved. Clinically reviewed by the [facility]. [employer] 535942 - REV 01/24.

## 2024-09-25 NOTE — ASSESSMENT & PLAN NOTE
Has claustrophobia and does not tolerate CPAP; has not tried a mouth guard and suggested she could give that a try.

## 2024-09-25 NOTE — PROGRESS NOTES
Preventive Care Visit  [facility]  JUNO THOMPSON MD, Family Medicine  Sep 25, 2024      Assessment & Plan   Problem List Items Addressed This Visit       Morbid obesity due to excess calories (H)     The patient is actually down 10 pounds since March in part due to stress but also trying to adhere to a healthy diet.         Relevant Orders    Vitamin D Deficiency (Completed)    Chronic atrial fibrillation (H)     The patient continues in chronic atrial fibrillation which is rate controlled and she is on Coumadin for anticoagulation.         Moderate mitral regurgitation     Will be due for a follow-up echocardiogram next year.         Systolic heart failure (H)     Stable on a combination of Lasix, losartan, Coreg and spironolactone.         Relevant Orders    Comprehensive metabolic panel (BMP + Alb, Alk Phos, ALT, AST, Total. Bili, TP) (Completed)    CBC with platelets (Completed)    BROCK (obstructive sleep apnea)     Has claustrophobia and does not tolerate CPAP; has not tried a mouth guard and suggested she could give that a try.          Hyperlipidemia LDL goal <40    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 atrophy of thyroid     Currently on levothyroxine 75 mcg daily.  Due for updated lab work and a TSH was checked.         Relevant Medications    levothyroxine (SYNTHROID/LEVOTHROID) 75 MCG tablet    Other Relevant Orders    TSH (Completed)    Coronary artery disease involving native coronary artery of native heart without angina pectoris    Relevant Orders    Lipid panel reflex to direct LDL Non-fasting (Completed)    Non-ischemic cardiomyopathy (H)     Continues on high-dose statin in the form of atorvastatin 80 mg daily and is treated for chronic congestive heart failure which is stable.          Other Visit Diagnoses       Encounter for Medicare annual wellness exam    -  Primary    Essential myoclonus        Relevant Medications    clonazePAM (KLONOPIN) 0.5 MG tablet     "Hypothyroidism, unspecified type        Relevant Medications    levothyroxine (SYNTHROID/LEVOTHROID) 75 MCG tablet    Encounter for screening mammogram for breast cancer        Relevant Orders    MA Screen Bilateral w/Yobany    Postmenopausal status        Relevant Orders    DX Bone Density                  BMI  Estimated body mass index is 39.45 kg/m  as calculated from the following:    Height as of this encounter: 1.6 m (5' 3\").    Weight as of this encounter: 101 kg (222 lb 11.2 oz).         Jessee Alex is a 79 year old who presents today accompanied by her  for an annual wellness visit and medication check.  She has no specific complaints or concerns at today's visit.  Wellness Visit (/)        9/25/2024     3:28 PM   Additional Questions   Roomed by AS   Accompanied by SELF         9/25/2024     3:28 PM   Patient Reported Additional Medications   Patient reports taking the following new medications NO       Health Care Directive  Patient has a Health Care Directive on file  Advance care planning document is on file and is current.          8/17/2023   General Health   How would you rate your overall physical health? Good            8/17/2023   Nutrition   At least 4 servings of fruits and vegetables/day Yes            8/17/2023   Exercise   Frequency of exercise: 2-3 days/week               8/17/2023   Activities of Daily Living- Home Safety   Needs help with the following daily activites Transportation    Preparing meals    Housework    Laundry   Safety concerns in the home None of the above       Multiple values from one day are sorted in reverse-chronological order               8/17/2023   Hearing Screening   Hearing concerns? No concerns                     Today's PHQ-2 Score:       8/17/2023    10:33 AM   PHQ-2 ( 1999 [employer])   Q1: Little interest or pleasure in doing things 0   Q2: Feeling down, depressed or hopeless 0   PHQ-2 Score 0   Q1: Little interest or pleasure in doing things Not at " all   Q2: Feeling down, depressed or hopeless Not at all   PHQ-2 Score 0         2023   Substance Use   Alcohol more than 3/day or more than 7/wk No        Social History     Tobacco Use    Smoking status: Former     Current packs/day: 0.00     Average packs/day: 0.5 packs/day for 20.0 years (10.0 ttl pk-yrs)     Types: Cigarettes     Start date: 1962     Quit date: 1982     Years since quittin.7    Smokeless tobacco: Never   Vaping Use    Vaping status: Never Used   Substance Use Topics    Alcohol use: Yes     Comment: 1 beers per week    Drug use: Never          Mammogram Screening - After age 74- determine frequency with patient based on health status, life expectancy and patient goals    ASCVD Risk   The ASCVD Risk score (Jessica BLISS, et al., 2019) failed to calculate for the following reasons:    The valid total cholesterol range is 130 to 320 mg/dL      Reviewed and updated as needed this visit by Provider                    Patient Active Problem List   Diagnosis    Morbid obesity due to excess calories (H)    Chronic atrial fibrillation (H)    Moderate mitral regurgitation    Systolic heart failure (H)    BROCK (obstructive sleep apnea)    Hyperlipidemia LDL goal <40    Long term current use of anticoagulant therapy    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 atrophy of thyroid    History of chondrosarcoma    S/p total knee replacement, bilateral    DDD (degenerative disc disease), lumbar    Coronary artery disease involving native coronary artery of native heart without angina pectoris    Non-ischemic cardiomyopathy (H)    Status post coronary angiogram    Dyspnea on exertion    Long term (current) use of anticoagulants    Elevated brain natriuretic peptide (BNP) level     Past Surgical History:   Procedure Laterality Date    CV CORONARY ANGIOGRAM N/A 2021    Procedure: Coronary Angiogram;  Surgeon: Hai Murphy MD;  Location: [facility] CATH LAB CV    CV LEFT HEART CATH N/A 2021     Procedure: Left Heart Cath;  Surgeon: Hai Murphy MD;  Location: 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       Social History     Tobacco Use    Smoking status: Former     Current packs/day: 0.00     Average packs/day: 0.5 packs/day for 20.0 years (10.0 ttl pk-yrs)     Types: Cigarettes     Start date: 1962     Quit date: 1982     Years since quittin.7    Smokeless tobacco: Never   Substance Use Topics    Alcohol use: Yes     Comment: 1 beers per week     Family History   Problem Relation Age of Onset    Heart Disease Mother     Cancer Mother     Diabetes Father     Musculoskeletal Disorder Father     Diabetes Paternal Grandmother     Diabetes Paternal Grandfather     Diabetes Sister     Thrombophilia Daughter         Factor V Leiden Deficiency    Diabetes Sister          Current Outpatient Medications   Medication Sig Dispense Refill    acetaminophen (TYLENOL) 650 MG CR tablet Take 1,300 mg by mouth 2 times daily      amoxicillin (AMOXIL) 500 MG capsule TAKE 4 CAPSULES BY MOUTH 1 HOUR BEFORE DENTAL APPOINTMENT      atorvastatin (LIPITOR) 80 MG tablet TAKE 1 TABLET DAILY 90 tablet 3    carvedilol (COREG) 25 MG tablet TAKE 1/2 TABLET IN THE MORNING AND 1 TABLET IN THE AFTERNOON 135 tablet 1    clonazePAM (KLONOPIN) 0.5 MG tablet Take 1 tablet (0.5 mg) by mouth at bedtime. 30 tablet 1    furosemide (LASIX) 40 MG tablet TAKE 1 TABLET DAILY 90 tablet 3    levothyroxine (SYNTHROID/LEVOTHROID) 75 MCG tablet TAKE 1 TABLET DAILY (FILLED ONE TIME ONLY AS IS DUE FOR FOLLOW UP WITH PRIMARY CARE PHYSICIAN FOR FURTHER REFILLS) 90 tablet 3    losartan (COZAAR) 25 MG tablet TAKE 1 TABLET DAILY 90 tablet 1    Melatonin 10 MG TABS tablet Take 1 tablet by mouth nightly as needed       spironolactone (ALDACTONE) 25 MG tablet Take 1 tablet (25 mg) by mouth daily 90 tablet 3    warfarin ANTICOAGULANT (COUMADIN) 2 MG tablet TAKE 1/2 TO 1 TABLET (1MG TO 2MG) DAILY OR AS DIRECTED. ADJUST DOSE BASED ON INTERNATIONAL NORMALIZED RATIO RESULTS. 90 tablet 3     Allergies   Allergen Reactions    Metal [Staples] Other (See Comments)     Metal allergy not clearly specified - patient had a stainless steel screw put in her foot that needed to be removed due to allergy/hypersensitivity     Trace Minerals Cr-Cu-Mn-Zn      Other reaction(s): Other (see comments)  Metal allergy not clearly specified - patient had a stainless steel screw put in her foot that needed to be removed due to allergy/hypersensitivity.  Also react to earrings.    Lisinopril      cough    Metoprolol Fatigue     Current providers sharing in care for this patient include:  Patient Care Team:  Keke Flores MD as PCP - General  Cameron Soria MD as Assigned Heart and Vascular Provider  Keke Flores MD as Assigned PCP  Miroslava Orantes PA-C as Physician Assistant (Physician Assistant - Medical)    The following health maintenance items are reviewed in Epic and correct as of today:  Health Maintenance   Topic Date Due    ZOSTER IMMUNIZATION (2 of 3) 03/01/2012    RSV VACCINE (1 - 1-dose 75+ series) Never done    HF ACTION PLAN  02/25/2022    PHQ-2 (once per calendar year)  01/01/2024    DIGOXIN  02/01/2024    ANNUAL REVIEW OF HM ORDERS  08/17/2024    FALL RISK ASSESSMENT  08/17/2024    BMP  03/25/2025    MEDICARE ANNUAL WELLNESS VISIT  09/25/2025    ALT  09/25/2025    LIPID  09/25/2025    TSH W/FREE T4 REFLEX  09/25/2025    CBC  09/25/2025    DEXA  05/24/2027    GLUCOSE  09/25/2027    DTAP/TDAP/TD IMMUNIZATION (3 - Td or Tdap) 08/28/2029    ADVANCE CARE PLANNING  09/25/2029    HEPATITIS C SCREENING  Completed    INFLUENZA VACCINE  Completed    Pneumococcal Vaccine: 65+ Years  Completed    COVID-19 Vaccine  Completed     "HPV IMMUNIZATION  Aged Out    MENINGITIS IMMUNIZATION  Aged Out    RSV MONOCLONAL ANTIBODY  Aged Out    MAMMO SCREENING  Discontinued    COLORECTAL CANCER SCREENING  Discontinued    LUNG CANCER SCREENING  Discontinued          Objective    Exam  /83 (BP Location: Left arm, Patient Position: Left side, Cuff Size: Adult Large)   Pulse 110   Temp 97.7  F (36.5  C) (Oral)   Resp 16   Ht 1.6 m (5' 3\")   Wt 101 kg (222 lb 11.2 oz)   SpO2 94%   BMI 39.45 kg/m     Estimated body mass index is 39.45 kg/m  as calculated from the following:    Height as of this encounter: 1.6 m (5' 3\").    Weight as of this encounter: 101 kg (222 lb 11.2 oz).    Physical Exam  GENERAL: alert and no distress  EYES: Eyes grossly normal to inspection, PERRL and conjunctivae and sclerae normal  HENT: ear canals and TM's normal, nose and mouth without ulcers or lesions  NECK: no adenopathy, no asymmetry, masses, or scars  RESP: lungs clear to auscultation - no rales, rhonchi or wheezes  CV: irregular rhythm, normal S1 S2, no S3 or S4, no murmur, click or rub, no peripheral edema  ABDOMEN: soft, nontender, no hepatosplenomegaly, no masses and bowel sounds normal  MS: no gross musculoskeletal defects noted, no edema  SKIN: no suspicious lesions or rashes  NEURO: Normal strength and tone, mentation intact and speech normal  PSYCH: mentation appears normal, affect normal/bright        9/25/2024   Mini Cog   Clock Draw Score 2 Normal   3 Item Recall 2 objects recalled   Mini Cog Total Score 4                 Signed Electronically by: JUNO THOMPSON MD    "

## 2024-09-25 NOTE — PROGRESS NOTES
ANTICOAGULATION MANAGEMENT     Caty Ng 79 year old female is on warfarin with supratherapeutic INR result. (Goal INR 2.0-3.0)    Recent labs: (last 7 days)     09/25/24  1644   INR 3.6*       ASSESSMENT     Source(s): Chart Review     Warfarin doses taken: Reviewed in chart  Medication/supplement changes:  9/25/24 new Rx sent for Clonazepam to mail order pharmacy. No interaction anticipated.  New illness, injury, or hospitalization: No  Signs or symptoms of bleeding or clotting: No  Previous result: Therapeutic last 2(+) visits  Additional findings: None       PLAN     Unable to reach Abi by phone today.    Left message to hold warfarin tonight. Request call back for assessment. [employer] Message sent also.    Follow up required to confirm warfarin dose taken and assess for changes, discuss out of range result , and discuss dosing instructions and confirm understanding of instructions    Priscila Monroe RN  9/25/2024  Anticoagulation Clinic  [facility] for routing messages: gordon ANDREW  [facility] patient phone line: [phone]

## 2024-09-26 LAB
ALBUMIN SERPL BCG-MCNC: 4.4 G/DL (ref 3.5–5.2)
ALP SERPL-CCNC: 72 U/L (ref 40–150)
ALT SERPL W P-5'-P-CCNC: 14 U/L (ref 0–50)
ANION GAP SERPL CALCULATED.3IONS-SCNC: 11 MMOL/L (ref 7–15)
AST SERPL W P-5'-P-CCNC: 24 U/L (ref 0–45)
BILIRUB SERPL-MCNC: 1 MG/DL
BUN SERPL-MCNC: 14.7 MG/DL (ref 8–23)
CALCIUM SERPL-MCNC: 9.4 MG/DL (ref 8.8–10.4)
CHLORIDE SERPL-SCNC: 100 MMOL/L (ref 98–107)
CHOLEST SERPL-MCNC: 92 MG/DL
CREAT SERPL-MCNC: 0.81 MG/DL (ref 0.51–0.95)
EGFRCR SERPLBLD CKD-EPI 2021: 74 ML/MIN/1.73M2
FASTING STATUS PATIENT QL REPORTED: ABNORMAL
FASTING STATUS PATIENT QL REPORTED: NORMAL
GLUCOSE SERPL-MCNC: 94 MG/DL (ref 70–99)
HCO3 SERPL-SCNC: 28 MMOL/L (ref 22–29)
HDLC SERPL-MCNC: 42 MG/DL
LDLC SERPL CALC-MCNC: 34 MG/DL
NONHDLC SERPL-MCNC: 50 MG/DL
POTASSIUM SERPL-SCNC: 4 MMOL/L (ref 3.4–5.3)
PROT SERPL-MCNC: 7.9 G/DL (ref 6.4–8.3)
SODIUM SERPL-SCNC: 139 MMOL/L (ref 135–145)
TRIGL SERPL-MCNC: 80 MG/DL
TSH SERPL DL<=0.005 MIU/L-ACNC: 1.45 UIU/ML (ref 0.3–4.2)
VIT D+METAB SERPL-MCNC: 18 NG/ML (ref 20–50)

## 2024-09-26 NOTE — PROGRESS NOTES
ANTICOAGULATION MANAGEMENT     Caty Ng 79 year old female is on warfarin with supratherapeutic INR result. (Goal INR 2.0-3.0)    Recent labs: (last 7 days)     09/25/24  1644   INR 3.6*       ASSESSMENT     Source(s): Chart Review and Patient/Caregiver Call     Warfarin doses taken: Warfarin taken as instructed  Diet: Decreased greens/vitamin K in diet; plans to resume previous intake  Medication/supplement changes: None noted  New illness, injury, or hospitalization: No  Signs or symptoms of bleeding or clotting: No  Previous result: Therapeutic last 2(+) visits  Additional findings: None       PLAN     Recommended plan for temporary change(s) affecting INR     Dosing Instructions: hold dose then continue your current warfarin dose with next INR in 1-2 weeks       Summary  As of 9/25/2024      Full warfarin instructions:  9/25: Hold; Otherwise 3 mg every Mon; 2 mg all other days   Next INR check:  10/7/2024               Telephone call with Abi who verbalizes understanding and agrees to plan    Lab visit scheduled    Education provided: Please call back if any changes to your diet, medications or how you've been taking warfarin    Plan made per [facility] anticoagulation protocol    Nathalie Glaser RN  9/26/2024  Anticoagulation Clinic  [employer] for routing messages: gordon ANDREW  [facility] patient phone line: [phone]        _______________________________________________________________________     Anticoagulation Episode Summary       Current INR goal:  2.0-3.0   TTR:  79.9% (1 y)   Target end date:  Indefinite   Send INR reminders to:  BREE ANDREW    Indications    Long term (current) use of anticoagulants [Z79.01]  Permanent atrial fibrillation (H) (Resolved) [I48.21]  Chronic atrial fibrillation (H) [I48.20]             Comments:               Anticoagulation Care Providers       Provider Role Specialty Phone number    Keke Flores MD Referring Family Medicine [phone]

## 2024-09-27 NOTE — ASSESSMENT & PLAN NOTE
The patient is actually down 10 pounds since March in part due to stress but also trying to adhere to a healthy diet.

## 2024-09-27 NOTE — ASSESSMENT & PLAN NOTE
The patient continues in chronic atrial fibrillation which is rate controlled and she is on Coumadin for anticoagulation.

## 2024-09-30 ENCOUNTER — TELEPHONE (OUTPATIENT)
Dept: FAMILY MEDICINE | Facility: CLINIC | Age: 79
End: 2024-09-30

## 2024-09-30 NOTE — TELEPHONE ENCOUNTER
Prior Authorization Request  Who s requesting:  Pharmacy  Pharmacy Name and Location: [facility]  Medication Name: clonazePAM (KLONOPIN) 0.5 MG tablet   Insurance Plan: Medicare  Insurance Member ID Number:  [id number]   CoverMyMeds Key:   Informed patient that prior authorizations can take up to 10 business days for response:   NA  Okay to leave a detailed message: No    Insurance 1 of 2.

## 2024-09-30 NOTE — TELEPHONE ENCOUNTER
Prior Authorization Request  Who s requesting:  Pharmacy  Pharmacy Name and Location: [facility]  Medication Name: clonazePAM (KLONOPIN) 0.5 MG tablet   Insurance Plan: [facility]  Insurance Member ID Number:  [id number]   CoverMyMeds Key:   Informed patient that prior authorizations can take up to 10 business days for response:   NA  Okay to leave a detailed message: No     Insurance 2 of 2.

## 2024-10-02 NOTE — TELEPHONE ENCOUNTER
Prior Authorization Not Needed per Insurance    Medication: CLONAZEPAM 0.5 MG PO TABS  Insurance Company: [employer] - Phone [phone] Fax [phone]  Expected CoPay: $    Pharmacy Filling the Rx: [facility] - [address]  Pharmacy Notified: Yes  Patient Notified: The pharmacy will notify the patient.

## 2024-10-04 ENCOUNTER — LAB (OUTPATIENT)
Dept: LAB | Facility: CLINIC | Age: 79
End: 2024-10-04
Payer: MEDICARE

## 2024-10-04 ENCOUNTER — ANTICOAGULATION THERAPY VISIT (OUTPATIENT)
Dept: ANTICOAGULATION | Facility: CLINIC | Age: 79
End: 2024-10-04

## 2024-10-04 ENCOUNTER — TELEPHONE (OUTPATIENT)
Dept: FAMILY MEDICINE | Facility: CLINIC | Age: 79
End: 2024-10-04

## 2024-10-04 DIAGNOSIS — Z79.01 LONG TERM (CURRENT) USE OF ANTICOAGULANTS: Primary | ICD-10-CM

## 2024-10-04 DIAGNOSIS — I48.20 CHRONIC ATRIAL FIBRILLATION (H): ICD-10-CM

## 2024-10-04 LAB — INR BLD: 3.4 (ref 0.9–1.1)

## 2024-10-04 PROCEDURE — 36415 COLL VENOUS BLD VENIPUNCTURE: CPT

## 2024-10-04 PROCEDURE — 85610 PROTHROMBIN TIME: CPT

## 2024-10-04 NOTE — TELEPHONE ENCOUNTER
See anticoagulation encounter from today.    Alysa Ariza RN  [facility] Anticoagulation  [phone]

## 2024-10-04 NOTE — PROGRESS NOTES
ANTICOAGULATION MANAGEMENT     Caty Ng 79 year old female is on warfarin with supratherapeutic INR result. (Goal INR 2.0-3.0)    Recent labs: (last 7 days)     10/04/24  1141   INR 3.4*       ASSESSMENT     Warfarin Lab Questionnaire    Warfarin Doses Last 7 Days      10/4/2024    11:36 AM   Dose in Tablet or Mg   TAB or MG? tablet (tab)     Pt Rptd Dose VOLODYMYR MONDAY TUESDAY WED THURS FRIDAY SATURDAY   10/4/2024  11:36 AM 1 1 1 1 1 1.5 1   **taken as instructed, above dosing is incorrect**        10/4/2024   Warfarin Lab Questionnaire   Missed doses within past 14 days? No   Changes in diet or alcohol within past 14 days? No--didn't eat as much greens as she had planned on doing and was unable to get to the grocery story until today; will increase greens again   Medication changes since last result? No   Injuries or illness since last result? No   New shortness of breath, severe headaches or sudden changes in vision since last result? No   Abnormal bleeding since last result? No   Upcoming surgery, procedure? No        Previous result: Supratherapeutic  Additional findings: None       PLAN     Recommended plan for temporary change(s) affecting INR     Dosing Instructions: Continue your current warfarin dose with next INR in 2 weeks       Summary  As of 10/4/2024      Full warfarin instructions:  3 mg every Mon; 2 mg all other days   Next INR check:  10/18/2024               Telephone call with Abi who verbalizes understanding and agrees to plan    Lab visit scheduled    Education provided: Contact [phone] with any changes, questions or concerns.     Plan made per [facility] anticoagulation protocol    Alysa Ariza RN  10/4/2024  Anticoagulation Clinic  [employer] for routing messages: gordon ANDREW  [facility] patient phone line: [phone]        _______________________________________________________________________     Anticoagulation Episode Summary       Current INR goal:  2.0-3.0   TTR:  77.5%  (1 y)   Target end date:  Indefinite   Send INR reminders to:  BREE ANDREW    Indications    Long term (current) use of anticoagulants [Z79.01]  Permanent atrial fibrillation (H) (Resolved) [I48.21]  Chronic atrial fibrillation (H) [I48.20]             Comments:               Anticoagulation Care Providers       Provider Role Specialty Phone number    Keke Flores MD Referring Family Medicine [phone]

## 2024-10-04 NOTE — TELEPHONE ENCOUNTER
General Call      Reason for Call:  Missed call from Alysa Ariza, attempted to warm transfer first       What are your questions or concerns:  Would like a call back       Could we send this information to you in [employer] or would you prefer to receive a phone call?:   Patient would prefer a phone call   Okay to leave a detailed message?: Yes at Cell number on file:    Telephone Information:   Mobile [phone]

## 2024-10-07 DIAGNOSIS — I42.8 NON-ISCHEMIC CARDIOMYOPATHY (H): ICD-10-CM

## 2024-10-08 RX ORDER — ATORVASTATIN CALCIUM 80 MG/1
TABLET, FILM COATED ORAL
Qty: 90 TABLET | Refills: 0 | Status: SHIPPED | OUTPATIENT
Start: 2024-10-08

## 2024-10-18 ENCOUNTER — ANTICOAGULATION THERAPY VISIT (OUTPATIENT)
Dept: ANTICOAGULATION | Facility: CLINIC | Age: 79
End: 2024-10-18

## 2024-10-18 ENCOUNTER — TELEPHONE (OUTPATIENT)
Dept: FAMILY MEDICINE | Facility: CLINIC | Age: 79
End: 2024-10-18

## 2024-10-18 ENCOUNTER — LAB (OUTPATIENT)
Dept: LAB | Facility: CLINIC | Age: 79
End: 2024-10-18
Payer: MEDICARE

## 2024-10-18 DIAGNOSIS — I48.20 CHRONIC ATRIAL FIBRILLATION (H): ICD-10-CM

## 2024-10-18 DIAGNOSIS — Z79.01 LONG TERM (CURRENT) USE OF ANTICOAGULANTS: Primary | ICD-10-CM

## 2024-10-18 LAB — INR BLD: 3.1 (ref 0.9–1.1)

## 2024-10-18 PROCEDURE — 36416 COLLJ CAPILLARY BLOOD SPEC: CPT

## 2024-10-18 PROCEDURE — 85610 PROTHROMBIN TIME: CPT

## 2024-10-18 NOTE — PROGRESS NOTES
ANTICOAGULATION MANAGEMENT     Caty Ng 79 year old female is on warfarin with supratherapeutic INR result. (Goal INR 2.0-3.0)    Recent labs: (last 7 days)     10/18/24  1129   INR 3.1*       ASSESSMENT     Source(s): Chart Review and Patient/Caregiver Call     Warfarin doses taken: Warfarin taken as instructed  Diet:  Trying to eat more greens consistently but just not accomplishing it.  Medication/supplement changes: None noted  New illness, injury, or hospitalization: No  Signs or symptoms of bleeding or clotting: No  Previous result: Supratherapeutic  Additional findings: None       PLAN     Recommended plan for ongoing change(s) affecting INR     Dosing Instructions: decrease your warfarin dose (6.7% change) with next INR in 2 weeks       Summary  As of 10/18/2024      Full warfarin instructions:  2 mg every day   Next INR check:  11/1/2024               Telephone call with Abi who verbalizes understanding and agrees to plan    Patient elected to schedule next visit 11/8/24    Education provided: Goal range and lab monitoring: goal range and significance of current result  Dietary considerations: impact of vitamin K foods on INR  Contact [phone] with any changes, questions or concerns.     Plan made per [facility] anticoagulation protocol    Priscila Monroe RN  10/18/2024  Anticoagulation Clinic  [employer] for routing messages: gordon ANDREW  [facility] patient phone line: [phone]        _______________________________________________________________________     Anticoagulation Episode Summary       Current INR goal:  2.0-3.0   TTR:  73.7% (1 y)   Target end date:  Indefinite   Send INR reminders to:  BREE ANDREW    Indications    Long term (current) use of anticoagulants [Z79.01]  Permanent atrial fibrillation (H) (Resolved) [I48.21]  Chronic atrial fibrillation (H) [I48.20]             Comments:               Anticoagulation Care Providers       Provider Role Specialty Phone number     Keke Flores MD [facility] Medicine [phone]

## 2024-10-18 NOTE — PROGRESS NOTES
"ANTICOAGULATION MANAGEMENT     Caty Ng 79 year old female is on warfarin with supratherapeutic INR result. (Goal INR 2.0-3.0)    Recent labs: (last 7 days)     10/18/24  1129   INR 3.1*       ASSESSMENT     Source(s): Chart Review  Previous INR was Supratherapeutic-was going to increase greens back to \"normal\" amount.  Medication, diet, health changes since last INR chart reviewed; none identified         PLAN     Unable to reach bAi by phone today.    Left message to take usual 2 mg daily of Warfarin this weekend. Request call back for assessment.    Follow up required to confirm warfarin dose taken and assess for changes and discuss dosing instructions and confirm understanding of instructions    Priscila Monroe RN  10/18/2024  Anticoagulation Clinic  [facility] for routing messages: gordon ANDREW  [facility] patient phone line: [phone]    "

## 2024-10-18 NOTE — TELEPHONE ENCOUNTER
General Call      Reason for Call:  returned call    What are your questions or concerns:  patient returning call to INR nurse    Date of last appointment with provider: 10-18-24    Could we send this information to you in [employer] or would you prefer to receive a phone call?:   Patient would prefer a phone call   Okay to leave a detailed message?: Yes at Home number on file [phone] (home) until 3:30

## 2024-11-08 ENCOUNTER — ANTICOAGULATION THERAPY VISIT (OUTPATIENT)
Dept: ANTICOAGULATION | Facility: CLINIC | Age: 79
End: 2024-11-08

## 2024-11-08 ENCOUNTER — LAB (OUTPATIENT)
Dept: LAB | Facility: CLINIC | Age: 79
End: 2024-11-08
Payer: MEDICARE

## 2024-11-08 DIAGNOSIS — Z79.01 LONG TERM (CURRENT) USE OF ANTICOAGULANTS: Primary | ICD-10-CM

## 2024-11-08 DIAGNOSIS — I48.20 CHRONIC ATRIAL FIBRILLATION (H): ICD-10-CM

## 2024-11-08 LAB — INR BLD: 3.8 (ref 0.9–1.1)

## 2024-11-08 PROCEDURE — 85610 PROTHROMBIN TIME: CPT

## 2024-11-08 PROCEDURE — 36416 COLLJ CAPILLARY BLOOD SPEC: CPT

## 2024-11-08 NOTE — PROGRESS NOTES
ANTICOAGULATION MANAGEMENT     Caty Ng 79 year old female is on warfarin with supratherapeutic INR result. (Goal INR 2.0-3.0)    Recent labs: (last 7 days)     11/08/24  1134   INR 3.8*       ASSESSMENT     Source(s): Chart Review and Patient/Caregiver Call     Warfarin doses taken: Warfarin taken as instructed  Diet: No new diet changes identified  Medication/supplement changes: None noted  New illness, injury, or hospitalization: No  Signs or symptoms of bleeding or clotting: No  Previous result: Supratherapeutic decreased by 6.7%  Additional findings: None       PLAN     Recommended plan for no diet, medication or health factor changes affecting INR     Dosing Instructions: decrease your warfarin dose (14.3% change) with next INR in 2 weeks       Summary  As of 11/8/2024      Full warfarin instructions:  1 mg every Tue, Fri; 2 mg all other days   Next INR check:  11/22/2024               Telephone call with Abi who verbalizes understanding and agrees to plan  Sent Tutellus message with dosing and follow up instructions    Lab visit scheduled    Education provided: Goal range and lab monitoring: goal range and significance of current result  Contact [phone] with any changes, questions or concerns.     Plan made per [facility] anticoagulation protocol    Marisela Funk RN  11/8/2024  Anticoagulation Clinic  [employer] for routing messages: gordon ANDREW  [facility] patient phone line: [phone]        _______________________________________________________________________     Anticoagulation Episode Summary       Current INR goal:  2.0-3.0   TTR:  67.9% (1 y)   Target end date:  Indefinite   Send INR reminders to:  BREE ANDREW    Indications    Long term (current) use of anticoagulants [Z79.01]  Permanent atrial fibrillation (H) (Resolved) [I48.21]  Chronic atrial fibrillation (H) [I48.20]             Comments:  --             Anticoagulation Care Providers       Provider Role Specialty Phone  number    Keke Flores MD [facility] Medicine [phone]

## 2024-12-12 DIAGNOSIS — G25.3 ESSENTIAL MYOCLONUS: ICD-10-CM

## 2024-12-12 RX ORDER — CLONAZEPAM 0.5 MG/1
0.5 TABLET ORAL AT BEDTIME
Qty: 30 TABLET | Refills: 1 | Status: SHIPPED | OUTPATIENT
Start: 2024-12-12

## 2024-12-12 NOTE — TELEPHONE ENCOUNTER
AUDIOLOGY REPORT    BACKGROUND INFORMATION: Dr. Mickey Turpin implanted Cas Noriega with a right  [employer] HiRes 90k Advantage midscala MS cochlear implant (CI) on 12/17/2015 and a left HiRes 90k Ultra CI on 3/27/2017 due to bilateral severe to profound sensorineural hearing loss and lack of benefit from hearing aids. The patient is being seen for troubleshooting in Audiology at [facility].      PATIENT REPORT:  Patient reports he has been diagnosed with focal epilepsy and his neurologist is requesting MRIs therefore he met with Dr. Murguia prior to our appointment today to be evaluated to have the magnets removed. 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 It is unclear whether or not he had follow up care following his activation of the new implant on 3/11/21. He reported that the left external equipment does not work and he is unable to hear or understand clearly with the right device as it is intermittent.    FITTING SESSION: Dr. Yovana Murguia, cochlear implant surgeon, ordered today's appointment. The patient came to the clinic for adjustment to the programs in the external speech processor and for assessment of the external components of the cochlear implant system. These components provide power and data to the internal device. Sound is only heard once the external portion is activated. Postoperative treatment, including device fitting and adjustment, audiologic assessments, and training are required at regular intervals. Testing can include electropsychophysical measures of threshold, comfort, and loudness balancing, which are completed to update the program.    Processor type: Lydia CI Q90  Headpiece type: UHP  Magnet strength: Right: 3, Left: 3    TEST RESULTS:   Electrode Impedances: Right: Stable and within tolerances, Left: Electrodes 10-11 open, slightly elevated, likely due  Medication Request  Medication name: clonazePAM (KLONOPIN) 0.5 MG tablet   Requested Pharmacy: [facility]  When was patient last seen for this?:  09/25/2024  Patient offered appointment:  No  Okay to leave a detailed message: no     Patient is requesting a 90 day supply to be sent to [facility] home delivery   to non-use.  Neural Response Testing: Not tested today  Facial Stimulation: Absent  Tinnitus: Present  Balance Problems: Absent  Pain/Discomfort: Absent  Strategies Tried: Optima P, Optima S  Strategy Preference: Right: Optima P, Left: Optima S    Number of Channels per Program: 16     COMMENTS: Troubleshooting his equipment it was found that his left UHP 3.0 was broken. Using our troubleshooting UHP we balanced loudness and he reported that he was not understanding speech at all with the left side. We discussed that likely this is due to him not using it for the majority of time since activation therefore will need to spend time adjusting to it again. We did not have a loaner UHP for him therefore [facility] will send him a new one.     We discussed that his left internal device is MRI compatible up to 3 regla however his right is not. He will work with his neurologist and Dr. Murguia to decide how to proceed with imaging.    He has started the upgrade process with Linda Torres from [facility]. Once he receives his upgrade kit we will attempt remapping after he has had time to use both processors together.    SUMMARY AND RECOMMENDATIONS: Cas was seen for bilateral cochlear implant troubleshooting today. A new UHP 3.0 will be shipped to him and he will continue the upgrade process. He will return in 6 weeks for upgrade fitting, sooner if concerns arise. Please call this clinic with questions regarding these results or recommendations.        Peña Childress, [facility]  Licensed Audiologist  [place] License #[id number]

## 2024-12-18 ENCOUNTER — ANTICOAGULATION THERAPY VISIT (OUTPATIENT)
Dept: ANTICOAGULATION | Facility: CLINIC | Age: 79
End: 2024-12-18

## 2024-12-18 ENCOUNTER — LAB (OUTPATIENT)
Dept: LAB | Facility: CLINIC | Age: 79
End: 2024-12-18
Payer: MEDICARE

## 2024-12-18 DIAGNOSIS — Z79.01 LONG TERM (CURRENT) USE OF ANTICOAGULANTS: Primary | ICD-10-CM

## 2024-12-18 DIAGNOSIS — I48.20 CHRONIC ATRIAL FIBRILLATION (H): ICD-10-CM

## 2024-12-18 LAB — INR BLD: 3 (ref 0.9–1.1)

## 2024-12-18 PROCEDURE — 85610 PROTHROMBIN TIME: CPT

## 2024-12-18 PROCEDURE — 36416 COLLJ CAPILLARY BLOOD SPEC: CPT

## 2024-12-18 NOTE — PROGRESS NOTES
ANTICOAGULATION MANAGEMENT     Caty Ng 79 year old female is on warfarin with therapeutic INR result. (Goal INR 2.0-3.0)    Recent labs: (last 7 days)     12/18/24  1112   INR 3.0*       ASSESSMENT     Source(s): Chart Review  Previous INR was Therapeutic last 2(+) visits  Medication, diet, health changes since last INR chart reviewed; none identified         PLAN     Recommended plan for no diet, medication or health factor changes affecting INR     Dosing Instructions: Continue your current warfarin dose with next INR in 5 weeks       Summary  As of 12/18/2024      Full warfarin instructions:  1 mg every Mon, Fri; 2 mg all other days   Next INR check:  1/22/2025               Detailed voice message left for Abi with dosing instructions and follow up date.     Contact [phone] to schedule and with any changes, questions or concerns.     Education provided: Please call back if any changes to your diet, medications or how you've been taking warfarin  Goal range and lab monitoring: goal range and significance of current result    Plan made per [facility] anticoagulation protocol    Alysa Ariza RN  12/18/2024  Anticoagulation Clinic  [employer] for routing messages: gordon ANDREW  [facility] patient phone line: [phone]        _______________________________________________________________________     Anticoagulation Episode Summary       Current INR goal:  2.0-3.0   TTR:  65.9% (1 y)   Target end date:  Indefinite   Send INR reminders to:  BREE ANDREW    Indications    Long term (current) use of anticoagulants [Z79.01]  Permanent atrial fibrillation (H) (Resolved) [I48.21]  Chronic atrial fibrillation (H) [I48.20]             Comments:  --             Anticoagulation Care Providers       Provider Role Specialty Phone number    Keke Flores MD Referring Family Medicine [phone]

## 2025-01-06 DIAGNOSIS — I50.32 CHRONIC DIASTOLIC HEART FAILURE (H): ICD-10-CM

## 2025-01-09 RX ORDER — CARVEDILOL 25 MG/1
TABLET ORAL
Qty: 90 TABLET | Refills: 0 | Status: SHIPPED | OUTPATIENT
Start: 2025-01-09

## 2025-01-09 RX ORDER — LOSARTAN POTASSIUM 25 MG/1
25 TABLET ORAL DAILY
Qty: 90 TABLET | Refills: 0 | Status: SHIPPED | OUTPATIENT
Start: 2025-01-09

## 2025-01-16 DIAGNOSIS — I50.23 ACUTE ON CHRONIC HFREF (HEART FAILURE WITH REDUCED EJECTION FRACTION) (H): ICD-10-CM

## 2025-01-16 DIAGNOSIS — I42.8 NON-ISCHEMIC CARDIOMYOPATHY (H): ICD-10-CM

## 2025-01-16 RX ORDER — SPIRONOLACTONE 25 MG/1
25 TABLET ORAL DAILY
Qty: 90 TABLET | Refills: 3 | Status: SHIPPED | OUTPATIENT
Start: 2025-01-16

## 2025-01-16 RX ORDER — FUROSEMIDE 40 MG/1
40 TABLET ORAL DAILY
Qty: 90 TABLET | Refills: 3 | Status: SHIPPED | OUTPATIENT
Start: 2025-01-16

## 2025-01-30 ENCOUNTER — TELEPHONE (OUTPATIENT)
Dept: ANTICOAGULATION | Facility: CLINIC | Age: 80
End: 2025-01-30
Payer: MEDICARE

## 2025-01-30 NOTE — TELEPHONE ENCOUNTER
ANTICOAGULATION     Caty Ng is overdue for an INR check.     Spoke with Abi who declined to schedule a lab appointment at this time. If calling back please schedule as soon as possible.    Her  just passed away on Sunday so she has a lot going on right now.    Priscila Monroe RN  1/30/2025  Anticoagulation Clinic  [facility] for routing messages: gordon ANDREW  [facility] patient phone line: [phone]

## 2025-02-18 DIAGNOSIS — G25.3 ESSENTIAL MYOCLONUS: ICD-10-CM

## 2025-02-18 RX ORDER — CLONAZEPAM 0.5 MG/1
0.5 TABLET ORAL AT BEDTIME
Qty: 30 TABLET | Refills: 1 | Status: SHIPPED | OUTPATIENT
Start: 2025-02-18

## 2025-02-18 NOTE — TELEPHONE ENCOUNTER
Medication Request  Medication name: clonazePAM (KLONOPIN) 0.5 MG tablet   Requested Pharmacy: [facility]  When was patient last seen for this?:  09/25/2024  Patient offered appointment:  Yes, patient declined at time of call  Okay to leave a detailed message: no

## 2025-02-19 ENCOUNTER — TELEPHONE (OUTPATIENT)
Dept: ANTICOAGULATION | Facility: CLINIC | Age: 80
End: 2025-02-19
Payer: MEDICARE

## 2025-02-19 NOTE — TELEPHONE ENCOUNTER
"ANTICOAGULATION     Caty LUKAS Ng is overdue for an INR check.     Spoke with Abi who declined to schedule a lab appointment at this time. If calling back please schedule as soon as possible.    Patient's  recently passed away and he was her . She has not driven since he passed but plans to try and go somewhere close this weekend to \"try it out\". She states that she will call next week to make an appointment.     Alysa Ariza RN  2/19/2025  Anticoagulation Clinic  [facility] for routing messages: gordon ANDREW  [facility] patient phone line: [phone]    "

## 2025-03-05 ENCOUNTER — TELEPHONE (OUTPATIENT)
Dept: ANTICOAGULATION | Facility: CLINIC | Age: 80
End: 2025-03-05
Payer: MEDICARE

## 2025-03-05 NOTE — TELEPHONE ENCOUNTER
ANTICOAGULATION     Caty Ng is overdue for an INR check.     Spoke with patient who declined to schedule a lab appointment at this time. If calling back please schedule as soon as possible.    Marisela Funk, RN  3/5/2025  Anticoagulation Clinic  [facility] for routing messages: gordon ANDREW  [facility] patient phone line: [phone]

## 2025-03-06 DIAGNOSIS — I50.32 CHRONIC DIASTOLIC HEART FAILURE (H): ICD-10-CM

## 2025-03-06 RX ORDER — CARVEDILOL 25 MG/1
TABLET ORAL
Qty: 90 TABLET | Refills: 0 | Status: SHIPPED | OUTPATIENT
Start: 2025-03-06

## 2025-03-24 ENCOUNTER — LAB (OUTPATIENT)
Dept: LAB | Facility: CLINIC | Age: 80
End: 2025-03-24
Payer: MEDICARE

## 2025-03-24 ENCOUNTER — ANTICOAGULATION THERAPY VISIT (OUTPATIENT)
Dept: ANTICOAGULATION | Facility: CLINIC | Age: 80
End: 2025-03-24

## 2025-03-24 DIAGNOSIS — I48.20 CHRONIC ATRIAL FIBRILLATION (H): ICD-10-CM

## 2025-03-24 DIAGNOSIS — Z79.01 LONG TERM (CURRENT) USE OF ANTICOAGULANTS: Primary | ICD-10-CM

## 2025-03-24 LAB — INR BLD: 5.6 (ref 0.9–1.1)

## 2025-03-24 PROCEDURE — 85610 PROTHROMBIN TIME: CPT

## 2025-03-24 PROCEDURE — 36416 COLLJ CAPILLARY BLOOD SPEC: CPT

## 2025-03-24 NOTE — PROGRESS NOTES
ANTICOAGULATION MANAGEMENT     Caty Ng 80 year old female is on warfarin with supratherapeutic INR result. (Goal INR 2.0-3.0)    Recent labs: (last 7 days)     03/24/25  1137   INR 5.6*       ASSESSMENT     Source(s): Chart Review and Patient/Caregiver Call     Warfarin doses taken: Warfarin taken as instructed  Diet: Decreased greens/vitamin K in diet; states that since her  passed away at the end of January, she hasn't been eating much and when she does, it isn't salads or veggies like before-- states that she is going to be more conscious about her intake and get back to eating salads/veggies  Medication/supplement changes: None noted  New illness, injury, or hospitalization: No  Signs or symptoms of bleeding or clotting: No  Previous result: Therapeutic last 2(+) visits  Additional findings: None       PLAN     Recommended plan for ongoing change(s) affecting INR     Dosing Instructions: hold 1.5 doses then continue your current warfarin dose with next INR in 3 days       Summary  As of 3/24/2025      Full warfarin instructions:  3/24: Hold; 3/25: 1 mg; Otherwise 1 mg every Mon, Fri; 2 mg all other days   Next INR check:  3/27/2025               Telephone call with Abi who agrees to plan and repeated back plan correctly    Lab visit scheduled    Education provided: Symptom monitoring: monitoring for bleeding signs and symptoms, when to seek medical attention/emergency care, and if you hit your head or have a bad fall seek emergency care  Contact [phone] with any changes, questions or concerns.     Plan made per [facility] anticoagulation protocol    Alysa Ariza RN  3/24/2025  Anticoagulation Clinic  [employer] for routing messages: gordon ANDREW  [facility] patient phone line: [phone]        _______________________________________________________________________     Anticoagulation Episode Summary       Current INR goal:  2.0-3.0   TTR:  69.0% (9 mo)   Target end date:  Indefinite   Send  INR reminders to:  RODRICKAG KAMRAN    Indications    Long term (current) use of anticoagulants [Z79.01]  Permanent atrial fibrillation (H) (Resolved) [I48.21]  Chronic atrial fibrillation (H) [I48.20]             Comments:  --             Anticoagulation Care Providers       Provider Role Specialty Phone number    Keke Flores MD Referring Family Medicine [phone]

## 2025-03-25 ENCOUNTER — DOCUMENTATION ONLY (OUTPATIENT)
Dept: ANTICOAGULATION | Facility: CLINIC | Age: 80
End: 2025-03-25
Payer: MEDICARE

## 2025-03-25 DIAGNOSIS — I48.20 CHRONIC ATRIAL FIBRILLATION (H): Primary | ICD-10-CM

## 2025-03-25 NOTE — PROGRESS NOTES
ANTICOAGULATION CLINIC REFERRAL RENEWAL REQUEST       An annual renewal order is required for all patients referred to [facility] Anticoagulation [facility].?  Please review and sign the pended referral order for Caty Ng.       ANTICOAGULATION SUMMARY      Warfarin indication(s)   Atrial Fibrillation    Mechanical heart valve present?  NO       Current goal range   INR: 2.0-3.0     Goal appropriate for indication? Goal INR 2-3, standard for indication(s) above     Time in Therapeutic Range (TTR)  (Goal > 60%) 69.0%       Office visit with referring provider's group within last year yes on 9/25/24       Alysa Ariza RN  [facility] Anticoagulation Clinic

## 2025-04-03 ENCOUNTER — LAB (OUTPATIENT)
Dept: LAB | Facility: CLINIC | Age: 80
End: 2025-04-03
Payer: MEDICARE

## 2025-04-03 ENCOUNTER — ANTICOAGULATION THERAPY VISIT (OUTPATIENT)
Dept: ANTICOAGULATION | Facility: CLINIC | Age: 80
End: 2025-04-03

## 2025-04-03 ENCOUNTER — OFFICE VISIT (OUTPATIENT)
Dept: FAMILY MEDICINE | Facility: CLINIC | Age: 80
End: 2025-04-03
Payer: MEDICARE

## 2025-04-03 VITALS
OXYGEN SATURATION: 95 % | TEMPERATURE: 98.6 F | BODY MASS INDEX: 38.14 KG/M2 | HEART RATE: 116 BPM | WEIGHT: 215.25 LBS | SYSTOLIC BLOOD PRESSURE: 133 MMHG | RESPIRATION RATE: 16 BRPM | HEIGHT: 63 IN | DIASTOLIC BLOOD PRESSURE: 81 MMHG

## 2025-04-03 DIAGNOSIS — Z79.01 LONG TERM (CURRENT) USE OF ANTICOAGULANTS: Primary | ICD-10-CM

## 2025-04-03 DIAGNOSIS — I48.20 CHRONIC ATRIAL FIBRILLATION (H): ICD-10-CM

## 2025-04-03 DIAGNOSIS — H60.391 INFECTIVE OTITIS EXTERNA, RIGHT: Primary | ICD-10-CM

## 2025-04-03 LAB — INR BLD: 4.1 (ref 0.9–1.1)

## 2025-04-03 RX ORDER — MELATONIN 3 MG
CAPSULE ORAL
COMMUNITY

## 2025-04-03 RX ORDER — CIPROFLOXACIN AND DEXAMETHASONE 3; 1 MG/ML; MG/ML
4 SUSPENSION/ DROPS AURICULAR (OTIC) 2 TIMES DAILY
Qty: 7.5 ML | Refills: 0 | Status: SHIPPED | OUTPATIENT
Start: 2025-04-03 | End: 2025-04-10

## 2025-04-03 NOTE — PROGRESS NOTES
Assessment & Plan   Assessment & Plan  Infective otitis externa, right  Patient presents today with complaints of right sided otalgia x2 weeks with acute worsening over the last 3 days. She denies any concurrent infectious symptoms such as fever, chills, rhinorrhea or cough. On exam, she has purulent drainage present in the canal. She is tender to the mastoid bone, therefore mastoiditis was considered in the differentials, however with the absence of fever, post-auricular edema/erythema or lymphadenopathy and no obvious protrustion of the auricle this seems less likely. There is a large amount of cerumen, but the TM I can see appears non-erythematous and non-bulging. Discussed symptoms seem most consistent with otitis externa. Will treat with Ciprodex x7 days. I have given her strict return precautions, including the need to seek emergent care with swelling of the ear, fevers, or erythema to the mastoid bone. Will plan to see her back in one week for reassessment or sooner if needed. Patient expressed understanding of and agreement with this plan. All questions were answered.  Orders:    ciprofloxacin-dexAMETHasone (CIPRODEX) 0.3-0.1 % otic suspension; Place 4 drops into the right ear 2 times daily for 7 days.    Jessee Alex is a 80 year old, presenting for the following health issues:  Otalgia (RT. Sx for 2.5 weeks-getting worse last 3 days. Pain into neck and up into head.)        4/3/2025     2:53 PM   Additional Questions   Roomed by sac   Accompanied by self         4/3/2025     2:53 PM   Patient Reported Additional Medications   Patient reports taking the following new medications no     Ear pain x2.5 weeks. Worse in the last 3 days  No drainage, fever. Tender to the touch  Has been using APAP and hydrogen peroxide   No concurrent infectious symptoms. No fever. No throat pain/swelling    History of Present Illness       Reason for visit:  Ear pain  Symptom onset:  1-2 weeks ago  Symptoms include:   "Pain  Symptom intensity:  Severe  Symptom progression:  Worsening  Had these symptoms before:  No  What makes it worse:  Touching or layoing on ear  What makes it better:  TYL   She is taking medications regularly.          Objective    /81 (BP Location: Left arm, Patient Position: Sitting, Cuff Size: Adult Large)   Pulse 116   Temp 98.6  F (37  C) (Oral)   Resp 16   Ht 1.6 m (5' 3\")   Wt 97.6 kg (215 lb 4 oz)   SpO2 95%   BMI 38.13 kg/m    Body mass index is 38.13 kg/m .    Physical Exam  Vitals and nursing note reviewed.   Constitutional:       General: She is not in acute distress.     Appearance: Normal appearance. She is not ill-appearing or toxic-appearing.   HENT:      Right Ear: Decreased hearing noted. Drainage (purulent), swelling and tenderness present. There is no impacted cerumen. No foreign body. There is mastoid tenderness.      Left Ear: Tympanic membrane, ear canal and external ear normal.   Cardiovascular:      Rate and Rhythm: Normal rate and regular rhythm.   Neurological:      Mental Status: She is alert.   Psychiatric:         Mood and Affect: Mood normal.         Behavior: Behavior normal.         Thought Content: Thought content normal.              Signed Electronically by: RACHEL Gilmore CNP    "

## 2025-04-03 NOTE — PROGRESS NOTES
ANTICOAGULATION MANAGEMENT     Caty Ng 80 year old female is on warfarin with supratherapeutic INR result. (Goal INR 2.0-3.0)    Recent labs: (last 7 days)     04/03/25  1427   INR 4.1*       ASSESSMENT     Source(s): Chart Review and Patient/Caregiver Call     Warfarin doses taken: Warfarin taken as instructed  Diet: Decreased greens/vitamin K in diet; ongoing change  Medication/supplement changes:  4/3 Ciprodex ear drops for 7 days  New illness, injury, or hospitalization: Yes: 4/3 right infective otitis externa  Signs or symptoms of bleeding or clotting: No  Previous result: Therapeutic last visit; previously outside of goal range  Additional findings: None       PLAN     Recommended plan for temporary change(s) and ongoing change(s) affecting INR     Dosing Instructions: hold dose then decrease your warfarin dose (16.7% change) with next INR in 1 week       Summary  As of 4/3/2025      Full warfarin instructions:  4/3: Hold; Otherwise 2 mg every Sun, Tue, Thu; 1 mg all other days   Next INR check:  4/10/2025               Telephone call with Abi who verbalizes understanding and agrees to plan and sent [employer] message.     Check at provider office visit 4/10/25    Education provided: Goal range and lab monitoring: goal range and significance of current result  Contact [phone] with any changes, questions or concerns.     Plan made per [facility] anticoagulation protocol; closest % change with current tablet size made per protocol for for INR 4-4.4 (goal 2-3)    Marisela Funk, RN  4/3/2025  Anticoagulation Clinic  [employer] [place] for routing messages: gordon ANDREW  [facility] patient phone line: [phone]        _______________________________________________________________________     Anticoagulation Episode Summary       Current INR goal:  2.0-3.0   TTR:  65.5% (9 mo)   Target end date:  Indefinite   Send INR reminders to:  BREE ANDREW    Indications    Long term (current) use of  anticoagulants [Z79.01]  Permanent atrial fibrillation (H) (Resolved) [I48.21]  Chronic atrial fibrillation (H) [I48.20]             Comments:  --             Anticoagulation Care Providers       Provider Role Specialty Phone number    Keke Flores MD Referring Family Medicine [phone]

## 2025-04-07 DIAGNOSIS — I50.32 CHRONIC DIASTOLIC HEART FAILURE (H): ICD-10-CM

## 2025-04-07 DIAGNOSIS — Z79.01 LONG TERM (CURRENT) USE OF ANTICOAGULANTS: ICD-10-CM

## 2025-04-07 DIAGNOSIS — I48.21 PERMANENT ATRIAL FIBRILLATION (H): ICD-10-CM

## 2025-04-07 RX ORDER — LOSARTAN POTASSIUM 25 MG/1
TABLET ORAL
Qty: 90 TABLET | Refills: 0 | Status: SHIPPED | OUTPATIENT
Start: 2025-04-07

## 2025-04-07 RX ORDER — WARFARIN SODIUM 2 MG/1
1-2 TABLET ORAL DAILY
Qty: 90 TABLET | Refills: 0 | Status: SHIPPED | OUTPATIENT
Start: 2025-04-07

## 2025-04-07 NOTE — TELEPHONE ENCOUNTER
ANTICOAGULATION MANAGEMENT:  Medication Refill    Anticoagulation Summary  As of 4/3/2025      Warfarin maintenance plan:  2 mg (2 mg x 1) every Sun, Tue, Thu; 1 mg (2 mg x 0.5) all other days   Next INR check:  4/10/2025   Target end date:  Indefinite    Indications    Long term (current) use of anticoagulants [Z79.01]  Permanent atrial fibrillation (H) (Resolved) [I48.21]  Chronic atrial fibrillation (H) [I48.20]                 Anticoagulation Care Providers       Provider Role Specialty Phone number    Keke Flores MD Referring Family Medicine [phone]            Refill Criteria    Visit with referring provider/group: Meets criteria: visit within referring provider group in the last 15 months on 9/25/24    [facility] referral last signed: 03/26/2025; within last year:  Yes    Lab monitoring is up to date (not exceeding 2 weeks overdue): Yes    Caty meets all criteria for refill. Rx instructions and quantity supplied updated to match patient's current dosing plan.  90 day supply with 0 refills granted per [facility] protocol     Priscila Monroe RN  Anticoagulation Clinic    
21-Dec-2021

## 2025-04-10 ENCOUNTER — LAB (OUTPATIENT)
Dept: LAB | Facility: CLINIC | Age: 80
End: 2025-04-10
Payer: MEDICARE

## 2025-04-10 ENCOUNTER — ANTICOAGULATION THERAPY VISIT (OUTPATIENT)
Dept: ANTICOAGULATION | Facility: CLINIC | Age: 80
End: 2025-04-10

## 2025-04-10 ENCOUNTER — OFFICE VISIT (OUTPATIENT)
Dept: FAMILY MEDICINE | Facility: CLINIC | Age: 80
End: 2025-04-10
Payer: MEDICARE

## 2025-04-10 VITALS
RESPIRATION RATE: 16 BRPM | OXYGEN SATURATION: 95 % | WEIGHT: 217.13 LBS | TEMPERATURE: 97.8 F | HEIGHT: 63 IN | SYSTOLIC BLOOD PRESSURE: 109 MMHG | HEART RATE: 98 BPM | BODY MASS INDEX: 38.47 KG/M2 | DIASTOLIC BLOOD PRESSURE: 70 MMHG

## 2025-04-10 DIAGNOSIS — I48.20 CHRONIC ATRIAL FIBRILLATION (H): ICD-10-CM

## 2025-04-10 DIAGNOSIS — H60.391 INFECTIVE OTITIS EXTERNA, RIGHT: Primary | ICD-10-CM

## 2025-04-10 DIAGNOSIS — Z79.01 LONG TERM (CURRENT) USE OF ANTICOAGULANTS: Primary | ICD-10-CM

## 2025-04-10 LAB — INR BLD: 2.1 (ref 0.9–1.1)

## 2025-04-10 NOTE — PROGRESS NOTES
"  Assessment & Plan   Assessment & Plan  Infective otitis externa, right  Patient presents today in follow-up from last week's visit in which she was diagnosed with infective otitis externa of the right.  Her pain is since resolved since her visit and initiation of the Ciprodex, however she reports decreased hearing and plugging in that side with some intermittent drainage.  On exam, she continues to have moderate burden of purulent appearing drainage in the canal.  Her eardrum appears intact and I am able to visualize it much better today than last week.  Will have her repeat Ciprodex for the next 7 days and plan to have her see ENT for persistent symptoms.  She prefers the [facility] system, however if they cannot accommodate within the next 4 to 6 weeks, I would recommend she be seen by [facility] and she will let us know.  Discussed reasons to return to care.  Patient expressed understanding of and agreement with this plan.  All questions were answered.  Orders:    Adult ENT  Referral; Future      Subjective   Abi is a 80 year old, presenting for the following health issues:  Follow Up (Ear infection. Pain better but unable to hear out of ear.)        4/10/2025    11:06 AM   Additional Questions   Roomed by sac   Accompanied by self     History of Present Illness       Reason for visit:  Ear pain  Symptom onset:  1-2 weeks ago  Symptoms include:  Pain  Symptom intensity:  Severe  Symptom progression:  Worsening  Had these symptoms before:  No  What makes it worse:  Touching or layoing on ear  What makes it better:  TYL   She is taking medications regularly.          Objective    /70 (BP Location: Left arm, Patient Position: Sitting, Cuff Size: Adult Large)   Pulse 98   Temp 97.8  F (36.6  C) (Oral)   Resp 16   Ht 1.6 m (5' 3\")   Wt 98.5 kg (217 lb 2 oz)   SpO2 95%   BMI 38.46 kg/m    Body mass index is 38.46 kg/m .    Physical Exam  Vitals and nursing note reviewed.   Constitutional:       " General: She is not in acute distress.     Appearance: Normal appearance. She is not ill-appearing.   HENT:      Right Ear: Drainage (purulent drainage in canal) present. No swelling or tenderness. Tympanic membrane is not perforated, erythematous or bulging.      Left Ear: Tympanic membrane, ear canal and external ear normal.   Cardiovascular:      Rate and Rhythm: Normal rate and regular rhythm.   Pulmonary:      Effort: Pulmonary effort is normal. No respiratory distress.   Neurological:      Mental Status: She is alert.   Psychiatric:         Mood and Affect: Mood normal.         Behavior: Behavior normal.         Thought Content: Thought content normal.            Signed Electronically by: RACHEL Gilmore CNP

## 2025-04-10 NOTE — PROGRESS NOTES
ANTICOAGULATION MANAGEMENT     Caty Ng 80 year old female is on warfarin with therapeutic INR result. (Goal INR 2.0-3.0)    Recent labs: (last 7 days)     04/10/25  1052   INR 2.1*       ASSESSMENT     Warfarin Lab Questionnaire    Warfarin Doses Last 7 Days          4/10/2025   Warfarin Lab Questionnaire   Missed doses within past 14 days? No   Changes in diet or alcohol within past 14 days? No   Medication changes since last result? Yes   Injuries or illness since last result? No   New shortness of breath, severe headaches or sudden changes in vision since last result? No   Abnormal bleeding since last result? No   Upcoming surgery, procedure? No     Previous result: Supratherapeutic-Hold and 16% dose reduction 4/3/25.  Additional findings: Per follow up visit today for infective otitis externa: repeat Ciprodex for the next 7 days and plan to have her see ENT for persistent symptoms        PLAN     Unable to reach Abi by phone today.    Left message to continue current dose of warfarin 2 mg tonight. Request call back for assessment. Transactivt Message sent also.    Follow up required to confirm warfarin dose taken and assess for changes and discuss dosing instructions and confirm understanding of instructions    Priscila Monroe RN  4/10/2025  Anticoagulation Clinic  [facility] for routing messages: gordon ANDREW  [facility] patient phone line: [phone]

## 2025-04-10 NOTE — PROGRESS NOTES
ANTICOAGULATION MANAGEMENT     Caty Ng 80 year old female is on warfarin with therapeutic INR result. (Goal INR 2.0-3.0)    Recent labs: (last 7 days)     04/10/25  1052   INR 2.1*       ASSESSMENT     Warfarin Lab Questionnaire    Warfarin Doses Last 7 Days    Abi reports the following dosing over the past 7 days:    Sun Mon Tues Wed Thur Fri Sat   2 mg 1 mg 2 mg 1 mg 2 mg 1 mg 1 mg         4/10/2025   Warfarin Lab Questionnaire   Missed doses within past 14 days? No   Changes in diet or alcohol within past 14 days? No   Medication changes since last result? Yes   Injuries or illness since last result? No   New shortness of breath, severe headaches or sudden changes in vision since last result? No   Abnormal bleeding since last result? No   Upcoming surgery, procedure? No     Previous result: Supratherapeutic  Additional findings: None       PLAN     Recommended plan for no diet, medication or health factor changes affecting INR     Dosing Instructions: Continue your current warfarin dose with next INR in 2 weeks       Summary  As of 4/10/2025      Full warfarin instructions:  2 mg every Sun, Tue, Thu; 1 mg all other days   Next INR check:  4/24/2025               Telephone call with Abi who verbalizes understanding and agrees to plan    Lab visit scheduled    Education provided: Please call back if any changes to your diet, medications or how you've been taking warfarin  Goal range and lab monitoring: goal range and significance of current result  Importance of notifying anticoagulation clinic for: changes in medications; a sooner lab recheck maybe needed  Contact [phone] with any changes, questions or concerns.     Plan made per [facility] anticoagulation protocol    Priscila Monroe RN  4/10/2025  Anticoagulation Clinic  [employer] for routing messages: gordon ANDREW  [facility] patient phone line: [phone]        _______________________________________________________________________     Anticoagulation  Episode Summary       Current INR goal:  2.0-3.0   TTR:  64.0% (9 mo)   Target end date:  Indefinite   Send INR reminders to:  BREE ANDREW    Indications    Long term (current) use of anticoagulants [Z79.01]  Permanent atrial fibrillation (H) (Resolved) [I48.21]  Chronic atrial fibrillation (H) [I48.20]             Comments:  --             Anticoagulation Care Providers       Provider Role Specialty Phone number    Keke Flores MD Referring Family Medicine [phone]

## 2025-04-10 NOTE — PATIENT INSTRUCTIONS
Let me know if ENT cannot accommodate in a month and will plan to refer to [facility]   Continue 4 drops, twice daily for 7 more days. See ENT if symptoms aren't resolved.

## 2025-04-22 DIAGNOSIS — G25.3 ESSENTIAL MYOCLONUS: ICD-10-CM

## 2025-04-23 RX ORDER — CLONAZEPAM 0.5 MG/1
0.5 TABLET ORAL AT BEDTIME
Qty: 30 TABLET | Refills: 1 | Status: SHIPPED | OUTPATIENT
Start: 2025-04-23

## 2025-04-24 ENCOUNTER — LAB (OUTPATIENT)
Dept: LAB | Facility: CLINIC | Age: 80
End: 2025-04-24
Payer: MEDICARE

## 2025-04-24 ENCOUNTER — ANTICOAGULATION THERAPY VISIT (OUTPATIENT)
Dept: ANTICOAGULATION | Facility: CLINIC | Age: 80
End: 2025-04-24

## 2025-04-24 DIAGNOSIS — I48.20 CHRONIC ATRIAL FIBRILLATION (H): ICD-10-CM

## 2025-04-24 DIAGNOSIS — Z79.01 LONG TERM (CURRENT) USE OF ANTICOAGULANTS: Primary | ICD-10-CM

## 2025-04-24 LAB — INR BLD: 1.6 (ref 0.9–1.1)

## 2025-04-24 NOTE — PROGRESS NOTES
ANTICOAGULATION MANAGEMENT     Caty Ng 80 year old female is on warfarin with subtherapeutic INR result. (Goal INR 2.0-3.0)    Recent labs: (last 7 days)     25  1057   INR 1.6*       ASSESSMENT     Source(s): Chart Review and Patient/Caregiver Call     Warfarin doses taken: Warfarin taken as instructed  Diet: Has been drinking the Boost left from before her  . Has about 5 left. She has drank one in the evening sometimes and had one this morning for breakfast. She will finish them up then not have any more. Agreed to have one every few days, not daily.  Medication/supplement changes: None noted  New illness, injury, or hospitalization: No  Signs or symptoms of bleeding or clotting: No  Previous result: Therapeutic last visit; previously outside of goal range  Additional findings: None       PLAN     Recommended plan for temporary change(s) affecting INR     Dosing Instructions: booster dose then continue your current warfarin dose with next INR in 2 weeks       Summary  As of 2025      Full warfarin instructions:  : 3 mg; Otherwise 2 mg every Sun, Tue, Thu; 1 mg all other days   Next INR check:  2025               Telephone call with Abi who verbalizes understanding and agrees to plan  Sent [email or web address] message with dosing and follow up instructions    Lab visit scheduled    Education provided: Goal range and lab monitoring: goal range and significance of current result  Dietary considerations: impact of vitamin K foods on INR and Impact of protein intake on INR   Written instructions provided  Contact [phone] with any changes, questions or concerns.     Plan made per [facility] anticoagulation protocol    Priscila Monroe RN  2025  Anticoagulation Clinic  [employer] for routing messages: gordon ANDREW  [facility] patient phone line: [phone]        _______________________________________________________________________     Anticoagulation Episode Summary       Current INR goal:   2.0-3.0   TTR:  59.9% (9 mo)   Target end date:  Indefinite   Send INR reminders to:  BREE ANDREW    Indications    Long term (current) use of anticoagulants [Z79.01]  Permanent atrial fibrillation (H) (Resolved) [I48.21]  Chronic atrial fibrillation (H) [I48.20]             Comments:  --             Anticoagulation Care Providers       Provider Role Specialty Phone number    Keke Flores MD Referring Family Medicine [phone]

## 2025-04-29 ENCOUNTER — TELEPHONE (OUTPATIENT)
Dept: FAMILY MEDICINE | Facility: CLINIC | Age: 80
End: 2025-04-29
Payer: MEDICARE

## 2025-04-29 DIAGNOSIS — G25.3 ESSENTIAL MYOCLONUS: ICD-10-CM

## 2025-04-29 RX ORDER — CLONAZEPAM 0.5 MG/1
0.5 TABLET ORAL AT BEDTIME
Qty: 30 TABLET | Refills: 1 | Status: SHIPPED | OUTPATIENT
Start: 2025-04-29

## 2025-04-29 NOTE — TELEPHONE ENCOUNTER
General Call    Contacts       Contact Date/Time Type Contact Phone/Fax    04/29/2025 09:52 AM CDT Phone (Incoming) Abi Ng (Self) [phone] (M)          Reason for Call:  Pharmacy Change    What are your questions or concerns:  Patient is having difficulty getting clonazepam filled in a timely manner with mail order. She is requesting pharmacy change for current prescription and future refills. New pharmacy prepped and added to pharmacy preferences.     Date of last appointment with provider: 4/10/25    Could we send this information to you in [employer] or would you prefer to receive a phone call?:   Patient would prefer a phone call   Okay to leave a detailed message?: Yes at Cell number on file:    Telephone Information:   Mobile [phone]

## 2025-05-05 DIAGNOSIS — I50.32 CHRONIC DIASTOLIC HEART FAILURE (H): ICD-10-CM

## 2025-05-06 RX ORDER — CARVEDILOL 25 MG/1
TABLET ORAL
Qty: 135 TABLET | Refills: 0 | Status: SHIPPED | OUTPATIENT
Start: 2025-05-06

## 2025-05-08 ENCOUNTER — LAB (OUTPATIENT)
Dept: LAB | Facility: CLINIC | Age: 80
End: 2025-05-08
Payer: MEDICARE

## 2025-05-08 ENCOUNTER — ANTICOAGULATION THERAPY VISIT (OUTPATIENT)
Dept: ANTICOAGULATION | Facility: CLINIC | Age: 80
End: 2025-05-08

## 2025-05-08 ENCOUNTER — RESULTS FOLLOW-UP (OUTPATIENT)
Dept: ANTICOAGULATION | Facility: CLINIC | Age: 80
End: 2025-05-08

## 2025-05-08 DIAGNOSIS — I48.20 CHRONIC ATRIAL FIBRILLATION (H): ICD-10-CM

## 2025-05-08 DIAGNOSIS — Z79.01 LONG TERM (CURRENT) USE OF ANTICOAGULANTS: Primary | ICD-10-CM

## 2025-05-08 LAB — INR BLD: 1.6 (ref 0.9–1.1)

## 2025-05-19 DIAGNOSIS — I42.8 NON-ISCHEMIC CARDIOMYOPATHY (H): ICD-10-CM

## 2025-05-19 RX ORDER — ATORVASTATIN CALCIUM 80 MG/1
80 TABLET, FILM COATED ORAL DAILY
Qty: 90 TABLET | Refills: 0 | Status: SHIPPED | OUTPATIENT
Start: 2025-05-19

## 2025-05-22 ENCOUNTER — RESULTS FOLLOW-UP (OUTPATIENT)
Dept: ANTICOAGULATION | Facility: CLINIC | Age: 80
End: 2025-05-22

## 2025-05-22 ENCOUNTER — ANTICOAGULATION THERAPY VISIT (OUTPATIENT)
Dept: ANTICOAGULATION | Facility: CLINIC | Age: 80
End: 2025-05-22

## 2025-05-22 ENCOUNTER — LAB (OUTPATIENT)
Dept: LAB | Facility: CLINIC | Age: 80
End: 2025-05-22
Payer: MEDICARE

## 2025-05-22 DIAGNOSIS — I48.20 CHRONIC ATRIAL FIBRILLATION (H): ICD-10-CM

## 2025-05-22 DIAGNOSIS — Z79.01 LONG TERM (CURRENT) USE OF ANTICOAGULANTS: Primary | ICD-10-CM

## 2025-05-22 LAB — INR BLD: 2.3 (ref 0.9–1.1)

## 2025-05-22 NOTE — PROGRESS NOTES
ANTICOAGULATION MANAGEMENT     Ctay Ng 80 year old female is on warfarin with therapeutic INR result. (Goal INR 2.0-3.0)    Recent labs: (last 7 days)     05/22/25  1117   INR 2.3*       ASSESSMENT     Source(s): Chart Review  Previous INR was Subtherapeutic  Medication, diet, health changes since last INR chart reviewed; none identified         PLAN     Recommended plan for no diet, medication or health factor changes affecting INR     Dosing Instructions: Continue your current warfarin dose with next INR in 2 weeks       Summary  As of 5/22/2025      Full warfarin instructions:  1 mg every Mon, Wed, Fri; 2 mg all other days   Next INR check:  6/5/2025               Detailed voice message left for Abi with dosing instructions and follow up date.   Sent [employer] message with dosing and follow up instructions    Contact [phone] to schedule and with any changes, questions or concerns.     Education provided: Please call back if any changes to your diet, medications or how you've been taking warfarin    Plan made per [facility] anticoagulation protocol    Lyudmila Zambrano RN  5/22/2025  Anticoagulation Clinic  [facility] for routing messages: gordon ANDREW  [facility] patient phone line: [phone]        _______________________________________________________________________     Anticoagulation Episode Summary       Current INR goal:  2.0-3.0   TTR:  51.7% (9 mo)   Target end date:  Indefinite   Send INR reminders to:  BREE ANDREW    Indications    Long term (current) use of anticoagulants [Z79.01]  Permanent atrial fibrillation (H) (Resolved) [I48.21]  Chronic atrial fibrillation (H) [I48.20]             Comments:  --             Anticoagulation Care Providers       Provider Role Specialty Phone number    Keke Flores MD Referring Family Medicine [phone]

## 2025-06-12 ENCOUNTER — RESULTS FOLLOW-UP (OUTPATIENT)
Dept: ANTICOAGULATION | Facility: CLINIC | Age: 80
End: 2025-06-12

## 2025-06-12 ENCOUNTER — LAB (OUTPATIENT)
Dept: LAB | Facility: CLINIC | Age: 80
End: 2025-06-12
Payer: MEDICARE

## 2025-06-12 ENCOUNTER — ANTICOAGULATION THERAPY VISIT (OUTPATIENT)
Dept: ANTICOAGULATION | Facility: CLINIC | Age: 80
End: 2025-06-12

## 2025-06-12 DIAGNOSIS — Z79.01 LONG TERM (CURRENT) USE OF ANTICOAGULANTS: Primary | ICD-10-CM

## 2025-06-12 DIAGNOSIS — I48.20 CHRONIC ATRIAL FIBRILLATION (H): ICD-10-CM

## 2025-06-12 LAB — INR BLD: 2 (ref 0.9–1.1)

## 2025-06-12 NOTE — PROGRESS NOTES
ANTICOAGULATION MANAGEMENT     Caty Ng 80 year old female is on warfarin with therapeutic INR result. (Goal INR 2.0-3.0)    Recent labs: (last 7 days)     06/12/25  1058   INR 2.0*       ASSESSMENT     Source(s): Chart Review and Patient/Caregiver Call     Warfarin doses taken: Warfarin taken as instructed  Diet: Eating less since  passed in January, pt is trying to be smarter with easy/pre-cooked meals - ongoing change. Understands to call [facility] team if appetite or vitamin K/protein increase before next check.  Medication/supplement changes: None noted  New illness, injury, or hospitalization: No  Signs or symptoms of bleeding or clotting: No  Previous result: Therapeutic last visit; previously outside of goal range  Additional findings: None       PLAN     Recommended plan for ongoing change(s) affecting INR     Dosing Instructions: Continue your current warfarin dose with next INR in 2-3 weeks       Summary  As of 6/12/2025      Full warfarin instructions:  1 mg every Mon, Wed, Fri; 2 mg all other days   Next INR check:  7/3/2025               Telephone call with Abi who verbalizes understanding and agrees to plan    Lab visit scheduled    Education provided: Please call back if any changes to your diet, medications or how you've been taking warfarin  Symptom monitoring: monitoring for bleeding signs and symptoms and monitoring for clotting signs and symptoms    Plan made per [facility] anticoagulation protocol    Mae Liriano RN  6/12/2025  Anticoagulation Clinic  [facility] for routing messages: gordon ANRDEW  [facility] patient phone line: [phone]        _______________________________________________________________________     Anticoagulation Episode Summary       Current INR goal:  2.0-3.0   TTR:  51.7% (9 mo)   Target end date:  Indefinite   Send INR reminders to:  BREE ANDREW    Indications    Long term (current) use of anticoagulants [Z79.01]  Permanent atrial fibrillation (H)  (Resolved) [I48.21]  Chronic atrial fibrillation (H) [I48.20]             Comments:  --             Anticoagulation Care Providers       Provider Role Specialty Phone number    Keke Flores MD Referring Family Medicine [phone]

## 2025-06-19 ENCOUNTER — TRANSFERRED RECORDS (OUTPATIENT)
Dept: HEALTH INFORMATION MANAGEMENT | Facility: CLINIC | Age: 80
End: 2025-06-19
Payer: MEDICARE

## 2025-06-25 DIAGNOSIS — G25.3 ESSENTIAL MYOCLONUS: ICD-10-CM

## 2025-06-25 RX ORDER — CLONAZEPAM 0.5 MG/1
0.5 TABLET ORAL AT BEDTIME
Qty: 30 TABLET | Refills: 1 | Status: SHIPPED | OUTPATIENT
Start: 2025-06-25

## 2025-06-25 NOTE — TELEPHONE ENCOUNTER
Patient calling stating she is out of clonazepam requesting a fill be sent today.  Patient has her yearly appointment in October   She was not sure if she needed to be seen every 6 months   Please advise on fill prepped below

## 2025-06-26 ENCOUNTER — RESULTS FOLLOW-UP (OUTPATIENT)
Dept: CARDIOLOGY | Facility: CLINIC | Age: 80
End: 2025-06-26

## 2025-06-26 ENCOUNTER — OFFICE VISIT (OUTPATIENT)
Dept: CARDIOLOGY | Facility: CLINIC | Age: 80
End: 2025-06-26
Payer: MEDICARE

## 2025-06-26 VITALS
HEART RATE: 92 BPM | RESPIRATION RATE: 14 BRPM | WEIGHT: 212 LBS | DIASTOLIC BLOOD PRESSURE: 72 MMHG | SYSTOLIC BLOOD PRESSURE: 104 MMHG | BODY MASS INDEX: 37.55 KG/M2

## 2025-06-26 DIAGNOSIS — R06.01 ORTHOPNEA: ICD-10-CM

## 2025-06-26 DIAGNOSIS — I48.20 CHRONIC ATRIAL FIBRILLATION (H): Primary | ICD-10-CM

## 2025-06-26 DIAGNOSIS — I42.8 NON-ISCHEMIC CARDIOMYOPATHY (H): Primary | ICD-10-CM

## 2025-06-26 DIAGNOSIS — I42.8 NON-ISCHEMIC CARDIOMYOPATHY (H): ICD-10-CM

## 2025-06-26 DIAGNOSIS — E03.9 HYPOTHYROIDISM, UNSPECIFIED TYPE: ICD-10-CM

## 2025-06-26 DIAGNOSIS — I50.20 SYSTOLIC HEART FAILURE, UNSPECIFIED HF CHRONICITY (H): ICD-10-CM

## 2025-06-26 DIAGNOSIS — I42.0 DILATED CARDIOMYOPATHY (H): ICD-10-CM

## 2025-06-26 DIAGNOSIS — I50.32 CHRONIC DIASTOLIC HEART FAILURE (H): ICD-10-CM

## 2025-06-26 DIAGNOSIS — I25.10 CORONARY ARTERY DISEASE INVOLVING NATIVE CORONARY ARTERY OF NATIVE HEART WITHOUT ANGINA PECTORIS: ICD-10-CM

## 2025-06-26 LAB
ANION GAP SERPL CALCULATED.3IONS-SCNC: 8 MMOL/L (ref 7–15)
BUN SERPL-MCNC: 16 MG/DL (ref 8–23)
CALCIUM SERPL-MCNC: 9.5 MG/DL (ref 8.8–10.4)
CHLORIDE SERPL-SCNC: 104 MMOL/L (ref 98–107)
CREAT SERPL-MCNC: 0.73 MG/DL (ref 0.51–0.95)
EGFRCR SERPLBLD CKD-EPI 2021: 84 ML/MIN/1.73M2
ERYTHROCYTE [DISTWIDTH] IN BLOOD BY AUTOMATED COUNT: 13.2 % (ref 10–15)
GLUCOSE SERPL-MCNC: 99 MG/DL (ref 70–99)
HCO3 SERPL-SCNC: 31 MMOL/L (ref 22–29)
HCT VFR BLD AUTO: 40.5 % (ref 35–47)
HGB BLD-MCNC: 13.3 G/DL (ref 11.7–15.7)
MCH RBC QN AUTO: 30.9 PG (ref 26.5–33)
MCHC RBC AUTO-ENTMCNC: 32.8 G/DL (ref 31.5–36.5)
MCV RBC AUTO: 94 FL (ref 78–100)
NT-PROBNP SERPL-MCNC: 683 PG/ML (ref 0–624)
PLATELET # BLD AUTO: 178 10E3/UL (ref 150–450)
POTASSIUM SERPL-SCNC: 4.7 MMOL/L (ref 3.4–5.3)
RBC # BLD AUTO: 4.3 10E6/UL (ref 3.8–5.2)
SODIUM SERPL-SCNC: 143 MMOL/L (ref 135–145)
WBC # BLD AUTO: 6.8 10E3/UL (ref 4–11)

## 2025-06-26 NOTE — PROGRESS NOTES
[facility]  [phone]          Assessment/Recommendations   Patient with known cardiomyopathy, mild coronary artery disease by previous angiography, permanent atrial fibrillation.  She does have shortness of breath with exertion and sleeps in her recliner as she feels like her breathing is better partially sitting up.  We will check a B natruretic peptide to see if there is evidence of extra fluid in her system.  Her jugular venous pressure is normal today and I did not hear any crackles on exam but certainly she may benefit from increased diuretic given the B natruretic peptide is elevated.    It has been a couple of years so we will get a echocardiogram to reevaluate her left ventricular systolic function as well as left ventricular compliance and Zio patch monitor to make sure that the ventricular response to atrial fibrillation is well-controlled and not overcontrolled.    Blood pressure is well-controlled, LDL cholesterol was measured at 34 and she is anticoagulated for elevated CHADS2 vascular score.    We have talked about switching her to Xarelto or Eliquis in the past and expense has been an issue.  Will ask our [facility] consultants to help her navigate through as I think she would benefit from switching to 1 of these medications.    It has been my honor to care for Abi Ng.    The longitudinal plan of care for the diagnosis(es)/condition(s) as documented were addressed during this visit. Due to the added complexity in care, I will continue to support Abi in the subsequent management and with ongoing continuity of care.        History of Present Illness/Subjective    Ms. Caty Ng is a 80 year old female with known mild reduction left ventricular systolic function, permanent atrial fibrillation, who continues to have dyspnea on exertion.  She feels like it may be a little bit worse over the last couple years.  She has also noticed that sleeping in a recliner allows her to  breathe better at night.  She figure this out when her  is getting chemotherapy and she was next to him in a chair and felt better.  So she continues to sleep in a chair.  She does have some peripheral edema and quite significant shortness of breath with activity.  No chest pain, no palpitations but sometimes can feel her heart flip-flopping a bit.  No syncopal or near syncopal episodes.       Physical Examination Review of Systems   /72 (BP Location: Left arm, Patient Position: Sitting, Cuff Size: Adult Large)   Pulse 92   Resp 14   Wt 96.2 kg (212 lb)   BMI 37.55 kg/m    Body mass index is 37.55 kg/m .  Wt Readings from Last 3 Encounters:   06/26/25 96.2 kg (212 lb)   04/10/25 98.5 kg (217 lb 2 oz)   04/03/25 97.6 kg (215 lb 4 oz)     General Appearance:   Alert, cooperative and in no acute distress.   ENT/Mouth: Pink/moist oral mucosa   EYES:  no scleral icterus, normal conjunctivae   Neck: JVP normal. No Hepatojugular reflux. Thyroid not visualized.   Chest/Lungs:   Lungs are clear to auscultation, equal chest wall expansion.   Cardiovascular:   S1, S2 with 1/6 systolic murmur , no clicks or rubs. Brachial, radial pulses are intact and symetric. No carotid bruits noted   Abdomen:  Nontender. BS+. No bruits.   Extremities: No cyanosis, clubbing and mild pretibial edema.  Superficial varicosities in pretibial region   Skin: no xanthelasma, warm.    Neurologic: normal arm movement bilateral, no tremors     Psychiatric: Appropriate affect.      Encounter Vitals  BP: 104/72  Pulse: 92  Resp: 14  Weight: 96.2 kg (212 lb)                                           Medical History  Surgical History Family History Social History   Past Medical History:   Diagnosis Date    Allergic rhinitis     Arrhythmia 01/01/2012    Atrial fibrillation (H)     Atrial fibrillation, chronic (H) 06/21/2012    Followed by Dr. 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 Cobos, cardiology EF 45 - 50%    Hyperlipidemia 01/01/2012    Hypertriglyceridemia     Mediastinal mass 2015    benign, [facility] consult     Mitral valve regurgitation 06/24/2012    Moderate mitral regurgitation     Morbid obesity with BMI of 40.0-44.9, adult (H) 04/25/2015    BROCK (obstructive sleep apnea)     Scoliosis     Sleep apnea 01/01/2010    per pt    Thyroid disease     Past Surgical History:   Procedure Laterality Date    CV CORONARY ANGIOGRAM N/A 7/21/2021    Procedure: Coronary Angiogram;  Surgeon: Hai Murphy MD;  Location: [facility] CATH LAB CV    CV LEFT HEART CATH N/A 7/21/2021    Procedure: Left Heart Cath;  Surgeon: Hai Murphy MD;  Location: [facility] CATH LAB CV    HYSTERECTOMY, PAP NO LONGER INDICATED  1989    JOINT REPLACEMTN, KNEE RT/LT  2007    right    JOINT REPLACEMTN, KNEE RT/LT Left     SALPINGO OOPHORECTOMY,R/L/BRANDO Bilateral 2003    SURGICAL HISTORY OF -   1989    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    Family History   Problem Relation Age of Onset    Heart Disease Mother     Cancer Mother     Diabetes Father     Musculoskeletal Disorder Father     Diabetes Paternal Grandmother     Diabetes Paternal Grandfather     Diabetes Sister     Thrombophilia Daughter         Factor V Leiden Deficiency    Diabetes Sister     Social History     Socioeconomic History    Marital status:      Spouse name: Bill     Number of children: 2    Years of education: Not on file    Highest education level: Not on file   Occupational History    Occupation: [employer]      Employer:  RETIRED   Tobacco Use    Smoking status: Former     Current packs/day: 0.00     Average packs/day: 0.5 packs/day for 20.0 years (10.0 ttl pk-yrs)     Types: Cigarettes     Start date: 1962     Quit date: 1982     Years since quittin.5     Passive exposure: Past    Smokeless tobacco: Never   Vaping Use    Vaping status: Never Used   Substance and Sexual Activity    Alcohol use: Yes     Comment: 1 beers per week    Drug use: Never    Sexual activity: Yes     Partners: Male     Birth control/protection: Post-menopausal   Other Topics Concern    Parent/sibling w/ CABG, MI or angioplasty before 65F 55M? Yes   Social History Narrative    Not on file     Social Drivers of Health     Financial Resource Strain: Low Risk  (10/20/2022)    Received from [facility]    Overall Financial Resource Strain (CARDIA)     Difficulty of Paying Living Expenses: Not very hard   Food Insecurity: No Food Insecurity (10/20/2022)    Received from [facility]    Hunger Vital Sign     Worried About Running Out of Food in the Last Year: Never true     Ran Out of Food in the Last Year: Never true   Transportation Needs: No Transportation Needs (10/20/2022)    Received from [facility]    PRAPARE - Transportation     Lack of Transportation (Medical): No     Lack of Transportation (Non-Medical): No   Physical Activity: Unknown (10/20/2022)    Received from [facility]    Exercise Vital Sign     Days of Exercise per Week: 0 days     Minutes of Exercise per Session: Patient declined   Stress: No Stress Concern Present (10/20/2022)    Received from [facility]    [employer] - Occupational Stress Questionnaire     Feeling of Stress : Only a little   Social Connections: Unknown (10/20/2022)    Received from [facility]    Social Connection and Isolation Panel [NHANES]     Frequency of Communication with Friends and Family: More than three times a week     Frequency of Social Gatherings with Friends and Family:  Once a week     Attends Orthodox Services: Patient declined     Active Member of Clubs or Organizations: No     Attends Club or Organization Meetings: Never     Marital Status:    Interpersonal Safety: Low Risk  (4/3/2025)    Interpersonal Safety     Do you feel physically and emotionally safe where you currently live?: Yes     Within the past 12 months, have you been hit, slapped, kicked or otherwise physically hurt by someone?: No     Within the past 12 months, have you been humiliated or emotionally abused in other ways by your partner or ex-partner?: No   Housing Stability: Low Risk  (10/20/2022)    Received from [facility]    Housing Stability Vital Sign     Unable to Pay for Housing in the Last Year: No     Number of Places Lived in the Last Year: 1     Unstable Housing in the Last Year: No          Medications  Allergies   Current Outpatient Medications   Medication Sig Dispense Refill    acetaminophen (TYLENOL) 650 MG CR tablet Take 1,300 mg by mouth 2 times daily      amoxicillin (AMOXIL) 500 MG capsule TAKE 4 CAPSULES BY MOUTH 1 HOUR BEFORE DENTAL APPOINTMENT      atorvastatin (LIPITOR) 80 MG tablet Take 1 tablet (80 mg) by mouth daily. 90 tablet 0    carvedilol (COREG) 25 MG tablet TAKE ONE-HALF (1/2) TABLET IN THE MORNING AND ONE TABLET IN THE AFTERNOON (FOLLOW UP IN CARDIOLOGY NEEDED FOR FURTHER REFILLS) 135 tablet 0    clonazePAM (KLONOPIN) 0.5 MG tablet Take 1 tablet (0.5 mg) by mouth at bedtime. 30 tablet 1    furosemide (LASIX) 40 MG tablet TAKE 1 TABLET DAILY 90 tablet 3    levothyroxine (SYNTHROID/LEVOTHROID) 75 MCG tablet TAKE 1 TABLET DAILY (FILLED ONE TIME ONLY AS IS DUE FOR FOLLOW UP WITH PRIMARY CARE PHYSICIAN FOR FURTHER REFILLS) 90 tablet 3    losartan (COZAAR) 25 MG tablet TAKE 1 TABLET DAILY (TO SCHEDULE FOLLOW UP FOR FURTHER REFILLS) 90 tablet 0    melatonin 3 MG CAPS Take 3 mg by mouth at bedtime.      spironolactone (ALDACTONE) 25 MG tablet TAKE 1 TABLET DAILY 90 tablet 3     warfarin ANTICOAGULANT (COUMADIN) 2 MG tablet Take 0.5-1 tablets (1-2 mg) by mouth daily. As directed by INR clinic. 90 tablet 0    Allergies   Allergen Reactions    Metal [Staples] Other (See Comments)     Metal allergy not clearly specified - patient had a stainless steel screw put in her foot that needed to be removed due to allergy/hypersensitivity     Trace Minerals Cr-Cu-Mn-Zn      Other reaction(s): Other (see comments)  Metal allergy not clearly specified - patient had a stainless steel screw put in her foot that needed to be removed due to allergy/hypersensitivity.  Also react to earrings.    Lisinopril      cough    Metoprolol Fatigue         Lab Results    Chemistry/lipid CBC Cardiac Enzymes/BNP/TSH/INR   Lab Results   Component Value Date    CHOL 92 09/25/2024    HDL 42 (L) 09/25/2024    TRIG 80 09/25/2024    BUN 14.7 09/25/2024     09/25/2024    CO2 28 09/25/2024    Lab Results   Component Value Date    WBC 6.1 09/25/2024    HGB 14.3 09/25/2024    HCT 42.5 09/25/2024    MCV 94 09/25/2024     09/25/2024    Lab Results   Component Value Date     (H) 12/03/2020    TSH 1.45 09/25/2024    INR 2.0 (H) 06/12/2025

## 2025-06-26 NOTE — LETTER
6/26/2025    JUNO THOMPSON MD  [address] [facility] [place] [address]    RE: Caty Ng       Dear Colleague,     I had the pleasure of seeing Caty Ng in the [facility]alth [facility].      [facility]  [phone]          Assessment/Recommendations   Patient with known cardiomyopathy, mild coronary artery disease by previous angiography, permanent atrial fibrillation.  She does have shortness of breath with exertion and sleeps in her recliner as she feels like her breathing is better partially sitting up.  We will check a B natruretic peptide to see if there is evidence of extra fluid in her system.  Her jugular venous pressure is normal today and I did not hear any crackles on exam but certainly she may benefit from increased diuretic given the B natruretic peptide is elevated.    It has been a couple of years so we will get a echocardiogram to reevaluate her left ventricular systolic function as well as left ventricular compliance and Zio patch monitor to make sure that the ventricular response to atrial fibrillation is well-controlled and not overcontrolled.    Blood pressure is well-controlled, LDL cholesterol was measured at 34 and she is anticoagulated for elevated CHADS2 vascular score.    We have talked about switching her to Xarelto or Eliquis in the past and expense has been an issue.  Will ask our MT consultants to help her navigate through as I think she would benefit from switching to 1 of these medications.    It has been my honor to care for Abi Ng.    The longitudinal plan of care for the diagnosis(es)/condition(s) as documented were addressed during this visit. Due to the added complexity in care, I will continue to support Abi in the subsequent management and with ongoing continuity of care.        History of Present Illness/Subjective    Ms. Caty Ng is a 80 year old female with known mild reduction left ventricular systolic function,  permanent atrial fibrillation, who continues to have dyspnea on exertion.  She feels like it may be a little bit worse over the last couple years.  She has also noticed that sleeping in a recliner allows her to breathe better at night.  She figure this out when her  is getting chemotherapy and she was next to him in a chair and felt better.  So she continues to sleep in a chair.  She does have some peripheral edema and quite significant shortness of breath with activity.  No chest pain, no palpitations but sometimes can feel her heart flip-flopping a bit.  No syncopal or near syncopal episodes.       Physical Examination Review of Systems   /72 (BP Location: Left arm, Patient Position: Sitting, Cuff Size: Adult Large)   Pulse 92   Resp 14   Wt 96.2 kg (212 lb)   BMI 37.55 kg/m    Body mass index is 37.55 kg/m .  Wt Readings from Last 3 Encounters:   06/26/25 96.2 kg (212 lb)   04/10/25 98.5 kg (217 lb 2 oz)   04/03/25 97.6 kg (215 lb 4 oz)     General Appearance:   Alert, cooperative and in no acute distress.   ENT/Mouth: Pink/moist oral mucosa   EYES:  no scleral icterus, normal conjunctivae   Neck: JVP normal. No Hepatojugular reflux. Thyroid not visualized.   Chest/Lungs:   Lungs are clear to auscultation, equal chest wall expansion.   Cardiovascular:   S1, S2 with 1/6 systolic murmur , no clicks or rubs. Brachial, radial pulses are intact and symetric. No carotid bruits noted   Abdomen:  Nontender. BS+. No bruits.   Extremities: No cyanosis, clubbing and mild pretibial edema.  Superficial varicosities in pretibial region   Skin: no xanthelasma, warm.    Neurologic: normal arm movement bilateral, no tremors     Psychiatric: Appropriate affect.      Encounter Vitals  BP: 104/72  Pulse: 92  Resp: 14  Weight: 96.2 kg (212 lb)                                           Medical History  Surgical History Family History Social History   Past Medical History:   Diagnosis Date     Allergic rhinitis       Arrhythmia 01/01/2012     Atrial fibrillation (H)      Atrial fibrillation, chronic (H) 06/21/2012    Followed by Dr. 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 Cobos, cardiology EF 45 - 50%     Hyperlipidemia 01/01/2012     Hypertriglyceridemia      Mediastinal mass 2015    benign, [facility] consult      Mitral valve regurgitation 06/24/2012     Moderate mitral regurgitation      Morbid obesity with BMI of 40.0-44.9, adult (H) 04/25/2015     BROCK (obstructive sleep apnea)      Scoliosis      Sleep apnea 01/01/2010    per pt     Thyroid disease     Past Surgical History:   Procedure Laterality Date     CV CORONARY ANGIOGRAM N/A 7/21/2021    Procedure: Coronary Angiogram;  Surgeon: Hai Murphy MD;  Location: [facility] CATH LAB CV     CV LEFT HEART CATH N/A 7/21/2021    Procedure: Left Heart Cath;  Surgeon: Hai Murphy MD;  Location: [facility] CATH LAB CV     HYSTERECTOMY, PAP NO LONGER INDICATED  1989     JOINT REPLACEMTN, KNEE RT/LT  2007    right     JOINT REPLACEMTN, KNEE RT/LT Left      SALPINGO OOPHORECTOMY,R/L/BRANDO Bilateral 2003     SURGICAL HISTORY OF -   1989    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    Family History   Problem Relation Age of Onset     Heart Disease Mother      Cancer Mother      Diabetes Father      Musculoskeletal Disorder Father      Diabetes Paternal Grandmother      Diabetes Paternal Grandfather      Diabetes Sister      Thrombophilia Daughter         Factor V Leiden Deficiency     Diabetes Sister      Social History     Socioeconomic History     Marital status:      Spouse name: Bill      Number of children: 2     Years of education: Not on file     Highest education level: Not on file   Occupational History     Occupation: taxes      Employer: RETIRED   Tobacco Use     Smoking status: Former     Current packs/day: 0.00     Average packs/day: 0.5 packs/day for 20.0 years (10.0 ttl pk-yrs)     Types: Cigarettes     Start date: 1962     Quit date: 1982     Years since quittin.5     Passive exposure: Past     Smokeless tobacco: Never   Vaping Use     Vaping status: Never Used   Substance and Sexual Activity     Alcohol use: Yes     Comment: 1 beers per week     Drug use: Never     Sexual activity: Yes     Partners: Male     Birth control/protection: Post-menopausal   Other Topics Concern     Parent/sibling w/ CABG, MI or angioplasty before 65F 55M? Yes   Social History Narrative     Not on file     Social Drivers of Health     Financial Resource Strain: Low Risk  (10/20/2022)    Received from [facility]    Overall Financial Resource Strain (CARDIA)      Difficulty of Paying Living Expenses: Not very hard   Food Insecurity: No Food Insecurity (10/20/2022)    Received from [facility]    Hunger Vital Sign      Worried About Running Out of Food in the Last Year: Never true      Ran Out of Food in the Last Year: Never true   Transportation Needs: No Transportation Needs (10/20/2022)    Received from [facility]    PRAPARE - Transportation      Lack of Transportation (Medical): No      Lack of Transportation (Non-Medical): No   Physical Activity: Unknown (10/20/2022)    Received from [facility]    Exercise Vital Sign      Days of Exercise per Week: 0 days      Minutes of Exercise per Session: Patient declined   Stress: No Stress Concern Present (10/20/2022)    Received from [facility]    [employer] - Occupational Stress Questionnaire      Feeling of Stress : Only a  little   Social Connections: Unknown (10/20/2022)    Received from [facility]    Social Connection and Isolation Panel [NHANES]      Frequency of Communication with Friends and Family: More than three times a week      Frequency of Social Gatherings with Friends and Family: Once a week      Attends Temple Services: Patient declined      Active Member of Clubs or Organizations: No      Attends Club or Organization Meetings: Never      Marital Status:    Interpersonal Safety: Low Risk  (4/3/2025)    Interpersonal Safety      Do you feel physically and emotionally safe where you currently live?: Yes      Within the past 12 months, have you been hit, slapped, kicked or otherwise physically hurt by someone?: No      Within the past 12 months, have you been humiliated or emotionally abused in other ways by your partner or ex-partner?: No   Housing Stability: Low Risk  (10/20/2022)    Received from [facility]    Housing Stability Vital Sign      Unable to Pay for Housing in the Last Year: No      Number of Places Lived in the Last Year: 1      Unstable Housing in the Last Year: No          Medications  Allergies   Current Outpatient Medications   Medication Sig Dispense Refill     acetaminophen (TYLENOL) 650 MG CR tablet Take 1,300 mg by mouth 2 times daily       amoxicillin (AMOXIL) 500 MG capsule TAKE 4 CAPSULES BY MOUTH 1 HOUR BEFORE DENTAL APPOINTMENT       atorvastatin (LIPITOR) 80 MG tablet Take 1 tablet (80 mg) by mouth daily. 90 tablet 0     carvedilol (COREG) 25 MG tablet TAKE ONE-HALF (1/2) TABLET IN THE MORNING AND ONE TABLET IN THE AFTERNOON (FOLLOW UP IN CARDIOLOGY NEEDED FOR FURTHER REFILLS) 135 tablet 0     clonazePAM (KLONOPIN) 0.5 MG tablet Take 1 tablet (0.5 mg) by mouth at bedtime. 30 tablet 1     furosemide (LASIX) 40 MG tablet TAKE 1 TABLET DAILY 90 tablet 3     levothyroxine (SYNTHROID/LEVOTHROID) 75 MCG tablet TAKE 1 TABLET DAILY (FILLED ONE TIME ONLY AS IS DUE FOR FOLLOW UP WITH PRIMARY  CARE PHYSICIAN FOR FURTHER REFILLS) 90 tablet 3     losartan (COZAAR) 25 MG tablet TAKE 1 TABLET DAILY (TO SCHEDULE FOLLOW UP FOR FURTHER REFILLS) 90 tablet 0     melatonin 3 MG CAPS Take 3 mg by mouth at bedtime.       spironolactone (ALDACTONE) 25 MG tablet TAKE 1 TABLET DAILY 90 tablet 3     warfarin ANTICOAGULANT (COUMADIN) 2 MG tablet Take 0.5-1 tablets (1-2 mg) by mouth daily. As directed by INR clinic. 90 tablet 0    Allergies   Allergen Reactions     Metal [Staples] Other (See Comments)     Metal allergy not clearly specified - patient had a stainless steel screw put in her foot that needed to be removed due to allergy/hypersensitivity      Trace Minerals Cr-Cu-Mn-Zn      Other reaction(s): Other (see comments)  Metal allergy not clearly specified - patient had a stainless steel screw put in her foot that needed to be removed due to allergy/hypersensitivity.  Also react to earrings.     Lisinopril      cough     Metoprolol Fatigue         Lab Results    Chemistry/lipid CBC Cardiac Enzymes/BNP/TSH/INR   Lab Results   Component Value Date    CHOL 92 09/25/2024    HDL 42 (L) 09/25/2024    TRIG 80 09/25/2024    BUN 14.7 09/25/2024     09/25/2024    CO2 28 09/25/2024    Lab Results   Component Value Date    WBC 6.1 09/25/2024    HGB 14.3 09/25/2024    HCT 42.5 09/25/2024    MCV 94 09/25/2024     09/25/2024    Lab Results   Component Value Date     (H) 12/03/2020    TSH 1.45 09/25/2024    INR 2.0 (H) 06/12/2025                                              Thank you for allowing me to participate in the care of your patient.      Sincerely,     Cameron Soria MD     [facility]  cc:   Miroslava Orantes PA-C  [address]  [address]  [place] [address]

## 2025-06-27 ENCOUNTER — ORDERS ONLY (AUTO-RELEASED) (OUTPATIENT)
Dept: CARDIOLOGY | Facility: CLINIC | Age: 80
End: 2025-06-27
Payer: MEDICARE

## 2025-06-27 DIAGNOSIS — I42.8 NON-ISCHEMIC CARDIOMYOPATHY (H): ICD-10-CM

## 2025-06-27 DIAGNOSIS — E03.9 HYPOTHYROIDISM, UNSPECIFIED TYPE: ICD-10-CM

## 2025-06-27 DIAGNOSIS — R06.01 ORTHOPNEA: ICD-10-CM

## 2025-06-27 DIAGNOSIS — I48.20 CHRONIC ATRIAL FIBRILLATION (H): ICD-10-CM

## 2025-06-27 DIAGNOSIS — I25.10 CORONARY ARTERY DISEASE INVOLVING NATIVE CORONARY ARTERY OF NATIVE HEART WITHOUT ANGINA PECTORIS: ICD-10-CM

## 2025-07-03 ENCOUNTER — RESULTS FOLLOW-UP (OUTPATIENT)
Dept: ANTICOAGULATION | Facility: CLINIC | Age: 80
End: 2025-07-03

## 2025-07-03 ENCOUNTER — LAB (OUTPATIENT)
Dept: LAB | Facility: CLINIC | Age: 80
End: 2025-07-03
Payer: MEDICARE

## 2025-07-03 ENCOUNTER — ANTICOAGULATION THERAPY VISIT (OUTPATIENT)
Dept: ANTICOAGULATION | Facility: CLINIC | Age: 80
End: 2025-07-03

## 2025-07-03 DIAGNOSIS — I48.20 CHRONIC ATRIAL FIBRILLATION (H): ICD-10-CM

## 2025-07-03 DIAGNOSIS — Z79.01 LONG TERM (CURRENT) USE OF ANTICOAGULANTS: Primary | ICD-10-CM

## 2025-07-03 LAB — INR BLD: 1.9 (ref 0.9–1.1)

## 2025-07-03 NOTE — PROGRESS NOTES
"ANTICOAGULATION MANAGEMENT     Caty Ng 80 year old female is on warfarin with subtherapeutic INR result. (Goal INR 2.0-3.0)    Recent labs: (last 7 days)     07/03/25  1108   INR 1.9*       ASSESSMENT     Source(s): Chart Review and Patient/Caregiver Call     Warfarin doses taken: Warfarin taken as instructed  Diet: \"my diet changes all the time depending on how I am feeling\"  Medication/supplement changes: None noted  New illness, injury, or hospitalization: No  Signs or symptoms of bleeding or clotting: No  Previous result: Therapeutic last 2(+) visits  Additional findings: cataract surgery 7/21/25       PLAN     Recommended plan for no diet, medication or health factor changes and previous 2 INR results within goal affecting INR     Dosing Instructions: Continue your current warfarin dose with next INR in 1 week       Summary  As of 7/3/2025      Full warfarin instructions:  1 mg every Mon, Wed, Fri; 2 mg all other days   Next INR check:  7/11/2025               Telephone call with Abi who agrees to plan and repeated back plan correctly    Check at provider office visit    Education provided: Contact [phone] with any changes, questions or concerns.     Plan made per [facility] anticoagulation protocol    Alysa Ariza RN  7/3/2025  Anticoagulation Clinic  [employer] for routing messages: gordon ANDREW  [facility] patient phone line: [phone]        _______________________________________________________________________     Anticoagulation Episode Summary       Current INR goal:  2.0-3.0   TTR:  43.9% (9 mo)   Target end date:  Indefinite   Send INR reminders to:  BREE ANDREW    Indications    Long term (current) use of anticoagulants [Z79.01]  Permanent atrial fibrillation (H) (Resolved) [I48.21]  Chronic atrial fibrillation (H) [I48.20]             Comments:  --             Anticoagulation Care Providers       Provider Role Specialty Phone number    Keke Folres MD Referring Family " Medicine [phone]

## 2025-07-07 DIAGNOSIS — I48.21 PERMANENT ATRIAL FIBRILLATION (H): ICD-10-CM

## 2025-07-07 DIAGNOSIS — Z79.01 LONG TERM (CURRENT) USE OF ANTICOAGULANTS: ICD-10-CM

## 2025-07-07 RX ORDER — WARFARIN SODIUM 2 MG/1
1-2 TABLET ORAL EVERY EVENING
Qty: 70 TABLET | Refills: 1 | Status: SHIPPED | OUTPATIENT
Start: 2025-07-07

## 2025-07-07 NOTE — TELEPHONE ENCOUNTER
ANTICOAGULATION MANAGEMENT:  Medication Refill    Anticoagulation Summary  As of 7/3/2025      Warfarin maintenance plan:  1 mg (2 mg x 0.5) every Mon, Wed, Fri; 2 mg (2 mg x 1) all other days   Next INR check:  7/11/2025   Target end date:  Indefinite    Indications    Long term (current) use of anticoagulants [Z79.01]  Permanent atrial fibrillation (H) (Resolved) [I48.21]  Chronic atrial fibrillation (H) [I48.20]                 Anticoagulation Care Providers       Provider Role Specialty Phone number    Keke Flores MD Referring Family Medicine [phone]            Refill Criteria    Visit with referring provider/group: Meets criteria: visit within referring provider group in the last 15 months on 4/10/25    [facility] referral last signed: 03/26/2025; within last year:  Yes    Lab monitoring is up to date (not exceeding 2 weeks overdue): Yes    Caty meets all criteria for refill. Rx instructions and quantity supplied updated to match patient's current dosing plan. Warfarin 90 day supply with 1 refill granted per [facility] protocol     Alysa Ariza RN  Anticoagulation Clinic

## 2025-07-14 DIAGNOSIS — I50.32 CHRONIC DIASTOLIC HEART FAILURE (H): ICD-10-CM

## 2025-07-14 RX ORDER — LOSARTAN POTASSIUM 25 MG/1
25 TABLET ORAL DAILY
Qty: 90 TABLET | Refills: 2 | Status: SHIPPED | OUTPATIENT
Start: 2025-07-14

## 2025-07-16 LAB — CV ZIO PRELIM RESULTS: NORMAL

## 2025-07-17 ENCOUNTER — ANTICOAGULATION THERAPY VISIT (OUTPATIENT)
Dept: ANTICOAGULATION | Facility: CLINIC | Age: 80
End: 2025-07-17

## 2025-07-17 ENCOUNTER — OFFICE VISIT (OUTPATIENT)
Dept: FAMILY MEDICINE | Facility: CLINIC | Age: 80
End: 2025-07-17
Payer: MEDICARE

## 2025-07-17 VITALS
RESPIRATION RATE: 20 BRPM | HEIGHT: 63 IN | TEMPERATURE: 98 F | DIASTOLIC BLOOD PRESSURE: 68 MMHG | BODY MASS INDEX: 37.49 KG/M2 | HEART RATE: 88 BPM | SYSTOLIC BLOOD PRESSURE: 100 MMHG | WEIGHT: 211.6 LBS | OXYGEN SATURATION: 96 %

## 2025-07-17 DIAGNOSIS — I48.20 CHRONIC ATRIAL FIBRILLATION (H): ICD-10-CM

## 2025-07-17 DIAGNOSIS — Z79.01 LONG TERM (CURRENT) USE OF ANTICOAGULANTS: Primary | ICD-10-CM

## 2025-07-17 DIAGNOSIS — H26.9 CATARACT OF BOTH EYES, UNSPECIFIED CATARACT TYPE: ICD-10-CM

## 2025-07-17 DIAGNOSIS — I34.0 MODERATE MITRAL REGURGITATION: ICD-10-CM

## 2025-07-17 DIAGNOSIS — Z01.818 PREOP GENERAL PHYSICAL EXAM: Primary | ICD-10-CM

## 2025-07-17 DIAGNOSIS — Z79.01 LONG TERM CURRENT USE OF ANTICOAGULANT THERAPY: ICD-10-CM

## 2025-07-17 DIAGNOSIS — E03.4 HYPOTHYROIDISM DUE TO ACQUIRED ATROPHY OF THYROID: ICD-10-CM

## 2025-07-17 DIAGNOSIS — E78.5 HYPERLIPIDEMIA LDL GOAL <70: ICD-10-CM

## 2025-07-17 DIAGNOSIS — I25.10 CORONARY ARTERY DISEASE INVOLVING NATIVE CORONARY ARTERY OF NATIVE HEART WITHOUT ANGINA PECTORIS: ICD-10-CM

## 2025-07-17 DIAGNOSIS — I50.22 CHRONIC SYSTOLIC HEART FAILURE (H): ICD-10-CM

## 2025-07-17 LAB
ANION GAP SERPL CALCULATED.3IONS-SCNC: 11 MMOL/L (ref 7–15)
ATRIAL RATE - MUSE: 101 BPM
BUN SERPL-MCNC: 14.8 MG/DL (ref 8–23)
CALCIUM SERPL-MCNC: 9.6 MG/DL (ref 8.8–10.4)
CHLORIDE SERPL-SCNC: 101 MMOL/L (ref 98–107)
CREAT SERPL-MCNC: 0.79 MG/DL (ref 0.51–0.95)
DIASTOLIC BLOOD PRESSURE - MUSE: NORMAL MMHG
EGFRCR SERPLBLD CKD-EPI 2021: 76 ML/MIN/1.73M2
ERYTHROCYTE [DISTWIDTH] IN BLOOD BY AUTOMATED COUNT: 12.9 % (ref 10–15)
GLUCOSE SERPL-MCNC: 106 MG/DL (ref 70–99)
HCO3 SERPL-SCNC: 27 MMOL/L (ref 22–29)
HCT VFR BLD AUTO: 40.5 % (ref 35–47)
HGB BLD-MCNC: 13.6 G/DL (ref 11.7–15.7)
INR PPP: 1.85 (ref 0.85–1.15)
INTERPRETATION ECG - MUSE: NORMAL
MCH RBC QN AUTO: 31.6 PG (ref 26.5–33)
MCHC RBC AUTO-ENTMCNC: 33.6 G/DL (ref 31.5–36.5)
MCV RBC AUTO: 94 FL (ref 78–100)
P AXIS - MUSE: NORMAL DEGREES
PLATELET # BLD AUTO: 191 10E3/UL (ref 150–450)
POTASSIUM SERPL-SCNC: 4.1 MMOL/L (ref 3.4–5.3)
PR INTERVAL - MUSE: NORMAL MS
PROTHROMBIN TIME: 21.5 SECONDS (ref 11.8–14.8)
QRS DURATION - MUSE: 82 MS
QT - MUSE: 376 MS
QTC - MUSE: 470 MS
R AXIS - MUSE: 8 DEGREES
RBC # BLD AUTO: 4.3 10E6/UL (ref 3.8–5.2)
SODIUM SERPL-SCNC: 139 MMOL/L (ref 135–145)
SYSTOLIC BLOOD PRESSURE - MUSE: NORMAL MMHG
T AXIS - MUSE: 7 DEGREES
TSH SERPL DL<=0.005 MIU/L-ACNC: 1.38 UIU/ML (ref 0.3–4.2)
VENTRICULAR RATE- MUSE: 94 BPM
WBC # BLD AUTO: 7.1 10E3/UL (ref 4–11)

## 2025-07-17 PROCEDURE — 99214 OFFICE O/P EST MOD 30 MIN: CPT | Performed by: FAMILY MEDICINE

## 2025-07-17 PROCEDURE — 84443 ASSAY THYROID STIM HORMONE: CPT | Performed by: FAMILY MEDICINE

## 2025-07-17 PROCEDURE — 80048 BASIC METABOLIC PNL TOTAL CA: CPT | Performed by: FAMILY MEDICINE

## 2025-07-17 PROCEDURE — 93005 ELECTROCARDIOGRAM TRACING: CPT | Performed by: FAMILY MEDICINE

## 2025-07-17 PROCEDURE — 36415 COLL VENOUS BLD VENIPUNCTURE: CPT | Performed by: FAMILY MEDICINE

## 2025-07-17 PROCEDURE — G2211 COMPLEX E/M VISIT ADD ON: HCPCS | Performed by: FAMILY MEDICINE

## 2025-07-17 PROCEDURE — 3074F SYST BP LT 130 MM HG: CPT | Performed by: FAMILY MEDICINE

## 2025-07-17 PROCEDURE — 3078F DIAST BP <80 MM HG: CPT | Performed by: FAMILY MEDICINE

## 2025-07-17 PROCEDURE — 85610 PROTHROMBIN TIME: CPT | Performed by: FAMILY MEDICINE

## 2025-07-17 PROCEDURE — 85027 COMPLETE CBC AUTOMATED: CPT | Performed by: FAMILY MEDICINE

## 2025-07-17 NOTE — PATIENT INSTRUCTIONS
Patient Education   Preparing for Your Surgery  For Adults  Getting started  In most cases, a nurse will call to review your health history and instructions. They will give you an arrival time based on your scheduled surgery time. Please be ready to share:  Your doctor's clinic name and phone number  Your medical, surgical, and anesthesia history  A list of allergies and sensitivities  A list of medicines, including herbal treatments and over-the-counter drugs  Whether the patient has a legal guardian (ask how to send us the papers in advance)  Note: You may not receive a call if you were seen at our PAC (Preoperative Assessment Center).  Please tell us if you're pregnant--or if there's any chance you might be pregnant. Some surgeries may injure a fetus (unborn baby), so they require a pregnancy test. Surgeries that are safe for a fetus don't always need a test, and you can choose whether to have one.   Preparing for surgery  Within 10 to 30 days of surgery: Have a pre-op exam (sometimes called an H&P, or History and Physical). This can be done at a clinic or pre-operative center.  If you're having a , you may not need this exam. Talk to your care team.  At your pre-op exam, talk to your care team about all medicines you take. (This includes CBD oil and any drugs, such as THC, marijuana, and other forms of cannabis.) If you need to stop any medicine before surgery, ask when to start taking it again.  This is for your safety. Many medicines and drugs can make you bleed too much during surgery. Some change how well surgery (anesthesia) drugs work.  Call your insurance company to let them know you're having surgery. (If you don't have insurance, call [phone].)  Call your clinic if there's any change in your health. This includes a scrape or scratch near the surgery site, or any signs of a cold (sore throat, runny nose, cough, rash, fever).  Eating and drinking guidelines  For your safety: Unless  your surgeon tells you otherwise, follow the guidelines below.  Eat and drink as normal until 8 hours before you arrive for surgery. After that, no food or milk. You can spit out gum when you arrive.  Drink clear liquids until 2 hours before you arrive. These are liquids you can see through, like water, Gatorade, and Propel Water. They also include plain black coffee and tea (no cream or milk).  No alcohol for 24 hours before you arrive. The night before surgery, stop any drinks that contain THC.  If your care team tells you to take medicine on the morning of surgery, it's okay to take it with a sip of water. No other medicines or drugs are allowed (including CBD oil)--follow your care team's instructions.  If you have questions the day of surgery, call your hospital or surgery center.   Preventing infection  Shower or bathe the night before and the morning of surgery. Follow the instructions your clinic gave you. (If no instructions, use regular soap.)  Don't shave or clip hair near your surgery site. We'll remove the hair if needed.  Don't smoke or vape the morning of surgery. No chewing tobacco for 6 hours before you arrive. A nicotine patch is okay. You may spit out nicotine gum when you arrive.  For some surgeries, the surgeon will tell you to fully quit smoking and nicotine.  We will make every effort to keep you safe from infection. We will:  Clean our hands often with soap and water (or an alcohol-based hand rub).  Clean the skin at your surgery site with a special soap that kills germs.  Give you a special gown to keep you warm. (Cold raises the risk of infection.)  Wear hair covers, masks, gowns, and gloves during surgery.  Give antibiotic medicine, if prescribed. Not all surgeries need this medicine.  What to bring on the day of surgery  Photo ID and insurance card  Copy of your health care directive, if you have one  Glasses and hearing aids (bring cases)  You can't wear contacts during surgery  Inhaler  and eye drops, if you use them (tell us about these when you arrive)  CPAP machine or breathing device, if you use them  A few personal items, if spending the night  If you have . . .  A pacemaker, ICD (cardiac defibrillator), or other implant: Bring the ID card.  An implanted stimulator: Bring the remote control.  A legal guardian: Bring a copy of the certified (court-stamped) guardianship papers.  Please remove any jewelry, including body piercings. Leave jewelry and other valuables at home.  If you're going home the day of surgery  You must have a support person drive you home. They should stay with you overnight, and they may need to help with your self-care.  If you don't have a support person, please tells us as soon as possible. We can help.  After surgery  If it's hard to control your pain or you need more pain medicine, please call your surgeon's office.  Questions?   If you have any questions for your care team, list them here:   ____________________________________________________________________________________________________________________________________________________________________________________________________________________________________________________________  For informational purposes only. Not to replace the advice of your health care provider. Copyright   2003, 2019 [employer]. All rights reserved. Clinically reviewed by Alexey Reynolds MD. [employer] [id number] - REV 02/25.

## 2025-07-17 NOTE — PROGRESS NOTES
ANTICOAGULATION MANAGEMENT     Caty Ng 80 year old female is on warfarin with subtherapeutic INR result. (Goal INR 2.0-3.0)    Recent labs: (last 7 days)     07/17/25  1004   INR 1.85*       ASSESSMENT     Source(s): Chart Review and Patient/Caregiver Call     Warfarin doses taken: Warfarin taken as instructed  Diet: No new diet changes identified  Medication/supplement changes: None noted  New illness, injury, or hospitalization: No  Signs or symptoms of bleeding or clotting: No  Previous result: Subtherapeutic  Additional findings: Upcoming surgery/procedure Cataract surgery: 7/22 and 7/29, no warfarin hold needed       PLAN     Recommended plan for no diet, medication or health factor changes affecting INR     Dosing Instructions: Increase your warfarin dose (9.1% change) with next INR in 2 weeks       Summary  As of 7/17/2025      Full warfarin instructions:  1 mg every Mon, Fri; 2 mg all other days   Next INR check:  8/4/2025               Telephone call with Abi who verbalizes understanding and agrees to plan    Lab visit scheduled    Education provided: Goal range and lab monitoring: goal range and significance of current result  Contact [phone] with any changes, questions or concerns.     Plan made per [facility] anticoagulation protocol    Marisela Funk RN  7/17/2025  Anticoagulation Clinic  [employer] for routing messages: gordon ANDREW  [facility] patient phone line: [phone]        _______________________________________________________________________     Anticoagulation Episode Summary       Current INR goal:  2.0-3.0   TTR:  38.7% (9 mo)   Target end date:  Indefinite   Send INR reminders to:  BREE ANDREW    Indications    Long term (current) use of anticoagulants [Z79.01]  Permanent atrial fibrillation (H) (Resolved) [I48.21]  Chronic atrial fibrillation (H) [I48.20]             Comments:  --             Anticoagulation Care Providers       Provider Role Specialty Phone  number    Keke Flores MD [facility] Medicine [phone]

## 2025-07-17 NOTE — PROGRESS NOTES
Preoperative Evaluation  [address]  [facility] [address]  Phone: [phone]  Fax: [phone]  Primary Provider: JUNO THOMPSON MD  Pre-op Performing Provider: Dee Dee Barrientos MD  Jul 17, 2025 7/17/2025   Surgical Information   What procedure is being done? Catarct Surgery    Facility or Hospital where procedure/surgery will be performed: [facility]    Who is doing the procedure / surgery? Dr. Rosario    Date of surgery / procedure: 07/22/2025    Time of surgery / procedure: TBD    Where do you plan to recover after surgery? at home with family        Proxy-reported     Fax number for surgical facility: [phone]    Assessment & Plan     The proposed surgical procedure is considered LOW risk.    Preop general physical exam    - EKG 12-lead, tracing only  - CBC with platelets; Future  - Basic metabolic panel; Future  - CBC with platelets  - Basic metabolic panel    Cataract of both eyes, unspecified cataract type      Chronic atrial fibrillation (H)    Chronic systolic heart failure (H)    Moderate mitral regurgitation    Long term current use of anticoagulant therapy  - INR; Future  - INR    Coronary artery disease involving native coronary artery of native heart without angina pectoris    Hyperlipidemia LDL goal <40    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 atrophy of thyroid  - TSH with free T4 reflex          - No identified additional risk factors other than previously addressed    Antiplatelet or Anticoagulation Medication Instructions   - warfarin: Bleeding risk is minimal for this procedure (e.g. cataract, 1 to 2 dental extractions) and warfarin should be continued before procedure.     Additional Medication Instructions  Take all scheduled medications on the day of surgery      Recommendation  Approval given to proceed with proposed procedure, without further diagnostic evaluation.    The longitudinal plan of care for  the diagnosis(es)/condition(s) as documented were addressed during this visit. Due to the added complexity in care, I will continue to support Abi in the subsequent management and with ongoing continuity of care.    Jessee Alex is a 80 year old, presenting for the following:  Pre-Op Exam          7/17/2025     8:57 AM   Additional Questions   Roomed by Osbaldo VILLARREAL CMA             7/17/2025   Pre-Op Questionnaire   Have you ever had a heart attack or stroke? No    Have you ever had surgery on your heart or blood vessels, such as a stent placement, a coronary artery bypass, or surgery on an artery in your head, neck, heart, or legs? (!) YES     Do you have chest pain with activity? No    Do you have a history of heart failure? (!) YES     Do you currently have a cold, bronchitis or symptoms of other infection? No    Do you have a cough, shortness of breath, or wheezing? No    Do you or anyone in your family have previous history of blood clots? (!) YES     Do you or does anyone in your family have a serious bleeding problem such as prolonged bleeding following surgeries or cuts? No    Have you ever had problems with anemia or been told to take iron pills? No    Have you had any abnormal blood loss such as black, tarry or bloody stools, or abnormal vaginal bleeding? No    Have you ever had a blood transfusion? No    Are you willing to have a blood transfusion if it is medically needed before, during, or after your surgery? Yes    Have you or any of your relatives ever had problems with anesthesia? No    Do you have sleep apnea, excessive snoring or daytime drowsiness? (!) UNKNOWN    Do you have any artifical heart valves or other implanted medical devices like a pacemaker, defibrillator, or continuous glucose monitor? No    Do you have artificial joints? (!) YES    Are you allergic to latex? No        Proxy-reported     Advance Care Planning    Patient states has Health Care Directive and will send to [employer].    Preoperative Review of    reviewed - controlled substances reflected in medication list.          Patient Active Problem List    Diagnosis Date Noted    Orthopnea 06/26/2025     Priority: Medium    Elevated brain natriuretic peptide (BNP) level 01/06/2023     Priority: Medium    Long term (current) use of anticoagulants 05/02/2022     Priority: Medium    Dyspnea on exertion 08/12/2021     Priority: Medium    Status post coronary angiogram 07/21/2021     Priority: Medium    Coronary artery disease involving native coronary artery of native heart without angina pectoris 06/30/2021     Priority: Medium     Added automatically from request for surgery [id number]      Non-ischemic cardiomyopathy (H) 06/30/2021     Priority: Medium     Added automatically from request for surgery [id number]      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 Provider to review      Hyperlipidemia LDL goal <40 11/05/2013     Priority: Medium    BROCK (obstructive sleep apnea) 11/04/2013     Priority: Medium     Patient has chosen to not do cpap.      Systolic heart failure (H)      Priority: Medium     EF 45 - 50%        Moderate mitral regurgitation 06/24/2012     Priority: Medium    Chronic atrial fibrillation (H) 06/21/2012     Priority: Medium    Morbid obesity due to excess calories (H) 11/29/2011     Priority: Medium      Past Medical History:   Diagnosis Date    Allergic rhinitis     Arrhythmia 01/01/2012    Atrial fibrillation (H)     Atrial fibrillation, chronic (H) 06/21/2012    Followed by Dr. 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 Cobos, cardiology EF 45 - 50%    Hyperlipidemia 01/01/2012    Hypertriglyceridemia     Mediastinal mass 2015    benign, [facility] consult     Mitral valve regurgitation 06/24/2012    Moderate mitral regurgitation     Morbid obesity with BMI of 40.0-44.9, adult (H) 04/25/2015    BROCK (obstructive sleep apnea)     Scoliosis     Sleep apnea 01/01/2010    per pt    Thyroid disease      Past Surgical History:   Procedure Laterality Date    APPENDECTOMY  1964    Same time as ovarian cyst removal    BIOPSY      BREAST SURGERY  1977    COLONOSCOPY      CV CORONARY ANGIOGRAM N/A 07/21/2021    Procedure: Coronary Angiogram;  Surgeon: Hai Murphy MD;  Location: [facility] CATH LAB CV    CV LEFT HEART CATH N/A 07/21/2021    Procedure: Left Heart Cath;  Surgeon: Hai Murphy MD;  Location: [facility] CATH LAB CV    HYSTERECTOMY, PAP NO LONGER INDICATED  1989    JOINT REPLACEMTN, KNEE RT/LT  2007    right    JOINT REPLACEMTN, KNEE RT/LT Left     SALPINGO OOPHORECTOMY,R/L/BRANDO Bilateral 2003    SURGICAL HISTORY OF -   1989    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     Current Outpatient Medications   Medication Sig Dispense Refill    acetaminophen (TYLENOL) 650 MG CR tablet Take 1,300 mg by mouth 2 times daily      atorvastatin (LIPITOR) 80 MG tablet Take 1 tablet (80 mg) by mouth daily. 90 tablet 0    carvedilol (COREG) 25 MG tablet TAKE ONE-HALF (1/2) TABLET IN THE MORNING AND ONE TABLET IN THE AFTERNOON (FOLLOW UP IN CARDIOLOGY NEEDED FOR FURTHER REFILLS) 135  tablet 0    clonazePAM (KLONOPIN) 0.5 MG tablet Take 1 tablet (0.5 mg) by mouth at bedtime. 30 tablet 1    furosemide (LASIX) 40 MG tablet TAKE 1 TABLET DAILY 90 tablet 3    levothyroxine (SYNTHROID/LEVOTHROID) 75 MCG tablet TAKE 1 TABLET DAILY (FILLED ONE TIME ONLY AS IS DUE FOR FOLLOW UP WITH PRIMARY CARE PHYSICIAN FOR FURTHER REFILLS) 90 tablet 3    losartan (COZAAR) 25 MG tablet Take 1 tablet (25 mg) by mouth daily. 90 tablet 2    melatonin 3 MG CAPS Take 3 mg by mouth at bedtime.      spironolactone (ALDACTONE) 25 MG tablet TAKE 1 TABLET DAILY 90 tablet 3    warfarin ANTICOAGULANT (COUMADIN/JANTOVEN) 2 MG tablet Take 0.5-1 tablets (1-2 mg) by mouth every evening. Adjust dose per INR as directed by [facility] 70 tablet 1    amoxicillin (AMOXIL) 500 MG capsule TAKE 4 CAPSULES BY MOUTH 1 HOUR BEFORE DENTAL APPOINTMENT (Patient not taking: Reported on 2025)         Allergies   Allergen Reactions    Metal [Staples] Other (See Comments)     Metal allergy not clearly specified - patient had a stainless steel screw put in her foot that needed to be removed due to allergy/hypersensitivity     Trace Minerals Cr-[id number]-[id number]      Other reaction(s): Other (see comments)  Metal allergy not clearly specified - patient had a stainless steel screw put in her foot that needed to be removed due to allergy/hypersensitivity.  Also react to earrings.    Lisinopril      cough    Metoprolol Fatigue        Social History     Tobacco Use    Smoking status: Former     Current packs/day: 0.00     Average packs/day: 0.5 packs/day for 20.0 years (10.0 ttl pk-yrs)     Types: Cigarettes     Start date: 1962     Quit date: 1982     Years since quittin.5     Passive exposure: Past    Smokeless tobacco: Never   Substance Use Topics    Alcohol use: Yes     Comment: 1 beers per week       History   Drug Use Unknown               Objective    /68 (BP Location: Right arm, Patient Position: Sitting, Cuff Size: Adult Regular)   Pulse 88  "  Temp 98  F (36.7  C) (Oral)   Resp 20   Ht 1.6 m (5' 3\")   Wt 96 kg (211 lb 9.6 oz)   SpO2 96%   BMI 37.48 kg/m     Estimated body mass index is 37.48 kg/m  as calculated from the following:    Height as of this encounter: 1.6 m (5' 3\").    Weight as of this encounter: 96 kg (211 lb 9.6 oz).    Physical Exam  GENERAL: alert and no distress  HENT: normal cephalic/atraumatic, oropharynx clear, and oral mucous membranes moist  NECK: no adenopathy, no asymmetry, masses, or scars  RESP: lungs clear to auscultation - no rales, rhonchi or wheezes  CV: irregularly irregular rhythm, normal S1 S2, no S3 or S4, peripheral pulses strong, and no peripheral edema  ABDOMEN: soft, nontender, no hepatosplenomegaly, no masses and bowel sounds normal  MS: no gross musculoskeletal defects noted, no edema    Recent Labs   Lab Test 07/03/25  1108 06/26/25  0852 06/12/25  1058 10/04/24  1141 09/25/24  1644   HGB  --  13.3  --   --  14.3   PLT  --  178  --   --  199   INR 1.9*  --  2.0*   < > 3.6*   NA  --  143  --   --  139   POTASSIUM  --  4.7  --   --  4.0   CR  --  0.73  --   --  0.81    < > = values in this interval not displayed.        Diagnostics  Recent Results (from the past week)   ZIO PATCH MAIL OUT    Collection Time: 07/15/25  1:49 PM   Result Value Ref Range    Zio Prelim Results       Atrial Fibrillation occurred continuously (100% burden), ranging from  bpm (avg of 91 bpm). Isolated VEs were occasional (2.1%, 7944), VE Couplets were rare (<1.0%, 91), and no VE Triplets were present. Ventricular Bigeminy was present.   EKG 12-lead, tracing only    Collection Time: 07/17/25  9:32 AM   Result Value Ref Range    Systolic Blood Pressure  mmHg    Diastolic Blood Pressure  mmHg    Ventricular Rate 94 BPM    Atrial Rate 101 BPM    VA Interval  ms    QRS Duration 82 ms     ms    QTc 470 ms    P Axis  degrees    R AXIS 8 degrees    T Axis 7 degrees    Interpretation ECG       Atrial fibrillation  Abnormal " ECG  When compared with ECG of 06-Jan-2023 13:13,  Criteria for Inferior infarct are no longer Present  Nonspecific T wave abnormality, improved in Inferior leads  Nonspecific T wave abnormality no longer evident in Lateral leads     TSH with free T4 reflex    Collection Time: 07/17/25 10:04 AM   Result Value Ref Range    TSH 1.38 0.30 - 4.20 uIU/mL   CBC with platelets    Collection Time: 07/17/25 10:04 AM   Result Value Ref Range    WBC Count 7.1 4.0 - 11.0 10e3/uL    RBC Count 4.30 3.80 - 5.20 10e6/uL    Hemoglobin 13.6 11.7 - 15.7 g/dL    Hematocrit 40.5 35.0 - 47.0 %    MCV 94 78 - 100 fL    MCH 31.6 26.5 - 33.0 pg    MCHC 33.6 31.5 - 36.5 g/dL    RDW 12.9 10.0 - 15.0 %    Platelet Count 191 150 - 450 10e3/uL   Basic metabolic panel    Collection Time: 07/17/25 10:04 AM   Result Value Ref Range    Sodium 139 135 - 145 mmol/L    Potassium 4.1 3.4 - 5.3 mmol/L    Chloride 101 98 - 107 mmol/L    Carbon Dioxide (CO2) 27 22 - 29 mmol/L    Anion Gap 11 7 - 15 mmol/L    Urea Nitrogen 14.8 8.0 - 23.0 mg/dL    Creatinine 0.79 0.51 - 0.95 mg/dL    GFR Estimate 76 >60 mL/min/1.73m2    Calcium 9.6 8.8 - 10.4 mg/dL    Glucose 106 (H) 70 - 99 mg/dL   INR    Collection Time: 07/17/25 10:04 AM   Result Value Ref Range    INR 1.85 (H) 0.85 - 1.15    PT 21.5 (H) 11.8 - 14.8 Seconds          Revised Cardiac Risk Index (RCRI)  The patient has the following serious cardiovascular risks for perioperative complications:   - Congestive Heart Failure (pulmonary edema, PND, s3 saray, CXR with pulmonary congestion, basilar rales) = 1 point     RCRI Interpretation: 1 point: Class II (low risk - 0.9% complication rate)         Signed Electronically by: Dee Dee Barrientos MD  A copy of this evaluation report is provided to the requesting physician.

## 2025-07-21 LAB
ATRIAL RATE - MUSE: 101 BPM
DIASTOLIC BLOOD PRESSURE - MUSE: NORMAL MMHG
INTERPRETATION ECG - MUSE: NORMAL
P AXIS - MUSE: NORMAL DEGREES
PR INTERVAL - MUSE: NORMAL MS
QRS DURATION - MUSE: 82 MS
QT - MUSE: 376 MS
QTC - MUSE: 470 MS
R AXIS - MUSE: 8 DEGREES
SYSTOLIC BLOOD PRESSURE - MUSE: NORMAL MMHG
T AXIS - MUSE: 7 DEGREES
VENTRICULAR RATE- MUSE: 94 BPM

## 2025-07-21 PROCEDURE — 93010 ELECTROCARDIOGRAM REPORT: CPT | Mod: OFF | Performed by: INTERNAL MEDICINE

## 2025-08-04 ENCOUNTER — ANTICOAGULATION THERAPY VISIT (OUTPATIENT)
Dept: ANTICOAGULATION | Facility: CLINIC | Age: 80
End: 2025-08-04

## 2025-08-04 ENCOUNTER — LAB (OUTPATIENT)
Dept: LAB | Facility: CLINIC | Age: 80
End: 2025-08-04
Payer: MEDICARE

## 2025-08-04 DIAGNOSIS — I48.20 CHRONIC ATRIAL FIBRILLATION (H): ICD-10-CM

## 2025-08-04 DIAGNOSIS — I50.32 CHRONIC DIASTOLIC HEART FAILURE (H): ICD-10-CM

## 2025-08-04 DIAGNOSIS — Z79.01 LONG TERM (CURRENT) USE OF ANTICOAGULANTS: Primary | ICD-10-CM

## 2025-08-04 DIAGNOSIS — I48.20 CHRONIC ATRIAL FIBRILLATION (H): Primary | ICD-10-CM

## 2025-08-04 LAB — INR BLD: 2.2 (ref 0.9–1.1)

## 2025-08-04 PROCEDURE — 85610 PROTHROMBIN TIME: CPT

## 2025-08-04 PROCEDURE — 36416 COLLJ CAPILLARY BLOOD SPEC: CPT

## 2025-08-05 RX ORDER — CARVEDILOL 25 MG/1
TABLET ORAL
Qty: 135 TABLET | Refills: 2 | Status: SHIPPED | OUTPATIENT
Start: 2025-08-05

## 2025-08-11 ENCOUNTER — HOSPITAL ENCOUNTER (OUTPATIENT)
Dept: CARDIOLOGY | Facility: HOSPITAL | Age: 80
Discharge: HOME | End: 2025-08-11
Attending: INTERNAL MEDICINE | Admitting: INTERNAL MEDICINE
Payer: MEDICARE

## 2025-08-11 DIAGNOSIS — E03.9 HYPOTHYROIDISM, UNSPECIFIED TYPE: ICD-10-CM

## 2025-08-11 DIAGNOSIS — I25.10 CORONARY ARTERY DISEASE INVOLVING NATIVE CORONARY ARTERY OF NATIVE HEART WITHOUT ANGINA PECTORIS: ICD-10-CM

## 2025-08-11 DIAGNOSIS — I42.8 NON-ISCHEMIC CARDIOMYOPATHY (H): ICD-10-CM

## 2025-08-11 DIAGNOSIS — I48.20 CHRONIC ATRIAL FIBRILLATION (H): ICD-10-CM

## 2025-08-11 DIAGNOSIS — R06.01 ORTHOPNEA: ICD-10-CM

## 2025-08-11 LAB — LVEF ECHO: NORMAL

## 2025-08-11 PROCEDURE — 255N000002 HC RX 255 OP 636: Performed by: INTERNAL MEDICINE

## 2025-08-11 PROCEDURE — C8929 TTE W OR WO FOL WCON,DOPPLER: HCPCS

## 2025-08-11 PROCEDURE — 93306 TTE W/DOPPLER COMPLETE: CPT | Mod: 26 | Performed by: INTERNAL MEDICINE

## 2025-08-11 RX ADMIN — PERFLUTREN 2 ML: 6.52 INJECTION, SUSPENSION INTRAVENOUS at 10:42

## 2025-08-18 DIAGNOSIS — I42.8 NON-ISCHEMIC CARDIOMYOPATHY (H): ICD-10-CM

## 2025-08-19 RX ORDER — ATORVASTATIN CALCIUM 80 MG/1
80 TABLET, FILM COATED ORAL DAILY
Qty: 90 TABLET | Refills: 2 | Status: SHIPPED | OUTPATIENT
Start: 2025-08-19

## 2025-08-28 DIAGNOSIS — G25.3 ESSENTIAL MYOCLONUS: ICD-10-CM

## 2025-08-28 RX ORDER — CLONAZEPAM 0.5 MG/1
0.5 TABLET ORAL AT BEDTIME
Qty: 30 TABLET | Refills: 1 | Status: SHIPPED | OUTPATIENT
Start: 2025-08-28

## (undated) DEVICE — SYSTEM PANNUS RETENTION 4 PAD 2 STRAP CZ-PRS-04

## (undated) DEVICE — SYR ANGIOGRAPHY MULTIUSE KIT ACIST 014612

## (undated) DEVICE — KIT HAND CONTROL ACIST 014644 AR-P54

## (undated) DEVICE — ELECTRODE ADULT PACING MULTI P-211-M1

## (undated) DEVICE — SHTH INTRO 0.021IN ID 6FR DIA

## (undated) DEVICE — SLEEVE TR BAND RADIAL COMPRESSION DEVICE 24CM TRB24-REG

## (undated) DEVICE — CATH DIAGNOSTIC RADIAL 5FR TIG 4.0

## (undated) DEVICE — CUSTOM PACK CORONARY SAN5BCRHEA

## (undated) DEVICE — MANIFOLD KIT ANGIO AUTOMATED 014613

## (undated) RX ORDER — DIAZEPAM 5 MG
TABLET ORAL
Status: DISPENSED
Start: 2021-07-21

## (undated) RX ORDER — FENTANYL CITRATE 50 UG/ML
INJECTION, SOLUTION INTRAMUSCULAR; INTRAVENOUS
Status: DISPENSED
Start: 2021-07-21

## (undated) RX ORDER — ASPIRIN 325 MG
TABLET ORAL
Status: DISPENSED
Start: 2021-07-21